# Patient Record
Sex: FEMALE | Race: WHITE | NOT HISPANIC OR LATINO | Employment: UNEMPLOYED | RURAL
[De-identification: names, ages, dates, MRNs, and addresses within clinical notes are randomized per-mention and may not be internally consistent; named-entity substitution may affect disease eponyms.]

---

## 2019-12-12 ENCOUNTER — HISTORICAL (OUTPATIENT)
Dept: ADMINISTRATIVE | Facility: HOSPITAL | Age: 39
End: 2019-12-12

## 2019-12-13 LAB
LAB AP CLINICAL INFORMATION: NORMAL
LAB AP DIAGNOSIS - HISTORICAL: NORMAL
LAB AP GROSS PATHOLOGY - HISTORICAL: NORMAL
LAB AP SPECIMEN SUBMITTED - HISTORICAL: NORMAL

## 2020-03-09 ENCOUNTER — HISTORICAL (OUTPATIENT)
Dept: ADMINISTRATIVE | Facility: HOSPITAL | Age: 40
End: 2020-03-09

## 2020-04-27 ENCOUNTER — HISTORICAL (OUTPATIENT)
Dept: ADMINISTRATIVE | Facility: HOSPITAL | Age: 40
End: 2020-04-27

## 2020-05-05 ENCOUNTER — HISTORICAL (OUTPATIENT)
Dept: ADMINISTRATIVE | Facility: HOSPITAL | Age: 40
End: 2020-05-05

## 2020-05-19 ENCOUNTER — HISTORICAL (OUTPATIENT)
Dept: ADMINISTRATIVE | Facility: HOSPITAL | Age: 40
End: 2020-05-19

## 2020-05-21 LAB
LAB AP CLINICAL INFORMATION: NORMAL
LAB AP GENERAL CAT - HISTORICAL: NORMAL
LAB AP INTERPRETATION/RESULT - HISTORICAL: NEGATIVE
LAB AP SPECIMEN ADEQUACY - HISTORICAL: NORMAL
LAB AP SPECIMEN SUBMITTED - HISTORICAL: NORMAL

## 2020-06-18 ENCOUNTER — HISTORICAL (OUTPATIENT)
Dept: ADMINISTRATIVE | Facility: HOSPITAL | Age: 40
End: 2020-06-18

## 2020-06-26 ENCOUNTER — HISTORICAL (OUTPATIENT)
Dept: ADMINISTRATIVE | Facility: HOSPITAL | Age: 40
End: 2020-06-26

## 2020-09-21 ENCOUNTER — HISTORICAL (OUTPATIENT)
Dept: ADMINISTRATIVE | Facility: HOSPITAL | Age: 40
End: 2020-09-21

## 2020-09-21 LAB
GLUCOSE SERPL-MCNC: 129 MG/DL (ref 70–105)
GLUCOSE SERPL-MCNC: 157 MG/DL (ref 70–105)

## 2020-10-07 ENCOUNTER — HISTORICAL (OUTPATIENT)
Dept: ADMINISTRATIVE | Facility: HOSPITAL | Age: 40
End: 2020-10-07

## 2020-10-07 LAB
ANION GAP SERPL CALCULATED.3IONS-SCNC: 15 MMOL/L
APTT PPP: 36.8 SECONDS (ref 25.2–37.3)
BASOPHILS # BLD AUTO: 0.05 X10E3/UL (ref 0–0.2)
BASOPHILS NFR BLD AUTO: 0.5 % (ref 0–1)
BUN SERPL-MCNC: 16 MG/DL (ref 7–18)
CALCIUM SERPL-MCNC: 9.4 MG/DL (ref 8.5–10.1)
CHLORIDE SERPL-SCNC: 101 MMOL/L (ref 98–107)
CK MB SERPL-MCNC: <1 NG/ML (ref 1–3.6)
CO2 SERPL-SCNC: 26 MMOL/L (ref 21–32)
CREAT SERPL-MCNC: 0.79 MG/DL (ref 0.55–1.02)
EOSINOPHIL # BLD AUTO: 0.24 X10E3/UL (ref 0–0.5)
EOSINOPHIL NFR BLD AUTO: 2.5 % (ref 1–4)
ERYTHROCYTE [DISTWIDTH] IN BLOOD BY AUTOMATED COUNT: 13.2 % (ref 11.5–14.5)
GLUCOSE SERPL-MCNC: 127 MG/DL (ref 74–106)
HCT VFR BLD AUTO: 38.6 % (ref 38–47)
HGB BLD-MCNC: 12.6 G/DL (ref 12–16)
IMM GRANULOCYTES # BLD AUTO: 0.08 X10E3/UL (ref 0–0.04)
IMM GRANULOCYTES NFR BLD: 0.8 % (ref 0–0.4)
INR BLD: 1.1 (ref 0–3.3)
LYMPHOCYTES # BLD AUTO: 2.26 X10E3/UL (ref 1–4.8)
LYMPHOCYTES NFR BLD AUTO: 23.3 % (ref 27–41)
MCH RBC QN AUTO: 28.2 PG (ref 27–31)
MCHC RBC AUTO-ENTMCNC: 32.6 G/DL (ref 32–36)
MCV RBC AUTO: 86.4 FL (ref 80–96)
MONOCYTES # BLD AUTO: 0.75 X10E3/UL (ref 0–0.8)
MONOCYTES NFR BLD AUTO: 7.7 % (ref 2–6)
MPC BLD CALC-MCNC: 13.4 FL (ref 9.4–12.4)
MYOGLOBIN SERPL-MCNC: 39 NG/ML (ref 13–71)
NEUTROPHILS # BLD AUTO: 6.32 X10E3/UL (ref 1.8–7.7)
NEUTROPHILS NFR BLD AUTO: 65.2 % (ref 53–65)
NRBC # BLD AUTO: 0 X10E3/UL (ref 0–0)
NRBC, AUTO (.00): 0 /100 (ref 0–0)
NT-PROBNP SERPL-MCNC: 22 PG/ML (ref 1–125)
PLATELET # BLD AUTO: 166 X10E3/UL (ref 150–400)
PLATELET MORPHOLOGY: ABNORMAL
POTASSIUM SERPL-SCNC: 3.9 MMOL/L (ref 3.5–5.1)
PROTHROMBIN TIME: 13.7 SECONDS (ref 11.7–14.7)
RBC # BLD AUTO: 4.47 X10E6/UL (ref 4.2–5.4)
RBC MORPH BLD: NORMAL
SODIUM SERPL-SCNC: 138 MMOL/L (ref 136–145)
TROPONIN I SERPL-MCNC: <0.017 NG/ML (ref 0–0.06)
WBC # BLD AUTO: 9.7 X10E3/UL (ref 4.5–11)

## 2020-11-16 ENCOUNTER — HISTORICAL (OUTPATIENT)
Dept: ADMINISTRATIVE | Facility: HOSPITAL | Age: 40
End: 2020-11-16

## 2020-11-16 LAB — GLUCOSE SERPL-MCNC: 125 MG/DL (ref 70–105)

## 2020-12-21 ENCOUNTER — HISTORICAL (OUTPATIENT)
Dept: ADMINISTRATIVE | Facility: HOSPITAL | Age: 40
End: 2020-12-21

## 2020-12-21 LAB — GLUCOSE SERPL-MCNC: 95 MG/DL (ref 70–105)

## 2021-03-24 RX ORDER — ESCITALOPRAM OXALATE 20 MG/1
20 TABLET ORAL DAILY
COMMUNITY
End: 2022-09-12

## 2021-03-24 RX ORDER — CYCLOBENZAPRINE HCL 10 MG
10 TABLET ORAL 3 TIMES DAILY PRN
COMMUNITY
End: 2021-05-06 | Stop reason: SDUPTHER

## 2021-03-24 RX ORDER — GABAPENTIN 300 MG/1
300 CAPSULE ORAL EVERY 8 HOURS
COMMUNITY
End: 2021-03-29 | Stop reason: SDUPTHER

## 2021-03-25 RX ORDER — BUDESONIDE AND FORMOTEROL FUMARATE DIHYDRATE 160; 4.5 UG/1; UG/1
2 AEROSOL RESPIRATORY (INHALATION) EVERY 12 HOURS
COMMUNITY
End: 2022-05-11 | Stop reason: SDUPTHER

## 2021-03-25 RX ORDER — LISINOPRIL 40 MG/1
40 TABLET ORAL DAILY
COMMUNITY
End: 2021-05-06 | Stop reason: SDUPTHER

## 2021-03-25 RX ORDER — TRIAMTERENE AND HYDROCHLOROTHIAZIDE 75; 50 MG/1; MG/1
1 TABLET ORAL DAILY
COMMUNITY
End: 2021-05-06 | Stop reason: SDUPTHER

## 2021-03-25 RX ORDER — HYDROCODONE BITARTRATE AND ACETAMINOPHEN 10; 325 MG/1; MG/1
1 TABLET ORAL EVERY 12 HOURS
COMMUNITY
End: 2021-03-29 | Stop reason: SDUPTHER

## 2021-03-25 RX ORDER — METFORMIN HYDROCHLORIDE EXTENDED-RELEASE TABLETS 500 MG/1
500 TABLET, FILM COATED, EXTENDED RELEASE ORAL
COMMUNITY
End: 2021-06-16 | Stop reason: SDUPTHER

## 2021-03-25 RX ORDER — PRAVASTATIN SODIUM 20 MG/1
20 TABLET ORAL NIGHTLY
COMMUNITY
End: 2021-05-06 | Stop reason: SDUPTHER

## 2021-03-29 PROBLEM — M54.17 LUMBOSACRAL RADICULOPATHY: Chronic | Status: ACTIVE | Noted: 2021-03-29

## 2021-03-29 PROBLEM — M05.79 RHEUMATOID ARTHRITIS INVOLVING MULTIPLE SITES WITH POSITIVE RHEUMATOID FACTOR: Chronic | Status: ACTIVE | Noted: 2021-03-29

## 2021-03-29 PROBLEM — G89.4 CHRONIC PAIN SYNDROME: Chronic | Status: ACTIVE | Noted: 2021-03-29

## 2021-03-31 ENCOUNTER — OFFICE VISIT (OUTPATIENT)
Dept: PAIN MEDICINE | Facility: HOSPITAL | Age: 41
End: 2021-03-31
Payer: MEDICAID

## 2021-03-31 VITALS
DIASTOLIC BLOOD PRESSURE: 91 MMHG | WEIGHT: 293 LBS | HEART RATE: 107 BPM | SYSTOLIC BLOOD PRESSURE: 150 MMHG | RESPIRATION RATE: 19 BRPM | HEIGHT: 62 IN | BODY MASS INDEX: 53.92 KG/M2

## 2021-03-31 DIAGNOSIS — M05.79 RHEUMATOID ARTHRITIS INVOLVING MULTIPLE SITES WITH POSITIVE RHEUMATOID FACTOR: Primary | Chronic | ICD-10-CM

## 2021-03-31 DIAGNOSIS — G89.4 CHRONIC PAIN SYNDROME: Chronic | ICD-10-CM

## 2021-03-31 DIAGNOSIS — Z79.899 ENCOUNTER FOR LONG-TERM (CURRENT) USE OF OTHER MEDICATIONS: ICD-10-CM

## 2021-03-31 DIAGNOSIS — M54.17 LUMBOSACRAL RADICULOPATHY: Chronic | ICD-10-CM

## 2021-03-31 LAB
CTP QC/QA: YES
POC (AMP) AMPHETAMINE: NEGATIVE
POC (BAR) BARBITURATES: NEGATIVE
POC (BUP) BUPRENORPHINE: NEGATIVE
POC (BZO) BENZODIAZEPINES: NEGATIVE
POC (COC) COCAINE: NEGATIVE
POC (MDMA) METHYLENEDIOXYMETHAMPHETAMINE 3,4: NEGATIVE
POC (MET) METHAMPHETAMINE: NEGATIVE
POC (MOP) OPIATES: ABNORMAL
POC (MTD) METHADONE: NEGATIVE
POC (OXY) OXYCODONE: NEGATIVE
POC (PCP) PHENCYCLIDINE: NEGATIVE
POC (TCA) TRICYCLIC ANTIDEPRESSANTS: NEGATIVE
POC TEMPERATURE (URINE): 90
POC THC: NEGATIVE

## 2021-03-31 PROCEDURE — 99214 OFFICE O/P EST MOD 30 MIN: CPT | Mod: S$PBB,,, | Performed by: PHYSICIAN ASSISTANT

## 2021-03-31 PROCEDURE — 99214 PR OFFICE/OUTPT VISIT, EST, LEVL IV, 30-39 MIN: ICD-10-PCS | Mod: S$PBB,,, | Performed by: PHYSICIAN ASSISTANT

## 2021-03-31 PROCEDURE — 99214 OFFICE O/P EST MOD 30 MIN: CPT | Mod: PBBFAC | Performed by: PHYSICIAN ASSISTANT

## 2021-03-31 PROCEDURE — 99999 PR PBB SHADOW E&M-EST. PATIENT-LVL IV: ICD-10-PCS | Mod: PBBFAC,,, | Performed by: PHYSICIAN ASSISTANT

## 2021-03-31 PROCEDURE — 99999 PR PBB SHADOW E&M-EST. PATIENT-LVL IV: CPT | Mod: PBBFAC,,, | Performed by: PHYSICIAN ASSISTANT

## 2021-03-31 RX ORDER — IPRATROPIUM BROMIDE AND ALBUTEROL SULFATE 2.5; .5 MG/3ML; MG/3ML
3 SOLUTION RESPIRATORY (INHALATION) EVERY 6 HOURS PRN
COMMUNITY

## 2021-03-31 RX ORDER — HYDROCODONE BITARTRATE AND ACETAMINOPHEN 10; 325 MG/1; MG/1
1 TABLET ORAL EVERY 12 HOURS
Qty: 60 TABLET | Refills: 0 | Status: SHIPPED | OUTPATIENT
Start: 2021-04-01 | End: 2021-05-01

## 2021-03-31 RX ORDER — GABAPENTIN 300 MG/1
300 CAPSULE ORAL EVERY 8 HOURS
Qty: 90 CAPSULE | Refills: 0 | Status: SHIPPED | OUTPATIENT
Start: 2021-03-31 | End: 2021-04-27 | Stop reason: SDUPTHER

## 2021-03-31 RX ORDER — METHOTREXATE 2.5 MG/1
2.5 TABLET ORAL
COMMUNITY
End: 2022-09-12

## 2021-04-27 ENCOUNTER — OFFICE VISIT (OUTPATIENT)
Dept: PAIN MEDICINE | Facility: CLINIC | Age: 41
End: 2021-04-27
Payer: MEDICAID

## 2021-04-27 VITALS
DIASTOLIC BLOOD PRESSURE: 82 MMHG | RESPIRATION RATE: 20 BRPM | HEIGHT: 62 IN | WEIGHT: 293 LBS | BODY MASS INDEX: 53.92 KG/M2 | SYSTOLIC BLOOD PRESSURE: 171 MMHG | HEART RATE: 99 BPM

## 2021-04-27 DIAGNOSIS — M05.79 RHEUMATOID ARTHRITIS INVOLVING MULTIPLE SITES WITH POSITIVE RHEUMATOID FACTOR: Chronic | ICD-10-CM

## 2021-04-27 DIAGNOSIS — G89.4 CHRONIC PAIN SYNDROME: Chronic | ICD-10-CM

## 2021-04-27 DIAGNOSIS — M54.17 LUMBOSACRAL RADICULOPATHY: Primary | Chronic | ICD-10-CM

## 2021-04-27 PROCEDURE — 99999 PR PBB SHADOW E&M-EST. PATIENT-LVL IV: CPT | Mod: PBBFAC,,, | Performed by: PHYSICIAN ASSISTANT

## 2021-04-27 PROCEDURE — 99999 PR PBB SHADOW E&M-EST. PATIENT-LVL IV: ICD-10-PCS | Mod: PBBFAC,,, | Performed by: PHYSICIAN ASSISTANT

## 2021-04-27 PROCEDURE — 99214 OFFICE O/P EST MOD 30 MIN: CPT | Mod: PBBFAC,25 | Performed by: PHYSICIAN ASSISTANT

## 2021-04-27 PROCEDURE — 99214 OFFICE O/P EST MOD 30 MIN: CPT | Mod: S$PBB,25,, | Performed by: PHYSICIAN ASSISTANT

## 2021-04-27 PROCEDURE — 96372 THER/PROPH/DIAG INJ SC/IM: CPT | Mod: PBBFAC | Performed by: PHYSICIAN ASSISTANT

## 2021-04-27 PROCEDURE — 99214 PR OFFICE/OUTPT VISIT, EST, LEVL IV, 30-39 MIN: ICD-10-PCS | Mod: S$PBB,25,, | Performed by: PHYSICIAN ASSISTANT

## 2021-04-27 RX ORDER — GABAPENTIN 300 MG/1
300 CAPSULE ORAL EVERY 8 HOURS
Qty: 90 CAPSULE | Refills: 0 | Status: SHIPPED | OUTPATIENT
Start: 2021-04-27 | End: 2021-06-28 | Stop reason: SDUPTHER

## 2021-04-27 RX ORDER — KETOROLAC TROMETHAMINE 30 MG/ML
60 INJECTION, SOLUTION INTRAMUSCULAR; INTRAVENOUS
Status: COMPLETED | OUTPATIENT
Start: 2021-04-27 | End: 2021-04-27

## 2021-04-27 RX ORDER — HYDROCODONE BITARTRATE AND ACETAMINOPHEN 10; 325 MG/1; MG/1
1 TABLET ORAL EVERY 8 HOURS
Qty: 90 TABLET | Refills: 0 | Status: SHIPPED | OUTPATIENT
Start: 2021-05-01 | End: 2021-05-31

## 2021-04-27 RX ORDER — HYDROCODONE BITARTRATE AND ACETAMINOPHEN 10; 325 MG/1; MG/1
1 TABLET ORAL EVERY 12 HOURS
Qty: 60 TABLET | Refills: 0 | Status: SHIPPED | OUTPATIENT
Start: 2021-05-31 | End: 2021-05-07 | Stop reason: SDUPTHER

## 2021-04-27 RX ADMIN — KETOROLAC TROMETHAMINE 60 MG: 60 INJECTION, SOLUTION INTRAMUSCULAR at 01:04

## 2021-05-06 ENCOUNTER — OFFICE VISIT (OUTPATIENT)
Dept: FAMILY MEDICINE | Facility: CLINIC | Age: 41
End: 2021-05-06
Payer: MEDICAID

## 2021-05-06 VITALS
SYSTOLIC BLOOD PRESSURE: 141 MMHG | HEIGHT: 62 IN | RESPIRATION RATE: 20 BRPM | HEART RATE: 75 BPM | DIASTOLIC BLOOD PRESSURE: 100 MMHG | WEIGHT: 293 LBS | BODY MASS INDEX: 53.92 KG/M2 | OXYGEN SATURATION: 98 % | TEMPERATURE: 97 F

## 2021-05-06 DIAGNOSIS — M62.838 MUSCLE SPASM: ICD-10-CM

## 2021-05-06 DIAGNOSIS — I10 ESSENTIAL HYPERTENSION: ICD-10-CM

## 2021-05-06 DIAGNOSIS — E78.00 HYPERCHOLESTEREMIA: ICD-10-CM

## 2021-05-06 DIAGNOSIS — J31.0 CHRONIC RHINITIS: ICD-10-CM

## 2021-05-06 DIAGNOSIS — Z23 NEED FOR DIPHTHERIA-TETANUS-PERTUSSIS (TDAP) VACCINE: ICD-10-CM

## 2021-05-06 DIAGNOSIS — E11.9 TYPE 2 DIABETES MELLITUS WITHOUT COMPLICATION, WITHOUT LONG-TERM CURRENT USE OF INSULIN: Primary | ICD-10-CM

## 2021-05-06 DIAGNOSIS — J45.909 ASTHMA, UNSPECIFIED ASTHMA SEVERITY, UNSPECIFIED WHETHER COMPLICATED, UNSPECIFIED WHETHER PERSISTENT: ICD-10-CM

## 2021-05-06 DIAGNOSIS — M05.79 RHEUMATOID ARTHRITIS INVOLVING MULTIPLE SITES WITH POSITIVE RHEUMATOID FACTOR: ICD-10-CM

## 2021-05-06 DIAGNOSIS — Z12.31 SCREENING MAMMOGRAM, ENCOUNTER FOR: ICD-10-CM

## 2021-05-06 DIAGNOSIS — Z79.899 ENCOUNTER FOR LONG-TERM (CURRENT) USE OF OTHER MEDICATIONS: ICD-10-CM

## 2021-05-06 PROCEDURE — 90471 TDAP VACCINE GREATER THAN OR EQUAL TO 7YO IM: ICD-10-PCS | Mod: ,,, | Performed by: NURSE PRACTITIONER

## 2021-05-06 PROCEDURE — 90715 TDAP VACCINE 7 YRS/> IM: CPT | Mod: ,,, | Performed by: NURSE PRACTITIONER

## 2021-05-06 PROCEDURE — 90715 TDAP VACCINE GREATER THAN OR EQUAL TO 7YO IM: ICD-10-PCS | Mod: ,,, | Performed by: NURSE PRACTITIONER

## 2021-05-06 PROCEDURE — 90471 IMMUNIZATION ADMIN: CPT | Mod: ,,, | Performed by: NURSE PRACTITIONER

## 2021-05-06 PROCEDURE — 99214 OFFICE O/P EST MOD 30 MIN: CPT | Mod: 25,,, | Performed by: NURSE PRACTITIONER

## 2021-05-06 PROCEDURE — 99214 PR OFFICE/OUTPT VISIT, EST, LEVL IV, 30-39 MIN: ICD-10-PCS | Mod: 25,,, | Performed by: NURSE PRACTITIONER

## 2021-05-06 RX ORDER — ADALIMUMAB 40MG/0.4ML
KIT SUBCUTANEOUS
COMMUNITY
Start: 2021-04-19 | End: 2021-09-08 | Stop reason: ALTCHOICE

## 2021-05-06 RX ORDER — BUSPIRONE HYDROCHLORIDE 15 MG/1
15 TABLET ORAL 2 TIMES DAILY
COMMUNITY
Start: 2021-04-28 | End: 2022-09-12

## 2021-05-06 RX ORDER — CYCLOBENZAPRINE HCL 10 MG
10 TABLET ORAL 3 TIMES DAILY PRN
Qty: 90 TABLET | Refills: 3 | Status: SHIPPED | OUTPATIENT
Start: 2021-05-06 | End: 2021-09-08 | Stop reason: SDUPTHER

## 2021-05-06 RX ORDER — HYDROXYZINE PAMOATE 50 MG/1
4 CAPSULE ORAL DAILY
COMMUNITY
Start: 2021-04-28 | End: 2022-09-12

## 2021-05-06 RX ORDER — AMLODIPINE BESYLATE 2.5 MG/1
2.5 TABLET ORAL DAILY
Qty: 30 TABLET | Refills: 3 | Status: SHIPPED | OUTPATIENT
Start: 2021-05-06 | End: 2021-12-22 | Stop reason: ALTCHOICE

## 2021-05-06 RX ORDER — TRIAMTERENE AND HYDROCHLOROTHIAZIDE 75; 50 MG/1; MG/1
1 TABLET ORAL DAILY
Qty: 90 TABLET | Refills: 1 | Status: SHIPPED | OUTPATIENT
Start: 2021-05-06 | End: 2021-05-20

## 2021-05-06 RX ORDER — LISINOPRIL 40 MG/1
40 TABLET ORAL DAILY
Qty: 90 TABLET | Refills: 1 | Status: SHIPPED | OUTPATIENT
Start: 2021-05-06 | End: 2021-09-08 | Stop reason: SDUPTHER

## 2021-05-06 RX ORDER — PRAVASTATIN SODIUM 20 MG/1
20 TABLET ORAL NIGHTLY
Qty: 90 TABLET | Refills: 3 | Status: SHIPPED | OUTPATIENT
Start: 2021-05-06 | End: 2021-06-16 | Stop reason: ALTCHOICE

## 2021-05-07 PROBLEM — J31.0 CHRONIC RHINITIS: Status: ACTIVE | Noted: 2021-05-07

## 2021-05-07 PROBLEM — J45.909 ASTHMA: Status: ACTIVE | Noted: 2021-05-07

## 2021-05-07 PROBLEM — M62.838 MUSCLE SPASM: Status: ACTIVE | Noted: 2021-05-07

## 2021-05-07 PROBLEM — E11.9 TYPE 2 DIABETES MELLITUS WITHOUT COMPLICATION, WITHOUT LONG-TERM CURRENT USE OF INSULIN: Status: ACTIVE | Noted: 2021-05-07

## 2021-05-07 PROBLEM — I10 ESSENTIAL HYPERTENSION: Status: ACTIVE | Noted: 2021-05-07

## 2021-05-07 PROBLEM — E78.00 HYPERCHOLESTEREMIA: Status: ACTIVE | Noted: 2021-05-07

## 2021-05-17 ENCOUNTER — HOSPITAL ENCOUNTER (OUTPATIENT)
Dept: RADIOLOGY | Facility: HOSPITAL | Age: 41
Discharge: HOME | End: 2021-05-17
Attending: NURSE PRACTITIONER
Payer: MEDICAID

## 2021-05-17 VITALS — BODY MASS INDEX: 68.84 KG/M2 | HEIGHT: 62 IN

## 2021-05-17 DIAGNOSIS — Z12.31 SCREENING MAMMOGRAM, ENCOUNTER FOR: ICD-10-CM

## 2021-05-17 PROCEDURE — 77067 SCR MAMMO BI INCL CAD: CPT | Mod: TC

## 2021-05-20 ENCOUNTER — APPOINTMENT (OUTPATIENT)
Dept: RADIOLOGY | Facility: CLINIC | Age: 41
End: 2021-05-20
Attending: NURSE PRACTITIONER
Payer: MEDICAID

## 2021-05-20 ENCOUNTER — OFFICE VISIT (OUTPATIENT)
Dept: FAMILY MEDICINE | Facility: CLINIC | Age: 41
End: 2021-05-20
Payer: MEDICAID

## 2021-05-20 ENCOUNTER — TELEPHONE (OUTPATIENT)
Dept: FAMILY MEDICINE | Facility: CLINIC | Age: 41
End: 2021-05-20

## 2021-05-20 VITALS
BODY MASS INDEX: 53.92 KG/M2 | DIASTOLIC BLOOD PRESSURE: 110 MMHG | HEIGHT: 62 IN | OXYGEN SATURATION: 98 % | SYSTOLIC BLOOD PRESSURE: 168 MMHG | TEMPERATURE: 98 F | HEART RATE: 97 BPM | RESPIRATION RATE: 22 BRPM | WEIGHT: 293 LBS

## 2021-05-20 DIAGNOSIS — R05.9 COUGH: Primary | ICD-10-CM

## 2021-05-20 DIAGNOSIS — J32.9 SINUSITIS, UNSPECIFIED CHRONICITY, UNSPECIFIED LOCATION: ICD-10-CM

## 2021-05-20 DIAGNOSIS — R05.9 COUGH: ICD-10-CM

## 2021-05-20 DIAGNOSIS — R50.9 FEVER, UNSPECIFIED FEVER CAUSE: ICD-10-CM

## 2021-05-20 PROCEDURE — 96372 PR INJECTION,THERAP/PROPH/DIAG2ST, IM OR SUBCUT: ICD-10-PCS | Mod: ,,, | Performed by: NURSE PRACTITIONER

## 2021-05-20 PROCEDURE — 96372 THER/PROPH/DIAG INJ SC/IM: CPT | Mod: ,,, | Performed by: NURSE PRACTITIONER

## 2021-05-20 PROCEDURE — 71046 X-RAY EXAM CHEST 2 VIEWS: CPT | Mod: TC,RHCUB | Performed by: NURSE PRACTITIONER

## 2021-05-20 PROCEDURE — 99213 OFFICE O/P EST LOW 20 MIN: CPT | Mod: 25,,, | Performed by: NURSE PRACTITIONER

## 2021-05-20 PROCEDURE — 99213 PR OFFICE/OUTPT VISIT, EST, LEVL III, 20-29 MIN: ICD-10-PCS | Mod: 25,,, | Performed by: NURSE PRACTITIONER

## 2021-05-20 RX ORDER — PROMETHAZINE HYDROCHLORIDE AND DEXTROMETHORPHAN HYDROBROMIDE 6.25; 15 MG/5ML; MG/5ML
5 SYRUP ORAL EVERY 4 HOURS PRN
Qty: 150 ML | Refills: 0 | Status: SHIPPED | OUTPATIENT
Start: 2021-05-20 | End: 2021-05-30

## 2021-05-20 RX ORDER — METFORMIN HYDROCHLORIDE 500 MG/1
500 TABLET, EXTENDED RELEASE ORAL DAILY
COMMUNITY
Start: 2020-12-23 | End: 2022-01-11 | Stop reason: SDUPTHER

## 2021-05-20 RX ORDER — OLOPATADINE HYDROCHLORIDE 2 MG/ML
SOLUTION/ DROPS OPHTHALMIC
COMMUNITY
Start: 2020-12-01 | End: 2022-01-11

## 2021-05-20 RX ORDER — CEFTRIAXONE 1 G/1
1 INJECTION, POWDER, FOR SOLUTION INTRAMUSCULAR; INTRAVENOUS
Status: COMPLETED | OUTPATIENT
Start: 2021-05-20 | End: 2021-05-20

## 2021-05-20 RX ORDER — CEFDINIR 300 MG/1
300 CAPSULE ORAL 2 TIMES DAILY
Qty: 20 CAPSULE | Refills: 0 | Status: SHIPPED | OUTPATIENT
Start: 2021-05-20 | End: 2021-09-08 | Stop reason: ALTCHOICE

## 2021-05-20 RX ORDER — CALCIUM CITRATE/VITAMIN D3 200MG-6.25
TABLET ORAL
COMMUNITY
Start: 2021-01-01 | End: 2023-01-12 | Stop reason: SDUPTHER

## 2021-05-20 RX ORDER — ETANERCEPT 50 MG/ML
SOLUTION SUBCUTANEOUS
COMMUNITY
Start: 2021-05-07 | End: 2022-01-11

## 2021-05-20 RX ORDER — SPIRONOLACTONE 50 MG/1
75 TABLET, FILM COATED ORAL DAILY
COMMUNITY
Start: 2020-12-02 | End: 2022-07-06 | Stop reason: SDUPTHER

## 2021-05-20 RX ORDER — HYDROCODONE BITARTRATE AND ACETAMINOPHEN 7.5; 325 MG/1; MG/1
TABLET ORAL
COMMUNITY
Start: 2021-01-01 | End: 2021-05-20

## 2021-05-20 RX ADMIN — CEFTRIAXONE 1 G: 1 INJECTION, POWDER, FOR SOLUTION INTRAMUSCULAR; INTRAVENOUS at 03:05

## 2021-05-24 ENCOUNTER — OFFICE VISIT (OUTPATIENT)
Dept: FAMILY MEDICINE | Facility: CLINIC | Age: 41
End: 2021-05-24
Payer: MEDICAID

## 2021-05-24 VITALS
BODY MASS INDEX: 55.32 KG/M2 | TEMPERATURE: 99 F | OXYGEN SATURATION: 98 % | HEART RATE: 95 BPM | HEIGHT: 61 IN | SYSTOLIC BLOOD PRESSURE: 160 MMHG | WEIGHT: 293 LBS | RESPIRATION RATE: 20 BRPM | DIASTOLIC BLOOD PRESSURE: 102 MMHG

## 2021-05-24 DIAGNOSIS — I10 ESSENTIAL HYPERTENSION: Primary | ICD-10-CM

## 2021-05-24 PROCEDURE — 99212 PR OFFICE/OUTPT VISIT, EST, LEVL II, 10-19 MIN: ICD-10-PCS | Mod: ,,, | Performed by: NURSE PRACTITIONER

## 2021-05-24 PROCEDURE — 99212 OFFICE O/P EST SF 10 MIN: CPT | Mod: ,,, | Performed by: NURSE PRACTITIONER

## 2021-06-08 ENCOUNTER — OFFICE VISIT (OUTPATIENT)
Dept: ORTHOPEDICS | Facility: CLINIC | Age: 41
End: 2021-06-08
Payer: MEDICAID

## 2021-06-08 DIAGNOSIS — M22.41 CHONDROMALACIA OF PATELLOFEMORAL JOINT, RIGHT: Primary | ICD-10-CM

## 2021-06-08 PROCEDURE — 20610 LARGE JOINT ASPIRATION/INJECTION: R SUPRA PATELLAR BURSA: ICD-10-PCS | Mod: RT,,, | Performed by: ORTHOPAEDIC SURGERY

## 2021-06-08 PROCEDURE — 20610 DRAIN/INJ JOINT/BURSA W/O US: CPT | Mod: RT,,, | Performed by: ORTHOPAEDIC SURGERY

## 2021-06-08 PROCEDURE — 99212 OFFICE O/P EST SF 10 MIN: CPT | Mod: 25,,, | Performed by: ORTHOPAEDIC SURGERY

## 2021-06-08 PROCEDURE — 99212 PR OFFICE/OUTPT VISIT, EST, LEVL II, 10-19 MIN: ICD-10-PCS | Mod: 25,,, | Performed by: ORTHOPAEDIC SURGERY

## 2021-06-08 RX ORDER — HYDROCODONE BITARTRATE AND ACETAMINOPHEN 10; 325 MG/1; MG/1
TABLET ORAL
COMMUNITY
Start: 2021-06-01 | End: 2021-06-28 | Stop reason: SDUPTHER

## 2021-06-08 RX ORDER — TRIAMCINOLONE ACETONIDE 40 MG/ML
40 INJECTION, SUSPENSION INTRA-ARTICULAR; INTRAMUSCULAR
Status: DISCONTINUED | OUTPATIENT
Start: 2021-06-08 | End: 2021-06-08 | Stop reason: HOSPADM

## 2021-06-08 RX ADMIN — TRIAMCINOLONE ACETONIDE 40 MG: 40 INJECTION, SUSPENSION INTRA-ARTICULAR; INTRAMUSCULAR at 09:06

## 2021-06-16 ENCOUNTER — OFFICE VISIT (OUTPATIENT)
Dept: CARDIOLOGY | Facility: CLINIC | Age: 41
End: 2021-06-16
Payer: MEDICAID

## 2021-06-16 VITALS
DIASTOLIC BLOOD PRESSURE: 82 MMHG | WEIGHT: 293 LBS | RESPIRATION RATE: 14 BRPM | HEIGHT: 61 IN | SYSTOLIC BLOOD PRESSURE: 120 MMHG | HEART RATE: 82 BPM | BODY MASS INDEX: 55.32 KG/M2

## 2021-06-16 DIAGNOSIS — E78.5 HYPERLIPIDEMIA, UNSPECIFIED HYPERLIPIDEMIA TYPE: ICD-10-CM

## 2021-06-16 DIAGNOSIS — I10 ESSENTIAL HYPERTENSION: ICD-10-CM

## 2021-06-16 DIAGNOSIS — E66.01 MORBIDLY OBESE: ICD-10-CM

## 2021-06-16 PROCEDURE — 93010 ELECTROCARDIOGRAM REPORT: CPT | Mod: S$PBB,,, | Performed by: STUDENT IN AN ORGANIZED HEALTH CARE EDUCATION/TRAINING PROGRAM

## 2021-06-16 PROCEDURE — 93010 EKG 12-LEAD: ICD-10-PCS | Mod: S$PBB,,, | Performed by: STUDENT IN AN ORGANIZED HEALTH CARE EDUCATION/TRAINING PROGRAM

## 2021-06-16 PROCEDURE — 93005 ELECTROCARDIOGRAM TRACING: CPT | Mod: PBBFAC | Performed by: STUDENT IN AN ORGANIZED HEALTH CARE EDUCATION/TRAINING PROGRAM

## 2021-06-16 PROCEDURE — 99204 PR OFFICE/OUTPT VISIT, NEW, LEVL IV, 45-59 MIN: ICD-10-PCS | Mod: S$PBB,,, | Performed by: STUDENT IN AN ORGANIZED HEALTH CARE EDUCATION/TRAINING PROGRAM

## 2021-06-16 PROCEDURE — 99204 OFFICE O/P NEW MOD 45 MIN: CPT | Mod: S$PBB,,, | Performed by: STUDENT IN AN ORGANIZED HEALTH CARE EDUCATION/TRAINING PROGRAM

## 2021-06-16 PROCEDURE — 99215 OFFICE O/P EST HI 40 MIN: CPT | Mod: PBBFAC | Performed by: STUDENT IN AN ORGANIZED HEALTH CARE EDUCATION/TRAINING PROGRAM

## 2021-06-16 RX ORDER — ROSUVASTATIN CALCIUM 40 MG/1
40 TABLET, COATED ORAL NIGHTLY
Qty: 90 TABLET | Refills: 3 | Status: SHIPPED | OUTPATIENT
Start: 2021-06-16 | End: 2023-06-27 | Stop reason: SDUPTHER

## 2021-06-28 ENCOUNTER — OFFICE VISIT (OUTPATIENT)
Dept: PAIN MEDICINE | Facility: CLINIC | Age: 41
End: 2021-06-28
Payer: MEDICAID

## 2021-06-28 VITALS
HEART RATE: 108 BPM | HEIGHT: 62 IN | RESPIRATION RATE: 20 BRPM | DIASTOLIC BLOOD PRESSURE: 106 MMHG | SYSTOLIC BLOOD PRESSURE: 166 MMHG | BODY MASS INDEX: 53.92 KG/M2 | WEIGHT: 293 LBS

## 2021-06-28 DIAGNOSIS — M05.79 RHEUMATOID ARTHRITIS INVOLVING MULTIPLE SITES WITH POSITIVE RHEUMATOID FACTOR: Chronic | ICD-10-CM

## 2021-06-28 DIAGNOSIS — M54.17 LUMBOSACRAL RADICULOPATHY: Chronic | ICD-10-CM

## 2021-06-28 DIAGNOSIS — Z79.899 ENCOUNTER FOR LONG-TERM (CURRENT) USE OF OTHER MEDICATIONS: Primary | ICD-10-CM

## 2021-06-28 LAB
CTP QC/QA: YES
POC (AMP) AMPHETAMINE: NEGATIVE
POC (BAR) BARBITURATES: NEGATIVE
POC (BUP) BUPRENORPHINE: NEGATIVE
POC (BZO) BENZODIAZEPINES: NEGATIVE
POC (COC) COCAINE: NEGATIVE
POC (MDMA) METHYLENEDIOXYMETHAMPHETAMINE 3,4: NEGATIVE
POC (MET) METHAMPHETAMINE: NEGATIVE
POC (MOP) OPIATES: ABNORMAL
POC (MTD) METHADONE: NEGATIVE
POC (OXY) OXYCODONE: NEGATIVE
POC (PCP) PHENCYCLIDINE: NEGATIVE
POC (TCA) TRICYCLIC ANTIDEPRESSANTS: NEGATIVE
POC TEMPERATURE (URINE): 92
POC THC: NEGATIVE

## 2021-06-28 PROCEDURE — 99214 OFFICE O/P EST MOD 30 MIN: CPT | Mod: S$PBB,,, | Performed by: PHYSICIAN ASSISTANT

## 2021-06-28 PROCEDURE — 80305 DRUG TEST PRSMV DIR OPT OBS: CPT | Mod: PBBFAC | Performed by: PHYSICIAN ASSISTANT

## 2021-06-28 PROCEDURE — 99215 OFFICE O/P EST HI 40 MIN: CPT | Mod: PBBFAC | Performed by: PHYSICIAN ASSISTANT

## 2021-06-28 PROCEDURE — 99214 PR OFFICE/OUTPT VISIT, EST, LEVL IV, 30-39 MIN: ICD-10-PCS | Mod: S$PBB,,, | Performed by: PHYSICIAN ASSISTANT

## 2021-06-28 RX ORDER — HYDROCODONE BITARTRATE AND ACETAMINOPHEN 10; 325 MG/1; MG/1
1 TABLET ORAL EVERY 12 HOURS
Qty: 60 TABLET | Refills: 0 | Status: SHIPPED | OUTPATIENT
Start: 2021-07-31 | End: 2021-08-25 | Stop reason: SDUPTHER

## 2021-06-28 RX ORDER — HYDROCODONE BITARTRATE AND ACETAMINOPHEN 10; 325 MG/1; MG/1
1 TABLET ORAL EVERY 12 HOURS
Qty: 60 TABLET | Refills: 0 | Status: SHIPPED | OUTPATIENT
Start: 2021-07-01 | End: 2021-07-31

## 2021-06-28 RX ORDER — GABAPENTIN 300 MG/1
300 CAPSULE ORAL EVERY 8 HOURS
Qty: 90 CAPSULE | Refills: 1 | Status: SHIPPED | OUTPATIENT
Start: 2021-06-28 | End: 2021-08-25 | Stop reason: SDUPTHER

## 2021-08-25 RX ORDER — HYDROCODONE BITARTRATE AND ACETAMINOPHEN 10; 325 MG/1; MG/1
1 TABLET ORAL EVERY 12 HOURS
Qty: 60 TABLET | Refills: 0 | Status: CANCELLED | OUTPATIENT
Start: 2021-08-25 | End: 2021-09-24

## 2021-08-26 ENCOUNTER — OFFICE VISIT (OUTPATIENT)
Dept: PAIN MEDICINE | Facility: CLINIC | Age: 41
End: 2021-08-26
Payer: MEDICAID

## 2021-08-26 VITALS
HEART RATE: 75 BPM | WEIGHT: 293 LBS | DIASTOLIC BLOOD PRESSURE: 78 MMHG | BODY MASS INDEX: 53.92 KG/M2 | SYSTOLIC BLOOD PRESSURE: 144 MMHG | HEIGHT: 62 IN

## 2021-08-26 DIAGNOSIS — M05.79 RHEUMATOID ARTHRITIS INVOLVING MULTIPLE SITES WITH POSITIVE RHEUMATOID FACTOR: Primary | Chronic | ICD-10-CM

## 2021-08-26 DIAGNOSIS — M05.79 RHEUMATOID ARTHRITIS INVOLVING MULTIPLE SITES WITH POSITIVE RHEUMATOID FACTOR: Chronic | ICD-10-CM

## 2021-08-26 DIAGNOSIS — M54.17 LUMBOSACRAL RADICULOPATHY: Chronic | ICD-10-CM

## 2021-08-26 PROCEDURE — 99214 OFFICE O/P EST MOD 30 MIN: CPT | Mod: PBBFAC | Performed by: PHYSICIAN ASSISTANT

## 2021-08-26 PROCEDURE — 99214 PR OFFICE/OUTPT VISIT, EST, LEVL IV, 30-39 MIN: ICD-10-PCS | Mod: S$PBB,,, | Performed by: PHYSICIAN ASSISTANT

## 2021-08-26 PROCEDURE — 99214 OFFICE O/P EST MOD 30 MIN: CPT | Mod: S$PBB,,, | Performed by: PHYSICIAN ASSISTANT

## 2021-08-26 RX ORDER — GABAPENTIN 300 MG/1
300 CAPSULE ORAL EVERY 8 HOURS
Qty: 90 CAPSULE | Refills: 0 | Status: SHIPPED | OUTPATIENT
Start: 2021-08-26 | End: 2021-09-28 | Stop reason: SDUPTHER

## 2021-08-26 RX ORDER — HYDROCODONE BITARTRATE AND ACETAMINOPHEN 10; 325 MG/1; MG/1
1 TABLET ORAL EVERY 12 HOURS
Qty: 60 TABLET | Refills: 0 | Status: SHIPPED | OUTPATIENT
Start: 2021-08-31 | End: 2021-09-28 | Stop reason: SDUPTHER

## 2021-09-08 ENCOUNTER — OFFICE VISIT (OUTPATIENT)
Dept: FAMILY MEDICINE | Facility: CLINIC | Age: 41
End: 2021-09-08
Payer: MEDICAID

## 2021-09-08 VITALS
HEART RATE: 95 BPM | BODY MASS INDEX: 53.92 KG/M2 | HEIGHT: 62 IN | SYSTOLIC BLOOD PRESSURE: 130 MMHG | RESPIRATION RATE: 20 BRPM | DIASTOLIC BLOOD PRESSURE: 92 MMHG | WEIGHT: 293 LBS | TEMPERATURE: 98 F | OXYGEN SATURATION: 99 %

## 2021-09-08 DIAGNOSIS — Z00.00 ROUTINE GENERAL MEDICAL EXAMINATION AT A HEALTH CARE FACILITY: Primary | ICD-10-CM

## 2021-09-08 DIAGNOSIS — E66.01 MORBID OBESITY WITH BMI OF 70 AND OVER, ADULT: ICD-10-CM

## 2021-09-08 DIAGNOSIS — E78.00 PURE HYPERCHOLESTEROLEMIA: ICD-10-CM

## 2021-09-08 DIAGNOSIS — Z13.1 DIABETES MELLITUS SCREENING: ICD-10-CM

## 2021-09-08 DIAGNOSIS — J31.0 CHRONIC RHINITIS: ICD-10-CM

## 2021-09-08 DIAGNOSIS — I10 ESSENTIAL HYPERTENSION: ICD-10-CM

## 2021-09-08 DIAGNOSIS — M62.838 MUSCLE SPASM: ICD-10-CM

## 2021-09-08 DIAGNOSIS — E11.9 TYPE 2 DIABETES MELLITUS WITHOUT COMPLICATION, WITHOUT LONG-TERM CURRENT USE OF INSULIN: ICD-10-CM

## 2021-09-08 DIAGNOSIS — Z13.220 SCREENING FOR HYPERLIPIDEMIA: ICD-10-CM

## 2021-09-08 DIAGNOSIS — J45.40 MODERATE PERSISTENT ASTHMA WITHOUT COMPLICATION: ICD-10-CM

## 2021-09-08 DIAGNOSIS — Z23 INFLUENZA VACCINE NEEDED: ICD-10-CM

## 2021-09-08 PROCEDURE — 90471 FLU VACCINE (QUAD) GREATER THAN OR EQUAL TO 3YO PRESERVATIVE FREE IM: ICD-10-PCS | Mod: ,,, | Performed by: NURSE PRACTITIONER

## 2021-09-08 PROCEDURE — 90686 IIV4 VACC NO PRSV 0.5 ML IM: CPT | Mod: ,,, | Performed by: NURSE PRACTITIONER

## 2021-09-08 PROCEDURE — 99396 PREV VISIT EST AGE 40-64: CPT | Mod: 25,,, | Performed by: NURSE PRACTITIONER

## 2021-09-08 PROCEDURE — 90686 FLU VACCINE (QUAD) GREATER THAN OR EQUAL TO 3YO PRESERVATIVE FREE IM: ICD-10-PCS | Mod: ,,, | Performed by: NURSE PRACTITIONER

## 2021-09-08 PROCEDURE — 99396 PR PREVENTIVE VISIT,EST,40-64: ICD-10-PCS | Mod: 25,,, | Performed by: NURSE PRACTITIONER

## 2021-09-08 PROCEDURE — 90471 IMMUNIZATION ADMIN: CPT | Mod: ,,, | Performed by: NURSE PRACTITIONER

## 2021-09-08 RX ORDER — MELOXICAM 15 MG/1
15 TABLET ORAL DAILY
COMMUNITY
Start: 2021-09-01 | End: 2022-01-11

## 2021-09-08 RX ORDER — TRIAMCINOLONE ACETONIDE 1 MG/G
OINTMENT TOPICAL 2 TIMES DAILY
Qty: 454 G | Refills: 0 | Status: SHIPPED | OUTPATIENT
Start: 2021-09-08

## 2021-09-08 RX ORDER — LIRAGLUTIDE 6 MG/ML
0.6 INJECTION SUBCUTANEOUS DAILY
Qty: 3 ML | Refills: 5 | Status: SHIPPED | OUTPATIENT
Start: 2021-09-08 | End: 2022-01-11

## 2021-09-08 RX ORDER — HYDROCHLOROTHIAZIDE 12.5 MG/1
1 CAPSULE ORAL DAILY
COMMUNITY
End: 2022-01-11

## 2021-09-08 RX ORDER — LISINOPRIL 40 MG/1
40 TABLET ORAL DAILY
Qty: 90 TABLET | Refills: 1 | Status: SHIPPED | OUTPATIENT
Start: 2021-09-08 | End: 2023-06-27 | Stop reason: ALTCHOICE

## 2021-09-08 RX ORDER — FLUTICASONE PROPIONATE 50 MCG
1-2 SPRAY, SUSPENSION (ML) NASAL DAILY
COMMUNITY
Start: 2020-12-02 | End: 2022-09-12

## 2021-09-08 RX ORDER — PEN NEEDLE, DIABETIC 29 G X1/2"
NEEDLE, DISPOSABLE MISCELLANEOUS
Qty: 30 EACH | Refills: 11 | Status: SHIPPED | OUTPATIENT
Start: 2021-09-08 | End: 2022-09-12 | Stop reason: ALTCHOICE

## 2021-09-08 RX ORDER — CYCLOBENZAPRINE HCL 10 MG
10 TABLET ORAL 3 TIMES DAILY PRN
Qty: 90 TABLET | Refills: 3 | Status: SHIPPED | OUTPATIENT
Start: 2021-09-08 | End: 2021-10-11

## 2021-09-08 RX ORDER — TRIAMTERENE AND HYDROCHLOROTHIAZIDE 75; 50 MG/1; MG/1
1 TABLET ORAL DAILY
COMMUNITY
Start: 2021-07-01 | End: 2022-07-07 | Stop reason: SDUPTHER

## 2021-09-09 LAB
CHOLEST SERPL-MCNC: 159 MG/DL (ref 0–200)
CHOLEST/HDLC SERPL: 3.9 {RATIO}
EST. AVERAGE GLUCOSE BLD GHB EST-MCNC: 114 MG/DL
GLUCOSE SERPL-MCNC: 117 MG/DL (ref 74–106)
HBA1C MFR BLD HPLC: 6 % (ref 4.5–6.6)
HDLC SERPL-MCNC: 41 MG/DL (ref 40–60)
LDLC SERPL CALC-MCNC: 101 MG/DL
LDLC/HDLC SERPL: 2.5 {RATIO}
NONHDLC SERPL-MCNC: 118 MG/DL
TRIGL SERPL-MCNC: 85 MG/DL (ref 35–150)
VLDLC SERPL-MCNC: 17 MG/DL

## 2021-09-09 PROCEDURE — 80061 LIPID PANEL: ICD-10-PCS | Mod: ,,, | Performed by: CLINICAL MEDICAL LABORATORY

## 2021-09-09 PROCEDURE — 82947 ASSAY GLUCOSE BLOOD QUANT: CPT | Mod: ,,, | Performed by: CLINICAL MEDICAL LABORATORY

## 2021-09-09 PROCEDURE — 80061 LIPID PANEL: CPT | Mod: ,,, | Performed by: CLINICAL MEDICAL LABORATORY

## 2021-09-09 PROCEDURE — 83036 HEMOGLOBIN GLYCOSYLATED A1C: CPT | Mod: ,,, | Performed by: CLINICAL MEDICAL LABORATORY

## 2021-09-09 PROCEDURE — 82947 GLUCOSE, FASTING: ICD-10-PCS | Mod: ,,, | Performed by: CLINICAL MEDICAL LABORATORY

## 2021-09-09 PROCEDURE — 83036 HEMOGLOBIN A1C: ICD-10-PCS | Mod: ,,, | Performed by: CLINICAL MEDICAL LABORATORY

## 2021-09-29 ENCOUNTER — OFFICE VISIT (OUTPATIENT)
Dept: PAIN MEDICINE | Facility: CLINIC | Age: 41
End: 2021-09-29
Payer: MEDICAID

## 2021-09-29 VITALS
WEIGHT: 293 LBS | HEART RATE: 83 BPM | BODY MASS INDEX: 53.92 KG/M2 | DIASTOLIC BLOOD PRESSURE: 97 MMHG | HEIGHT: 62 IN | RESPIRATION RATE: 20 BRPM | SYSTOLIC BLOOD PRESSURE: 166 MMHG

## 2021-09-29 DIAGNOSIS — M54.17 LUMBOSACRAL RADICULOPATHY: Chronic | ICD-10-CM

## 2021-09-29 DIAGNOSIS — M05.79 RHEUMATOID ARTHRITIS INVOLVING MULTIPLE SITES WITH POSITIVE RHEUMATOID FACTOR: Primary | Chronic | ICD-10-CM

## 2021-09-29 DIAGNOSIS — Z79.899 ENCOUNTER FOR LONG-TERM (CURRENT) USE OF OTHER MEDICATIONS: ICD-10-CM

## 2021-09-29 PROCEDURE — 99215 OFFICE O/P EST HI 40 MIN: CPT | Mod: PBBFAC,25 | Performed by: PHYSICIAN ASSISTANT

## 2021-09-29 PROCEDURE — 99214 PR OFFICE/OUTPT VISIT, EST, LEVL IV, 30-39 MIN: ICD-10-PCS | Mod: S$PBB,25,, | Performed by: PHYSICIAN ASSISTANT

## 2021-09-29 PROCEDURE — 96372 THER/PROPH/DIAG INJ SC/IM: CPT | Mod: PBBFAC | Performed by: PHYSICIAN ASSISTANT

## 2021-09-29 PROCEDURE — 80305 DRUG TEST PRSMV DIR OPT OBS: CPT | Mod: PBBFAC | Performed by: PHYSICIAN ASSISTANT

## 2021-09-29 PROCEDURE — 99214 OFFICE O/P EST MOD 30 MIN: CPT | Mod: S$PBB,25,, | Performed by: PHYSICIAN ASSISTANT

## 2021-09-29 RX ORDER — HYDROCODONE BITARTRATE AND ACETAMINOPHEN 10; 325 MG/1; MG/1
1 TABLET ORAL EVERY 12 HOURS
Qty: 60 TABLET | Refills: 0 | Status: SHIPPED | OUTPATIENT
Start: 2021-10-31 | End: 2021-11-18 | Stop reason: SDUPTHER

## 2021-09-29 RX ORDER — HYDROCODONE BITARTRATE AND ACETAMINOPHEN 10; 325 MG/1; MG/1
1 TABLET ORAL EVERY 12 HOURS
Qty: 60 TABLET | Refills: 0 | Status: SHIPPED | OUTPATIENT
Start: 2021-10-01 | End: 2021-10-31

## 2021-09-29 RX ORDER — KETOROLAC TROMETHAMINE 30 MG/ML
60 INJECTION, SOLUTION INTRAMUSCULAR; INTRAVENOUS
Status: COMPLETED | OUTPATIENT
Start: 2021-09-29 | End: 2021-09-29

## 2021-09-29 RX ORDER — GABAPENTIN 300 MG/1
300 CAPSULE ORAL EVERY 8 HOURS
Qty: 90 CAPSULE | Refills: 1 | Status: SHIPPED | OUTPATIENT
Start: 2021-09-29 | End: 2021-11-18 | Stop reason: SDUPTHER

## 2021-09-29 RX ADMIN — KETOROLAC TROMETHAMINE 60 MG: 30 INJECTION, SOLUTION INTRAMUSCULAR; INTRAVENOUS at 10:09

## 2021-10-17 ENCOUNTER — OFFICE VISIT (OUTPATIENT)
Dept: FAMILY MEDICINE | Facility: CLINIC | Age: 41
End: 2021-10-17
Payer: MEDICAID

## 2021-10-17 VITALS
BODY MASS INDEX: 53.92 KG/M2 | OXYGEN SATURATION: 97 % | WEIGHT: 293 LBS | TEMPERATURE: 99 F | RESPIRATION RATE: 20 BRPM | SYSTOLIC BLOOD PRESSURE: 178 MMHG | HEIGHT: 62 IN | HEART RATE: 86 BPM | DIASTOLIC BLOOD PRESSURE: 122 MMHG

## 2021-10-17 DIAGNOSIS — L02.91 ABSCESS: Primary | ICD-10-CM

## 2021-10-17 PROCEDURE — 96372 PR INJECTION,THERAP/PROPH/DIAG2ST, IM OR SUBCUT: ICD-10-PCS | Mod: ,,, | Performed by: FAMILY MEDICINE

## 2021-10-17 PROCEDURE — 99051 MED SERV EVE/WKEND/HOLIDAY: CPT | Mod: ,,, | Performed by: FAMILY MEDICINE

## 2021-10-17 PROCEDURE — 99214 OFFICE O/P EST MOD 30 MIN: CPT | Mod: 25,,, | Performed by: FAMILY MEDICINE

## 2021-10-17 PROCEDURE — 99214 PR OFFICE/OUTPT VISIT, EST, LEVL IV, 30-39 MIN: ICD-10-PCS | Mod: 25,,, | Performed by: FAMILY MEDICINE

## 2021-10-17 PROCEDURE — 96372 THER/PROPH/DIAG INJ SC/IM: CPT | Mod: ,,, | Performed by: FAMILY MEDICINE

## 2021-10-17 PROCEDURE — 99051 PR MEDICAL SERVICES, EVE/WKEND/HOLIDAY: ICD-10-PCS | Mod: ,,, | Performed by: FAMILY MEDICINE

## 2021-10-17 RX ORDER — CLINDAMYCIN PHOSPHATE 150 MG/ML
300 INJECTION, SOLUTION INTRAVENOUS
Status: COMPLETED | OUTPATIENT
Start: 2021-10-17 | End: 2021-10-17

## 2021-10-17 RX ORDER — CLINDAMYCIN HYDROCHLORIDE 300 MG/1
300 CAPSULE ORAL 3 TIMES DAILY
Qty: 21 CAPSULE | Refills: 0 | Status: SHIPPED | OUTPATIENT
Start: 2021-10-17 | End: 2021-10-24

## 2021-10-17 RX ORDER — PEN NEEDLE, DIABETIC 32GX 5/32"
NEEDLE, DISPOSABLE MISCELLANEOUS
COMMUNITY
Start: 2021-09-11 | End: 2022-09-12 | Stop reason: ALTCHOICE

## 2021-10-17 RX ORDER — PROMETHAZINE HYDROCHLORIDE 25 MG/1
25 TABLET ORAL EVERY 6 HOURS PRN
Qty: 20 TABLET | Refills: 0 | Status: SHIPPED | OUTPATIENT
Start: 2021-10-17 | End: 2022-01-11 | Stop reason: SDUPTHER

## 2021-10-17 RX ORDER — IBUPROFEN 800 MG/1
800 TABLET ORAL 3 TIMES DAILY PRN
Qty: 20 TABLET | Refills: 0 | Status: SHIPPED | OUTPATIENT
Start: 2021-10-17 | End: 2022-01-11

## 2021-10-17 RX ADMIN — CLINDAMYCIN PHOSPHATE 300 MG: 150 INJECTION, SOLUTION INTRAVENOUS at 11:10

## 2021-10-27 ENCOUNTER — TELEPHONE (OUTPATIENT)
Dept: FAMILY MEDICINE | Facility: CLINIC | Age: 41
End: 2021-10-27
Payer: MEDICAID

## 2021-11-29 ENCOUNTER — HOSPITAL ENCOUNTER (OUTPATIENT)
Dept: RADIOLOGY | Facility: HOSPITAL | Age: 41
Discharge: HOME | End: 2021-11-29
Attending: PHYSICIAN ASSISTANT
Payer: MEDICAID

## 2021-11-29 ENCOUNTER — OFFICE VISIT (OUTPATIENT)
Dept: PAIN MEDICINE | Facility: CLINIC | Age: 41
End: 2021-11-29
Payer: MEDICAID

## 2021-11-29 VITALS
WEIGHT: 293 LBS | DIASTOLIC BLOOD PRESSURE: 80 MMHG | HEIGHT: 61 IN | RESPIRATION RATE: 20 BRPM | HEART RATE: 110 BPM | BODY MASS INDEX: 55.32 KG/M2 | SYSTOLIC BLOOD PRESSURE: 143 MMHG

## 2021-11-29 DIAGNOSIS — M54.9 DORSALGIA, UNSPECIFIED: ICD-10-CM

## 2021-11-29 DIAGNOSIS — M54.17 LUMBOSACRAL RADICULOPATHY: Chronic | ICD-10-CM

## 2021-11-29 DIAGNOSIS — M54.17 LUMBOSACRAL RADICULOPATHY: ICD-10-CM

## 2021-11-29 DIAGNOSIS — M05.79 RHEUMATOID ARTHRITIS INVOLVING MULTIPLE SITES WITH POSITIVE RHEUMATOID FACTOR: Primary | Chronic | ICD-10-CM

## 2021-11-29 PROCEDURE — 72110 XR LUMBAR SPINE 5 VIEW WITH FLEX AND EXT: ICD-10-PCS | Mod: 26,,, | Performed by: RADIOLOGY

## 2021-11-29 PROCEDURE — 99215 OFFICE O/P EST HI 40 MIN: CPT | Mod: PBBFAC,25 | Performed by: PHYSICIAN ASSISTANT

## 2021-11-29 PROCEDURE — 72070 XR THORACIC SPINE AP LATERAL: ICD-10-PCS | Mod: 26,,, | Performed by: RADIOLOGY

## 2021-11-29 PROCEDURE — 72070 X-RAY EXAM THORAC SPINE 2VWS: CPT | Mod: 26,,, | Performed by: RADIOLOGY

## 2021-11-29 PROCEDURE — 72070 X-RAY EXAM THORAC SPINE 2VWS: CPT | Mod: TC

## 2021-11-29 PROCEDURE — 72110 X-RAY EXAM L-2 SPINE 4/>VWS: CPT | Mod: TC

## 2021-11-29 PROCEDURE — 99214 PR OFFICE/OUTPT VISIT, EST, LEVL IV, 30-39 MIN: ICD-10-PCS | Mod: S$PBB,25,, | Performed by: PHYSICIAN ASSISTANT

## 2021-11-29 PROCEDURE — 72110 X-RAY EXAM L-2 SPINE 4/>VWS: CPT | Mod: 26,,, | Performed by: RADIOLOGY

## 2021-11-29 PROCEDURE — 99214 OFFICE O/P EST MOD 30 MIN: CPT | Mod: S$PBB,25,, | Performed by: PHYSICIAN ASSISTANT

## 2021-11-29 PROCEDURE — 96372 THER/PROPH/DIAG INJ SC/IM: CPT | Mod: PBBFAC | Performed by: PHYSICIAN ASSISTANT

## 2021-11-29 RX ORDER — HYDROCODONE BITARTRATE AND ACETAMINOPHEN 10; 325 MG/1; MG/1
1 TABLET ORAL EVERY 12 HOURS
Qty: 60 TABLET | Refills: 0 | Status: SHIPPED | OUTPATIENT
Start: 2021-12-31 | End: 2021-12-02

## 2021-11-29 RX ORDER — HYDROCODONE BITARTRATE AND ACETAMINOPHEN 10; 325 MG/1; MG/1
1 TABLET ORAL EVERY 8 HOURS PRN
Qty: 90 TABLET | Refills: 0 | Status: SHIPPED | OUTPATIENT
Start: 2021-12-01 | End: 2021-12-02

## 2021-11-29 RX ORDER — KETOROLAC TROMETHAMINE 30 MG/ML
60 INJECTION, SOLUTION INTRAMUSCULAR; INTRAVENOUS
Status: COMPLETED | OUTPATIENT
Start: 2021-11-29 | End: 2021-11-29

## 2021-11-29 RX ORDER — GABAPENTIN 300 MG/1
300 CAPSULE ORAL EVERY 8 HOURS
Qty: 90 CAPSULE | Refills: 1 | Status: SHIPPED | OUTPATIENT
Start: 2021-11-29 | End: 2022-02-16 | Stop reason: SDUPTHER

## 2021-11-29 RX ADMIN — KETOROLAC TROMETHAMINE 60 MG: 30 INJECTION, SOLUTION INTRAMUSCULAR; INTRAVENOUS at 09:11

## 2021-12-02 RX ORDER — HYDROCODONE BITARTRATE AND ACETAMINOPHEN 10; 325 MG/1; MG/1
1 TABLET ORAL EVERY 12 HOURS
Qty: 60 TABLET | Refills: 0 | Status: SHIPPED | OUTPATIENT
Start: 2021-12-31 | End: 2022-01-30

## 2021-12-02 RX ORDER — HYDROCODONE BITARTRATE AND ACETAMINOPHEN 10; 325 MG/1; MG/1
1 TABLET ORAL EVERY 8 HOURS PRN
Qty: 90 TABLET | Refills: 0 | Status: SHIPPED | OUTPATIENT
Start: 2021-12-02 | End: 2022-01-01

## 2021-12-09 ENCOUNTER — OFFICE VISIT (OUTPATIENT)
Dept: ORTHOPEDICS | Facility: CLINIC | Age: 41
End: 2021-12-09
Payer: MEDICAID

## 2021-12-09 DIAGNOSIS — M22.41 CHONDROMALACIA OF PATELLOFEMORAL JOINT, RIGHT: Primary | ICD-10-CM

## 2021-12-09 PROCEDURE — 99213 OFFICE O/P EST LOW 20 MIN: CPT | Mod: 25,,, | Performed by: ORTHOPAEDIC SURGERY

## 2021-12-09 PROCEDURE — 99213 PR OFFICE/OUTPT VISIT, EST, LEVL III, 20-29 MIN: ICD-10-PCS | Mod: 25,,, | Performed by: ORTHOPAEDIC SURGERY

## 2021-12-09 PROCEDURE — 20610 DRAIN/INJ JOINT/BURSA W/O US: CPT | Mod: RT,,, | Performed by: ORTHOPAEDIC SURGERY

## 2021-12-09 PROCEDURE — 20610 LARGE JOINT ASPIRATION/INJECTION: R SUPRA PATELLAR BURSA: ICD-10-PCS | Mod: RT,,, | Performed by: ORTHOPAEDIC SURGERY

## 2021-12-09 RX ADMIN — TRIAMCINOLONE ACETONIDE 40 MG: 40 INJECTION, SUSPENSION INTRA-ARTICULAR; INTRAMUSCULAR at 10:12

## 2021-12-09 RX ADMIN — BUPIVACAINE HYDROCHLORIDE 3 ML: 5 INJECTION, SOLUTION PERINEURAL at 10:12

## 2021-12-10 RX ORDER — TRIAMCINOLONE ACETONIDE 40 MG/ML
40 INJECTION, SUSPENSION INTRA-ARTICULAR; INTRAMUSCULAR
Status: DISCONTINUED | OUTPATIENT
Start: 2021-12-09 | End: 2021-12-10 | Stop reason: HOSPADM

## 2021-12-10 RX ORDER — BUPIVACAINE HYDROCHLORIDE 5 MG/ML
3 INJECTION, SOLUTION PERINEURAL
Status: DISCONTINUED | OUTPATIENT
Start: 2021-12-09 | End: 2021-12-10 | Stop reason: HOSPADM

## 2021-12-16 ENCOUNTER — OFFICE VISIT (OUTPATIENT)
Dept: DERMATOLOGY | Facility: CLINIC | Age: 41
End: 2021-12-16
Payer: MEDICAID

## 2021-12-16 VITALS — WEIGHT: 293 LBS | RESPIRATION RATE: 18 BRPM | BODY MASS INDEX: 55.32 KG/M2 | HEIGHT: 61 IN

## 2021-12-16 DIAGNOSIS — L02.91 ABSCESS: ICD-10-CM

## 2021-12-16 DIAGNOSIS — L73.2 HIDRADENITIS SUPPURATIVA: Primary | ICD-10-CM

## 2021-12-16 DIAGNOSIS — L40.9 PSORIASIS: ICD-10-CM

## 2021-12-16 PROCEDURE — 99204 PR OFFICE/OUTPT VISIT, NEW, LEVL IV, 45-59 MIN: ICD-10-PCS | Mod: ,,, | Performed by: STUDENT IN AN ORGANIZED HEALTH CARE EDUCATION/TRAINING PROGRAM

## 2021-12-16 PROCEDURE — 4010F PR ACE/ARB THEARPY RXD/TAKEN: ICD-10-PCS | Mod: CPTII,,, | Performed by: STUDENT IN AN ORGANIZED HEALTH CARE EDUCATION/TRAINING PROGRAM

## 2021-12-16 PROCEDURE — 99204 OFFICE O/P NEW MOD 45 MIN: CPT | Mod: ,,, | Performed by: STUDENT IN AN ORGANIZED HEALTH CARE EDUCATION/TRAINING PROGRAM

## 2021-12-16 PROCEDURE — 4010F ACE/ARB THERAPY RXD/TAKEN: CPT | Mod: CPTII,,, | Performed by: STUDENT IN AN ORGANIZED HEALTH CARE EDUCATION/TRAINING PROGRAM

## 2021-12-16 RX ORDER — DOXYCYCLINE 100 MG/1
100 CAPSULE ORAL 2 TIMES DAILY
Qty: 60 CAPSULE | Refills: 1 | Status: SHIPPED | OUTPATIENT
Start: 2021-12-16 | End: 2022-01-11

## 2021-12-22 ENCOUNTER — OFFICE VISIT (OUTPATIENT)
Dept: CARDIOLOGY | Facility: CLINIC | Age: 41
End: 2021-12-22
Payer: MEDICAID

## 2021-12-22 VITALS
HEIGHT: 61 IN | RESPIRATION RATE: 14 BRPM | DIASTOLIC BLOOD PRESSURE: 80 MMHG | BODY MASS INDEX: 55.32 KG/M2 | WEIGHT: 293 LBS | SYSTOLIC BLOOD PRESSURE: 130 MMHG | HEART RATE: 100 BPM

## 2021-12-22 DIAGNOSIS — I10 HYPERTENSION, UNSPECIFIED TYPE: Primary | ICD-10-CM

## 2021-12-22 DIAGNOSIS — E66.01 MORBID OBESITY WITH BMI OF 70 AND OVER, ADULT: ICD-10-CM

## 2021-12-22 DIAGNOSIS — I10 HYPERTENSION, UNSPECIFIED TYPE: ICD-10-CM

## 2021-12-22 DIAGNOSIS — I10 ESSENTIAL HYPERTENSION: ICD-10-CM

## 2021-12-22 PROCEDURE — 93010 ELECTROCARDIOGRAM REPORT: CPT | Mod: S$PBB,,, | Performed by: STUDENT IN AN ORGANIZED HEALTH CARE EDUCATION/TRAINING PROGRAM

## 2021-12-22 PROCEDURE — 93005 ELECTROCARDIOGRAM TRACING: CPT | Mod: PBBFAC | Performed by: STUDENT IN AN ORGANIZED HEALTH CARE EDUCATION/TRAINING PROGRAM

## 2021-12-22 PROCEDURE — 4010F ACE/ARB THERAPY RXD/TAKEN: CPT | Mod: CPTII,,, | Performed by: STUDENT IN AN ORGANIZED HEALTH CARE EDUCATION/TRAINING PROGRAM

## 2021-12-22 PROCEDURE — 99214 OFFICE O/P EST MOD 30 MIN: CPT | Mod: S$PBB,,, | Performed by: STUDENT IN AN ORGANIZED HEALTH CARE EDUCATION/TRAINING PROGRAM

## 2021-12-22 PROCEDURE — 99215 OFFICE O/P EST HI 40 MIN: CPT | Mod: PBBFAC | Performed by: STUDENT IN AN ORGANIZED HEALTH CARE EDUCATION/TRAINING PROGRAM

## 2021-12-22 PROCEDURE — 4010F PR ACE/ARB THEARPY RXD/TAKEN: ICD-10-PCS | Mod: CPTII,,, | Performed by: STUDENT IN AN ORGANIZED HEALTH CARE EDUCATION/TRAINING PROGRAM

## 2021-12-22 PROCEDURE — 99214 PR OFFICE/OUTPT VISIT, EST, LEVL IV, 30-39 MIN: ICD-10-PCS | Mod: S$PBB,,, | Performed by: STUDENT IN AN ORGANIZED HEALTH CARE EDUCATION/TRAINING PROGRAM

## 2021-12-22 PROCEDURE — 93010 EKG 12-LEAD: ICD-10-PCS | Mod: S$PBB,,, | Performed by: STUDENT IN AN ORGANIZED HEALTH CARE EDUCATION/TRAINING PROGRAM

## 2022-01-11 ENCOUNTER — OFFICE VISIT (OUTPATIENT)
Dept: FAMILY MEDICINE | Facility: CLINIC | Age: 42
End: 2022-01-11
Payer: MEDICAID

## 2022-01-11 VITALS
WEIGHT: 293 LBS | RESPIRATION RATE: 20 BRPM | BODY MASS INDEX: 55.32 KG/M2 | OXYGEN SATURATION: 98 % | DIASTOLIC BLOOD PRESSURE: 82 MMHG | SYSTOLIC BLOOD PRESSURE: 122 MMHG | HEIGHT: 61 IN | HEART RATE: 101 BPM | TEMPERATURE: 98 F

## 2022-01-11 DIAGNOSIS — M79.7 FIBROMYALGIA: ICD-10-CM

## 2022-01-11 DIAGNOSIS — J45.40 MODERATE PERSISTENT ASTHMA WITHOUT COMPLICATION: ICD-10-CM

## 2022-01-11 DIAGNOSIS — F41.9 ANXIETY: ICD-10-CM

## 2022-01-11 DIAGNOSIS — J31.0 CHRONIC RHINITIS: ICD-10-CM

## 2022-01-11 DIAGNOSIS — E78.00 PURE HYPERCHOLESTEROLEMIA: ICD-10-CM

## 2022-01-11 DIAGNOSIS — Z79.899 ENCOUNTER FOR LONG-TERM (CURRENT) USE OF OTHER MEDICATIONS: ICD-10-CM

## 2022-01-11 DIAGNOSIS — I10 ESSENTIAL HYPERTENSION: ICD-10-CM

## 2022-01-11 DIAGNOSIS — G47.33 OSA ON CPAP: ICD-10-CM

## 2022-01-11 DIAGNOSIS — E11.9 TYPE 2 DIABETES MELLITUS WITHOUT COMPLICATION, WITHOUT LONG-TERM CURRENT USE OF INSULIN: Primary | ICD-10-CM

## 2022-01-11 DIAGNOSIS — E24.9 CUSHING SYNDROME: ICD-10-CM

## 2022-01-11 LAB
ALBUMIN SERPL BCP-MCNC: 3.5 G/DL (ref 3.5–5)
ALBUMIN/GLOB SERPL: 0.7 {RATIO}
ALP SERPL-CCNC: 121 U/L (ref 37–98)
ALT SERPL W P-5'-P-CCNC: 33 U/L (ref 13–56)
ANION GAP SERPL CALCULATED.3IONS-SCNC: 11 MMOL/L (ref 7–16)
AST SERPL W P-5'-P-CCNC: 13 U/L (ref 15–37)
BASOPHILS # BLD AUTO: 0.08 K/UL (ref 0–0.2)
BASOPHILS NFR BLD AUTO: 0.6 % (ref 0–1)
BILIRUB SERPL-MCNC: 0.3 MG/DL (ref 0–1.2)
BUN SERPL-MCNC: 12 MG/DL (ref 7–18)
BUN/CREAT SERPL: 20 (ref 6–20)
CALCIUM SERPL-MCNC: 9.4 MG/DL (ref 8.5–10.1)
CHLORIDE SERPL-SCNC: 104 MMOL/L (ref 98–107)
CHOLEST SERPL-MCNC: 175 MG/DL (ref 0–200)
CHOLEST/HDLC SERPL: 3.7 {RATIO}
CO2 SERPL-SCNC: 26 MMOL/L (ref 21–32)
CREAT SERPL-MCNC: 0.6 MG/DL (ref 0.55–1.02)
CREAT UR-MCNC: 195 MG/DL (ref 28–219)
DIFFERENTIAL METHOD BLD: ABNORMAL
EOSINOPHIL # BLD AUTO: 0.42 K/UL (ref 0–0.5)
EOSINOPHIL NFR BLD AUTO: 3 % (ref 1–4)
ERYTHROCYTE [DISTWIDTH] IN BLOOD BY AUTOMATED COUNT: 14.6 % (ref 11.5–14.5)
EST. AVERAGE GLUCOSE BLD GHB EST-MCNC: 120 MG/DL
GLOBULIN SER-MCNC: 5.1 G/DL (ref 2–4)
GLUCOSE SERPL-MCNC: 114 MG/DL (ref 74–106)
HBA1C MFR BLD HPLC: 6.2 % (ref 4.5–6.6)
HCT VFR BLD AUTO: 44.4 % (ref 38–47)
HDLC SERPL-MCNC: 47 MG/DL (ref 40–60)
HGB BLD-MCNC: 14.1 G/DL (ref 12–16)
IMM GRANULOCYTES # BLD AUTO: 0.16 K/UL (ref 0–0.04)
IMM GRANULOCYTES NFR BLD: 1.1 % (ref 0–0.4)
LDLC SERPL CALC-MCNC: 108 MG/DL
LDLC/HDLC SERPL: 2.3 {RATIO}
LYMPHOCYTES # BLD AUTO: 2.14 K/UL (ref 1–4.8)
LYMPHOCYTES NFR BLD AUTO: 15.2 % (ref 27–41)
MCH RBC QN AUTO: 28.5 PG (ref 27–31)
MCHC RBC AUTO-ENTMCNC: 31.8 G/DL (ref 32–36)
MCV RBC AUTO: 89.7 FL (ref 80–96)
MICROALBUMIN UR-MCNC: 1.1 MG/DL (ref 0–2.8)
MICROALBUMIN/CREAT RATIO PNL UR: 5.6 MG/G (ref 0–30)
MONOCYTES # BLD AUTO: 0.76 K/UL (ref 0–0.8)
MONOCYTES NFR BLD AUTO: 5.4 % (ref 2–6)
MPC BLD CALC-MCNC: 14 FL (ref 9.4–12.4)
NEUTROPHILS # BLD AUTO: 10.51 K/UL (ref 1.8–7.7)
NEUTROPHILS NFR BLD AUTO: 74.7 % (ref 53–65)
NONHDLC SERPL-MCNC: 128 MG/DL
NRBC # BLD AUTO: 0 X10E3/UL
NRBC, AUTO (.00): 0 %
PLATELET # BLD AUTO: 171 K/UL (ref 150–400)
PLATELET MORPHOLOGY: ABNORMAL
POTASSIUM SERPL-SCNC: 3.9 MMOL/L (ref 3.5–5.1)
PROT SERPL-MCNC: 8.6 G/DL (ref 6.4–8.2)
RBC # BLD AUTO: 4.95 M/UL (ref 4.2–5.4)
RBC MORPH BLD: NORMAL
SODIUM SERPL-SCNC: 137 MMOL/L (ref 136–145)
TRIGL SERPL-MCNC: 101 MG/DL (ref 35–150)
TSH SERPL DL<=0.005 MIU/L-ACNC: 2.24 UIU/ML (ref 0.36–3.74)
VLDLC SERPL-MCNC: 20 MG/DL
WBC # BLD AUTO: 14.07 K/UL (ref 4.5–11)

## 2022-01-11 PROCEDURE — 82043 UR ALBUMIN QUANTITATIVE: CPT | Mod: ,,, | Performed by: CLINICAL MEDICAL LABORATORY

## 2022-01-11 PROCEDURE — 3079F PR MOST RECENT DIASTOLIC BLOOD PRESSURE 80-89 MM HG: ICD-10-PCS | Mod: CPTII,,, | Performed by: NURSE PRACTITIONER

## 2022-01-11 PROCEDURE — 99214 PR OFFICE/OUTPT VISIT, EST, LEVL IV, 30-39 MIN: ICD-10-PCS | Mod: ,,, | Performed by: NURSE PRACTITIONER

## 2022-01-11 PROCEDURE — 82043 MICROALBUMIN / CREATININE RATIO URINE: ICD-10-PCS | Mod: ,,, | Performed by: CLINICAL MEDICAL LABORATORY

## 2022-01-11 PROCEDURE — 85025 CBC WITH DIFFERENTIAL: ICD-10-PCS | Mod: ,,, | Performed by: CLINICAL MEDICAL LABORATORY

## 2022-01-11 PROCEDURE — 1160F PR REVIEW ALL MEDS BY PRESCRIBER/CLIN PHARMACIST DOCUMENTED: ICD-10-PCS | Mod: CPTII,,, | Performed by: NURSE PRACTITIONER

## 2022-01-11 PROCEDURE — 83036 HEMOGLOBIN A1C: ICD-10-PCS | Mod: ,,, | Performed by: CLINICAL MEDICAL LABORATORY

## 2022-01-11 PROCEDURE — 99214 OFFICE O/P EST MOD 30 MIN: CPT | Mod: ,,, | Performed by: NURSE PRACTITIONER

## 2022-01-11 PROCEDURE — 1159F MED LIST DOCD IN RCRD: CPT | Mod: CPTII,,, | Performed by: NURSE PRACTITIONER

## 2022-01-11 PROCEDURE — 83036 HEMOGLOBIN GLYCOSYLATED A1C: CPT | Mod: ,,, | Performed by: CLINICAL MEDICAL LABORATORY

## 2022-01-11 PROCEDURE — 3008F BODY MASS INDEX DOCD: CPT | Mod: CPTII,,, | Performed by: NURSE PRACTITIONER

## 2022-01-11 PROCEDURE — 3008F PR BODY MASS INDEX (BMI) DOCUMENTED: ICD-10-PCS | Mod: CPTII,,, | Performed by: NURSE PRACTITIONER

## 2022-01-11 PROCEDURE — 80061 LIPID PANEL: CPT | Mod: ,,, | Performed by: CLINICAL MEDICAL LABORATORY

## 2022-01-11 PROCEDURE — 3079F DIAST BP 80-89 MM HG: CPT | Mod: CPTII,,, | Performed by: NURSE PRACTITIONER

## 2022-01-11 PROCEDURE — 1159F PR MEDICATION LIST DOCUMENTED IN MEDICAL RECORD: ICD-10-PCS | Mod: CPTII,,, | Performed by: NURSE PRACTITIONER

## 2022-01-11 PROCEDURE — 85025 COMPLETE CBC W/AUTO DIFF WBC: CPT | Mod: ,,, | Performed by: CLINICAL MEDICAL LABORATORY

## 2022-01-11 PROCEDURE — 82570 MICROALBUMIN / CREATININE RATIO URINE: ICD-10-PCS | Mod: ,,, | Performed by: CLINICAL MEDICAL LABORATORY

## 2022-01-11 PROCEDURE — 84443 ASSAY THYROID STIM HORMONE: CPT | Mod: ,,, | Performed by: CLINICAL MEDICAL LABORATORY

## 2022-01-11 PROCEDURE — 80053 COMPREHEN METABOLIC PANEL: CPT | Mod: ,,, | Performed by: CLINICAL MEDICAL LABORATORY

## 2022-01-11 PROCEDURE — 80053 COMPREHENSIVE METABOLIC PANEL: ICD-10-PCS | Mod: ,,, | Performed by: CLINICAL MEDICAL LABORATORY

## 2022-01-11 PROCEDURE — 1160F RVW MEDS BY RX/DR IN RCRD: CPT | Mod: CPTII,,, | Performed by: NURSE PRACTITIONER

## 2022-01-11 PROCEDURE — 82570 ASSAY OF URINE CREATININE: CPT | Mod: ,,, | Performed by: CLINICAL MEDICAL LABORATORY

## 2022-01-11 PROCEDURE — 80061 LIPID PANEL: ICD-10-PCS | Mod: ,,, | Performed by: CLINICAL MEDICAL LABORATORY

## 2022-01-11 PROCEDURE — 3074F PR MOST RECENT SYSTOLIC BLOOD PRESSURE < 130 MM HG: ICD-10-PCS | Mod: CPTII,,, | Performed by: NURSE PRACTITIONER

## 2022-01-11 PROCEDURE — 3074F SYST BP LT 130 MM HG: CPT | Mod: CPTII,,, | Performed by: NURSE PRACTITIONER

## 2022-01-11 PROCEDURE — 84443 TSH: ICD-10-PCS | Mod: ,,, | Performed by: CLINICAL MEDICAL LABORATORY

## 2022-01-11 RX ORDER — HYDROCODONE BITARTRATE AND ACETAMINOPHEN 7.5; 325 MG/1; MG/1
1 TABLET ORAL 2 TIMES DAILY PRN
COMMUNITY
End: 2022-01-11 | Stop reason: DRUGHIGH

## 2022-01-11 RX ORDER — PROMETHAZINE HYDROCHLORIDE 25 MG/1
25 TABLET ORAL EVERY 6 HOURS PRN
Qty: 20 TABLET | Refills: 1 | Status: SHIPPED | OUTPATIENT
Start: 2022-01-11 | End: 2022-05-11 | Stop reason: SDUPTHER

## 2022-01-11 RX ORDER — LIRAGLUTIDE 6 MG/ML
1.8 INJECTION SUBCUTANEOUS DAILY
Qty: 9 ML | Refills: 11 | Status: SHIPPED | OUTPATIENT
Start: 2022-01-11 | End: 2022-06-13

## 2022-01-11 RX ORDER — HYDROCHLOROTHIAZIDE 25 MG/1
25 TABLET ORAL DAILY
COMMUNITY
End: 2022-01-11

## 2022-01-11 RX ORDER — METFORMIN HYDROCHLORIDE 500 MG/1
500 TABLET, EXTENDED RELEASE ORAL DAILY
Qty: 90 TABLET | Refills: 3 | Status: SHIPPED | OUTPATIENT
Start: 2022-01-11 | End: 2023-11-13 | Stop reason: SDUPTHER

## 2022-01-11 NOTE — PROGRESS NOTES
WHIT FERNANDEZ [facility] - FAMILY MEDICINE       PATIENT NAME: Belem Swann   : 1980    AGE: 41 y.o. DATE: 2022    MRN: [id number]        Reason for Visit / Chief Complaint:  Follow-up (Pt presents for 4 month DM, HTN, and HLD follow up. She is fasting.), Hypertension, Hyperlipidemia, Diabetes, and Insomnia (Pt states she has been having trouble falling asleep for a while. She is always up really late. She can stay asleep unless she has to use the bathroom.)     Subjective:     HPI:  Presents for 4 mth f/u T2DM, HTN, asthma, and HLD.  Rx for victoza at last visit and is up to 1.8 mg daily - reports was down to 360 lbs but gained wt during the holidays; is down 13 lbs since last visit  Monitoring glucose daily with levels ranging 105-197.   Last A1c 6.0% 2021    Went to the weight loss clinic in [place]; was advised to take victoza at [facility] to help nausea.    Having difficulty falling asleep - taking 4 vistaril and it doesn't help; limited options due to chronic opioids for pain    On spironolactone for hirtuism per Dr. Dominguez in [place] for Cushing's syndrome    Eye exam per Dr. Resendiz at [facility] Dec 2021 - NEERAJ  Pap 20 per Dr. Avina    Has psoriasis and RA - sees Rheumatologist Dr. Aguilar; would like to change when Dr. Bull starts with [facility]    PHQ9 2022   Total Score 10       Review of Systems:     Review of Systems   Constitutional: Negative for chills and fever.   HENT: Positive for congestion (chronic rhinitis) and postnasal drip. Negative for ear pain, sinus pain and sore throat.    Eyes: Negative.    Respiratory: Positive for shortness of breath (chronic BALDERAS). Negative for cough and wheezing.    Cardiovascular: Positive for leg swelling. Negative for chest pain and palpitations.   Genitourinary: Negative.    Musculoskeletal: Positive for arthralgias, back pain, gait problem and myalgias.   Skin: Positive for rash.  "  Psychiatric/Behavioral: Negative.        Allergies and Medications:     Review of patient's allergies indicates:   Allergen Reactions    Doxycycline     Latex         Current Outpatient Medications on File Prior to Visit   Medication Sig Dispense Refill    adalimumab (HUMIRA) 10 mg/0.2 mL SyKt Inject into the skin every 14 (fourteen) days.      albuterol sulfate 90 mcg/actuation aebs Inhale 180 mcg into the lungs every 4 (four) hours.      albuterol-ipratropium (DUO-NEB) 2.5 mg-0.5 mg/3 mL nebulizer solution Take 3 mLs by nebulization every 6 (six) hours as needed. Rescue      budesonide-formoterol 160-4.5 mcg (SYMBICORT) 160-4.5 mcg/actuation HFAA Inhale 2 puffs into the lungs every 12 (twelve) hours. Controller      busPIRone (BUSPAR) 15 MG tablet Take 15 mg by mouth 2 (two) times daily.      cyclobenzaprine (FLEXERIL) 10 MG tablet Take 1 tablet (10 mg total) by mouth 3 (three) times daily as needed. 90 tablet 1    EScitalopram oxalate (LEXAPRO) 20 MG tablet Take 20 mg by mouth once daily.      fluticasone propionate (FLONASE) 50 mcg/actuation nasal spray 1-2 sprays by Nasal route once daily.      gabapentin (NEURONTIN) 300 MG capsule Take 1 capsule (300 mg total) by mouth every 8 (eight) hours. (Patient taking differently: Take 300 mg by mouth 3 (three) times daily.) 90 capsule 1    HYDROcodone-acetaminophen (NORCO)  mg per tablet Take 1 tablet by mouth every 12 (twelve) hours. 60 tablet 0    hydrOXYzine pamoate (VISTARIL) 50 MG Cap Take 4 capsules by mouth once daily.      lisinopriL (PRINIVIL,ZESTRIL) 40 MG tablet Take 1 tablet (40 mg total) by mouth once daily. 90 tablet 1    methotrexate 2.5 MG Tab Take 2.5 mg by mouth every 7 days.      pen needle, diabetic 31 gauge x 1/4" Ndle 1 needle daily for injection of victoza 30 each 11    pen needle, diabetic 31 gauge x 15/64" Ndle USE ONE PEN NEEDLE SUBCUTANESOULY FOR VICTOZA INJECTION DAILY      rosuvastatin (CRESTOR) 40 MG Tab Take 1 " tablet (40 mg total) by mouth every evening. 90 tablet 3    spironolactone (ALDACTONE) 50 MG tablet Take 75 mg by mouth once daily.      triamcinolone acetonide 0.1% (KENALOG) 0.1 % ointment Apply topically 2 (two) times daily. 454 g 0    triamterene-hydrochlorothiazide 75-50 mg (MAXZIDE) 75-50 mg per tablet Take 1 tablet by mouth once daily.      TRUE METRIX GLUCOSE TEST STRIP Strp USE TO TEST BLOOD SUGAR EVERY DAY      [DISCONTINUED] ibuprofen (ADVIL,MOTRIN) 800 MG tablet Take 1 tablet (800 mg total) by mouth 3 (three) times daily as needed for Pain. 20 tablet 0    [DISCONTINUED] liraglutide 0.6 mg/0.1 mL, 18 mg/3 mL, subq PNIJ (VICTOZA 3-SUZI) 0.6 mg/0.1 mL (18 mg/3 mL) PnIj pen Inject 0.6 mg into the skin once daily. 0.6 mg daily for the 1st week then increase to 1.2 mg in the skin daily x 1 week then increase to 1.8 mg in the skin daily. (Patient taking differently: Inject 1.8 mg into the skin once daily.) 3 mL 5    [DISCONTINUED] meloxicam (MOBIC) 15 MG tablet Take 15 mg by mouth once daily.      [DISCONTINUED] metFORMIN (GLUCOPHAGE-XR) 500 MG ER 24hr tablet Take 500 mg by mouth once daily.      [DISCONTINUED] promethazine (PHENERGAN) 25 MG tablet Take 1 tablet (25 mg total) by mouth every 6 (six) hours as needed for Nausea. 20 tablet 0    [DISCONTINUED] doxycycline (VIBRAMYCIN) 100 MG Cap Take 1 capsule (100 mg total) by mouth 2 (two) times daily. (Patient not taking: No sig reported) 60 capsule 1    [DISCONTINUED] ENBREL SURECLICK 50 mg/mL (1 mL) PnIj       [DISCONTINUED] hydroCHLOROthiazide (HYDRODIURIL) 25 MG tablet Take 25 mg by mouth once daily.      [DISCONTINUED] hydroCHLOROthiazide (MICROZIDE) 12.5 mg capsule Take 1 capsule by mouth once daily.      [DISCONTINUED] HYDROcodone-acetaminophen (NORCO) 7.5-325 mg per tablet Take 1 tablet by mouth 2 (two) times daily as needed.      [DISCONTINUED] olopatadine (PATADAY) 0.2 % Drop INSTILL 1 DROP INTO EACH EYE ONCE DAILY       No current  facility-administered medications on file prior to visit.       Labs:      Lab Results   Component Value Date    HGBA1C 6.0 09/09/2021      Lipids:   Lab Results   Component Value Date    CHOL 159 09/09/2021    CHOL 195 05/06/2021     Lab Results   Component Value Date    HDL 41 09/09/2021    HDL 48 05/06/2021     Lab Results   Component Value Date    LDLCALC 101 09/09/2021    LDLCALC 128 05/06/2021     Lab Results   Component Value Date    TRIG 85 09/09/2021    TRIG 95 05/06/2021     Lab Results   Component Value Date    CHOLHDL 3.9 09/09/2021    CHOLHDL 4.1 05/06/2021      Urine Ma/creat ratio:  No results found for: MICROALADELINE, [id number]   CMP:  Sodium   Date Value Ref Range Status   12/17/2021 138 136 - 145 mmol/L Final     Potassium   Date Value Ref Range Status   12/17/2021 4.2 3.5 - 5.1 mmol/L Final     Chloride   Date Value Ref Range Status   12/17/2021 104 98 - 107 mmol/L Final     CO2   Date Value Ref Range Status   12/17/2021 30 21 - 32 mmol/L Final     Glucose   Date Value Ref Range Status   12/17/2021 90 74 - 106 mg/dL Final     BUN   Date Value Ref Range Status   12/17/2021 10 7 - 18 mg/dL Final     Creatinine   Date Value Ref Range Status   12/17/2021 0.61 0.55 - 1.02 mg/dL Final     Calcium   Date Value Ref Range Status   12/17/2021 8.6 8.5 - 10.1 mg/dL Final     Total Protein   Date Value Ref Range Status   12/17/2021 7.7 6.4 - 8.2 g/dL Final     Albumin   Date Value Ref Range Status   12/17/2021 3.3 (L) 3.5 - 5.0 g/dL Final     Bilirubin, Total   Date Value Ref Range Status   12/17/2021 0.3 0.0 - 1.2 mg/dL Final     Alk Phos   Date Value Ref Range Status   12/17/2021 125 (H) 37 - 98 U/L Final     AST   Date Value Ref Range Status   12/17/2021 12 (L) 15 - 37 U/L Final     ALT   Date Value Ref Range Status   12/17/2021 32 13 - 56 U/L Final     Anion Gap   Date Value Ref Range Status   12/17/2021 8 7 - 16 mmol/L Final     eGFR    Date Value Ref Range Status   10/07/2020 103       eGFR    Date Value Ref Range Status   2021 114 >=60 mL/min/1.73m² Final      CBC:  Lab Results   Component Value Date    WBC 16.97 (H) 2021    RBC 4.69 2021    HGB 13.3 2021    HCT 40.5 2021    MCV 86.4 2021    MCH 28.4 2021    MCHC 32.8 2021    RDW 13.6 2021     2021    MPV 12.7 (H) 2021    LYMPH 13.6 (L) 2021    LYMPH 2.31 2021    MONO 4.9 2021    EOS 0.43 2021    BASO 0.08 2021    EOSINOPHIL 2.5 2021    BASOPHIL 0.5 2021      Lab Results   Component Value Date    TSH 1.390 2021         Medical/Social/Family History:     Past Medical History:   Diagnosis Date    Anxiety     Asthma     Chronic pain     Cushing syndrome     Depression     Essential hypertension     Fibromyalgia     Herpes zoster     Hyperlipidemia     Onychomycosis     MARY on CPAP     Rheumatoid arthritis     Type 2 diabetes mellitus 2020    Vitamin D deficiency 2018      Social History     Tobacco Use   Smoking Status Former Smoker   Smokeless Tobacco Never Used      Social History     Substance and Sexual Activity   Alcohol Use Never       Family History   Problem Relation Age of Onset    Cancer Mother     Thyroid cancer Mother     Thyroid cancer Maternal Grandmother     Melanoma Neg Hx       Past Surgical History:   Procedure Laterality Date     SECTION      ELBOW SURGERY  1985    EPIDURAL STEROID INJECTION      L4-5 VERITO Dec 2020-Torre    FEMUR SURGERY Right 1985    due to osteomyelitis    HYSTERECTOMY      LIPOMA RESECTION  2019    SURGICAL REMOVAL OF PILONIDAL CYST      TRANSFORAMINAL EPIDURAL INJECTION OF STEROID      Bilateral L4-5 TFESI 2020-    TRANSFORAMINAL EPIDURAL INJECTION OF STEROID      Right L4-5 TFESI 2020-Torre    VENTRAL HERNIA REPAIR  2020        Health Maintenance:     Immunization History   Administered Date(s) Administered    Influenza -  "Quadrivalent 09/03/2020    Influenza - Quadrivalent - PF *Preferred* (6 months and older) 09/08/2021    Pneumococcal Conjugate - 13 Valent 12/11/2019    Pneumococcal Polysaccharide - 23 Valent 01/04/2021    Tdap 05/06/2021      Health Maintenance Due   Topic Date Due    Diabetes Urine Screening  Never done    Eye Exam  Never done     Health Maintenance Topics with due status: Not Due       Topic Last Completion Date    Pap Smear 05/19/2020    Pneumococcal Vaccines (Age 0-64) 01/04/2021    TETANUS VACCINE 05/06/2021    Foot Exam 05/06/2021    Mammogram 05/17/2021    Lipid Panel 09/09/2021    Hemoglobin A1c 09/09/2021       Objective:      Vitals:    01/11/22 0839   BP: 122/82   BP Location: Left arm   Patient Position: Sitting   BP Method: Large (Manual)   Pulse: 101   Resp: 20   Temp: 97.5 °F (36.4 °C)   TempSrc: Temporal   SpO2: 98%   Weight: (!) 167.4 kg (369 lb)   Height: 5' 1" (1.549 m)     Body mass index is 69.72 kg/m².     Physical Exam:    Physical Exam  Vitals and nursing note reviewed.   Constitutional:       Appearance: Normal appearance. She is obese.   HENT:      Head: Normocephalic.   Eyes:      Conjunctiva/sclera: Conjunctivae normal.   Neck:      Thyroid: No thyromegaly.      Trachea: Trachea normal.   Cardiovascular:      Rate and Rhythm: Normal rate and regular rhythm.      Pulses: Normal pulses.      Heart sounds: Normal heart sounds.   Pulmonary:      Effort: Pulmonary effort is normal.      Breath sounds: Normal breath sounds.   Musculoskeletal:      Cervical back: Neck supple.      Right lower leg: Edema (trace) present.      Left lower leg: Edema (trace) present.   Lymphadenopathy:      Cervical: No cervical adenopathy.   Skin:     General: Skin is warm and dry.   Neurological:      General: No focal deficit present.      Mental Status: She is alert and oriented to person, place, and time.   Psychiatric:         Mood and Affect: Mood normal.         Behavior: Behavior normal.      "     Assessment:          ICD-10-CM ICD-9-CM   1. Type 2 diabetes mellitus without complication, without long-term current use of insulin  E11.9 250.00   2. Essential hypertension  I10 401.9   3. Pure hypercholesterolemia  E78.00 272.0   4. Moderate persistent asthma without complication  J45.40 493.90   5. Chronic rhinitis  J31.0 472.0   6. Cushing syndrome  E24.9 255.0   7. Fibromyalgia  M79.7 729.1   8. Anxiety  F41.9 300.00   9. MARY on CPAP  G47.33 327.23    Z99.89 V46.8   10. Encounter for long-term (current) use of other medications  Z79.899 V58.69        Plan:       Type 2 diabetes mellitus without complication, without long-term current use of insulin  -     liraglutide 0.6 mg/0.1 mL, 18 mg/3 mL, subq PNIJ (VICTOZA 3-SUZI) 0.6 mg/0.1 mL (18 mg/3 mL) PnIj pen; Inject 1.8 mg into the skin once daily.  Dispense: 9 mL; Refill: 11  -     Comprehensive Metabolic Panel; Future; Expected date: 01/11/2022  -     Microalbumin/Creatinine Ratio, Urine; Future; Expected date: 01/11/2022  -     Hemoglobin A1C; Future; Expected date: 01/11/2022    Essential hypertension  -     Comprehensive Metabolic Panel; Future; Expected date: 01/11/2022  -     TSH; Future; Expected date: 01/11/2022    Pure hypercholesterolemia  -     Comprehensive Metabolic Panel; Future; Expected date: 01/11/2022  -     Lipid Panel; Future; Expected date: 01/11/2022  -     TSH; Future; Expected date: 01/11/2022    Moderate persistent asthma without complication    Chronic rhinitis    Cushing syndrome    Fibromyalgia    Anxiety    MARY on CPAP    Encounter for long-term (current) use of other medications  -     CBC Auto Differential; Future; Expected date: 01/11/2022  -     Comprehensive Metabolic Panel; Future; Expected date: 01/11/2022  -     TSH; Future; Expected date: 01/11/2022    Other orders  -     metFORMIN (GLUCOPHAGE-XR) 500 MG ER 24hr tablet; Take 1 tablet (500 mg total) by mouth once daily.  Dispense: 90 tablet; Refill: 3  -     promethazine  "(PHENERGAN) 25 MG tablet; Take 1 tablet (25 mg total) by mouth every 6 (six) hours as needed for Nausea.  Dispense: 20 tablet; Refill: 1        Current Outpatient Medications:     adalimumab (HUMIRA) 10 mg/0.2 mL SyKt, Inject into the skin every 14 (fourteen) days., Disp: , Rfl:     albuterol sulfate 90 mcg/actuation aebs, Inhale 180 mcg into the lungs every 4 (four) hours., Disp: , Rfl:     albuterol-ipratropium (DUO-NEB) 2.5 mg-0.5 mg/3 mL nebulizer solution, Take 3 mLs by nebulization every 6 (six) hours as needed. Rescue, Disp: , Rfl:     budesonide-formoterol 160-4.5 mcg (SYMBICORT) 160-4.5 mcg/actuation HFAA, Inhale 2 puffs into the lungs every 12 (twelve) hours. Controller, Disp: , Rfl:     busPIRone (BUSPAR) 15 MG tablet, Take 15 mg by mouth 2 (two) times daily., Disp: , Rfl:     cyclobenzaprine (FLEXERIL) 10 MG tablet, Take 1 tablet (10 mg total) by mouth 3 (three) times daily as needed., Disp: 90 tablet, Rfl: 1    EScitalopram oxalate (LEXAPRO) 20 MG tablet, Take 20 mg by mouth once daily., Disp: , Rfl:     fluticasone propionate (FLONASE) 50 mcg/actuation nasal spray, 1-2 sprays by Nasal route once daily., Disp: , Rfl:     gabapentin (NEURONTIN) 300 MG capsule, Take 1 capsule (300 mg total) by mouth every 8 (eight) hours. (Patient taking differently: Take 300 mg by mouth 3 (three) times daily.), Disp: 90 capsule, Rfl: 1    HYDROcodone-acetaminophen (NORCO)  mg per tablet, Take 1 tablet by mouth every 12 (twelve) hours., Disp: 60 tablet, Rfl: 0    hydrOXYzine pamoate (VISTARIL) 50 MG Cap, Take 4 capsules by mouth once daily., Disp: , Rfl:     lisinopriL (PRINIVIL,ZESTRIL) 40 MG tablet, Take 1 tablet (40 mg total) by mouth once daily., Disp: 90 tablet, Rfl: 1    methotrexate 2.5 MG Tab, Take 2.5 mg by mouth every 7 days., Disp: , Rfl:     pen needle, diabetic 31 gauge x 1/4" Ndle, 1 needle daily for injection of victoza, Disp: 30 each, Rfl: 11    pen needle, diabetic 31 gauge x 15/64" " Ndle, USE ONE PEN NEEDLE SUBCUTANESOULY FOR VICTOZA INJECTION DAILY, Disp: , Rfl:     rosuvastatin (CRESTOR) 40 MG Tab, Take 1 tablet (40 mg total) by mouth every evening., Disp: 90 tablet, Rfl: 3    spironolactone (ALDACTONE) 50 MG tablet, Take 75 mg by mouth once daily., Disp: , Rfl:     triamcinolone acetonide 0.1% (KENALOG) 0.1 % ointment, Apply topically 2 (two) times daily., Disp: 454 g, Rfl: 0    triamterene-hydrochlorothiazide 75-50 mg (MAXZIDE) 75-50 mg per tablet, Take 1 tablet by mouth once daily., Disp: , Rfl:     TRUE METRIX GLUCOSE TEST STRIP Strp, USE TO TEST BLOOD SUGAR EVERY DAY, Disp: , Rfl:     liraglutide 0.6 mg/0.1 mL, 18 mg/3 mL, subq PNIJ (VICTOZA 3-SUZI) 0.6 mg/0.1 mL (18 mg/3 mL) PnIj pen, Inject 1.8 mg into the skin once daily., Disp: 9 mL, Rfl: 11    metFORMIN (GLUCOPHAGE-XR) 500 MG ER 24hr tablet, Take 1 tablet (500 mg total) by mouth once daily., Disp: 90 tablet, Rfl: 3    promethazine (PHENERGAN) 25 MG tablet, Take 1 tablet (25 mg total) by mouth every 6 (six) hours as needed for Nausea., Disp: 20 tablet, Rfl: 1      New & refilled meds:  Requested Prescriptions     Signed Prescriptions Disp Refills    metFORMIN (GLUCOPHAGE-XR) 500 MG ER 24hr tablet 90 tablet 3     Sig: Take 1 tablet (500 mg total) by mouth once daily.    promethazine (PHENERGAN) 25 MG tablet 20 tablet 1     Sig: Take 1 tablet (25 mg total) by mouth every 6 (six) hours as needed for Nausea.    liraglutide 0.6 mg/0.1 mL, 18 mg/3 mL, subq PNIJ (VICTOZA 3-SUZI) 0.6 mg/0.1 mL (18 mg/3 mL) PnIj pen 9 mL 11     Sig: Inject 1.8 mg into the skin once daily.        Urged to continue weight loss for overall health and well being.   Continue current meds.   Labs - will notify of results.   NEERAJ for Dec 2021 eye exam report    Return to clinic in 4 months for T2DM, HTN, HLD, and asthma with nonfasting labs; and as needed.    Future Appointments   Date Time Provider Department Center   1/24/2022 10:45 AM Carmel Torre,  MD [facility]   4/12/2022 10:15 AM Rogelio Wakefield MD [facility]   4/19/2022  1:30 PM Ashlyn Wakefield MD [facility]   5/11/2022  9:40 AM [facility]   5/23/2022  2:00 PM [facility] [place]   9/12/2022  8:00 AM DANE Saenz [facility] SYLVAIN [facility]        Signature:  DANE Saenz [facility] - FAMILY MEDICINE    Date of encounter: 1/11/22

## 2022-01-18 DIAGNOSIS — D69.1 LARGE PLATELETS: ICD-10-CM

## 2022-01-18 DIAGNOSIS — D72.829 LEUKOCYTOSIS, UNSPECIFIED TYPE: Primary | ICD-10-CM

## 2022-01-18 DIAGNOSIS — R77.1 ELEVATED SERUM GLOBULIN LEVEL: ICD-10-CM

## 2022-01-24 ENCOUNTER — OFFICE VISIT (OUTPATIENT)
Dept: PAIN MEDICINE | Facility: CLINIC | Age: 42
End: 2022-01-24
Payer: MEDICAID

## 2022-01-24 VITALS
HEART RATE: 102 BPM | DIASTOLIC BLOOD PRESSURE: 100 MMHG | WEIGHT: 293 LBS | HEIGHT: 61 IN | SYSTOLIC BLOOD PRESSURE: 152 MMHG | BODY MASS INDEX: 55.32 KG/M2

## 2022-01-24 DIAGNOSIS — Z79.899 OTHER LONG TERM (CURRENT) DRUG THERAPY: Primary | ICD-10-CM

## 2022-01-24 DIAGNOSIS — G89.29 CHRONIC BILATERAL LOW BACK PAIN WITHOUT SCIATICA: ICD-10-CM

## 2022-01-24 DIAGNOSIS — Z11.59 SCREENING FOR VIRAL DISEASE: ICD-10-CM

## 2022-01-24 DIAGNOSIS — M43.06 SPONDYLOLYSIS OF LUMBAR REGION: ICD-10-CM

## 2022-01-24 DIAGNOSIS — M54.50 CHRONIC BILATERAL LOW BACK PAIN WITHOUT SCIATICA: ICD-10-CM

## 2022-01-24 LAB
CTP QC/QA: YES
POC (AMP) AMPHETAMINE: NEGATIVE
POC (BAR) BARBITURATES: NEGATIVE
POC (BUP) BUPRENORPHINE: NEGATIVE
POC (BZO) BENZODIAZEPINES: ABNORMAL
POC (COC) COCAINE: NEGATIVE
POC (MDMA) METHYLENEDIOXYMETHAMPHETAMINE 3,4: NEGATIVE
POC (MET) METHAMPHETAMINE: NEGATIVE
POC (MOP) OPIATES: ABNORMAL
POC (MTD) METHADONE: NEGATIVE
POC (OXY) OXYCODONE: NEGATIVE
POC (PCP) PHENCYCLIDINE: NEGATIVE
POC (TCA) TRICYCLIC ANTIDEPRESSANTS: NEGATIVE
POC TEMPERATURE (URINE): 92
POC THC: NEGATIVE

## 2022-01-24 PROCEDURE — 80305 DRUG TEST PRSMV DIR OPT OBS: CPT | Mod: PBBFAC | Performed by: PAIN MEDICINE

## 2022-01-24 PROCEDURE — 3044F HG A1C LEVEL LT 7.0%: CPT | Mod: CPTII,,, | Performed by: PAIN MEDICINE

## 2022-01-24 PROCEDURE — 3008F PR BODY MASS INDEX (BMI) DOCUMENTED: ICD-10-PCS | Mod: CPTII,,, | Performed by: PAIN MEDICINE

## 2022-01-24 PROCEDURE — 3080F DIAST BP >= 90 MM HG: CPT | Mod: CPTII,,, | Performed by: PAIN MEDICINE

## 2022-01-24 PROCEDURE — 3066F NEPHROPATHY DOC TX: CPT | Mod: CPTII,,, | Performed by: PAIN MEDICINE

## 2022-01-24 PROCEDURE — G0481 DRUG TEST DEF 8-14 CLASSES: HCPCS | Mod: ,,, | Performed by: CLINICAL MEDICAL LABORATORY

## 2022-01-24 PROCEDURE — 3044F PR MOST RECENT HEMOGLOBIN A1C LEVEL <7.0%: ICD-10-PCS | Mod: CPTII,,, | Performed by: PAIN MEDICINE

## 2022-01-24 PROCEDURE — 3008F BODY MASS INDEX DOCD: CPT | Mod: CPTII,,, | Performed by: PAIN MEDICINE

## 2022-01-24 PROCEDURE — 3061F PR NEG MICROALBUMINURIA RESULT DOCUMENTED/REVIEW: ICD-10-PCS | Mod: CPTII,,, | Performed by: PAIN MEDICINE

## 2022-01-24 PROCEDURE — 99215 OFFICE O/P EST HI 40 MIN: CPT | Mod: PBBFAC | Performed by: PAIN MEDICINE

## 2022-01-24 PROCEDURE — 99214 OFFICE O/P EST MOD 30 MIN: CPT | Mod: S$PBB,,, | Performed by: PAIN MEDICINE

## 2022-01-24 PROCEDURE — 3077F PR MOST RECENT SYSTOLIC BLOOD PRESSURE >= 140 MM HG: ICD-10-PCS | Mod: CPTII,,, | Performed by: PAIN MEDICINE

## 2022-01-24 PROCEDURE — 3080F PR MOST RECENT DIASTOLIC BLOOD PRESSURE >= 90 MM HG: ICD-10-PCS | Mod: CPTII,,, | Performed by: PAIN MEDICINE

## 2022-01-24 PROCEDURE — 1159F PR MEDICATION LIST DOCUMENTED IN MEDICAL RECORD: ICD-10-PCS | Mod: CPTII,,, | Performed by: PAIN MEDICINE

## 2022-01-24 PROCEDURE — 3061F NEG MICROALBUMINURIA REV: CPT | Mod: CPTII,,, | Performed by: PAIN MEDICINE

## 2022-01-24 PROCEDURE — G0481 PR DRUG TEST DEF 8-14 CLASSES: ICD-10-PCS | Mod: ,,, | Performed by: CLINICAL MEDICAL LABORATORY

## 2022-01-24 PROCEDURE — 99214 PR OFFICE/OUTPT VISIT, EST, LEVL IV, 30-39 MIN: ICD-10-PCS | Mod: S$PBB,,, | Performed by: PAIN MEDICINE

## 2022-01-24 PROCEDURE — 1159F MED LIST DOCD IN RCRD: CPT | Mod: CPTII,,, | Performed by: PAIN MEDICINE

## 2022-01-24 PROCEDURE — 3066F PR DOCUMENTATION OF TREATMENT FOR NEPHROPATHY: ICD-10-PCS | Mod: CPTII,,, | Performed by: PAIN MEDICINE

## 2022-01-24 PROCEDURE — 3077F SYST BP >= 140 MM HG: CPT | Mod: CPTII,,, | Performed by: PAIN MEDICINE

## 2022-01-24 RX ORDER — HYDROCODONE BITARTRATE AND ACETAMINOPHEN 10; 325 MG/1; MG/1
1 TABLET ORAL EVERY 8 HOURS PRN
Qty: 90 TABLET | Refills: 0 | Status: SHIPPED | OUTPATIENT
Start: 2022-01-31 | End: 2022-02-16 | Stop reason: SDUPTHER

## 2022-01-24 NOTE — H&P (VIEW-ONLY)
She Disclaimer: This note has been generated using voice-recognition software. There may be typographical errors that have been missed during proof-reading        Patient ID: Belem Swann is a 41 y.o. female.      Chief Complaint: Low-back Pain and Knee Pain (right)      41-year-old female returns today for re-evaluation of intractable lower back pain.  She received transforaminal epidural steroid injections for lumbosacral radiculopathy with some improvement.  She is now  poorly responding to Norco twice a day.  She was evaluated by Dr. Gale for surgical intervention and weight reduction was recommended.  She complains of lower back pain, right side greater than left.  She is not an ideal  candidate for repeat steroid injections secondary to cushion syndrome and diabetes mellitus.  Lumbar medial branch blocks were discussed without steroids and patient agrees and consents to the procedure for lower back pain and spondylosis.              Pain Assessment  Pain Assessment: 0-10  Pain Score:   9  Pain Location: Back  Pain Radiating Towards: LBP and right knee pain  Pain Descriptors: Sharp,Pounding,Constant  Pain Frequency: Continuous  Pain Onset: Awakened from sleep  Clinical Progression: Gradually worsening  Aggravating Factors: Standing,Walking  Pain Intervention(s): Medication (See eMAR)      A's of Opioid Risk Assessment  Activity:Patient can not perform ADL.   Analgesia:Patients pain is not controlled by current medication.   Adverse Effects: Patient denies constipation or sedation.  Aberrant Behavior:  reviewed with no aberrant drug seeking/taking behavior.      Patient denies any suicidal or homicidal ideations    Physical Therapy/Home Exercise: yes      X-Ray Lumbar Complete Including Flex And Ext  Narrative: EXAMINATION:  XR LUMBAR SPINE 5 VIEW WITH FLEX AND EXT    CLINICAL HISTORY:  Dorsalgia, unspecifiedLow back pain, symptoms persist with > 6wks conservative treatment;    COMPARISON:  Lumbar spine  x-ray 2020    TECHNIQUE:  Frontal and lateral views of the lumbosacral spine. Lateral views obtained in the neutral, flexion, and extension positions.    FINDINGS:  Lumbar vertebral body heights appear maintained.  Minimal levoconvex curvature of the lumbar spine.  No significant anterolisthesis or retrolisthesis.  Mild scattered posterior facet arthropathy.  Impression: As above.    Point of Service: [facility]    Electronically signed by: Joseph Torres  Date:    2021  Time:    10:27  X-Ray Thoracic Spine AP Lateral  Narrative: EXAMINATION:  XR THORACIC SPINE AP LATERAL    CLINICAL HISTORY:  Radiculopathy, lumbosacral region    COMPARISON:  None.    FINDINGS:  No fracture is seen. Vertebral body heights and alignment are normal.  The disc space heights are well-maintained.  No significant degenerative change is present.  Impression: No evidence of abnormality demonstrated    Electronically signed by: Baljinder Porter  Date:    2021  Time:    10:20      Review of Systems   Constitutional: Negative.    HENT: Negative.    Eyes: Negative.    Respiratory: Negative.    Cardiovascular: Negative.    Gastrointestinal: Negative.    Endocrine: Negative.    Genitourinary: Negative.    Musculoskeletal: Positive for arthralgias, back pain and myalgias.   Integumentary:  Negative.   Neurological: Negative.    Hematological: Negative.    Psychiatric/Behavioral: Positive for sleep disturbance.             Past Medical History:   Diagnosis Date    Anxiety     Asthma     Chronic pain     Cushing syndrome     Depression     Essential hypertension     Fibromyalgia     Herpes zoster     Hyperlipidemia     Onychomycosis     MARY on CPAP     Rheumatoid arthritis     Type 2 diabetes mellitus 2020    Vitamin D deficiency 2018     Past Surgical History:   Procedure Laterality Date     SECTION      ELBOW SURGERY      EPIDURAL STEROID INJECTION      L4-5 VERITO Dec  2020[place]    FEMUR SURGERY Right 1985    due to osteomyelitis    HYSTERECTOMY      LIPOMA RESECTION  12/2019    SURGICAL REMOVAL OF PILONIDAL CYST      TRANSFORAMINAL EPIDURAL INJECTION OF STEROID      Bilateral L4-5 TFESI Nov 2020-[place]    TRANSFORAMINAL EPIDURAL INJECTION OF STEROID      Right L4-5 TFESI Feb 2020-[place]    VENTRAL HERNIA REPAIR  09/2020     Social History     Socioeconomic History    Marital status:    Tobacco Use    Smoking status: Former Smoker    Smokeless tobacco: Never Used   Substance and Sexual Activity    Alcohol use: Never    Drug use: Never    Sexual activity: Not Currently     Family History   Problem Relation Age of Onset    Cancer Mother     Thyroid cancer Mother     Thyroid cancer Maternal Grandmother     Melanoma Neg Hx      Review of patient's allergies indicates:   Allergen Reactions    Doxycycline     Latex      has a current medication list which includes the following prescription(s): humira, albuterol sulfate, albuterol-ipratropium, budesonide-formoterol 160-4.5 mcg, buspirone, cyclobenzaprine, escitalopram oxalate, fluticasone propionate, gabapentin, hydrocodone-acetaminophen, hydroxyzine pamoate, victoza 3-harshal, lisinopril, metformin, methotrexate, pen needle, diabetic, pen needle, diabetic, promethazine, rosuvastatin, spironolactone, triamcinolone acetonide 0.1%, triamterene-hydrochlorothiazide 75-50 mg, true metrix glucose test strip, and [START ON 1/31/2022] hydrocodone-acetaminophen.      Objective:  Vitals:    01/24/22 1058   BP: (!) 152/100   Pulse: 102        Physical Exam  Vitals and nursing note reviewed.   Constitutional:       General: She is not in acute distress.     Appearance: Normal appearance. She is not ill-appearing, toxic-appearing or diaphoretic.   HENT:      Head: Normocephalic and atraumatic.      Nose: Nose normal.      Mouth/Throat:      Mouth: Mucous membranes are moist.   Eyes:      Extraocular Movements: Extraocular  movements intact.      Pupils: Pupils are equal, round, and reactive to light.   Cardiovascular:      Rate and Rhythm: Normal rate and regular rhythm.      Heart sounds: Normal heart sounds.   Pulmonary:      Effort: Pulmonary effort is normal. No respiratory distress.      Breath sounds: Normal breath sounds. No stridor. No wheezing or rhonchi.   Abdominal:      General: Bowel sounds are normal.      Palpations: Abdomen is soft.   Musculoskeletal:         General: No swelling or deformity.      Cervical back: Normal and normal range of motion. No spasms or tenderness. No pain with movement. Normal range of motion.      Thoracic back: Normal.      Lumbar back: Spasms, tenderness and bony tenderness present. Decreased range of motion. Negative right straight leg raise test and negative left straight leg raise test. No scoliosis.      Right lower leg: No edema.      Left lower leg: No edema.      Comments: Lumbar pain with extension, flexion and lateral rotation   Skin:     General: Skin is warm.   Neurological:      General: No focal deficit present.      Mental Status: She is alert and oriented to person, place, and time. Mental status is at baseline.      Cranial Nerves: Cranial nerves are intact. No cranial nerve deficit.      Sensory: Sensation is intact. No sensory deficit.      Motor: No weakness.      Coordination: Coordination normal.      Gait: Gait normal.      Deep Tendon Reflexes: Reflexes are normal and symmetric.   Psychiatric:         Mood and Affect: Mood normal.         Behavior: Behavior normal.           Assessment:      1. Other long term (current) drug therapy    2. Spondylolysis of lumbar region    3. Chronic bilateral low back pain without sciatica    4. Screening for viral disease          Plan:  1. reviewed  2.Addiction, Dependency, Tolerance, Opioid abuse-misuse, Death, Diversion Discussed. Overdose reversal drug Naloxone discussed.  3.Refill/Continue medications for pain control and  function       Requested Prescriptions     Signed Prescriptions Disp Refills    HYDROcodone-acetaminophen (NORCO)  mg per tablet 90 tablet 0     Sig: Take 1 tablet by mouth every 8 (eight) hours as needed for Pain.     4.Urine drug screen point of care obtained and consistent with prescribed medications and medication refill date  5. Schedule diagnostic bilateral lumbar medial branch blocks at L4-5, 5 S1 for lower back pain and spondylosis    Orders Placed This Encounter   Procedures    Drug Screen Definitive 14, Urine     Standing Status:   Future     Number of Occurrences:   1     Standing Expiration Date:   3/25/2023     Order Specific Question:   Specimen Source     Answer:   Urine    COVID-19 Routine Screening     Standing Status:   Future     Standing Expiration Date:   3/25/2023     Order Specific Question:   Is the patient symptomatic?     Answer:   No     Order Specific Question:   Is this needed for pre-procedure or pre-op testing?     Answer:   Yes     Order Specific Question:   Diagnosis:     Answer:   Pre-op testing [[id number]]    POCT Urine Drug Screen Presump     Interpretive Information:     Negative:  No drug detected at the cut off level.   Positive:  This result represents presumptive positive for the   tested drug, other substances may yield a positive response other   than the analyte of interest. This result should be utilized for   diagnostic purpose only. Confirmation testing will be performed upon physician request only.       Case Request Operating Room: Bilateral L4-5,5-S1 MBB ( No steroids)     Order Specific Question:   Medical Necessity:     Answer:   Medically Non-Urgent [100]     Order Specific Question:   CPT Code:     Answer:   LA INJ DX/THER AGNT PARAVERT FACET JOINT,IMG GUIDE,LUMBAR/SAC,1ST LVL [46705]     Order Specific Question:   Post-Procedure Disposition:     Answer:   PACU [1]     Order Specific Question:   Estimated Length of Stay:     Answer:   0 midnight     Order  Specific Question:   Implant Required:     Answer:   No [1001]     Order Specific Question:   Is an on-site pathologist required for this procedure?     Answer:   N/A      5. Indications for this procedure for this specific patient include the following   - Pt has had symptoms for three months with moderate to severe pain with functional impairment rated of 7/10 pain.   - Pain non-responsive to conservative care.    - Pain predominately axial and not associated with radiculopathy or claudication.    - No non-facet pathology as source of pain.    - Clinical assessment implicates facet joint as putative pain source.    - Pain is exacerbated by extension or prolonged sitting/standing and relieved by rest.    - No unexplained neurologic deficit.    - No history of coagulopathy, infection or unstable medical conditions.    - Pain is causing significant functional limitation resulting in diminished quality of life and impaired age appropriate ADL's.   - Clinical assessment implicates facet joint as putative source of pain  - Repeat injections not done prior to 7 days   - no more than 2 levels will be done per side    6. Monitored Anesthesia Care medical necessity authorization request:    Monitor anesthesia request is medically indicated for the scheduled nerve block procedure due to:  - needle phobia and anxiety, placing  the patient at risk during the provided service.  -patient has a BMI greater than 45  -patient has severe sleep apnea for which BiPAP and oxygen are needed while sleeping  --patient has severe problems with muscles and muscle spasticity that makes it hard to lie still  --patient suffers from chronic pain and is unable to function due to  diminished ADLs           report:  Reviewed and consistent with medication use as prescribed.      The total time spent for evaluation and management on 01/24/2022 including reviewing separately obtained history, performing a medically appropriate exam and  evaluation, documenting clinical information in the health record, independently interpreting results and communicating them to the patient/family/caregiver, and ordering medications/tests/procedures was between 15-29 minutes.    The above plan and management options were discussed at length with patient. Patient is in agreement with the above and verbalized understanding. It will be communicated with the referring physician via electronic record, fax, or mail.

## 2022-01-24 NOTE — PATIENT INSTRUCTIONS
Bilateral L4-S1 MBB 2-8-2022 AT 0800      ALL PATIENTS TO HAVE COVID SCREENING DONE 48-72 HOURS PRIOR TO PROCEDURE INCLUDING PATIENTS THAT WHOM HAVE HAD COVID VACCINE!!!   IF YOUR  PROCEDURE IS ON A Tuesday, GET COVID TESTING ON THE  Friday BEFORE PROCEDURE.  IF YOUR PROCEDURE IS ON Thursday, HAVE COVID TESTING ON THE Monday OR Tuesday PRIOR TO PROCEDURE    IF YOUR PRIMARY  CARE DOCTOR ORDERS YOUR COVID TESTING YOU MUST BRING YOUR RESULTS WITH YOU TO YOUR PROCEDURE.    Procedure Instructions:    Nothing to eat or drink for 8 hours or after midnight including gum, candy, mints, or tobacco products.  If you are scheduled for 1:30 or later nothing to eat or drink after 5 a.m. the morning of the procedure, including gum, candy, mints, or tobacco products.  Must have a  at least 18 yrs of age to stay present at all times  No Diabetic medications the morning of procedure, check blood sugar the morning of procedure, if it is greater than 200 call the office at [phone]  If you are started on antibiotics or have been prescribed antibiotics, have a fever, or have any other type of infection call to reschedule procedure.  If you take blood pressure medications you can take it at your regular scheduled time with a small sip of WATER!    If you are having ANY TYPE OF EPIDURAL  VERITO/TFESI: hold aspirin and aspirin products for 7 days and NSAIDS( ibuprofen, mobic, advil, aleve etc....)  for 3 days   prior to procedure.    IF YOU ARE HAVING ANY TYPE OF NECK/CERVICAL PROCEDURE  ASPIRIN AND ASPIRIN PRODUCTS TO BE HELD FOR 7 DAYS AS WELL AS NSAIDS( IBUPROFEN, MOBIC, ADVIL, ALEVE ETC...) FOR 3 DAYS PRIOR TO PROCEDURE.

## 2022-01-24 NOTE — PROGRESS NOTES
She Disclaimer: This note has been generated using voice-recognition software. There may be typographical errors that have been missed during proof-reading        Patient ID: Belem Swann is a 41 y.o. female.      Chief Complaint: Low-back Pain and Knee Pain (right)      41-year-old female returns today for re-evaluation of intractable lower back pain.  She received transforaminal epidural steroid injections for lumbosacral radiculopathy with some improvement.  She is now  poorly responding to Norco twice a day.  She was evaluated by Dr. Gale for surgical intervention and weight reduction was recommended.  She complains of lower back pain, right side greater than left.  She is not an ideal  candidate for repeat steroid injections secondary to cushion syndrome and diabetes mellitus.  Lumbar medial branch blocks were discussed without steroids and patient agrees and consents to the procedure for lower back pain and spondylosis.              Pain Assessment  Pain Assessment: 0-10  Pain Score:   9  Pain Location: Back  Pain Radiating Towards: LBP and right knee pain  Pain Descriptors: Sharp,Pounding,Constant  Pain Frequency: Continuous  Pain Onset: Awakened from sleep  Clinical Progression: Gradually worsening  Aggravating Factors: Standing,Walking  Pain Intervention(s): Medication (See eMAR)      A's of Opioid Risk Assessment  Activity:Patient can not perform ADL.   Analgesia:Patients pain is not controlled by current medication.   Adverse Effects: Patient denies constipation or sedation.  Aberrant Behavior:  reviewed with no aberrant drug seeking/taking behavior.      Patient denies any suicidal or homicidal ideations    Physical Therapy/Home Exercise: yes      X-Ray Lumbar Complete Including Flex And Ext  Narrative: EXAMINATION:  XR LUMBAR SPINE 5 VIEW WITH FLEX AND EXT    CLINICAL HISTORY:  Dorsalgia, unspecifiedLow back pain, symptoms persist with > 6wks conservative treatment;    COMPARISON:  Lumbar spine  x-ray 2020    TECHNIQUE:  Frontal and lateral views of the lumbosacral spine. Lateral views obtained in the neutral, flexion, and extension positions.    FINDINGS:  Lumbar vertebral body heights appear maintained.  Minimal levoconvex curvature of the lumbar spine.  No significant anterolisthesis or retrolisthesis.  Mild scattered posterior facet arthropathy.  Impression: As above.    Point of Service: [facility]    Electronically signed by: Joseph Torres  Date:    2021  Time:    10:27  X-Ray Thoracic Spine AP Lateral  Narrative: EXAMINATION:  XR THORACIC SPINE AP LATERAL    CLINICAL HISTORY:  Radiculopathy, lumbosacral region    COMPARISON:  None.    FINDINGS:  No fracture is seen. Vertebral body heights and alignment are normal.  The disc space heights are well-maintained.  No significant degenerative change is present.  Impression: No evidence of abnormality demonstrated    Electronically signed by: Baljinder Porter  Date:    2021  Time:    10:20      Review of Systems   Constitutional: Negative.    HENT: Negative.    Eyes: Negative.    Respiratory: Negative.    Cardiovascular: Negative.    Gastrointestinal: Negative.    Endocrine: Negative.    Genitourinary: Negative.    Musculoskeletal: Positive for arthralgias, back pain and myalgias.   Integumentary:  Negative.   Neurological: Negative.    Hematological: Negative.    Psychiatric/Behavioral: Positive for sleep disturbance.             Past Medical History:   Diagnosis Date    Anxiety     Asthma     Chronic pain     Cushing syndrome     Depression     Essential hypertension     Fibromyalgia     Herpes zoster     Hyperlipidemia     Onychomycosis     MARY on CPAP     Rheumatoid arthritis     Type 2 diabetes mellitus 2020    Vitamin D deficiency 2018     Past Surgical History:   Procedure Laterality Date     SECTION      ELBOW SURGERY      EPIDURAL STEROID INJECTION      L4-5 VERITO Dec  2020-[facility]    FEMUR SURGERY Right 1985    due to osteomyelitis    HYSTERECTOMY      LIPOMA RESECTION  12/2019    SURGICAL REMOVAL OF PILONIDAL CYST      TRANSFORAMINAL EPIDURAL INJECTION OF STEROID      Bilateral L4-5 TFESI Nov 2020-[facility]    TRANSFORAMINAL EPIDURAL INJECTION OF STEROID      Right L4-5 TFESI Feb 2020-[facility]    VENTRAL HERNIA REPAIR  09/2020     Social History     Socioeconomic History    Marital status:    Tobacco Use    Smoking status: Former Smoker    Smokeless tobacco: Never Used   Substance and Sexual Activity    Alcohol use: Never    Drug use: Never    Sexual activity: Not Currently     Family History   Problem Relation Age of Onset    Cancer Mother     Thyroid cancer Mother     Thyroid cancer Maternal Grandmother     Melanoma Neg Hx      Review of patient's allergies indicates:   Allergen Reactions    Doxycycline     Latex      has a current medication list which includes the following prescription(s): humira, albuterol sulfate, albuterol-ipratropium, budesonide-formoterol 160-4.5 mcg, buspirone, cyclobenzaprine, escitalopram oxalate, fluticasone propionate, gabapentin, hydrocodone-acetaminophen, hydroxyzine pamoate, victoza 3-harshal, lisinopril, metformin, methotrexate, pen needle, diabetic, pen needle, diabetic, promethazine, rosuvastatin, spironolactone, triamcinolone acetonide 0.1%, triamterene-hydrochlorothiazide 75-50 mg, true metrix glucose test strip, and [START ON 1/31/2022] hydrocodone-acetaminophen.      Objective:  Vitals:    01/24/22 1058   BP: (!) 152/100   Pulse: 102        Physical Exam  Vitals and nursing note reviewed.   Constitutional:       General: She is not in acute distress.     Appearance: Normal appearance. She is not ill-appearing, toxic-appearing or diaphoretic.   HENT:      Head: Normocephalic and atraumatic.      Nose: Nose normal.      Mouth/Throat:      Mouth: Mucous membranes are moist.   Eyes:      Extraocular Movements: Extraocular  movements intact.      Pupils: Pupils are equal, round, and reactive to light.   Cardiovascular:      Rate and Rhythm: Normal rate and regular rhythm.      Heart sounds: Normal heart sounds.   Pulmonary:      Effort: Pulmonary effort is normal. No respiratory distress.      Breath sounds: Normal breath sounds. No stridor. No wheezing or rhonchi.   Abdominal:      General: Bowel sounds are normal.      Palpations: Abdomen is soft.   Musculoskeletal:         General: No swelling or deformity.      Cervical back: Normal and normal range of motion. No spasms or tenderness. No pain with movement. Normal range of motion.      Thoracic back: Normal.      Lumbar back: Spasms, tenderness and bony tenderness present. Decreased range of motion. Negative right straight leg raise test and negative left straight leg raise test. No scoliosis.      Right lower leg: No edema.      Left lower leg: No edema.      Comments: Lumbar pain with extension, flexion and lateral rotation   Skin:     General: Skin is warm.   Neurological:      General: No focal deficit present.      Mental Status: She is alert and oriented to person, place, and time. Mental status is at baseline.      Cranial Nerves: Cranial nerves are intact. No cranial nerve deficit.      Sensory: Sensation is intact. No sensory deficit.      Motor: No weakness.      Coordination: Coordination normal.      Gait: Gait normal.      Deep Tendon Reflexes: Reflexes are normal and symmetric.   Psychiatric:         Mood and Affect: Mood normal.         Behavior: Behavior normal.           Assessment:      1. Other long term (current) drug therapy    2. Spondylolysis of lumbar region    3. Chronic bilateral low back pain without sciatica    4. Screening for viral disease          Plan:  1. reviewed  2.Addiction, Dependency, Tolerance, Opioid abuse-misuse, Death, Diversion Discussed. Overdose reversal drug Naloxone discussed.  3.Refill/Continue medications for pain control and  function       Requested Prescriptions     Signed Prescriptions Disp Refills    HYDROcodone-acetaminophen (NORCO)  mg per tablet 90 tablet 0     Sig: Take 1 tablet by mouth every 8 (eight) hours as needed for Pain.     4.Urine drug screen point of care obtained and consistent with prescribed medications and medication refill date  5. Schedule diagnostic bilateral lumbar medial branch blocks at L4-5, 5 S1 for lower back pain and spondylosis    Orders Placed This Encounter   Procedures    Drug Screen Definitive 14, Urine     Standing Status:   Future     Number of Occurrences:   1     Standing Expiration Date:   3/25/2023     Order Specific Question:   Specimen Source     Answer:   Urine    COVID-19 Routine Screening     Standing Status:   Future     Standing Expiration Date:   3/25/2023     Order Specific Question:   Is the patient symptomatic?     Answer:   No     Order Specific Question:   Is this needed for pre-procedure or pre-op testing?     Answer:   Yes     Order Specific Question:   Diagnosis:     Answer:   Pre-op testing [[id number]]    POCT Urine Drug Screen Presump     Interpretive Information:     Negative:  No drug detected at the cut off level.   Positive:  This result represents presumptive positive for the   tested drug, other substances may yield a positive response other   than the analyte of interest. This result should be utilized for   diagnostic purpose only. Confirmation testing will be performed upon physician request only.       Case Request Operating Room: Bilateral L4-5,5-S1 MBB ( No steroids)     Order Specific Question:   Medical Necessity:     Answer:   Medically Non-Urgent [100]     Order Specific Question:   CPT Code:     Answer:   OR INJ DX/THER AGNT PARAVERT FACET JOINT,IMG GUIDE,LUMBAR/SAC,1ST LVL [81877]     Order Specific Question:   Post-Procedure Disposition:     Answer:   PACU [1]     Order Specific Question:   Estimated Length of Stay:     Answer:   0 midnight     Order  Specific Question:   Implant Required:     Answer:   No [1001]     Order Specific Question:   Is an on-site pathologist required for this procedure?     Answer:   N/A      5. Indications for this procedure for this specific patient include the following   - Pt has had symptoms for three months with moderate to severe pain with functional impairment rated of 7/10 pain.   - Pain non-responsive to conservative care.    - Pain predominately axial and not associated with radiculopathy or claudication.    - No non-facet pathology as source of pain.    - Clinical assessment implicates facet joint as putative pain source.    - Pain is exacerbated by extension or prolonged sitting/standing and relieved by rest.    - No unexplained neurologic deficit.    - No history of coagulopathy, infection or unstable medical conditions.    - Pain is causing significant functional limitation resulting in diminished quality of life and impaired age appropriate ADL's.   - Clinical assessment implicates facet joint as putative source of pain  - Repeat injections not done prior to 7 days   - no more than 2 levels will be done per side    6. Monitored Anesthesia Care medical necessity authorization request:    Monitor anesthesia request is medically indicated for the scheduled nerve block procedure due to:  - needle phobia and anxiety, placing  the patient at risk during the provided service.  -patient has a BMI greater than 45  -patient has severe sleep apnea for which BiPAP and oxygen are needed while sleeping  --patient has severe problems with muscles and muscle spasticity that makes it hard to lie still  --patient suffers from chronic pain and is unable to function due to  diminished ADLs           report:  Reviewed and consistent with medication use as prescribed.      The total time spent for evaluation and management on 01/24/2022 including reviewing separately obtained history, performing a medically appropriate exam and  evaluation, documenting clinical information in the health record, independently interpreting results and communicating them to the patient/family/caregiver, and ordering medications/tests/procedures was between 15-29 minutes.    The above plan and management options were discussed at length with patient. Patient is in agreement with the above and verbalized understanding. It will be communicated with the referring physician via electronic record, fax, or mail.

## 2022-01-26 LAB
6-ACETYLMORPHINE, URINE (RUSH): NEGATIVE 10 NG/ML
7-AMINOCLONAZEPAM, URINE (RUSH): NEGATIVE 25 NG/ML
A-HYDROXYALPRAZOLAM, URINE (RUSH): 31.8 25 NG/ML
ACETYL FENTANYL, URINE (RUSH): NEGATIVE 2.5 NG/ML
ACETYL NORFENTANYL OXALATE, URINE (RUSH): NEGATIVE 5 NG/ML
AMPHET UR QL SCN: NEGATIVE 100 NG/ML
BENZOYLECGONINE, URINE (RUSH): NEGATIVE 100 NG/ML
BUPRENORPHINE UR QL SCN: NEGATIVE 25 NG/ML
CODEINE, URINE (RUSH): NEGATIVE 25 NG/ML
CREAT UR-MCNC: 184 MG/DL (ref 28–219)
EDDP, URINE (RUSH): NEGATIVE 25 NG/ML
FENTANYL, URINE (RUSH): NEGATIVE 2.5 NG/ML
HYDROCODONE, URINE (RUSH): >250 25 NG/ML
HYDROMORPHONE, URINE (RUSH): NEGATIVE 25 NG/ML
LORAZEPAM, URINE (RUSH): NEGATIVE 25 NG/ML
METHADONE UR QL SCN: NEGATIVE 25 NG/ML
METHAMPHET UR QL SCN: NEGATIVE 100 NG/ML
MORPHINE, URINE (RUSH): NEGATIVE 25 NG/ML
NORBUPRENORPHINE, URINE (RUSH): NEGATIVE 25 NG/ML
NORDIAZEPAM, URINE (RUSH): NEGATIVE 25 NG/ML
NORFENTANYL OXALATE, URINE (RUSH): NEGATIVE 5 NG/ML
NORHYDROCODONE, URINE (RUSH): >500 50 NG/ML
NOROXYCODONE HCL, URINE (RUSH): NEGATIVE 50 NG/ML
OXAZEPAM, URINE (RUSH): NEGATIVE 25 NG/ML
OXYCODONE UR QL SCN: NEGATIVE 25 NG/ML
OXYMORPHONE, URINE (RUSH): NEGATIVE 25 NG/ML
PH UR STRIP: 5.5 PH UNITS
SP GR UR STRIP: >=1.03
TAPENTADOL, URINE (RUSH): NEGATIVE 25 NG/ML
TEMAZEPAM, URINE (RUSH): NEGATIVE 25 NG/ML
THC-COOH, URINE (RUSH): NEGATIVE 25 NG/ML
TRAMADOL, URINE (RUSH): NEGATIVE 100 NG/ML

## 2022-02-08 ENCOUNTER — HOSPITAL ENCOUNTER (OUTPATIENT)
Facility: HOSPITAL | Age: 42
Discharge: HOME | End: 2022-02-08
Attending: PAIN MEDICINE | Admitting: PAIN MEDICINE
Payer: MEDICAID

## 2022-02-08 ENCOUNTER — ANESTHESIA EVENT (OUTPATIENT)
Dept: PAIN MEDICINE | Facility: HOSPITAL | Age: 42
End: 2022-02-08
Payer: MEDICAID

## 2022-02-08 ENCOUNTER — ANESTHESIA (OUTPATIENT)
Dept: PAIN MEDICINE | Facility: HOSPITAL | Age: 42
End: 2022-02-08
Payer: MEDICAID

## 2022-02-08 VITALS
SYSTOLIC BLOOD PRESSURE: 110 MMHG | WEIGHT: 293 LBS | TEMPERATURE: 97 F | HEIGHT: 61 IN | BODY MASS INDEX: 55.32 KG/M2 | DIASTOLIC BLOOD PRESSURE: 75 MMHG | OXYGEN SATURATION: 99 % | HEART RATE: 88 BPM | RESPIRATION RATE: 13 BRPM

## 2022-02-08 DIAGNOSIS — M47.816 SPONDYLOSIS OF LUMBAR REGION WITHOUT MYELOPATHY OR RADICULOPATHY: Primary | ICD-10-CM

## 2022-02-08 DIAGNOSIS — M47.817 SPONDYLOSIS WITHOUT MYELOPATHY OR RADICULOPATHY, LUMBOSACRAL REGION: ICD-10-CM

## 2022-02-08 LAB
GLUCOSE SERPL-MCNC: 147 MG/DL (ref 70–105)
GLUCOSE SERPL-MCNC: 147 MG/DL (ref 70–110)

## 2022-02-08 PROCEDURE — 82962 GLUCOSE BLOOD TEST: CPT

## 2022-02-08 PROCEDURE — 63600175 PHARM REV CODE 636 W HCPCS: Performed by: NURSE ANESTHETIST, CERTIFIED REGISTERED

## 2022-02-08 PROCEDURE — 64495 INJ PARAVERT F JNT L/S 3 LEV: CPT | Mod: 50 | Performed by: PAIN MEDICINE

## 2022-02-08 PROCEDURE — 63600175 PHARM REV CODE 636 W HCPCS: Performed by: PAIN MEDICINE

## 2022-02-08 PROCEDURE — 64494 INJ PARAVERT F JNT L/S 2 LEV: CPT | Mod: RT,,, | Performed by: PAIN MEDICINE

## 2022-02-08 PROCEDURE — 64495 PR INJ DX/THER AGNT PARAVERT FACET JOINT,IMG GUIDE,LUMBAR/SAC, ADD LEVEL: ICD-10-PCS | Mod: RT,,, | Performed by: PAIN MEDICINE

## 2022-02-08 PROCEDURE — 64495 INJ PARAVERT F JNT L/S 3 LEV: CPT | Mod: RT,,, | Performed by: PAIN MEDICINE

## 2022-02-08 PROCEDURE — 25000003 PHARM REV CODE 250: Performed by: NURSE ANESTHETIST, CERTIFIED REGISTERED

## 2022-02-08 PROCEDURE — 37000009 HC ANESTHESIA EA ADD 15 MINS: Performed by: PAIN MEDICINE

## 2022-02-08 PROCEDURE — D9220A PRA ANESTHESIA: Mod: ,,, | Performed by: NURSE ANESTHETIST, CERTIFIED REGISTERED

## 2022-02-08 PROCEDURE — 25000003 PHARM REV CODE 250: Performed by: PAIN MEDICINE

## 2022-02-08 PROCEDURE — 64493 INJ PARAVERT F JNT L/S 1 LEV: CPT | Mod: 50,,, | Performed by: PAIN MEDICINE

## 2022-02-08 PROCEDURE — D9220A PRA ANESTHESIA: ICD-10-PCS | Mod: ,,, | Performed by: NURSE ANESTHETIST, CERTIFIED REGISTERED

## 2022-02-08 PROCEDURE — 64494 INJ PARAVERT F JNT L/S 2 LEV: CPT | Mod: 50 | Performed by: PAIN MEDICINE

## 2022-02-08 PROCEDURE — 64494 PR INJ DX/THER AGNT PARAVERT FACET JOINT,IMG GUIDE,LUMBAR/SAC, 2ND LEVEL: ICD-10-PCS | Mod: RT,,, | Performed by: PAIN MEDICINE

## 2022-02-08 PROCEDURE — 64493 INJ PARAVERT F JNT L/S 1 LEV: CPT | Mod: 50 | Performed by: PAIN MEDICINE

## 2022-02-08 PROCEDURE — 37000008 HC ANESTHESIA 1ST 15 MINUTES: Performed by: PAIN MEDICINE

## 2022-02-08 PROCEDURE — 27201423 OPTIME MED/SURG SUP & DEVICES STERILE SUPPLY: Performed by: PAIN MEDICINE

## 2022-02-08 PROCEDURE — 64493 PR INJ DX/THER AGNT PARAVERT FACET JOINT,IMG GUIDE,LUMBAR/SAC,1ST LVL: ICD-10-PCS | Mod: 50,,, | Performed by: PAIN MEDICINE

## 2022-02-08 RX ORDER — PROPOFOL 10 MG/ML
INJECTION, EMULSION INTRAVENOUS
Status: DISCONTINUED | OUTPATIENT
Start: 2022-02-08 | End: 2022-02-08

## 2022-02-08 RX ORDER — BUPIVACAINE HYDROCHLORIDE 2.5 MG/ML
INJECTION, SOLUTION INFILTRATION; PERINEURAL
Status: DISCONTINUED | OUTPATIENT
Start: 2022-02-08 | End: 2022-02-08 | Stop reason: HOSPADM

## 2022-02-08 RX ORDER — FENTANYL CITRATE 50 UG/ML
INJECTION, SOLUTION INTRAMUSCULAR; INTRAVENOUS
Status: DISCONTINUED | OUTPATIENT
Start: 2022-02-08 | End: 2022-02-08

## 2022-02-08 RX ORDER — TRIAMCINOLONE ACETONIDE 40 MG/ML
INJECTION, SUSPENSION INTRA-ARTICULAR; INTRAMUSCULAR
Status: DISCONTINUED | OUTPATIENT
Start: 2022-02-08 | End: 2022-02-08 | Stop reason: HOSPADM

## 2022-02-08 RX ORDER — SODIUM CHLORIDE 9 MG/ML
INJECTION, SOLUTION INTRAVENOUS CONTINUOUS
Status: DISCONTINUED | OUTPATIENT
Start: 2022-02-08 | End: 2022-02-08 | Stop reason: HOSPADM

## 2022-02-08 RX ORDER — LIDOCAINE HYDROCHLORIDE 20 MG/ML
INJECTION, SOLUTION EPIDURAL; INFILTRATION; INTRACAUDAL; PERINEURAL
Status: DISCONTINUED | OUTPATIENT
Start: 2022-02-08 | End: 2022-02-08

## 2022-02-08 RX ADMIN — PROPOFOL 50 MG: 10 INJECTION, EMULSION INTRAVENOUS at 08:02

## 2022-02-08 RX ADMIN — LIDOCAINE HYDROCHLORIDE 100 MG: 20 INJECTION, SOLUTION INTRAVENOUS at 08:02

## 2022-02-08 RX ADMIN — FENTANYL CITRATE 100 MCG: 50 INJECTION INTRAMUSCULAR; INTRAVENOUS at 09:02

## 2022-02-08 RX ADMIN — PROPOFOL 50 MG: 10 INJECTION, EMULSION INTRAVENOUS at 09:02

## 2022-02-08 RX ADMIN — SODIUM CHLORIDE: 9 INJECTION, SOLUTION INTRAVENOUS at 08:02

## 2022-02-08 NOTE — TRANSFER OF CARE
"Anesthesia Transfer of Care Note    Patient: Belem Swann    Procedure(s) Performed: Procedure(s) (LRB):  Bilateral L4-5,5-S1 MBB ( No steroids) (Bilateral)    Patient location: PACU    Anesthesia Type: general    Transport from OR: Transported from OR on room air with adequate spontaneous ventilation    Post pain: adequate analgesia    Post assessment: no apparent anesthetic complications    Post vital signs: stable    Level of consciousness: responds to stimulation    Nausea/Vomiting: no nausea/vomiting    Complications: none    Transfer of care protocol was followed      Last vitals:   Visit Vitals  /78 (BP Location: Right arm, Patient Position: Lying)   Pulse 98   Temp 36.1 °C (97 °F) (Oral)   Resp (!) 22   Ht 5' 1" (1.549 m)   Wt (!) 166.9 kg (368 lb)   SpO2 100%   Breastfeeding No   BMI 69.53 kg/m²     "

## 2022-02-08 NOTE — ANESTHESIA PREPROCEDURE EVALUATION
02/08/2022  Belem Swann is a 41 y.o., female.    Anesthesia Evaluation    I have reviewed the Patient Summary Reports.    I have reviewed the Nursing Notes. I have reviewed the NPO Status.   I have reviewed the Medications.     Review of Systems  Anesthesia Hx:  No problems with previous Anesthesia    Social:  Non-Smoker    Cardiovascular:   Hypertension hyperlipidemia    Pulmonary:   Asthma Sleep Apnea, CPAP    Musculoskeletal:  Lumbar Spine Disorders   Neurological:   Neuromuscular Disease,  Pain Syndrome  Chronic Pain Syndrome   Endocrine:   Diabetes, type 2  Metabolic Disorders, Morbid Obesity / BMI > 40  Psych:   Psychiatric History        Past Medical History:   Diagnosis Date    Anxiety     Asthma     Chronic pain     Cushing syndrome     Depression     Essential hypertension     Fibromyalgia     Herpes zoster     Hyperlipidemia     Onychomycosis     MARY on CPAP     Rheumatoid arthritis     Type 2 diabetes mellitus 11/2020    Vitamin D deficiency 05/03/2018       Physical Exam  General:  Well nourished, Morbid Obesity    Airway/Jaw/Neck:  Airway Findings: General Airway Assessment: Adult Mallampati: III       Chest/Lungs:  Chest/Lungs Findings: Clear to auscultation     Heart/Vascular:  Heart Findings: Rate: Normal        Mental Status:  Mental Status Findings:  Cooperative, Alert and Oriented         Anesthesia Plan  Type of Anesthesia, risks & benefits discussed:  Anesthesia Type:  general    Patient's Preference:   Plan Factors:          Intra-op Monitoring Plan: standard ASA monitors  Intra-op Monitoring Plan Comments:   Post Op Pain Control Plan: multimodal analgesia  Post Op Pain Control Plan Comments:     Induction:   IV  Beta Blocker:  Patient is not currently on a Beta-Blocker (No further documentation required).       Informed Consent: Patient understands risks and agrees  with Anesthesia plan.  Questions answered. Anesthesia consent signed with patient.  ASA Score: 3     Day of Surgery Review of History & Physical: I have interviewed and examined the patient. I have reviewed the patient's H&P dated:  There are no significant changes.          Ready For Surgery From Anesthesia Perspective.

## 2022-02-08 NOTE — DISCHARGE SUMMARY
[facility] - Pain Management  Discharge Note  Short Stay    Procedure(s) (LRB):  Bilateral L4-5,5-S1 MBB ( No steroids) (Bilateral)    OUTCOME: Patient tolerated treatment/procedure well without complication and is now ready for discharge.    DISPOSITION: Home or Self Care    FINAL DIAGNOSIS:  Lumbar spondylosis    FOLLOWUP: In clinic    DISCHARGE INSTRUCTIONS:  See nurse's notes     TIME SPENT ON DISCHARGE: 5 minutes

## 2022-02-08 NOTE — OP NOTE
Procedure Note    Procedure Date: 2/8/2022    Procedure Performed:  Bilateral Lumbar Facet  Medial Branch Block @ L3-4,4-5,5-S1  with Fluoroscopic Guidance    Indications: Patient has failed conservative therapy.      Pre-op diagnosis: Lumbar Spondylosis    Post-op diagnosis: same    Physician: REBECCA Torre MD    Anesthesia: MAC    Estimated Blood Loss: Less than 1cc    IVF: Per Anesthesia    Complications: None    Technique:  The patient was interviewed in the holding area and Risks/Benefits were discussed, including but not limited to  the possibility of new or different pain, bleeding or infection.   All questions were answered.  The patient understood and accepted risks.  Consent was reviewed and signed.  A time out was taken to identify the patient, procedure and side of the procedure. The patient was placed in a prone position, then prepped and draped in the usual sterile fashion using ChloraPrep and sterile towels.  The levels were determined under fluoroscopic guidance and then marked.  1% Lidocaine was given by raising a skin wheal at the skin over each site and then infiltrated.  A 22-gauge 4 inch needle was introduced to the anatomic location of the bilateral L3-4,4-5,5-S1 medial branch nerves.  Then, after negative aspiration, 1 cc of a 1:1 mixture of  (Bupivacaine 0.25% 3cc , 1% lidocaine 2 cc and  40mg kenalog ) was injected @ each level.The patient tolerated the procedure well and was transferred to the [facility] in stable condition.  The patient was monitored after the procedure.  Patient was given post procedure and discharge instructions to follow at home.  The patient was discharged in a stable condition with an adult .

## 2022-02-08 NOTE — ANESTHESIA POSTPROCEDURE EVALUATION
Anesthesia Post Evaluation    Patient: Belem Swann    Procedure(s) Performed: Procedure(s) (LRB):  Bilateral L4-5,5-S1 MBB ( No steroids) (Bilateral)    Final Anesthesia Type: general      Patient location during evaluation: PACU  Patient participation: Yes- Able to Participate  Level of consciousness: awake and alert  Post-procedure vital signs: reviewed and stable  Pain management: adequate  Airway patency: patent    PONV status at discharge: No PONV  Anesthetic complications: no      Cardiovascular status: blood pressure returned to baseline and hemodynamically stable  Respiratory status: spontaneous ventilation, unassisted and nasal cannula  Hydration status: euvolemic  Follow-up not needed.          Vitals Value Taken Time   /78 02/08/22 0913   Temp 97f 02/08/22 0913   Pulse 87 02/08/22 0913   Resp 18 02/08/22 0913   SpO2 99 02/08/22 0913         No case tracking events are documented in the log.      Pain/Lowell Score: No data recorded

## 2022-02-16 NOTE — PROGRESS NOTES
Disclaimer:  This note has been generated using voice recognition software.  There may be type of graft focal areas that have been missed during a proof reading      Subjective:      Patient ID: Belem Swann is a 41 y.o. female.    Chief Complaint: No chief complaint on file.      Pain  This is a chronic problem. The current episode started more than 1 year ago. The problem occurs daily. The problem has been gradually improving. Associated symptoms include arthralgias and neck pain. Pertinent negatives include no change in bowel habit, chest pain, chills, coughing, diaphoresis, fever, rash, sore throat, vertigo or vomiting.     Review of Systems   Constitutional: Negative for appetite change, chills, diaphoresis, fever and unexpected weight change.   HENT: Negative for drooling, ear discharge, ear pain, facial swelling, nosebleeds, sore throat, trouble swallowing, voice change and goiter.    Eyes: Negative for photophobia, pain, discharge, redness and visual disturbance.   Respiratory: Negative for apnea, cough, choking, chest tightness, shortness of breath, wheezing and stridor.    Cardiovascular: Negative for chest pain, palpitations and leg swelling.   Gastrointestinal: Negative for abdominal distention, change in bowel habit, diarrhea, rectal pain, vomiting, fecal incontinence and change in bowel habit.   Endocrine: Negative for cold intolerance, heat intolerance, polydipsia, polyphagia and polyuria.   Genitourinary: Negative for bladder incontinence, dysuria, flank pain, frequency and hot flashes.   Musculoskeletal: Positive for arthralgias, back pain, leg pain, neck pain and neck stiffness.   Integumentary:  Negative for color change, pallor and rash.   Allergic/Immunologic: Negative for immunocompromised state.   Neurological: Negative for dizziness, vertigo, seizures, syncope, facial asymmetry, speech difficulty, light-headedness, disturbances in coordination, memory loss and coordination difficulties.  "  Hematological: Negative for adenopathy. Does not bruise/bleed easily.   Psychiatric/Behavioral: Negative for agitation, behavioral problems, confusion, decreased concentration, dysphoric mood, hallucinations, self-injury and suicidal ideas. The patient is not nervous/anxious and is not hyperactive.             Objective:  Vitals:    02/22/22 0818 02/22/22 0819   BP: (!) 168/115    Pulse: 101    Resp: 18    SpO2: 98%    Weight: (!) 167.8 kg (370 lb)    Height: 5' 2" (1.575 m)    PainSc:   6   6         Physical Exam  Vitals and nursing note reviewed. Exam conducted with a chaperone present.   Constitutional:       General: She is awake.      Appearance: Normal appearance. She is not ill-appearing or diaphoretic.   HENT:      Head: Normocephalic and atraumatic.      Nose: Nose normal.      Mouth/Throat:      Mouth: Mucous membranes are moist.      Pharynx: Oropharynx is clear.   Eyes:      Conjunctiva/sclera: Conjunctivae normal.      Pupils: Pupils are equal, round, and reactive to light.   Cardiovascular:      Rate and Rhythm: Normal rate.   Pulmonary:      Effort: Pulmonary effort is normal. No respiratory distress.   Abdominal:      Palpations: Abdomen is soft.      Tenderness: There is no guarding.   Musculoskeletal:         General: No swelling.      Cervical back: Normal range of motion and neck supple. Tenderness present. No rigidity.      Thoracic back: Tenderness present.      Lumbar back: Tenderness present. Decreased range of motion.   Skin:     General: Skin is warm and dry.      Coloration: Skin is not jaundiced or pale.   Neurological:      General: No focal deficit present.      Mental Status: She is alert and oriented to person, place, and time. Mental status is at baseline.      Cranial Nerves: Cranial nerves are intact. No cranial nerve deficit (II-XII).   Psychiatric:         Mood and Affect: Mood normal.         Behavior: Behavior normal. Behavior is cooperative.         Thought Content: Thought " content normal.           Orders Placed This Encounter   Procedures    Case Request Operating Room: Block-nerve-medial branch-lumbar, bilateral L4 through S1     Order Specific Question:   Medical Necessity:     Answer:   Medically Non-Urgent [100]     Order Specific Question:   CPT Code:     Answer:   TN INJ DX/THER AGNT PARAVERT FACET JOINT,IMG GUIDE,LUMBAR/SAC,1ST LVL [63055]     Order Specific Question:   CPT Code:     Answer:   TN INJ DX/THER AGNT PARAVERT FACET JOINT,IMG GUIDE,LUMBAR/SAC, 2ND LEVEL [14436]     Order Specific Question:   Is an on-site pathologist required for this procedure?     Answer:   N/A        [facility]  See OP Notes for results.     IMPRESSION: See OP Notes for results.     This procedure was auto-finalized by: Virtual Radiologist       Admission on 02/08/2022, Discharged on 02/08/2022   Component Date Value Ref Range Status    POC Glucose 02/08/2022 147 (A) 70 - 110 MG/DL Final    POC Glucose 02/08/2022 147 (A) 70 - 105 mg/dL Final   Lab Visit on 02/04/2022   Component Date Value Ref Range Status    SARS CoV-2 PCR 02/04/2022 Negative  Negative, Invalid Final   Office Visit on 01/24/2022   Component Date Value Ref Range Status    POC Amphetamines 01/24/2022 Negative  Negative, Inconclusive Final    POC Barbiturates 01/24/2022 Negative  Negative, Inconclusive Final    POC Benzodiazepines 01/24/2022 Presumptive Positive (A) Negative, Inconclusive Final    POC Cocaine 01/24/2022 Negative  Negative, Inconclusive Final    POC THC 01/24/2022 Negative  Negative, Inconclusive Final    POC Methadone 01/24/2022 Negative  Negative, Inconclusive Final    POC Methamphetamine 01/24/2022 Negative  Negative, Inconclusive Final    POC Opiates 01/24/2022 Presumptive Positive (A) Negative, Inconclusive Final    POC Oxycodone 01/24/2022 Negative  Negative, Inconclusive Final    POC Phencyclidine 01/24/2022 Negative  Negative, Inconclusive Final    POC  Methylenedioxymethamphetamine * 01/24/2022 Negative  Negative, Inconclusive Final    POC Tricyclic Antidepressants 01/24/2022 Negative  Negative, Inconclusive Final    POC Buprenorphine 01/24/2022 Negative   Final     Acceptable 01/24/2022 Yes   Final    POC Temperature (Urine) 01/24/2022 92   Final    pH, UA 01/24/2022 5.5  5.0, 5.5, 6.0, 6.5, 7.0, 7.5, 8.0 pH Units Final    Specific Gravity, UA 01/24/2022 >=1.030 (A) <=1.005, 1.010, 1.015, 1.020, 1.025, 1.030 Final    Creatinine, Urine 01/24/2022 184  28 - 219 mg/dL Final    6-Acetylmorphine 01/24/2022 Negative  10 ng/mL Final    7-Aminoclonazepam 01/24/2022 Negative  Negative 25 ng/mL Final    a-Hydroxyalprazolam 01/24/2022 31.8 (A) <25.0 25 ng/mL Final    Acetyl Fentanyl 01/24/2022 Negative  2.5 ng/mL Final    Acetyl Norfentanyl Oxalate 01/24/2022 Negative  5 ng/mL Final    Benzoylecgonine 01/24/2022 Negative  100 ng/mL Final    Buprenorphine 01/24/2022 Negative  25 ng/mL Final    Codeine 01/24/2022 Negative  25 ng/mL Final    EDDP 01/24/2022 Negative  25 ng/mL Final    Fentanyl 01/24/2022 Negative  2.5 ng/mL Final    Hydrocodone 01/24/2022 >250.0 (A) <25.0 25 ng/mL Final    Hydromorphone 01/24/2022 Negative  25 ng/mL Final    Lorazepam 01/24/2022 Negative  25 ng/mL Final    Morphine 01/24/2022 Negative  25 ng/mL Final    Norbuprenorphine 01/24/2022 Negative  25 ng/mL Final    Nordiazepam 01/24/2022 Negative  25 ng/mL Final    Norfentanyl Oxalate 01/24/2022 Negative  5 ng/mL Final    Norhydrocodone 01/24/2022 >500.0 (A) <50.0 50 ng/mL Final    Noroxycodone HCL 01/24/2022 Negative  50 ng/mL Final    Oxazepam 01/24/2022 Negative  25 ng/mL Final    Oxymorphone 01/24/2022 Negative  25 ng/mL Final    Tapentadol 01/24/2022 Negative  25 ng/mL Final    Temazepam 01/24/2022 Negative  25 ng/mL Final    THC-COOH 01/24/2022 Negative  25 ng/mL Final    Tramadol 01/24/2022 Negative  100 ng/mL Final    Amphetamine, Urine  01/24/2022 Negative  Negative 100 ng/mL Final    Methamphetamines, Urine 01/24/2022 Negative  Negative 100 ng/mL Final    Methadone, Urine 01/24/2022 Negative  Negative 25 ng/mL Final    Oxycodone, Urine 01/24/2022 Negative  Negative 25 ng/mL Final   Office Visit on 01/11/2022   Component Date Value Ref Range Status    Sodium 01/11/2022 137  136 - 145 mmol/L Final    Potassium 01/11/2022 3.9  3.5 - 5.1 mmol/L Final    Chloride 01/11/2022 104  98 - 107 mmol/L Final    CO2 01/11/2022 26  21 - 32 mmol/L Final    Anion Gap 01/11/2022 11  7 - 16 mmol/L Final    Glucose 01/11/2022 114 (A) 74 - 106 mg/dL Final    BUN 01/11/2022 12  7 - 18 mg/dL Final    Creatinine 01/11/2022 0.60  0.55 - 1.02 mg/dL Final    BUN/Creatinine Ratio 01/11/2022 20  6 - 20 Final    Calcium 01/11/2022 9.4  8.5 - 10.1 mg/dL Final    Total Protein 01/11/2022 8.6 (A) 6.4 - 8.2 g/dL Final    Albumin 01/11/2022 3.5  3.5 - 5.0 g/dL Final    Globulin 01/11/2022 5.1 (A) 2.0 - 4.0 g/dL Final    A/G Ratio 01/11/2022 0.7   Final    Bilirubin, Total 01/11/2022 0.3  0.0 - 1.2 mg/dL Final    Alk Phos 01/11/2022 121 (A) 37 - 98 U/L Final    ALT 01/11/2022 33  13 - 56 U/L Final    AST 01/11/2022 13 (A) 15 - 37 U/L Final    eGFR 01/11/2022 117  >=60 mL/min/1.73m² Final    Triglycerides 01/11/2022 101  35 - 150 mg/dL Final    Cholesterol 01/11/2022 175  0 - 200 mg/dL Final    HDL Cholesterol 01/11/2022 47  40 - 60 mg/dL Final    Cholesterol/HDL Ratio (Risk Factor) 01/11/2022 3.7   Final    Non-HDL 01/11/2022 128  mg/dL Final    LDL Calculated 01/11/2022 108  mg/dL Final    LDL/HDL 01/11/2022 2.3   Final    VLDL 01/11/2022 20  mg/dL Final    TSH 01/11/2022 2.240  0.358 - 3.740 uIU/mL Final    Creatinine, Urine 01/11/2022 195  28 - 219 mg/dL Final    Microalbumin 01/11/2022 1.1  0.0 - 2.8 mg/dL Final    Microalbumin/Creatinine Ratio 01/11/2022 5.6  0.0 - 30.0 mg/g Final    Hemoglobin A1C 01/11/2022 6.2  4.5 - 6.6 % Final     Estimated Average Glucose 01/11/2022 120  mg/dL Final    WBC 01/11/2022 14.07 (A) 4.50 - 11.00 K/uL Final    RBC 01/11/2022 4.95  4.20 - 5.40 M/uL Final    Hemoglobin 01/11/2022 14.1  12.0 - 16.0 g/dL Final    Hematocrit 01/11/2022 44.4  38.0 - 47.0 % Final    MCV 01/11/2022 89.7  80.0 - 96.0 fL Final    MCH 01/11/2022 28.5  27.0 - 31.0 pg Final    MCHC 01/11/2022 31.8 (A) 32.0 - 36.0 g/dL Final    RDW 01/11/2022 14.6 (A) 11.5 - 14.5 % Final    Platelet Count 01/11/2022 171  150 - 400 K/uL Final    MPV 01/11/2022 14.0 (A) 9.4 - 12.4 fL Final    Neutrophils % 01/11/2022 74.7 (A) 53.0 - 65.0 % Final    Lymphocytes % 01/11/2022 15.2 (A) 27.0 - 41.0 % Final    Monocytes % 01/11/2022 5.4  2.0 - 6.0 % Final    Eosinophils % 01/11/2022 3.0  1.0 - 4.0 % Final    Basophils % 01/11/2022 0.6  0.0 - 1.0 % Final    Immature Granulocytes % 01/11/2022 1.1 (A) 0.0 - 0.4 % Final    nRBC, Auto 01/11/2022 0.0  <=0.0 % Final    Neutrophils, Abs 01/11/2022 10.51 (A) 1.80 - 7.70 K/uL Final    Lymphocytes, Absolute 01/11/2022 2.14  1.00 - 4.80 K/uL Final    Monocytes, Absolute 01/11/2022 0.76  0.00 - 0.80 K/uL Final    Eosinophils, Absolute 01/11/2022 0.42  0.00 - 0.50 K/uL Final    Basophils, Absolute 01/11/2022 0.08  0.00 - 0.20 K/uL Final    Immature Granulocytes, Absolute 01/11/2022 0.16 (A) 0.00 - 0.04 K/uL Final    nRBC, Absolute 01/11/2022 0.00  <=0.00 x10e3/uL Final    Diff Type 01/11/2022 Scan Smear   Final    Platelet Morphology 01/11/2022 Large Platelets (A) Normal Final    RBC Morphology 01/11/2022 Normal   Final   Lab Visit on 12/17/2021   Component Date Value Ref Range Status    Sodium 12/17/2021 138  136 - 145 mmol/L Final    Potassium 12/17/2021 4.2  3.5 - 5.1 mmol/L Final    Chloride 12/17/2021 104  98 - 107 mmol/L Final    CO2 12/17/2021 30  21 - 32 mmol/L Final    Anion Gap 12/17/2021 8  7 - 16 mmol/L Final    Glucose 12/17/2021 90  74 - 106 mg/dL Final    BUN 12/17/2021 10  7 - 18  mg/dL Final    Creatinine 12/17/2021 0.61  0.55 - 1.02 mg/dL Final    BUN/Creatinine Ratio 12/17/2021 16  6 - 20 Final    Calcium 12/17/2021 8.6  8.5 - 10.1 mg/dL Final    Total Protein 12/17/2021 7.7  6.4 - 8.2 g/dL Final    Albumin 12/17/2021 3.3 (A) 3.5 - 5.0 g/dL Final    Globulin 12/17/2021 4.4 (A) 2.0 - 4.0 g/dL Final    A/G Ratio 12/17/2021 0.8   Final    Bilirubin, Total 12/17/2021 0.3  0.0 - 1.2 mg/dL Final    Alk Phos 12/17/2021 125 (A) 37 - 98 U/L Final    ALT 12/17/2021 32  13 - 56 U/L Final    AST 12/17/2021 12 (A) 15 - 37 U/L Final    eGFR 12/17/2021 114  >=60 mL/min/1.73m² Final    Nil Result, TB 12/17/2021 0.03   Final    TB1 Ag minus Nil Result 12/17/2021 0.00   Final    TB2 Ag minus Nil Result 12/17/2021 0.00   Final    Mitogen minus Nil Result, TB 12/17/2021 8.44   Final    QuantiFERON-Tb Gold Plus 12/17/2021 Negative  Negative Final    Hepatitis B Surface Ag 12/17/2021 Non-Reactive  Non-Reactive Final    HBc Total Ab 12/17/2021 Negative  Negative Final    WBC 12/17/2021 16.97 (A) 4.50 - 11.00 K/uL Final    RBC 12/17/2021 4.69  4.20 - 5.40 M/uL Final    Hemoglobin 12/17/2021 13.3  12.0 - 16.0 g/dL Final    Hematocrit 12/17/2021 40.5  38.0 - 47.0 % Final    MCV 12/17/2021 86.4  80.0 - 96.0 fL Final    MCH 12/17/2021 28.4  27.0 - 31.0 pg Final    MCHC 12/17/2021 32.8  32.0 - 36.0 g/dL Final    RDW 12/17/2021 13.6  11.5 - 14.5 % Final    Platelet Count 12/17/2021 173  150 - 400 K/uL Final    MPV 12/17/2021 12.7 (A) 9.4 - 12.4 fL Final    Neutrophils % 12/17/2021 77.7 (A) 53.0 - 65.0 % Final    Lymphocytes % 12/17/2021 13.6 (A) 27.0 - 41.0 % Final    Monocytes % 12/17/2021 4.9  2.0 - 6.0 % Final    Eosinophils % 12/17/2021 2.5  1.0 - 4.0 % Final    Basophils % 12/17/2021 0.5  0.0 - 1.0 % Final    Immature Granulocytes % 12/17/2021 0.8 (A) 0.0 - 0.4 % Final    nRBC, Auto 12/17/2021 0.0  <=0.0 % Final    Neutrophils, Abs 12/17/2021 13.18 (A) 1.80 - 7.70 K/uL Final     Lymphocytes, Absolute 12/17/2021 2.31  1.00 - 4.80 K/uL Final    Monocytes, Absolute 12/17/2021 0.83 (A) 0.00 - 0.80 K/uL Final    Eosinophils, Absolute 12/17/2021 0.43  0.00 - 0.50 K/uL Final    Basophils, Absolute 12/17/2021 0.08  0.00 - 0.20 K/uL Final    Immature Granulocytes, Absolute 12/17/2021 0.14 (A) 0.00 - 0.04 K/uL Final    nRBC, Absolute 12/17/2021 0.00  <=0.00 x10e3/uL Final    Diff Type 12/17/2021 Scan Smear   Final    Platelet Morphology 12/17/2021 Large & Giant Platelets (A) Normal Final    RBC Morphology 12/17/2021 Normal   Final   Office Visit on 09/29/2021   Component Date Value Ref Range Status    POC Amphetamines 09/29/2021 Negative  Negative, Inconclusive Final    POC Barbiturates 09/29/2021 Negative  Negative, Inconclusive Final    POC Benzodiazepines 09/29/2021 Negative  Negative, Inconclusive Final    POC Cocaine 09/29/2021 Negative  Negative, Inconclusive Final    POC THC 09/29/2021 Negative  Negative, Inconclusive Final    POC Methadone 09/29/2021 Negative  Negative, Inconclusive Final    POC Methamphetamine 09/29/2021 Negative  Negative, Inconclusive Final    POC Opiates 09/29/2021 Presumptive Positive (A) Negative, Inconclusive Final    POC Oxycodone 09/29/2021 Negative  Negative, Inconclusive Final    POC Phencyclidine 09/29/2021 Negative  Negative, Inconclusive Final    POC Methylenedioxymethamphetamine * 09/29/2021 Negative  Negative, Inconclusive Final    POC Tricyclic Antidepressants 09/29/2021 Negative  Negative, Inconclusive Final    POC Buprenorphine 09/29/2021 Negative   Final     Acceptable 09/29/2021 Yes   Final    POC Temperature (Urine) 09/29/2021 90   Final   Office Visit on 09/08/2021   Component Date Value Ref Range Status    Triglycerides 09/09/2021 85  35 - 150 mg/dL Final    Cholesterol 09/09/2021 159  0 - 200 mg/dL Final    HDL Cholesterol 09/09/2021 41  40 - 60 mg/dL Final    Cholesterol/HDL Ratio (Risk Factor) 09/09/2021  3.9   Final    Non-HDL 09/09/2021 118  mg/dL Final    LDL Calculated 09/09/2021 101  mg/dL Final    LDL/HDL 09/09/2021 2.5   Final    VLDL 09/09/2021 17  mg/dL Final    Hemoglobin A1C 09/09/2021 6.0  4.5 - 6.6 % Final    Estimated Average Glucose 09/09/2021 114  mg/dL Final    Glucose, Fasting 09/09/2021 117 (A) 74 - 106 mg/dL Final         Assessment:      1. 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 Rheumatoid arthritis involving multiple sites with positive rheumatoid factor            A's of Opioid Risk Assessment  Activity:Patient can perform ADL.   Analgesia:Patients pain is partially controlled by current medication. Patient has tried OTC medications such as Tylenol and Ibuprofen with out relief.   Adverse Effects: Patient denies constipation or sedation.  Aberrant Behavior:  reviewed with no aberrant drug seeking/taking behavior.  Overdose reversal drug naloxone discussed    Drug screen reviewed      Requested Prescriptions     Signed Prescriptions Disp Refills    gabapentin (NEURONTIN) 300 MG capsule 90 capsule 1     Sig: Take 1 capsule (300 mg total) by mouth every 8 (eight) hours.    HYDROcodone-acetaminophen (NORCO)  mg per tablet 90 tablet 0     Sig: Take 1 tablet by mouth every 8 (eight) hours.         Plan:    Benzodiazepine positive last definitive drug screen, no hydromorphone last drug screen January 24, 2022    Follow-up after lumbar L4 through S1 bilateral medial branch block # 1, February 8, 2022  She states she had 80% relief after procedure maintain 50% relief with time    She would like to have next lumbar procedure for back pain worse with flexion extension rotation lumbar spine facet joint in nature    Follows rheumatology ARM rheumatoid arthritis    Indications for this procedure for this specific patient include the following   - Pt has had symptoms for three months with moderate to severe pain with functional impairment rated of 7/10 pain.    - Pain non-responsive to conservative care.    - Pain predominately axial and not associated with radiculopathy or claudication.    - No non-facet pathology as source of pain.    - Clinical assessment implicates facet joint as putative pain source.    - Pain is exacerbated by extension or prolonged sitting/standing and relieved by rest.    - No unexplained neurologic deficit.    - No history of coagulopathy, infection or unstable medical conditions.    - Pain is causing significant functional limitation resulting in diminished quality of life and impaired age appropriate ADL's.   - Clinical assessment implicates facet joint as putative source of pain  - Repeat injections not done prior to 7 days   - no more than 2 levels will be done      Monitor anesthesia request is medically indicated for the scheduled nerve block procedure due to:  1- needle phobia and anxiety, placing  the patient at risk during the provided service.  2-patient has an ASA class greater than 3 and requires constant presence of an anesthesiologist during the procedure,   3-patient has severe problems hard to lie still  4-patient suffers from chronic pain and is unable to function due to  diminished ADLs    Schedule bilateral lumbar medial branch block L4 through S1 # 2, lumbosacral spondylosis    Dr. Torre      Bring original prescription medication bottles/container/box with labels to each visit

## 2022-02-16 NOTE — H&P (VIEW-ONLY)
Disclaimer:  This note has been generated using voice recognition software.  There may be type of graft focal areas that have been missed during a proof reading      Subjective:      Patient ID: Belem Swann is a 41 y.o. female.    Chief Complaint: No chief complaint on file.      Pain  This is a chronic problem. The current episode started more than 1 year ago. The problem occurs daily. The problem has been gradually improving. Associated symptoms include arthralgias and neck pain. Pertinent negatives include no change in bowel habit, chest pain, chills, coughing, diaphoresis, fever, rash, sore throat, vertigo or vomiting.     Review of Systems   Constitutional: Negative for appetite change, chills, diaphoresis, fever and unexpected weight change.   HENT: Negative for drooling, ear discharge, ear pain, facial swelling, nosebleeds, sore throat, trouble swallowing, voice change and goiter.    Eyes: Negative for photophobia, pain, discharge, redness and visual disturbance.   Respiratory: Negative for apnea, cough, choking, chest tightness, shortness of breath, wheezing and stridor.    Cardiovascular: Negative for chest pain, palpitations and leg swelling.   Gastrointestinal: Negative for abdominal distention, change in bowel habit, diarrhea, rectal pain, vomiting, fecal incontinence and change in bowel habit.   Endocrine: Negative for cold intolerance, heat intolerance, polydipsia, polyphagia and polyuria.   Genitourinary: Negative for bladder incontinence, dysuria, flank pain, frequency and hot flashes.   Musculoskeletal: Positive for arthralgias, back pain, leg pain, neck pain and neck stiffness.   Integumentary:  Negative for color change, pallor and rash.   Allergic/Immunologic: Negative for immunocompromised state.   Neurological: Negative for dizziness, vertigo, seizures, syncope, facial asymmetry, speech difficulty, light-headedness, disturbances in coordination, memory loss and coordination difficulties.  "  Hematological: Negative for adenopathy. Does not bruise/bleed easily.   Psychiatric/Behavioral: Negative for agitation, behavioral problems, confusion, decreased concentration, dysphoric mood, hallucinations, self-injury and suicidal ideas. The patient is not nervous/anxious and is not hyperactive.             Objective:  Vitals:    02/22/22 0818 02/22/22 0819   BP: (!) 168/115    Pulse: 101    Resp: 18    SpO2: 98%    Weight: (!) 167.8 kg (370 lb)    Height: 5' 2" (1.575 m)    PainSc:   6   6         Physical Exam  Vitals and nursing note reviewed. Exam conducted with a chaperone present.   Constitutional:       General: She is awake.      Appearance: Normal appearance. She is not ill-appearing or diaphoretic.   HENT:      Head: Normocephalic and atraumatic.      Nose: Nose normal.      Mouth/Throat:      Mouth: Mucous membranes are moist.      Pharynx: Oropharynx is clear.   Eyes:      Conjunctiva/sclera: Conjunctivae normal.      Pupils: Pupils are equal, round, and reactive to light.   Cardiovascular:      Rate and Rhythm: Normal rate.   Pulmonary:      Effort: Pulmonary effort is normal. No respiratory distress.   Abdominal:      Palpations: Abdomen is soft.      Tenderness: There is no guarding.   Musculoskeletal:         General: No swelling.      Cervical back: Normal range of motion and neck supple. Tenderness present. No rigidity.      Thoracic back: Tenderness present.      Lumbar back: Tenderness present. Decreased range of motion.   Skin:     General: Skin is warm and dry.      Coloration: Skin is not jaundiced or pale.   Neurological:      General: No focal deficit present.      Mental Status: She is alert and oriented to person, place, and time. Mental status is at baseline.      Cranial Nerves: Cranial nerves are intact. No cranial nerve deficit (II-XII).   Psychiatric:         Mood and Affect: Mood normal.         Behavior: Behavior normal. Behavior is cooperative.         Thought Content: Thought " content normal.           Orders Placed This Encounter   Procedures    Case Request Operating Room: Block-nerve-medial branch-lumbar, bilateral L4 through S1     Order Specific Question:   Medical Necessity:     Answer:   Medically Non-Urgent [100]     Order Specific Question:   CPT Code:     Answer:   IL INJ DX/THER AGNT PARAVERT FACET JOINT,IMG GUIDE,LUMBAR/SAC,1ST LVL [54273]     Order Specific Question:   CPT Code:     Answer:   IL INJ DX/THER AGNT PARAVERT FACET JOINT,IMG GUIDE,LUMBAR/SAC, 2ND LEVEL [89187]     Order Specific Question:   Is an on-site pathologist required for this procedure?     Answer:   N/A        [facility] for [facility]  See OP Notes for results.     IMPRESSION: See OP Notes for results.     This procedure was auto-finalized by: Virtual Radiologist       Admission on 02/08/2022, Discharged on 02/08/2022   Component Date Value Ref Range Status    POC Glucose 02/08/2022 147 (A) 70 - 110 MG/DL Final    POC Glucose 02/08/2022 147 (A) 70 - 105 mg/dL Final   Lab Visit on 02/04/2022   Component Date Value Ref Range Status    SARS CoV-2 PCR 02/04/2022 Negative  Negative, Invalid Final   Office Visit on 01/24/2022   Component Date Value Ref Range Status    POC Amphetamines 01/24/2022 Negative  Negative, Inconclusive Final    POC Barbiturates 01/24/2022 Negative  Negative, Inconclusive Final    POC Benzodiazepines 01/24/2022 Presumptive Positive (A) Negative, Inconclusive Final    POC Cocaine 01/24/2022 Negative  Negative, Inconclusive Final    POC THC 01/24/2022 Negative  Negative, Inconclusive Final    POC Methadone 01/24/2022 Negative  Negative, Inconclusive Final    POC Methamphetamine 01/24/2022 Negative  Negative, Inconclusive Final    POC Opiates 01/24/2022 Presumptive Positive (A) Negative, Inconclusive Final    POC Oxycodone 01/24/2022 Negative  Negative, Inconclusive Final    POC Phencyclidine 01/24/2022 Negative  Negative, Inconclusive Final    POC  Methylenedioxymethamphetamine * 01/24/2022 Negative  Negative, Inconclusive Final    POC Tricyclic Antidepressants 01/24/2022 Negative  Negative, Inconclusive Final    POC Buprenorphine 01/24/2022 Negative   Final     Acceptable 01/24/2022 Yes   Final    POC Temperature (Urine) 01/24/2022 92   Final    pH, UA 01/24/2022 5.5  5.0, 5.5, 6.0, 6.5, 7.0, 7.5, 8.0 pH Units Final    Specific Gravity, UA 01/24/2022 >=1.030 (A) <=1.005, 1.010, 1.015, 1.020, 1.025, 1.030 Final    Creatinine, Urine 01/24/2022 184  28 - 219 mg/dL Final    6-Acetylmorphine 01/24/2022 Negative  10 ng/mL Final    7-Aminoclonazepam 01/24/2022 Negative  Negative 25 ng/mL Final    a-Hydroxyalprazolam 01/24/2022 31.8 (A) <25.0 25 ng/mL Final    Acetyl Fentanyl 01/24/2022 Negative  2.5 ng/mL Final    Acetyl Norfentanyl Oxalate 01/24/2022 Negative  5 ng/mL Final    Benzoylecgonine 01/24/2022 Negative  100 ng/mL Final    Buprenorphine 01/24/2022 Negative  25 ng/mL Final    Codeine 01/24/2022 Negative  25 ng/mL Final    EDDP 01/24/2022 Negative  25 ng/mL Final    Fentanyl 01/24/2022 Negative  2.5 ng/mL Final    Hydrocodone 01/24/2022 >250.0 (A) <25.0 25 ng/mL Final    Hydromorphone 01/24/2022 Negative  25 ng/mL Final    Lorazepam 01/24/2022 Negative  25 ng/mL Final    Morphine 01/24/2022 Negative  25 ng/mL Final    Norbuprenorphine 01/24/2022 Negative  25 ng/mL Final    Nordiazepam 01/24/2022 Negative  25 ng/mL Final    Norfentanyl Oxalate 01/24/2022 Negative  5 ng/mL Final    Norhydrocodone 01/24/2022 >500.0 (A) <50.0 50 ng/mL Final    Noroxycodone HCL 01/24/2022 Negative  50 ng/mL Final    Oxazepam 01/24/2022 Negative  25 ng/mL Final    Oxymorphone 01/24/2022 Negative  25 ng/mL Final    Tapentadol 01/24/2022 Negative  25 ng/mL Final    Temazepam 01/24/2022 Negative  25 ng/mL Final    THC-COOH 01/24/2022 Negative  25 ng/mL Final    Tramadol 01/24/2022 Negative  100 ng/mL Final    Amphetamine, Urine  01/24/2022 Negative  Negative 100 ng/mL Final    Methamphetamines, Urine 01/24/2022 Negative  Negative 100 ng/mL Final    Methadone, Urine 01/24/2022 Negative  Negative 25 ng/mL Final    Oxycodone, Urine 01/24/2022 Negative  Negative 25 ng/mL Final   Office Visit on 01/11/2022   Component Date Value Ref Range Status    Sodium 01/11/2022 137  136 - 145 mmol/L Final    Potassium 01/11/2022 3.9  3.5 - 5.1 mmol/L Final    Chloride 01/11/2022 104  98 - 107 mmol/L Final    CO2 01/11/2022 26  21 - 32 mmol/L Final    Anion Gap 01/11/2022 11  7 - 16 mmol/L Final    Glucose 01/11/2022 114 (A) 74 - 106 mg/dL Final    BUN 01/11/2022 12  7 - 18 mg/dL Final    Creatinine 01/11/2022 0.60  0.55 - 1.02 mg/dL Final    BUN/Creatinine Ratio 01/11/2022 20  6 - 20 Final    Calcium 01/11/2022 9.4  8.5 - 10.1 mg/dL Final    Total Protein 01/11/2022 8.6 (A) 6.4 - 8.2 g/dL Final    Albumin 01/11/2022 3.5  3.5 - 5.0 g/dL Final    Globulin 01/11/2022 5.1 (A) 2.0 - 4.0 g/dL Final    A/G Ratio 01/11/2022 0.7   Final    Bilirubin, Total 01/11/2022 0.3  0.0 - 1.2 mg/dL Final    Alk Phos 01/11/2022 121 (A) 37 - 98 U/L Final    ALT 01/11/2022 33  13 - 56 U/L Final    AST 01/11/2022 13 (A) 15 - 37 U/L Final    eGFR 01/11/2022 117  >=60 mL/min/1.73m² Final    Triglycerides 01/11/2022 101  35 - 150 mg/dL Final    Cholesterol 01/11/2022 175  0 - 200 mg/dL Final    HDL Cholesterol 01/11/2022 47  40 - 60 mg/dL Final    Cholesterol/HDL Ratio (Risk Factor) 01/11/2022 3.7   Final    Non-HDL 01/11/2022 128  mg/dL Final    LDL Calculated 01/11/2022 108  mg/dL Final    LDL/HDL 01/11/2022 2.3   Final    VLDL 01/11/2022 20  mg/dL Final    TSH 01/11/2022 2.240  0.358 - 3.740 uIU/mL Final    Creatinine, Urine 01/11/2022 195  28 - 219 mg/dL Final    Microalbumin 01/11/2022 1.1  0.0 - 2.8 mg/dL Final    Microalbumin/Creatinine Ratio 01/11/2022 5.6  0.0 - 30.0 mg/g Final    Hemoglobin A1C 01/11/2022 6.2  4.5 - 6.6 % Final     Estimated Average Glucose 01/11/2022 120  mg/dL Final    WBC 01/11/2022 14.07 (A) 4.50 - 11.00 K/uL Final    RBC 01/11/2022 4.95  4.20 - 5.40 M/uL Final    Hemoglobin 01/11/2022 14.1  12.0 - 16.0 g/dL Final    Hematocrit 01/11/2022 44.4  38.0 - 47.0 % Final    MCV 01/11/2022 89.7  80.0 - 96.0 fL Final    MCH 01/11/2022 28.5  27.0 - 31.0 pg Final    MCHC 01/11/2022 31.8 (A) 32.0 - 36.0 g/dL Final    RDW 01/11/2022 14.6 (A) 11.5 - 14.5 % Final    Platelet Count 01/11/2022 171  150 - 400 K/uL Final    MPV 01/11/2022 14.0 (A) 9.4 - 12.4 fL Final    Neutrophils % 01/11/2022 74.7 (A) 53.0 - 65.0 % Final    Lymphocytes % 01/11/2022 15.2 (A) 27.0 - 41.0 % Final    Monocytes % 01/11/2022 5.4  2.0 - 6.0 % Final    Eosinophils % 01/11/2022 3.0  1.0 - 4.0 % Final    Basophils % 01/11/2022 0.6  0.0 - 1.0 % Final    Immature Granulocytes % 01/11/2022 1.1 (A) 0.0 - 0.4 % Final    nRBC, Auto 01/11/2022 0.0  <=0.0 % Final    Neutrophils, Abs 01/11/2022 10.51 (A) 1.80 - 7.70 K/uL Final    Lymphocytes, Absolute 01/11/2022 2.14  1.00 - 4.80 K/uL Final    Monocytes, Absolute 01/11/2022 0.76  0.00 - 0.80 K/uL Final    Eosinophils, Absolute 01/11/2022 0.42  0.00 - 0.50 K/uL Final    Basophils, Absolute 01/11/2022 0.08  0.00 - 0.20 K/uL Final    Immature Granulocytes, Absolute 01/11/2022 0.16 (A) 0.00 - 0.04 K/uL Final    nRBC, Absolute 01/11/2022 0.00  <=0.00 x10e3/uL Final    Diff Type 01/11/2022 Scan Smear   Final    Platelet Morphology 01/11/2022 Large Platelets (A) Normal Final    RBC Morphology 01/11/2022 Normal   Final   Lab Visit on 12/17/2021   Component Date Value Ref Range Status    Sodium 12/17/2021 138  136 - 145 mmol/L Final    Potassium 12/17/2021 4.2  3.5 - 5.1 mmol/L Final    Chloride 12/17/2021 104  98 - 107 mmol/L Final    CO2 12/17/2021 30  21 - 32 mmol/L Final    Anion Gap 12/17/2021 8  7 - 16 mmol/L Final    Glucose 12/17/2021 90  74 - 106 mg/dL Final    BUN 12/17/2021 10  7 - 18  mg/dL Final    Creatinine 12/17/2021 0.61  0.55 - 1.02 mg/dL Final    BUN/Creatinine Ratio 12/17/2021 16  6 - 20 Final    Calcium 12/17/2021 8.6  8.5 - 10.1 mg/dL Final    Total Protein 12/17/2021 7.7  6.4 - 8.2 g/dL Final    Albumin 12/17/2021 3.3 (A) 3.5 - 5.0 g/dL Final    Globulin 12/17/2021 4.4 (A) 2.0 - 4.0 g/dL Final    A/G Ratio 12/17/2021 0.8   Final    Bilirubin, Total 12/17/2021 0.3  0.0 - 1.2 mg/dL Final    Alk Phos 12/17/2021 125 (A) 37 - 98 U/L Final    ALT 12/17/2021 32  13 - 56 U/L Final    AST 12/17/2021 12 (A) 15 - 37 U/L Final    eGFR 12/17/2021 114  >=60 mL/min/1.73m² Final    Nil Result, TB 12/17/2021 0.03   Final    TB1 Ag minus Nil Result 12/17/2021 0.00   Final    TB2 Ag minus Nil Result 12/17/2021 0.00   Final    Mitogen minus Nil Result, TB 12/17/2021 8.44   Final    QuantiFERON-Tb Gold Plus 12/17/2021 Negative  Negative Final    Hepatitis B Surface Ag 12/17/2021 Non-Reactive  Non-Reactive Final    HBc Total Ab 12/17/2021 Negative  Negative Final    WBC 12/17/2021 16.97 (A) 4.50 - 11.00 K/uL Final    RBC 12/17/2021 4.69  4.20 - 5.40 M/uL Final    Hemoglobin 12/17/2021 13.3  12.0 - 16.0 g/dL Final    Hematocrit 12/17/2021 40.5  38.0 - 47.0 % Final    MCV 12/17/2021 86.4  80.0 - 96.0 fL Final    MCH 12/17/2021 28.4  27.0 - 31.0 pg Final    MCHC 12/17/2021 32.8  32.0 - 36.0 g/dL Final    RDW 12/17/2021 13.6  11.5 - 14.5 % Final    Platelet Count 12/17/2021 173  150 - 400 K/uL Final    MPV 12/17/2021 12.7 (A) 9.4 - 12.4 fL Final    Neutrophils % 12/17/2021 77.7 (A) 53.0 - 65.0 % Final    Lymphocytes % 12/17/2021 13.6 (A) 27.0 - 41.0 % Final    Monocytes % 12/17/2021 4.9  2.0 - 6.0 % Final    Eosinophils % 12/17/2021 2.5  1.0 - 4.0 % Final    Basophils % 12/17/2021 0.5  0.0 - 1.0 % Final    Immature Granulocytes % 12/17/2021 0.8 (A) 0.0 - 0.4 % Final    nRBC, Auto 12/17/2021 0.0  <=0.0 % Final    Neutrophils, Abs 12/17/2021 13.18 (A) 1.80 - 7.70 K/uL Final     Lymphocytes, Absolute 12/17/2021 2.31  1.00 - 4.80 K/uL Final    Monocytes, Absolute 12/17/2021 0.83 (A) 0.00 - 0.80 K/uL Final    Eosinophils, Absolute 12/17/2021 0.43  0.00 - 0.50 K/uL Final    Basophils, Absolute 12/17/2021 0.08  0.00 - 0.20 K/uL Final    Immature Granulocytes, Absolute 12/17/2021 0.14 (A) 0.00 - 0.04 K/uL Final    nRBC, Absolute 12/17/2021 0.00  <=0.00 x10e3/uL Final    Diff Type 12/17/2021 Scan Smear   Final    Platelet Morphology 12/17/2021 Large & Giant Platelets (A) Normal Final    RBC Morphology 12/17/2021 Normal   Final   Office Visit on 09/29/2021   Component Date Value Ref Range Status    POC Amphetamines 09/29/2021 Negative  Negative, Inconclusive Final    POC Barbiturates 09/29/2021 Negative  Negative, Inconclusive Final    POC Benzodiazepines 09/29/2021 Negative  Negative, Inconclusive Final    POC Cocaine 09/29/2021 Negative  Negative, Inconclusive Final    POC THC 09/29/2021 Negative  Negative, Inconclusive Final    POC Methadone 09/29/2021 Negative  Negative, Inconclusive Final    POC Methamphetamine 09/29/2021 Negative  Negative, Inconclusive Final    POC Opiates 09/29/2021 Presumptive Positive (A) Negative, Inconclusive Final    POC Oxycodone 09/29/2021 Negative  Negative, Inconclusive Final    POC Phencyclidine 09/29/2021 Negative  Negative, Inconclusive Final    POC Methylenedioxymethamphetamine * 09/29/2021 Negative  Negative, Inconclusive Final    POC Tricyclic Antidepressants 09/29/2021 Negative  Negative, Inconclusive Final    POC Buprenorphine 09/29/2021 Negative   Final     Acceptable 09/29/2021 Yes   Final    POC Temperature (Urine) 09/29/2021 90   Final   Office Visit on 09/08/2021   Component Date Value Ref Range Status    Triglycerides 09/09/2021 85  35 - 150 mg/dL Final    Cholesterol 09/09/2021 159  0 - 200 mg/dL Final    HDL Cholesterol 09/09/2021 41  40 - 60 mg/dL Final    Cholesterol/HDL Ratio (Risk Factor) 09/09/2021  3.9   Final    Non-HDL 09/09/2021 118  mg/dL Final    LDL Calculated 09/09/2021 101  mg/dL Final    LDL/HDL 09/09/2021 2.5   Final    VLDL 09/09/2021 17  mg/dL Final    Hemoglobin A1C 09/09/2021 6.0  4.5 - 6.6 % Final    Estimated Average Glucose 09/09/2021 114  mg/dL Final    Glucose, Fasting 09/09/2021 117 (A) 74 - 106 mg/dL Final         Assessment:      1. 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 Rheumatoid arthritis involving multiple sites with positive rheumatoid factor            A's of Opioid Risk Assessment  Activity:Patient can perform ADL.   Analgesia:Patients pain is partially controlled by current medication. Patient has tried OTC medications such as Tylenol and Ibuprofen with out relief.   Adverse Effects: Patient denies constipation or sedation.  Aberrant Behavior:  reviewed with no aberrant drug seeking/taking behavior.  Overdose reversal drug naloxone discussed    Drug screen reviewed      Requested Prescriptions     Signed Prescriptions Disp Refills    gabapentin (NEURONTIN) 300 MG capsule 90 capsule 1     Sig: Take 1 capsule (300 mg total) by mouth every 8 (eight) hours.    HYDROcodone-acetaminophen (NORCO)  mg per tablet 90 tablet 0     Sig: Take 1 tablet by mouth every 8 (eight) hours.         Plan:    Benzodiazepine positive last definitive drug screen, no hydromorphone last drug screen January 24, 2022    Follow-up after lumbar L4 through S1 bilateral medial branch block # 1, February 8, 2022  She states she had 80% relief after procedure maintain 50% relief with time    She would like to have next lumbar procedure for back pain worse with flexion extension rotation lumbar spine facet joint in nature    Follows rheumatology ARM rheumatoid arthritis    Indications for this procedure for this specific patient include the following   - Pt has had symptoms for three months with moderate to severe pain with functional impairment rated of 7/10 pain.    - Pain non-responsive to conservative care.    - Pain predominately axial and not associated with radiculopathy or claudication.    - No non-facet pathology as source of pain.    - Clinical assessment implicates facet joint as putative pain source.    - Pain is exacerbated by extension or prolonged sitting/standing and relieved by rest.    - No unexplained neurologic deficit.    - No history of coagulopathy, infection or unstable medical conditions.    - Pain is causing significant functional limitation resulting in diminished quality of life and impaired age appropriate ADL's.   - Clinical assessment implicates facet joint as putative source of pain  - Repeat injections not done prior to 7 days   - no more than 2 levels will be done      Monitor anesthesia request is medically indicated for the scheduled nerve block procedure due to:  1- needle phobia and anxiety, placing  the patient at risk during the provided service.  2-patient has an ASA class greater than 3 and requires constant presence of an anesthesiologist during the procedure,   3-patient has severe problems hard to lie still  4-patient suffers from chronic pain and is unable to function due to  diminished ADLs    Schedule bilateral lumbar medial branch block L4 through S1 # 2, lumbosacral spondylosis    Dr. Torre      Bring original prescription medication bottles/container/box with labels to each visit

## 2022-02-22 ENCOUNTER — OFFICE VISIT (OUTPATIENT)
Dept: PAIN MEDICINE | Facility: CLINIC | Age: 42
End: 2022-02-22
Payer: MEDICAID

## 2022-02-22 VITALS
HEIGHT: 62 IN | OXYGEN SATURATION: 98 % | HEART RATE: 101 BPM | SYSTOLIC BLOOD PRESSURE: 168 MMHG | RESPIRATION RATE: 18 BRPM | DIASTOLIC BLOOD PRESSURE: 115 MMHG | WEIGHT: 293 LBS | BODY MASS INDEX: 53.92 KG/M2

## 2022-02-22 DIAGNOSIS — M47.816 SPONDYLOSIS OF LUMBAR REGION WITHOUT MYELOPATHY OR RADICULOPATHY: Primary | Chronic | ICD-10-CM

## 2022-02-22 DIAGNOSIS — M05.79 RHEUMATOID ARTHRITIS INVOLVING MULTIPLE SITES WITH POSITIVE RHEUMATOID FACTOR: Chronic | ICD-10-CM

## 2022-02-22 DIAGNOSIS — Z01.818 PRE-OP TESTING: ICD-10-CM

## 2022-02-22 PROCEDURE — 3080F DIAST BP >= 90 MM HG: CPT | Mod: CPTII,,, | Performed by: PHYSICIAN ASSISTANT

## 2022-02-22 PROCEDURE — 3077F PR MOST RECENT SYSTOLIC BLOOD PRESSURE >= 140 MM HG: ICD-10-PCS | Mod: CPTII,,, | Performed by: PHYSICIAN ASSISTANT

## 2022-02-22 PROCEDURE — 99214 OFFICE O/P EST MOD 30 MIN: CPT | Mod: S$PBB,,, | Performed by: PHYSICIAN ASSISTANT

## 2022-02-22 PROCEDURE — 3066F PR DOCUMENTATION OF TREATMENT FOR NEPHROPATHY: ICD-10-PCS | Mod: CPTII,,, | Performed by: PHYSICIAN ASSISTANT

## 2022-02-22 PROCEDURE — 3044F HG A1C LEVEL LT 7.0%: CPT | Mod: CPTII,,, | Performed by: PHYSICIAN ASSISTANT

## 2022-02-22 PROCEDURE — 99215 OFFICE O/P EST HI 40 MIN: CPT | Mod: PBBFAC | Performed by: PHYSICIAN ASSISTANT

## 2022-02-22 PROCEDURE — 3044F PR MOST RECENT HEMOGLOBIN A1C LEVEL <7.0%: ICD-10-PCS | Mod: CPTII,,, | Performed by: PHYSICIAN ASSISTANT

## 2022-02-22 PROCEDURE — 1159F MED LIST DOCD IN RCRD: CPT | Mod: CPTII,,, | Performed by: PHYSICIAN ASSISTANT

## 2022-02-22 PROCEDURE — 3080F PR MOST RECENT DIASTOLIC BLOOD PRESSURE >= 90 MM HG: ICD-10-PCS | Mod: CPTII,,, | Performed by: PHYSICIAN ASSISTANT

## 2022-02-22 PROCEDURE — 3061F NEG MICROALBUMINURIA REV: CPT | Mod: CPTII,,, | Performed by: PHYSICIAN ASSISTANT

## 2022-02-22 PROCEDURE — 3077F SYST BP >= 140 MM HG: CPT | Mod: CPTII,,, | Performed by: PHYSICIAN ASSISTANT

## 2022-02-22 PROCEDURE — 3008F BODY MASS INDEX DOCD: CPT | Mod: CPTII,,, | Performed by: PHYSICIAN ASSISTANT

## 2022-02-22 PROCEDURE — 3066F NEPHROPATHY DOC TX: CPT | Mod: CPTII,,, | Performed by: PHYSICIAN ASSISTANT

## 2022-02-22 PROCEDURE — 99214 PR OFFICE/OUTPT VISIT, EST, LEVL IV, 30-39 MIN: ICD-10-PCS | Mod: S$PBB,,, | Performed by: PHYSICIAN ASSISTANT

## 2022-02-22 PROCEDURE — 3061F PR NEG MICROALBUMINURIA RESULT DOCUMENTED/REVIEW: ICD-10-PCS | Mod: CPTII,,, | Performed by: PHYSICIAN ASSISTANT

## 2022-02-22 PROCEDURE — 3008F PR BODY MASS INDEX (BMI) DOCUMENTED: ICD-10-PCS | Mod: CPTII,,, | Performed by: PHYSICIAN ASSISTANT

## 2022-02-22 PROCEDURE — 1159F PR MEDICATION LIST DOCUMENTED IN MEDICAL RECORD: ICD-10-PCS | Mod: CPTII,,, | Performed by: PHYSICIAN ASSISTANT

## 2022-02-22 RX ORDER — GABAPENTIN 300 MG/1
300 CAPSULE ORAL EVERY 8 HOURS
Qty: 90 CAPSULE | Refills: 1 | Status: SHIPPED | OUTPATIENT
Start: 2022-02-22 | End: 2022-03-22 | Stop reason: SDUPTHER

## 2022-02-22 RX ORDER — HYDROCODONE BITARTRATE AND ACETAMINOPHEN 10; 325 MG/1; MG/1
1 TABLET ORAL EVERY 8 HOURS
Qty: 90 TABLET | Refills: 0 | Status: SHIPPED | OUTPATIENT
Start: 2022-03-02 | End: 2022-03-22 | Stop reason: SDUPTHER

## 2022-02-22 NOTE — PATIENT INSTRUCTIONS
COVID SCREENING TO BE DONE 48-72 HOURS PRIOR TO PROCEDURE IF PT HAS NOT RECEIVED BOTH COVID VACCINATIONS OR HAS BEEN VACCINATED WITHIN THE LAST 2 WEEKS. VACCINATION CARD MUST BE PROVIDED IF PT HAS BEEN VACCINATED OR PT MUST HAVE COVID SCREENING IN ORDER TO HAVE PROCEDURE.  IF YOUR PROCEDURE IS ON A Tuesday, GET COVID TESTING ON THE Friday PRIOR TO PROCEDURE.  IF YOUR PROCEDURE IS ON A Thursday GET COVID TESTING ON THE Monday OR Tuesday PRIOR TO PROCEDURE.    IF YOUR PRIMARY CARE DOCTOR ORDERS YOUR COVID TESTING YOU MUST BRING YOUR RESULTS WITH YOU TO YOUR PROCEDURE.    Procedure Instructions:    Nothing to eat or drink for 8 hours or after midnight including gum, candy, mints, or tobacco products.  If you are scheduled for 1:30 or later nothing to eat or drink after 5 a.m. the morning of the procedure, including gum, candy, mints, or tobacco products.  Must have a  at least 18 yrs of age to stay present at all times  No Diabetic medications the morning of procedure, check blood sugar the morning of procedure, if it is greater than 200 call the office at [phone]  If you are started on antibiotics or have been prescribed antibiotics, have a fever, or have any other type of infection call to reschedule procedure.  If you take blood pressure medications you can take it at your regular scheduled time.        HOLD ASPIRIN AND ASPIRIN PRODUCTS  (ASPIRIN, BC POWDER ETC. ) FOR 7 DAYS  PRIOR TO PROCEDURE  HOLD NSAIDS( ibuprofen, mobic, meloxicam, advil, diclofenac, methotrexate, aleve etc....)  FOR 3 DAYS   PRIOR TO PROCEDURE

## 2022-03-08 ENCOUNTER — HOSPITAL ENCOUNTER (OUTPATIENT)
Facility: HOSPITAL | Age: 42
Discharge: HOME | End: 2022-03-08
Attending: PAIN MEDICINE | Admitting: PAIN MEDICINE
Payer: MEDICAID

## 2022-03-08 ENCOUNTER — ANESTHESIA EVENT (OUTPATIENT)
Dept: PAIN MEDICINE | Facility: HOSPITAL | Age: 42
End: 2022-03-08
Payer: MEDICAID

## 2022-03-08 ENCOUNTER — ANESTHESIA (OUTPATIENT)
Dept: PAIN MEDICINE | Facility: HOSPITAL | Age: 42
End: 2022-03-08
Payer: MEDICAID

## 2022-03-08 VITALS
TEMPERATURE: 99 F | HEIGHT: 62 IN | HEART RATE: 91 BPM | SYSTOLIC BLOOD PRESSURE: 121 MMHG | WEIGHT: 293 LBS | DIASTOLIC BLOOD PRESSURE: 73 MMHG | OXYGEN SATURATION: 98 % | RESPIRATION RATE: 19 BRPM | BODY MASS INDEX: 53.92 KG/M2

## 2022-03-08 DIAGNOSIS — M47.817 SPONDYLOSIS OF LUMBOSACRAL REGION WITHOUT MYELOPATHY OR RADICULOPATHY: ICD-10-CM

## 2022-03-08 LAB — GLUCOSE SERPL-MCNC: 116 MG/DL (ref 70–105)

## 2022-03-08 PROCEDURE — 27000284 HC CANNULA NASAL: Performed by: NURSE ANESTHETIST, CERTIFIED REGISTERED

## 2022-03-08 PROCEDURE — 64494 INJ PARAVERT F JNT L/S 2 LEV: CPT | Mod: 50 | Performed by: PAIN MEDICINE

## 2022-03-08 PROCEDURE — 82962 GLUCOSE BLOOD TEST: CPT

## 2022-03-08 PROCEDURE — 37000009 HC ANESTHESIA EA ADD 15 MINS: Performed by: PAIN MEDICINE

## 2022-03-08 PROCEDURE — 64493 INJ PARAVERT F JNT L/S 1 LEV: CPT | Mod: 50,,, | Performed by: PAIN MEDICINE

## 2022-03-08 PROCEDURE — D9220A PRA ANESTHESIA: Mod: ,,, | Performed by: NURSE ANESTHETIST, CERTIFIED REGISTERED

## 2022-03-08 PROCEDURE — 64494 PR INJ DX/THER AGNT PARAVERT FACET JOINT,IMG GUIDE,LUMBAR/SAC, 2ND LEVEL: ICD-10-PCS | Mod: 50,,, | Performed by: PAIN MEDICINE

## 2022-03-08 PROCEDURE — 64493 PR INJ DX/THER AGNT PARAVERT FACET JOINT,IMG GUIDE,LUMBAR/SAC,1ST LVL: ICD-10-PCS | Mod: 50,,, | Performed by: PAIN MEDICINE

## 2022-03-08 PROCEDURE — 64494 INJ PARAVERT F JNT L/S 2 LEV: CPT | Mod: 50,,, | Performed by: PAIN MEDICINE

## 2022-03-08 PROCEDURE — D9220A PRA ANESTHESIA: ICD-10-PCS | Mod: ,,, | Performed by: NURSE ANESTHETIST, CERTIFIED REGISTERED

## 2022-03-08 PROCEDURE — 64493 INJ PARAVERT F JNT L/S 1 LEV: CPT | Mod: 50 | Performed by: PAIN MEDICINE

## 2022-03-08 PROCEDURE — 27201423 OPTIME MED/SURG SUP & DEVICES STERILE SUPPLY: Performed by: PAIN MEDICINE

## 2022-03-08 PROCEDURE — 63600175 PHARM REV CODE 636 W HCPCS: Performed by: NURSE ANESTHETIST, CERTIFIED REGISTERED

## 2022-03-08 PROCEDURE — 25000003 PHARM REV CODE 250: Performed by: PAIN MEDICINE

## 2022-03-08 PROCEDURE — 25000003 PHARM REV CODE 250: Performed by: NURSE ANESTHETIST, CERTIFIED REGISTERED

## 2022-03-08 PROCEDURE — 37000008 HC ANESTHESIA 1ST 15 MINUTES: Performed by: PAIN MEDICINE

## 2022-03-08 RX ORDER — LIDOCAINE HYDROCHLORIDE 20 MG/ML
INJECTION, SOLUTION EPIDURAL; INFILTRATION; INTRACAUDAL; PERINEURAL
Status: DISCONTINUED | OUTPATIENT
Start: 2022-03-08 | End: 2022-03-08

## 2022-03-08 RX ORDER — PROPOFOL 10 MG/ML
VIAL (ML) INTRAVENOUS
Status: DISCONTINUED | OUTPATIENT
Start: 2022-03-08 | End: 2022-03-08

## 2022-03-08 RX ORDER — SODIUM CHLORIDE 9 MG/ML
INJECTION, SOLUTION INTRAVENOUS CONTINUOUS
Status: DISCONTINUED | OUTPATIENT
Start: 2022-03-08 | End: 2022-03-08 | Stop reason: HOSPADM

## 2022-03-08 RX ADMIN — PROPOFOL 50 MG: 10 INJECTION, EMULSION INTRAVENOUS at 12:03

## 2022-03-08 RX ADMIN — LIDOCAINE HYDROCHLORIDE 50 MG: 20 INJECTION, SOLUTION EPIDURAL; INFILTRATION; INTRACAUDAL; PERINEURAL at 12:03

## 2022-03-08 RX ADMIN — SODIUM CHLORIDE: 9 INJECTION, SOLUTION INTRAVENOUS at 12:03

## 2022-03-08 NOTE — BRIEF OP NOTE
Discharge Note  Short Stay    Admit Date: 3/8/2022    Discharge Date: 3/8/2022    Attending Physician: Carmel Torre     Discharge Provider: Carmel Torre    Diagnoses: Lumbosacral Spondylosis    Discharged Condition: Good    Final Diagnoses: Spondylosis of lumbar region without myelopathy or radiculopathy [M47.816]    Disposition: Home or Self Care    Hospital Course: No complications, uneventful    Outcome of Hospitalization, Treatment, Procedure, or Surgery:  Patient was admitted for outpatient interventional pain management procedure. The patient tolerated the procedure well with no complications.    Follow up/Patient Instructions:  Follow up as scheduled in Pain Management office in 3-4 weeks.  Patient has received instructions and follow up date and time.    Medications:  Continue previous medications

## 2022-03-08 NOTE — PLAN OF CARE
Plan:  D/c pt via wheelchair at 1300  Informed pt if does not void in 8 hours to go to ER. Notify if redness, drainage, from injection site or fever over next 3-4 days. Rest and drink plenty of fluids for the remainder of the day. No lifting over 5 lbs. For the remainder of the day. Continue regular medications as prescribed. May take pain medications as prescribed.     Pain improved 100%

## 2022-03-08 NOTE — DISCHARGE INSTRUCTIONS
No driving, operating machinery, making legal decisions, or signing legal documents until tomorrow.   Continue diet as tolerated. Drink plenty of fluids and rest.   If unable to void in 8 hours proceed to nearest ER.   Notify MD of redness or drainage from incision site as well as any fever over the next 3-4 days.  No lifting over 5 lbs for the next 24 hrs.   Continue medications as prescribed. May take pain medication as prescribed.   May shower tomorrow. No tub baths for 48 hours following procedure.   May remove bandaids tomorrow, if they fall off before tomorrow they do not have to be replaced.

## 2022-03-08 NOTE — ANESTHESIA PREPROCEDURE EVALUATION
03/08/2022  Belem Swann is a 41 y.o., female.      Pre-op Assessment    I have reviewed the Patient Summary Reports.     I have reviewed the Nursing Notes. I have reviewed the NPO Status.   I have reviewed the Medications.     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  Dermatological:  Skin Normal    Psych:   Psychiatric History          Physical Exam  General: Well nourished    Airway:  Mallampati: II   Mouth Opening: Normal  TM Distance: Normal  Tongue: Normal  Neck ROM: Normal ROM    Dental:  Intact    Chest/Lungs:  Clear to auscultation    Heart:  Rate: Normal  Rhythm: Regular Rhythm    Abdomen:  Normal        Anesthesia Plan  Type of Anesthesia, risks & benefits discussed:    Anesthesia Type: Gen Natural Airway  Intra-op Monitoring Plan: Standard ASA Monitors  Post Op Pain Control Plan: multimodal analgesia  Induction:  IV  Informed Consent: Informed consent signed with the Patient and all parties understand the risks and agree with anesthesia plan.  All questions answered. Patient consented to blood products? Yes  ASA Score: 3    Ready For Surgery From Anesthesia Perspective.     .

## 2022-03-08 NOTE — DISCHARGE SUMMARY
[facility] - Pain Management  Discharge Note  Short Stay    Procedure(s) (LRB):  Block-nerve-medial branch-lumbar, bilateral L4 through S1 (Bilateral)    OUTCOME: Patient tolerated treatment/procedure well without complication and is now ready for discharge.    DISPOSITION: Home or Self Care    FINAL DIAGNOSIS:  Lumbosacral spondylosis    FOLLOWUP: In clinic    DISCHARGE INSTRUCTIONS:  See nurse's notes     TIME SPENT ON DISCHARGE: 5 minutes

## 2022-03-08 NOTE — OP NOTE
Procedure Note    Procedure Date: 3/8/2022    Procedure Performed: Bilateral Lumbar Facet  Medial Branch Block @ L4-5,5-S1 with Fluoroscopic Guidance    Indications: Patient has failed conservative therapy.      Pre-op diagnosis: Lumbar Spondylosis    Post-op diagnosis: same    Physician: REBECCA Torre MD    Anesthesia: MAC    Estimated Blood Loss: Less than 1cc    IVF: Per Anesthesia    Complications: None    Technique:  The patient was interviewed in the holding area and Risks/Benefits were discussed, including but not limited to  the possibility of new or different pain, bleeding or infection.   All questions were answered.  The patient understood and accepted risks.  Consent was reviewed and signed.  A time out was taken to identify the patient, procedure and side of the procedure. The patient was placed in a prone position, then prepped and draped in the usual sterile fashion using ChloraPrep and sterile towels.  The levels were determined under fluoroscopic guidance and then marked.  1% Lidocaine was given by raising a skin wheal at the skin over each site and then infiltrated.  A 22-gauge 5 inch needle was introduced to the anatomic location of the bilateral L4-5,5-S1 medial branch nerves.  Then, after negative aspiration, 1 cc of a 1:1 mixture of  (Bupivacaine 0.25% 3cc , 1% lidocaine 2 cc and  40mg kenalog ) was injected @ each level.The patient tolerated the procedure well and was transferred to the [facility] in stable condition.  The patient was monitored after the procedure.  Patient was given post procedure and discharge instructions to follow at home.  The patient was discharged in a stable condition with an adult .

## 2022-03-08 NOTE — TRANSFER OF CARE
"Anesthesia Transfer of Care Note    Patient: Belem Swann    Procedure(s) Performed: Procedure(s) (LRB):  Block-nerve-medial branch-lumbar, bilateral L4 through S1 (Bilateral)    Patient location: GI    Anesthesia Type: general    Transport from OR: Transported from OR on room air with adequate spontaneous ventilation. Continuous ECG monitoring in transport. Continuous SpO2 monitoring in transport    Post pain: adequate analgesia    Post assessment: no apparent anesthetic complications    Post vital signs: stable    Level of consciousness: responds to stimulation    Nausea/Vomiting: no nausea/vomiting    Complications: none    Transfer of care protocol was followed      Last vitals:   Visit Vitals  /76 (BP Location: Right arm, Patient Position: Lying)   Pulse 80   Temp 37 °C (98.6 °F) (Oral)   Resp 13   Ht 5' 2" (1.575 m)   Wt (!) 170.1 kg (375 lb)   SpO2 98%   Breastfeeding No   BMI 68.59 kg/m²     "
[FreeTextEntry3] : Pt seen and examined with GI team.  Pt has anemia, will obtain iron and vitamin studies.  Today the patient has bilateral wheezes which he believes may be related to URI.  To ensure that that patient's respiratory status has improved I will have patient seen me in one month and if at that time his wheezes have corrected colonoscopy will be scheduled, Egd and colonoscopy if RYDER is diagnosed.

## 2022-03-08 NOTE — ANESTHESIA POSTPROCEDURE EVALUATION
Anesthesia Post Evaluation    Patient: Belem Swann    Procedure(s) Performed: Procedure(s) (LRB):  Block-nerve-medial branch-lumbar, bilateral L4 through S1 (Bilateral)    Final Anesthesia Type: general      Patient location during evaluation: PACU  Patient participation: Yes- Able to Participate  Level of consciousness: awake and alert  Post-procedure vital signs: reviewed and stable  Pain management: adequate  Airway patency: patent  MARY mitigation strategies: Multimodal analgesia  PONV status at discharge: No PONV  Anesthetic complications: no      Cardiovascular status: blood pressure returned to baseline  Respiratory status: unassisted  Hydration status: euvolemic  Follow-up not needed.          Vitals Value Taken Time   /57 03/08/22 1230   Temp 37 °C (98.6 °F) 03/08/22 1222   Pulse 89 03/08/22 1230   Resp 11 03/08/22 1230   SpO2 97 % 03/08/22 1230   Vitals shown include unvalidated device data.      No case tracking events are documented in the log.      Pain/Lowell Score: Lowell Score: 10 (3/8/2022 12:22 PM)

## 2022-03-22 RX ORDER — HYDROCODONE BITARTRATE AND ACETAMINOPHEN 10; 325 MG/1; MG/1
1 TABLET ORAL EVERY 8 HOURS
Qty: 90 TABLET | Refills: 0 | Status: CANCELLED | OUTPATIENT
Start: 2022-03-22 | End: 2022-04-21

## 2022-03-22 NOTE — H&P (VIEW-ONLY)
Disclaimer:  This note has been generated using voice recognition software.  There may be type of graft focal areas that have been missed during a proof reading      Subjective:      Patient ID: Belem Swann is a 41 y.o. female.    Chief Complaint: Low-back Pain and Knee Pain      Pain  This is a chronic problem. The current episode started more than 1 year ago. The problem occurs daily. The problem has been waxing and waning. Associated symptoms include arthralgias and neck pain. Pertinent negatives include no change in bowel habit, chest pain, coughing, diaphoresis, fever, rash, sore throat, vertigo or vomiting.     Review of Systems   Constitutional: Negative for appetite change, diaphoresis, fever and unexpected weight change.   HENT: Negative for drooling, ear discharge, ear pain, facial swelling, nosebleeds, sore throat, trouble swallowing, voice change and goiter.    Eyes: Negative for photophobia, pain, discharge, redness and visual disturbance.   Respiratory: Negative for apnea, cough, choking, chest tightness, shortness of breath, wheezing and stridor.    Cardiovascular: Negative for chest pain, palpitations and leg swelling.   Gastrointestinal: Negative for abdominal distention, change in bowel habit, diarrhea, rectal pain, vomiting, fecal incontinence and change in bowel habit.   Endocrine: Negative for cold intolerance, heat intolerance, polydipsia, polyphagia and polyuria.   Genitourinary: Negative for bladder incontinence, dysuria, flank pain, frequency and hot flashes.   Musculoskeletal: Positive for arthralgias, back pain, leg pain, neck pain and neck stiffness.   Integumentary:  Negative for color change, pallor and rash.   Allergic/Immunologic: Negative for immunocompromised state.   Neurological: Negative for dizziness, vertigo, seizures, syncope, facial asymmetry, speech difficulty, light-headedness, disturbances in coordination, memory loss and coordination difficulties.   Hematological:  "Negative for adenopathy. Does not bruise/bleed easily.   Psychiatric/Behavioral: Negative for agitation, behavioral problems, confusion, decreased concentration, dysphoric mood, hallucinations, self-injury and suicidal ideas. The patient is not nervous/anxious and is not hyperactive.             Objective:  Vitals:    03/23/22 0915 03/23/22 0916   BP: (!) 173/95    Pulse: 79    Resp: 18    Weight: (!) 170.1 kg (375 lb)    Height: 5' 2" (1.575 m)    PainSc:   6   6         Physical Exam  Vitals and nursing note reviewed. Exam conducted with a chaperone present.   Constitutional:       General: She is awake.      Appearance: Normal appearance. She is not ill-appearing, toxic-appearing or diaphoretic.   HENT:      Head: Normocephalic and atraumatic.      Nose: Nose normal.      Mouth/Throat:      Mouth: Mucous membranes are moist.      Pharynx: Oropharynx is clear.   Eyes:      Conjunctiva/sclera: Conjunctivae normal.      Pupils: Pupils are equal, round, and reactive to light.   Cardiovascular:      Rate and Rhythm: Normal rate.   Pulmonary:      Effort: Pulmonary effort is normal. No respiratory distress.   Abdominal:      Palpations: Abdomen is soft.      Tenderness: There is no guarding.   Musculoskeletal:         General: No swelling.      Cervical back: Normal range of motion and neck supple. Tenderness present. No rigidity.      Thoracic back: Tenderness present.      Lumbar back: Tenderness present. Decreased range of motion.   Skin:     General: Skin is warm and dry.      Coloration: Skin is not jaundiced or pale.   Neurological:      General: No focal deficit present.      Mental Status: She is alert and oriented to person, place, and time. Mental status is at baseline.      Cranial Nerves: Cranial nerves are intact. No cranial nerve deficit (II-XII).   Psychiatric:         Mood and Affect: Mood normal.         Behavior: Behavior normal. Behavior is cooperative.         Thought Content: Thought content " normal.           No orders of the defined types were placed in this encounter.       [facility]  See OP Notes for results.     IMPRESSION: See OP Notes for results.     This procedure was auto-finalized by: Virtual Radiologist       Admission on 03/08/2022, Discharged on 03/08/2022   Component Date Value Ref Range Status    POC Glucose 03/08/2022 116 (A) 70 - 105 mg/dL Final   Lab Visit on 03/04/2022   Component Date Value Ref Range Status    SARS CoV-2 PCR 03/04/2022 Negative  Negative, Invalid Final    Internal Control 03/04/2022 Valid   Final   Admission on 02/08/2022, Discharged on 02/08/2022   Component Date Value Ref Range Status    POC Glucose 02/08/2022 147 (A) 70 - 110 MG/DL Final    POC Glucose 02/08/2022 147 (A) 70 - 105 mg/dL Final   Lab Visit on 02/04/2022   Component Date Value Ref Range Status    SARS CoV-2 PCR 02/04/2022 Negative  Negative, Invalid Final   Office Visit on 01/24/2022   Component Date Value Ref Range Status    POC Amphetamines 01/24/2022 Negative  Negative, Inconclusive Final    POC Barbiturates 01/24/2022 Negative  Negative, Inconclusive Final    POC Benzodiazepines 01/24/2022 Presumptive Positive (A) Negative, Inconclusive Final    POC Cocaine 01/24/2022 Negative  Negative, Inconclusive Final    POC THC 01/24/2022 Negative  Negative, Inconclusive Final    POC Methadone 01/24/2022 Negative  Negative, Inconclusive Final    POC Methamphetamine 01/24/2022 Negative  Negative, Inconclusive Final    POC Opiates 01/24/2022 Presumptive Positive (A) Negative, Inconclusive Final    POC Oxycodone 01/24/2022 Negative  Negative, Inconclusive Final    POC Phencyclidine 01/24/2022 Negative  Negative, Inconclusive Final    POC Methylenedioxymethamphetamine * 01/24/2022 Negative  Negative, Inconclusive Final    POC Tricyclic Antidepressants 01/24/2022 Negative  Negative, Inconclusive Final    POC Buprenorphine 01/24/2022 Negative   Final      Acceptable 01/24/2022 Yes   Final    POC Temperature (Urine) 01/24/2022 92   Final    pH, UA 01/24/2022 5.5  5.0, 5.5, 6.0, 6.5, 7.0, 7.5, 8.0 pH Units Final    Specific Gravity, UA 01/24/2022 >=1.030 (A) <=1.005, 1.010, 1.015, 1.020, 1.025, 1.030 Final    Creatinine, Urine 01/24/2022 184  28 - 219 mg/dL Final    6-Acetylmorphine 01/24/2022 Negative  10 ng/mL Final    7-Aminoclonazepam 01/24/2022 Negative  Negative 25 ng/mL Final    a-Hydroxyalprazolam 01/24/2022 31.8 (A) <25.0 25 ng/mL Final    Acetyl Fentanyl 01/24/2022 Negative  2.5 ng/mL Final    Acetyl Norfentanyl Oxalate 01/24/2022 Negative  5 ng/mL Final    Benzoylecgonine 01/24/2022 Negative  100 ng/mL Final    Buprenorphine 01/24/2022 Negative  25 ng/mL Final    Codeine 01/24/2022 Negative  25 ng/mL Final    EDDP 01/24/2022 Negative  25 ng/mL Final    Fentanyl 01/24/2022 Negative  2.5 ng/mL Final    Hydrocodone 01/24/2022 >250.0 (A) <25.0 25 ng/mL Final    Hydromorphone 01/24/2022 Negative  25 ng/mL Final    Lorazepam 01/24/2022 Negative  25 ng/mL Final    Morphine 01/24/2022 Negative  25 ng/mL Final    Norbuprenorphine 01/24/2022 Negative  25 ng/mL Final    Nordiazepam 01/24/2022 Negative  25 ng/mL Final    Norfentanyl Oxalate 01/24/2022 Negative  5 ng/mL Final    Norhydrocodone 01/24/2022 >500.0 (A) <50.0 50 ng/mL Final    Noroxycodone HCL 01/24/2022 Negative  50 ng/mL Final    Oxazepam 01/24/2022 Negative  25 ng/mL Final    Oxymorphone 01/24/2022 Negative  25 ng/mL Final    Tapentadol 01/24/2022 Negative  25 ng/mL Final    Temazepam 01/24/2022 Negative  25 ng/mL Final    THC-COOH 01/24/2022 Negative  25 ng/mL Final    Tramadol 01/24/2022 Negative  100 ng/mL Final    Amphetamine, Urine 01/24/2022 Negative  Negative 100 ng/mL Final    Methamphetamines, Urine 01/24/2022 Negative  Negative 100 ng/mL Final    Methadone, Urine 01/24/2022 Negative  Negative 25 ng/mL Final    Oxycodone, Urine 01/24/2022 Negative  Negative 25  ng/mL Final   Office Visit on 01/11/2022   Component Date Value Ref Range Status    Sodium 01/11/2022 137  136 - 145 mmol/L Final    Potassium 01/11/2022 3.9  3.5 - 5.1 mmol/L Final    Chloride 01/11/2022 104  98 - 107 mmol/L Final    CO2 01/11/2022 26  21 - 32 mmol/L Final    Anion Gap 01/11/2022 11  7 - 16 mmol/L Final    Glucose 01/11/2022 114 (A) 74 - 106 mg/dL Final    BUN 01/11/2022 12  7 - 18 mg/dL Final    Creatinine 01/11/2022 0.60  0.55 - 1.02 mg/dL Final    BUN/Creatinine Ratio 01/11/2022 20  6 - 20 Final    Calcium 01/11/2022 9.4  8.5 - 10.1 mg/dL Final    Total Protein 01/11/2022 8.6 (A) 6.4 - 8.2 g/dL Final    Albumin 01/11/2022 3.5  3.5 - 5.0 g/dL Final    Globulin 01/11/2022 5.1 (A) 2.0 - 4.0 g/dL Final    A/G Ratio 01/11/2022 0.7   Final    Bilirubin, Total 01/11/2022 0.3  0.0 - 1.2 mg/dL Final    Alk Phos 01/11/2022 121 (A) 37 - 98 U/L Final    ALT 01/11/2022 33  13 - 56 U/L Final    AST 01/11/2022 13 (A) 15 - 37 U/L Final    eGFR 01/11/2022 117  >=60 mL/min/1.73m² Final    Triglycerides 01/11/2022 101  35 - 150 mg/dL Final    Cholesterol 01/11/2022 175  0 - 200 mg/dL Final    HDL Cholesterol 01/11/2022 47  40 - 60 mg/dL Final    Cholesterol/HDL Ratio (Risk Factor) 01/11/2022 3.7   Final    Non-HDL 01/11/2022 128  mg/dL Final    LDL Calculated 01/11/2022 108  mg/dL Final    LDL/HDL 01/11/2022 2.3   Final    VLDL 01/11/2022 20  mg/dL Final    TSH 01/11/2022 2.240  0.358 - 3.740 uIU/mL Final    Creatinine, Urine 01/11/2022 195  28 - 219 mg/dL Final    Microalbumin 01/11/2022 1.1  0.0 - 2.8 mg/dL Final    Microalbumin/Creatinine Ratio 01/11/2022 5.6  0.0 - 30.0 mg/g Final    Hemoglobin A1C 01/11/2022 6.2  4.5 - 6.6 % Final    Estimated Average Glucose 01/11/2022 120  mg/dL Final    WBC 01/11/2022 14.07 (A) 4.50 - 11.00 K/uL Final    RBC 01/11/2022 4.95  4.20 - 5.40 M/uL Final    Hemoglobin 01/11/2022 14.1  12.0 - 16.0 g/dL Final    Hematocrit 01/11/2022 44.4  38.0  - 47.0 % Final    MCV 01/11/2022 89.7  80.0 - 96.0 fL Final    MCH 01/11/2022 28.5  27.0 - 31.0 pg Final    MCHC 01/11/2022 31.8 (A) 32.0 - 36.0 g/dL Final    RDW 01/11/2022 14.6 (A) 11.5 - 14.5 % Final    Platelet Count 01/11/2022 171  150 - 400 K/uL Final    MPV 01/11/2022 14.0 (A) 9.4 - 12.4 fL Final    Neutrophils % 01/11/2022 74.7 (A) 53.0 - 65.0 % Final    Lymphocytes % 01/11/2022 15.2 (A) 27.0 - 41.0 % Final    Monocytes % 01/11/2022 5.4  2.0 - 6.0 % Final    Eosinophils % 01/11/2022 3.0  1.0 - 4.0 % Final    Basophils % 01/11/2022 0.6  0.0 - 1.0 % Final    Immature Granulocytes % 01/11/2022 1.1 (A) 0.0 - 0.4 % Final    nRBC, Auto 01/11/2022 0.0  <=0.0 % Final    Neutrophils, Abs 01/11/2022 10.51 (A) 1.80 - 7.70 K/uL Final    Lymphocytes, Absolute 01/11/2022 2.14  1.00 - 4.80 K/uL Final    Monocytes, Absolute 01/11/2022 0.76  0.00 - 0.80 K/uL Final    Eosinophils, Absolute 01/11/2022 0.42  0.00 - 0.50 K/uL Final    Basophils, Absolute 01/11/2022 0.08  0.00 - 0.20 K/uL Final    Immature Granulocytes, Absolute 01/11/2022 0.16 (A) 0.00 - 0.04 K/uL Final    nRBC, Absolute 01/11/2022 0.00  <=0.00 x10e3/uL Final    Diff Type 01/11/2022 Scan Smear   Final    Platelet Morphology 01/11/2022 Large Platelets (A) Normal Final    RBC Morphology 01/11/2022 Normal   Final   Lab Visit on 12/17/2021   Component Date Value Ref Range Status    Sodium 12/17/2021 138  136 - 145 mmol/L Final    Potassium 12/17/2021 4.2  3.5 - 5.1 mmol/L Final    Chloride 12/17/2021 104  98 - 107 mmol/L Final    CO2 12/17/2021 30  21 - 32 mmol/L Final    Anion Gap 12/17/2021 8  7 - 16 mmol/L Final    Glucose 12/17/2021 90  74 - 106 mg/dL Final    BUN 12/17/2021 10  7 - 18 mg/dL Final    Creatinine 12/17/2021 0.61  0.55 - 1.02 mg/dL Final    BUN/Creatinine Ratio 12/17/2021 16  6 - 20 Final    Calcium 12/17/2021 8.6  8.5 - 10.1 mg/dL Final    Total Protein 12/17/2021 7.7  6.4 - 8.2 g/dL Final    Albumin 12/17/2021  3.3 (A) 3.5 - 5.0 g/dL Final    Globulin 12/17/2021 4.4 (A) 2.0 - 4.0 g/dL Final    A/G Ratio 12/17/2021 0.8   Final    Bilirubin, Total 12/17/2021 0.3  0.0 - 1.2 mg/dL Final    Alk Phos 12/17/2021 125 (A) 37 - 98 U/L Final    ALT 12/17/2021 32  13 - 56 U/L Final    AST 12/17/2021 12 (A) 15 - 37 U/L Final    eGFR 12/17/2021 114  >=60 mL/min/1.73m² Final    Nil Result, TB 12/17/2021 0.03   Final    TB1 Ag minus Nil Result 12/17/2021 0.00   Final    TB2 Ag minus Nil Result 12/17/2021 0.00   Final    Mitogen minus Nil Result, TB 12/17/2021 8.44   Final    QuantiFERON-Tb Gold Plus 12/17/2021 Negative  Negative Final    Hepatitis B Surface Ag 12/17/2021 Non-Reactive  Non-Reactive Final    HBc Total Ab 12/17/2021 Negative  Negative Final    WBC 12/17/2021 16.97 (A) 4.50 - 11.00 K/uL Final    RBC 12/17/2021 4.69  4.20 - 5.40 M/uL Final    Hemoglobin 12/17/2021 13.3  12.0 - 16.0 g/dL Final    Hematocrit 12/17/2021 40.5  38.0 - 47.0 % Final    MCV 12/17/2021 86.4  80.0 - 96.0 fL Final    MCH 12/17/2021 28.4  27.0 - 31.0 pg Final    MCHC 12/17/2021 32.8  32.0 - 36.0 g/dL Final    RDW 12/17/2021 13.6  11.5 - 14.5 % Final    Platelet Count 12/17/2021 173  150 - 400 K/uL Final    MPV 12/17/2021 12.7 (A) 9.4 - 12.4 fL Final    Neutrophils % 12/17/2021 77.7 (A) 53.0 - 65.0 % Final    Lymphocytes % 12/17/2021 13.6 (A) 27.0 - 41.0 % Final    Monocytes % 12/17/2021 4.9  2.0 - 6.0 % Final    Eosinophils % 12/17/2021 2.5  1.0 - 4.0 % Final    Basophils % 12/17/2021 0.5  0.0 - 1.0 % Final    Immature Granulocytes % 12/17/2021 0.8 (A) 0.0 - 0.4 % Final    nRBC, Auto 12/17/2021 0.0  <=0.0 % Final    Neutrophils, Abs 12/17/2021 13.18 (A) 1.80 - 7.70 K/uL Final    Lymphocytes, Absolute 12/17/2021 2.31  1.00 - 4.80 K/uL Final    Monocytes, Absolute 12/17/2021 0.83 (A) 0.00 - 0.80 K/uL Final    Eosinophils, Absolute 12/17/2021 0.43  0.00 - 0.50 K/uL Final    Basophils, Absolute 12/17/2021 0.08  0.00 - 0.20  K/uL Final    Immature Granulocytes, Absolute 12/17/2021 0.14 (A) 0.00 - 0.04 K/uL Final    nRBC, Absolute 12/17/2021 0.00  <=0.00 x10e3/uL Final    Diff Type 12/17/2021 Scan Smear   Final    Platelet Morphology 12/17/2021 Large & Giant Platelets (A) Normal Final    RBC Morphology 12/17/2021 Normal   Final   Office Visit on 09/29/2021   Component Date Value Ref Range Status    POC Amphetamines 09/29/2021 Negative  Negative, Inconclusive Final    POC Barbiturates 09/29/2021 Negative  Negative, Inconclusive Final    POC Benzodiazepines 09/29/2021 Negative  Negative, Inconclusive Final    POC Cocaine 09/29/2021 Negative  Negative, Inconclusive Final    POC THC 09/29/2021 Negative  Negative, Inconclusive Final    POC Methadone 09/29/2021 Negative  Negative, Inconclusive Final    POC Methamphetamine 09/29/2021 Negative  Negative, Inconclusive Final    POC Opiates 09/29/2021 Presumptive Positive (A) Negative, Inconclusive Final    POC Oxycodone 09/29/2021 Negative  Negative, Inconclusive Final    POC Phencyclidine 09/29/2021 Negative  Negative, Inconclusive Final    POC Methylenedioxymethamphetamine * 09/29/2021 Negative  Negative, Inconclusive Final    POC Tricyclic Antidepressants 09/29/2021 Negative  Negative, Inconclusive Final    POC Buprenorphine 09/29/2021 Negative   Final     Acceptable 09/29/2021 Yes   Final    POC Temperature (Urine) 09/29/2021 90   Final         Assessment:      1. Rheumatoid arthritis involving multiple sites with positive rheumatoid factor    2. Spondylosis of lumbar region without myelopathy or radiculopathy            A's of Opioid Risk Assessment  Activity:Patient can perform ADL.   Analgesia:Patients pain is partially controlled by current medication. Patient has tried OTC medications such as Tylenol and Ibuprofen with out relief.   Adverse Effects: Patient denies constipation or sedation.  Aberrant Behavior:  reviewed with no aberrant drug  seeking/taking behavior.  Overdose reversal drug naloxone discussed    Drug screen reviewed      Requested Prescriptions     Pending Prescriptions Disp Refills    gabapentin (NEURONTIN) 300 MG capsule 90 capsule 1     Sig: Take 1 capsule (300 mg total) by mouth every 8 (eight) hours.    HYDROcodone-acetaminophen (NORCO)  mg per tablet 90 tablet 0     Sig: Take 1 tablet by mouth every 8 (eight) hours.    HYDROcodone-acetaminophen (NORCO)  mg per tablet 90 tablet 0     Sig: Take 1 tablet by mouth every 8 (eight) hours.         Plan:    Benzodiazepine positive last definitive drug screen, no hydromorphone last drug screen January 24, 2022    Follow-up after lumbar L4 through S1 bilateral medial branch block # 2, March 8, 2022  She states she had 85% relief after procedure maintaining 65% relief with time  She states the procedure does help improve her level of functioning    Complaining bilateral knee pain considering genicular nerve block    She would like to have next lumbar procedure for back pain worse with flexion extension rotation lumbar spine facet joint in nature    Requesting Toradol Injection for back pain joint pain    Toradol 60 mg IM, tolerated well    Follows rheumatology ARM rheumatoid arthritis    Indications for this procedure for this specific patient include the following   - Pt has had symptoms for three months with moderate to severe pain with functional impairment rated of 7/10 pain.   - Pain non-responsive to conservative care.    - Pain predominately axial and not associated with radiculopathy or claudication.    - No non-facet pathology as source of pain.    - Clinical assessment implicates facet joint as putative pain source.    - Pain is exacerbated by extension or prolonged sitting/standing and relieved by rest.    - No unexplained neurologic deficit.    - No history of coagulopathy, infection or unstable medical conditions.    - Pain is causing significant functional  limitation resulting in diminished quality of life and impaired age appropriate ADL's.   - Clinical assessment implicates facet joint as putative source of pain  - Repeat injections not done prior to 7 days   - no more than 2 levels will be done      Monitor anesthesia request is medically indicated for the scheduled nerve block procedure due to:  1- needle phobia and anxiety, placing  the patient at risk during the provided service.  2-patient has an ASA class greater than 3 and requires constant presence of an anesthesiologist during the procedure,   3-patient has severe problems hard to lie still  4-patient suffers from chronic pain and is unable to function due to  diminished ADLs    Schedule right lumbar RFTC L4 through S1, lumbosacral spondylosis    Proceed with left lumbar RF TC L4 through S1 after completing above    Dr. Torre      Bring original prescription medication bottles/container/box with labels to each visit

## 2022-03-23 ENCOUNTER — OFFICE VISIT (OUTPATIENT)
Dept: PAIN MEDICINE | Facility: CLINIC | Age: 42
End: 2022-03-23
Payer: MEDICAID

## 2022-03-23 VITALS
DIASTOLIC BLOOD PRESSURE: 95 MMHG | RESPIRATION RATE: 18 BRPM | SYSTOLIC BLOOD PRESSURE: 173 MMHG | BODY MASS INDEX: 53.92 KG/M2 | WEIGHT: 293 LBS | HEART RATE: 79 BPM | HEIGHT: 62 IN

## 2022-03-23 DIAGNOSIS — M47.816 SPONDYLOSIS OF LUMBAR REGION WITHOUT MYELOPATHY OR RADICULOPATHY: Chronic | ICD-10-CM

## 2022-03-23 DIAGNOSIS — Z11.59 SCREENING FOR VIRAL DISEASE: ICD-10-CM

## 2022-03-23 DIAGNOSIS — M05.79 RHEUMATOID ARTHRITIS INVOLVING MULTIPLE SITES WITH POSITIVE RHEUMATOID FACTOR: Primary | Chronic | ICD-10-CM

## 2022-03-23 PROCEDURE — 3008F BODY MASS INDEX DOCD: CPT | Mod: CPTII,,, | Performed by: PHYSICIAN ASSISTANT

## 2022-03-23 PROCEDURE — 3080F PR MOST RECENT DIASTOLIC BLOOD PRESSURE >= 90 MM HG: ICD-10-PCS | Mod: CPTII,,, | Performed by: PHYSICIAN ASSISTANT

## 2022-03-23 PROCEDURE — 3066F PR DOCUMENTATION OF TREATMENT FOR NEPHROPATHY: ICD-10-PCS | Mod: CPTII,,, | Performed by: PHYSICIAN ASSISTANT

## 2022-03-23 PROCEDURE — 99214 OFFICE O/P EST MOD 30 MIN: CPT | Mod: S$PBB,25,, | Performed by: PHYSICIAN ASSISTANT

## 2022-03-23 PROCEDURE — 3080F DIAST BP >= 90 MM HG: CPT | Mod: CPTII,,, | Performed by: PHYSICIAN ASSISTANT

## 2022-03-23 PROCEDURE — 3066F NEPHROPATHY DOC TX: CPT | Mod: CPTII,,, | Performed by: PHYSICIAN ASSISTANT

## 2022-03-23 PROCEDURE — 3077F PR MOST RECENT SYSTOLIC BLOOD PRESSURE >= 140 MM HG: ICD-10-PCS | Mod: CPTII,,, | Performed by: PHYSICIAN ASSISTANT

## 2022-03-23 PROCEDURE — 3061F NEG MICROALBUMINURIA REV: CPT | Mod: CPTII,,, | Performed by: PHYSICIAN ASSISTANT

## 2022-03-23 PROCEDURE — 99214 PR OFFICE/OUTPT VISIT, EST, LEVL IV, 30-39 MIN: ICD-10-PCS | Mod: S$PBB,25,, | Performed by: PHYSICIAN ASSISTANT

## 2022-03-23 PROCEDURE — 3044F HG A1C LEVEL LT 7.0%: CPT | Mod: CPTII,,, | Performed by: PHYSICIAN ASSISTANT

## 2022-03-23 PROCEDURE — 3044F PR MOST RECENT HEMOGLOBIN A1C LEVEL <7.0%: ICD-10-PCS | Mod: CPTII,,, | Performed by: PHYSICIAN ASSISTANT

## 2022-03-23 PROCEDURE — 99215 OFFICE O/P EST HI 40 MIN: CPT | Mod: PBBFAC | Performed by: PHYSICIAN ASSISTANT

## 2022-03-23 PROCEDURE — 3061F PR NEG MICROALBUMINURIA RESULT DOCUMENTED/REVIEW: ICD-10-PCS | Mod: CPTII,,, | Performed by: PHYSICIAN ASSISTANT

## 2022-03-23 PROCEDURE — 3077F SYST BP >= 140 MM HG: CPT | Mod: CPTII,,, | Performed by: PHYSICIAN ASSISTANT

## 2022-03-23 PROCEDURE — 96372 THER/PROPH/DIAG INJ SC/IM: CPT | Mod: PBBFAC | Performed by: PHYSICIAN ASSISTANT

## 2022-03-23 PROCEDURE — 3008F PR BODY MASS INDEX (BMI) DOCUMENTED: ICD-10-PCS | Mod: CPTII,,, | Performed by: PHYSICIAN ASSISTANT

## 2022-03-23 RX ORDER — GABAPENTIN 300 MG/1
300 CAPSULE ORAL EVERY 8 HOURS
Qty: 90 CAPSULE | Refills: 1 | Status: SHIPPED | OUTPATIENT
Start: 2022-03-23 | End: 2022-05-17 | Stop reason: SDUPTHER

## 2022-03-23 RX ORDER — KETOROLAC TROMETHAMINE 30 MG/ML
60 INJECTION, SOLUTION INTRAMUSCULAR; INTRAVENOUS
Status: COMPLETED | OUTPATIENT
Start: 2022-03-23 | End: 2022-03-23

## 2022-03-23 RX ORDER — HYDROCODONE BITARTRATE AND ACETAMINOPHEN 10; 325 MG/1; MG/1
1 TABLET ORAL EVERY 8 HOURS
Qty: 90 TABLET | Refills: 0 | Status: SHIPPED | OUTPATIENT
Start: 2022-04-01 | End: 2022-04-18 | Stop reason: SDUPTHER

## 2022-03-23 RX ADMIN — KETOROLAC TROMETHAMINE 60 MG: 30 INJECTION, SOLUTION INTRAMUSCULAR; INTRAVENOUS at 10:03

## 2022-03-23 NOTE — PATIENT INSTRUCTIONS
ALL PATIENTS TO HAVE COVID SCREENING DONE 48-72 HOURS PRIOR TO PROCEDURE INCLUDING PATIENTS THAT WHOM HAVE HAD COVID VACCINE!!!     IF YOUR  PROCEDURE IS ON A Tuesday, GET COVID TESTING ON THE  Friday BEFORE PROCEDURE.    IF YOUR PROCEDURE IS ON Thursday, HAVE COVID TESTING ON THE Monday OR Tuesday PRIOR TO PROCEDURE    COVID TESTING TO BE DONE AT [facility] IN THE LAB DEPARTMENT OR YOUR PRIMARY CARE DOCTOR MAY ORDER IT FOR YOU.    IF YOUR PRIMARY  CARE DOCTOR ORDERS YOUR COVID TESTING YOU MUST BRING YOUR RESULTS WITH YOU TO YOUR PROCEDURE.    Procedure Instructions:    Nothing to eat or drink for 8 hours or after midnight including gum, candy, mints, or tobacco products.  If you are scheduled for 1:30 or later nothing to eat or drink after 5 a.m. the morning of the procedure, including gum, candy, mints, or tobacco products.  Must have a  at least 18 yrs of age to stay present at all times  No Diabetic medications the morning of procedure, check blood sugar the morning of procedure, if it is greater than 200 call the office at [phone]  If you are started on antibiotics or have been prescribed antibiotics, have a fever, or have any other type of infection call to reschedule procedure.  If you take blood pressure medications you can take it at your regular scheduled time with a small sip of WATER!    HOLD ASPIRIN AND ASPIRIN PRODUCTS  (ASPIRIN, BC POWDER ETC. ) FOR 7 DAYS  PRIOR TO PROCEDURE  HOLD NSAIDS( ibuprofen, mobic, meloxicam, advil, diclofenac, methotrexate, aleve etc....)  FOR 3 DAYS   PRIOR TO PROCEDURE

## 2022-04-12 ENCOUNTER — OFFICE VISIT (OUTPATIENT)
Dept: ORTHOPEDICS | Facility: CLINIC | Age: 42
End: 2022-04-12
Payer: MEDICAID

## 2022-04-12 DIAGNOSIS — M22.41 CHONDROMALACIA OF PATELLOFEMORAL JOINT, RIGHT: Primary | ICD-10-CM

## 2022-04-12 DIAGNOSIS — M22.41 CHONDROMALACIA OF BOTH PATELLAE: ICD-10-CM

## 2022-04-12 DIAGNOSIS — M22.42 CHONDROMALACIA OF BOTH PATELLAE: ICD-10-CM

## 2022-04-12 PROCEDURE — 3061F PR NEG MICROALBUMINURIA RESULT DOCUMENTED/REVIEW: ICD-10-PCS | Mod: CPTII,,, | Performed by: ORTHOPAEDIC SURGERY

## 2022-04-12 PROCEDURE — 99213 OFFICE O/P EST LOW 20 MIN: CPT | Mod: 25,,, | Performed by: ORTHOPAEDIC SURGERY

## 2022-04-12 PROCEDURE — 3044F HG A1C LEVEL LT 7.0%: CPT | Mod: CPTII,,, | Performed by: ORTHOPAEDIC SURGERY

## 2022-04-12 PROCEDURE — 3044F PR MOST RECENT HEMOGLOBIN A1C LEVEL <7.0%: ICD-10-PCS | Mod: CPTII,,, | Performed by: ORTHOPAEDIC SURGERY

## 2022-04-12 PROCEDURE — 99213 PR OFFICE/OUTPT VISIT, EST, LEVL III, 20-29 MIN: ICD-10-PCS | Mod: 25,,, | Performed by: ORTHOPAEDIC SURGERY

## 2022-04-12 PROCEDURE — 3066F NEPHROPATHY DOC TX: CPT | Mod: CPTII,,, | Performed by: ORTHOPAEDIC SURGERY

## 2022-04-12 PROCEDURE — 3066F PR DOCUMENTATION OF TREATMENT FOR NEPHROPATHY: ICD-10-PCS | Mod: CPTII,,, | Performed by: ORTHOPAEDIC SURGERY

## 2022-04-12 PROCEDURE — 20610 DRAIN/INJ JOINT/BURSA W/O US: CPT | Mod: 50,,, | Performed by: ORTHOPAEDIC SURGERY

## 2022-04-12 PROCEDURE — 3061F NEG MICROALBUMINURIA REV: CPT | Mod: CPTII,,, | Performed by: ORTHOPAEDIC SURGERY

## 2022-04-12 PROCEDURE — 20610 LARGE JOINT ASPIRATION/INJECTION: BILATERAL PATELLAR BURSA: ICD-10-PCS | Mod: 50,,, | Performed by: ORTHOPAEDIC SURGERY

## 2022-04-12 RX ORDER — TRIAMCINOLONE ACETONIDE 40 MG/ML
40 INJECTION, SUSPENSION INTRA-ARTICULAR; INTRAMUSCULAR
Status: DISCONTINUED | OUTPATIENT
Start: 2022-04-12 | End: 2022-04-12 | Stop reason: HOSPADM

## 2022-04-12 RX ADMIN — TRIAMCINOLONE ACETONIDE 40 MG: 40 INJECTION, SUSPENSION INTRA-ARTICULAR; INTRAMUSCULAR at 10:04

## 2022-04-12 NOTE — PROGRESS NOTES
CC:  Knee pain    42 y.o. Female returns to clinic for a follow up visit regarding right knee pain.  Her prior right knee injection worked remarkably well.. She is now bothered by pain in her left knee. She describes it as similar in quality and duration as her pain in her right knee       Past Medical History:   Diagnosis Date    Anxiety     Asthma     Chronic pain     Cushing syndrome     Depression     Essential hypertension     Fibromyalgia     Herpes zoster     Hyperlipidemia     Onychomycosis     MARY on CPAP     Rheumatoid arthritis     Type 2 diabetes mellitus 2020    Vitamin D deficiency 2018     Past Surgical History:   Procedure Laterality Date     SECTION      ELBOW SURGERY  1985    EPIDURAL STEROID INJECTION      L4-5 VERITO Dec 2020-Torre    FEMUR SURGERY Right 1985    due to osteomyelitis    HYSTERECTOMY      INJECTION OF ANESTHETIC AGENT AROUND MEDIAL BRANCH NERVES INNERVATING LUMBAR FACET JOINT Bilateral 2022    Procedure: Bilateral L4-5,5-S1 MBB ( No steroids);  Surgeon: Carmel Torre MD;  Location: [facility];  Service: Pain Management;  Laterality: Bilateral;  PT AWARE TO BE TESTED ON OV    INJECTION OF ANESTHETIC AGENT AROUND MEDIAL BRANCH NERVES INNERVATING LUMBAR FACET JOINT Bilateral 3/8/2022    Procedure: Block-nerve-medial branch-lumbar, bilateral L4 through S1;  Surgeon: Carmel Torre MD;  Location: [facility];  Service: Pain Management;  Laterality: Bilateral;    LIPOMA RESECTION  2019    SURGICAL REMOVAL OF PILONIDAL CYST      TRANSFORAMINAL EPIDURAL INJECTION OF STEROID      Bilateral L4-5 TFESI 2020-Torre    TRANSFORAMINAL EPIDURAL INJECTION OF STEROID      Right L4-5 TFESI 2020-Torre    VENTRAL HERNIA REPAIR  2020         REVIEW OF SYSTEMS:   Constitution: Negative. Negative for chills, fever and night sweats.    Hematologic/Lymphatic: Negative for bleeding problem. Does not bruise/bleed easily.    Skin: Negative for dry skin, itching and rash.   Musculoskeletal: Negative for falls. Positive for knee pain and muscle weakness.   All other review of symptoms were reviewed and found to be noncontributory.     PHYSICAL EXAMINATION:  There were no vitals taken for this visit.  General    Constitutional: She is oriented to person, place, and time. She appears well-developed and well-nourished.   HENT:   Head: Normocephalic and atraumatic.   Eyes: EOM are normal. Pupils are equal, round, and reactive to light.   Neck: Neck supple.   Cardiovascular: Normal rate, regular rhythm and intact distal pulses.    Pulmonary/Chest: Effort normal and breath sounds normal. No respiratory distress.   Abdominal: There is no abdominal tenderness. There is no guarding.   Neurological: She is alert and oriented to person, place, and time. She has normal reflexes. No cranial nerve deficit. She exhibits normal muscle tone. Coordination normal.   Psychiatric: She has a normal mood and affect. Her behavior is normal. Judgment and thought content normal.           Right Knee Exam     Tenderness   The patient is tender to palpation of the lateral retinaculum and condyle.    Range of Motion   Extension: normal   Flexion: normal     Tests   Meniscus   Grady:  Medial - negative   Ligament Examination Lachman: normal (-1 to 2mm) Pivot Shift: normal (Equal)    Left Knee Exam     Inspection   Erythema: absent  Scars: absent  Swelling: absent  Bruising: absent    Tenderness   The patient tender to palpation of the patella.    Crepitus   The patient has crepitus of the patella.    Range of Motion   Extension: normal   Flexion: normal     Tests   Meniscus   Grady:  Medial - positive   Stability Lachman: normal (-1 to 2mm)   Patella   Passive Patellar Tilt: lateral tilt  Patellar Tracking: normal  Q-Angle at 90 degrees: normal  Patellar Grind: positive    Other   Sensation: normal    Muscle Strength   Right Lower Extremity   Hip Abduction:  5/5   Quadriceps:  5/5   Hamstrin/5     Vascular Exam       Left Pulses  Dorsalis Pedis:      2+  Posterior Tibial:      2+              IMAGING:  No results found.     ASSESSMENT:      ICD-10-CM ICD-9-CM   1. Chondromalacia of patellofemoral joint, right  M22.41 717.7   2. Chondromalacia of both patellae  M22.41 717.7    M22.42        PLAN:     -Findings and treatment options were discussed with the patient  -All questions answered  Natural history and expected course discussed. Questions answered.  Educational materials distributed.  Reduction in offending activity.  Arthrocentesis. See procedure note.  Bilateral knee injections  RTC PRN    There are no Patient Instructions on file for this visit.      Orders Placed This Encounter   Procedures    Large Joint Aspiration/Injection: bilateral patellar bursa         Large Joint Aspiration/Injection: bilateral patellar bursa    Date/Time: 2022 10:15 AM  Performed by: Rogelio Wakefield MD  Authorized by: Rogelio Wakefield MD     Consent Done?:  Yes (Verbal)  Indications:  Pain  Site marked: the procedure site was marked    Local anesthetic:  Bupivacaine 0.25% without epinephrine    Details:  Needle Size:  22 G  Location:  Knee  Laterality:  Bilateral  Site:  Bilateral patellar bursa  Medications (Right):  40 mg triamcinolone acetonide 40 mg/mL  Medications (Left):  40 mg triamcinolone acetonide 40 mg/mL  Patient tolerance:  Patient tolerated the procedure well with no immediate complications

## 2022-04-14 ENCOUNTER — ANESTHESIA (OUTPATIENT)
Dept: PAIN MEDICINE | Facility: HOSPITAL | Age: 42
End: 2022-04-14
Payer: MEDICAID

## 2022-04-14 ENCOUNTER — HOSPITAL ENCOUNTER (OUTPATIENT)
Facility: HOSPITAL | Age: 42
Discharge: HOME | End: 2022-04-14
Attending: PAIN MEDICINE | Admitting: PAIN MEDICINE
Payer: MEDICAID

## 2022-04-14 ENCOUNTER — ANESTHESIA EVENT (OUTPATIENT)
Dept: PAIN MEDICINE | Facility: HOSPITAL | Age: 42
End: 2022-04-14
Payer: MEDICAID

## 2022-04-14 VITALS
WEIGHT: 293 LBS | TEMPERATURE: 99 F | OXYGEN SATURATION: 100 % | SYSTOLIC BLOOD PRESSURE: 148 MMHG | HEART RATE: 77 BPM | HEIGHT: 62 IN | BODY MASS INDEX: 53.92 KG/M2 | DIASTOLIC BLOOD PRESSURE: 102 MMHG | RESPIRATION RATE: 10 BRPM

## 2022-04-14 DIAGNOSIS — Z11.59 SCREENING FOR VIRAL DISEASE: Primary | ICD-10-CM

## 2022-04-14 DIAGNOSIS — M47.817 LUMBOSACRAL SPONDYLOSIS WITHOUT MYELOPATHY: ICD-10-CM

## 2022-04-14 LAB
GLUCOSE SERPL-MCNC: 238 MG/DL (ref 70–105)
GLUCOSE SERPL-MCNC: 238 MG/DL (ref 70–110)

## 2022-04-14 PROCEDURE — 27201423 OPTIME MED/SURG SUP & DEVICES STERILE SUPPLY: Performed by: PAIN MEDICINE

## 2022-04-14 PROCEDURE — 64636 PR DESTROY L/S FACET JNT ADDL: ICD-10-PCS | Mod: RT,,, | Performed by: PAIN MEDICINE

## 2022-04-14 PROCEDURE — 64635 PR DESTROY LUMB/SAC FACET JNT: ICD-10-PCS | Mod: RT,,, | Performed by: PAIN MEDICINE

## 2022-04-14 PROCEDURE — 27000284 HC CANNULA NASAL: Performed by: NURSE ANESTHETIST, CERTIFIED REGISTERED

## 2022-04-14 PROCEDURE — 64635 DESTROY LUMB/SAC FACET JNT: CPT | Mod: RT | Performed by: PAIN MEDICINE

## 2022-04-14 PROCEDURE — 25000003 PHARM REV CODE 250: Performed by: NURSE ANESTHETIST, CERTIFIED REGISTERED

## 2022-04-14 PROCEDURE — D9220A PRA ANESTHESIA: Mod: ,,, | Performed by: NURSE ANESTHETIST, CERTIFIED REGISTERED

## 2022-04-14 PROCEDURE — 63600175 PHARM REV CODE 636 W HCPCS: Performed by: NURSE ANESTHETIST, CERTIFIED REGISTERED

## 2022-04-14 PROCEDURE — 82962 GLUCOSE BLOOD TEST: CPT

## 2022-04-14 PROCEDURE — 37000009 HC ANESTHESIA EA ADD 15 MINS: Performed by: PAIN MEDICINE

## 2022-04-14 PROCEDURE — 64635 DESTROY LUMB/SAC FACET JNT: CPT | Mod: RT,,, | Performed by: PAIN MEDICINE

## 2022-04-14 PROCEDURE — 64636 DESTROY L/S FACET JNT ADDL: CPT | Mod: RT,,, | Performed by: PAIN MEDICINE

## 2022-04-14 PROCEDURE — 01992 ANES DX/THER NRV BLK&INJ PRN: CPT | Performed by: PAIN MEDICINE

## 2022-04-14 PROCEDURE — 64636 DESTROY L/S FACET JNT ADDL: CPT | Performed by: PAIN MEDICINE

## 2022-04-14 PROCEDURE — D9220A PRA ANESTHESIA: ICD-10-PCS | Mod: ,,, | Performed by: NURSE ANESTHETIST, CERTIFIED REGISTERED

## 2022-04-14 PROCEDURE — 37000008 HC ANESTHESIA 1ST 15 MINUTES: Performed by: PAIN MEDICINE

## 2022-04-14 PROCEDURE — 25000003 PHARM REV CODE 250: Performed by: PAIN MEDICINE

## 2022-04-14 RX ORDER — LIDOCAINE HYDROCHLORIDE 20 MG/ML
INJECTION, SOLUTION EPIDURAL; INFILTRATION; INTRACAUDAL; PERINEURAL
Status: DISCONTINUED | OUTPATIENT
Start: 2022-04-14 | End: 2022-04-14

## 2022-04-14 RX ORDER — SODIUM CHLORIDE 9 MG/ML
INJECTION, SOLUTION INTRAVENOUS CONTINUOUS
Status: DISCONTINUED | OUTPATIENT
Start: 2022-04-14 | End: 2022-04-14 | Stop reason: HOSPADM

## 2022-04-14 RX ORDER — PROPOFOL 10 MG/ML
VIAL (ML) INTRAVENOUS
Status: DISCONTINUED | OUTPATIENT
Start: 2022-04-14 | End: 2022-04-14

## 2022-04-14 RX ORDER — BUPIVACAINE HYDROCHLORIDE 2.5 MG/ML
INJECTION, SOLUTION INFILTRATION; PERINEURAL
Status: DISCONTINUED | OUTPATIENT
Start: 2022-04-14 | End: 2022-04-14 | Stop reason: HOSPADM

## 2022-04-14 RX ADMIN — LIDOCAINE HYDROCHLORIDE 100 MG: 20 INJECTION, SOLUTION INTRAVENOUS at 08:04

## 2022-04-14 RX ADMIN — PROPOFOL 200 MG: 10 INJECTION, EMULSION INTRAVENOUS at 08:04

## 2022-04-14 NOTE — BRIEF OP NOTE
Discharge Note  Short Stay    Admit Date: 4/14/2022    Discharge Date: 4/14/2022    Attending Physician: Carmel Torre     Discharge Provider: Carmel Torre    Diagnoses:  Lumbosacral spondylosis    Discharged Condition: Good    Final Diagnoses: Spondylosis of lumbar region without myelopathy or radiculopathy [M47.816]    Disposition: Home or Self Care    Hospital Course: No complications, uneventful    Outcome of Hospitalization, Treatment, Procedure, or Surgery:  Patient was admitted for outpatient interventional pain management procedure. The patient tolerated the procedure well with no complications.    Follow up/Patient Instructions:  Follow up as scheduled in Pain Management office in 3-4 weeks.  Patient has received instructions and follow up date and time.    Medications:  Continue previous medications

## 2022-04-14 NOTE — PLAN OF CARE
REFER TO WRITTEN DOCUMENT AND RECOVERY INFORMATION    INFORMED PT IF DOES NOT VOID IN 8 HOURS TO GO TO ER. NOTIFY IF REDDNESS, DRAINAGE, FROM INJECTION SITE OR FEVER OVER NEXT 3-4 DAYS, REST AND DRINK PLENTY OF FLUIDS FOR THE REMINDER OF THE DAY , NO LIFTING OVER 5 LBS FOR THE REMAINDER OF THE DAY. CONTINUE REGULAR MEDICATIONS AS PRESCRIBED. MAY TAKE PAIN MEDS AS PRESCRIBED.

## 2022-04-14 NOTE — ANESTHESIA PREPROCEDURE EVALUATION
04/14/2022  Belem Swann is a 42 y.o., female.      Pre-op Assessment    I have reviewed the Patient Summary Reports.     I have reviewed the Nursing Notes. I have reviewed the NPO Status.   I have reviewed the Medications.     Review of Systems  Anesthesia Hx:  No problems with previous Anesthesia  Neg history of prior surgery.   Cardiovascular:   Hypertension    Pulmonary:   Asthma mild Sleep Apnea, CPAP    Neurological:   Neuromuscular Disease,    Endocrine:   Diabetes, well controlled, type 2    Psych:   Psychiatric History depression          Physical Exam  General: Well nourished    Airway:  Mallampati: I   Mouth Opening: Normal  TM Distance: Normal  Neck ROM: Normal ROM        Anesthesia Plan  Type of Anesthesia, risks & benefits discussed:    Anesthesia Type: Gen Natural Airway  Intra-op Monitoring Plan: Standard ASA Monitors  Post Op Pain Control Plan: multimodal analgesia  Induction:  IV  Airway Plan: Direct  Informed Consent: Informed consent signed with the Patient and all parties understand the risks and agree with anesthesia plan.  All questions answered. Patient consented to blood products? Yes  ASA Score: 3  Day of Surgery Review of History & Physical: H&P Update referred to the surgeon/provider.    Ready For Surgery From Anesthesia Perspective.     .

## 2022-04-14 NOTE — TRANSFER OF CARE
"Anesthesia Transfer of Care Note    Patient: Belem Swann    Procedure(s) Performed: Procedure(s) (LRB):  Radiofrequency Ablation, Nerve, Spinal, Lumbar, Medial Branch, Level L4-S1, right side 1st, will have left-sided after completing (Right)    Patient location: PACU    Anesthesia Type: general    Transport from OR: Transported from OR on room air with adequate spontaneous ventilation    Post pain: adequate analgesia    Post assessment: no apparent anesthetic complications and tolerated procedure well    Post vital signs: stable    Level of consciousness: awake    Nausea/Vomiting: no nausea    Complications: none    Transfer of care protocol was followed      Last vitals:   Visit Vitals  BP (!) 132/97   Pulse 84   Temp 37 °C (98.6 °F)   Resp 20   Ht 5' 2" (1.575 m)   Wt (!) 166.5 kg (367 lb)   SpO2 97%   Breastfeeding No   BMI 67.13 kg/m²     "

## 2022-04-14 NOTE — OP NOTE
Procedure Note    Procedure Date: 4/14/2022    Procedure Performed:  Radiofrequency ablation of the right  L3-4,4-5,5-S1 medial branch nerves utilizing fluoroscopy    Indications: Patient has failed conservative therapy.      Pre-op diagnosis: Lumbosacral Spondylosis    Post-op diagnosis: same    Physician: REBECCA Torre MD    Anesthesia:MAC    Medications injected:  Pre-lesion, 2ml of 1% lidocaine at each level.  0.25% bupivacaine,   1ml of this solution was injected at each level post-lesion.    Local anesthetic used: 1% Lidocaine, 10 ml     Estimated Blood Loss:Less than 1cc    IVF:Per Anesthesia    Complications: None    Technique:  The patient was interviewed in the holding area and Risks/Benefits were discussed, including, but not limited to  the possibility of new or different pain, bleeding or infection.   All questions were answered.  The patient understood and accepted risks.  The area of treatment was marked. Consent was reviewed and signed.  A time out was taken to identify the patient, procedure and side of the procedure. The patient was placed in a prone position, then prepped and draped in the usual sterile fashion using ChloraPrep and sterile towels.  The levels were determined under fluoroscopic guidance.   AP and oblique fluoroscopy were used to identify and tirso the junctions between the superior articular processes and transverse processes at  Right  L3-4,4-5,5-S1.  The Right sacral ala was also identified and marked.  The skin and subcutaneous tissues in these identified areas were anesthetized with 1% lidocaine. A 20-gauge 150 mm Localler RF needle was advanced towards each of these points under fluoroscopic guidance such that the tip of the needle was positioned posterior to the Neuro-foramen on lateral fluoroscopic view. Each needle was positioned such that, on stimulation, the patient had an appropriate pain response between 0.3-0.5 V, with no motor response, other than Lumbar Paraspinal Muscles  up to 2.0V.  One mL of 2% lidocaine was then injected at each level prior to lesioning, which was performed for 90 seconds at 80°C.  Once the lesion was complete, 1 mL of a solution consisting of  0.25% bupivacaine was injected through each probe. The probes were removed and a sterile Band-Aid dressing was applied to the puncture site.  The patient tolerated the procedure well and was monitored after the procedure.  Patient was given post procedure and discharge instructions to follow at home.  The patient was discharged in a stable condition and accompanied by an adult.

## 2022-04-14 NOTE — ANESTHESIA POSTPROCEDURE EVALUATION
Anesthesia Post Evaluation    Patient: Belem Swann    Procedure(s) Performed: Procedure(s) (LRB):  Radiofrequency Ablation, Nerve, Spinal, Lumbar, Medial Branch, Level L4-S1, right side 1st, will have left-sided after completing (Right)    Final Anesthesia Type: general      Patient location during evaluation: PACU  Patient participation: Yes- Able to Participate  Level of consciousness: awake and alert  Post-procedure vital signs: reviewed and stable  Pain management: adequate  Airway patency: patent    PONV status at discharge: No PONV  Anesthetic complications: no      Cardiovascular status: blood pressure returned to baseline  Respiratory status: unassisted and spontaneous ventilation  Hydration status: euvolemic  Follow-up not needed.          Vitals Value Taken Time   /97 04/14/22 0912   Temp 37 °C (98.6 °F) 04/14/22 0912   Pulse 86 04/14/22 0912   Resp 23 04/14/22 0912   SpO2 98 % 04/14/22 0912   Vitals shown include unvalidated device data.      No case tracking events are documented in the log.      Pain/Lowell Score: Lowell Score: 10 (4/14/2022  9:12 AM)

## 2022-04-14 NOTE — DISCHARGE SUMMARY
[facility] - Pain Management  Discharge Note  Short Stay    Procedure(s) (LRB):  Radiofrequency Ablation, Nerve, Spinal, Lumbar, Medial Branch, Level L4-S1, right side 1st, will have left-sided after completing (Right)    OUTCOME: Patient tolerated treatment/procedure well without complication and is now ready for discharge.    DISPOSITION: Home or Self Care    FINAL DIAGNOSIS:  Lumbosacral spondylosis    FOLLOWUP: In clinic    DISCHARGE INSTRUCTIONS:  See nurse's notes     TIME SPENT ON DISCHARGE: 5 minutes

## 2022-04-18 DIAGNOSIS — M05.79 RHEUMATOID ARTHRITIS INVOLVING MULTIPLE SITES WITH POSITIVE RHEUMATOID FACTOR: Chronic | ICD-10-CM

## 2022-04-18 DIAGNOSIS — M47.816 SPONDYLOSIS OF LUMBAR REGION WITHOUT MYELOPATHY OR RADICULOPATHY: Chronic | ICD-10-CM

## 2022-04-18 NOTE — TELEPHONE ENCOUNTER
----- Message from Mercy Alex sent at 4/14/2022  3:26 PM CDT -----  Regarding: meds  Pt states that her pain meds run out(5/1) before her procedure 5/5. Call back number [phone]

## 2022-04-18 NOTE — TELEPHONE ENCOUNTER
Pt had procedure on 4-14 and to have another procedure on 5-5. Per [facility], [facility] 10 #[phone] filled on 4-1-2022.

## 2022-04-19 ENCOUNTER — OFFICE VISIT (OUTPATIENT)
Dept: DERMATOLOGY | Facility: CLINIC | Age: 42
End: 2022-04-19
Payer: MEDICAID

## 2022-04-19 VITALS — BODY MASS INDEX: 53.92 KG/M2 | HEIGHT: 62 IN | RESPIRATION RATE: 19 BRPM | WEIGHT: 293 LBS

## 2022-04-19 DIAGNOSIS — L73.2 HIDRADENITIS SUPPURATIVA: ICD-10-CM

## 2022-04-19 DIAGNOSIS — L21.9 SEBORRHEIC DERMATITIS: ICD-10-CM

## 2022-04-19 DIAGNOSIS — L40.9 PSORIASIS: ICD-10-CM

## 2022-04-19 DIAGNOSIS — L91.8 INFLAMED ACROCHORDON: Primary | ICD-10-CM

## 2022-04-19 PROCEDURE — 99214 OFFICE O/P EST MOD 30 MIN: CPT | Mod: 25,,, | Performed by: STUDENT IN AN ORGANIZED HEALTH CARE EDUCATION/TRAINING PROGRAM

## 2022-04-19 PROCEDURE — 17110 DESTRUCTION B9 LES UP TO 14: CPT | Mod: ,,, | Performed by: STUDENT IN AN ORGANIZED HEALTH CARE EDUCATION/TRAINING PROGRAM

## 2022-04-19 PROCEDURE — 3066F NEPHROPATHY DOC TX: CPT | Mod: CPTII,,, | Performed by: STUDENT IN AN ORGANIZED HEALTH CARE EDUCATION/TRAINING PROGRAM

## 2022-04-19 PROCEDURE — 3008F BODY MASS INDEX DOCD: CPT | Mod: CPTII,,, | Performed by: STUDENT IN AN ORGANIZED HEALTH CARE EDUCATION/TRAINING PROGRAM

## 2022-04-19 PROCEDURE — 3061F NEG MICROALBUMINURIA REV: CPT | Mod: CPTII,,, | Performed by: STUDENT IN AN ORGANIZED HEALTH CARE EDUCATION/TRAINING PROGRAM

## 2022-04-19 PROCEDURE — 3044F PR MOST RECENT HEMOGLOBIN A1C LEVEL <7.0%: ICD-10-PCS | Mod: CPTII,,, | Performed by: STUDENT IN AN ORGANIZED HEALTH CARE EDUCATION/TRAINING PROGRAM

## 2022-04-19 PROCEDURE — 1159F MED LIST DOCD IN RCRD: CPT | Mod: CPTII,,, | Performed by: STUDENT IN AN ORGANIZED HEALTH CARE EDUCATION/TRAINING PROGRAM

## 2022-04-19 PROCEDURE — 1160F PR REVIEW ALL MEDS BY PRESCRIBER/CLIN PHARMACIST DOCUMENTED: ICD-10-PCS | Mod: CPTII,,, | Performed by: STUDENT IN AN ORGANIZED HEALTH CARE EDUCATION/TRAINING PROGRAM

## 2022-04-19 PROCEDURE — 17110 PR DESTRUCTION BENIGN LESIONS UP TO 14: ICD-10-PCS | Mod: ,,, | Performed by: STUDENT IN AN ORGANIZED HEALTH CARE EDUCATION/TRAINING PROGRAM

## 2022-04-19 PROCEDURE — 3008F PR BODY MASS INDEX (BMI) DOCUMENTED: ICD-10-PCS | Mod: CPTII,,, | Performed by: STUDENT IN AN ORGANIZED HEALTH CARE EDUCATION/TRAINING PROGRAM

## 2022-04-19 PROCEDURE — 3061F PR NEG MICROALBUMINURIA RESULT DOCUMENTED/REVIEW: ICD-10-PCS | Mod: CPTII,,, | Performed by: STUDENT IN AN ORGANIZED HEALTH CARE EDUCATION/TRAINING PROGRAM

## 2022-04-19 PROCEDURE — 1159F PR MEDICATION LIST DOCUMENTED IN MEDICAL RECORD: ICD-10-PCS | Mod: CPTII,,, | Performed by: STUDENT IN AN ORGANIZED HEALTH CARE EDUCATION/TRAINING PROGRAM

## 2022-04-19 PROCEDURE — 3044F HG A1C LEVEL LT 7.0%: CPT | Mod: CPTII,,, | Performed by: STUDENT IN AN ORGANIZED HEALTH CARE EDUCATION/TRAINING PROGRAM

## 2022-04-19 PROCEDURE — 1160F RVW MEDS BY RX/DR IN RCRD: CPT | Mod: CPTII,,, | Performed by: STUDENT IN AN ORGANIZED HEALTH CARE EDUCATION/TRAINING PROGRAM

## 2022-04-19 PROCEDURE — 3066F PR DOCUMENTATION OF TREATMENT FOR NEPHROPATHY: ICD-10-PCS | Mod: CPTII,,, | Performed by: STUDENT IN AN ORGANIZED HEALTH CARE EDUCATION/TRAINING PROGRAM

## 2022-04-19 PROCEDURE — 99214 PR OFFICE/OUTPT VISIT, EST, LEVL IV, 30-39 MIN: ICD-10-PCS | Mod: 25,,, | Performed by: STUDENT IN AN ORGANIZED HEALTH CARE EDUCATION/TRAINING PROGRAM

## 2022-04-19 RX ORDER — KETOCONAZOLE 20 MG/G
CREAM TOPICAL DAILY
Qty: 60 G | Refills: 2 | Status: SHIPPED | OUTPATIENT
Start: 2022-04-19

## 2022-04-19 RX ORDER — HYDROCODONE BITARTRATE AND ACETAMINOPHEN 10; 325 MG/1; MG/1
1 TABLET ORAL EVERY 8 HOURS
Qty: 90 TABLET | Refills: 0 | Status: SHIPPED | OUTPATIENT
Start: 2022-05-01 | End: 2022-05-17 | Stop reason: SDUPTHER

## 2022-04-19 NOTE — PROGRESS NOTES
[facility]  Cj Wakefield MD    [address] [address]  [phone]    Fax: [phone]    Patient Name: Belem Swann  Medical Record Number: [id number]  PCP: DANE Saenz  Age: 42 y.o. : 1980  Contact: [phone] (home)     History of Present Illness:     Belem Swann is a 42 y.o.  female here for follow up of psoriasis and hidradenitis suppurativa. She was last seen on 2021. Previous treatment includes Humira which pt has only been taking for about 3 weeks due to awaiting insurance approval.  Pt has seen some improvement of the psoriasis. She has an active cyst today in the L axilla area. Patient is also complaining of redness around the nose and skin tags around her neck that have become inflamed and pruritic.      Review of Systems:     Unremarkable other than mentioned above.     Physical Exam:     General: Relaxed, oriented, alert    Skin examination of the scalp, face, neck, chest, back, abdomen, upper extremities and lower extremities were normal except for as listed below      Assessment and Plan:     1. 1. Hidradenitis Suppurativa  - Fistula formation and draining sinuses, weeping acneiform pustules and papules, scarring, and acneiform nodules  Alvarado Stage: 3     Plan:   - continue Humira 40 mg weekly      Counseling.  Cleanse acneiform lesions and sinus tracts with anti-bacterial washes. Oral antibiotics can help reduce inflammation.  Discussed importance of not smoking and weight loss       2. Seborrheic Dermatitis   - Red scaly plaques located on the scalp, nasolabial folds, and eyebrows    Plan:   - ketoconazole cream bid PRN to face or ears   - can use OTC 1% hydrocortisone cream for severe flares       3. Inflamed acrochordons   - skin colored papules on erythematous base       Plan: Liquid Nitrogen.  A total of 12 lesions were treated with liquid nitrogen for 2 freeze-thaw cycles lasting 5 seconds, located on the  above locations.   The patient's consent was obtained including but not limited to risks of crusting, scabbing,  blistering, scarring, darker or lighter pigmentary change, recurrence, incomplete removal and infection.    4.  Plaque Psoriasis  - psoriasiform plaques with micaceous scale     Plan: - will continue Humira as above      Counseling  I counseled patient regarding systemic effects of inflammation from psoriasis, and the association with cardiac disease, metabolic syndrome, depression, and arthritis           Return to clinic in 3 months    AVS printed with patient instructions     Cj Wakefield MD   Mohs Surgery/Dermatologic Oncology  Dermatology  te

## 2022-05-05 ENCOUNTER — ANESTHESIA EVENT (OUTPATIENT)
Dept: PAIN MEDICINE | Facility: HOSPITAL | Age: 42
End: 2022-05-05
Payer: MEDICAID

## 2022-05-05 ENCOUNTER — HOSPITAL ENCOUNTER (OUTPATIENT)
Facility: HOSPITAL | Age: 42
Discharge: HOME | End: 2022-05-05
Attending: PAIN MEDICINE | Admitting: PAIN MEDICINE
Payer: MEDICAID

## 2022-05-05 ENCOUNTER — ANESTHESIA (OUTPATIENT)
Dept: PAIN MEDICINE | Facility: HOSPITAL | Age: 42
End: 2022-05-05
Payer: MEDICAID

## 2022-05-05 VITALS
TEMPERATURE: 98 F | HEIGHT: 62 IN | WEIGHT: 293 LBS | OXYGEN SATURATION: 100 % | DIASTOLIC BLOOD PRESSURE: 103 MMHG | HEART RATE: 76 BPM | BODY MASS INDEX: 53.92 KG/M2 | SYSTOLIC BLOOD PRESSURE: 172 MMHG | RESPIRATION RATE: 15 BRPM

## 2022-05-05 DIAGNOSIS — M47.817 LUMBOSACRAL SPONDYLOSIS WITHOUT MYELOPATHY: ICD-10-CM

## 2022-05-05 LAB
GLUCOSE SERPL-MCNC: 131 MG/DL (ref 70–105)
GLUCOSE SERPL-MCNC: 131 MG/DL (ref 70–110)

## 2022-05-05 PROCEDURE — 64635 PR DESTROY LUMB/SAC FACET JNT: ICD-10-PCS | Mod: LT,,, | Performed by: PAIN MEDICINE

## 2022-05-05 PROCEDURE — 64635 DESTROY LUMB/SAC FACET JNT: CPT | Mod: LT,,, | Performed by: PAIN MEDICINE

## 2022-05-05 PROCEDURE — 25000003 PHARM REV CODE 250: Performed by: NURSE ANESTHETIST, CERTIFIED REGISTERED

## 2022-05-05 PROCEDURE — 63600175 PHARM REV CODE 636 W HCPCS: Performed by: NURSE ANESTHETIST, CERTIFIED REGISTERED

## 2022-05-05 PROCEDURE — 01940 ANES NULYT AGT LMBR/SAC: CPT | Performed by: PAIN MEDICINE

## 2022-05-05 PROCEDURE — 64636 PR DESTROY L/S FACET JNT ADDL: ICD-10-PCS | Mod: LT,,, | Performed by: PAIN MEDICINE

## 2022-05-05 PROCEDURE — 37000008 HC ANESTHESIA 1ST 15 MINUTES: Performed by: PAIN MEDICINE

## 2022-05-05 PROCEDURE — 82962 GLUCOSE BLOOD TEST: CPT

## 2022-05-05 PROCEDURE — 63600175 PHARM REV CODE 636 W HCPCS: Performed by: PAIN MEDICINE

## 2022-05-05 PROCEDURE — D9220A PRA ANESTHESIA: Mod: ,,, | Performed by: NURSE ANESTHETIST, CERTIFIED REGISTERED

## 2022-05-05 PROCEDURE — 25000003 PHARM REV CODE 250: Performed by: PAIN MEDICINE

## 2022-05-05 PROCEDURE — D9220A PRA ANESTHESIA: ICD-10-PCS | Mod: ,,, | Performed by: NURSE ANESTHETIST, CERTIFIED REGISTERED

## 2022-05-05 PROCEDURE — 64635 DESTROY LUMB/SAC FACET JNT: CPT | Mod: LT | Performed by: PAIN MEDICINE

## 2022-05-05 PROCEDURE — 27201423 OPTIME MED/SURG SUP & DEVICES STERILE SUPPLY: Performed by: PAIN MEDICINE

## 2022-05-05 PROCEDURE — 37000009 HC ANESTHESIA EA ADD 15 MINS: Performed by: PAIN MEDICINE

## 2022-05-05 PROCEDURE — 64636 DESTROY L/S FACET JNT ADDL: CPT | Mod: LT,,, | Performed by: PAIN MEDICINE

## 2022-05-05 PROCEDURE — 64636 DESTROY L/S FACET JNT ADDL: CPT | Mod: LT | Performed by: PAIN MEDICINE

## 2022-05-05 RX ORDER — TRIAMCINOLONE ACETONIDE 40 MG/ML
INJECTION, SUSPENSION INTRA-ARTICULAR; INTRAMUSCULAR
Status: DISCONTINUED | OUTPATIENT
Start: 2022-05-05 | End: 2022-05-05 | Stop reason: HOSPADM

## 2022-05-05 RX ORDER — LIDOCAINE HYDROCHLORIDE 20 MG/ML
INJECTION, SOLUTION EPIDURAL; INFILTRATION; INTRACAUDAL; PERINEURAL
Status: DISCONTINUED | OUTPATIENT
Start: 2022-05-05 | End: 2022-05-05

## 2022-05-05 RX ORDER — BUPIVACAINE HYDROCHLORIDE 2.5 MG/ML
INJECTION, SOLUTION INFILTRATION; PERINEURAL
Status: DISCONTINUED | OUTPATIENT
Start: 2022-05-05 | End: 2022-05-05 | Stop reason: HOSPADM

## 2022-05-05 RX ORDER — ORPHENADRINE CITRATE 30 MG/ML
INJECTION INTRAMUSCULAR; INTRAVENOUS
Status: DISCONTINUED | OUTPATIENT
Start: 2022-05-05 | End: 2022-05-05

## 2022-05-05 RX ORDER — PROPOFOL 10 MG/ML
VIAL (ML) INTRAVENOUS
Status: DISCONTINUED | OUTPATIENT
Start: 2022-05-05 | End: 2022-05-05

## 2022-05-05 RX ORDER — SODIUM CHLORIDE 9 MG/ML
INJECTION, SOLUTION INTRAVENOUS CONTINUOUS
Status: DISCONTINUED | OUTPATIENT
Start: 2022-05-05 | End: 2022-05-05 | Stop reason: HOSPADM

## 2022-05-05 RX ADMIN — PROPOFOL 50 MG: 10 INJECTION, EMULSION INTRAVENOUS at 08:05

## 2022-05-05 RX ADMIN — PROPOFOL 25 MG: 10 INJECTION, EMULSION INTRAVENOUS at 08:05

## 2022-05-05 RX ADMIN — SODIUM CHLORIDE: 9 INJECTION, SOLUTION INTRAVENOUS at 08:05

## 2022-05-05 RX ADMIN — LIDOCAINE HYDROCHLORIDE 50 MG: 20 INJECTION, SOLUTION INTRAVENOUS at 08:05

## 2022-05-05 RX ADMIN — ORPHENADRINE CITRATE 60 MG: 30 INJECTION INTRAMUSCULAR; INTRAVENOUS at 08:05

## 2022-05-05 NOTE — TRANSFER OF CARE
"Anesthesia Transfer of Care Note    Patient: Belem Swann    Procedure(s) Performed: Procedure(s) (LRB):  LEFT  L4-S1 RFTC  (HAD RIGHT  ON 4-14) (Left)    Patient location: Other: [facility]    Anesthesia Type: general    Transport from OR: Transported from OR on room air with adequate spontaneous ventilation    Post pain: adequate analgesia    Post assessment: no apparent anesthetic complications    Post vital signs: stable    Level of consciousness: sedated and responds to stimulation    Nausea/Vomiting: no nausea/vomiting    Complications: none    Transfer of care protocol was followedComments: Good SV continue, NAD noted, VSS, RTRN      Last vitals:   Visit Vitals  /84   Pulse 78   Temp 36.4 °C (97.5 °F)   Resp (!) 22   Ht 5' 2" (1.575 m)   Wt (!) 170.1 kg (375 lb)   SpO2 97%   BMI 68.59 kg/m²     "

## 2022-05-05 NOTE — BRIEF OP NOTE
Discharge Note  Short Stay    Admit Date: 5/5/2022    Discharge Date: 5/5/2022    Attending Physician: Carmel Torre     Discharge Provider: Carmel Torre    Diagnoses:  Lumbosacral spondylosis    Discharged Condition: Good    Final Diagnoses: Lumbosacral spondylosis without myelopathy [M47.817]    Disposition: Home or Self Care    Hospital Course: No complications, uneventful    Outcome of Hospitalization, Treatment, Procedure, or Surgery:  Patient was admitted for outpatient interventional pain management procedure. The patient tolerated the procedure well with no complications.    Follow up/Patient Instructions:  Follow up as scheduled in Pain Management office in 3-4 weeks.  Patient has received instructions and follow up date and time.    Medications:  Continue previous medications

## 2022-05-05 NOTE — H&P
[facility] Pain Management  History & Physical    Subjective:      Chief Complaint/Reason for Admission:  Left lumbar medial branch radiofrequency    Belem Swann is a 42 y.o. female.  Patient has long history of chronic lower back pain and lumbar spondylosis.  She presents today for a medial branch radiofrequency procedure.  She failed conservative treatment and is not a surgical candidate.    Past Medical History:   Diagnosis Date    Anxiety     Asthma     Chronic pain     Cushing syndrome     Depression     Essential hypertension     Fibromyalgia     Herpes zoster     Hyperlipidemia     Onychomycosis     MARY on CPAP     Rheumatoid arthritis     Type 2 diabetes mellitus 2020    Vitamin D deficiency 2018     Past Surgical History:   Procedure Laterality Date     SECTION      ELBOW SURGERY  1985    EPIDURAL STEROID INJECTION      L4-5 VERITO Dec 2020-Torre    FEMUR SURGERY Right     due to osteomyelitis    HYSTERECTOMY      INJECTION OF ANESTHETIC AGENT AROUND MEDIAL BRANCH NERVES INNERVATING LUMBAR FACET JOINT Bilateral 2022    Procedure: Bilateral L4-5,5-S1 MBB ( No steroids);  Surgeon: Carmel Torre MD;  Location: [facility];  Service: Pain Management;  Laterality: Bilateral;  PT AWARE TO BE TESTED ON OV    INJECTION OF ANESTHETIC AGENT AROUND MEDIAL BRANCH NERVES INNERVATING LUMBAR FACET JOINT Bilateral 3/8/2022    Procedure: Block-nerve-medial branch-lumbar, bilateral L4 through S1;  Surgeon: Carmel Torre MD;  Location: [facility];  Service: Pain Management;  Laterality: Bilateral;    LIPOMA RESECTION  2019    RADIOFREQUENCY ABLATION OF LUMBAR MEDIAL BRANCH NERVE AT SINGLE LEVEL Right 2022    Procedure: Radiofrequency Ablation, Nerve, Spinal, Lumbar, Medial Branch, Level L4-S1, right side 1st, will have left-sided after completing;  Surgeon: Carmel Torre MD;  Location: [facility];  Service: Pain Management;   Laterality: Right;  pt aware at visit to be tested    SURGICAL REMOVAL OF PILONIDAL CYST      TRANSFORAMINAL EPIDURAL INJECTION OF STEROID      Bilateral L4-5 TFESI Nov 2020-Torre    TRANSFORAMINAL EPIDURAL INJECTION OF STEROID      Right L4-5 TFESI Feb 2020-Torre    VENTRAL HERNIA REPAIR  09/2020     Family History   Problem Relation Age of Onset    Cancer Mother     Thyroid cancer Mother     Thyroid cancer Maternal Grandmother     Melanoma Neg Hx      Social History     Tobacco Use    Smoking status: Former Smoker    Smokeless tobacco: Never Used   Substance Use Topics    Alcohol use: Never    Drug use: Never       PTA Medications   Medication Sig    adalimumab (HUMIRA) 10 mg/0.2 mL SyKt Inject into the skin every 14 (fourteen) days.    albuterol sulfate 90 mcg/actuation aebs Inhale 180 mcg into the lungs every 4 (four) hours.    busPIRone (BUSPAR) 15 MG tablet Take 15 mg by mouth 2 (two) times daily.    cyclobenzaprine (FLEXERIL) 10 MG tablet Take 1 tablet (10 mg total) by mouth 3 (three) times daily as needed.    EScitalopram oxalate (LEXAPRO) 20 MG tablet Take 20 mg by mouth once daily.    gabapentin (NEURONTIN) 300 MG capsule Take 1 capsule (300 mg total) by mouth every 8 (eight) hours.    HYDROcodone-acetaminophen (NORCO)  mg per tablet Take 1 tablet by mouth every 8 (eight) hours.    hydrOXYzine pamoate (VISTARIL) 50 MG Cap Take 4 capsules by mouth once daily.    liraglutide 0.6 mg/0.1 mL, 18 mg/3 mL, subq PNIJ (VICTOZA 3-SUZI) 0.6 mg/0.1 mL (18 mg/3 mL) PnIj pen Inject 1.8 mg into the skin once daily.    lisinopriL (PRINIVIL,ZESTRIL) 40 MG tablet Take 1 tablet (40 mg total) by mouth once daily.    metFORMIN (GLUCOPHAGE-XR) 500 MG ER 24hr tablet Take 1 tablet (500 mg total) by mouth once daily.    rosuvastatin (CRESTOR) 40 MG Tab Take 1 tablet (40 mg total) by mouth every evening.    triamterene-hydrochlorothiazide 75-50 mg (MAXZIDE) 75-50 mg per tablet Take 1 tablet by mouth  "once daily.    albuterol-ipratropium (DUO-NEB) 2.5 mg-0.5 mg/3 mL nebulizer solution Take 3 mLs by nebulization every 6 (six) hours as needed. Rescue    budesonide-formoterol 160-4.5 mcg (SYMBICORT) 160-4.5 mcg/actuation HFAA Inhale 2 puffs into the lungs every 12 (twelve) hours. Controller    fluticasone propionate (FLONASE) 50 mcg/actuation nasal spray 1-2 sprays by Nasal route once daily.    ketoconazole (NIZORAL) 2 % cream Apply topically once daily.    methotrexate 2.5 MG Tab Take 2.5 mg by mouth every 7 days.    pen needle, diabetic 31 gauge x 1/4" Ndle 1 needle daily for injection of victoza    pen needle, diabetic 31 gauge x 15/64" Ndle USE ONE PEN NEEDLE SUBCUTANESOULY FOR VICTOZA INJECTION DAILY    promethazine (PHENERGAN) 25 MG tablet Take 1 tablet (25 mg total) by mouth every 6 (six) hours as needed for Nausea.    spironolactone (ALDACTONE) 50 MG tablet Take 75 mg by mouth once daily.    triamcinolone acetonide 0.1% (KENALOG) 0.1 % ointment Apply topically 2 (two) times daily.    TRUE METRIX GLUCOSE TEST STRIP Strp USE TO TEST BLOOD SUGAR EVERY DAY     Review of patient's allergies indicates:   Allergen Reactions    Doxycycline     Latex         Review of Systems   Constitutional: Negative for chills, diaphoresis, fever, malaise/fatigue and weight loss.   HENT: Negative for hearing loss.    Eyes: Negative.    Respiratory: Negative for cough, shortness of breath and wheezing.    Cardiovascular: Negative.  Negative for chest pain, palpitations and leg swelling.   Gastrointestinal: Negative for abdominal pain.   Genitourinary: Negative.    Musculoskeletal: Positive for back pain and myalgias. Negative for falls, joint pain and neck pain.   Skin: Negative.    Neurological: Negative for dizziness, tingling, sensory change, speech change, seizures, loss of consciousness, weakness and headaches.   Endo/Heme/Allergies: Negative.  Does not bruise/bleed easily.   Psychiatric/Behavioral: Negative.  " Negative for depression, hallucinations, memory loss, substance abuse and suicidal ideas. The patient does not have insomnia.        Objective:      Vital Signs (Most Recent)  Temp: 98.6 °F (37 °C) (05/05/22 0758)  Pulse: 76 (05/05/22 0758)  Resp: 18 (05/05/22 0758)  BP: (!) 184/94 (05/05/22 0758)  SpO2: 97 % (05/05/22 0758)    Vital Signs Range (Last 24H):  Temp:  [98.6 °F (37 °C)]   Pulse:  [76]   Resp:  [18]   BP: (184)/(94)   SpO2:  [97 %]     Physical Exam  Vitals and nursing note reviewed.   Constitutional:       General: She is not in acute distress.     Appearance: Normal appearance. She is morbidly obese. She is not ill-appearing, toxic-appearing or diaphoretic.   HENT:      Head: Normocephalic and atraumatic.      Nose: Nose normal.      Mouth/Throat:      Mouth: Mucous membranes are moist.   Eyes:      Extraocular Movements: Extraocular movements intact.      Pupils: Pupils are equal, round, and reactive to light.   Cardiovascular:      Rate and Rhythm: Normal rate and regular rhythm.      Heart sounds: Normal heart sounds.   Pulmonary:      Effort: Pulmonary effort is normal. No respiratory distress.      Breath sounds: Normal breath sounds. No stridor. No wheezing or rhonchi.   Abdominal:      General: Bowel sounds are normal.      Palpations: Abdomen is soft.   Musculoskeletal:         General: No swelling or deformity.      Cervical back: Normal and normal range of motion. No spasms or tenderness. No pain with movement. Normal range of motion.      Thoracic back: Normal.      Lumbar back: Tenderness and bony tenderness present. No spasms. Decreased range of motion. Negative right straight leg raise test and negative left straight leg raise test. No scoliosis.      Right lower leg: No edema.      Left lower leg: No edema.      Comments: Lumbar pain with flexion, extension and lateral rotation.  Facet tenderness to palpation at L3-S1   Skin:     General: Skin is warm.   Neurological:      General: No  focal deficit present.      Mental Status: She is alert and oriented to person, place, and time. Mental status is at baseline.      Cranial Nerves: Cranial nerves are intact. No cranial nerve deficit.      Sensory: Sensation is intact. No sensory deficit.      Motor: No weakness.      Coordination: Coordination normal.      Gait: Gait normal.      Deep Tendon Reflexes: Reflexes are normal and symmetric.   Psychiatric:         Mood and Affect: Mood normal.         Behavior: Behavior normal.             Assessment:      1. Bilateral lumbar spondylosis    Plan:    1. Left L3-4, 4-5, 5-S1 medial branch radiofrequency procedure under fluoroscopic guided needle localization

## 2022-05-05 NOTE — DISCHARGE SUMMARY
[facility] - Pain Management  Discharge Note  Short Stay    Procedure(s) (LRB):  LEFT  L4-S1 RFTC  (HAD RIGHT  ON 4-14) (Left)    OUTCOME: Patient tolerated treatment/procedure well without complication and is now ready for discharge.    DISPOSITION: Home or Self Care    FINAL DIAGNOSIS:  Lumbosacral spondylosis    FOLLOWUP: In clinic    DISCHARGE INSTRUCTIONS:  See nurse's notes     TIME SPENT ON DISCHARGE: 5 minutes

## 2022-05-05 NOTE — ANESTHESIA PREPROCEDURE EVALUATION
2022  Belem Swann is a 42 y.o., female.      Pre-op Assessment    I have reviewed the Patient Summary Reports.     I have reviewed the Nursing Notes. I have reviewed the NPO Status.   I have reviewed the Medications.     Review of Systems  Anesthesia Hx:  No problems with previous Anesthesia    Cardiovascular:   Hypertension hyperlipidemia    Pulmonary:   Asthma Sleep Apnea, CPAP    Musculoskeletal:   Arthritis  Rheumatoid  fibromyalgia   Neurological:   Neuromuscular Disease,    Endocrine:   Diabetes Cushing<s Morbid Obesity / BMI > 40  Psych:   Psychiatric History anxiety          Physical Exam  General: Well nourished, Cooperative, Alert and Oriented    Airway:  Mallampati: II   Mouth Opening: Normal  TM Distance: Normal  Tongue: Normal  Neck ROM: Normal ROM    Dental:  Intact        Anesthesia Plan  Type of Anesthesia, risks & benefits discussed:    Anesthesia Type: Gen Natural Airway  Intra-op Monitoring Plan: Standard ASA Monitors  Post Op Pain Control Plan: multimodal analgesia  Induction:  IV  Informed Consent: Informed consent signed with the Patient and all parties understand the risks and agree with anesthesia plan.  All questions answered. Patient consented to blood products? Yes  ASA Score: 4  Day of Surgery Review of History & Physical: I have interviewed and examined the patient. I have reviewed the patient's H&P dated:     Ready For Surgery From Anesthesia Perspective.     .    Asthma Chronic pain   Essential hypertension Hyperlipidemia   Herpes zoster Onychomycosis   Rheumatoid arthritis Type 2 diabetes mellitus   Depression Vitamin D deficiency   Cushing syndrome Fibromyalgia   Anxiety MARY on CPAP       Surgical History    HYSTERECTOMY VENTRAL HERNIA REPAIR    SECTION LIPOMA RESECTION   ELBOW SURGERY FEMUR SURGERY   SURGICAL REMOVAL OF PILONIDAL CYST EPIDURAL STEROID  INJECTION   TRANSFORAMINAL EPIDURAL INJECTION OF STEROID TRANSFORAMINAL EPIDURAL INJECTION OF STEROID   INJECTION OF ANESTHETIC AGENT AROUND MEDIAL BRANCH NERVES INNERVATING LUMBAR FACET JOINT INJECTION OF ANESTHETIC AGENT AROUND MEDIAL BRANCH NERVES INNERVATING LUMBAR FACET JOINT   RADIOFREQUENCY ABLATION OF LUMBAR MEDIAL BRANCH NERVE AT SINGLE LEVEL

## 2022-05-05 NOTE — PLAN OF CARE
REFER TO WRITTEN DOCUMENT AND RECOVERY INFORMATION.    D/CD PATIENT VIAA WHEELCHAIR .    INFORMED PATIENT IF UNABLE TO VOID IN 8 HOURS, GO TO ER. NOTIFY MD OF REDNESS OR DRAINAGE FROM INJECTION SITE OR FEVER OVER 3-4 DAY. REST AND DRINK PLENTY OF FLUIDS FOR THE REMAINDER OF THE DAY. NO LIFTING OVER 5 LBS FOR THE REMAINDER OF THE DAY. CONTINUE REGULAR MEDICATIONS AS PRESCRIBED. MAY TAKE PAIN MEDICATION AS PRESCRIBED.     PAIN IMPROVED  0%

## 2022-05-05 NOTE — OP NOTE
Procedure Note    Procedure Date: 5/5/2022    Procedure Performed:  Radiofrequency ablation of the left  L3-4,4-5,5-S1 medial branch nerves utilizing fluoroscopy    Indications: Patient has failed conservative therapy.      Pre-op diagnosis: Lumbosacral Spondylosis    Post-op diagnosis: same    Physician: REBECCA Torre MD    Anesthesia:MAC    Medications injected:  Pre-lesion, 2ml of 1% lidocaine at each level.  From a 1:1 mixture of  (0.25% bupivacaine,  1% Lidocaine 40mg of kenalog)  1ml of this solution was injected at each level post-lesion.    Local anesthetic used: 1% Lidocaine, 1- ml     Estimated Blood Loss:Less than 1cc    IVF:Per Anesthesia    Complications: None    Technique:  The patient was interviewed in the holding area and Risks/Benefits were discussed, including, but not limited to  the possibility of new or different pain, bleeding or infection.   All questions were answered.  The patient understood and accepted risks.  The area of treatment was marked. Consent was reviewed and signed.  A time out was taken to identify the patient, procedure and side of the procedure. The patient was placed in a prone position, then prepped and draped in the usual sterile fashion using ChloraPrep and sterile towels.  The levels were determined under fluoroscopic guidance.   AP and oblique fluoroscopy were used to identify and tirso the junctions between the superior articular processes and transverse processes at  Left  L3-4,4-5, 5-S1.  The Left sacral ala was also identified and marked.  The skin and subcutaneous tissues in these identified areas were anesthetized with 1% lidocaine. A 20-gauge 150 mm Plasticell RF needle was advanced towards each of these points under fluoroscopic guidance such that the tip of the needle was positioned posterior to the Neuro-foramen on lateral fluoroscopic view. Each needle was positioned such that, on stimulation, the patient had an appropriate pain response between 0.3-0.5 V, with no  motor response, other than Lumbar Paraspinal Muscles up to 2.0V.  One mL of 2% lidocaine was then injected at each level prior to lesioning, which was performed for 90 seconds at 80°C.  Once the lesion was complete, 1 mL of a solution consisting of  a 1:1 mixture  (0.25% bupivacaine 2cc and 1% Lidocaine 2cc and 40mg kenalog)  was injected through each probe. The probes were removed and a sterile Band-Aid dressing was applied to the puncture site.  The patient tolerated the procedure well and was monitored after the procedure.  Patient was given post procedure and discharge instructions to follow at home.  The patient was discharged in a stable condition and accompanied by an adult.

## 2022-05-06 NOTE — ANESTHESIA POSTPROCEDURE EVALUATION
Anesthesia Post Evaluation    Patient: Belem Swann    Procedure(s) Performed: Procedure(s) (LRB):  LEFT  L4-S1 RFTC  (HAD RIGHT  ON 4-14) (Left)    Final Anesthesia Type: general      Patient location: [facility].  Patient participation: Yes- Able to Participate  Level of consciousness: awake and alert  Post-procedure vital signs: reviewed and stable  Pain management: adequate  Airway patency: patent    PONV status at discharge: No PONV  Anesthetic complications: no      Cardiovascular status: blood pressure returned to baseline, hemodynamically stable and stable  Respiratory status: unassisted  Hydration status: euvolemic  Follow-up not needed.  Comments: Pt voices appreciation for care          Vitals Value Taken Time   /105 05/05/22 0943   Temp 36.4 °C (97.5 °F) 05/05/22 0911   Pulse 76 05/05/22 0945   Resp 11 05/05/22 0945   SpO2 100 % 05/05/22 0944   Vitals shown include unvalidated device data.      Event Time   Out of Recovery 09:45:00         Pain/Lowell Score: Lowell Score: 10 (5/5/2022  9:40 AM)

## 2022-05-11 ENCOUNTER — OFFICE VISIT (OUTPATIENT)
Dept: FAMILY MEDICINE | Facility: CLINIC | Age: 42
End: 2022-05-11
Payer: MEDICAID

## 2022-05-11 VITALS
HEART RATE: 89 BPM | WEIGHT: 293 LBS | OXYGEN SATURATION: 98 % | SYSTOLIC BLOOD PRESSURE: 130 MMHG | TEMPERATURE: 98 F | DIASTOLIC BLOOD PRESSURE: 85 MMHG | RESPIRATION RATE: 22 BRPM | BODY MASS INDEX: 53.92 KG/M2 | HEIGHT: 62 IN

## 2022-05-11 DIAGNOSIS — I10 ESSENTIAL HYPERTENSION: ICD-10-CM

## 2022-05-11 DIAGNOSIS — E78.00 PURE HYPERCHOLESTEROLEMIA: ICD-10-CM

## 2022-05-11 DIAGNOSIS — D69.1 LARGE PLATELETS: ICD-10-CM

## 2022-05-11 DIAGNOSIS — M79.7 FIBROMYALGIA: ICD-10-CM

## 2022-05-11 DIAGNOSIS — M62.838 MUSCLE SPASM: ICD-10-CM

## 2022-05-11 DIAGNOSIS — J45.40 MODERATE PERSISTENT ASTHMA WITHOUT COMPLICATION: ICD-10-CM

## 2022-05-11 DIAGNOSIS — E11.9 TYPE 2 DIABETES MELLITUS WITHOUT COMPLICATION, WITHOUT LONG-TERM CURRENT USE OF INSULIN: Primary | ICD-10-CM

## 2022-05-11 DIAGNOSIS — D72.829 LEUKOCYTOSIS, UNSPECIFIED TYPE: ICD-10-CM

## 2022-05-11 DIAGNOSIS — R77.1 ELEVATED SERUM GLOBULIN LEVEL: ICD-10-CM

## 2022-05-11 DIAGNOSIS — M05.79 RHEUMATOID ARTHRITIS INVOLVING MULTIPLE SITES WITH POSITIVE RHEUMATOID FACTOR: ICD-10-CM

## 2022-05-11 LAB
ALBUMIN SERPL BCP-MCNC: 3.5 G/DL (ref 3.5–5)
ALBUMIN/GLOB SERPL: 0.7 {RATIO}
ALP SERPL-CCNC: 140 U/L (ref 37–98)
ALT SERPL W P-5'-P-CCNC: 35 U/L (ref 13–56)
ANION GAP SERPL CALCULATED.3IONS-SCNC: 7 MMOL/L (ref 7–16)
AST SERPL W P-5'-P-CCNC: 16 U/L (ref 15–37)
BASOPHILS # BLD AUTO: 0.09 K/UL (ref 0–0.2)
BASOPHILS NFR BLD AUTO: 0.5 % (ref 0–1)
BILIRUB SERPL-MCNC: 0.3 MG/DL (ref 0–1.2)
BUN SERPL-MCNC: 18 MG/DL (ref 7–18)
BUN/CREAT SERPL: 25 (ref 6–20)
CALCIUM SERPL-MCNC: 9.6 MG/DL (ref 8.5–10.1)
CHLORIDE SERPL-SCNC: 102 MMOL/L (ref 98–107)
CO2 SERPL-SCNC: 33 MMOL/L (ref 21–32)
CREAT SERPL-MCNC: 0.71 MG/DL (ref 0.55–1.02)
DIFFERENTIAL METHOD BLD: ABNORMAL
EOSINOPHIL # BLD AUTO: 0.38 K/UL (ref 0–0.5)
EOSINOPHIL NFR BLD AUTO: 2.1 % (ref 1–4)
ERYTHROCYTE [DISTWIDTH] IN BLOOD BY AUTOMATED COUNT: 14.9 % (ref 11.5–14.5)
EST. AVERAGE GLUCOSE BLD GHB EST-MCNC: 130 MG/DL
GLOBULIN SER-MCNC: 5.1 G/DL (ref 2–4)
GLUCOSE SERPL-MCNC: 102 MG/DL (ref 74–106)
HBA1C MFR BLD HPLC: 6.5 % (ref 4.5–6.6)
HCT VFR BLD AUTO: 46 % (ref 38–47)
HGB BLD-MCNC: 14.4 G/DL (ref 12–16)
IMM GRANULOCYTES # BLD AUTO: 0.22 K/UL (ref 0–0.04)
IMM GRANULOCYTES NFR BLD: 1.2 % (ref 0–0.4)
LDH SERPL-CCNC: 254 U/L (ref 87–241)
LYMPHOCYTES # BLD AUTO: 3.79 K/UL (ref 1–4.8)
LYMPHOCYTES NFR BLD AUTO: 20.7 % (ref 27–41)
MCH RBC QN AUTO: 28.9 PG (ref 27–31)
MCHC RBC AUTO-ENTMCNC: 31.3 G/DL (ref 32–36)
MCV RBC AUTO: 92.2 FL (ref 80–96)
MONOCYTES # BLD AUTO: 1.21 K/UL (ref 0–0.8)
MONOCYTES NFR BLD AUTO: 6.6 % (ref 2–6)
MPC BLD CALC-MCNC: 13.3 FL (ref 9.4–12.4)
NEUTROPHILS # BLD AUTO: 12.62 K/UL (ref 1.8–7.7)
NEUTROPHILS NFR BLD AUTO: 68.9 % (ref 53–65)
NRBC # BLD AUTO: 0 X10E3/UL
NRBC, AUTO (.00): 0 %
PLATELET # BLD AUTO: 183 K/UL (ref 150–400)
PLATELET MORPHOLOGY: ABNORMAL
POTASSIUM SERPL-SCNC: 4.3 MMOL/L (ref 3.5–5.1)
PROT SERPL-MCNC: 8.6 G/DL (ref 6.4–8.2)
RBC # BLD AUTO: 4.99 M/UL (ref 4.2–5.4)
RBC MORPH BLD: NORMAL
SODIUM SERPL-SCNC: 138 MMOL/L (ref 136–145)
WBC # BLD AUTO: 18.31 K/UL (ref 4.5–11)

## 2022-05-11 PROCEDURE — 3008F BODY MASS INDEX DOCD: CPT | Mod: CPTII,,, | Performed by: NURSE PRACTITIONER

## 2022-05-11 PROCEDURE — 84166 PROTEIN ELECTROPHORESIS URINE, CONCENTRATED: ICD-10-PCS | Mod: 26,,, | Performed by: PATHOLOGY

## 2022-05-11 PROCEDURE — 3066F NEPHROPATHY DOC TX: CPT | Mod: CPTII,,, | Performed by: NURSE PRACTITIONER

## 2022-05-11 PROCEDURE — 3044F HG A1C LEVEL LT 7.0%: CPT | Mod: CPTII,,, | Performed by: NURSE PRACTITIONER

## 2022-05-11 PROCEDURE — 80053 COMPREHEN METABOLIC PANEL: CPT | Mod: ,,, | Performed by: CLINICAL MEDICAL LABORATORY

## 2022-05-11 PROCEDURE — 3079F PR MOST RECENT DIASTOLIC BLOOD PRESSURE 80-89 MM HG: ICD-10-PCS | Mod: CPTII,,, | Performed by: NURSE PRACTITIONER

## 2022-05-11 PROCEDURE — 84166 PROTEIN E-PHORESIS/URINE/CSF: CPT | Mod: ,,, | Performed by: CLINICAL MEDICAL LABORATORY

## 2022-05-11 PROCEDURE — 84165 PROTEIN ELECTROPHORESIS, SERUM WITH REFLEX IFE: ICD-10-PCS | Mod: ,,, | Performed by: CLINICAL MEDICAL LABORATORY

## 2022-05-11 PROCEDURE — 99214 OFFICE O/P EST MOD 30 MIN: CPT | Mod: ,,, | Performed by: NURSE PRACTITIONER

## 2022-05-11 PROCEDURE — 1159F MED LIST DOCD IN RCRD: CPT | Mod: CPTII,,, | Performed by: NURSE PRACTITIONER

## 2022-05-11 PROCEDURE — 85025 CBC WITH DIFFERENTIAL: ICD-10-PCS | Mod: ,,, | Performed by: CLINICAL MEDICAL LABORATORY

## 2022-05-11 PROCEDURE — 1159F PR MEDICATION LIST DOCUMENTED IN MEDICAL RECORD: ICD-10-PCS | Mod: CPTII,,, | Performed by: NURSE PRACTITIONER

## 2022-05-11 PROCEDURE — 3075F SYST BP GE 130 - 139MM HG: CPT | Mod: CPTII,,, | Performed by: NURSE PRACTITIONER

## 2022-05-11 PROCEDURE — 3075F PR MOST RECENT SYSTOLIC BLOOD PRESS GE 130-139MM HG: ICD-10-PCS | Mod: CPTII,,, | Performed by: NURSE PRACTITIONER

## 2022-05-11 PROCEDURE — 80053 COMPREHENSIVE METABOLIC PANEL: ICD-10-PCS | Mod: ,,, | Performed by: CLINICAL MEDICAL LABORATORY

## 2022-05-11 PROCEDURE — 3008F PR BODY MASS INDEX (BMI) DOCUMENTED: ICD-10-PCS | Mod: CPTII,,, | Performed by: NURSE PRACTITIONER

## 2022-05-11 PROCEDURE — 84165 PROTEIN ELECTROPHORESIS, SERUM WITH REFLEX IFE: ICD-10-PCS | Mod: 26,,, | Performed by: PATHOLOGY

## 2022-05-11 PROCEDURE — 84165 PROTEIN E-PHORESIS SERUM: CPT | Mod: ,,, | Performed by: CLINICAL MEDICAL LABORATORY

## 2022-05-11 PROCEDURE — 99214 PR OFFICE/OUTPT VISIT, EST, LEVL IV, 30-39 MIN: ICD-10-PCS | Mod: ,,, | Performed by: NURSE PRACTITIONER

## 2022-05-11 PROCEDURE — 3061F NEG MICROALBUMINURIA REV: CPT | Mod: CPTII,,, | Performed by: NURSE PRACTITIONER

## 2022-05-11 PROCEDURE — 83036 HEMOGLOBIN GLYCOSYLATED A1C: CPT | Mod: ,,, | Performed by: CLINICAL MEDICAL LABORATORY

## 2022-05-11 PROCEDURE — 83615 LACTATE (LD) (LDH) ENZYME: CPT | Mod: ,,, | Performed by: CLINICAL MEDICAL LABORATORY

## 2022-05-11 PROCEDURE — 3066F PR DOCUMENTATION OF TREATMENT FOR NEPHROPATHY: ICD-10-PCS | Mod: CPTII,,, | Performed by: NURSE PRACTITIONER

## 2022-05-11 PROCEDURE — 3044F PR MOST RECENT HEMOGLOBIN A1C LEVEL <7.0%: ICD-10-PCS | Mod: CPTII,,, | Performed by: NURSE PRACTITIONER

## 2022-05-11 PROCEDURE — 84166 PROTEIN ELECTROPHORESIS URINE, CONCENTRATED: ICD-10-PCS | Mod: ,,, | Performed by: CLINICAL MEDICAL LABORATORY

## 2022-05-11 PROCEDURE — 3079F DIAST BP 80-89 MM HG: CPT | Mod: CPTII,,, | Performed by: NURSE PRACTITIONER

## 2022-05-11 PROCEDURE — 83615 LACTATE DEHYDROGENASE: ICD-10-PCS | Mod: ,,, | Performed by: CLINICAL MEDICAL LABORATORY

## 2022-05-11 PROCEDURE — 3061F PR NEG MICROALBUMINURIA RESULT DOCUMENTED/REVIEW: ICD-10-PCS | Mod: CPTII,,, | Performed by: NURSE PRACTITIONER

## 2022-05-11 PROCEDURE — 84165 PROTEIN E-PHORESIS SERUM: CPT | Mod: 26,,, | Performed by: PATHOLOGY

## 2022-05-11 PROCEDURE — 83036 HEMOGLOBIN A1C: ICD-10-PCS | Mod: ,,, | Performed by: CLINICAL MEDICAL LABORATORY

## 2022-05-11 PROCEDURE — 85025 COMPLETE CBC W/AUTO DIFF WBC: CPT | Mod: ,,, | Performed by: CLINICAL MEDICAL LABORATORY

## 2022-05-11 PROCEDURE — 84166 PROTEIN E-PHORESIS/URINE/CSF: CPT | Mod: 26,,, | Performed by: PATHOLOGY

## 2022-05-11 RX ORDER — CYCLOBENZAPRINE HCL 10 MG
10 TABLET ORAL 3 TIMES DAILY PRN
Qty: 90 TABLET | Refills: 2 | Status: SHIPPED | OUTPATIENT
Start: 2022-05-11 | End: 2022-09-12 | Stop reason: SDUPTHER

## 2022-05-11 RX ORDER — BUDESONIDE AND FORMOTEROL FUMARATE DIHYDRATE 160; 4.5 UG/1; UG/1
2 AEROSOL RESPIRATORY (INHALATION) EVERY 12 HOURS
Qty: 10.2 G | Refills: 11 | Status: SHIPPED | OUTPATIENT
Start: 2022-05-11

## 2022-05-11 RX ORDER — ADALIMUMAB 40MG/0.4ML
40 KIT SUBCUTANEOUS WEEKLY
COMMUNITY
Start: 2022-04-19

## 2022-05-11 RX ORDER — PROMETHAZINE HYDROCHLORIDE 25 MG/1
25 TABLET ORAL EVERY 6 HOURS PRN
Qty: 30 TABLET | Refills: 1 | Status: SHIPPED | OUTPATIENT
Start: 2022-05-11 | End: 2023-01-12 | Stop reason: SDUPTHER

## 2022-05-11 RX ORDER — CARVEDILOL 6.25 MG/1
6.25 TABLET ORAL 2 TIMES DAILY
COMMUNITY
Start: 2022-04-05 | End: 2022-09-09

## 2022-05-11 RX ORDER — LANCETS 33 GAUGE
1 EACH MISCELLANEOUS DAILY
Qty: 100 EACH | Refills: 3 | Status: SHIPPED | OUTPATIENT
Start: 2022-05-11

## 2022-05-11 RX ORDER — PEN NEEDLE, DIABETIC 29 G X1/2"
NEEDLE, DISPOSABLE MISCELLANEOUS
COMMUNITY
Start: 2021-09-11

## 2022-05-11 NOTE — PROGRESS NOTES
[facility]NAN[facility] - FAMILY MEDICINE       PATIENT NAME: Belem Swann   : 1980    AGE: 42 y.o. DATE: 2022    MRN: [id number]        Reason for Visit / Chief Complaint:  Follow-up (Room 1//  Follow up for HTN and DM)     Subjective:     HPI:  Presents for 4 mth f/u T2DM and HTN.  Hasn't had f/u labs as advised after last visit.    Reports has been having pain tx procedures and hasn't been diligent about weight loss.    Not checking glucose - needs lancets and strips for True Metrix.     RA, fibro - wants to change to [facility]    PHQ9 2022   Total Score 3       Review of Systems:     Review of Systems   Constitutional: Negative for chills and fever.   HENT: Positive for congestion (chronic rhinitis) and postnasal drip. Negative for ear pain, sinus pain and sore throat.    Eyes: Negative.    Respiratory: Positive for shortness of breath (chronic BALDERAS). Negative for cough and wheezing.    Cardiovascular: Positive for leg swelling. Negative for chest pain and palpitations.   Genitourinary: Negative.    Musculoskeletal: Positive for arthralgias, back pain, gait problem and myalgias.   Skin: Positive for rash.   Psychiatric/Behavioral: Negative.        Allergies and Medications:     Review of patient's allergies indicates:   Allergen Reactions    Doxycycline     Latex         Current Outpatient Medications on File Prior to Visit   Medication Sig Dispense Refill    albuterol sulfate 90 mcg/actuation aebs Inhale 180 mcg into the lungs every 4 (four) hours.      albuterol-ipratropium (DUO-NEB) 2.5 mg-0.5 mg/3 mL nebulizer solution Take 3 mLs by nebulization every 6 (six) hours as needed. Rescue      busPIRone (BUSPAR) 15 MG tablet Take 15 mg by mouth 2 (two) times daily.      carvediloL (COREG) 6.25 MG tablet Take 6.25 mg by mouth 2 (two) times daily.      EScitalopram oxalate (LEXAPRO) 20 MG tablet Take 20 mg by mouth once daily.      gabapentin  "(NEURONTIN) 300 MG capsule Take 1 capsule (300 mg total) by mouth every 8 (eight) hours. 90 capsule 1    HUMIRA,CF, PEN 40 mg/0.4 mL PnKt SMARTSI Milligram(s) SUB-Q Once a Week      HYDROcodone-acetaminophen (NORCO)  mg per tablet Take 1 tablet by mouth every 8 (eight) hours. 90 tablet 0    hydrOXYzine pamoate (VISTARIL) 50 MG Cap Take 4 capsules by mouth once daily.      ketoconazole (NIZORAL) 2 % cream Apply topically once daily. 60 g 2    liraglutide 0.6 mg/0.1 mL, 18 mg/3 mL, subq PNIJ (VICTOZA 3-SUZI) 0.6 mg/0.1 mL (18 mg/3 mL) PnIj pen Inject 1.8 mg into the skin once daily. 9 mL 11    lisinopriL (PRINIVIL,ZESTRIL) 40 MG tablet Take 1 tablet (40 mg total) by mouth once daily. 90 tablet 1    metFORMIN (GLUCOPHAGE-XR) 500 MG ER 24hr tablet Take 1 tablet (500 mg total) by mouth once daily. 90 tablet 3    methotrexate 2.5 MG Tab Take 2.5 mg by mouth every 7 days.      pen needle, diabetic 31 gauge x 1/4" Ndle 1 needle daily for injection of victoza 30 each 11    pen needle, diabetic 31 gauge x 15/64" Ndle USE ONE PEN NEEDLE SUBCUTANESOULY FOR VICTOZA INJECTION DAILY      rosuvastatin (CRESTOR) 40 MG Tab Take 1 tablet (40 mg total) by mouth every evening. 90 tablet 3    spironolactone (ALDACTONE) 50 MG tablet Take 75 mg by mouth once daily.      triamcinolone acetonide 0.1% (KENALOG) 0.1 % ointment Apply topically 2 (two) times daily. 454 g 0    triamterene-hydrochlorothiazide 75-50 mg (MAXZIDE) 75-50 mg per tablet Take 1 tablet by mouth once daily.      TRUE METRIX GLUCOSE TEST STRIP Strp USE TO TEST BLOOD SUGAR EVERY DAY      [DISCONTINUED] cyclobenzaprine (FLEXERIL) 10 MG tablet Take 1 tablet (10 mg total) by mouth 3 (three) times daily as needed. 90 tablet 1    [DISCONTINUED] promethazine (PHENERGAN) 25 MG tablet Take 1 tablet (25 mg total) by mouth every 6 (six) hours as needed for Nausea. 20 tablet 1    adalimumab (HUMIRA) 10 mg/0.2 mL SyKt Inject into the skin every 14 (fourteen) " "days.      fluticasone propionate (FLONASE) 50 mcg/actuation nasal spray 1-2 sprays by Nasal route once daily.      pen needle, diabetic 31 gauge x 1/4" Ndle       [DISCONTINUED] budesonide-formoterol 160-4.5 mcg (SYMBICORT) 160-4.5 mcg/actuation HFAA Inhale 2 puffs into the lungs every 12 (twelve) hours. Controller       No current facility-administered medications on file prior to visit.       Labs:      Lab Results   Component Value Date    HGBA1C 6.2 01/11/2022      Lipids:   Lab Results   Component Value Date    CHOL 175 01/11/2022    CHOL 159 09/09/2021    CHOL 195 05/06/2021     Lab Results   Component Value Date    HDL 47 01/11/2022    HDL 41 09/09/2021    HDL 48 05/06/2021     Lab Results   Component Value Date    LDLCALC 108 01/11/2022    LDLCALC 101 09/09/2021    LDLCALC 128 05/06/2021     Lab Results   Component Value Date    TRIG 101 01/11/2022    TRIG 85 09/09/2021    TRIG 95 05/06/2021     Lab Results   Component Value Date    CHOLHDL 3.7 01/11/2022    CHOLHDL 3.9 09/09/2021    CHOLHDL 4.1 05/06/2021      Urine Ma/creat ratio:  Lab Results   Component Value Date    MICROALBUR 1.1 01/11/2022      CMP:  Sodium   Date Value Ref Range Status   01/11/2022 137 136 - 145 mmol/L Final     Potassium   Date Value Ref Range Status   01/11/2022 3.9 3.5 - 5.1 mmol/L Final     Chloride   Date Value Ref Range Status   01/11/2022 104 98 - 107 mmol/L Final     CO2   Date Value Ref Range Status   01/11/2022 26 21 - 32 mmol/L Final     Glucose   Date Value Ref Range Status   01/11/2022 114 (H) 74 - 106 mg/dL Final     BUN   Date Value Ref Range Status   01/11/2022 12 7 - 18 mg/dL Final     Creatinine   Date Value Ref Range Status   01/11/2022 0.60 0.55 - 1.02 mg/dL Final     Calcium   Date Value Ref Range Status   01/11/2022 9.4 8.5 - 10.1 mg/dL Final     Total Protein   Date Value Ref Range Status   01/11/2022 8.6 (H) 6.4 - 8.2 g/dL Final     Albumin   Date Value Ref Range Status   01/11/2022 3.5 3.5 - 5.0 g/dL " Final     Bilirubin, Total   Date Value Ref Range Status   2022 0.3 0.0 - 1.2 mg/dL Final     Alk Phos   Date Value Ref Range Status   2022 121 (H) 37 - 98 U/L Final     AST   Date Value Ref Range Status   2022 13 (L) 15 - 37 U/L Final     ALT   Date Value Ref Range Status   2022 33 13 - 56 U/L Final     Anion Gap   Date Value Ref Range Status   2022 11 7 - 16 mmol/L Final     eGFR    Date Value Ref Range Status   10/07/2020 103       eGFR   Date Value Ref Range Status   2022 117 >=60 mL/min/1.73m² Final      CBC:  Lab Results   Component Value Date    WBC 14.07 (H) 2022    RBC 4.95 2022    HGB 14.1 2022    HCT 44.4 2022    MCV 89.7 2022    MCH 28.5 2022    MCHC 31.8 (L) 2022    RDW 14.6 (H) 2022     2022    MPV 14.0 (H) 2022    LYMPH 15.2 (L) 2022    LYMPH 2.14 2022    MONO 5.4 2022    EOS 0.42 2022    BASO 0.08 2022    EOSINOPHIL 3.0 2022    BASOPHIL 0.6 2022      Lab Results   Component Value Date    TSH 2.240 2022       Medical/Social/Family History:     Past Medical History:   Diagnosis Date    Anxiety     Asthma     Chronic pain     Cushing syndrome     Depression     Essential hypertension     Fibromyalgia     Herpes zoster     Hyperlipidemia     Onychomycosis     MARY on CPAP     Rheumatoid arthritis     Type 2 diabetes mellitus 2020    Vitamin D deficiency 2018      Social History     Tobacco Use   Smoking Status Former Smoker   Smokeless Tobacco Never Used      Social History     Substance and Sexual Activity   Alcohol Use Never       Family History   Problem Relation Age of Onset    Cancer Mother     Thyroid cancer Mother     Thyroid cancer Maternal Grandmother     Melanoma Neg Hx       Past Surgical History:   Procedure Laterality Date     SECTION      ELBOW SURGERY      EPIDURAL STEROID INJECTION       L4-5 VERITO Dec 2020-[facility]    FEMUR SURGERY Right 1985    due to osteomyelitis    HYSTERECTOMY      INJECTION OF ANESTHETIC AGENT AROUND MEDIAL BRANCH NERVES INNERVATING LUMBAR FACET JOINT Bilateral 2/8/2022    Procedure: Bilateral L4-5,5-S1 MBB ( No steroids);  Surgeon: Carmel Torre MD;  Location: [facility];  Service: Pain Management;  Laterality: Bilateral;  PT AWARE TO BE TESTED ON OV    INJECTION OF ANESTHETIC AGENT AROUND MEDIAL BRANCH NERVES INNERVATING LUMBAR FACET JOINT Bilateral 3/8/2022    Procedure: Block-nerve-medial branch-lumbar, bilateral L4 through S1;  Surgeon: Carmel Torre MD;  Location: [facility];  Service: Pain Management;  Laterality: Bilateral;    LIPOMA RESECTION  12/2019    RADIOFREQUENCY ABLATION OF LUMBAR MEDIAL BRANCH NERVE AT SINGLE LEVEL Right 4/14/2022    Procedure: Radiofrequency Ablation, Nerve, Spinal, Lumbar, Medial Branch, Level L4-S1, right side 1st, will have left-sided after completing;  Surgeon: Carmel Torre MD;  Location: [facility];  Service: Pain Management;  Laterality: Right;  pt aware at visit to be tested    RADIOFREQUENCY ABLATION OF LUMBAR MEDIAL BRANCH NERVE AT SINGLE LEVEL Left 5/5/2022    Procedure: LEFT  L4-S1 RFTC  (HAD RIGHT  ON 4-14);  Surgeon: Carmel Torre MD;  Location: [facility];  Service: Pain Management;  Laterality: Left;    SURGICAL REMOVAL OF PILONIDAL CYST      TRANSFORAMINAL EPIDURAL INJECTION OF STEROID      Bilateral L4-5 TFESI Nov 2020-[facility]    TRANSFORAMINAL EPIDURAL INJECTION OF STEROID      Right L4-5 TFESI Feb 2020-[facility]    VENTRAL HERNIA REPAIR  09/2020        Health Maintenance:     Immunization History   Administered Date(s) Administered    Influenza - Quadrivalent 09/03/2020    Influenza - Quadrivalent - PF *Preferred* (6 months and older) 09/08/2021    Pneumococcal Conjugate - 13 Valent 12/11/2019    Pneumococcal Polysaccharide - 23 Valent 01/04/2021    Tdap 05/06/2021     "  Health Maintenance Due   Topic Date Due    Eye Exam  Never done    Sign Pain Contract  Never done    Mammogram  05/17/2022     Health Maintenance Topics with due status: Not Due       Topic Last Completion Date    Pap Smear 05/19/2020    TETANUS VACCINE 05/06/2021    Diabetes Urine Screening 01/11/2022    Lipid Panel 01/11/2022    Hemoglobin A1c 01/11/2022    Foot Exam 05/11/2022       Objective:      Wt Readings from Last 3 Encounters:   05/11/22 0926 (!) 169.7 kg (374 lb 3.2 oz)   05/05/22 0758 (!) 170.1 kg (375 lb)   04/19/22 1317 (!) 166.5 kg (367 lb)     Vitals:    05/11/22 0926   BP: 130/85   Pulse: 89   Resp: (!) 22   Temp: 98 °F (36.7 °C)   TempSrc: Tympanic   SpO2: 98%   Weight: (!) 169.7 kg (374 lb 3.2 oz)   Height: 5' 2" (1.575 m)     Body mass index is 68.44 kg/m².     Physical Exam:    Physical Exam  Vitals and nursing note reviewed.   Constitutional:       Appearance: Normal appearance. She is morbidly obese.   HENT:      Head: Normocephalic.   Eyes:      Conjunctiva/sclera: Conjunctivae normal.      Pupils: Pupils are equal, round, and reactive to light.   Neck:      Thyroid: No thyromegaly.      Vascular: No carotid bruit.      Trachea: Trachea normal.   Cardiovascular:      Rate and Rhythm: Normal rate and regular rhythm.      Pulses:           Dorsalis pedis pulses are 3+ on the right side and 3+ on the left side.        Posterior tibial pulses are 3+ on the right side and 3+ on the left side.      Heart sounds: Normal heart sounds.   Pulmonary:      Effort: Pulmonary effort is normal.      Breath sounds: Normal breath sounds.   Chest:   Breasts:      Right: No supraclavicular adenopathy.      Left: No supraclavicular adenopathy.       Musculoskeletal:      Cervical back: Neck supple.      Right lower leg: Edema present.      Left lower leg: Edema present.      Right foot: Normal range of motion. No deformity or bunion.      Left foot: Normal range of motion. No deformity or bunion.   Feet:    "   Right foot:      Protective Sensation: 10 sites tested. 10 sites sensed.      Skin integrity: Dry skin present. No ulcer, blister, skin breakdown, erythema, warmth, callus or fissure.      Toenail Condition: Right toenails are normal.      Left foot:      Protective Sensation: 10 sites tested. 10 sites sensed.      Skin integrity: Dry skin present. No ulcer, blister, skin breakdown, erythema, warmth, callus or fissure.      Toenail Condition: Left toenails are normal.   Lymphadenopathy:      Cervical: No cervical adenopathy.      Upper Body:      Right upper body: No supraclavicular adenopathy.      Left upper body: No supraclavicular adenopathy.   Skin:     General: Skin is warm and dry.      Findings: No rash.   Neurological:      General: No focal deficit present.      Mental Status: She is alert and oriented to person, place, and time.   Psychiatric:         Mood and Affect: Mood normal.         Behavior: Behavior normal.          Assessment:          ICD-10-CM ICD-9-CM   1. Type 2 diabetes mellitus without complication, without long-term current use of insulin  E11.9 250.00   2. Muscle spasm  M62.838 728.85   3. Essential hypertension  I10 401.9   4. Moderate persistent asthma without complication  J45.40 493.90   5. Pure hypercholesterolemia  E78.00 272.0   6. Fibromyalgia  M79.7 729.1   7. Rheumatoid arthritis involving multiple sites with positive rheumatoid factor  M05.79 714.0   8. Leukocytosis, unspecified type  D72.829 288.60   9. Elevated serum globulin level  R77.1 790.99   10. Large platelets  D69.1 287.1        Plan:       Type 2 diabetes mellitus without complication, without long-term current use of insulin  -     Hemoglobin A1C; Future; Expected date: 05/11/2022  -     lancets (ONETOUCH DELICA LANCETS) 33 gauge Misc; 1 lancet by Misc.(Non-Drug; Combo Route) route once daily.  Dispense: 100 each; Refill: 3  -     blood sugar diagnostic Strp; 1 strip by Misc.(Non-Drug; Combo Route) route once  daily.  Dispense: 100 strip; Refill: 3    Muscle spasm  -     cyclobenzaprine (FLEXERIL) 10 MG tablet; Take 1 tablet (10 mg total) by mouth 3 (three) times daily as needed for Muscle spasms.  Dispense: 90 tablet; Refill: 2    Essential hypertension    Moderate persistent asthma without complication    Pure hypercholesterolemia    Fibromyalgia  -     Ambulatory referral/consult to Rheumatology; Future; Expected date: 05/18/2022    Rheumatoid arthritis involving multiple sites with positive rheumatoid factor  -     Ambulatory referral/consult to Rheumatology; Future; Expected date: 05/18/2022    Leukocytosis, unspecified type  -     Comprehensive Metabolic Panel  -     CBC Auto Differential  -     Lactate Dehydrogenase    Elevated serum globulin level  -     Comprehensive Metabolic Panel  -     CBC Auto Differential  -     Protein Electrophoresis Urine, Concentrated  -     Protein Electrophoresis, Serum with Reflex AUDRA  -     Lactate Dehydrogenase    Large platelets  -     Comprehensive Metabolic Panel  -     CBC Auto Differential  -     Lactate Dehydrogenase    Other orders  -     promethazine (PHENERGAN) 25 MG tablet; Take 1 tablet (25 mg total) by mouth every 6 (six) hours as needed for Nausea.  Dispense: 30 tablet; Refill: 1  -     budesonide-formoterol 160-4.5 mcg (SYMBICORT) 160-4.5 mcg/actuation HFAA; Inhale 2 puffs into the lungs every 12 (twelve) hours. Controller  Dispense: 10.2 g; Refill: 11        Current Outpatient Medications:     albuterol sulfate 90 mcg/actuation aebs, Inhale 180 mcg into the lungs every 4 (four) hours., Disp: , Rfl:     albuterol-ipratropium (DUO-NEB) 2.5 mg-0.5 mg/3 mL nebulizer solution, Take 3 mLs by nebulization every 6 (six) hours as needed. Rescue, Disp: , Rfl:     busPIRone (BUSPAR) 15 MG tablet, Take 15 mg by mouth 2 (two) times daily., Disp: , Rfl:     carvediloL (COREG) 6.25 MG tablet, Take 6.25 mg by mouth 2 (two) times daily., Disp: , Rfl:     EScitalopram oxalate  "(LEXAPRO) 20 MG tablet, Take 20 mg by mouth once daily., Disp: , Rfl:     gabapentin (NEURONTIN) 300 MG capsule, Take 1 capsule (300 mg total) by mouth every 8 (eight) hours., Disp: 90 capsule, Rfl: 1    HUMIRA,CF, PEN 40 mg/0.4 mL PnKt, SMARTSI Milligram(s) SUB-Q Once a Week, Disp: , Rfl:     HYDROcodone-acetaminophen (NORCO)  mg per tablet, Take 1 tablet by mouth every 8 (eight) hours., Disp: 90 tablet, Rfl: 0    hydrOXYzine pamoate (VISTARIL) 50 MG Cap, Take 4 capsules by mouth once daily., Disp: , Rfl:     ketoconazole (NIZORAL) 2 % cream, Apply topically once daily., Disp: 60 g, Rfl: 2    liraglutide 0.6 mg/0.1 mL, 18 mg/3 mL, subq PNIJ (VICTOZA 3-SUZI) 0.6 mg/0.1 mL (18 mg/3 mL) PnIj pen, Inject 1.8 mg into the skin once daily., Disp: 9 mL, Rfl: 11    lisinopriL (PRINIVIL,ZESTRIL) 40 MG tablet, Take 1 tablet (40 mg total) by mouth once daily., Disp: 90 tablet, Rfl: 1    metFORMIN (GLUCOPHAGE-XR) 500 MG ER 24hr tablet, Take 1 tablet (500 mg total) by mouth once daily., Disp: 90 tablet, Rfl: 3    methotrexate 2.5 MG Tab, Take 2.5 mg by mouth every 7 days., Disp: , Rfl:     pen needle, diabetic 31 gauge x 1/4" Ndle, 1 needle daily for injection of victoza, Disp: 30 each, Rfl: 11    pen needle, diabetic 31 gauge x 15/64" Ndle, USE ONE PEN NEEDLE SUBCUTANESOULY FOR VICTOZA INJECTION DAILY, Disp: , Rfl:     rosuvastatin (CRESTOR) 40 MG Tab, Take 1 tablet (40 mg total) by mouth every evening., Disp: 90 tablet, Rfl: 3    spironolactone (ALDACTONE) 50 MG tablet, Take 75 mg by mouth once daily., Disp: , Rfl:     triamcinolone acetonide 0.1% (KENALOG) 0.1 % ointment, Apply topically 2 (two) times daily., Disp: 454 g, Rfl: 0    triamterene-hydrochlorothiazide 75-50 mg (MAXZIDE) 75-50 mg per tablet, Take 1 tablet by mouth once daily., Disp: , Rfl:     TRUE METRIX GLUCOSE TEST STRIP Strp, USE TO TEST BLOOD SUGAR EVERY DAY, Disp: , Rfl:     adalimumab (HUMIRA) 10 mg/0.2 mL SyKt, Inject into the skin " "every 14 (fourteen) days., Disp: , Rfl:     blood sugar diagnostic Strp, 1 strip by Misc.(Non-Drug; Combo Route) route once daily., Disp: 100 strip, Rfl: 3    budesonide-formoterol 160-4.5 mcg (SYMBICORT) 160-4.5 mcg/actuation HFAA, Inhale 2 puffs into the lungs every 12 (twelve) hours. Controller, Disp: 10.2 g, Rfl: 11    cyclobenzaprine (FLEXERIL) 10 MG tablet, Take 1 tablet (10 mg total) by mouth 3 (three) times daily as needed for Muscle spasms., Disp: 90 tablet, Rfl: 2    fluticasone propionate (FLONASE) 50 mcg/actuation nasal spray, 1-2 sprays by Nasal route once daily., Disp: , Rfl:     lancets (ONETOUCH DELICA LANCETS) 33 gauge Misc, 1 lancet by Misc.(Non-Drug; Combo Route) route once daily., Disp: 100 each, Rfl: 3    pen needle, diabetic 31 gauge x 1/4" Ndle, , Disp: , Rfl:     promethazine (PHENERGAN) 25 MG tablet, Take 1 tablet (25 mg total) by mouth every 6 (six) hours as needed for Nausea., Disp: 30 tablet, Rfl: 1      New & refilled meds:  Requested Prescriptions     Signed Prescriptions Disp Refills    cyclobenzaprine (FLEXERIL) 10 MG tablet 90 tablet 2     Sig: Take 1 tablet (10 mg total) by mouth 3 (three) times daily as needed for Muscle spasms.    promethazine (PHENERGAN) 25 MG tablet 30 tablet 1     Sig: Take 1 tablet (25 mg total) by mouth every 6 (six) hours as needed for Nausea.    budesonide-formoterol 160-4.5 mcg (SYMBICORT) 160-4.5 mcg/actuation HFAA 10.2 g 11     Sig: Inhale 2 puffs into the lungs every 12 (twelve) hours. Controller    lancets (ONETOUCH DELICA LANCETS) 33 gauge Misc 100 each 3     Si lancet by Misc.(Non-Drug; Combo Route) route once daily.    blood sugar diagnostic Strp 100 strip 3     Si strip by Misc.(Non-Drug; Combo Route) route once daily.       Current treatment plan is effective, no change in therapy.  Lab results and schedule of future lab studies reviewed with patient.  Orders and follow up as documented in patient record.  Reviewed diet, " exercise and weight control.   NEERAJ for eye exam report  Check glucose daily.  Refer to Rheum at [facility] - advised may take 5-6 mths for appt so needs to cont to see Dr. Aguilar until then.     Return to clinic 4 mths for DM and HTN; and f/u as needed.    Future Appointments   Date Time Provider Department Center   5/18/2022  8:15 AM GUCCI Rubio [facility]   5/23/2022  2:00 PM [facility] [place]   7/19/2022  4:00 PM Ashlyn Sherman MD [facility]   9/12/2022  8:00 AM [facility]   9/14/2022 10:20 AM [facility]        Signature:  DANE Saenz  [facility]    Date of encounter: 5/11/22

## 2022-05-12 LAB
ALBUMIN PE, BLOOD: 4.82 G/DL (ref 3.5–5.2)
ALPHA1 GLOB SERPL ELPH-MCNC: 0.2 G/DL (ref 0.1–0.4)
ALPHA2 GLOB SERPL ELPH-MCNC: 1 G/DL (ref 0.4–1.3)
B-GLOBULIN SERPL ELPH-MCNC: 1 G/DL (ref 0.5–1.5)
GAMMA GLOB SERPL ELPH-MCNC: 1.3 G/DL (ref 0.5–1.8)
PATH INTERP BLD-IMP: ABNORMAL
PROT SERPL-MCNC: 8.3 G/DL (ref 6.4–8.2)

## 2022-05-13 LAB
ALBUMIN %, URINE ELECTROPHORESIS: 100 %
PATHOLOGIST INTERP, PRO ELECT, URINE: NORMAL
PROT UR-MCNC: 7.7 MG/DL (ref 0–11.9)

## 2022-05-17 ENCOUNTER — TELEPHONE (OUTPATIENT)
Dept: FAMILY MEDICINE | Facility: CLINIC | Age: 42
End: 2022-05-17
Payer: MEDICAID

## 2022-05-17 DIAGNOSIS — K14.8 TONGUE LESION: Primary | ICD-10-CM

## 2022-05-17 DIAGNOSIS — E11.9 TYPE 2 DIABETES MELLITUS WITHOUT COMPLICATION, WITHOUT LONG-TERM CURRENT USE OF INSULIN: ICD-10-CM

## 2022-05-17 DIAGNOSIS — D72.829 LEUKOCYTOSIS, UNSPECIFIED TYPE: Primary | ICD-10-CM

## 2022-05-17 RX ORDER — SEMAGLUTIDE 1.34 MG/ML
1 INJECTION, SOLUTION SUBCUTANEOUS
Qty: 1 PEN | Refills: 0 | Status: SHIPPED | OUTPATIENT
Start: 2022-05-17 | End: 2022-06-13

## 2022-05-17 NOTE — TELEPHONE ENCOUNTER
"Pt called stating that she has a "spot" on her tongue. She says its been there for a little while but she recently has started biting it, accidentally, and now it is hurting. She states she has had to see an ENT to get them removed, in the past. She is wondering if you can refer her to someone because her ENT moved out of state.  "

## 2022-05-17 NOTE — PROGRESS NOTES
Subjective:      Patient ID: Belem Swann is a 42 y.o. female.    Chief Complaint: Low-back Pain, Leg Pain, and Knee Pain      Pain  This is a chronic problem. The current episode started more than 1 year ago. The problem occurs daily. The problem has been unchanged. Associated symptoms include arthralgias and neck pain. Pertinent negatives include no change in bowel habit, chest pain, coughing, diaphoresis, fever, rash, sore throat, vertigo or vomiting.     Review of Systems   Constitutional: Negative for activity change, appetite change, diaphoresis, fever and unexpected weight change.   HENT: Negative for drooling, ear discharge, ear pain, facial swelling, mouth sores, nosebleeds, sore throat, trouble swallowing, voice change and goiter.    Eyes: Negative for photophobia, pain, discharge, redness and visual disturbance.   Respiratory: Negative for apnea, cough, choking, chest tightness, shortness of breath, wheezing and stridor.    Cardiovascular: Negative for chest pain, palpitations and leg swelling.   Gastrointestinal: Negative for abdominal distention, change in bowel habit, diarrhea, rectal pain, vomiting, fecal incontinence and change in bowel habit.   Endocrine: Negative for cold intolerance, heat intolerance, polydipsia, polyphagia and polyuria.   Genitourinary: Negative for bladder incontinence, dysuria, flank pain, frequency and hot flashes.   Musculoskeletal: Positive for arthralgias, back pain, leg pain, neck pain and neck stiffness.   Integumentary:  Negative for color change, pallor and rash.   Allergic/Immunologic: Negative for immunocompromised state.   Neurological: Negative for dizziness, vertigo, seizures, syncope, facial asymmetry, speech difficulty, light-headedness, disturbances in coordination, memory loss and coordination difficulties.   Hematological: Negative for adenopathy. Does not bruise/bleed easily.   Psychiatric/Behavioral: Negative for agitation, behavioral problems,  "confusion, decreased concentration, dysphoric mood, hallucinations, self-injury and suicidal ideas. The patient is not hyperactive.             Objective:  Vitals:    05/18/22 0751   BP: (!) 175/100   Pulse: 90   Resp: 19   Weight: (!) 169.2 kg (373 lb)   Height: 5' 2" (1.575 m)   PainSc:   6         Physical Exam  Vitals and nursing note reviewed. Exam conducted with a chaperone present.   Constitutional:       General: She is awake.      Appearance: Normal appearance. She is not toxic-appearing or diaphoretic.   HENT:      Head: Normocephalic and atraumatic.      Nose: Nose normal.      Mouth/Throat:      Mouth: Mucous membranes are moist.      Pharynx: Oropharynx is clear.   Eyes:      Conjunctiva/sclera: Conjunctivae normal.      Pupils: Pupils are equal, round, and reactive to light.   Cardiovascular:      Rate and Rhythm: Normal rate.   Pulmonary:      Effort: Pulmonary effort is normal. No respiratory distress.   Abdominal:      Palpations: Abdomen is soft.      Tenderness: There is no guarding.   Musculoskeletal:         General: No swelling.      Cervical back: Normal range of motion and neck supple. Tenderness present. No rigidity.      Thoracic back: Tenderness present.      Lumbar back: Tenderness present. Decreased range of motion.   Skin:     General: Skin is warm and dry.      Coloration: Skin is not jaundiced or pale.   Neurological:      General: No focal deficit present.      Mental Status: She is alert and oriented to person, place, and time. Mental status is at baseline.      Cranial Nerves: Cranial nerves are intact. No cranial nerve deficit (II-XII).   Psychiatric:         Mood and Affect: Mood normal.         Behavior: Behavior normal. Behavior is cooperative.         Thought Content: Thought content normal.           Orders Placed This Encounter   Procedures    POCT Urine Drug Screen Presump     Interpretive Information:     Negative:  No drug detected at the cut off level.   Positive:  This " result represents presumptive positive for the   tested drug, other substances may yield a positive response other   than the analyte of interest. This result should be utilized for   diagnostic purpose only. Confirmation testing will be performed upon physician request only.           [facility]  See OP Notes for results.     IMPRESSION: See OP Notes for results.     This procedure was auto-finalized by: Virtual Radiologist       Office Visit on 05/11/2022   Component Date Value Ref Range Status    Sodium 05/11/2022 138  136 - 145 mmol/L Final    Potassium 05/11/2022 4.3  3.5 - 5.1 mmol/L Final    Chloride 05/11/2022 102  98 - 107 mmol/L Final    CO2 05/11/2022 33 (A) 21 - 32 mmol/L Final    Anion Gap 05/11/2022 7  7 - 16 mmol/L Final    Glucose 05/11/2022 102  74 - 106 mg/dL Final    BUN 05/11/2022 18  7 - 18 mg/dL Final    Creatinine 05/11/2022 0.71  0.55 - 1.02 mg/dL Final    BUN/Creatinine Ratio 05/11/2022 25 (A) 6 - 20 Final    Calcium 05/11/2022 9.6  8.5 - 10.1 mg/dL Final    Total Protein 05/11/2022 8.6 (A) 6.4 - 8.2 g/dL Final    Albumin 05/11/2022 3.5  3.5 - 5.0 g/dL Final    Globulin 05/11/2022 5.1 (A) 2.0 - 4.0 g/dL Final    A/G Ratio 05/11/2022 0.7   Final    Bilirubin, Total 05/11/2022 0.3  0.0 - 1.2 mg/dL Final    Alk Phos 05/11/2022 140 (A) 37 - 98 U/L Final    ALT 05/11/2022 35  13 - 56 U/L Final    AST 05/11/2022 16  15 - 37 U/L Final    eGFR 05/11/2022 95  >=60 mL/min/1.73m² Final    Protein, Urine 05/11/2022 7.7  0.0 - 11.9 mg/dL Final    Albumin % 05/11/2022 100  % Final    Pathologist Interp, Pro Elect, Uri* 05/11/2022 No Abnormal Proteins Detected   Final    Total Protein 05/11/2022 8.3 (A) 6.4 - 8.2 g/dL Final    Albumin, PE 05/11/2022 4.82  3.5 - 5.2 g/dL Final    Alpha-1-Globulin 05/11/2022 0.2  0.1 - 0.4 g/dL Final    Alpha-2-Globulin 05/11/2022 1.0  0.4 - 1.3 g/dL Final    Beta, PE 05/11/2022 1.0  0.5 - 1.5 g/dL Final    Gamma, PE 05/11/2022  1.3  0.5 - 1.8 g/dL Final    Pathologist Interp, Pro Elect, Blo* 05/11/2022 Normal. No monoclonal bands identified   Final    LDH 05/11/2022 254 (A) 87 - 241 U/L Final    Hemoglobin A1C 05/11/2022 6.5  4.5 - 6.6 % Final    Estimated Average Glucose 05/11/2022 130  mg/dL Final    WBC 05/11/2022 18.31 (A) 4.50 - 11.00 K/uL Final    RBC 05/11/2022 4.99  4.20 - 5.40 M/uL Final    Hemoglobin 05/11/2022 14.4  12.0 - 16.0 g/dL Final    Hematocrit 05/11/2022 46.0  38.0 - 47.0 % Final    MCV 05/11/2022 92.2  80.0 - 96.0 fL Final    MCH 05/11/2022 28.9  27.0 - 31.0 pg Final    MCHC 05/11/2022 31.3 (A) 32.0 - 36.0 g/dL Final    RDW 05/11/2022 14.9 (A) 11.5 - 14.5 % Final    Platelet Count 05/11/2022 183  150 - 400 K/uL Final    MPV 05/11/2022 13.3 (A) 9.4 - 12.4 fL Final    Neutrophils % 05/11/2022 68.9 (A) 53.0 - 65.0 % Final    Lymphocytes % 05/11/2022 20.7 (A) 27.0 - 41.0 % Final    Monocytes % 05/11/2022 6.6 (A) 2.0 - 6.0 % Final    Eosinophils % 05/11/2022 2.1  1.0 - 4.0 % Final    Basophils % 05/11/2022 0.5  0.0 - 1.0 % Final    Immature Granulocytes % 05/11/2022 1.2 (A) 0.0 - 0.4 % Final    nRBC, Auto 05/11/2022 0.0  <=0.0 % Final    Neutrophils, Abs 05/11/2022 12.62 (A) 1.80 - 7.70 K/uL Final    Lymphocytes, Absolute 05/11/2022 3.79  1.00 - 4.80 K/uL Final    Monocytes, Absolute 05/11/2022 1.21 (A) 0.00 - 0.80 K/uL Final    Eosinophils, Absolute 05/11/2022 0.38  0.00 - 0.50 K/uL Final    Basophils, Absolute 05/11/2022 0.09  0.00 - 0.20 K/uL Final    Immature Granulocytes, Absolute 05/11/2022 0.22 (A) 0.00 - 0.04 K/uL Final    nRBC, Absolute 05/11/2022 0.00  <=0.00 x10e3/uL Final    Diff Type 05/11/2022 Scan Smear   Final    Platelet Morphology 05/11/2022 Few Large Platelets (A) Normal Final    RBC Morphology 05/11/2022 Normal   Final   Admission on 05/05/2022, Discharged on 05/05/2022   Component Date Value Ref Range Status    POC Glucose 05/05/2022 131 (A) 70 - 110 MG/DL Final    POC  Glucose 05/05/2022 131 (A) 70 - 105 mg/dL Final   Lab Visit on 05/03/2022   Component Date Value Ref Range Status    SARS CoV-2 PCR 05/03/2022 Negative  Negative, Invalid Final    Internal Control 05/03/2022 Valid   Final   Admission on 04/14/2022, Discharged on 04/14/2022   Component Date Value Ref Range Status    POC Glucose 04/14/2022 238 (A) 70 - 110 MG/DL Final    POC Glucose 04/14/2022 238 (A) 70 - 105 mg/dL Final   Lab Visit on 04/12/2022   Component Date Value Ref Range Status    SARS CoV-2 PCR 04/12/2022 Negative  Negative, Invalid Final    Internal Control 04/12/2022 Valid   Final   Admission on 03/08/2022, Discharged on 03/08/2022   Component Date Value Ref Range Status    POC Glucose 03/08/2022 116 (A) 70 - 105 mg/dL Final   Lab Visit on 03/04/2022   Component Date Value Ref Range Status    SARS CoV-2 PCR 03/04/2022 Negative  Negative, Invalid Final    Internal Control 03/04/2022 Valid   Final   Admission on 02/08/2022, Discharged on 02/08/2022   Component Date Value Ref Range Status    POC Glucose 02/08/2022 147 (A) 70 - 110 MG/DL Final    POC Glucose 02/08/2022 147 (A) 70 - 105 mg/dL Final   Lab Visit on 02/04/2022   Component Date Value Ref Range Status    SARS CoV-2 PCR 02/04/2022 Negative  Negative, Invalid Final   Office Visit on 01/24/2022   Component Date Value Ref Range Status    POC Amphetamines 01/24/2022 Negative  Negative, Inconclusive Final    POC Barbiturates 01/24/2022 Negative  Negative, Inconclusive Final    POC Benzodiazepines 01/24/2022 Presumptive Positive (A) Negative, Inconclusive Final    POC Cocaine 01/24/2022 Negative  Negative, Inconclusive Final    POC THC 01/24/2022 Negative  Negative, Inconclusive Final    POC Methadone 01/24/2022 Negative  Negative, Inconclusive Final    POC Methamphetamine 01/24/2022 Negative  Negative, Inconclusive Final    POC Opiates 01/24/2022 Presumptive Positive (A) Negative, Inconclusive Final    POC Oxycodone 01/24/2022  Negative  Negative, Inconclusive Final    POC Phencyclidine 01/24/2022 Negative  Negative, Inconclusive Final    POC Methylenedioxymethamphetamine * 01/24/2022 Negative  Negative, Inconclusive Final    POC Tricyclic Antidepressants 01/24/2022 Negative  Negative, Inconclusive Final    POC Buprenorphine 01/24/2022 Negative   Final     Acceptable 01/24/2022 Yes   Final    POC Temperature (Urine) 01/24/2022 92   Final    pH, UA 01/24/2022 5.5  5.0, 5.5, 6.0, 6.5, 7.0, 7.5, 8.0 pH Units Final    Specific Gravity, UA 01/24/2022 >=1.030 (A) <=1.005, 1.010, 1.015, 1.020, 1.025, 1.030 Final    Creatinine, Urine 01/24/2022 184  28 - 219 mg/dL Final    6-Acetylmorphine 01/24/2022 Negative  10 ng/mL Final    7-Aminoclonazepam 01/24/2022 Negative  Negative 25 ng/mL Final    a-Hydroxyalprazolam 01/24/2022 31.8 (A) <25.0 25 ng/mL Final    Acetyl Fentanyl 01/24/2022 Negative  2.5 ng/mL Final    Acetyl Norfentanyl Oxalate 01/24/2022 Negative  5 ng/mL Final    Benzoylecgonine 01/24/2022 Negative  100 ng/mL Final    Buprenorphine 01/24/2022 Negative  25 ng/mL Final    Codeine 01/24/2022 Negative  25 ng/mL Final    EDDP 01/24/2022 Negative  25 ng/mL Final    Fentanyl 01/24/2022 Negative  2.5 ng/mL Final    Hydrocodone 01/24/2022 >250.0 (A) <25.0 25 ng/mL Final    Hydromorphone 01/24/2022 Negative  25 ng/mL Final    Lorazepam 01/24/2022 Negative  25 ng/mL Final    Morphine 01/24/2022 Negative  25 ng/mL Final    Norbuprenorphine 01/24/2022 Negative  25 ng/mL Final    Nordiazepam 01/24/2022 Negative  25 ng/mL Final    Norfentanyl Oxalate 01/24/2022 Negative  5 ng/mL Final    Norhydrocodone 01/24/2022 >500.0 (A) <50.0 50 ng/mL Final    Noroxycodone HCL 01/24/2022 Negative  50 ng/mL Final    Oxazepam 01/24/2022 Negative  25 ng/mL Final    Oxymorphone 01/24/2022 Negative  25 ng/mL Final    Tapentadol 01/24/2022 Negative  25 ng/mL Final    Temazepam 01/24/2022 Negative  25 ng/mL Final     THC-COOH 01/24/2022 Negative  25 ng/mL Final    Tramadol 01/24/2022 Negative  100 ng/mL Final    Amphetamine, Urine 01/24/2022 Negative  Negative 100 ng/mL Final    Methamphetamines, Urine 01/24/2022 Negative  Negative 100 ng/mL Final    Methadone, Urine 01/24/2022 Negative  Negative 25 ng/mL Final    Oxycodone, Urine 01/24/2022 Negative  Negative 25 ng/mL Final   There may be more visits with results that are not included.         Assessment:      1. Rheumatoid arthritis involving multiple sites with positive rheumatoid factor    2. Spondylosis of lumbar region without myelopathy or radiculopathy    3. Other long term (current) drug therapy            A's of Opioid Risk Assessment  Activity:Patient can perform ADL.   Analgesia:Patients pain is partially controlled by current medication. Patient has tried OTC medications such as Tylenol and Ibuprofen with out relief.   Adverse Effects: Patient denies constipation or sedation.  Aberrant Behavior:  reviewed with no aberrant drug seeking/taking behavior.  Overdose reversal drug naloxone discussed    Drug screen reviewed      Requested Prescriptions     Signed Prescriptions Disp Refills    gabapentin (NEURONTIN) 300 MG capsule 90 capsule 1     Sig: Take 1 capsule (300 mg total) by mouth every 8 (eight) hours.    HYDROcodone-acetaminophen (NORCO)  mg per tablet 90 tablet 0     Sig: Take 1 tablet by mouth every 8 (eight) hours.    HYDROcodone-acetaminophen (NORCO)  mg per tablet 90 tablet 0     Sig: Take 1 tablet by mouth every 8 (eight) hours.         Plan:    Follows rheumatology [facility] rheumatoid arthritis  Has been referred rheumatology [facility] rheumatoid arthritis    Follow-up after lumbar RFTC L4 through S1 left and right  She states she had 80% relief after procedure maintaining 65% relief with time  She states the procedure does help improve her level of functioning    She would like to continue conservative management at this  time    Continue home exercise program as directed    Continue current medication    Follow-up 2 months    Dr. Torre, May 2023    Bring original prescription medication bottles/container/box with labels to each visit

## 2022-05-18 ENCOUNTER — OFFICE VISIT (OUTPATIENT)
Dept: PAIN MEDICINE | Facility: CLINIC | Age: 42
End: 2022-05-18
Payer: MEDICAID

## 2022-05-18 VITALS
HEART RATE: 90 BPM | RESPIRATION RATE: 19 BRPM | SYSTOLIC BLOOD PRESSURE: 175 MMHG | WEIGHT: 293 LBS | HEIGHT: 62 IN | DIASTOLIC BLOOD PRESSURE: 100 MMHG | BODY MASS INDEX: 53.92 KG/M2

## 2022-05-18 DIAGNOSIS — M05.79 RHEUMATOID ARTHRITIS INVOLVING MULTIPLE SITES WITH POSITIVE RHEUMATOID FACTOR: Primary | Chronic | ICD-10-CM

## 2022-05-18 DIAGNOSIS — Z79.899 OTHER LONG TERM (CURRENT) DRUG THERAPY: ICD-10-CM

## 2022-05-18 DIAGNOSIS — M47.816 SPONDYLOSIS OF LUMBAR REGION WITHOUT MYELOPATHY OR RADICULOPATHY: Chronic | ICD-10-CM

## 2022-05-18 PROCEDURE — 3061F NEG MICROALBUMINURIA REV: CPT | Mod: CPTII,,, | Performed by: PHYSICIAN ASSISTANT

## 2022-05-18 PROCEDURE — 3066F PR DOCUMENTATION OF TREATMENT FOR NEPHROPATHY: ICD-10-PCS | Mod: CPTII,,, | Performed by: PHYSICIAN ASSISTANT

## 2022-05-18 PROCEDURE — 3008F BODY MASS INDEX DOCD: CPT | Mod: CPTII,,, | Performed by: PHYSICIAN ASSISTANT

## 2022-05-18 PROCEDURE — 1159F PR MEDICATION LIST DOCUMENTED IN MEDICAL RECORD: ICD-10-PCS | Mod: CPTII,,, | Performed by: PHYSICIAN ASSISTANT

## 2022-05-18 PROCEDURE — 80305 DRUG TEST PRSMV DIR OPT OBS: CPT | Mod: PBBFAC | Performed by: PHYSICIAN ASSISTANT

## 2022-05-18 PROCEDURE — 3080F DIAST BP >= 90 MM HG: CPT | Mod: CPTII,,, | Performed by: PHYSICIAN ASSISTANT

## 2022-05-18 PROCEDURE — 1159F MED LIST DOCD IN RCRD: CPT | Mod: CPTII,,, | Performed by: PHYSICIAN ASSISTANT

## 2022-05-18 PROCEDURE — 3044F PR MOST RECENT HEMOGLOBIN A1C LEVEL <7.0%: ICD-10-PCS | Mod: CPTII,,, | Performed by: PHYSICIAN ASSISTANT

## 2022-05-18 PROCEDURE — 3066F NEPHROPATHY DOC TX: CPT | Mod: CPTII,,, | Performed by: PHYSICIAN ASSISTANT

## 2022-05-18 PROCEDURE — 99214 PR OFFICE/OUTPT VISIT, EST, LEVL IV, 30-39 MIN: ICD-10-PCS | Mod: S$PBB,,, | Performed by: PHYSICIAN ASSISTANT

## 2022-05-18 PROCEDURE — 4010F ACE/ARB THERAPY RXD/TAKEN: CPT | Mod: CPTII,,, | Performed by: PHYSICIAN ASSISTANT

## 2022-05-18 PROCEDURE — 3008F PR BODY MASS INDEX (BMI) DOCUMENTED: ICD-10-PCS | Mod: CPTII,,, | Performed by: PHYSICIAN ASSISTANT

## 2022-05-18 PROCEDURE — 3077F SYST BP >= 140 MM HG: CPT | Mod: CPTII,,, | Performed by: PHYSICIAN ASSISTANT

## 2022-05-18 PROCEDURE — 99214 OFFICE O/P EST MOD 30 MIN: CPT | Mod: S$PBB,,, | Performed by: PHYSICIAN ASSISTANT

## 2022-05-18 PROCEDURE — 3061F PR NEG MICROALBUMINURIA RESULT DOCUMENTED/REVIEW: ICD-10-PCS | Mod: CPTII,,, | Performed by: PHYSICIAN ASSISTANT

## 2022-05-18 PROCEDURE — 3080F PR MOST RECENT DIASTOLIC BLOOD PRESSURE >= 90 MM HG: ICD-10-PCS | Mod: CPTII,,, | Performed by: PHYSICIAN ASSISTANT

## 2022-05-18 PROCEDURE — 99213 OFFICE O/P EST LOW 20 MIN: CPT | Mod: PBBFAC | Performed by: PHYSICIAN ASSISTANT

## 2022-05-18 PROCEDURE — 3044F HG A1C LEVEL LT 7.0%: CPT | Mod: CPTII,,, | Performed by: PHYSICIAN ASSISTANT

## 2022-05-18 PROCEDURE — 4010F PR ACE/ARB THEARPY RXD/TAKEN: ICD-10-PCS | Mod: CPTII,,, | Performed by: PHYSICIAN ASSISTANT

## 2022-05-18 PROCEDURE — 3077F PR MOST RECENT SYSTOLIC BLOOD PRESSURE >= 140 MM HG: ICD-10-PCS | Mod: CPTII,,, | Performed by: PHYSICIAN ASSISTANT

## 2022-05-18 RX ORDER — HYDROCODONE BITARTRATE AND ACETAMINOPHEN 10; 325 MG/1; MG/1
1 TABLET ORAL EVERY 8 HOURS
Qty: 90 TABLET | Refills: 0 | Status: SHIPPED | OUTPATIENT
Start: 2022-06-30 | End: 2022-07-28 | Stop reason: SDUPTHER

## 2022-05-18 RX ORDER — GABAPENTIN 300 MG/1
300 CAPSULE ORAL EVERY 8 HOURS
Qty: 90 CAPSULE | Refills: 1 | Status: SHIPPED | OUTPATIENT
Start: 2022-05-18 | End: 2022-07-28 | Stop reason: SDUPTHER

## 2022-05-18 RX ORDER — HYDROCODONE BITARTRATE AND ACETAMINOPHEN 10; 325 MG/1; MG/1
1 TABLET ORAL EVERY 8 HOURS
Qty: 90 TABLET | Refills: 0 | Status: SHIPPED | OUTPATIENT
Start: 2022-05-31 | End: 2022-06-30

## 2022-05-23 ENCOUNTER — HOSPITAL ENCOUNTER (OUTPATIENT)
Dept: RADIOLOGY | Facility: HOSPITAL | Age: 42
Discharge: HOME | End: 2022-05-23
Attending: NURSE PRACTITIONER
Payer: MEDICAID

## 2022-05-23 DIAGNOSIS — Z12.31 OTHER SCREENING MAMMOGRAM: ICD-10-CM

## 2022-05-23 PROCEDURE — 77067 SCR MAMMO BI INCL CAD: CPT | Mod: TC

## 2022-05-25 ENCOUNTER — OFFICE VISIT (OUTPATIENT)
Dept: OTOLARYNGOLOGY | Facility: CLINIC | Age: 42
End: 2022-05-25
Payer: MEDICAID

## 2022-05-25 VITALS — WEIGHT: 293 LBS | BODY MASS INDEX: 53.92 KG/M2 | HEIGHT: 62 IN

## 2022-05-25 DIAGNOSIS — K14.8 TONGUE LESION: ICD-10-CM

## 2022-05-25 PROCEDURE — 1159F PR MEDICATION LIST DOCUMENTED IN MEDICAL RECORD: ICD-10-PCS | Mod: CPTII,,, | Performed by: OTOLARYNGOLOGY

## 2022-05-25 PROCEDURE — 1160F RVW MEDS BY RX/DR IN RCRD: CPT | Mod: CPTII,,, | Performed by: OTOLARYNGOLOGY

## 2022-05-25 PROCEDURE — 3061F PR NEG MICROALBUMINURIA RESULT DOCUMENTED/REVIEW: ICD-10-PCS | Mod: CPTII,,, | Performed by: OTOLARYNGOLOGY

## 2022-05-25 PROCEDURE — 1159F MED LIST DOCD IN RCRD: CPT | Mod: CPTII,,, | Performed by: OTOLARYNGOLOGY

## 2022-05-25 PROCEDURE — 3044F HG A1C LEVEL LT 7.0%: CPT | Mod: CPTII,,, | Performed by: OTOLARYNGOLOGY

## 2022-05-25 PROCEDURE — 3066F NEPHROPATHY DOC TX: CPT | Mod: CPTII,,, | Performed by: OTOLARYNGOLOGY

## 2022-05-25 PROCEDURE — 3008F BODY MASS INDEX DOCD: CPT | Mod: CPTII,,, | Performed by: OTOLARYNGOLOGY

## 2022-05-25 PROCEDURE — 3061F NEG MICROALBUMINURIA REV: CPT | Mod: CPTII,,, | Performed by: OTOLARYNGOLOGY

## 2022-05-25 PROCEDURE — 99203 PR OFFICE/OUTPT VISIT, NEW, LEVL III, 30-44 MIN: ICD-10-PCS | Mod: S$PBB,,, | Performed by: OTOLARYNGOLOGY

## 2022-05-25 PROCEDURE — 3066F PR DOCUMENTATION OF TREATMENT FOR NEPHROPATHY: ICD-10-PCS | Mod: CPTII,,, | Performed by: OTOLARYNGOLOGY

## 2022-05-25 PROCEDURE — 3008F PR BODY MASS INDEX (BMI) DOCUMENTED: ICD-10-PCS | Mod: CPTII,,, | Performed by: OTOLARYNGOLOGY

## 2022-05-25 PROCEDURE — 1160F PR REVIEW ALL MEDS BY PRESCRIBER/CLIN PHARMACIST DOCUMENTED: ICD-10-PCS | Mod: CPTII,,, | Performed by: OTOLARYNGOLOGY

## 2022-05-25 PROCEDURE — 3044F PR MOST RECENT HEMOGLOBIN A1C LEVEL <7.0%: ICD-10-PCS | Mod: CPTII,,, | Performed by: OTOLARYNGOLOGY

## 2022-05-25 PROCEDURE — 4010F PR ACE/ARB THEARPY RXD/TAKEN: ICD-10-PCS | Mod: CPTII,,, | Performed by: OTOLARYNGOLOGY

## 2022-05-25 PROCEDURE — 99203 OFFICE O/P NEW LOW 30 MIN: CPT | Mod: S$PBB,,, | Performed by: OTOLARYNGOLOGY

## 2022-05-25 PROCEDURE — 4010F ACE/ARB THERAPY RXD/TAKEN: CPT | Mod: CPTII,,, | Performed by: OTOLARYNGOLOGY

## 2022-05-25 PROCEDURE — 99215 OFFICE O/P EST HI 40 MIN: CPT | Mod: PBBFAC | Performed by: OTOLARYNGOLOGY

## 2022-05-25 NOTE — PROGRESS NOTES
Subjective:       Patient ID: Belem Swann is a 42 y.o. female.    Chief Complaint: Other (Patient is here for a tongue lesion.)  better today   HPI  Review of Systems   HENT: Positive for mouth sores.    All other systems reviewed and are negative.      Objective:      Physical Exam  General: NAD  Head: Normocephalic, atraumatic, no facial asymmetry/normal strength,  Ears: Both auricules normal in appearance, w/o deformities tympanic membranes normal external auditory canals normal  Nose: External nose w/o deformities normal turbinates no drainage or inflammation  Oral Cavity: Lips, gums, floor of mouth, tongue better minimal changes hard palate, and buccal mucosa without mass/lesion  Oropharynx: Mucosa pink and moist, soft palate, posterior pharynx and oropharyngeal wall without mass/lesion  Neck: Supple, symmetric, trachea midline, no palpable mass/lesion, no palpable cervical lymphadenopathy  Skin: Warm and dry, no concerning lesions  Respiratory: Respirations even, unlabored    Assessment:       1. Tongue lesion        Plan:       Watch return if worsens

## 2022-06-13 ENCOUNTER — TELEPHONE (OUTPATIENT)
Dept: FAMILY MEDICINE | Facility: CLINIC | Age: 42
End: 2022-06-13
Payer: MEDICAID

## 2022-06-13 DIAGNOSIS — E11.9 TYPE 2 DIABETES MELLITUS WITHOUT COMPLICATION, WITHOUT LONG-TERM CURRENT USE OF INSULIN: Primary | ICD-10-CM

## 2022-06-13 RX ORDER — EXENATIDE 250 UG/ML
5 INJECTION SUBCUTANEOUS 2 TIMES DAILY WITH MEALS
Qty: 1.2 ML | Refills: 0 | Status: SHIPPED | OUTPATIENT
Start: 2022-06-13 | End: 2022-09-12 | Stop reason: SDUPTHER

## 2022-06-13 NOTE — TELEPHONE ENCOUNTER
She already took and failed treatment w/ victoza Sept 2021 to Jan 2022 with continued reports of hyperglycemia on 1.8 mg dly.   So to clarify.... she has to fail Ric too? If that is the case, I will send rx; typically compliance is an issue w/ Ric bc it is a twice daily injection. I sent 1 mth of the 5 mcg then would increase to 10 mcg.

## 2022-06-13 NOTE — TELEPHONE ENCOUNTER
I had to do PA for Ozempic.    It was denied. It states    - Member must try and fail the following drug alternatives: Byetta and Victoza.

## 2022-06-22 ENCOUNTER — TELEPHONE (OUTPATIENT)
Dept: FAMILY MEDICINE | Facility: CLINIC | Age: 42
End: 2022-06-22
Payer: MEDICAID

## 2022-06-22 ENCOUNTER — DOCUMENTATION ONLY (OUTPATIENT)
Dept: FAMILY MEDICINE | Facility: CLINIC | Age: 42
End: 2022-06-22
Payer: MEDICAID

## 2022-06-22 DIAGNOSIS — E11.9 TYPE 2 DIABETES MELLITUS WITHOUT COMPLICATION, WITHOUT LONG-TERM CURRENT USE OF INSULIN: ICD-10-CM

## 2022-07-06 RX ORDER — SPIRONOLACTONE 50 MG/1
75 TABLET, FILM COATED ORAL DAILY
Qty: 90 TABLET | Refills: 1 | Status: SHIPPED | OUTPATIENT
Start: 2022-07-06 | End: 2023-06-27

## 2022-07-06 NOTE — TELEPHONE ENCOUNTER
----- Message from Margy Rodriguez sent at 7/6/2022  3:24 PM CDT -----  Patient needs a refill on fluid pill  called into [place] [address] pharmacy.  Please call patient at [phone] if you have any questions. Thanks!

## 2022-07-07 RX ORDER — TRIAMTERENE AND HYDROCHLOROTHIAZIDE 75; 50 MG/1; MG/1
1 TABLET ORAL DAILY
Qty: 90 TABLET | Refills: 1 | Status: SHIPPED | OUTPATIENT
Start: 2022-07-07 | End: 2023-06-27

## 2022-07-07 NOTE — TELEPHONE ENCOUNTER
Called pt to let her know refill was sent. She told me the wrong medication was sent. I explained that my message just said fluid pill and that the girls up front didn't know a lot about medications and that it would help if she could provide names of meds when she calls for refills.

## 2022-07-21 ENCOUNTER — OFFICE VISIT (OUTPATIENT)
Dept: DERMATOLOGY | Facility: CLINIC | Age: 42
End: 2022-07-21
Payer: MEDICAID

## 2022-07-21 VITALS — BODY MASS INDEX: 53.92 KG/M2 | HEIGHT: 62 IN | WEIGHT: 293 LBS

## 2022-07-21 DIAGNOSIS — L21.9 SEBORRHEIC DERMATITIS: ICD-10-CM

## 2022-07-21 DIAGNOSIS — L91.8 INFLAMED ACROCHORDON: Primary | ICD-10-CM

## 2022-07-21 DIAGNOSIS — Z79.899 HIGH RISK MEDICATION USE: ICD-10-CM

## 2022-07-21 DIAGNOSIS — L73.2 HIDRADENITIS SUPPURATIVA: ICD-10-CM

## 2022-07-21 DIAGNOSIS — L40.9 PSORIASIS: ICD-10-CM

## 2022-07-21 PROCEDURE — 3061F NEG MICROALBUMINURIA REV: CPT | Mod: CPTII,,, | Performed by: STUDENT IN AN ORGANIZED HEALTH CARE EDUCATION/TRAINING PROGRAM

## 2022-07-21 PROCEDURE — 4010F PR ACE/ARB THEARPY RXD/TAKEN: ICD-10-PCS | Mod: CPTII,,, | Performed by: STUDENT IN AN ORGANIZED HEALTH CARE EDUCATION/TRAINING PROGRAM

## 2022-07-21 PROCEDURE — 3061F PR NEG MICROALBUMINURIA RESULT DOCUMENTED/REVIEW: ICD-10-PCS | Mod: CPTII,,, | Performed by: STUDENT IN AN ORGANIZED HEALTH CARE EDUCATION/TRAINING PROGRAM

## 2022-07-21 PROCEDURE — 17110 PR DESTRUCTION BENIGN LESIONS UP TO 14: ICD-10-PCS | Mod: ,,, | Performed by: STUDENT IN AN ORGANIZED HEALTH CARE EDUCATION/TRAINING PROGRAM

## 2022-07-21 PROCEDURE — 3066F NEPHROPATHY DOC TX: CPT | Mod: CPTII,,, | Performed by: STUDENT IN AN ORGANIZED HEALTH CARE EDUCATION/TRAINING PROGRAM

## 2022-07-21 PROCEDURE — 3008F BODY MASS INDEX DOCD: CPT | Mod: CPTII,,, | Performed by: STUDENT IN AN ORGANIZED HEALTH CARE EDUCATION/TRAINING PROGRAM

## 2022-07-21 PROCEDURE — 99214 PR OFFICE/OUTPT VISIT, EST, LEVL IV, 30-39 MIN: ICD-10-PCS | Mod: 25,,, | Performed by: STUDENT IN AN ORGANIZED HEALTH CARE EDUCATION/TRAINING PROGRAM

## 2022-07-21 PROCEDURE — 4010F ACE/ARB THERAPY RXD/TAKEN: CPT | Mod: CPTII,,, | Performed by: STUDENT IN AN ORGANIZED HEALTH CARE EDUCATION/TRAINING PROGRAM

## 2022-07-21 PROCEDURE — 3044F HG A1C LEVEL LT 7.0%: CPT | Mod: CPTII,,, | Performed by: STUDENT IN AN ORGANIZED HEALTH CARE EDUCATION/TRAINING PROGRAM

## 2022-07-21 PROCEDURE — 3066F PR DOCUMENTATION OF TREATMENT FOR NEPHROPATHY: ICD-10-PCS | Mod: CPTII,,, | Performed by: STUDENT IN AN ORGANIZED HEALTH CARE EDUCATION/TRAINING PROGRAM

## 2022-07-21 PROCEDURE — 1159F MED LIST DOCD IN RCRD: CPT | Mod: CPTII,,, | Performed by: STUDENT IN AN ORGANIZED HEALTH CARE EDUCATION/TRAINING PROGRAM

## 2022-07-21 PROCEDURE — 1159F PR MEDICATION LIST DOCUMENTED IN MEDICAL RECORD: ICD-10-PCS | Mod: CPTII,,, | Performed by: STUDENT IN AN ORGANIZED HEALTH CARE EDUCATION/TRAINING PROGRAM

## 2022-07-21 PROCEDURE — 17110 DESTRUCTION B9 LES UP TO 14: CPT | Mod: ,,, | Performed by: STUDENT IN AN ORGANIZED HEALTH CARE EDUCATION/TRAINING PROGRAM

## 2022-07-21 PROCEDURE — 99214 OFFICE O/P EST MOD 30 MIN: CPT | Mod: 25,,, | Performed by: STUDENT IN AN ORGANIZED HEALTH CARE EDUCATION/TRAINING PROGRAM

## 2022-07-21 PROCEDURE — 3044F PR MOST RECENT HEMOGLOBIN A1C LEVEL <7.0%: ICD-10-PCS | Mod: CPTII,,, | Performed by: STUDENT IN AN ORGANIZED HEALTH CARE EDUCATION/TRAINING PROGRAM

## 2022-07-21 PROCEDURE — 3008F PR BODY MASS INDEX (BMI) DOCUMENTED: ICD-10-PCS | Mod: CPTII,,, | Performed by: STUDENT IN AN ORGANIZED HEALTH CARE EDUCATION/TRAINING PROGRAM

## 2022-07-21 RX ORDER — HYDROCORTISONE 25 MG/G
OINTMENT TOPICAL 2 TIMES DAILY
Qty: 454 G | Refills: 5 | Status: SHIPPED | OUTPATIENT
Start: 2022-07-21

## 2022-07-21 RX ORDER — FLUOCINOLONE ACETONIDE 0.1 MG/ML
SOLUTION TOPICAL 2 TIMES DAILY
Qty: 90 ML | Refills: 5 | Status: SHIPPED | OUTPATIENT
Start: 2022-07-21 | End: 2022-11-22 | Stop reason: SDUPTHER

## 2022-07-21 NOTE — PROGRESS NOTES
[facility]  Cj Wakefield MD    [address] [address]  [phone]    Fax: [phone]    Patient Name: Belem Swann  Medical Record Number: [id number]  PCP: DANE Saenz  Age: 42 y.o. : 1980  Contact: [phone] (home)     History of Present Illness:     Belem Swann is a 42 y.o.  female here for follow up of psoriasis and hidradenitis suppurativa.  Previous treatment includes Humira with improvement in both. She has no active cysts today. Patient does have some psoriasis flaring on her posterior ears. A few of the previously treated skin tags did not resolve with LN2 and have continued to be pruritic.      Review of Systems:     Unremarkable other than mentioned above.     Physical Exam:     General: Relaxed, oriented, alert    Skin examination of the scalp, face, neck, chest, back, abdomen, upper extremities and lower extremities were normal except for as listed below      Assessment and Plan:     1. 1. Hidradenitis Suppurativa  - Fistula formation and draining sinuses, weeping acneiform pustules and papules, scarring, and acneiform nodules  Alvarado Stage: 3     Plan:   - continue Humira 40 mg weekly      Counseling.  Cleanse acneiform lesions and sinus tracts with anti-bacterial washes. Oral antibiotics can help reduce inflammation.  Discussed importance of not smoking and weight loss       2. Seborrheic Dermatitis   - Red scaly plaques located on the scalp, nasolabial folds, and eyebrows    Plan:   -Lidex solution for the scalp   - ketoconazole cream bid PRN to face or ears   - can use OTC 1% hydrocortisone cream for severe flares       3. Inflamed acrochordons   - skin colored papules on erythematous base       Plan: Liquid Nitrogen.  A total of  8 lesions were treated with liquid nitrogen for 2 freeze-thaw cycles lasting 5 seconds, located on the above locations.   The patient's consent was obtained including but not limited to  risks of crusting, scabbing,  blistering, scarring, darker or lighter pigmentary change, recurrence, incomplete removal and infection.    4.  Plaque Psoriasis  - psoriasiform plaques with micaceous scale     Plan: - will continue Humira as above   -Hydrocortisone 2.5% ointment for posterior ears      Counseling  I counseled patient regarding systemic effects of inflammation from psoriasis, and the association with cardiac disease, metabolic syndrome, depression, and arthritis       Return to clinic in 4 months    AVS printed with patient instructions     Cj Wakefield MD   Mohs Surgery/Dermatologic Oncology  Dermatology  te

## 2022-07-28 ENCOUNTER — OFFICE VISIT (OUTPATIENT)
Dept: PAIN MEDICINE | Facility: CLINIC | Age: 42
End: 2022-07-28
Payer: MEDICAID

## 2022-07-28 VITALS
WEIGHT: 293 LBS | SYSTOLIC BLOOD PRESSURE: 182 MMHG | DIASTOLIC BLOOD PRESSURE: 83 MMHG | HEIGHT: 62 IN | BODY MASS INDEX: 53.92 KG/M2 | HEART RATE: 99 BPM

## 2022-07-28 DIAGNOSIS — M05.79 RHEUMATOID ARTHRITIS INVOLVING MULTIPLE SITES WITH POSITIVE RHEUMATOID FACTOR: Chronic | ICD-10-CM

## 2022-07-28 DIAGNOSIS — M47.816 SPONDYLOSIS OF LUMBAR REGION WITHOUT MYELOPATHY OR RADICULOPATHY: Chronic | ICD-10-CM

## 2022-07-28 DIAGNOSIS — Z01.818 PRE-OP TESTING: ICD-10-CM

## 2022-07-28 DIAGNOSIS — M46.1 SACROILIITIS: Primary | ICD-10-CM

## 2022-07-28 PROCEDURE — 3061F NEG MICROALBUMINURIA REV: CPT | Mod: CPTII,,, | Performed by: PHYSICIAN ASSISTANT

## 2022-07-28 PROCEDURE — 3044F PR MOST RECENT HEMOGLOBIN A1C LEVEL <7.0%: ICD-10-PCS | Mod: CPTII,,, | Performed by: PHYSICIAN ASSISTANT

## 2022-07-28 PROCEDURE — 99214 PR OFFICE/OUTPT VISIT, EST, LEVL IV, 30-39 MIN: ICD-10-PCS | Mod: S$PBB,25,, | Performed by: PHYSICIAN ASSISTANT

## 2022-07-28 PROCEDURE — 99214 OFFICE O/P EST MOD 30 MIN: CPT | Mod: S$PBB,25,, | Performed by: PHYSICIAN ASSISTANT

## 2022-07-28 PROCEDURE — 99215 OFFICE O/P EST HI 40 MIN: CPT | Mod: PBBFAC,25 | Performed by: PHYSICIAN ASSISTANT

## 2022-07-28 PROCEDURE — 3008F BODY MASS INDEX DOCD: CPT | Mod: CPTII,,, | Performed by: PHYSICIAN ASSISTANT

## 2022-07-28 PROCEDURE — 3079F PR MOST RECENT DIASTOLIC BLOOD PRESSURE 80-89 MM HG: ICD-10-PCS | Mod: CPTII,,, | Performed by: PHYSICIAN ASSISTANT

## 2022-07-28 PROCEDURE — 1159F MED LIST DOCD IN RCRD: CPT | Mod: CPTII,,, | Performed by: PHYSICIAN ASSISTANT

## 2022-07-28 PROCEDURE — 3066F PR DOCUMENTATION OF TREATMENT FOR NEPHROPATHY: ICD-10-PCS | Mod: CPTII,,, | Performed by: PHYSICIAN ASSISTANT

## 2022-07-28 PROCEDURE — 4010F ACE/ARB THERAPY RXD/TAKEN: CPT | Mod: CPTII,,, | Performed by: PHYSICIAN ASSISTANT

## 2022-07-28 PROCEDURE — 3044F HG A1C LEVEL LT 7.0%: CPT | Mod: CPTII,,, | Performed by: PHYSICIAN ASSISTANT

## 2022-07-28 PROCEDURE — 96372 THER/PROPH/DIAG INJ SC/IM: CPT | Mod: PBBFAC | Performed by: PHYSICIAN ASSISTANT

## 2022-07-28 PROCEDURE — 3008F PR BODY MASS INDEX (BMI) DOCUMENTED: ICD-10-PCS | Mod: CPTII,,, | Performed by: PHYSICIAN ASSISTANT

## 2022-07-28 PROCEDURE — 4010F PR ACE/ARB THEARPY RXD/TAKEN: ICD-10-PCS | Mod: CPTII,,, | Performed by: PHYSICIAN ASSISTANT

## 2022-07-28 PROCEDURE — 1159F PR MEDICATION LIST DOCUMENTED IN MEDICAL RECORD: ICD-10-PCS | Mod: CPTII,,, | Performed by: PHYSICIAN ASSISTANT

## 2022-07-28 PROCEDURE — 3077F SYST BP >= 140 MM HG: CPT | Mod: CPTII,,, | Performed by: PHYSICIAN ASSISTANT

## 2022-07-28 PROCEDURE — 3079F DIAST BP 80-89 MM HG: CPT | Mod: CPTII,,, | Performed by: PHYSICIAN ASSISTANT

## 2022-07-28 PROCEDURE — 3061F PR NEG MICROALBUMINURIA RESULT DOCUMENTED/REVIEW: ICD-10-PCS | Mod: CPTII,,, | Performed by: PHYSICIAN ASSISTANT

## 2022-07-28 PROCEDURE — 3077F PR MOST RECENT SYSTOLIC BLOOD PRESSURE >= 140 MM HG: ICD-10-PCS | Mod: CPTII,,, | Performed by: PHYSICIAN ASSISTANT

## 2022-07-28 PROCEDURE — 3066F NEPHROPATHY DOC TX: CPT | Mod: CPTII,,, | Performed by: PHYSICIAN ASSISTANT

## 2022-07-28 RX ORDER — GABAPENTIN 300 MG/1
300 CAPSULE ORAL EVERY 8 HOURS
Qty: 90 CAPSULE | Refills: 1 | Status: SHIPPED | OUTPATIENT
Start: 2022-07-28 | End: 2022-09-01 | Stop reason: SDUPTHER

## 2022-07-28 RX ORDER — KETOROLAC TROMETHAMINE 30 MG/ML
60 INJECTION, SOLUTION INTRAMUSCULAR; INTRAVENOUS
Status: COMPLETED | OUTPATIENT
Start: 2022-07-28 | End: 2022-07-28

## 2022-07-28 RX ORDER — HYDROCODONE BITARTRATE AND ACETAMINOPHEN 10; 325 MG/1; MG/1
1 TABLET ORAL EVERY 8 HOURS
Qty: 90 TABLET | Refills: 0 | Status: SHIPPED | OUTPATIENT
Start: 2022-07-30 | End: 2022-08-29

## 2022-07-28 RX ADMIN — KETOROLAC TROMETHAMINE 60 MG: 60 INJECTION, SOLUTION INTRAMUSCULAR at 01:07

## 2022-07-28 NOTE — H&P (VIEW-ONLY)
Subjective:      Patient ID: Belem Swann is a 42 y.o. female.    Chief Complaint: Low-back Pain and Knee Pain      Pain  This is a chronic problem. The current episode started more than 1 year ago. The problem occurs daily. The problem has been waxing and waning. Associated symptoms include arthralgias and neck pain. Pertinent negatives include no change in bowel habit, chest pain, coughing, diaphoresis, fever, rash, sore throat, vertigo or vomiting.     Review of Systems   Constitutional: Negative for activity change, appetite change, diaphoresis, fever and unexpected weight change.   HENT: Negative for drooling, ear discharge, ear pain, facial swelling, mouth sores, nosebleeds, sore throat, trouble swallowing, voice change and goiter.    Eyes: Negative for photophobia, pain, discharge, redness and visual disturbance.   Respiratory: Negative for apnea, cough, choking, chest tightness, shortness of breath, wheezing and stridor.    Cardiovascular: Negative for chest pain, palpitations and leg swelling.   Gastrointestinal: Negative for abdominal distention, change in bowel habit, diarrhea, rectal pain, vomiting, fecal incontinence and change in bowel habit.   Endocrine: Negative for cold intolerance, heat intolerance, polydipsia, polyphagia and polyuria.   Genitourinary: Negative for bladder incontinence, dysuria, flank pain, frequency and hot flashes.   Musculoskeletal: Positive for arthralgias, back pain, leg pain, neck pain and neck stiffness.   Integumentary:  Negative for color change, pallor and rash.   Allergic/Immunologic: Negative for immunocompromised state.   Neurological: Negative for dizziness, vertigo, seizures, syncope, facial asymmetry, speech difficulty, light-headedness, disturbances in coordination, memory loss and coordination difficulties.   Hematological: Negative for adenopathy. Does not bruise/bleed easily.   Psychiatric/Behavioral: Negative for agitation, behavioral problems, confusion,  decreased concentration, dysphoric mood, hallucinations, self-injury and suicidal ideas. The patient is not hyperactive.             Past Surgical History:   Procedure Laterality Date     SECTION      ELBOW SURGERY  1985    EPIDURAL STEROID INJECTION      L4-5 VERITO Dec 2020-[facility]    FEMUR SURGERY Right 1985    due to osteomyelitis    HYSTERECTOMY      INJECTION OF ANESTHETIC AGENT AROUND MEDIAL BRANCH NERVES INNERVATING LUMBAR FACET JOINT Bilateral 2022    Procedure: Bilateral L4-5,5-S1 MBB ( No steroids);  Surgeon: Carmel Torre MD;  Location: [facility];  Service: Pain Management;  Laterality: Bilateral;  PT AWARE TO BE TESTED ON OV    INJECTION OF ANESTHETIC AGENT AROUND MEDIAL BRANCH NERVES INNERVATING LUMBAR FACET JOINT Bilateral 3/8/2022    Procedure: Block-nerve-medial branch-lumbar, bilateral L4 through S1;  Surgeon: Carmel Torre MD;  Location: [facility];  Service: Pain Management;  Laterality: Bilateral;    LIPOMA RESECTION  2019    RADIOFREQUENCY ABLATION OF LUMBAR MEDIAL BRANCH NERVE AT SINGLE LEVEL Right 2022    Procedure: Radiofrequency Ablation, Nerve, Spinal, Lumbar, Medial Branch, Level L4-S1, right side 1st, will have left-sided after completing;  Surgeon: Carmel Torre MD;  Location: [facility];  Service: Pain Management;  Laterality: Right;  pt aware at visit to be tested    RADIOFREQUENCY ABLATION OF LUMBAR MEDIAL BRANCH NERVE AT SINGLE LEVEL Left 2022    Procedure: LEFT  L4-S1 RFTC  (HAD RIGHT  ON );  Surgeon: Carmel Torre MD;  Location: [facility];  Service: Pain Management;  Laterality: Left;    SURGICAL REMOVAL OF PILONIDAL CYST      TRANSFORAMINAL EPIDURAL INJECTION OF STEROID      Bilateral L4-5 TFESI     TRANSFORAMINAL EPIDURAL INJECTION OF STEROID      Right L4-5 TFESI     VENTRAL HERNIA REPAIR  2020       Objective:  Vitals:    22 1251 22 1252   BP: (!)  "182/83    Pulse: 99    Weight: (!) 169.7 kg (374 lb 3.2 oz)    Height: 5' 2.4" (1.585 m)    PainSc:   8   8         Physical Exam  Vitals and nursing note reviewed. Exam conducted with a chaperone present.   Constitutional:       General: She is awake.      Appearance: Normal appearance. She is not toxic-appearing or diaphoretic.   HENT:      Head: Normocephalic and atraumatic.      Nose: Nose normal.      Mouth/Throat:      Mouth: Mucous membranes are moist.      Pharynx: Oropharynx is clear.   Eyes:      Conjunctiva/sclera: Conjunctivae normal.      Pupils: Pupils are equal, round, and reactive to light.   Cardiovascular:      Rate and Rhythm: Normal rate.   Pulmonary:      Effort: Pulmonary effort is normal. No respiratory distress.   Abdominal:      Palpations: Abdomen is soft.      Tenderness: There is no guarding.   Musculoskeletal:         General: No swelling.      Cervical back: Normal range of motion and neck supple. Tenderness present. No rigidity.      Thoracic back: Tenderness present.      Lumbar back: Tenderness present. Decreased range of motion.   Skin:     General: Skin is warm and dry.      Coloration: Skin is not jaundiced or pale.   Neurological:      General: No focal deficit present.      Mental Status: She is alert and oriented to person, place, and time. Mental status is at baseline.      Cranial Nerves: No cranial nerve deficit (II-XII).   Psychiatric:         Mood and Affect: Mood normal.         Behavior: Behavior normal. Behavior is cooperative.         Thought Content: Thought content normal.           No orders of the defined types were placed in this encounter.       Mammo Digital Screening Bilat  Narrative: Result:   Mammo Digital Screening Bilat     History:  Patient is 42 y.o. and is seen for a screening mammogram.    Films Compared:  Prior images (if available) were compared.     Findings:   The breasts have scattered areas of fibroglandular density. There is no   evidence of " suspicious masses, calcifications, or other abnormal findings.  Impression: Bilateral  There is no mammographic evidence of malignancy.    BI-RADS Category:   Overall: 1 - Negative     Recommendation:  Routine screening mammogram in 1 year is recommended.       Office Visit on 05/18/2022   Component Date Value Ref Range Status    POC Amphetamines 05/18/2022 Negative  Negative, Inconclusive Final    POC Barbiturates 05/18/2022 Negative  Negative, Inconclusive Final    POC Benzodiazepines 05/18/2022 Negative  Negative, Inconclusive Final    POC Cocaine 05/18/2022 Negative  Negative, Inconclusive Final    POC THC 05/18/2022 Negative  Negative, Inconclusive Final    POC Methadone 05/18/2022 Negative  Negative, Inconclusive Final    POC Methamphetamine 05/18/2022 Negative  Negative, Inconclusive Final    POC Opiates 05/18/2022 Presumptive Positive (A) Negative, Inconclusive Final    POC Oxycodone 05/18/2022 Negative  Negative, Inconclusive Final    POC Phencyclidine 05/18/2022 Negative  Negative, Inconclusive Final    POC Methylenedioxymethamphetamine * 05/18/2022 Negative  Negative, Inconclusive Final    POC Tricyclic Antidepressants 05/18/2022 Negative  Negative, Inconclusive Final    POC Buprenorphine 05/18/2022 Negative   Final     Acceptable 05/18/2022 Yes   Final    POC Temperature (Urine) 05/18/2022 90   Final   Office Visit on 05/11/2022   Component Date Value Ref Range Status    Sodium 05/11/2022 138  136 - 145 mmol/L Final    Potassium 05/11/2022 4.3  3.5 - 5.1 mmol/L Final    Chloride 05/11/2022 102  98 - 107 mmol/L Final    CO2 05/11/2022 33 (A) 21 - 32 mmol/L Final    Anion Gap 05/11/2022 7  7 - 16 mmol/L Final    Glucose 05/11/2022 102  74 - 106 mg/dL Final    BUN 05/11/2022 18  7 - 18 mg/dL Final    Creatinine 05/11/2022 0.71  0.55 - 1.02 mg/dL Final    BUN/Creatinine Ratio 05/11/2022 25 (A) 6 - 20 Final    Calcium 05/11/2022 9.6  8.5 - 10.1 mg/dL Final    Total  Protein 05/11/2022 8.6 (A) 6.4 - 8.2 g/dL Final    Albumin 05/11/2022 3.5  3.5 - 5.0 g/dL Final    Globulin 05/11/2022 5.1 (A) 2.0 - 4.0 g/dL Final    A/G Ratio 05/11/2022 0.7   Final    Bilirubin, Total 05/11/2022 0.3  0.0 - 1.2 mg/dL Final    Alk Phos 05/11/2022 140 (A) 37 - 98 U/L Final    ALT 05/11/2022 35  13 - 56 U/L Final    AST 05/11/2022 16  15 - 37 U/L Final    eGFR 05/11/2022 95  >=60 mL/min/1.73m² Final    Protein, Urine 05/11/2022 7.7  0.0 - 11.9 mg/dL Final    Albumin % 05/11/2022 100  % Final    Pathologist Interp, Pro Elect, Uri* 05/11/2022 No Abnormal Proteins Detected   Final    Total Protein 05/11/2022 8.3 (A) 6.4 - 8.2 g/dL Final    Albumin, PE 05/11/2022 4.82  3.5 - 5.2 g/dL Final    Alpha-1-Globulin 05/11/2022 0.2  0.1 - 0.4 g/dL Final    Alpha-2-Globulin 05/11/2022 1.0  0.4 - 1.3 g/dL Final    Beta, PE 05/11/2022 1.0  0.5 - 1.5 g/dL Final    Gamma, PE 05/11/2022 1.3  0.5 - 1.8 g/dL Final    Pathologist Interp, Pro Elect, Blo* 05/11/2022 Normal. No monoclonal bands identified   Final    LDH 05/11/2022 254 (A) 87 - 241 U/L Final    Hemoglobin A1C 05/11/2022 6.5  4.5 - 6.6 % Final    Estimated Average Glucose 05/11/2022 130  mg/dL Final    WBC 05/11/2022 18.31 (A) 4.50 - 11.00 K/uL Final    RBC 05/11/2022 4.99  4.20 - 5.40 M/uL Final    Hemoglobin 05/11/2022 14.4  12.0 - 16.0 g/dL Final    Hematocrit 05/11/2022 46.0  38.0 - 47.0 % Final    MCV 05/11/2022 92.2  80.0 - 96.0 fL Final    MCH 05/11/2022 28.9  27.0 - 31.0 pg Final    MCHC 05/11/2022 31.3 (A) 32.0 - 36.0 g/dL Final    RDW 05/11/2022 14.9 (A) 11.5 - 14.5 % Final    Platelet Count 05/11/2022 183  150 - 400 K/uL Final    MPV 05/11/2022 13.3 (A) 9.4 - 12.4 fL Final    Neutrophils % 05/11/2022 68.9 (A) 53.0 - 65.0 % Final    Lymphocytes % 05/11/2022 20.7 (A) 27.0 - 41.0 % Final    Monocytes % 05/11/2022 6.6 (A) 2.0 - 6.0 % Final    Eosinophils % 05/11/2022 2.1  1.0 - 4.0 % Final    Basophils % 05/11/2022  0.5  0.0 - 1.0 % Final    Immature Granulocytes % 05/11/2022 1.2 (A) 0.0 - 0.4 % Final    nRBC, Auto 05/11/2022 0.0  <=0.0 % Final    Neutrophils, Abs 05/11/2022 12.62 (A) 1.80 - 7.70 K/uL Final    Lymphocytes, Absolute 05/11/2022 3.79  1.00 - 4.80 K/uL Final    Monocytes, Absolute 05/11/2022 1.21 (A) 0.00 - 0.80 K/uL Final    Eosinophils, Absolute 05/11/2022 0.38  0.00 - 0.50 K/uL Final    Basophils, Absolute 05/11/2022 0.09  0.00 - 0.20 K/uL Final    Immature Granulocytes, Absolute 05/11/2022 0.22 (A) 0.00 - 0.04 K/uL Final    nRBC, Absolute 05/11/2022 0.00  <=0.00 x10e3/uL Final    Diff Type 05/11/2022 Scan Smear   Final    Platelet Morphology 05/11/2022 Few Large Platelets (A) Normal Final    RBC Morphology 05/11/2022 Normal   Final   Admission on 05/05/2022, Discharged on 05/05/2022   Component Date Value Ref Range Status    POC Glucose 05/05/2022 131 (A) 70 - 110 MG/DL Final    POC Glucose 05/05/2022 131 (A) 70 - 105 mg/dL Final   Lab Visit on 05/03/2022   Component Date Value Ref Range Status    SARS CoV-2 PCR 05/03/2022 Negative  Negative, Invalid Final    Internal Control 05/03/2022 Valid   Final   Admission on 04/14/2022, Discharged on 04/14/2022   Component Date Value Ref Range Status    POC Glucose 04/14/2022 238 (A) 70 - 110 MG/DL Final    POC Glucose 04/14/2022 238 (A) 70 - 105 mg/dL Final   Lab Visit on 04/12/2022   Component Date Value Ref Range Status    SARS CoV-2 PCR 04/12/2022 Negative  Negative, Invalid Final    Internal Control 04/12/2022 Valid   Final   Admission on 03/08/2022, Discharged on 03/08/2022   Component Date Value Ref Range Status    POC Glucose 03/08/2022 116 (A) 70 - 105 mg/dL Final   Lab Visit on 03/04/2022   Component Date Value Ref Range Status    SARS CoV-2 PCR 03/04/2022 Negative  Negative, Invalid Final    Internal Control 03/04/2022 Valid   Final   Admission on 02/08/2022, Discharged on 02/08/2022   Component Date Value Ref Range Status    POC  Glucose 02/08/2022 147 (A) 70 - 110 MG/DL Final    POC Glucose 02/08/2022 147 (A) 70 - 105 mg/dL Final   Lab Visit on 02/04/2022   Component Date Value Ref Range Status    SARS CoV-2 PCR 02/04/2022 Negative  Negative, Invalid Final   There may be more visits with results that are not included.         Assessment:      1. Rheumatoid arthritis involving multiple sites with positive rheumatoid factor    2. Spondylosis of lumbar region without myelopathy or radiculopathy            A's of Opioid Risk Assessment  Activity:Patient can perform ADL.   Analgesia:Patients pain is partially controlled by current medication. Patient has tried OTC medications such as Tylenol and Ibuprofen with out relief.   Adverse Effects: Patient denies constipation or sedation.  Aberrant Behavior:  reviewed with no aberrant drug seeking/taking behavior.  Overdose reversal drug naloxone discussed    Drug screen reviewed      Requested Prescriptions     Pending Prescriptions Disp Refills    gabapentin (NEURONTIN) 300 MG capsule 90 capsule 1     Sig: Take 1 capsule (300 mg total) by mouth every 8 (eight) hours.    HYDROcodone-acetaminophen (NORCO)  mg per tablet 90 tablet 0     Sig: Take 1 tablet by mouth every 8 (eight) hours.         Plan:    Follows rheumatology [facility] rheumatoid arthritis    She had lumbar RFTC L4 through S1 left and right  She states she had 80% relief after procedure maintaining 65% relief with time  She states the procedure does help improve her level of functioning    Complaining bilateral lower back pain today pointing to her bilateral sacroiliac area    Tender bilateral sacroiliac area  Bilateral sacroiliac compression test positive  Lemuel's /Skip's positive bilateral  Gaenslen positive bilateral    Ongoing Physical therapy/home exercise program as directed no longer controlling discomfort    Patient's pain medication no longer helping control discomfort    Toradol 60 mg IM, tolerated well    Continue  home exercise program as directed    Continue current medication    Monitor anesthesia request is medically indicated for the scheduled nerve block procedure due to:  1- needle phobia and anxiety, placing  the patient at risk during the provided service.  2-patient has an ASA class greater than 3 and requires constant presence of an anesthesiologist during the procedure,   3-patient has severe problems hard to lie still  4-patient suffers from chronic pain and is unable to function due to  diminished ADLs    Schedule bilateral sacroiliac injection # 1, sacroiliitis    Dr. Trore    Bring original prescription medication bottles/container/box with labels to each visit

## 2022-07-28 NOTE — PROGRESS NOTES
Subjective:      Patient ID: Belem Swann is a 42 y.o. female.    Chief Complaint: Low-back Pain and Knee Pain      Pain  This is a chronic problem. The current episode started more than 1 year ago. The problem occurs daily. The problem has been waxing and waning. Associated symptoms include arthralgias and neck pain. Pertinent negatives include no change in bowel habit, chest pain, coughing, diaphoresis, fever, rash, sore throat, vertigo or vomiting.     Review of Systems   Constitutional: Negative for activity change, appetite change, diaphoresis, fever and unexpected weight change.   HENT: Negative for drooling, ear discharge, ear pain, facial swelling, mouth sores, nosebleeds, sore throat, trouble swallowing, voice change and goiter.    Eyes: Negative for photophobia, pain, discharge, redness and visual disturbance.   Respiratory: Negative for apnea, cough, choking, chest tightness, shortness of breath, wheezing and stridor.    Cardiovascular: Negative for chest pain, palpitations and leg swelling.   Gastrointestinal: Negative for abdominal distention, change in bowel habit, diarrhea, rectal pain, vomiting, fecal incontinence and change in bowel habit.   Endocrine: Negative for cold intolerance, heat intolerance, polydipsia, polyphagia and polyuria.   Genitourinary: Negative for bladder incontinence, dysuria, flank pain, frequency and hot flashes.   Musculoskeletal: Positive for arthralgias, back pain, leg pain, neck pain and neck stiffness.   Integumentary:  Negative for color change, pallor and rash.   Allergic/Immunologic: Negative for immunocompromised state.   Neurological: Negative for dizziness, vertigo, seizures, syncope, facial asymmetry, speech difficulty, light-headedness, disturbances in coordination, memory loss and coordination difficulties.   Hematological: Negative for adenopathy. Does not bruise/bleed easily.   Psychiatric/Behavioral: Negative for agitation, behavioral problems, confusion,  decreased concentration, dysphoric mood, hallucinations, self-injury and suicidal ideas. The patient is not hyperactive.             Past Surgical History:   Procedure Laterality Date     SECTION      ELBOW SURGERY  1985    EPIDURAL STEROID INJECTION      L4-5 VERITO Dec 2020-Torre    FEMUR SURGERY Right 1985    due to osteomyelitis    HYSTERECTOMY      INJECTION OF ANESTHETIC AGENT AROUND MEDIAL BRANCH NERVES INNERVATING LUMBAR FACET JOINT Bilateral 2022    Procedure: Bilateral L4-5,5-S1 MBB ( No steroids);  Surgeon: Carmel Torre MD;  Location: [facility];  Service: Pain Management;  Laterality: Bilateral;  PT AWARE TO BE TESTED ON OV    INJECTION OF ANESTHETIC AGENT AROUND MEDIAL BRANCH NERVES INNERVATING LUMBAR FACET JOINT Bilateral 3/8/2022    Procedure: Block-nerve-medial branch-lumbar, bilateral L4 through S1;  Surgeon: Carmel Torre MD;  Location: [facility];  Service: Pain Management;  Laterality: Bilateral;    LIPOMA RESECTION  2019    RADIOFREQUENCY ABLATION OF LUMBAR MEDIAL BRANCH NERVE AT SINGLE LEVEL Right 2022    Procedure: Radiofrequency Ablation, Nerve, Spinal, Lumbar, Medial Branch, Level L4-S1, right side 1st, will have left-sided after completing;  Surgeon: Carmel Torre MD;  Location: [facility];  Service: Pain Management;  Laterality: Right;  pt aware at visit to be tested    RADIOFREQUENCY ABLATION OF LUMBAR MEDIAL BRANCH NERVE AT SINGLE LEVEL Left 2022    Procedure: LEFT  L4-S1 RFTC  (HAD RIGHT  ON );  Surgeon: Carmel Torre MD;  Location: [facility];  Service: Pain Management;  Laterality: Left;    SURGICAL REMOVAL OF PILONIDAL CYST      TRANSFORAMINAL EPIDURAL INJECTION OF STEROID      Bilateral L4-5 TFESI     TRANSFORAMINAL EPIDURAL INJECTION OF STEROID      Right L4-5 TFESI     VENTRAL HERNIA REPAIR  2020       Objective:  Vitals:    22 1251 22 1252   BP: (!)  "182/83    Pulse: 99    Weight: (!) 169.7 kg (374 lb 3.2 oz)    Height: 5' 2.4" (1.585 m)    PainSc:   8   8         Physical Exam  Vitals and nursing note reviewed. Exam conducted with a chaperone present.   Constitutional:       General: She is awake.      Appearance: Normal appearance. She is not toxic-appearing or diaphoretic.   HENT:      Head: Normocephalic and atraumatic.      Nose: Nose normal.      Mouth/Throat:      Mouth: Mucous membranes are moist.      Pharynx: Oropharynx is clear.   Eyes:      Conjunctiva/sclera: Conjunctivae normal.      Pupils: Pupils are equal, round, and reactive to light.   Cardiovascular:      Rate and Rhythm: Normal rate.   Pulmonary:      Effort: Pulmonary effort is normal. No respiratory distress.   Abdominal:      Palpations: Abdomen is soft.      Tenderness: There is no guarding.   Musculoskeletal:         General: No swelling.      Cervical back: Normal range of motion and neck supple. Tenderness present. No rigidity.      Thoracic back: Tenderness present.      Lumbar back: Tenderness present. Decreased range of motion.   Skin:     General: Skin is warm and dry.      Coloration: Skin is not jaundiced or pale.   Neurological:      General: No focal deficit present.      Mental Status: She is alert and oriented to person, place, and time. Mental status is at baseline.      Cranial Nerves: No cranial nerve deficit (II-XII).   Psychiatric:         Mood and Affect: Mood normal.         Behavior: Behavior normal. Behavior is cooperative.         Thought Content: Thought content normal.           No orders of the defined types were placed in this encounter.       Mammo Digital Screening Bilat  Narrative: Result:   Mammo Digital Screening Bilat     History:  Patient is 42 y.o. and is seen for a screening mammogram.    Films Compared:  Prior images (if available) were compared.     Findings:   The breasts have scattered areas of fibroglandular density. There is no   evidence of " suspicious masses, calcifications, or other abnormal findings.  Impression: Bilateral  There is no mammographic evidence of malignancy.    BI-RADS Category:   Overall: 1 - Negative     Recommendation:  Routine screening mammogram in 1 year is recommended.       Office Visit on 05/18/2022   Component Date Value Ref Range Status    POC Amphetamines 05/18/2022 Negative  Negative, Inconclusive Final    POC Barbiturates 05/18/2022 Negative  Negative, Inconclusive Final    POC Benzodiazepines 05/18/2022 Negative  Negative, Inconclusive Final    POC Cocaine 05/18/2022 Negative  Negative, Inconclusive Final    POC THC 05/18/2022 Negative  Negative, Inconclusive Final    POC Methadone 05/18/2022 Negative  Negative, Inconclusive Final    POC Methamphetamine 05/18/2022 Negative  Negative, Inconclusive Final    POC Opiates 05/18/2022 Presumptive Positive (A) Negative, Inconclusive Final    POC Oxycodone 05/18/2022 Negative  Negative, Inconclusive Final    POC Phencyclidine 05/18/2022 Negative  Negative, Inconclusive Final    POC Methylenedioxymethamphetamine * 05/18/2022 Negative  Negative, Inconclusive Final    POC Tricyclic Antidepressants 05/18/2022 Negative  Negative, Inconclusive Final    POC Buprenorphine 05/18/2022 Negative   Final     Acceptable 05/18/2022 Yes   Final    POC Temperature (Urine) 05/18/2022 90   Final   Office Visit on 05/11/2022   Component Date Value Ref Range Status    Sodium 05/11/2022 138  136 - 145 mmol/L Final    Potassium 05/11/2022 4.3  3.5 - 5.1 mmol/L Final    Chloride 05/11/2022 102  98 - 107 mmol/L Final    CO2 05/11/2022 33 (A) 21 - 32 mmol/L Final    Anion Gap 05/11/2022 7  7 - 16 mmol/L Final    Glucose 05/11/2022 102  74 - 106 mg/dL Final    BUN 05/11/2022 18  7 - 18 mg/dL Final    Creatinine 05/11/2022 0.71  0.55 - 1.02 mg/dL Final    BUN/Creatinine Ratio 05/11/2022 25 (A) 6 - 20 Final    Calcium 05/11/2022 9.6  8.5 - 10.1 mg/dL Final    Total  Protein 05/11/2022 8.6 (A) 6.4 - 8.2 g/dL Final    Albumin 05/11/2022 3.5  3.5 - 5.0 g/dL Final    Globulin 05/11/2022 5.1 (A) 2.0 - 4.0 g/dL Final    A/G Ratio 05/11/2022 0.7   Final    Bilirubin, Total 05/11/2022 0.3  0.0 - 1.2 mg/dL Final    Alk Phos 05/11/2022 140 (A) 37 - 98 U/L Final    ALT 05/11/2022 35  13 - 56 U/L Final    AST 05/11/2022 16  15 - 37 U/L Final    eGFR 05/11/2022 95  >=60 mL/min/1.73m² Final    Protein, Urine 05/11/2022 7.7  0.0 - 11.9 mg/dL Final    Albumin % 05/11/2022 100  % Final    Pathologist Interp, Pro Elect, Uri* 05/11/2022 No Abnormal Proteins Detected   Final    Total Protein 05/11/2022 8.3 (A) 6.4 - 8.2 g/dL Final    Albumin, PE 05/11/2022 4.82  3.5 - 5.2 g/dL Final    Alpha-1-Globulin 05/11/2022 0.2  0.1 - 0.4 g/dL Final    Alpha-2-Globulin 05/11/2022 1.0  0.4 - 1.3 g/dL Final    Beta, PE 05/11/2022 1.0  0.5 - 1.5 g/dL Final    Gamma, PE 05/11/2022 1.3  0.5 - 1.8 g/dL Final    Pathologist Interp, Pro Elect, Blo* 05/11/2022 Normal. No monoclonal bands identified   Final    LDH 05/11/2022 254 (A) 87 - 241 U/L Final    Hemoglobin A1C 05/11/2022 6.5  4.5 - 6.6 % Final    Estimated Average Glucose 05/11/2022 130  mg/dL Final    WBC 05/11/2022 18.31 (A) 4.50 - 11.00 K/uL Final    RBC 05/11/2022 4.99  4.20 - 5.40 M/uL Final    Hemoglobin 05/11/2022 14.4  12.0 - 16.0 g/dL Final    Hematocrit 05/11/2022 46.0  38.0 - 47.0 % Final    MCV 05/11/2022 92.2  80.0 - 96.0 fL Final    MCH 05/11/2022 28.9  27.0 - 31.0 pg Final    MCHC 05/11/2022 31.3 (A) 32.0 - 36.0 g/dL Final    RDW 05/11/2022 14.9 (A) 11.5 - 14.5 % Final    Platelet Count 05/11/2022 183  150 - 400 K/uL Final    MPV 05/11/2022 13.3 (A) 9.4 - 12.4 fL Final    Neutrophils % 05/11/2022 68.9 (A) 53.0 - 65.0 % Final    Lymphocytes % 05/11/2022 20.7 (A) 27.0 - 41.0 % Final    Monocytes % 05/11/2022 6.6 (A) 2.0 - 6.0 % Final    Eosinophils % 05/11/2022 2.1  1.0 - 4.0 % Final    Basophils % 05/11/2022  0.5  0.0 - 1.0 % Final    Immature Granulocytes % 05/11/2022 1.2 (A) 0.0 - 0.4 % Final    nRBC, Auto 05/11/2022 0.0  <=0.0 % Final    Neutrophils, Abs 05/11/2022 12.62 (A) 1.80 - 7.70 K/uL Final    Lymphocytes, Absolute 05/11/2022 3.79  1.00 - 4.80 K/uL Final    Monocytes, Absolute 05/11/2022 1.21 (A) 0.00 - 0.80 K/uL Final    Eosinophils, Absolute 05/11/2022 0.38  0.00 - 0.50 K/uL Final    Basophils, Absolute 05/11/2022 0.09  0.00 - 0.20 K/uL Final    Immature Granulocytes, Absolute 05/11/2022 0.22 (A) 0.00 - 0.04 K/uL Final    nRBC, Absolute 05/11/2022 0.00  <=0.00 x10e3/uL Final    Diff Type 05/11/2022 Scan Smear   Final    Platelet Morphology 05/11/2022 Few Large Platelets (A) Normal Final    RBC Morphology 05/11/2022 Normal   Final   Admission on 05/05/2022, Discharged on 05/05/2022   Component Date Value Ref Range Status    POC Glucose 05/05/2022 131 (A) 70 - 110 MG/DL Final    POC Glucose 05/05/2022 131 (A) 70 - 105 mg/dL Final   Lab Visit on 05/03/2022   Component Date Value Ref Range Status    SARS CoV-2 PCR 05/03/2022 Negative  Negative, Invalid Final    Internal Control 05/03/2022 Valid   Final   Admission on 04/14/2022, Discharged on 04/14/2022   Component Date Value Ref Range Status    POC Glucose 04/14/2022 238 (A) 70 - 110 MG/DL Final    POC Glucose 04/14/2022 238 (A) 70 - 105 mg/dL Final   Lab Visit on 04/12/2022   Component Date Value Ref Range Status    SARS CoV-2 PCR 04/12/2022 Negative  Negative, Invalid Final    Internal Control 04/12/2022 Valid   Final   Admission on 03/08/2022, Discharged on 03/08/2022   Component Date Value Ref Range Status    POC Glucose 03/08/2022 116 (A) 70 - 105 mg/dL Final   Lab Visit on 03/04/2022   Component Date Value Ref Range Status    SARS CoV-2 PCR 03/04/2022 Negative  Negative, Invalid Final    Internal Control 03/04/2022 Valid   Final   Admission on 02/08/2022, Discharged on 02/08/2022   Component Date Value Ref Range Status    POC  Glucose 02/08/2022 147 (A) 70 - 110 MG/DL Final    POC Glucose 02/08/2022 147 (A) 70 - 105 mg/dL Final   Lab Visit on 02/04/2022   Component Date Value Ref Range Status    SARS CoV-2 PCR 02/04/2022 Negative  Negative, Invalid Final   There may be more visits with results that are not included.         Assessment:      1. Rheumatoid arthritis involving multiple sites with positive rheumatoid factor    2. Spondylosis of lumbar region without myelopathy or radiculopathy            A's of Opioid Risk Assessment  Activity:Patient can perform ADL.   Analgesia:Patients pain is partially controlled by current medication. Patient has tried OTC medications such as Tylenol and Ibuprofen with out relief.   Adverse Effects: Patient denies constipation or sedation.  Aberrant Behavior:  reviewed with no aberrant drug seeking/taking behavior.  Overdose reversal drug naloxone discussed    Drug screen reviewed      Requested Prescriptions     Pending Prescriptions Disp Refills    gabapentin (NEURONTIN) 300 MG capsule 90 capsule 1     Sig: Take 1 capsule (300 mg total) by mouth every 8 (eight) hours.    HYDROcodone-acetaminophen (NORCO)  mg per tablet 90 tablet 0     Sig: Take 1 tablet by mouth every 8 (eight) hours.         Plan:    Follows rheumatology [facility] rheumatoid arthritis    She had lumbar RFTC L4 through S1 left and right  She states she had 80% relief after procedure maintaining 65% relief with time  She states the procedure does help improve her level of functioning    Complaining bilateral lower back pain today pointing to her bilateral sacroiliac area    Tender bilateral sacroiliac area  Bilateral sacroiliac compression test positive  Lemuel's /Skip's positive bilateral  Gaenslen positive bilateral    Ongoing Physical therapy/home exercise program as directed no longer controlling discomfort    Patient's pain medication no longer helping control discomfort    Toradol 60 mg IM, tolerated well    Continue  home exercise program as directed    Continue current medication    Monitor anesthesia request is medically indicated for the scheduled nerve block procedure due to:  1- needle phobia and anxiety, placing  the patient at risk during the provided service.  2-patient has an ASA class greater than 3 and requires constant presence of an anesthesiologist during the procedure,   3-patient has severe problems hard to lie still  4-patient suffers from chronic pain and is unable to function due to  diminished ADLs    Schedule bilateral sacroiliac injection # 1, sacroiliitis    Dr. Torre    Bring original prescription medication bottles/container/box with labels to each visit

## 2022-07-28 NOTE — PATIENT INSTRUCTIONS
COVID SCREENING TO BE DONE 48-72 HOURS PRIOR TO PROCEDURE IF PT HAS NOT RECEIVED BOTH COVID VACCINATIONS OR HAS BEEN VACCINATED WITHIN THE LAST 2 WEEKS. VACCINATION CARD MUST BE PROVIDED IF PT HAS BEEN VACCINATED OR PT MUST HAVE COVID SCREENING IN ORDER TO HAVE PROCEDURE.  IF YOUR PROCEDURE IS ON A Tuesday, GET COVID TESTING ON THE Friday PRIOR TO PROCEDURE.  IF YOUR PROCEDURE IS ON A Thursday GET COVID TESTING ON THE Monday OR Tuesday PRIOR TO PROCEDURE.    IF YOUR PRIMARY CARE DOCTOR ORDERS YOUR COVID TESTING YOU MUST BRING YOUR RESULTS WITH YOU TO YOUR PROCEDURE.    Procedure Instructions:    Nothing to eat or drink for 8 hours or after midnight including gum, candy, mints, or tobacco products.  If you are scheduled for 1:30 or later nothing to eat or drink after 5 a.m. the morning of the procedure, including gum, candy, mints, or tobacco products.  Must have a  at least 18 yrs of age to stay present at all times  No Diabetic medications the morning of procedure, check blood sugar the morning of procedure, if it is greater than 200 call the office at [phone]  If you are started on antibiotics or have been prescribed antibiotics, have a fever, or have any other type of infection call to reschedule procedure.  If you take blood pressure medications you can take it at your regular scheduled time.        HOLD ASPIRIN AND ASPIRIN PRODUCTS  (ASPIRIN, BC POWDER ETC. ) FOR 7 DAYS  PRIOR TO PROCEDURE  HOLD NSAIDS( ibuprofen, mobic, meloxicam, advil, diclofenac, methotrexate, aleve etc....)  FOR 3 DAYS   PRIOR TO PROCEDURE

## 2022-08-23 ENCOUNTER — HOSPITAL ENCOUNTER (OUTPATIENT)
Facility: HOSPITAL | Age: 42
Discharge: HOME | End: 2022-08-23
Attending: PAIN MEDICINE | Admitting: PAIN MEDICINE
Payer: MEDICAID

## 2022-08-23 ENCOUNTER — ANESTHESIA (OUTPATIENT)
Dept: PAIN MEDICINE | Facility: HOSPITAL | Age: 42
End: 2022-08-23
Payer: MEDICAID

## 2022-08-23 ENCOUNTER — ANESTHESIA EVENT (OUTPATIENT)
Dept: PAIN MEDICINE | Facility: HOSPITAL | Age: 42
End: 2022-08-23
Payer: MEDICAID

## 2022-08-23 VITALS
WEIGHT: 293 LBS | RESPIRATION RATE: 12 BRPM | HEIGHT: 62 IN | SYSTOLIC BLOOD PRESSURE: 124 MMHG | DIASTOLIC BLOOD PRESSURE: 70 MMHG | BODY MASS INDEX: 53.92 KG/M2 | HEART RATE: 75 BPM | OXYGEN SATURATION: 99 % | TEMPERATURE: 97 F

## 2022-08-23 DIAGNOSIS — M46.1 SACROILIITIS: Primary | ICD-10-CM

## 2022-08-23 LAB
GLUCOSE SERPL-MCNC: 122 MG/DL (ref 70–105)
GLUCOSE SERPL-MCNC: 122 MG/DL (ref 70–110)

## 2022-08-23 PROCEDURE — 63600175 PHARM REV CODE 636 W HCPCS: Performed by: PAIN MEDICINE

## 2022-08-23 PROCEDURE — 25000003 PHARM REV CODE 250: Performed by: NURSE ANESTHETIST, CERTIFIED REGISTERED

## 2022-08-23 PROCEDURE — 37000008 HC ANESTHESIA 1ST 15 MINUTES: Performed by: PAIN MEDICINE

## 2022-08-23 PROCEDURE — 27096 INJECT SACROILIAC JOINT: CPT | Mod: 50,,, | Performed by: PAIN MEDICINE

## 2022-08-23 PROCEDURE — 01991 ANES DX/THER NRV BLK&INJ OTH: CPT | Performed by: PAIN MEDICINE

## 2022-08-23 PROCEDURE — 27096 INJECT SACROILIAC JOINT: CPT | Mod: 50 | Performed by: PAIN MEDICINE

## 2022-08-23 PROCEDURE — 25000003 PHARM REV CODE 250: Performed by: PAIN MEDICINE

## 2022-08-23 PROCEDURE — 37000009 HC ANESTHESIA EA ADD 15 MINS: Performed by: PAIN MEDICINE

## 2022-08-23 PROCEDURE — D9220A PRA ANESTHESIA: ICD-10-PCS | Mod: ,,, | Performed by: NURSE ANESTHETIST, CERTIFIED REGISTERED

## 2022-08-23 PROCEDURE — 82962 GLUCOSE BLOOD TEST: CPT

## 2022-08-23 PROCEDURE — 63600175 PHARM REV CODE 636 W HCPCS: Performed by: NURSE ANESTHETIST, CERTIFIED REGISTERED

## 2022-08-23 PROCEDURE — 27096 PR INJECTION,SACROILIAC JOINT: ICD-10-PCS | Mod: 50,,, | Performed by: PAIN MEDICINE

## 2022-08-23 PROCEDURE — 27000284 HC CANNULA NASAL: Performed by: NURSE ANESTHETIST, CERTIFIED REGISTERED

## 2022-08-23 PROCEDURE — D9220A PRA ANESTHESIA: Mod: ,,, | Performed by: NURSE ANESTHETIST, CERTIFIED REGISTERED

## 2022-08-23 RX ORDER — TRIAMCINOLONE ACETONIDE 40 MG/ML
INJECTION, SUSPENSION INTRA-ARTICULAR; INTRAMUSCULAR
Status: DISCONTINUED | OUTPATIENT
Start: 2022-08-23 | End: 2022-08-23 | Stop reason: HOSPADM

## 2022-08-23 RX ORDER — PROPOFOL 10 MG/ML
VIAL (ML) INTRAVENOUS
Status: DISCONTINUED | OUTPATIENT
Start: 2022-08-23 | End: 2022-08-23

## 2022-08-23 RX ORDER — SODIUM CHLORIDE 9 MG/ML
INJECTION, SOLUTION INTRAVENOUS CONTINUOUS
Status: DISCONTINUED | OUTPATIENT
Start: 2022-08-23 | End: 2022-08-23 | Stop reason: HOSPADM

## 2022-08-23 RX ORDER — LIDOCAINE HYDROCHLORIDE 20 MG/ML
INJECTION, SOLUTION EPIDURAL; INFILTRATION; INTRACAUDAL; PERINEURAL
Status: DISCONTINUED | OUTPATIENT
Start: 2022-08-23 | End: 2022-08-23

## 2022-08-23 RX ORDER — BUPIVACAINE HYDROCHLORIDE 2.5 MG/ML
INJECTION, SOLUTION INFILTRATION; PERINEURAL
Status: DISCONTINUED | OUTPATIENT
Start: 2022-08-23 | End: 2022-08-23 | Stop reason: HOSPADM

## 2022-08-23 RX ORDER — HYDROCODONE BITARTRATE AND ACETAMINOPHEN 10; 325 MG/1; MG/1
1 TABLET ORAL EVERY 8 HOURS PRN
Qty: 90 TABLET | Refills: 0 | Status: SHIPPED | OUTPATIENT
Start: 2022-08-30 | End: 2022-09-01 | Stop reason: SDUPTHER

## 2022-08-23 RX ADMIN — PROPOFOL 50 MG: 10 INJECTION, EMULSION INTRAVENOUS at 10:08

## 2022-08-23 RX ADMIN — SODIUM CHLORIDE: 9 INJECTION, SOLUTION INTRAVENOUS at 10:08

## 2022-08-23 RX ADMIN — LIDOCAINE HYDROCHLORIDE 100 MG: 20 INJECTION, SOLUTION INTRAVENOUS at 10:08

## 2022-08-23 NOTE — ANESTHESIA PREPROCEDURE EVALUATION
08/23/2022  Belem Swann is a 42 y.o., female.      Pre-op Assessment    I have reviewed the Patient Summary Reports.     I have reviewed the Nursing Notes. I have reviewed the NPO Status.   I have reviewed the Medications.     Review of Systems  Anesthesia Hx:  No problems with previous Anesthesia    Cardiovascular:   Hypertension hyperlipidemia    Pulmonary:   Asthma Sleep Apnea, CPAP    Musculoskeletal:  Joint Disease:  Arthritis, Rheumatoid Arthritis    Neurological:   Neuromuscular Disease,  Rheumatoid Arthritis    Endocrine:   Diabetes, type 2  Metabolic Disorders, Morbid Obesity / BMI > 40  Psych:   Psychiatric History anxiety depression        Past Medical History:   Diagnosis Date    Anxiety     Asthma     Chronic pain     Cushing syndrome     Depression     Essential hypertension     Fibromyalgia     Herpes zoster     Hyperlipidemia     Onychomycosis     MARY on CPAP     Rheumatoid arthritis     Type 2 diabetes mellitus 11/2020    Vitamin D deficiency 05/03/2018       Physical Exam  General: Well nourished, Cooperative, Alert and Oriented    Airway:  Mallampati: III       Chest/Lungs:  Clear to auscultation    Heart:  Rate: Normal        Anesthesia Plan  Type of Anesthesia, risks & benefits discussed:    Anesthesia Type: Gen Natural Airway, MAC  Intra-op Monitoring Plan: Standard ASA Monitors  Post Op Pain Control Plan: multimodal analgesia and IV/PO Opioids PRN  Induction:  IV  Informed Consent: Informed consent signed with the Patient and all parties understand the risks and agree with anesthesia plan.  All questions answered. Patient consented to blood products? Yes  ASA Score: 3  Day of Surgery Review of History & Physical: I have interviewed and examined the patient. I have reviewed the patient's H&P dated: There are no significant changes.     Ready For Surgery From  Anesthesia Perspective.     .

## 2022-08-23 NOTE — OP NOTE
Procedure Note    Procedure Date: 8/23/2022    Procedure Performed: Bilateral Sacroiliac joint injection, with Fluoroscopic Guidance    Indications: Patient has failed conservative therapy.      Pre-op diagnosis: Bilateral sided Sacroiliitis    Post-op diagnosis: same    Physician: REBECCA Torre MD    Anesthesia: MAC    Medications injected: 0.25% Bupivacaine, 1% Lidocaine, Kenalog 40mg    Local anesthetic used: 1% Lidocaine, 2ml    Estimated Blood Loss: None    Complications:None    Technique:  The patient was interviewed in the holding area and Risks/Benefits were discussed, including, but not limited to, the possibility of new or different pain, bleeding or infection.   All questions were answered.  The patient understood and accepted risks.  Consent was reviewed and signed.   A time out was taken to identify the patient, procedure and side of the procedure.  The skin was prepped with chloroprep and sterile drapes were applied. The Bilateral SI joint was identified by fluoroscopy in an oblique plane. Skin and subcutaneous tissues overyling the target area was anesthetized with 1-2 mL of Lidocaine 1%.  A 22g 3 1/2 inch spinal needle was advanced under intermittent fluoroscopy until joint space was entered at the junction of the superior 2/3 & inferior 1/3 of the joint.  There was no paresthesia with needle placement. Aspiration was negative for blood. Next, a 2 cc solution from a mixture of 40 mg kenalog, 2mL of 0.25% Marcaine, 1ml 1% Lidocaine mixture was injected without difficulty or resistance into each joint. The needle was removed and a sterile Band-Aid dressing was applied to the puncture site. The patient tolerated the procedure well.  The patient was monitored after the procedure.  The patient was given post procedure and discharge instructions to follow at home. The patient was discharged in a stable condition

## 2022-08-23 NOTE — TRANSFER OF CARE
"Anesthesia Transfer of Care Note    Patient: Belem Swann    Procedure(s) Performed: Procedure(s) (LRB):  BLOCK, SACROILIAC JOINT (Bilateral)    Patient location: PACU    Anesthesia Type: general    Transport from OR: Transported from OR on room air with adequate spontaneous ventilation    Post pain: adequate analgesia    Post assessment: no apparent anesthetic complications    Post vital signs: stable    Level of consciousness: responds to stimulation    Nausea/Vomiting: no nausea/vomiting    Complications: none    Transfer of care protocol was followed      Last vitals:   Visit Vitals  /63 (BP Location: Left arm, Patient Position: Lying)   Pulse 80   Temp 36.1 °C (97 °F) (Oral)   Resp (!) 27   Ht 5' 2" (1.575 m)   Wt (!) 169.6 kg (374 lb)   SpO2 96%   BMI 68.41 kg/m²     "

## 2022-08-23 NOTE — DISCHARGE SUMMARY
[facility] - Pain Management  Discharge Note  Short Stay    Procedure(s) (LRB):  BLOCK, SACROILIAC JOINT (Bilateral)    OUTCOME: Patient tolerated treatment/procedure well without complication and is now ready for discharge.    DISPOSITION: Home or Self Care    FINAL DIAGNOSIS:  Bilateral sacroiliitis    FOLLOWUP: In clinic    DISCHARGE INSTRUCTIONS:  See nurse's notes     TIME SPENT ON DISCHARGE: 5 minutes

## 2022-08-23 NOTE — ANESTHESIA POSTPROCEDURE EVALUATION
Anesthesia Post Evaluation    Patient: Belem Swann    Procedure(s) Performed: Procedure(s) (LRB):  BLOCK, SACROILIAC JOINT (Bilateral)    Final Anesthesia Type: general      Patient location during evaluation: PACU  Patient participation: Yes- Able to Participate  Level of consciousness: awake and alert  Post-procedure vital signs: reviewed and stable  Pain management: adequate  Airway patency: patent    PONV status at discharge: No PONV  Anesthetic complications: no      Cardiovascular status: blood pressure returned to baseline and hemodynamically stable  Respiratory status: spontaneous ventilation and unassisted  Hydration status: euvolemic  Follow-up not needed.          Vitals Value Taken Time   /67 08/23/22 1108   Temp 36.1 °C (97 °F) 08/23/22 1108   Pulse 80 08/23/22 1108   Resp 27 08/23/22 1108   SpO2 95 % 08/23/22 1108   Vitals shown include unvalidated device data.      No case tracking events are documented in the log.      Pain/Lowell Score: No data recorded

## 2022-08-23 NOTE — BRIEF OP NOTE
Discharge Note  Short Stay    Admit Date: 8/23/2022    Discharge Date: 8/23/2022    Attending Physician: Carmel Torre     Discharge Provider: Carmel Torre    Diagnoses: Bilateral sacroiliitis    Discharged Condition: Good    Final Diagnoses: Sacroiliitis [M46.1]    Disposition: Home or Self Care    Hospital Course: No complications, uneventful    Outcome of Hospitalization, Treatment, Procedure, or Surgery:  Patient was admitted for outpatient interventional pain management procedure. The patient tolerated the procedure well with no complications.    Follow up/Patient Instructions:  Follow up as scheduled in Pain Management office in 3-4 weeks.  Patient has received instructions and follow up date and time.    Medications:  Continue previous medications

## 2022-09-01 NOTE — PROGRESS NOTES
Subjective:         Patient ID: Belem Swann is a 42 y.o. female.    Chief Complaint: Low-back Pain and Knee Pain      Pain  This is a chronic problem. The current episode started more than 1 year ago. The problem occurs daily. The problem has been unchanged. Associated symptoms include arthralgias and neck pain. Pertinent negatives include no change in bowel habit, chest pain, coughing, diaphoresis, fever, rash, sore throat, vertigo or vomiting.   Review of Systems   Constitutional:  Negative for activity change, appetite change, diaphoresis, fever and unexpected weight change.   HENT:  Negative for drooling, ear discharge, ear pain, facial swelling, mouth sores, nosebleeds, sore throat, trouble swallowing, voice change and goiter.    Eyes:  Negative for photophobia, pain, discharge, redness and visual disturbance.   Respiratory:  Negative for apnea, cough, choking, chest tightness, shortness of breath, wheezing and stridor.    Cardiovascular:  Negative for chest pain, palpitations and leg swelling.   Gastrointestinal:  Negative for abdominal distention, change in bowel habit, diarrhea, rectal pain, vomiting, fecal incontinence and change in bowel habit.   Endocrine: Negative for cold intolerance, heat intolerance, polydipsia, polyphagia and polyuria.   Genitourinary:  Negative for bladder incontinence, dysuria, flank pain, frequency and hot flashes.   Musculoskeletal:  Positive for arthralgias, back pain, leg pain, neck pain and neck stiffness.   Integumentary:  Negative for color change, pallor and rash.   Allergic/Immunologic: Negative for immunocompromised state.   Neurological:  Negative for dizziness, vertigo, seizures, syncope, facial asymmetry, speech difficulty, light-headedness, coordination difficulties, memory loss and coordination difficulties.   Hematological:  Negative for adenopathy. Does not bruise/bleed easily.   Psychiatric/Behavioral:  Negative for agitation, behavioral problems,  confusion, decreased concentration, dysphoric mood, hallucinations, self-injury and suicidal ideas. The patient is not hyperactive.          Past Medical History:   Diagnosis Date    Anxiety     Asthma     Chronic pain     Cushing syndrome     Depression     Essential hypertension     Fibromyalgia     Herpes zoster     Hyperlipidemia     Onychomycosis     MARY on CPAP     Rheumatoid arthritis     Type 2 diabetes mellitus 2020    Vitamin D deficiency 2018     Past Surgical History:   Procedure Laterality Date     SECTION      ELBOW SURGERY  1985    EPIDURAL STEROID INJECTION      L4-5 VERITO Dec 2020-[facility]    FEMUR SURGERY Right     due to osteomyelitis    HYSTERECTOMY      INJECTION OF ANESTHETIC AGENT AROUND MEDIAL BRANCH NERVES INNERVATING LUMBAR FACET JOINT Bilateral 2022    Procedure: Bilateral L4-5,5-S1 MBB ( No steroids);  Surgeon: Carmel Torre MD;  Location: [facility];  Service: Pain Management;  Laterality: Bilateral;  PT AWARE TO BE TESTED ON OV    INJECTION OF ANESTHETIC AGENT AROUND MEDIAL BRANCH NERVES INNERVATING LUMBAR FACET JOINT Bilateral 3/8/2022    Procedure: Block-nerve-medial branch-lumbar, bilateral L4 through S1;  Surgeon: Carmel Torre MD;  Location: [facility];  Service: Pain Management;  Laterality: Bilateral;    INJECTION OF ANESTHETIC AGENT INTO SACROILIAC JOINT Bilateral 2022    Procedure: BLOCK, SACROILIAC JOINT;  Surgeon: Carmel Torre MD;  Location: [facility];  Service: Pain Management;  Laterality: Bilateral;  covid test put in    LIPOMA RESECTION  2019    RADIOFREQUENCY ABLATION OF LUMBAR MEDIAL BRANCH NERVE AT SINGLE LEVEL Right 2022    Procedure: Radiofrequency Ablation, Nerve, Spinal, Lumbar, Medial Branch, Level L4-S1, right side 1st, will have left-sided after completing;  Surgeon: Carmel Torre MD;  Location: [facility];  Service: Pain Management;  Laterality: Right;  pt aware at visit to be  "tested    RADIOFREQUENCY ABLATION OF LUMBAR MEDIAL BRANCH NERVE AT SINGLE LEVEL Left 5/5/2022    Procedure: LEFT  L4-S1 RFTC  (HAD RIGHT  ON 4-14);  Surgeon: Carmel Torre MD;  Location: [facility];  Service: Pain Management;  Laterality: Left;    SURGICAL REMOVAL OF PILONIDAL CYST      TRANSFORAMINAL EPIDURAL INJECTION OF STEROID      Bilateral L4-5 TFESI Nov 2020-[facility]    TRANSFORAMINAL EPIDURAL INJECTION OF STEROID      Right L4-5 TFESI Feb 2020-[facility]    VENTRAL HERNIA REPAIR  09/2020     Social History     Socioeconomic History    Marital status:    Tobacco Use    Smoking status: Former    Smokeless tobacco: Never   Substance and Sexual Activity    Alcohol use: Never    Drug use: Never    Sexual activity: Not Currently     Family History   Problem Relation Age of Onset    Cancer Mother     Thyroid cancer Mother     Thyroid cancer Maternal Grandmother     Melanoma Neg Hx      Review of patient's allergies indicates:   Allergen Reactions    Doxycycline     Latex         Objective:  Vitals:    09/06/22 0857 09/06/22 0858   BP: (!) 196/103    Pulse: 79    Weight: (!) 162.7 kg (358 lb 9.6 oz)    Height: 5' 2.4" (1.585 m)    PainSc:   6   6         Physical Exam  Vitals and nursing note reviewed. Exam conducted with a chaperone present.   Constitutional:       General: She is awake.      Appearance: Normal appearance. She is not toxic-appearing or diaphoretic.   HENT:      Head: Normocephalic and atraumatic.      Nose: Nose normal.      Mouth/Throat:      Mouth: Mucous membranes are moist.      Pharynx: Oropharynx is clear.   Eyes:      Conjunctiva/sclera: Conjunctivae normal.      Pupils: Pupils are equal, round, and reactive to light.   Cardiovascular:      Rate and Rhythm: Normal rate.   Pulmonary:      Effort: Pulmonary effort is normal. No respiratory distress.   Abdominal:      Palpations: Abdomen is soft.      Tenderness: There is no guarding.   Musculoskeletal:         General: No swelling. "      Cervical back: Normal range of motion and neck supple. Tenderness present. No rigidity.      Thoracic back: Tenderness present.      Lumbar back: Tenderness present. Decreased range of motion.   Skin:     General: Skin is warm and dry.      Coloration: Skin is not jaundiced or pale.   Neurological:      General: No focal deficit present.      Mental Status: She is alert and oriented to person, place, and time. Mental status is at baseline.      Cranial Nerves: No cranial nerve deficit (II-XII).   Psychiatric:         Mood and Affect: Mood normal.         Behavior: Behavior normal. Behavior is cooperative.         Thought Content: Thought content normal.         FL Fluoro for Pain Management  See OP Notes for results.     IMPRESSION: See OP Notes for results.     This procedure was auto-finalized by: Virtual Radiologist       Admission on 08/23/2022, Discharged on 08/23/2022   Component Date Value Ref Range Status    POC Glucose 08/23/2022 122 (A)  70 - 110 MG/DL Final    POC Glucose 08/23/2022 122 (H)  70 - 105 mg/dL Final   Lab Visit on 08/19/2022   Component Date Value Ref Range Status    SARS CoV-2 PCR 08/19/2022 Negative  Negative, Invalid Final   Office Visit on 05/18/2022   Component Date Value Ref Range Status    POC Amphetamines 05/18/2022 Negative  Negative, Inconclusive Final    POC Barbiturates 05/18/2022 Negative  Negative, Inconclusive Final    POC Benzodiazepines 05/18/2022 Negative  Negative, Inconclusive Final    POC Cocaine 05/18/2022 Negative  Negative, Inconclusive Final    POC THC 05/18/2022 Negative  Negative, Inconclusive Final    POC Methadone 05/18/2022 Negative  Negative, Inconclusive Final    POC Methamphetamine 05/18/2022 Negative  Negative, Inconclusive Final    POC Opiates 05/18/2022 Presumptive Positive (A)  Negative, Inconclusive Final    POC Oxycodone 05/18/2022 Negative  Negative, Inconclusive Final    POC Phencyclidine 05/18/2022 Negative  Negative, Inconclusive Final    POC  Methylenedioxymethamphetamine * 05/18/2022 Negative  Negative, Inconclusive Final    POC Tricyclic Antidepressants 05/18/2022 Negative  Negative, Inconclusive Final    POC Buprenorphine 05/18/2022 Negative   Final     Acceptable 05/18/2022 Yes   Final    POC Temperature (Urine) 05/18/2022 90   Final   Office Visit on 05/11/2022   Component Date Value Ref Range Status    Sodium 05/11/2022 138  136 - 145 mmol/L Final    Potassium 05/11/2022 4.3  3.5 - 5.1 mmol/L Final    Chloride 05/11/2022 102  98 - 107 mmol/L Final    CO2 05/11/2022 33 (H)  21 - 32 mmol/L Final    Anion Gap 05/11/2022 7  7 - 16 mmol/L Final    Glucose 05/11/2022 102  74 - 106 mg/dL Final    BUN 05/11/2022 18  7 - 18 mg/dL Final    Creatinine 05/11/2022 0.71  0.55 - 1.02 mg/dL Final    BUN/Creatinine Ratio 05/11/2022 25 (H)  6 - 20 Final    Calcium 05/11/2022 9.6  8.5 - 10.1 mg/dL Final    Total Protein 05/11/2022 8.6 (H)  6.4 - 8.2 g/dL Final    Albumin 05/11/2022 3.5  3.5 - 5.0 g/dL Final    Globulin 05/11/2022 5.1 (H)  2.0 - 4.0 g/dL Final    A/G Ratio 05/11/2022 0.7   Final    Bilirubin, Total 05/11/2022 0.3  0.0 - 1.2 mg/dL Final    Alk Phos 05/11/2022 140 (H)  37 - 98 U/L Final    ALT 05/11/2022 35  13 - 56 U/L Final    AST 05/11/2022 16  15 - 37 U/L Final    eGFR 05/11/2022 95  >=60 mL/min/1.73m² Final    Protein, Urine 05/11/2022 7.7  0.0 - 11.9 mg/dL Final    Albumin % 05/11/2022 100  % Final    Pathologist Interp, Pro Elect, Uri* 05/11/2022 No Abnormal Proteins Detected   Final    Total Protein 05/11/2022 8.3 (H)  6.4 - 8.2 g/dL Final    Albumin, PE 05/11/2022 4.82  3.5 - 5.2 g/dL Final    Alpha-1-Globulin 05/11/2022 0.2  0.1 - 0.4 g/dL Final    Alpha-2-Globulin 05/11/2022 1.0  0.4 - 1.3 g/dL Final    Beta, PE 05/11/2022 1.0  0.5 - 1.5 g/dL Final    Gamma, PE 05/11/2022 1.3  0.5 - 1.8 g/dL Final    Pathologist Interp, Pro Elect, Blo* 05/11/2022 Normal. No monoclonal bands identified   Final    LDH 05/11/2022 254 (H)  87 - 241  U/L Final    Hemoglobin A1C 05/11/2022 6.5  4.5 - 6.6 % Final    Estimated Average Glucose 05/11/2022 130  mg/dL Final    WBC 05/11/2022 18.31 (H)  4.50 - 11.00 K/uL Final    RBC 05/11/2022 4.99  4.20 - 5.40 M/uL Final    Hemoglobin 05/11/2022 14.4  12.0 - 16.0 g/dL Final    Hematocrit 05/11/2022 46.0  38.0 - 47.0 % Final    MCV 05/11/2022 92.2  80.0 - 96.0 fL Final    MCH 05/11/2022 28.9  27.0 - 31.0 pg Final    MCHC 05/11/2022 31.3 (L)  32.0 - 36.0 g/dL Final    RDW 05/11/2022 14.9 (H)  11.5 - 14.5 % Final    Platelet Count 05/11/2022 183  150 - 400 K/uL Final    MPV 05/11/2022 13.3 (H)  9.4 - 12.4 fL Final    Neutrophils % 05/11/2022 68.9 (H)  53.0 - 65.0 % Final    Lymphocytes % 05/11/2022 20.7 (L)  27.0 - 41.0 % Final    Monocytes % 05/11/2022 6.6 (H)  2.0 - 6.0 % Final    Eosinophils % 05/11/2022 2.1  1.0 - 4.0 % Final    Basophils % 05/11/2022 0.5  0.0 - 1.0 % Final    Immature Granulocytes % 05/11/2022 1.2 (H)  0.0 - 0.4 % Final    nRBC, Auto 05/11/2022 0.0  <=0.0 % Final    Neutrophils, Abs 05/11/2022 12.62 (H)  1.80 - 7.70 K/uL Final    Lymphocytes, Absolute 05/11/2022 3.79  1.00 - 4.80 K/uL Final    Monocytes, Absolute 05/11/2022 1.21 (H)  0.00 - 0.80 K/uL Final    Eosinophils, Absolute 05/11/2022 0.38  0.00 - 0.50 K/uL Final    Basophils, Absolute 05/11/2022 0.09  0.00 - 0.20 K/uL Final    Immature Granulocytes, Absolute 05/11/2022 0.22 (H)  0.00 - 0.04 K/uL Final    nRBC, Absolute 05/11/2022 0.00  <=0.00 x10e3/uL Final    Diff Type 05/11/2022 Scan Smear   Final    Platelet Morphology 05/11/2022 Few Large Platelets (A)  Normal Final    RBC Morphology 05/11/2022 Normal   Final   Admission on 05/05/2022, Discharged on 05/05/2022   Component Date Value Ref Range Status    POC Glucose 05/05/2022 131 (A)  70 - 110 MG/DL Final    POC Glucose 05/05/2022 131 (H)  70 - 105 mg/dL Final   Lab Visit on 05/03/2022   Component Date Value Ref Range Status    SARS CoV-2 PCR 05/03/2022 Negative  Negative, Invalid Final     Internal Control 05/03/2022 Valid   Final   Admission on 04/14/2022, Discharged on 04/14/2022   Component Date Value Ref Range Status    POC Glucose 04/14/2022 238 (A)  70 - 110 MG/DL Final    POC Glucose 04/14/2022 238 (H)  70 - 105 mg/dL Final   Lab Visit on 04/12/2022   Component Date Value Ref Range Status    SARS CoV-2 PCR 04/12/2022 Negative  Negative, Invalid Final    Internal Control 04/12/2022 Valid   Final   Admission on 03/08/2022, Discharged on 03/08/2022   Component Date Value Ref Range Status    POC Glucose 03/08/2022 116 (H)  70 - 105 mg/dL Final         Orders Placed This Encounter   Procedures    POCT Urine Drug Screen Presump     Interpretive Information:     Negative:  No drug detected at the cut off level.   Positive:  This result represents presumptive positive for the   tested drug, other substances may yield a positive response other   than the analyte of interest. This result should be utilized for   diagnostic purpose only. Confirmation testing will be performed upon physician request only.          Requested Prescriptions     Signed Prescriptions Disp Refills    gabapentin (NEURONTIN) 300 MG capsule 90 capsule 1     Sig: Take 1 capsule (300 mg total) by mouth every 8 (eight) hours.    HYDROcodone-acetaminophen (NORCO)  mg per tablet 90 tablet 0     Sig: Take 1 tablet by mouth every 8 (eight) hours as needed for Pain.    HYDROcodone-acetaminophen (NORCO)  mg per tablet 90 tablet 0     Sig: Take 1 tablet by mouth every 8 (eight) hours as needed for Pain.       Assessment:     1. Rheumatoid arthritis involving multiple sites with positive rheumatoid factor    2. Spondylosis of lumbar region without myelopathy or radiculopathy    3. Sacroiliitis    4. Other long term (current) drug therapy         A's of Opioid Risk Assessment  Activity:Patient can perform ADL.   Analgesia:Patients pain is partially controlled by current medication. Patient has tried OTC medications such as  Tylenol and Ibuprofen with out relief.   Adverse Effects: Patient denies constipation or sedation.  Aberrant Behavior:  reviewed with no aberrant drug seeking/taking behavior.  Overdose reversal drug naloxone discussed    Drug screen reviewed      Plan:    Follows rheumatology [facility] rheumatoid arthritis    Follow-up after bilateral sacroiliac injection # 1 August 23, 2022, she states she would 60% relief initially pain is slowly returning    At this point she would like to continue with conservative management home exercise program     Continue current medication    Currently dealing with social issues at home    Follow-up 2 months    Dr. Torre, August 2023    Bring original prescription medication bottles/container/box with labels to each visit    Pill count    Physical therapy

## 2022-09-06 ENCOUNTER — OFFICE VISIT (OUTPATIENT)
Dept: PAIN MEDICINE | Facility: CLINIC | Age: 42
End: 2022-09-06
Payer: MEDICAID

## 2022-09-06 VITALS
DIASTOLIC BLOOD PRESSURE: 103 MMHG | HEIGHT: 62 IN | BODY MASS INDEX: 53.92 KG/M2 | HEART RATE: 79 BPM | SYSTOLIC BLOOD PRESSURE: 196 MMHG | WEIGHT: 293 LBS

## 2022-09-06 DIAGNOSIS — M46.1 SACROILIITIS: ICD-10-CM

## 2022-09-06 DIAGNOSIS — M05.79 RHEUMATOID ARTHRITIS INVOLVING MULTIPLE SITES WITH POSITIVE RHEUMATOID FACTOR: Primary | Chronic | ICD-10-CM

## 2022-09-06 DIAGNOSIS — Z79.899 OTHER LONG TERM (CURRENT) DRUG THERAPY: ICD-10-CM

## 2022-09-06 DIAGNOSIS — M47.816 SPONDYLOSIS OF LUMBAR REGION WITHOUT MYELOPATHY OR RADICULOPATHY: Chronic | ICD-10-CM

## 2022-09-06 PROCEDURE — 3080F DIAST BP >= 90 MM HG: CPT | Mod: CPTII,,, | Performed by: PHYSICIAN ASSISTANT

## 2022-09-06 PROCEDURE — 80305 DRUG TEST PRSMV DIR OPT OBS: CPT | Mod: PBBFAC | Performed by: PHYSICIAN ASSISTANT

## 2022-09-06 PROCEDURE — 3044F HG A1C LEVEL LT 7.0%: CPT | Mod: CPTII,,, | Performed by: PHYSICIAN ASSISTANT

## 2022-09-06 PROCEDURE — 3077F SYST BP >= 140 MM HG: CPT | Mod: CPTII,,, | Performed by: PHYSICIAN ASSISTANT

## 2022-09-06 PROCEDURE — 3044F PR MOST RECENT HEMOGLOBIN A1C LEVEL <7.0%: ICD-10-PCS | Mod: CPTII,,, | Performed by: PHYSICIAN ASSISTANT

## 2022-09-06 PROCEDURE — 99214 OFFICE O/P EST MOD 30 MIN: CPT | Mod: S$PBB,,, | Performed by: PHYSICIAN ASSISTANT

## 2022-09-06 PROCEDURE — 3066F PR DOCUMENTATION OF TREATMENT FOR NEPHROPATHY: ICD-10-PCS | Mod: CPTII,,, | Performed by: PHYSICIAN ASSISTANT

## 2022-09-06 PROCEDURE — 4010F PR ACE/ARB THEARPY RXD/TAKEN: ICD-10-PCS | Mod: CPTII,,, | Performed by: PHYSICIAN ASSISTANT

## 2022-09-06 PROCEDURE — 3080F PR MOST RECENT DIASTOLIC BLOOD PRESSURE >= 90 MM HG: ICD-10-PCS | Mod: CPTII,,, | Performed by: PHYSICIAN ASSISTANT

## 2022-09-06 PROCEDURE — 3008F BODY MASS INDEX DOCD: CPT | Mod: CPTII,,, | Performed by: PHYSICIAN ASSISTANT

## 2022-09-06 PROCEDURE — 3066F NEPHROPATHY DOC TX: CPT | Mod: CPTII,,, | Performed by: PHYSICIAN ASSISTANT

## 2022-09-06 PROCEDURE — 4010F ACE/ARB THERAPY RXD/TAKEN: CPT | Mod: CPTII,,, | Performed by: PHYSICIAN ASSISTANT

## 2022-09-06 PROCEDURE — 3061F NEG MICROALBUMINURIA REV: CPT | Mod: CPTII,,, | Performed by: PHYSICIAN ASSISTANT

## 2022-09-06 PROCEDURE — 3077F PR MOST RECENT SYSTOLIC BLOOD PRESSURE >= 140 MM HG: ICD-10-PCS | Mod: CPTII,,, | Performed by: PHYSICIAN ASSISTANT

## 2022-09-06 PROCEDURE — 3008F PR BODY MASS INDEX (BMI) DOCUMENTED: ICD-10-PCS | Mod: CPTII,,, | Performed by: PHYSICIAN ASSISTANT

## 2022-09-06 PROCEDURE — 99214 PR OFFICE/OUTPT VISIT, EST, LEVL IV, 30-39 MIN: ICD-10-PCS | Mod: S$PBB,,, | Performed by: PHYSICIAN ASSISTANT

## 2022-09-06 PROCEDURE — 3061F PR NEG MICROALBUMINURIA RESULT DOCUMENTED/REVIEW: ICD-10-PCS | Mod: CPTII,,, | Performed by: PHYSICIAN ASSISTANT

## 2022-09-06 PROCEDURE — 99213 OFFICE O/P EST LOW 20 MIN: CPT | Mod: PBBFAC | Performed by: PHYSICIAN ASSISTANT

## 2022-09-06 PROCEDURE — 1159F PR MEDICATION LIST DOCUMENTED IN MEDICAL RECORD: ICD-10-PCS | Mod: CPTII,,, | Performed by: PHYSICIAN ASSISTANT

## 2022-09-06 PROCEDURE — 1159F MED LIST DOCD IN RCRD: CPT | Mod: CPTII,,, | Performed by: PHYSICIAN ASSISTANT

## 2022-09-06 RX ORDER — HYDROCODONE BITARTRATE AND ACETAMINOPHEN 10; 325 MG/1; MG/1
1 TABLET ORAL EVERY 8 HOURS PRN
Qty: 90 TABLET | Refills: 0 | Status: SHIPPED | OUTPATIENT
Start: 2022-10-29 | End: 2022-10-27 | Stop reason: SDUPTHER

## 2022-09-06 RX ORDER — HYDROCODONE BITARTRATE AND ACETAMINOPHEN 10; 325 MG/1; MG/1
1 TABLET ORAL EVERY 8 HOURS PRN
Qty: 90 TABLET | Refills: 0 | Status: SHIPPED | OUTPATIENT
Start: 2022-09-29 | End: 2022-09-09

## 2022-09-06 RX ORDER — GABAPENTIN 300 MG/1
300 CAPSULE ORAL EVERY 8 HOURS
Qty: 90 CAPSULE | Refills: 1 | Status: SHIPPED | OUTPATIENT
Start: 2022-09-06 | End: 2022-10-27 | Stop reason: SDUPTHER

## 2022-09-09 ENCOUNTER — OFFICE VISIT (OUTPATIENT)
Dept: FAMILY MEDICINE | Facility: CLINIC | Age: 42
End: 2022-09-09
Payer: MEDICAID

## 2022-09-09 VITALS
TEMPERATURE: 98 F | DIASTOLIC BLOOD PRESSURE: 77 MMHG | WEIGHT: 293 LBS | SYSTOLIC BLOOD PRESSURE: 102 MMHG | RESPIRATION RATE: 18 BRPM | BODY MASS INDEX: 53.92 KG/M2 | HEART RATE: 83 BPM | HEIGHT: 62 IN | OXYGEN SATURATION: 100 %

## 2022-09-09 DIAGNOSIS — R53.83 FATIGUE, UNSPECIFIED TYPE: Primary | ICD-10-CM

## 2022-09-09 DIAGNOSIS — R10.32 LLQ PAIN: ICD-10-CM

## 2022-09-09 DIAGNOSIS — R19.7 DIARRHEA, UNSPECIFIED TYPE: ICD-10-CM

## 2022-09-09 PROBLEM — F41.9 ANXIETY AND DEPRESSION: Status: ACTIVE | Noted: 2021-10-21

## 2022-09-09 PROBLEM — M25.50 POLYARTHRALGIA: Status: ACTIVE | Noted: 2022-09-09

## 2022-09-09 PROBLEM — E24.0: Status: ACTIVE | Noted: 2022-09-09

## 2022-09-09 PROBLEM — Z72.89 OTHER PROBLEMS RELATED TO LIFESTYLE: Status: ACTIVE | Noted: 2022-09-09

## 2022-09-09 PROBLEM — G47.34 IDIOPATHIC SLEEP RELATED NONOBSTRUCTIVE ALVEOLAR HYPOVENTILATION: Status: ACTIVE | Noted: 2022-09-09

## 2022-09-09 PROBLEM — Z79.899 OTHER LONG TERM (CURRENT) DRUG THERAPY: Status: ACTIVE | Noted: 2022-09-09

## 2022-09-09 PROBLEM — H92.09 OTALGIA: Status: ACTIVE | Noted: 2022-09-09

## 2022-09-09 PROBLEM — M26.609 TEMPOROMANDIBULAR JOINT DISORDER: Status: ACTIVE | Noted: 2022-09-09

## 2022-09-09 PROBLEM — F32.A ANXIETY AND DEPRESSION: Status: ACTIVE | Noted: 2021-10-21

## 2022-09-09 PROBLEM — L68.0 HIRSUTISM: Status: ACTIVE | Noted: 2022-09-09

## 2022-09-09 PROBLEM — H91.90 HEARING LOSS: Status: ACTIVE | Noted: 2022-09-09

## 2022-09-09 PROBLEM — M54.50 LOW BACK PAIN: Status: ACTIVE | Noted: 2022-09-09

## 2022-09-09 PROBLEM — H69.93 UNSPECIFIED EUSTACHIAN TUBE DISORDER, BILATERAL: Status: ACTIVE | Noted: 2022-09-09

## 2022-09-09 PROBLEM — M83.9 OSTEOMALACIA: Status: ACTIVE | Noted: 2022-09-09

## 2022-09-09 PROBLEM — H93.19 TINNITUS: Status: ACTIVE | Noted: 2022-09-09

## 2022-09-09 LAB
ALBUMIN SERPL BCP-MCNC: 3.8 G/DL (ref 3.5–5)
ALBUMIN/GLOB SERPL: 0.7 {RATIO}
ALP SERPL-CCNC: 127 U/L (ref 37–98)
ALT SERPL W P-5'-P-CCNC: 33 U/L (ref 13–56)
ANION GAP SERPL CALCULATED.3IONS-SCNC: 10 MMOL/L (ref 7–16)
AST SERPL W P-5'-P-CCNC: 30 U/L (ref 15–37)
BASOPHILS # BLD AUTO: 0.06 K/UL (ref 0–0.2)
BASOPHILS NFR BLD AUTO: 0.4 % (ref 0–1)
BILIRUB SERPL-MCNC: 0.6 MG/DL (ref ?–1.2)
BILIRUB SERPL-MCNC: NEGATIVE MG/DL
BLOOD, POC UA: NEGATIVE
BUN SERPL-MCNC: 31 MG/DL (ref 7–18)
BUN/CREAT SERPL: 37 (ref 6–20)
CALCIUM SERPL-MCNC: 10 MG/DL (ref 8.5–10.1)
CHLORIDE SERPL-SCNC: 101 MMOL/L (ref 98–107)
CO2 SERPL-SCNC: 28 MMOL/L (ref 21–32)
CREAT SERPL-MCNC: 0.84 MG/DL (ref 0.55–1.02)
CTP QC/QA: YES
DIFFERENTIAL METHOD BLD: ABNORMAL
EGFR (NO RACE VARIABLE) (RUSH/TITUS): 89 ML/MIN/1.73M²
EOSINOPHIL # BLD AUTO: 0.23 K/UL (ref 0–0.5)
EOSINOPHIL NFR BLD AUTO: 1.6 % (ref 1–4)
ERYTHROCYTE [DISTWIDTH] IN BLOOD BY AUTOMATED COUNT: 13.4 % (ref 11.5–14.5)
FLUAV AG NPH QL: NEGATIVE
FLUBV AG NPH QL: NEGATIVE
GLOBULIN SER-MCNC: 5.1 G/DL (ref 2–4)
GLUCOSE SERPL-MCNC: 144 MG/DL (ref 74–106)
GLUCOSE UR QL STRIP: NEGATIVE
HCT VFR BLD AUTO: 47.3 % (ref 38–47)
HGB BLD-MCNC: 15.4 G/DL (ref 12–16)
IMM GRANULOCYTES # BLD AUTO: 0.09 K/UL (ref 0–0.04)
IMM GRANULOCYTES NFR BLD: 0.6 % (ref 0–0.4)
KETONES UR QL STRIP: NEGATIVE
LEUKOCYTE ESTERASE URINE, POC: NEGATIVE
LYMPHOCYTES # BLD AUTO: 2.06 K/UL (ref 1–4.8)
LYMPHOCYTES NFR BLD AUTO: 14.6 % (ref 27–41)
MCH RBC QN AUTO: 28.5 PG (ref 27–31)
MCHC RBC AUTO-ENTMCNC: 32.6 G/DL (ref 32–36)
MCV RBC AUTO: 87.6 FL (ref 80–96)
MONOCYTES # BLD AUTO: 0.81 K/UL (ref 0–0.8)
MONOCYTES NFR BLD AUTO: 5.7 % (ref 2–6)
MPC BLD CALC-MCNC: 13.4 FL (ref 9.4–12.4)
NEUTROPHILS # BLD AUTO: 10.9 K/UL (ref 1.8–7.7)
NEUTROPHILS NFR BLD AUTO: 77.1 % (ref 53–65)
NITRITE, POC UA: NEGATIVE
NRBC # BLD AUTO: 0 X10E3/UL
NRBC, AUTO (.00): 0 %
PH, POC UA: 5.5
PLATELET # BLD AUTO: 190 K/UL (ref 150–400)
PLATELET MORPHOLOGY: ABNORMAL
POTASSIUM SERPL-SCNC: 4.7 MMOL/L (ref 3.5–5.1)
PROT SERPL-MCNC: 8.9 G/DL (ref 6.4–8.2)
PROTEIN, POC: NEGATIVE
RBC # BLD AUTO: 5.4 M/UL (ref 4.2–5.4)
RBC MORPH BLD: NORMAL
SODIUM SERPL-SCNC: 134 MMOL/L (ref 136–145)
SPECIFIC GRAVITY, POC UA: 1.02
UROBILINOGEN, POC UA: 0.2
WBC # BLD AUTO: 14.15 K/UL (ref 4.5–11)

## 2022-09-09 PROCEDURE — 80053 COMPREHENSIVE METABOLIC PANEL: ICD-10-PCS | Mod: ,,, | Performed by: CLINICAL MEDICAL LABORATORY

## 2022-09-09 PROCEDURE — 3061F PR NEG MICROALBUMINURIA RESULT DOCUMENTED/REVIEW: ICD-10-PCS | Mod: CPTII,,, | Performed by: NURSE PRACTITIONER

## 2022-09-09 PROCEDURE — 1160F RVW MEDS BY RX/DR IN RCRD: CPT | Mod: CPTII,,, | Performed by: NURSE PRACTITIONER

## 2022-09-09 PROCEDURE — 3066F NEPHROPATHY DOC TX: CPT | Mod: CPTII,,, | Performed by: NURSE PRACTITIONER

## 2022-09-09 PROCEDURE — 3044F PR MOST RECENT HEMOGLOBIN A1C LEVEL <7.0%: ICD-10-PCS | Mod: CPTII,,, | Performed by: NURSE PRACTITIONER

## 2022-09-09 PROCEDURE — 99214 OFFICE O/P EST MOD 30 MIN: CPT | Mod: ,,, | Performed by: NURSE PRACTITIONER

## 2022-09-09 PROCEDURE — 85025 COMPLETE CBC W/AUTO DIFF WBC: CPT | Mod: ,,, | Performed by: CLINICAL MEDICAL LABORATORY

## 2022-09-09 PROCEDURE — 1159F PR MEDICATION LIST DOCUMENTED IN MEDICAL RECORD: ICD-10-PCS | Mod: CPTII,,, | Performed by: NURSE PRACTITIONER

## 2022-09-09 PROCEDURE — 87804 INFLUENZA ASSAY W/OPTIC: CPT | Mod: RHCUB | Performed by: NURSE PRACTITIONER

## 2022-09-09 PROCEDURE — 3078F PR MOST RECENT DIASTOLIC BLOOD PRESSURE < 80 MM HG: ICD-10-PCS | Mod: CPTII,,, | Performed by: NURSE PRACTITIONER

## 2022-09-09 PROCEDURE — 1160F PR REVIEW ALL MEDS BY PRESCRIBER/CLIN PHARMACIST DOCUMENTED: ICD-10-PCS | Mod: CPTII,,, | Performed by: NURSE PRACTITIONER

## 2022-09-09 PROCEDURE — 3008F BODY MASS INDEX DOCD: CPT | Mod: CPTII,,, | Performed by: NURSE PRACTITIONER

## 2022-09-09 PROCEDURE — 3074F PR MOST RECENT SYSTOLIC BLOOD PRESSURE < 130 MM HG: ICD-10-PCS | Mod: CPTII,,, | Performed by: NURSE PRACTITIONER

## 2022-09-09 PROCEDURE — 1159F MED LIST DOCD IN RCRD: CPT | Mod: CPTII,,, | Performed by: NURSE PRACTITIONER

## 2022-09-09 PROCEDURE — 3044F HG A1C LEVEL LT 7.0%: CPT | Mod: CPTII,,, | Performed by: NURSE PRACTITIONER

## 2022-09-09 PROCEDURE — 81003 URINALYSIS AUTO W/O SCOPE: CPT | Mod: RHCUB | Performed by: NURSE PRACTITIONER

## 2022-09-09 PROCEDURE — 87086 URINE CULTURE/COLONY COUNT: CPT | Mod: ,,, | Performed by: CLINICAL MEDICAL LABORATORY

## 2022-09-09 PROCEDURE — 3061F NEG MICROALBUMINURIA REV: CPT | Mod: CPTII,,, | Performed by: NURSE PRACTITIONER

## 2022-09-09 PROCEDURE — 85025 CBC WITH DIFFERENTIAL: ICD-10-PCS | Mod: ,,, | Performed by: CLINICAL MEDICAL LABORATORY

## 2022-09-09 PROCEDURE — 3078F DIAST BP <80 MM HG: CPT | Mod: CPTII,,, | Performed by: NURSE PRACTITIONER

## 2022-09-09 PROCEDURE — 87086 CULTURE, URINE: ICD-10-PCS | Mod: ,,, | Performed by: CLINICAL MEDICAL LABORATORY

## 2022-09-09 PROCEDURE — 4010F ACE/ARB THERAPY RXD/TAKEN: CPT | Mod: CPTII,,, | Performed by: NURSE PRACTITIONER

## 2022-09-09 PROCEDURE — 80053 COMPREHEN METABOLIC PANEL: CPT | Mod: ,,, | Performed by: CLINICAL MEDICAL LABORATORY

## 2022-09-09 PROCEDURE — 3066F PR DOCUMENTATION OF TREATMENT FOR NEPHROPATHY: ICD-10-PCS | Mod: CPTII,,, | Performed by: NURSE PRACTITIONER

## 2022-09-09 PROCEDURE — 4010F PR ACE/ARB THEARPY RXD/TAKEN: ICD-10-PCS | Mod: CPTII,,, | Performed by: NURSE PRACTITIONER

## 2022-09-09 PROCEDURE — 3008F PR BODY MASS INDEX (BMI) DOCUMENTED: ICD-10-PCS | Mod: CPTII,,, | Performed by: NURSE PRACTITIONER

## 2022-09-09 PROCEDURE — 99214 PR OFFICE/OUTPT VISIT, EST, LEVL IV, 30-39 MIN: ICD-10-PCS | Mod: ,,, | Performed by: NURSE PRACTITIONER

## 2022-09-09 PROCEDURE — 3074F SYST BP LT 130 MM HG: CPT | Mod: CPTII,,, | Performed by: NURSE PRACTITIONER

## 2022-09-09 RX ORDER — MELOXICAM 15 MG/1
15 TABLET ORAL DAILY
COMMUNITY
Start: 2022-05-11 | End: 2023-01-12

## 2022-09-09 RX ORDER — CIPROFLOXACIN 500 MG/1
500 TABLET ORAL 2 TIMES DAILY
Qty: 20 TABLET | Refills: 0 | Status: SHIPPED | OUTPATIENT
Start: 2022-09-09 | End: 2023-01-12 | Stop reason: ALTCHOICE

## 2022-09-09 RX ORDER — METRONIDAZOLE 500 MG/1
500 TABLET ORAL EVERY 8 HOURS
Qty: 30 TABLET | Refills: 0 | Status: SHIPPED | OUTPATIENT
Start: 2022-09-09 | End: 2022-09-19

## 2022-09-09 RX ORDER — CLONIDINE HYDROCHLORIDE 0.1 MG/1
0.1 TABLET ORAL DAILY PRN
COMMUNITY
Start: 2022-09-08

## 2022-09-09 RX ORDER — CARVEDILOL 12.5 MG/1
25 TABLET ORAL 2 TIMES DAILY
COMMUNITY
Start: 2022-07-06 | End: 2023-06-27 | Stop reason: DRUGHIGH

## 2022-09-09 NOTE — PROGRESS NOTES
Please call patient and ask if she has seen hematology yet. Rhoda Dodgety referred her to them for chronically elevated WBC. It is elevated today but better than it was 4 months ago. I want her to bring back a stool sample for enteric panel and c-diff. I am sending in an antibiotic in case of diverticulitis. She should measure her temp q 6 hours and go to ER if temp greater than 100.4, severe abd pain, or worsening. Thanks

## 2022-09-09 NOTE — PROGRESS NOTES
"Subjective:       Patient ID: Belem Swann is a 42 y.o. female.    Chief Complaint: Nausea (Nausea, vomiting, diarrhea, and fatigue since yesterday)    Presents to clinic as above. No fever. No recent antibiotic use. Had covid 1 month ago. Has had diarrhea 4 times since yesterday. Just feels unwell. No URI s/s. No severe abd pain. No dysuria, hematuria or blood in stool. Has had a hysterectomy in the past.   Review of Systems   Constitutional: Negative.    Respiratory: Negative.     Cardiovascular: Negative.    Gastrointestinal:  Positive for abdominal pain, diarrhea, nausea and vomiting.   Genitourinary: Negative.    Musculoskeletal: Negative.         Reviewed family, medical, surgical, and social history.    Objective:      /77   Pulse 83   Temp 98.2 °F (36.8 °C)   Resp 18   Ht 5' 2" (1.575 m)   Wt (!) 162.4 kg (358 lb)   SpO2 100%   BMI 65.48 kg/m²   Physical Exam  Vitals and nursing note reviewed.   Constitutional:       General: She is not in acute distress.     Appearance: Normal appearance. She is not ill-appearing, toxic-appearing or diaphoretic.   HENT:      Head: Normocephalic.      Right Ear: Tympanic membrane, ear canal and external ear normal.      Left Ear: Tympanic membrane, ear canal and external ear normal.      Nose: Nose normal. No congestion or rhinorrhea.      Mouth/Throat:      Mouth: Mucous membranes are moist.      Pharynx: No oropharyngeal exudate or posterior oropharyngeal erythema.   Cardiovascular:      Rate and Rhythm: Normal rate and regular rhythm.      Heart sounds: Normal heart sounds.   Pulmonary:      Effort: Pulmonary effort is normal.      Breath sounds: Normal breath sounds.   Abdominal:      General: Abdomen is flat. Bowel sounds are normal. There is no distension.      Palpations: Abdomen is soft. There is no mass.      Tenderness: There is abdominal tenderness. There is no right CVA tenderness, left CVA tenderness, guarding or rebound.      Hernia: No hernia " is present.      Comments: Mild LLQ tenderness. Palpation difficult due to abd adiposity.    Musculoskeletal:      Cervical back: Normal range of motion and neck supple.   Skin:     General: Skin is warm and dry.      Capillary Refill: Capillary refill takes less than 2 seconds.   Neurological:      Mental Status: She is alert and oriented to person, place, and time.   Psychiatric:         Mood and Affect: Mood normal.         Behavior: Behavior normal.         Thought Content: Thought content normal.         Judgment: Judgment normal.          Office Visit on 09/09/2022   Component Date Value Ref Range Status    Rapid Influenza A Ag 09/09/2022 Negative  Negative Final    Rapid Influenza B Ag 09/09/2022 Negative  Negative Final     Acceptable 09/09/2022 Yes   Final    WBC, UA 09/09/2022 Negative   Final    Nitrite, UA 09/09/2022 Negative   Final    Urobilinogen, UA 09/09/2022 0.2   Final    Protein, POC 09/09/2022 Negative   Final    pH, UA 09/09/2022 5.5   Final    Blood, UA 09/09/2022 Negative   Final    Spec Grav UA 09/09/2022 1.025   Final    Ketones, UA 09/09/2022 Negative   Final    Bilirubin, POC 09/09/2022 Negative   Final    Glucose, UA 09/09/2022 Negative   Final    WBC 09/09/2022 14.15 (H)  4.50 - 11.00 K/uL Final    RBC 09/09/2022 5.40  4.20 - 5.40 M/uL Final    Hemoglobin 09/09/2022 15.4  12.0 - 16.0 g/dL Final    Hematocrit 09/09/2022 47.3 (H)  38.0 - 47.0 % Final    MCV 09/09/2022 87.6  80.0 - 96.0 fL Final    MCH 09/09/2022 28.5  27.0 - 31.0 pg Final    MCHC 09/09/2022 32.6  32.0 - 36.0 g/dL Final    RDW 09/09/2022 13.4  11.5 - 14.5 % Final    Platelet Count 09/09/2022 190  150 - 400 K/uL Final    MPV 09/09/2022 13.4 (H)  9.4 - 12.4 fL Final    Neutrophils % 09/09/2022 77.1 (H)  53.0 - 65.0 % Final    Lymphocytes % 09/09/2022 14.6 (L)  27.0 - 41.0 % Final    Monocytes % 09/09/2022 5.7  2.0 - 6.0 % Final    Eosinophils % 09/09/2022 1.6  1.0 - 4.0 % Final    Basophils % 09/09/2022 0.4   0.0 - 1.0 % Final    Immature Granulocytes % 09/09/2022 0.6 (H)  0.0 - 0.4 % Final    nRBC, Auto 09/09/2022 0.0  <=0.0 % Final    Neutrophils, Abs 09/09/2022 10.90 (H)  1.80 - 7.70 K/uL Final    Lymphocytes, Absolute 09/09/2022 2.06  1.00 - 4.80 K/uL Final    Monocytes, Absolute 09/09/2022 0.81 (H)  0.00 - 0.80 K/uL Final    Eosinophils, Absolute 09/09/2022 0.23  0.00 - 0.50 K/uL Final    Basophils, Absolute 09/09/2022 0.06  0.00 - 0.20 K/uL Final    Immature Granulocytes, Absolute 09/09/2022 0.09 (H)  0.00 - 0.04 K/uL Final    nRBC, Absolute 09/09/2022 0.00  <=0.00 x10e3/uL Final    Diff Type 09/09/2022 Scan Smear   Final    Platelet Morphology 09/09/2022 Large Platelets (A)  Normal Final    RBC Morphology 09/09/2022 Normal   Final      Assessment:       1. Fatigue, unspecified type    2. LLQ pain    3. Diarrhea, unspecified type        Plan:       Fatigue, unspecified type  -     POCT Influenza A/B  -     CBC Auto Differential; Future; Expected date: 09/09/2022  -     Comprehensive Metabolic Panel; Future; Expected date: 09/09/2022  -     CBC Morphology    LLQ pain  -     POCT Urinalysis  -     Urine culture; Future; Expected date: 09/09/2022  -     Cancel: POCT urine pregnancy  -     ciprofloxacin HCl (CIPRO) 500 MG tablet; Take 1 tablet (500 mg total) by mouth 2 (two) times daily.  Dispense: 20 tablet; Refill: 0  -     metroNIDAZOLE (FLAGYL) 500 MG tablet; Take 1 tablet (500 mg total) by mouth every 8 (eight) hours. for 10 days  Dispense: 30 tablet; Refill: 0    Diarrhea, unspecified type  -     ciprofloxacin HCl (CIPRO) 500 MG tablet; Take 1 tablet (500 mg total) by mouth 2 (two) times daily.  Dispense: 20 tablet; Refill: 0  -     metroNIDAZOLE (FLAGYL) 500 MG tablet; Take 1 tablet (500 mg total) by mouth every 8 (eight) hours. for 10 days  Dispense: 30 tablet; Refill: 0        I spoke to patient at 1905 on 9/9/22 and notified of mild dehydration. She picked up the antibiotic and took a dose and feels some  better. Enc to hydrated with gatorade and water. She is seeing Hematology. Has a F/U appt with them. She will see her PCP Monday. Notify me if any new or worsening over the weekend. Voiced agreement.     Risks, benefits, and side effects were discussed with the patient. All questions were answered to the fullest satisfaction of the patient, and pt verbalized understanding and agreement to treatment plan. Pt was to call with any new or worsening symptoms, or present to the ER.

## 2022-09-09 NOTE — PROGRESS NOTES
Notify of mild dehydration. Needs to drink gatorade as long as having diarrhea. Also notify of persistent elevated ALK. Has she seen GI?

## 2022-09-11 ENCOUNTER — HOSPITAL ENCOUNTER (EMERGENCY)
Facility: HOSPITAL | Age: 42
Discharge: HOME | End: 2022-09-11
Attending: EMERGENCY MEDICINE
Payer: MEDICAID

## 2022-09-11 VITALS
WEIGHT: 293 LBS | HEART RATE: 106 BPM | OXYGEN SATURATION: 96 % | HEIGHT: 62 IN | BODY MASS INDEX: 53.92 KG/M2 | RESPIRATION RATE: 18 BRPM | SYSTOLIC BLOOD PRESSURE: 117 MMHG | TEMPERATURE: 98 F | DIASTOLIC BLOOD PRESSURE: 81 MMHG

## 2022-09-11 DIAGNOSIS — A08.4 VIRAL GASTROENTERITIS: Primary | ICD-10-CM

## 2022-09-11 DIAGNOSIS — K21.9 GASTROESOPHAGEAL REFLUX DISEASE, UNSPECIFIED WHETHER ESOPHAGITIS PRESENT: ICD-10-CM

## 2022-09-11 LAB
ALBUMIN SERPL BCP-MCNC: 3.9 G/DL (ref 3.5–5)
ALBUMIN/GLOB SERPL: 0.8 {RATIO}
ALP SERPL-CCNC: 112 U/L (ref 37–98)
ALT SERPL W P-5'-P-CCNC: 34 U/L (ref 13–56)
AMORPHOUS CRYSTALS, UA (OHS): ABNORMAL /HPF
ANION GAP SERPL CALCULATED.3IONS-SCNC: 15 MMOL/L (ref 7–16)
AST SERPL W P-5'-P-CCNC: 20 U/L (ref 15–37)
BACTERIA #/AREA URNS HPF: ABNORMAL /HPF
BASOPHILS # BLD AUTO: 0.06 K/UL (ref 0–0.2)
BASOPHILS NFR BLD AUTO: 0.4 % (ref 0–1)
BILIRUB SERPL-MCNC: 0.4 MG/DL (ref ?–1.2)
BILIRUB UR QL STRIP: NEGATIVE
BUN SERPL-MCNC: 34 MG/DL (ref 7–18)
BUN/CREAT SERPL: 28 (ref 6–20)
CALCIUM SERPL-MCNC: 10 MG/DL (ref 8.5–10.1)
CHLORIDE SERPL-SCNC: 100 MMOL/L (ref 98–107)
CLARITY UR: ABNORMAL
CO2 SERPL-SCNC: 25 MMOL/L (ref 21–32)
COLOR UR: YELLOW
CREAT SERPL-MCNC: 1.22 MG/DL (ref 0.55–1.02)
DIFFERENTIAL METHOD BLD: ABNORMAL
EGFR (NO RACE VARIABLE) (RUSH/TITUS): 57 ML/MIN/1.73M²
EOSINOPHIL # BLD AUTO: 0.2 K/UL (ref 0–0.5)
EOSINOPHIL NFR BLD AUTO: 1.2 % (ref 1–4)
ERYTHROCYTE [DISTWIDTH] IN BLOOD BY AUTOMATED COUNT: 13.1 % (ref 11.5–14.5)
GLOBULIN SER-MCNC: 5 G/DL (ref 2–4)
GLUCOSE SERPL-MCNC: 117 MG/DL (ref 74–106)
GLUCOSE UR STRIP-MCNC: NORMAL MG/DL
HCT VFR BLD AUTO: 46.9 % (ref 38–47)
HGB BLD-MCNC: 15.4 G/DL (ref 12–16)
IMM GRANULOCYTES # BLD AUTO: 0.1 K/UL (ref 0–0.04)
IMM GRANULOCYTES NFR BLD: 0.6 % (ref 0–0.4)
KETONES UR STRIP-SCNC: ABNORMAL MG/DL
LACTATE SERPL-SCNC: 1.6 MMOL/L (ref 0.4–2)
LEUKOCYTE ESTERASE UR QL STRIP: ABNORMAL
LIPASE SERPL-CCNC: 184 U/L (ref 73–393)
LYMPHOCYTES # BLD AUTO: 2.55 K/UL (ref 1–4.8)
LYMPHOCYTES NFR BLD AUTO: 15.1 % (ref 27–41)
MAGNESIUM SERPL-MCNC: 2.1 MG/DL (ref 1.7–2.3)
MCH RBC QN AUTO: 28.7 PG (ref 27–31)
MCHC RBC AUTO-ENTMCNC: 32.8 G/DL (ref 32–36)
MCV RBC AUTO: 87.5 FL (ref 80–96)
MONOCYTES # BLD AUTO: 1.33 K/UL (ref 0–0.8)
MONOCYTES NFR BLD AUTO: 7.9 % (ref 2–6)
MPC BLD CALC-MCNC: 13.9 FL (ref 9.4–12.4)
MUCOUS, UA: ABNORMAL /LPF
NEUTROPHILS # BLD AUTO: 12.6 K/UL (ref 1.8–7.7)
NEUTROPHILS NFR BLD AUTO: 74.8 % (ref 53–65)
NITRITE UR QL STRIP: NEGATIVE
NRBC # BLD AUTO: 0 X10E3/UL
NRBC, AUTO (.00): 0 %
PH UR STRIP: 5.5 PH UNITS
PLATELET # BLD AUTO: 229 K/UL (ref 150–400)
POTASSIUM SERPL-SCNC: 4.5 MMOL/L (ref 3.5–5.1)
PROT SERPL-MCNC: 8.9 G/DL (ref 6.4–8.2)
PROT UR QL STRIP: 20
RBC # BLD AUTO: 5.36 M/UL (ref 4.2–5.4)
RBC # UR STRIP: NEGATIVE /UL
RBC #/AREA URNS HPF: 1 /HPF
SODIUM SERPL-SCNC: 135 MMOL/L (ref 136–145)
SP GR UR STRIP: 1.02
SQUAMOUS #/AREA URNS LPF: ABNORMAL /HPF
TROPONIN I SERPL HS-MCNC: <4 PG/ML
UA COMPLETE W REFLEX CULTURE PNL UR: NORMAL
UROBILINOGEN UR STRIP-ACNC: NORMAL MG/DL
WBC # BLD AUTO: 16.84 K/UL (ref 4.5–11)
WBC #/AREA URNS HPF: 7 /HPF

## 2022-09-11 PROCEDURE — 81001 URINALYSIS AUTO W/SCOPE: CPT | Performed by: EMERGENCY MEDICINE

## 2022-09-11 PROCEDURE — 63600175 PHARM REV CODE 636 W HCPCS: Performed by: NURSE PRACTITIONER

## 2022-09-11 PROCEDURE — 83735 ASSAY OF MAGNESIUM: CPT | Performed by: EMERGENCY MEDICINE

## 2022-09-11 PROCEDURE — 25000003 PHARM REV CODE 250: Performed by: NURSE PRACTITIONER

## 2022-09-11 PROCEDURE — 36415 COLL VENOUS BLD VENIPUNCTURE: CPT | Performed by: EMERGENCY MEDICINE

## 2022-09-11 PROCEDURE — 83690 ASSAY OF LIPASE: CPT | Performed by: EMERGENCY MEDICINE

## 2022-09-11 PROCEDURE — 99284 EMERGENCY DEPT VISIT MOD MDM: CPT | Mod: ,,, | Performed by: NURSE PRACTITIONER

## 2022-09-11 PROCEDURE — 63600175 PHARM REV CODE 636 W HCPCS: Performed by: EMERGENCY MEDICINE

## 2022-09-11 PROCEDURE — 99285 EMERGENCY DEPT VISIT HI MDM: CPT | Mod: 25

## 2022-09-11 PROCEDURE — 96361 HYDRATE IV INFUSION ADD-ON: CPT

## 2022-09-11 PROCEDURE — 96375 TX/PRO/DX INJ NEW DRUG ADDON: CPT

## 2022-09-11 PROCEDURE — 25000003 PHARM REV CODE 250: Performed by: EMERGENCY MEDICINE

## 2022-09-11 PROCEDURE — 83605 ASSAY OF LACTIC ACID: CPT | Performed by: EMERGENCY MEDICINE

## 2022-09-11 PROCEDURE — 84484 ASSAY OF TROPONIN QUANT: CPT | Performed by: EMERGENCY MEDICINE

## 2022-09-11 PROCEDURE — 96365 THER/PROPH/DIAG IV INF INIT: CPT

## 2022-09-11 PROCEDURE — 99284 PR EMERGENCY DEPT VISIT,LEVEL IV: ICD-10-PCS | Mod: ,,, | Performed by: NURSE PRACTITIONER

## 2022-09-11 PROCEDURE — 85025 COMPLETE CBC W/AUTO DIFF WBC: CPT | Performed by: EMERGENCY MEDICINE

## 2022-09-11 PROCEDURE — 80053 COMPREHEN METABOLIC PANEL: CPT | Performed by: EMERGENCY MEDICINE

## 2022-09-11 RX ORDER — FAMOTIDINE 10 MG/ML
20 INJECTION INTRAVENOUS
Status: COMPLETED | OUTPATIENT
Start: 2022-09-11 | End: 2022-09-11

## 2022-09-11 RX ORDER — OMEPRAZOLE 20 MG/1
20 CAPSULE, DELAYED RELEASE ORAL DAILY
Qty: 30 CAPSULE | Refills: 0 | Status: SHIPPED | OUTPATIENT
Start: 2022-09-11 | End: 2022-10-07 | Stop reason: SDUPTHER

## 2022-09-11 RX ORDER — ONDANSETRON 2 MG/ML
4 INJECTION INTRAMUSCULAR; INTRAVENOUS
Status: COMPLETED | OUTPATIENT
Start: 2022-09-11 | End: 2022-09-11

## 2022-09-11 RX ORDER — ONDANSETRON 4 MG/1
4 TABLET, ORALLY DISINTEGRATING ORAL EVERY 6 HOURS PRN
Qty: 15 TABLET | Refills: 0 | Status: SHIPPED | OUTPATIENT
Start: 2022-09-11 | End: 2023-01-12

## 2022-09-11 RX ORDER — KETOROLAC TROMETHAMINE 30 MG/ML
30 INJECTION, SOLUTION INTRAMUSCULAR; INTRAVENOUS
Status: COMPLETED | OUTPATIENT
Start: 2022-09-11 | End: 2022-09-11

## 2022-09-11 RX ADMIN — SODIUM CHLORIDE 1000 ML: 9 INJECTION, SOLUTION INTRAVENOUS at 07:09

## 2022-09-11 RX ADMIN — KETOROLAC TROMETHAMINE 30 MG: 30 INJECTION, SOLUTION INTRAMUSCULAR; INTRAVENOUS at 08:09

## 2022-09-11 RX ADMIN — ONDANSETRON 4 MG: 2 INJECTION INTRAMUSCULAR; INTRAVENOUS at 05:09

## 2022-09-11 RX ADMIN — SODIUM CHLORIDE 1000 ML: 9 INJECTION, SOLUTION INTRAVENOUS at 05:09

## 2022-09-11 RX ADMIN — FAMOTIDINE 20 MG: 10 INJECTION, SOLUTION INTRAVENOUS at 08:09

## 2022-09-11 RX ADMIN — PROMETHAZINE HYDROCHLORIDE 25 MG: 25 INJECTION INTRAMUSCULAR; INTRAVENOUS at 07:09

## 2022-09-11 NOTE — ED PROVIDER NOTES
Encounter Date: 2022       History     Chief Complaint   Patient presents with    Nausea    Fever    Vomiting     41 Y/O FEMALE WITH 3 OR 4 DAYS OF GENERALLY FEELING WEAK AND EPIGASTRIC PAIN.  PAIN IS MODERATE.  PAIN IS WORSE WITH PALPATION.  OTHERWISE SHE NOTES NO REMITTING OR EXACERBATING FACTORS.        Review of patient's allergies indicates:   Allergen Reactions    Doxycycline     Latex      Past Medical History:   Diagnosis Date    Anxiety     Asthma     Chronic pain     Cushing syndrome     Depression     Essential hypertension     Fibromyalgia     Herpes zoster     Hyperlipidemia     Onychomycosis     MARY on CPAP     Rheumatoid arthritis     Type 2 diabetes mellitus 2020    Vitamin D deficiency 2018     Past Surgical History:   Procedure Laterality Date     SECTION      ELBOW SURGERY      EPIDURAL STEROID INJECTION      L4-5 VERITO Dec 2020-Torre    FEMUR SURGERY Right     due to osteomyelitis    HYSTERECTOMY      INJECTION OF ANESTHETIC AGENT AROUND MEDIAL BRANCH NERVES INNERVATING LUMBAR FACET JOINT Bilateral 2022    Procedure: Bilateral L4-5,5-S1 MBB ( No steroids);  Surgeon: Carmel Torre MD;  Location: [facility];  Service: Pain Management;  Laterality: Bilateral;  PT AWARE TO BE TESTED ON OV    INJECTION OF ANESTHETIC AGENT AROUND MEDIAL BRANCH NERVES INNERVATING LUMBAR FACET JOINT Bilateral 3/8/2022    Procedure: Block-nerve-medial branch-lumbar, bilateral L4 through S1;  Surgeon: Carmel Torre MD;  Location: [facility];  Service: Pain Management;  Laterality: Bilateral;    INJECTION OF ANESTHETIC AGENT INTO SACROILIAC JOINT Bilateral 2022    Procedure: BLOCK, SACROILIAC JOINT;  Surgeon: Carmel Torre MD;  Location: [facility];  Service: Pain Management;  Laterality: Bilateral;  covid test put in    LIPOMA RESECTION  2019    RADIOFREQUENCY ABLATION OF LUMBAR MEDIAL BRANCH NERVE AT SINGLE LEVEL Right 2022    Procedure:  Radiofrequency Ablation, Nerve, Spinal, Lumbar, Medial Branch, Level L4-S1, right side 1st, will have left-sided after completing;  Surgeon: Carmel Torre MD;  Location: [facility];  Service: Pain Management;  Laterality: Right;  pt aware at visit to be tested    RADIOFREQUENCY ABLATION OF LUMBAR MEDIAL BRANCH NERVE AT SINGLE LEVEL Left 5/5/2022    Procedure: LEFT  L4-S1 RFTC  (HAD RIGHT  ON 4-14);  Surgeon: Carmel Torre MD;  Location: [facility];  Service: Pain Management;  Laterality: Left;    SURGICAL REMOVAL OF PILONIDAL CYST      TRANSFORAMINAL EPIDURAL INJECTION OF STEROID      Bilateral L4-5 TFESI Nov 2020-Torre    TRANSFORAMINAL EPIDURAL INJECTION OF STEROID      Right L4-5 TFESI Feb 2020-Torre    VENTRAL HERNIA REPAIR  09/2020     Family History   Problem Relation Age of Onset    Cancer Mother     Thyroid cancer Mother     Thyroid cancer Maternal Grandmother     Melanoma Neg Hx      Social History     Tobacco Use    Smoking status: Former    Smokeless tobacco: Never   Substance Use Topics    Alcohol use: Never    Drug use: Never     Review of Systems   Constitutional:  Positive for activity change, appetite change, fatigue and fever.   Gastrointestinal:  Positive for abdominal pain, diarrhea, nausea and vomiting.   All other systems reviewed and are negative.    Physical Exam     Initial Vitals [09/11/22 1630]   BP Pulse Resp Temp SpO2   117/81 106 18 97.7 °F (36.5 °C) 96 %      MAP       --         Physical Exam    Nursing note and vitals reviewed.  Constitutional: She appears well-developed and well-nourished.   HENT:   Head: Normocephalic and atraumatic.   Nose: Nose normal.   Mouth/Throat: Oropharynx is clear and moist.   Eyes: Conjunctivae and EOM are normal. Pupils are equal, round, and reactive to light.   Neck: Neck supple.   Normal range of motion.  Cardiovascular:  Normal rate, regular rhythm, normal heart sounds and intact distal pulses.           Pulmonary/Chest: Breath  sounds normal.   Abdominal: Abdomen is soft. Bowel sounds are normal. There is abdominal tenderness.   Musculoskeletal:         General: Normal range of motion.      Cervical back: Normal range of motion and neck supple.     Neurological: She is alert and oriented to person, place, and time. She has normal strength. GCS score is 15. GCS eye subscore is 4. GCS verbal subscore is 5. GCS motor subscore is 6.   Skin: Skin is warm and dry. Capillary refill takes less than 2 seconds.   Psychiatric: She has a normal mood and affect. Thought content normal.       Medical Screening Exam   See Full Note    ED Course   Procedures  Labs Reviewed   COMPREHENSIVE METABOLIC PANEL - Abnormal; Notable for the following components:       Result Value    Sodium 135 (*)     Glucose 117 (*)     BUN 34 (*)     Creatinine 1.22 (*)     BUN/Creatinine Ratio 28 (*)     Total Protein 8.9 (*)     Globulin 5.0 (*)     Alk Phos 112 (*)     eGFR 57 (*)     All other components within normal limits   URINALYSIS, REFLEX TO URINE CULTURE - Abnormal; Notable for the following components:    Leukocytes, UA Trace (*)     Protein, UA 20 (*)     All other components within normal limits   CBC WITH DIFFERENTIAL - Abnormal; Notable for the following components:    WBC 16.84 (*)     MPV 13.9 (*)     Neutrophils % 74.8 (*)     Lymphocytes % 15.1 (*)     Monocytes % 7.9 (*)     Immature Granulocytes % 0.6 (*)     Neutrophils, Abs 12.60 (*)     Monocytes, Absolute 1.33 (*)     Immature Granulocytes, Absolute 0.10 (*)     All other components within normal limits   URINALYSIS, MICROSCOPIC - Abnormal; Notable for the following components:    WBC, UA 7 (*)     Bacteria, UA Few (*)     Squamous Epithelial Cells, UA Occasional (*)     Amorphous Crystals, UA Occ (*)     Mucous Few (*)     All other components within normal limits   LIPASE - Normal   MAGNESIUM - Normal   LACTIC ACID, PLASMA - Normal   TROPONIN I - Normal   CBC W/ AUTO DIFFERENTIAL    Narrative:      The following orders were created for panel order CBC auto differential.  Procedure                               Abnormality         Status                     ---------                               -----------         ------                     CBC with Differential[[id number]]        Abnormal            Final result                 Please view results for these tests on the individual orders.          Imaging Results              CT Abdomen Pelvis  Without Contrast (Final result)  Result time 09/11/22 19:28:44      Final result by Alex Buchanan MD (09/11/22 19:28:44)                   Impression:      No acute abnormality identified in the abdomen or pelvis.      Electronically signed by: Alex Buchanan  Date:    09/11/2022  Time:    19:28               Narrative:    EXAMINATION:  CT ABDOMEN PELVIS WITHOUT CONTRAST    CLINICAL HISTORY:  Abdominal pain, acute, nonlocalized;    TECHNIQUE:  Low dose axial images, sagittal and coronal reformations were obtained from the lung bases to the pubic symphysis.  Oral contrast was not administered.    COMPARISON:  06/18/2020    FINDINGS:  Lung bases clear.    Fatty infiltration of the liver.  No focal hepatic lesion.  Gallbladder, adrenals, pancreas, and spleen are unremarkable.  Kidneys unremarkable.  No stone, hydronephrosis, or hydroureter.    There is no pneumoperitoneum.  No ascites.    No adenopathy.  Vascular structures are normal caliber.    No bowel obstruction.  No acute bowel abnormality.  Appendix seen and normal.    Urinary bladder decompressed.    No acute fracture.  Small fat containing umbilical hernia.  Mildly decreased from prior.                                       Medications   sodium chloride 0.9% bolus 1,000 mL (0 mLs Intravenous Stopped 9/11/22 1813)   ondansetron injection 4 mg (4 mg Intravenous Given 9/11/22 1708)   sodium chloride 0.9% bolus 1,000 mL (0 mLs Intravenous Stopped 9/11/22 2012)   promethazine (PHENERGAN) 25 mg in dextrose 5 % 50 mL IVPB (0  mg Intravenous Stopped 9/11/22 2012)   ketorolac injection 30 mg (30 mg Intravenous Given 9/11/22 2011)   famotidine (PF) injection 20 mg (20 mg Intravenous Given 9/11/22 2011)     Medical Decision Making:   ED Management:  Assumed care of patient from Dr Le at 1800.  Patient resting quietly awaiting CT scan.    Discussed CT results with patient. She now complains of spasm type pain in left upper quadrant of abdomen along with nausea. Patient states this pain is chronic and her PCP normally treats it with Flexeril.  Patient states she has seen GI in past for c-diff but it has been a long time, and she does not remember what Doctor she saw.  Stool studies are ordered, if patient is unable to collect in ER will have her to collect at home and return to PCP office or ER.                  Clinical Impression:   Final diagnoses:  [A08.4] Viral gastroenteritis (Primary)  [K21.9] Gastroesophageal reflux disease, unspecified whether esophagitis present      ED Disposition Condition    Discharge Stable          ED Prescriptions       Medication Sig Dispense Start Date End Date Auth. Provider    ondansetron (ZOFRAN-ODT) 4 MG TbDL Take 1 tablet (4 mg total) by mouth every 6 (six) hours as needed (nausea). 15 tablet 9/11/2022 -- DANE Osorio    omeprazole (PRILOSEC) 20 MG capsule Take 1 capsule (20 mg total) by mouth once daily. 30 capsule 9/11/2022 10/11/2022 DANE Osorio          Follow-up Information       Follow up With Specialties Details Why Contact Info    Omero Weber MD Gastroenterology Schedule an appointment as soon as possible for a visit   [address] [place] [address]  [facility] [address]  [phone]      [facility] In 2 days If symptoms worsen [address] [place] [address] [facility] [address]  [phone]               DANE Osorio  09/11/22 2004       DANE Osorio  09/15/22 3073

## 2022-09-11 NOTE — ED TRIAGE NOTES
Pt presents to ed with c/o having nausea, vomiting, fever, and body aches for 1 week. Was treated with antibiotics from the clinic on Friday

## 2022-09-12 ENCOUNTER — OFFICE VISIT (OUTPATIENT)
Dept: FAMILY MEDICINE | Facility: CLINIC | Age: 42
End: 2022-09-12
Payer: MEDICAID

## 2022-09-12 VITALS
BODY MASS INDEX: 53.92 KG/M2 | WEIGHT: 293 LBS | SYSTOLIC BLOOD PRESSURE: 118 MMHG | RESPIRATION RATE: 20 BRPM | TEMPERATURE: 98 F | DIASTOLIC BLOOD PRESSURE: 70 MMHG | OXYGEN SATURATION: 94 % | HEART RATE: 82 BPM | HEIGHT: 62 IN

## 2022-09-12 DIAGNOSIS — Z79.899 ENCOUNTER FOR LONG-TERM (CURRENT) USE OF OTHER MEDICATIONS: ICD-10-CM

## 2022-09-12 DIAGNOSIS — E66.01 MORBID OBESITY WITH BMI OF 60.0-69.9, ADULT: Chronic | ICD-10-CM

## 2022-09-12 DIAGNOSIS — Z23 NEED FOR INFLUENZA VACCINATION: ICD-10-CM

## 2022-09-12 DIAGNOSIS — Z13.220 SCREENING FOR HYPERLIPIDEMIA: ICD-10-CM

## 2022-09-12 DIAGNOSIS — I10 ESSENTIAL HYPERTENSION: Chronic | ICD-10-CM

## 2022-09-12 DIAGNOSIS — Z00.00 ROUTINE GENERAL MEDICAL EXAMINATION AT A HEALTH CARE FACILITY: Primary | ICD-10-CM

## 2022-09-12 DIAGNOSIS — E11.9 TYPE 2 DIABETES MELLITUS WITHOUT COMPLICATION, WITHOUT LONG-TERM CURRENT USE OF INSULIN: Chronic | ICD-10-CM

## 2022-09-12 DIAGNOSIS — E78.00 HYPERCHOLESTEROLEMIA: Chronic | ICD-10-CM

## 2022-09-12 DIAGNOSIS — M62.838 MUSCLE SPASM: ICD-10-CM

## 2022-09-12 DIAGNOSIS — Z13.1 DIABETES MELLITUS SCREENING: ICD-10-CM

## 2022-09-12 PROBLEM — F41.9 ANXIETY AND DEPRESSION: Chronic | Status: ACTIVE | Noted: 2021-10-21

## 2022-09-12 PROBLEM — J31.0 CHRONIC RHINITIS: Chronic | Status: ACTIVE | Noted: 2021-05-07

## 2022-09-12 PROBLEM — J45.40 MODERATE PERSISTENT ASTHMA WITHOUT COMPLICATION: Chronic | Status: ACTIVE | Noted: 2021-05-07

## 2022-09-12 PROBLEM — M54.50 LOW BACK PAIN: Chronic | Status: ACTIVE | Noted: 2022-09-09

## 2022-09-12 PROBLEM — F32.A ANXIETY AND DEPRESSION: Chronic | Status: ACTIVE | Noted: 2021-10-21

## 2022-09-12 LAB
CHOLEST SERPL-MCNC: 171 MG/DL (ref 0–200)
CHOLEST/HDLC SERPL: 4.9 {RATIO}
EST. AVERAGE GLUCOSE BLD GHB EST-MCNC: 120 MG/DL
GLUCOSE SERPL-MCNC: 130 MG/DL (ref 74–106)
HBA1C MFR BLD HPLC: 6.2 % (ref 4.5–6.6)
HDLC SERPL-MCNC: 35 MG/DL (ref 40–60)
LDLC SERPL CALC-MCNC: 109 MG/DL
LDLC/HDLC SERPL: 3.1 {RATIO}
NONHDLC SERPL-MCNC: 136 MG/DL
TRIGL SERPL-MCNC: 135 MG/DL (ref 35–150)
VLDLC SERPL-MCNC: 27 MG/DL

## 2022-09-12 PROCEDURE — 80061 LIPID PANEL: ICD-10-PCS | Mod: ,,, | Performed by: CLINICAL MEDICAL LABORATORY

## 2022-09-12 PROCEDURE — 4010F ACE/ARB THERAPY RXD/TAKEN: CPT | Mod: CPTII,,, | Performed by: NURSE PRACTITIONER

## 2022-09-12 PROCEDURE — 90686 FLU VACCINE (QUAD) GREATER THAN OR EQUAL TO 3YO PRESERVATIVE FREE IM: ICD-10-PCS | Mod: ,,, | Performed by: NURSE PRACTITIONER

## 2022-09-12 PROCEDURE — 3008F BODY MASS INDEX DOCD: CPT | Mod: CPTII,,, | Performed by: NURSE PRACTITIONER

## 2022-09-12 PROCEDURE — 3066F NEPHROPATHY DOC TX: CPT | Mod: CPTII,,, | Performed by: NURSE PRACTITIONER

## 2022-09-12 PROCEDURE — 83036 HEMOGLOBIN GLYCOSYLATED A1C: CPT | Mod: ,,, | Performed by: CLINICAL MEDICAL LABORATORY

## 2022-09-12 PROCEDURE — 3044F HG A1C LEVEL LT 7.0%: CPT | Mod: CPTII,,, | Performed by: NURSE PRACTITIONER

## 2022-09-12 PROCEDURE — 3061F NEG MICROALBUMINURIA REV: CPT | Mod: CPTII,,, | Performed by: NURSE PRACTITIONER

## 2022-09-12 PROCEDURE — 3061F PR NEG MICROALBUMINURIA RESULT DOCUMENTED/REVIEW: ICD-10-PCS | Mod: CPTII,,, | Performed by: NURSE PRACTITIONER

## 2022-09-12 PROCEDURE — 3008F PR BODY MASS INDEX (BMI) DOCUMENTED: ICD-10-PCS | Mod: CPTII,,, | Performed by: NURSE PRACTITIONER

## 2022-09-12 PROCEDURE — 3066F PR DOCUMENTATION OF TREATMENT FOR NEPHROPATHY: ICD-10-PCS | Mod: CPTII,,, | Performed by: NURSE PRACTITIONER

## 2022-09-12 PROCEDURE — 3074F PR MOST RECENT SYSTOLIC BLOOD PRESSURE < 130 MM HG: ICD-10-PCS | Mod: CPTII,,, | Performed by: NURSE PRACTITIONER

## 2022-09-12 PROCEDURE — 3074F SYST BP LT 130 MM HG: CPT | Mod: CPTII,,, | Performed by: NURSE PRACTITIONER

## 2022-09-12 PROCEDURE — 82947 ASSAY GLUCOSE BLOOD QUANT: CPT | Mod: ,,, | Performed by: CLINICAL MEDICAL LABORATORY

## 2022-09-12 PROCEDURE — 90686 IIV4 VACC NO PRSV 0.5 ML IM: CPT | Mod: ,,, | Performed by: NURSE PRACTITIONER

## 2022-09-12 PROCEDURE — 99396 PREV VISIT EST AGE 40-64: CPT | Mod: 25,,, | Performed by: NURSE PRACTITIONER

## 2022-09-12 PROCEDURE — 90471 FLU VACCINE (QUAD) GREATER THAN OR EQUAL TO 3YO PRESERVATIVE FREE IM: ICD-10-PCS | Mod: ,,, | Performed by: NURSE PRACTITIONER

## 2022-09-12 PROCEDURE — 80061 LIPID PANEL: CPT | Mod: ,,, | Performed by: CLINICAL MEDICAL LABORATORY

## 2022-09-12 PROCEDURE — 3078F DIAST BP <80 MM HG: CPT | Mod: CPTII,,, | Performed by: NURSE PRACTITIONER

## 2022-09-12 PROCEDURE — 99396 PR PREVENTIVE VISIT,EST,40-64: ICD-10-PCS | Mod: 25,,, | Performed by: NURSE PRACTITIONER

## 2022-09-12 PROCEDURE — 1159F MED LIST DOCD IN RCRD: CPT | Mod: CPTII,,, | Performed by: NURSE PRACTITIONER

## 2022-09-12 PROCEDURE — 1159F PR MEDICATION LIST DOCUMENTED IN MEDICAL RECORD: ICD-10-PCS | Mod: CPTII,,, | Performed by: NURSE PRACTITIONER

## 2022-09-12 PROCEDURE — 83036 HEMOGLOBIN A1C: ICD-10-PCS | Mod: ,,, | Performed by: CLINICAL MEDICAL LABORATORY

## 2022-09-12 PROCEDURE — 82947 GLUCOSE, FASTING: ICD-10-PCS | Mod: ,,, | Performed by: CLINICAL MEDICAL LABORATORY

## 2022-09-12 PROCEDURE — 3044F PR MOST RECENT HEMOGLOBIN A1C LEVEL <7.0%: ICD-10-PCS | Mod: CPTII,,, | Performed by: NURSE PRACTITIONER

## 2022-09-12 PROCEDURE — 90471 IMMUNIZATION ADMIN: CPT | Mod: ,,, | Performed by: NURSE PRACTITIONER

## 2022-09-12 PROCEDURE — 4010F PR ACE/ARB THEARPY RXD/TAKEN: ICD-10-PCS | Mod: CPTII,,, | Performed by: NURSE PRACTITIONER

## 2022-09-12 PROCEDURE — 3078F PR MOST RECENT DIASTOLIC BLOOD PRESSURE < 80 MM HG: ICD-10-PCS | Mod: CPTII,,, | Performed by: NURSE PRACTITIONER

## 2022-09-12 RX ORDER — CYCLOBENZAPRINE HCL 10 MG
10 TABLET ORAL 3 TIMES DAILY PRN
Qty: 90 TABLET | Refills: 2 | Status: SHIPPED | OUTPATIENT
Start: 2022-09-12 | End: 2023-01-12 | Stop reason: SDUPTHER

## 2022-09-12 RX ORDER — EXENATIDE 250 UG/ML
5 INJECTION SUBCUTANEOUS 2 TIMES DAILY WITH MEALS
Qty: 1.2 ML | Refills: 0 | Status: SHIPPED | OUTPATIENT
Start: 2022-09-12 | End: 2022-11-14 | Stop reason: DRUGHIGH

## 2022-09-12 NOTE — ED NOTES
INFORMED PATIENT FOR THE NEED FOR STOOL SAMPLE. SHE REPORTS SHE DOES NOT HAVE TO HAVE A BM AT THIS TIME BUT WILL GET ONE WHEN SHE DOES HAVE TO GO

## 2022-09-12 NOTE — ED NOTES
PATIENT UNABLE TO PROVIDE STOOL SAMPLE HERE. SHE WILL TAKE SPECIMEN CUP WITH HER AND BRING BACK TO RUN

## 2022-09-12 NOTE — PROGRESS NOTES
"   [facility]ILY [facility]       PATIENT NAME: Belem Swann   : 1980    AGE: 42 y.o. DATE OF ENCOUNTER: 22    MRN: [id number]        Reason for Visit / Chief Complaint:  Annual Exam (Pt presents for wellness exam. She is fasting.), Hypertension, Hyperlipidemia, and Diabetes     Subjective:     HPI:    Presents for wellness visit w/ fasting labs.  Wants to get flu shot today.  PMH T2DM, HTN, HLD, asthma    Reports she saw Hematology but CBC was near normal that day so she will f/u in 6 mths.    Reports she went to [facility] & ER over the weekend for dehydration; had COVID early August.  Is on cipro & flagyl.    T2DM - reports Byetta helped, but forgot to call for refill to have dose increase to 10 mcg; has been off med > 1 mth    Still having muscle spasms in her sides but out of muscle relaxer.    Hasn't been to [facility] "in a while" - off meds.    Hasn't seen Dr. Aguilar for RA lately but never got appt w/ Dr. Bull so she will reschedule w/ him.    Review of Systems:     Review of Systems   Constitutional:  Negative for chills and fever.   HENT:  Positive for congestion (chronic rhinitis). Negative for ear pain, sinus pain and sore throat.    Eyes: Negative.    Respiratory:  Positive for shortness of breath (chronic BALDERAS). Negative for cough and wheezing.    Cardiovascular:  Positive for leg swelling (chronic). Negative for chest pain and palpitations.   Genitourinary: Negative.    Musculoskeletal:  Positive for arthralgias, back pain, gait problem and myalgias.        Chronic   Skin:  Positive for rash.   Psychiatric/Behavioral: Negative.       Allergies:     Review of patient's allergies indicates:   Allergen Reactions    Doxycycline     Latex         Labs:      Lab Results   Component Value Date    HGBA1C 6.5 2022     Lab Results   Component Value Date    MICROALBUR 1.1 2022      Lipids:   Lab Results   Component Value Date    CHOL 175 2022     Lab Results " "  Component Value Date    HDL 47 01/11/2022     Lab Results   Component Value Date    LDLCALC 108 01/11/2022     Lab Results   Component Value Date    TRIG 101 01/11/2022     CMP:  Sodium   Date Value Ref Range Status   09/11/2022 135 (L) 136 - 145 mmol/L Final     Potassium   Date Value Ref Range Status   09/11/2022 4.5 3.5 - 5.1 mmol/L Final     Chloride   Date Value Ref Range Status   09/11/2022 100 98 - 107 mmol/L Final     Glucose   Date Value Ref Range Status   09/11/2022 117 (H) 74 - 106 mg/dL Final     BUN   Date Value Ref Range Status   09/11/2022 34 (H) 7 - 18 mg/dL Final     Creatinine   Date Value Ref Range Status   09/11/2022 1.22 (H) 0.55 - 1.02 mg/dL Final     AST   Date Value Ref Range Status   09/11/2022 20 15 - 37 U/L Final     ALT   Date Value Ref Range Status   09/11/2022 34 13 - 56 U/L Final     eGFR    Date Value Ref Range Status   10/07/2020 103       eGFR   Date Value Ref Range Status   05/11/2022 95 >=60 mL/min/1.73m² Final      CBC:  Lab Results   Component Value Date    WBC 16.84 (H) 09/11/2022    RBC 5.36 09/11/2022    HGB 15.4 09/11/2022    HCT 46.9 09/11/2022     09/11/2022      TSH:  Lab Results   Component Value Date    TSH 2.240 01/11/2022     Objective:      Wt Readings from Last 3 Encounters:   09/12/22 0804 (!) 162.7 kg (358 lb 9.6 oz)   09/11/22 1630 (!) 149.2 kg (329 lb)   09/09/22 0834 (!) 162.4 kg (358 lb)     Vitals:    09/12/22 0804   BP: 118/70   BP Location: Left arm   Patient Position: Sitting   BP Method: Large (Manual)   Pulse: 82   Resp: 20   Temp: 98.1 °F (36.7 °C)   TempSrc: Oral   SpO2: (!) 94%   Weight: (!) 162.7 kg (358 lb 9.6 oz)   Height: 5' 2" (1.575 m)     Body mass index is 65.59 kg/m².     Physical Exam:    Physical Exam  Vitals and nursing note reviewed.   Constitutional:       General: She is not in acute distress.     Appearance: Normal appearance. She is obese. She is not ill-appearing.   HENT:      Head: Normocephalic.      Right " Ear: Tympanic membrane, ear canal and external ear normal.      Left Ear: Tympanic membrane, ear canal and external ear normal.      Nose: Nose normal.      Mouth/Throat:      Mouth: Mucous membranes are moist.      Pharynx: Oropharynx is clear.   Eyes:      Conjunctiva/sclera: Conjunctivae normal.   Neck:      Thyroid: No thyromegaly.      Trachea: Trachea normal.   Cardiovascular:      Rate and Rhythm: Normal rate and regular rhythm.      Pulses: Normal pulses.      Heart sounds: Normal heart sounds.   Pulmonary:      Effort: Pulmonary effort is normal.      Breath sounds: Normal breath sounds.   Abdominal:      Palpations: Abdomen is soft.   Musculoskeletal:      Cervical back: Neck supple.      Right lower leg: Edema (trace) present.      Left lower leg: Edema (trace) present.   Lymphadenopathy:      Cervical: No cervical adenopathy.   Skin:     General: Skin is warm and dry.   Neurological:      General: No focal deficit present.      Mental Status: She is alert and oriented to person, place, and time.   Psychiatric:         Mood and Affect: Mood normal.         Behavior: Behavior normal.        Assessment:          ICD-10-CM ICD-9-CM   1. Routine general medical examination at a health care facility  Z00.00 V70.0   2. Screening for hyperlipidemia  Z13.220 V77.91   3. Encounter for long-term (current) use of other medications  Z79.899 V58.69   4. Type 2 diabetes mellitus without complication, without long-term current use of insulin  E11.9 250.00   5. Essential hypertension  I10 401.9   6. Hypercholesterolemia  E78.00 272.0   7. Morbid obesity with BMI of 60.0-69.9, adult  E66.01 278.01    Z68.44 V85.44   8. Need for influenza vaccination  Z23 V04.81   9. Diabetes mellitus screening  Z13.1 V77.1   10. Muscle spasm  M62.838 728.85        Plan:       Routine general medical examination at a health care facility    Screening for hyperlipidemia  -     Lipid Panel; Future; Expected date: 09/12/2022    Encounter for  long-term (current) use of other medications    Type 2 diabetes mellitus without complication, without long-term current use of insulin  -     Hemoglobin A1C; Future; Expected date: 09/12/2022  -     exenatide (BYETTA) 5 mcg/dose (250 mcg/mL) 1.2 mL injection; Inject 0.02 mLs (5 mcg total) into the skin 2 (two) times daily with meals.  Dispense: 1.2 mL; Refill: 0    Essential hypertension    Hypercholesterolemia    Morbid obesity with BMI of 60.0-69.9, adult    Need for influenza vaccination  -     Influenza - Quadrivalent *Preferred* (6 months+) (PF)    Diabetes mellitus screening  -     Glucose, Fasting; Future; Expected date: 09/12/2022    Muscle spasm  -     cyclobenzaprine (FLEXERIL) 10 MG tablet; Take 1 tablet (10 mg total) by mouth 3 (three) times daily as needed for Muscle spasms.  Dispense: 90 tablet; Refill: 2      Current Outpatient Medications:     albuterol sulfate 90 mcg/actuation aebs, Inhale 180 mcg into the lungs every 4 (four) hours., Disp: , Rfl:     albuterol-ipratropium (DUO-NEB) 2.5 mg-0.5 mg/3 mL nebulizer solution, Take 3 mLs by nebulization every 6 (six) hours as needed. Rescue, Disp: , Rfl:     blood sugar diagnostic Strp, 1 strip by Misc.(Non-Drug; Combo Route) route once daily., Disp: 100 strip, Rfl: 3    budesonide-formoterol 160-4.5 mcg (SYMBICORT) 160-4.5 mcg/actuation HFAA, Inhale 2 puffs into the lungs every 12 (twelve) hours. Controller, Disp: 10.2 g, Rfl: 11    carvediloL (COREG) 12.5 MG tablet, Take 12.5 mg by mouth 2 (two) times daily., Disp: , Rfl:     ciprofloxacin HCl (CIPRO) 500 MG tablet, Take 1 tablet (500 mg total) by mouth 2 (two) times daily., Disp: 20 tablet, Rfl: 0    cloNIDine (CATAPRES) 0.1 MG tablet, Take 0.1 mg by mouth every evening., Disp: , Rfl:     fluocinolone (SYNALAR) 0.01 % external solution, Apply topically 2 (two) times daily., Disp: 90 mL, Rfl: 5    gabapentin (NEURONTIN) 300 MG capsule, Take 1 capsule (300 mg total) by mouth every 8 (eight) hours.,  "Disp: 90 capsule, Rfl: 1    HUMIRA,CF, PEN 40 mg/0.4 mL PnKt, SMARTSI Milligram(s) SUB-Q Once a Week, Disp: , Rfl:     [START ON 10/29/2022] HYDROcodone-acetaminophen (NORCO)  mg per tablet, Take 1 tablet by mouth every 8 (eight) hours as needed for Pain., Disp: 90 tablet, Rfl: 0    hydrocortisone 2.5 % ointment, Apply topically 2 (two) times daily., Disp: 454 g, Rfl: 5    ketoconazole (NIZORAL) 2 % cream, Apply topically once daily., Disp: 60 g, Rfl: 2    lancets (ONETOUCH DELICA LANCETS) 33 gauge Misc, 1 lancet by Misc.(Non-Drug; Combo Route) route once daily., Disp: 100 each, Rfl: 3    lisinopriL (PRINIVIL,ZESTRIL) 40 MG tablet, Take 1 tablet (40 mg total) by mouth once daily., Disp: 90 tablet, Rfl: 1    meloxicam (MOBIC) 15 MG tablet, Take 15 mg by mouth once daily., Disp: , Rfl:     metFORMIN (GLUCOPHAGE-XR) 500 MG ER 24hr tablet, Take 1 tablet (500 mg total) by mouth once daily., Disp: 90 tablet, Rfl: 3    metroNIDAZOLE (FLAGYL) 500 MG tablet, Take 1 tablet (500 mg total) by mouth every 8 (eight) hours. for 10 days, Disp: 30 tablet, Rfl: 0    omeprazole (PRILOSEC) 20 MG capsule, Take 1 capsule (20 mg total) by mouth once daily., Disp: 30 capsule, Rfl: 0    ondansetron (ZOFRAN-ODT) 4 MG TbDL, Take 1 tablet (4 mg total) by mouth every 6 (six) hours as needed (nausea)., Disp: 15 tablet, Rfl: 0    pen needle, diabetic 31 gauge x 1/4" Ndle, , Disp: , Rfl:     promethazine (PHENERGAN) 25 MG tablet, Take 1 tablet (25 mg total) by mouth every 6 (six) hours as needed for Nausea., Disp: 30 tablet, Rfl: 1    rosuvastatin (CRESTOR) 40 MG Tab, Take 1 tablet (40 mg total) by mouth every evening., Disp: 90 tablet, Rfl: 3    spironolactone (ALDACTONE) 50 MG tablet, Take 1.5 tablets (75 mg total) by mouth once daily., Disp: 90 tablet, Rfl: 1    triamcinolone acetonide 0.1% (KENALOG) 0.1 % ointment, Apply topically 2 (two) times daily., Disp: 454 g, Rfl: 0    triamterene-hydrochlorothiazide 75-50 mg (MAXZIDE) 75-50 mg " per tablet, Take 1 tablet by mouth once daily., Disp: 90 tablet, Rfl: 1    TRUE METRIX GLUCOSE TEST STRIP Strp, USE TO TEST BLOOD SUGAR EVERY DAY, Disp: , Rfl:     cyclobenzaprine (FLEXERIL) 10 MG tablet, Take 1 tablet (10 mg total) by mouth 3 (three) times daily as needed for Muscle spasms., Disp: 90 tablet, Rfl: 2    exenatide (BYETTA) 5 mcg/dose (250 mcg/mL) 1.2 mL injection, Inject 0.02 mLs (5 mcg total) into the skin 2 (two) times daily with meals., Disp: 1.2 mL, Rfl: 0  No current facility-administered medications for this visit.    New & refilled meds:  Requested Prescriptions     Signed Prescriptions Disp Refills    exenatide (BYETTA) 5 mcg/dose (250 mcg/mL) 1.2 mL injection 1.2 mL 0     Sig: Inject 0.02 mLs (5 mcg total) into the skin 2 (two) times daily with meals.    cyclobenzaprine (FLEXERIL) 10 MG tablet 90 tablet 2     Sig: Take 1 tablet (10 mg total) by mouth 3 (three) times daily as needed for Muscle spasms.     Resume Byetta - after 1st mth plan to increase to 10 mcg 2x/day; pt is to call for rx.  Lab results and schedule of future lab studies reviewed with patient.  Reviewed diet, exercise and weight control.  Wellness labs  Flu shot today    Return to clinic 4 mths for T2DM, HTN; and f/u as needed.    Future Appointments   Date Time Provider Department Center   9/20/2022  8:30 AM Rogelio Wakefield MD [facility] [place] [facility]   11/3/2022  9:45 AM GUCCI Rubio RAS [facility]   11/22/2022 10:00 AM Ashlyn Wakefield MD [facility] [place]   1/12/2023  8:00 AM DANE Saenz T[facility]   5/30/2023  2:00 PM [facility] MAMMO[facility] [place]   9/13/2023  9:00 AM DANE Saenz [facility] SYLVAIN [place]        Signature:  DANE Saenz

## 2022-09-12 NOTE — DISCHARGE INSTRUCTIONS
Take medication as prescribed.   Continue medication prescribed by [facility].   GI will call with follow up appointment in GI clinic.   Keep scheduled appointment with PCP.   Return stool specimen for stool culture, c-diff, ova and parasite studies to PCP's office or ER.   Return to ER with new or worsening symptoms.

## 2022-09-14 ENCOUNTER — TELEPHONE (OUTPATIENT)
Dept: FAMILY MEDICINE | Facility: CLINIC | Age: 42
End: 2022-09-14
Payer: MEDICAID

## 2022-09-14 DIAGNOSIS — M25.561 RIGHT KNEE PAIN, UNSPECIFIED CHRONICITY: Primary | ICD-10-CM

## 2022-09-14 NOTE — TELEPHONE ENCOUNTER
Pt notified. Voiced understanding.----- Message from DANE Farmer sent at 9/9/2022 11:53 AM CDT -----  Start antibiotic if possible after collecting stool.

## 2022-09-15 DIAGNOSIS — K21.9 GERD (GASTROESOPHAGEAL REFLUX DISEASE): Primary | ICD-10-CM

## 2022-09-20 ENCOUNTER — HOSPITAL ENCOUNTER (OUTPATIENT)
Dept: RADIOLOGY | Facility: HOSPITAL | Age: 42
Discharge: HOME | End: 2022-09-20
Attending: ORTHOPAEDIC SURGERY
Payer: MEDICAID

## 2022-09-20 ENCOUNTER — OFFICE VISIT (OUTPATIENT)
Dept: ORTHOPEDICS | Facility: CLINIC | Age: 42
End: 2022-09-20
Payer: MEDICAID

## 2022-09-20 DIAGNOSIS — M22.41 CHONDROMALACIA OF PATELLOFEMORAL JOINT, RIGHT: Primary | ICD-10-CM

## 2022-09-20 DIAGNOSIS — M25.561 RIGHT KNEE PAIN, UNSPECIFIED CHRONICITY: ICD-10-CM

## 2022-09-20 PROCEDURE — 3061F PR NEG MICROALBUMINURIA RESULT DOCUMENTED/REVIEW: ICD-10-PCS | Mod: CPTII,,, | Performed by: ORTHOPAEDIC SURGERY

## 2022-09-20 PROCEDURE — 20610 DRAIN/INJ JOINT/BURSA W/O US: CPT | Mod: RT,,, | Performed by: ORTHOPAEDIC SURGERY

## 2022-09-20 PROCEDURE — 99214 PR OFFICE/OUTPT VISIT, EST, LEVL IV, 30-39 MIN: ICD-10-PCS | Mod: 25,,, | Performed by: ORTHOPAEDIC SURGERY

## 2022-09-20 PROCEDURE — 1160F RVW MEDS BY RX/DR IN RCRD: CPT | Mod: CPTII,,, | Performed by: ORTHOPAEDIC SURGERY

## 2022-09-20 PROCEDURE — 73564 X-RAY EXAM KNEE 4 OR MORE: CPT | Mod: 26,RT,, | Performed by: ORTHOPAEDIC SURGERY

## 2022-09-20 PROCEDURE — 3066F NEPHROPATHY DOC TX: CPT | Mod: CPTII,,, | Performed by: ORTHOPAEDIC SURGERY

## 2022-09-20 PROCEDURE — 4010F PR ACE/ARB THEARPY RXD/TAKEN: ICD-10-PCS | Mod: CPTII,,, | Performed by: ORTHOPAEDIC SURGERY

## 2022-09-20 PROCEDURE — 3066F PR DOCUMENTATION OF TREATMENT FOR NEPHROPATHY: ICD-10-PCS | Mod: CPTII,,, | Performed by: ORTHOPAEDIC SURGERY

## 2022-09-20 PROCEDURE — 1160F PR REVIEW ALL MEDS BY PRESCRIBER/CLIN PHARMACIST DOCUMENTED: ICD-10-PCS | Mod: CPTII,,, | Performed by: ORTHOPAEDIC SURGERY

## 2022-09-20 PROCEDURE — 20610 LARGE JOINT ASPIRATION/INJECTION: R KNEE: ICD-10-PCS | Mod: RT,,, | Performed by: ORTHOPAEDIC SURGERY

## 2022-09-20 PROCEDURE — 1159F PR MEDICATION LIST DOCUMENTED IN MEDICAL RECORD: ICD-10-PCS | Mod: CPTII,,, | Performed by: ORTHOPAEDIC SURGERY

## 2022-09-20 PROCEDURE — 3044F PR MOST RECENT HEMOGLOBIN A1C LEVEL <7.0%: ICD-10-PCS | Mod: CPTII,,, | Performed by: ORTHOPAEDIC SURGERY

## 2022-09-20 PROCEDURE — 73564 XR KNEE COMP 4 OR MORE VIEWS RIGHT: ICD-10-PCS | Mod: 26,RT,, | Performed by: ORTHOPAEDIC SURGERY

## 2022-09-20 PROCEDURE — 3061F NEG MICROALBUMINURIA REV: CPT | Mod: CPTII,,, | Performed by: ORTHOPAEDIC SURGERY

## 2022-09-20 PROCEDURE — 3044F HG A1C LEVEL LT 7.0%: CPT | Mod: CPTII,,, | Performed by: ORTHOPAEDIC SURGERY

## 2022-09-20 PROCEDURE — 4010F ACE/ARB THERAPY RXD/TAKEN: CPT | Mod: CPTII,,, | Performed by: ORTHOPAEDIC SURGERY

## 2022-09-20 PROCEDURE — 99214 OFFICE O/P EST MOD 30 MIN: CPT | Mod: 25,,, | Performed by: ORTHOPAEDIC SURGERY

## 2022-09-20 PROCEDURE — 73564 X-RAY EXAM KNEE 4 OR MORE: CPT | Mod: TC,RT

## 2022-09-20 PROCEDURE — 1159F MED LIST DOCD IN RCRD: CPT | Mod: CPTII,,, | Performed by: ORTHOPAEDIC SURGERY

## 2022-09-20 RX ORDER — BUPIVACAINE HYDROCHLORIDE 5 MG/ML
3 INJECTION, SOLUTION PERINEURAL
Status: DISCONTINUED | OUTPATIENT
Start: 2022-09-20 | End: 2022-09-20 | Stop reason: HOSPADM

## 2022-09-20 RX ORDER — TRIAMCINOLONE ACETONIDE 40 MG/ML
40 INJECTION, SUSPENSION INTRA-ARTICULAR; INTRAMUSCULAR
Status: DISCONTINUED | OUTPATIENT
Start: 2022-09-20 | End: 2022-09-20 | Stop reason: HOSPADM

## 2022-09-20 RX ADMIN — BUPIVACAINE HYDROCHLORIDE 3 ML: 5 INJECTION, SOLUTION PERINEURAL at 08:09

## 2022-09-20 RX ADMIN — TRIAMCINOLONE ACETONIDE 40 MG: 40 INJECTION, SUSPENSION INTRA-ARTICULAR; INTRAMUSCULAR at 08:09

## 2022-09-20 NOTE — PROGRESS NOTES
CC:  Knee pain    42 y.o. Female returns to clinic for a follow up visit regarding knee pain.       She was here in April and had an injection. It lasted for about a month and a half. She has had covid and multiple minor procedures since then. She is currently having a lot of pain in her right knee as before.        Past Medical History:   Diagnosis Date    Anxiety     Asthma     Chronic pain     Cushing syndrome     Depression     Essential hypertension     Fibromyalgia     Herpes zoster     Hyperlipidemia     Onychomycosis     MARY on CPAP     Rheumatoid arthritis     Type 2 diabetes mellitus 2020    Vitamin D deficiency 2018     Past Surgical History:   Procedure Laterality Date     SECTION      ELBOW SURGERY      EPIDURAL STEROID INJECTION      L4-5 VERITO Dec 2020-[place]    FEMUR SURGERY Right     due to osteomyelitis    HYSTERECTOMY      INJECTION OF ANESTHETIC AGENT AROUND MEDIAL BRANCH NERVES INNERVATING LUMBAR FACET JOINT Bilateral 2022    Procedure: Bilateral L4-5,5-S1 MBB ( No steroids);  Surgeon: Carmel Torre MD;  Location: [facility]IN [facility];  Service: Pain Management;  Laterality: Bilateral;  PT AWARE TO BE TESTED ON OV    INJECTION OF ANESTHETIC AGENT AROUND MEDIAL BRANCH NERVES INNERVATING LUMBAR FACET JOINT Bilateral 3/8/2022    Procedure: Block-nerve-medial branch-lumbar, bilateral L4 through S1;  Surgeon: Carmel Torre MD;  Location: [facility] PAIN [facility];  Service: Pain Management;  Laterality: Bilateral;    INJECTION OF ANESTHETIC AGENT INTO SACROILIAC JOINT Bilateral 2022    Procedure: BLOCK, SACROILIAC JOINT;  Surgeon: Carmel Torre MD;  Location: [facility] PAIN [facility];  Service: Pain Management;  Laterality: Bilateral;  covid test put in    LIPOMA RESECTION  2019    RADIOFREQUENCY ABLATION OF LUMBAR MEDIAL BRANCH NERVE AT SINGLE LEVEL Right 2022    Procedure: Radiofrequency Ablation, Nerve, Spinal, Lumbar, Medial Branch, Level L4-S1, right  side 1st, will have left-sided after completing;  Surgeon: Carmel Torre MD;  Location: [facility]placed by [facility];  Service: Pain Management;  Laterality: Right;  pt aware at visit to be tested    RADIOFREQUENCY ABLATION OF LUMBAR MEDIAL BRANCH NERVE AT SINGLE LEVEL Left 5/5/2022    Procedure: LEFT  L4-S1 RFTC  (HAD RIGHT  ON 4-14);  Surgeon: Carmel Torre MD;  Location: [facility]placed by [facility];  Service: Pain Management;  Laterality: Left;    SURGICAL REMOVAL OF PILONIDAL CYST      TRANSFORAMINAL EPIDURAL INJECTION OF STEROID      Bilateral L4-5 TFESI Nov 2020-[place]    TRANSFORAMINAL EPIDURAL INJECTION OF STEROID      Right L4-5 TFESI Feb 2020-[place]    VENTRAL HERNIA REPAIR  09/2020         REVIEW OF SYSTEMS:   Constitution: Negative. Negative for chills, fever and night sweats.    Hematologic/Lymphatic: Negative for bleeding problem. Does not bruise/bleed easily.   Skin: Negative for dry skin, itching and rash.   Musculoskeletal: Negative for falls. Positive for knee pain and muscle weakness.   All other review of symptoms were reviewed and found to be noncontributory.     PHYSICAL EXAMINATION:  There were no vitals taken for this visit.  General    Nursing note and vitals reviewed.  Constitutional: She is oriented to person, place, and time. She appears well-developed and well-nourished.   HENT:   Head: Normocephalic and atraumatic.   Nose: Nose normal.   Eyes: Pupils are equal, round, and reactive to light.   Neck: Neck supple.   Cardiovascular:  Normal rate, regular rhythm and intact distal pulses.            Pulmonary/Chest: Effort normal. No respiratory distress. She exhibits no tenderness.   Abdominal: Soft. She exhibits no distension. There is no abdominal tenderness.   Neurological: She is alert and oriented to person, place, and time. She has normal reflexes.   Psychiatric: She has a normal mood and affect. Her behavior is normal. Judgment and thought content normal.     General Musculoskeletal Exam   Gait:  antalgic       Right Knee Exam     Inspection   Swelling: present  Effusion: present  Deformity: present    Tenderness   The patient is tender to palpation of the medial joint line.    Crepitus   The patient has crepitus of the patella and medial joint line.    Range of Motion   Extension:  abnormal   Flexion:  abnormal     Tests   Meniscus   Grady:  Medial - positive   Ligament Examination   Lachman: normal (-1 to 2mm)   PCL-Posterior Drawer: normal (0 to 2mm)     MCL - Valgus: normal (0 to 2mm)  LCL - Varus: normal  Pivot Shift: normal (Equal)  Reverse Pivot Shift: normal (Equal)  Dial Test at 30 degrees: normal (< 5 degrees)  Dial Test at 90 degrees: normal (< 5 degrees)  Posterior Sag Test: negative  Posterolateral Corner: unstable (>15 degrees difference)  Patella   Patellar Tracking: normal  Q-Angle at 90 degrees: normal  Patellar Grind: positive    Other   Sensation: normal    Muscle Strength   Right Lower Extremity   Quadriceps:  5/5   Hamstrin/5     Reflexes     Right Side   Quadriceps:  2+    Vascular Exam     Right Pulses  Dorsalis Pedis:      2+  Posterior Tibial:      2+          IMAGING:  X-Ray Knee Complete 4 Or More Views Right    Result Date: 2022  See Procedure Notes for results. IMPRESSION: Please see Ortho procedure notes for report.  This procedure was auto-finalized by: Virtual Radiologist    CT Abdomen Pelvis  Without Contrast    Result Date: 2022  EXAMINATION: CT ABDOMEN PELVIS WITHOUT CONTRAST CLINICAL HISTORY: Abdominal pain, acute, nonlocalized; TECHNIQUE: Low dose axial images, sagittal and coronal reformations were obtained from the lung bases to the pubic symphysis.  Oral contrast was not administered. COMPARISON: 2020 FINDINGS: Lung bases clear. Fatty infiltration of the liver.  No focal hepatic lesion.  Gallbladder, adrenals, pancreas, and spleen are unremarkable.  Kidneys unremarkable.  No stone, hydronephrosis, or hydroureter. There is no pneumoperitoneum.   No ascites. No adenopathy.  Vascular structures are normal caliber. No bowel obstruction.  No acute bowel abnormality.  Appendix seen and normal. Urinary bladder decompressed. No acute fracture.  Small fat containing umbilical hernia.  Mildly decreased from prior.     No acute abnormality identified in the abdomen or pelvis. Electronically signed by: Alex Buchanan Date:    09/11/2022 Time:    19:28    [place] [facility]    Result Date: 8/23/2022  See OP Notes for results. IMPRESSION: See OP Notes for results. This procedure was auto-finalized by: Virtual Radiologist       ASSESSMENT:      ICD-10-CM ICD-9-CM   1. Chondromalacia of patellofemoral joint, right  M22.41 717.7       PLAN:     -Findings and treatment options were discussed with the patient  -All questions answered  Natural history and expected course discussed. Questions answered.  Educational materials distributed.  Rest, ice, compression, and elevation (RICE) therapy.  Reduction in offending activity.  Arthrocentesis. See procedure note.    We discussed weight loss as well.  She voiced her understanding regarding the importance of weight loss.  Injection given today.  Please see the attached procedure note.  Will follow back up on an as-needed basis.  There are no Patient Instructions on file for this visit.      Orders Placed This Encounter   Procedures    Large Joint Aspiration/Injection: R knee         Large Joint Aspiration/Injection: R knee    Date/Time: 9/20/2022 8:30 AM  Performed by: Rogelio Wakefield MD  Authorized by: Rogelio Wakefield MD     Consent Done?:  Yes (Verbal)  Indications:  Pain  Local anesthesia used?: No      Details:  Needle Size:  22 G  Approach:  Superior  Location:  Knee  Site:  R knee  Medications:  3 mL BUPivacaine 0.5 % (5 mg/mL); 40 mg triamcinolone acetonide 40 mg/mL  Patient tolerance:  Patient tolerated the procedure well with no immediate complications

## 2022-09-20 NOTE — PROCEDURES
Large Joint Aspiration/Injection: R knee    Date/Time: 9/20/2022 8:30 AM  Performed by: Rogelio Wakefield MD  Authorized by: Rogelio Wakefield MD     Consent Done?:  Yes (Verbal)  Indications:  Pain  Local anesthesia used?: No      Details:  Needle Size:  22 G  Approach:  Superior  Location:  Knee  Site:  R knee  Medications:  3 mL BUPivacaine 0.5 % (5 mg/mL); 40 mg triamcinolone acetonide 40 mg/mL  Patient tolerance:  Patient tolerated the procedure well with no immediate complications

## 2022-10-07 ENCOUNTER — ANESTHESIA EVENT (OUTPATIENT)
Dept: GASTROENTEROLOGY | Facility: HOSPITAL | Age: 42
End: 2022-10-07
Payer: MEDICAID

## 2022-10-07 ENCOUNTER — HOSPITAL ENCOUNTER (OUTPATIENT)
Dept: GASTROENTEROLOGY | Facility: HOSPITAL | Age: 42
Discharge: HOME | End: 2022-10-07
Attending: INTERNAL MEDICINE
Payer: MEDICAID

## 2022-10-07 ENCOUNTER — ANESTHESIA (OUTPATIENT)
Dept: GASTROENTEROLOGY | Facility: HOSPITAL | Age: 42
End: 2022-10-07
Payer: MEDICAID

## 2022-10-07 VITALS
OXYGEN SATURATION: 97 % | HEART RATE: 69 BPM | WEIGHT: 293 LBS | RESPIRATION RATE: 15 BRPM | TEMPERATURE: 98 F | SYSTOLIC BLOOD PRESSURE: 130 MMHG | BODY MASS INDEX: 63.1 KG/M2 | DIASTOLIC BLOOD PRESSURE: 97 MMHG

## 2022-10-07 DIAGNOSIS — K21.9 GERD (GASTROESOPHAGEAL REFLUX DISEASE): ICD-10-CM

## 2022-10-07 DIAGNOSIS — K21.00 GASTROESOPHAGEAL REFLUX DISEASE WITH ESOPHAGITIS WITHOUT HEMORRHAGE: Primary | ICD-10-CM

## 2022-10-07 DIAGNOSIS — K29.00 ACUTE SUPERFICIAL GASTRITIS WITHOUT HEMORRHAGE: ICD-10-CM

## 2022-10-07 DIAGNOSIS — K20.90 ESOPHAGITIS: ICD-10-CM

## 2022-10-07 DIAGNOSIS — K44.9 HH (HIATUS HERNIA): ICD-10-CM

## 2022-10-07 LAB — GLUCOSE SERPL-MCNC: 125 MG/DL (ref 70–105)

## 2022-10-07 PROCEDURE — 37000008 HC ANESTHESIA 1ST 15 MINUTES

## 2022-10-07 PROCEDURE — 82962 GLUCOSE BLOOD TEST: CPT

## 2022-10-07 PROCEDURE — 88305 TISSUE EXAM BY PATHOLOGIST: CPT | Mod: 26,,, | Performed by: PATHOLOGY

## 2022-10-07 PROCEDURE — 37000009 HC ANESTHESIA EA ADD 15 MINS

## 2022-10-07 PROCEDURE — 43239 EGD BIOPSY SINGLE/MULTIPLE: CPT

## 2022-10-07 PROCEDURE — D9220A PRA ANESTHESIA: ICD-10-PCS | Mod: ,,, | Performed by: NURSE ANESTHETIST, CERTIFIED REGISTERED

## 2022-10-07 PROCEDURE — 25000003 PHARM REV CODE 250: Performed by: NURSE ANESTHETIST, CERTIFIED REGISTERED

## 2022-10-07 PROCEDURE — 88342 IMHCHEM/IMCYTCHM 1ST ANTB: CPT | Mod: 26,,, | Performed by: PATHOLOGY

## 2022-10-07 PROCEDURE — 88305 TISSUE EXAM BY PATHOLOGIST: CPT | Mod: SUR | Performed by: INTERNAL MEDICINE

## 2022-10-07 PROCEDURE — 88305 SURGICAL PATHOLOGY: ICD-10-PCS | Mod: 26,,, | Performed by: PATHOLOGY

## 2022-10-07 PROCEDURE — 00731 ANES UPR GI NDSC PX NOS: CPT

## 2022-10-07 PROCEDURE — 88342 SURGICAL PATHOLOGY: ICD-10-PCS | Mod: 26,,, | Performed by: PATHOLOGY

## 2022-10-07 PROCEDURE — D9220A PRA ANESTHESIA: Mod: ,,, | Performed by: NURSE ANESTHETIST, CERTIFIED REGISTERED

## 2022-10-07 PROCEDURE — 63600175 PHARM REV CODE 636 W HCPCS: Performed by: NURSE ANESTHETIST, CERTIFIED REGISTERED

## 2022-10-07 RX ORDER — PROPOFOL 10 MG/ML
VIAL (ML) INTRAVENOUS
Status: DISCONTINUED | OUTPATIENT
Start: 2022-10-07 | End: 2022-10-07

## 2022-10-07 RX ORDER — LIDOCAINE HYDROCHLORIDE 20 MG/ML
INJECTION, SOLUTION EPIDURAL; INFILTRATION; INTRACAUDAL; PERINEURAL
Status: DISCONTINUED | OUTPATIENT
Start: 2022-10-07 | End: 2022-10-07

## 2022-10-07 RX ORDER — SODIUM CHLORIDE 0.9 % (FLUSH) 0.9 %
10 SYRINGE (ML) INJECTION
Status: DISCONTINUED | OUTPATIENT
Start: 2022-10-07 | End: 2022-10-08 | Stop reason: HOSPADM

## 2022-10-07 RX ORDER — OMEPRAZOLE 20 MG/1
40 CAPSULE, DELAYED RELEASE ORAL DAILY
Qty: 30 CAPSULE | Refills: 6 | Status: SHIPPED | OUTPATIENT
Start: 2022-10-07 | End: 2023-01-12 | Stop reason: DRUGHIGH

## 2022-10-07 RX ADMIN — PROPOFOL 50 MG: 10 INJECTION, EMULSION INTRAVENOUS at 02:10

## 2022-10-07 RX ADMIN — LIDOCAINE HYDROCHLORIDE 80 MG: 20 INJECTION, SOLUTION INTRAVENOUS at 02:10

## 2022-10-07 RX ADMIN — PROPOFOL 20 MG: 10 INJECTION, EMULSION INTRAVENOUS at 02:10

## 2022-10-07 RX ADMIN — SODIUM CHLORIDE: 9 INJECTION, SOLUTION INTRAVENOUS at 02:10

## 2022-10-07 RX ADMIN — PROPOFOL 80 MG: 10 INJECTION, EMULSION INTRAVENOUS at 02:10

## 2022-10-07 NOTE — ANESTHESIA POSTPROCEDURE EVALUATION
Anesthesia Post Evaluation    Patient: Belem Swann    Procedure(s) Performed: * No procedures listed *    Final Anesthesia Type: general      Patient location during evaluation: GI PACU  Patient participation: Yes- Able to Participate  Level of consciousness: awake and alert  Post-procedure vital signs: reviewed and stable  Pain management: adequate  Airway patency: patent    PONV status at discharge: No PONV  Anesthetic complications: no      Cardiovascular status: blood pressure returned to baseline and hemodynamically stable  Respiratory status: spontaneous ventilation  Hydration status: euvolemic  Follow-up not needed.  Comments: Pt voices appreciation for care          Vitals Value Taken Time   /78 10/07/22 1501   Temp 98.3 10/07/22 1503   Pulse 69 10/07/22 1503   Resp 18 10/07/22 1503   SpO2 97 % 10/07/22 1503   Vitals shown include unvalidated device data.      No case tracking events are documented in the log.      Pain/Lowell Score: Lowell Score: 10 (10/7/2022  2:59 PM)

## 2022-10-07 NOTE — ANESTHESIA PREPROCEDURE EVALUATION
10/07/2022  Belem Swann is a 42 y.o., female.      Pre-op Assessment    I have reviewed the Patient Summary Reports.     I have reviewed the Nursing Notes. I have reviewed the NPO Status.   I have reviewed the Medications.     Review of Systems  Anesthesia Hx:  No problems with previous Anesthesia    Social:  Social Alcohol Use    Cardiovascular:   Hypertension hyperlipidemia    Pulmonary:   Asthma Sleep Apnea, CPAP    Musculoskeletal:  Joint Disease:  Arthritis, Rheumatoid Arthritis    Neurological:   Neuromuscular Disease,  Rheumatoid Arthritis    Endocrine:   Diabetes, type 2  Metabolic Disorders, Morbid Obesity / BMI > 40  Psych:   Psychiatric History anxiety depression        Past Medical History:   Diagnosis Date    Anxiety     Asthma     Chronic pain     Cushing syndrome     Depression     Essential hypertension     Fibromyalgia     Herpes zoster     Hyperlipidemia     Onychomycosis     MARY on CPAP     Rheumatoid arthritis     Type 2 diabetes mellitus 11/2020    Vitamin D deficiency 05/03/2018       Physical Exam  General: Well nourished, Cooperative, Alert and Oriented    Airway:  Mallampati: III   Neck ROM: Normal ROM    Dental:  Intact    Chest/Lungs:  Clear to auscultation    Heart:  Rate: Normal  Rhythm: Regular Rhythm        Anesthesia Plan  Type of Anesthesia, risks & benefits discussed:    Anesthesia Type: Gen Natural Airway, MAC  Intra-op Monitoring Plan: Standard ASA Monitors  Post Op Pain Control Plan: multimodal analgesia and IV/PO Opioids PRN  Induction:  IV  Informed Consent: Informed consent signed with the Patient and all parties understand the risks and agree with anesthesia plan.  All questions answered. Patient consented to blood products? Yes  ASA Score: 3  Day of Surgery Review of History & Physical: I have interviewed and examined the patient. I have reviewed  the patient's H&P dated: There are no significant changes.     Ready For Surgery From Anesthesia Perspective.     .

## 2022-10-07 NOTE — H&P
[facility] - Endoscopy  Gastroenterology  H&P    Patient Name: Belem Swann  MRN: [id number]  Admission Date: 10/7/2022  Code Status: Full Code    Attending Provider: Omero Weber MD   Primary Care Physician: DANE Saenz  Principal Problem:<principal problem not specified>    Subjective:     History of Present Illness: Pt c/o epigastric pain, for egd.    Past Medical History:   Diagnosis Date    Anxiety     Asthma     Chronic pain     Cushing syndrome     Depression     Essential hypertension     Fibromyalgia     Herpes zoster     Hyperlipidemia     Onychomycosis     MARY on CPAP     Rheumatoid arthritis     Type 2 diabetes mellitus 2020    Vitamin D deficiency 2018       Past Surgical History:   Procedure Laterality Date     SECTION      ELBOW SURGERY      EPIDURAL STEROID INJECTION      L4-5 VERITO Dec 2020-[place]    FEMUR SURGERY Right     due to osteomyelitis    HYSTERECTOMY      INJECTION OF ANESTHETIC AGENT AROUND MEDIAL BRANCH NERVES INNERVATING LUMBAR FACET JOINT Bilateral 2022    Procedure: Bilateral L4-5,5-S1 MBB ( No steroids);  Surgeon: Carmel Torre MD;  Location: [facility] PAIN [facility];  Service: Pain Management;  Laterality: Bilateral;  PT AWARE TO BE TESTED ON OV    INJECTION OF ANESTHETIC AGENT AROUND MEDIAL BRANCH NERVES INNERVATING LUMBAR FACET JOINT Bilateral 3/8/2022    Procedure: Block-nerve-medial branch-lumbar, bilateral L4 through S1;  Surgeon: Carmel Torre MD;  Location: [facility]IN [facility];  Service: Pain Management;  Laterality: Bilateral;    INJECTION OF ANESTHETIC AGENT INTO SACROILIAC JOINT Bilateral 2022    Procedure: BLOCK, SACROILIAC JOINT;  Surgeon: Carmel Torre MD;  Location: [facility]IN [facility];  Service: Pain Management;  Laterality: Bilateral;  covid test put in    LIPOMA RESECTION  2019    RADIOFREQUENCY ABLATION OF LUMBAR MEDIAL BRANCH NERVE AT SINGLE LEVEL Right 2022    Procedure: Radiofrequency Ablation, Nerve,  Spinal, Lumbar, Medial Branch, Level L4-S1, right side 1st, will have left-sided after completing;  Surgeon: Carmel Torre MD;  Location: [facility];  Service: Pain Management;  Laterality: Right;  pt aware at visit to be tested    RADIOFREQUENCY ABLATION OF LUMBAR MEDIAL BRANCH NERVE AT SINGLE LEVEL Left 5/5/2022    Procedure: LEFT  L4-S1 RFTC  (HAD RIGHT  ON 4-14);  Surgeon: Carmel Torre MD;  Location: [facility];  Service: Pain Management;  Laterality: Left;    SURGICAL REMOVAL OF PILONIDAL CYST      TRANSFORAMINAL EPIDURAL INJECTION OF STEROID      Bilateral L4-5 TFESI Nov 2020-[place]    TRANSFORAMINAL EPIDURAL INJECTION OF STEROID      Right L4-5 TFESI Feb 2020-[place]    VENTRAL HERNIA REPAIR  09/2020       Review of patient's allergies indicates:   Allergen Reactions    Doxycycline     Latex      Family History       Problem Relation (Age of Onset)    Cancer Mother    Thyroid cancer Mother, Maternal Grandmother          Tobacco Use    Smoking status: Former    Smokeless tobacco: Never   Substance and Sexual Activity    Alcohol use: Yes     Comment: ocassional    Drug use: Never    Sexual activity: Not Currently     Review of Systems   Respiratory: Negative.     Cardiovascular: Negative.    Gastrointestinal:  Positive for abdominal pain.   Objective:     Vital Signs (Most Recent):  Pulse: 76 (10/07/22 1408)  Resp: 13 (10/07/22 1408)  BP: (!) 157/85 (10/07/22 1408)  SpO2: 97 % (10/07/22 1408)   Vital Signs (24h Range):  Pulse:  [76] 76  Resp:  [13] 13  SpO2:  [97 %] 97 %  BP: (157)/(85) 157/85     Weight: (!) 156.5 kg (345 lb) (10/07/22 1408)  Body mass index is 63.1 kg/m².      Intake/Output Summary (Last 24 hours) at 10/7/2022 1442  Last data filed at 10/7/2022 1431  Gross per 24 hour   Intake 100 ml   Output --   Net 100 ml       Lines/Drains/Airways       Peripheral Intravenous Line  Duration                  Peripheral IV - Single Lumen 10/07/22 1407 22 G Anterior;Left Forearm <1 day                     Physical Exam  Vitals reviewed.   Constitutional:       General: She is not in acute distress.     Appearance: Normal appearance. She is well-developed. She is obese. She is not ill-appearing.   HENT:      Head: Normocephalic and atraumatic.      Nose: Nose normal.   Eyes:      Pupils: Pupils are equal, round, and reactive to light.   Cardiovascular:      Rate and Rhythm: Normal rate and regular rhythm.   Pulmonary:      Effort: Pulmonary effort is normal.      Breath sounds: Normal breath sounds. No wheezing.   Abdominal:      General: Abdomen is flat. Bowel sounds are normal. There is no distension.      Palpations: Abdomen is soft.      Tenderness: There is no abdominal tenderness. There is no guarding.   Skin:     General: Skin is warm and dry.      Coloration: Skin is not jaundiced.   Neurological:      Mental Status: She is alert.   Psychiatric:         Attention and Perception: Attention normal.         Mood and Affect: Affect normal.         Speech: Speech normal.         Behavior: Behavior is cooperative.      Comments: Pt was calm while speaking.       Significant Labs:  CBC: No results for input(s): WBC, HGB, HCT, PLT in the last 48 hours.  CMP: No results for input(s): GLU, CALCIUM, ALBUMIN, PROT, NA, K, CO2, CL, BUN, CREATININE, ALKPHOS, ALT, AST, BILITOT in the last 48 hours.    Significant Imaging:  Imaging results within the past 24 hours have been reviewed.    Assessment/Plan:     There are no hospital problems to display for this patient.        Imp: epigastric pain  Plan: egd    Omero Weber MD  Gastroenterology  [facility] - Endoscopy

## 2022-10-07 NOTE — TRANSFER OF CARE
Anesthesia Transfer of Care Note    Patient: Belem Swann    Procedure(s) Performed: * No procedures listed *    Patient location: GI    Anesthesia Type: general    Transport from OR: Transported from OR on room air with adequate spontaneous ventilation    Post pain: adequate analgesia    Post assessment: no apparent anesthetic complications    Post vital signs: stable    Level of consciousness: responds to stimulation    Nausea/Vomiting: no nausea/vomiting    Complications: none    Transfer of care protocol was followed      Last vitals:   Visit Vitals  /67   Pulse 87   Temp 36.6 °C (97.8 °F)   Resp 17   Wt (!) 156.5 kg (345 lb)   SpO2 97%   Breastfeeding No   BMI 63.10 kg/m²

## 2022-10-07 NOTE — DISCHARGE INSTRUCTIONS
Procedure Date  10/7/22     Impression  Overall Impression: The esophagus had inflammation and an erosion at the EG junction. The distal esophagus was biopsied. A small hiatus hernia was seen. The stomach has gastritis without ulcers, biopsies were obtained. The duodenum had normal mucosa.     Recommendation    Await pathology results      Continue to avoid nsaids; PPI 1/AM x 8 weeks as needed. Consider HIDA with CCK if epigastric pain persists.    No driving today, no operating heavy machinery, no signing any legal documents until tomorrow.    Drink lots of fluids, resume regular diet.  Take your normal medications.

## 2022-10-10 LAB
ESTROGEN SERPL-MCNC: NORMAL PG/ML
INSULIN SERPL-ACNC: NORMAL U[IU]/ML
LAB AP GROSS DESCRIPTION: NORMAL
LAB AP LABORATORY NOTES: NORMAL
T3RU NFR SERPL: NORMAL %

## 2022-10-18 ENCOUNTER — TELEPHONE (OUTPATIENT)
Dept: GASTROENTEROLOGY | Facility: CLINIC | Age: 42
End: 2022-10-18
Payer: MEDICAID

## 2022-10-18 NOTE — TELEPHONE ENCOUNTER
Your bx shows gastritis(inflammation in the stomach) and reflux esophagitis(inflammation in the esophagus) without H.pylori(bacteria infection). If you have any questions please give us a call at our office.    ----- Message from Omero Weber MD sent at 10/10/2022  3:15 PM CDT -----  EGD bx shows gastritis and reflux esophagitis without H.pylori.  ----- Message -----  From: Interface, Lab In [facility]  Sent: 10/7/2022   2:23 PM CDT  To: Omero Weber MD

## 2022-10-27 NOTE — PROGRESS NOTES
Subjective:         Patient ID: Belem Swann is a 42 y.o. female.    Chief Complaint: Low-back Pain and Knee Pain (bilateral)      Pain  This is a chronic problem. The current episode started more than 1 year ago. The problem occurs daily. The problem has been waxing and waning. Associated symptoms include arthralgias and neck pain. Pertinent negatives include no change in bowel habit, chest pain, coughing, diaphoresis, fever, rash, sore throat, vertigo or vomiting.   Review of Systems   Constitutional:  Negative for activity change, appetite change, diaphoresis, fever and unexpected weight change.   HENT:  Negative for drooling, ear discharge, ear pain, facial swelling, mouth sores, nosebleeds, sore throat, trouble swallowing, voice change and goiter.    Eyes:  Negative for photophobia, pain, discharge, redness and visual disturbance.   Respiratory:  Negative for apnea, cough, choking, chest tightness, shortness of breath, wheezing and stridor.    Cardiovascular:  Negative for chest pain, palpitations and leg swelling.   Gastrointestinal:  Negative for abdominal distention, change in bowel habit, diarrhea, rectal pain, vomiting, fecal incontinence and change in bowel habit.   Endocrine: Negative for cold intolerance, heat intolerance, polydipsia, polyphagia and polyuria.   Genitourinary:  Negative for bladder incontinence, dysuria, flank pain, frequency and hot flashes.   Musculoskeletal:  Positive for arthralgias, back pain, leg pain, neck pain and neck stiffness.   Integumentary:  Negative for color change, pallor and rash.   Allergic/Immunologic: Negative for immunocompromised state.   Neurological:  Negative for dizziness, vertigo, seizures, syncope, facial asymmetry, speech difficulty, light-headedness, coordination difficulties, memory loss and coordination difficulties.   Hematological:  Negative for adenopathy. Does not bruise/bleed easily.   Psychiatric/Behavioral:  Negative for agitation,  behavioral problems, confusion, decreased concentration, dysphoric mood, hallucinations, self-injury and suicidal ideas. The patient is not hyperactive.          Past Medical History:   Diagnosis Date    Anxiety     Asthma     Chronic pain     Cushing syndrome     Depression     Essential hypertension     Fibromyalgia     Herpes zoster     Hyperlipidemia     Onychomycosis     MARY on CPAP     Rheumatoid arthritis     Type 2 diabetes mellitus 2020    Vitamin D deficiency 2018     Past Surgical History:   Procedure Laterality Date     SECTION      ELBOW SURGERY  1985    EPIDURAL STEROID INJECTION      L4-5 VERITO Dec 2020-[facility]    FEMUR SURGERY Right     due to osteomyelitis    HYSTERECTOMY      INJECTION OF ANESTHETIC AGENT AROUND MEDIAL BRANCH NERVES INNERVATING LUMBAR FACET JOINT Bilateral 2022    Procedure: Bilateral L4-5,5-S1 MBB ( No steroids);  Surgeon: Carmel Torre MD;  Location: [facility];  Service: Pain Management;  Laterality: Bilateral;  PT AWARE TO BE TESTED ON OV    INJECTION OF ANESTHETIC AGENT AROUND MEDIAL BRANCH NERVES INNERVATING LUMBAR FACET JOINT Bilateral 3/8/2022    Procedure: Block-nerve-medial branch-lumbar, bilateral L4 through S1;  Surgeon: Carmel Torre MD;  Location: [facility];  Service: Pain Management;  Laterality: Bilateral;    INJECTION OF ANESTHETIC AGENT INTO SACROILIAC JOINT Bilateral 2022    Procedure: BLOCK, SACROILIAC JOINT;  Surgeon: Carmel Torre MD;  Location: [facility];  Service: Pain Management;  Laterality: Bilateral;  covid test put in    LIPOMA RESECTION  2019    RADIOFREQUENCY ABLATION OF LUMBAR MEDIAL BRANCH NERVE AT SINGLE LEVEL Right 2022    Procedure: Radiofrequency Ablation, Nerve, Spinal, Lumbar, Medial Branch, Level L4-S1, right side 1st, will have left-sided after completing;  Surgeon: Carmel Torre MD;  Location: [facility];  Service: Pain Management;  Laterality: Right;  pt aware  "at visit to be tested    RADIOFREQUENCY ABLATION OF LUMBAR MEDIAL BRANCH NERVE AT SINGLE LEVEL Left 5/5/2022    Procedure: LEFT  L4-S1 RFTC  (HAD RIGHT  ON 4-14);  Surgeon: Carmel Torre MD;  Location: [facility];  Service: Pain Management;  Laterality: Left;    SURGICAL REMOVAL OF PILONIDAL CYST      TRANSFORAMINAL EPIDURAL INJECTION OF STEROID      Bilateral L4-5 TFESI Nov 2020-[facility]    TRANSFORAMINAL EPIDURAL INJECTION OF STEROID      Right L4-5 TFESI Feb 2020-[facility]    VENTRAL HERNIA REPAIR  09/2020     Social History     Socioeconomic History    Marital status:    Tobacco Use    Smoking status: Former    Smokeless tobacco: Never   Substance and Sexual Activity    Alcohol use: Yes     Comment: ocassional    Drug use: Never    Sexual activity: Not Currently     Family History   Problem Relation Age of Onset    Cancer Mother     Thyroid cancer Mother     Thyroid cancer Maternal Grandmother     Melanoma Neg Hx      Review of patient's allergies indicates:   Allergen Reactions    Doxycycline     Latex         Objective:  Vitals:    10/31/22 0906   BP: (!) 163/103   Pulse: 108   Resp: 18   Weight: (!) 161.9 kg (357 lb)   Height: 5' 2" (1.575 m)   PainSc:   8         Physical Exam  Vitals and nursing note reviewed. Exam conducted with a chaperone present.   Constitutional:       General: She is awake.      Appearance: Normal appearance. She is obese. She is not toxic-appearing or diaphoretic.   HENT:      Head: Normocephalic and atraumatic.      Nose: Nose normal.      Mouth/Throat:      Mouth: Mucous membranes are moist.      Pharynx: Oropharynx is clear.   Eyes:      Conjunctiva/sclera: Conjunctivae normal.      Pupils: Pupils are equal, round, and reactive to light.   Cardiovascular:      Rate and Rhythm: Normal rate.   Pulmonary:      Effort: Pulmonary effort is normal. No respiratory distress.   Abdominal:      Palpations: Abdomen is soft.      Tenderness: There is no guarding. "   Musculoskeletal:         General: No swelling.      Cervical back: Normal range of motion and neck supple. Tenderness present. No rigidity.      Thoracic back: Tenderness present.      Lumbar back: Tenderness present. Decreased range of motion.   Skin:     General: Skin is warm and dry.      Coloration: Skin is not jaundiced or pale.   Neurological:      General: No focal deficit present.      Mental Status: She is alert and oriented to person, place, and time. Mental status is at baseline.      Cranial Nerves: No cranial nerve deficit (II-XII).   Psychiatric:         Mood and Affect: Mood normal.         Behavior: Behavior normal. Behavior is cooperative.         Thought Content: Thought content normal.         EGD  Narrative: Table formatting from the original result was not included.  Procedure Date  10/7/22    Impression  Overall   Impression:  The esophagus had inflammation and an erosion at the   EG junction. The distal esophagus was biopsied. A small hiatus hernia was   seen. The stomach has gastritis without ulcers, biopsies were obtained.   The duodenum had normal mucosa.    Recommendation   Await pathology results     Continue to avoid nsaids; PPI 1/AM x 8 weeks as needed. Consider HIDA with   CCK if epigastric pain persists.    Indication  GERD (gastroesophageal reflux disease)    Providers  Antonina Mayes Technician   Cassandra Stacy, SEA Skinner, RN Registered Nurse   Janet Hearn, SEA Weber MD Proceduralist     Medications  Moderate sedation administered by anesthesia staff - See anesthesia   record.    Preprocedure  A history and physical has been performed, and patient medication   allergies have been reviewed. The patient's tolerance of previous   anesthesia has been reviewed. The risks and benefits of the procedure and   the sedation options and risks were discussed with the patient. All   questions were answered and informed consent obtained.    ASA Score: ASA 3  - Patient with moderate systemic disease with functional   limitations  Mallampati Airway Score: II (hard and soft palate, upper portion of   tonsils anduvula visible)    Details of the Procedure  The patient underwent monitored anesthesia care, which was administered by   an anesthesia professional. The patient's blood pressure, heart rate,   level of consciousness, oxygen, respirations, ECG and ETCO2 were monitored   throughout the procedure. The scope was introduced through the mouth and   advanced to the third part of the duodenum. Retroflexion was performed in   the cardia. Prior to the procedure, the patient's H. Pylori status was   unknown. The patient's estimated blood loss was minimal (<5 mL). The   procedure was not difficult. The patient tolerated the procedure well.   There were no apparent complications.     Scope: Gastroscope  Scope Serial: [id number]    Events  Procedure Events   Event Event Time     Procedure Events   Event Event Time   SCOPE IN 10/7/2022  2:46 PM     Findings  Esophagitis, showing erythematous mucosa with erosion in the lower third   of the esophagus; performed cold forceps biopsy  Small hiatal hernia  Erythematous mucosa in the fundus of the stomach and antrum; performed   cold forceps biopsy       Hospital Outpatient Visit on 10/07/2022   Component Date Value Ref Range Status    POC Glucose 10/07/2022 125 (H)  70 - 105 mg/dL Final    Case Report 10/07/2022    Final                    Value:Surgical Pathology                                Case: [id number]                                   Authorizing Provider:  Omero Weber MD      Collected:           10/07/2022 02:49 PM          Ordering Location:     [facility] - Endoscopy       Received:            10/07/2022 03:30 PM          Pathologist:           Farhat Mendoza MD                                                       Specimens:   A) - Stomach, biopsy                                                                                 B) - Esophagus, biopsy                                                                     Final Diagnosis 10/07/2022    Final                    Value:This result contains rich text formatting which cannot be displayed here.    Gross Description 10/07/2022    Final                    Value:This result contains rich text formatting which cannot be displayed here.    Microscopic Description 10/07/2022    Final                    Value:This result contains rich text formatting which cannot be displayed here.    Laboratory Notes 10/07/2022    Final                    Value:This result contains rich text formatting which cannot be displayed here.   Office Visit on 09/12/2022   Component Date Value Ref Range Status    Hemoglobin A1C 09/12/2022 6.2  4.5 - 6.6 % Final    Estimated Average Glucose 09/12/2022 120  mg/dL Final    Glucose, Fasting 09/12/2022 130 (H)  74 - 106 mg/dL Final    Triglycerides 09/12/2022 135  35 - 150 mg/dL Final    Cholesterol 09/12/2022 171  0 - 200 mg/dL Final    HDL Cholesterol 09/12/2022 35 (L)  40 - 60 mg/dL Final    Cholesterol/HDL Ratio (Risk Factor) 09/12/2022 4.9   Final    Non-HDL 09/12/2022 136  mg/dL Final    LDL Calculated 09/12/2022 109  mg/dL Final    LDL/HDL 09/12/2022 3.1   Final    VLDL 09/12/2022 27  mg/dL Final   Admission on 09/11/2022, Discharged on 09/11/2022   Component Date Value Ref Range Status    Sodium 09/11/2022 135 (L)  136 - 145 mmol/L Final    Potassium 09/11/2022 4.5  3.5 - 5.1 mmol/L Final    Chloride 09/11/2022 100  98 - 107 mmol/L Final    CO2 09/11/2022 25  21 - 32 mmol/L Final    Anion Gap 09/11/2022 15  7 - 16 mmol/L Final    Glucose 09/11/2022 117 (H)  74 - 106 mg/dL Final    BUN 09/11/2022 34 (H)  7 - 18 mg/dL Final    Creatinine 09/11/2022 1.22 (H)  0.55 - 1.02 mg/dL Final    BUN/Creatinine Ratio 09/11/2022 28 (H)  6 - 20 Final    Calcium 09/11/2022 10.0  8.5 - 10.1 mg/dL Final    Total Protein 09/11/2022 8.9 (H)  6.4 - 8.2 g/dL Final    Albumin  09/11/2022 3.9  3.5 - 5.0 g/dL Final    Globulin 09/11/2022 5.0 (H)  2.0 - 4.0 g/dL Final    A/G Ratio 09/11/2022 0.8   Final    Bilirubin, Total 09/11/2022 0.4  >0.0 - 1.2 mg/dL Final    Alk Phos 09/11/2022 112 (H)  37 - 98 U/L Final    ALT 09/11/2022 34  13 - 56 U/L Final    AST 09/11/2022 20  15 - 37 U/L Final    eGFR 09/11/2022 57 (L)  >=60 mL/min/1.73m² Final    Lipase 09/11/2022 184  73 - 393 U/L Final    Magnesium 09/11/2022 2.1  1.7 - 2.3 mg/dL Final    Lactic Acid 09/11/2022 1.6  0.4 - 2.0 mmol/L Final    Color, UA 09/11/2022 Yellow  Colorless, Straw, Yellow, Light Yellow, Dark Yellow Final    Clarity, UA 09/11/2022 Turbid  Clear Final    pH, UA 09/11/2022 5.5  5.0 to 8.0 pH Units Final    Leukocytes, UA 09/11/2022 Trace (A)  Negative Final    Nitrites, UA 09/11/2022 Negative  Negative Final    Protein, UA 09/11/2022 20 (A)  Negative Final    Glucose, UA 09/11/2022 Normal  Normal mg/dL Final    Ketones, UA 09/11/2022 Trace  Negative mg/dL Final    Urobilinogen, UA 09/11/2022 Normal  0.2, 1.0, Normal mg/dL Final    Bilirubin, UA 09/11/2022 Negative  Negative Final    Blood, UA 09/11/2022 Negative  Negative Final    Specific Gravity, UA 09/11/2022 1.020  <=1.030 Final    WBC 09/11/2022 16.84 (H)  4.50 - 11.00 K/uL Final    RBC 09/11/2022 5.36  4.20 - 5.40 M/uL Final    Hemoglobin 09/11/2022 15.4  12.0 - 16.0 g/dL Final    Hematocrit 09/11/2022 46.9  38.0 - 47.0 % Final    MCV 09/11/2022 87.5  80.0 - 96.0 fL Final    MCH 09/11/2022 28.7  27.0 - 31.0 pg Final    MCHC 09/11/2022 32.8  32.0 - 36.0 g/dL Final    RDW 09/11/2022 13.1  11.5 - 14.5 % Final    Platelet Count 09/11/2022 229  150 - 400 K/uL Final    MPV 09/11/2022 13.9 (H)  9.4 - 12.4 fL Final    Neutrophils % 09/11/2022 74.8 (H)  53.0 - 65.0 % Final    Lymphocytes % 09/11/2022 15.1 (L)  27.0 - 41.0 % Final    Monocytes % 09/11/2022 7.9 (H)  2.0 - 6.0 % Final    Eosinophils % 09/11/2022 1.2  1.0 - 4.0 % Final    Basophils % 09/11/2022 0.4  0.0 - 1.0 %  Final    Immature Granulocytes % 09/11/2022 0.6 (H)  0.0 - 0.4 % Final    nRBC, Auto 09/11/2022 0.0  <=0.0 % Final    Neutrophils, Abs 09/11/2022 12.60 (H)  1.80 - 7.70 K/uL Final    Lymphocytes, Absolute 09/11/2022 2.55  1.00 - 4.80 K/uL Final    Monocytes, Absolute 09/11/2022 1.33 (H)  0.00 - 0.80 K/uL Final    Eosinophils, Absolute 09/11/2022 0.20  0.00 - 0.50 K/uL Final    Basophils, Absolute 09/11/2022 0.06  0.00 - 0.20 K/uL Final    Immature Granulocytes, Absolute 09/11/2022 0.10 (H)  0.00 - 0.04 K/uL Final    nRBC, Absolute 09/11/2022 0.00  <=0.00 x10e3/uL Final    Diff Type 09/11/2022 Auto   Final    Troponin I High Sensitivity 09/11/2022 <4.0  <=60.4 pg/mL Final    WBC, UA 09/11/2022 7 (H)  <=5 /hpf Final    RBC, UA 09/11/2022 1  <=3 /hpf Final    Bacteria, UA 09/11/2022 Few (A)  None Seen /hpf Final    Squamous Epithelial Cells, UA 09/11/2022 Occasional (A)  None Seen /HPF Final    Amorphous Crystals, UA 09/11/2022 Occ (A)  None Seen /HPF Final    Mucous 09/11/2022 Few (A)  None Seen /LPF Final   Office Visit on 09/09/2022   Component Date Value Ref Range Status    Rapid Influenza A Ag 09/09/2022 Negative  Negative Final    Rapid Influenza B Ag 09/09/2022 Negative  Negative Final     Acceptable 09/09/2022 Yes   Final    WBC, UA 09/09/2022 Negative   Final    Nitrite, UA 09/09/2022 Negative   Final    Urobilinogen, UA 09/09/2022 0.2   Final    Protein, POC 09/09/2022 Negative   Final    pH, UA 09/09/2022 5.5   Final    Blood, UA 09/09/2022 Negative   Final    Spec Grav UA 09/09/2022 1.025   Final    Ketones, UA 09/09/2022 Negative   Final    Bilirubin, POC 09/09/2022 Negative   Final    Glucose, UA 09/09/2022 Negative   Final    Sodium 09/09/2022 134 (L)  136 - 145 mmol/L Final    Potassium 09/09/2022 4.7  3.5 - 5.1 mmol/L Final    Chloride 09/09/2022 101  98 - 107 mmol/L Final    CO2 09/09/2022 28  21 - 32 mmol/L Final    Anion Gap 09/09/2022 10  7 - 16 mmol/L Final    Glucose 09/09/2022 144  (H)  74 - 106 mg/dL Final    BUN 09/09/2022 31 (H)  7 - 18 mg/dL Final    Creatinine 09/09/2022 0.84  0.55 - 1.02 mg/dL Final    BUN/Creatinine Ratio 09/09/2022 37 (H)  6 - 20 Final    Calcium 09/09/2022 10.0  8.5 - 10.1 mg/dL Final    Total Protein 09/09/2022 8.9 (H)  6.4 - 8.2 g/dL Final    Albumin 09/09/2022 3.8  3.5 - 5.0 g/dL Final    Globulin 09/09/2022 5.1 (H)  2.0 - 4.0 g/dL Final    A/G Ratio 09/09/2022 0.7   Final    Bilirubin, Total 09/09/2022 0.6  >0.0 - 1.2 mg/dL Final    Alk Phos 09/09/2022 127 (H)  37 - 98 U/L Final    ALT 09/09/2022 33  13 - 56 U/L Final    AST 09/09/2022 30  15 - 37 U/L Final    eGFR 09/09/2022 89  >=60 mL/min/1.73m² Final    Culture, Urine 09/09/2022 Skin/Urogenital Maricarmen Isolated, no further workup.   Final    WBC 09/09/2022 14.15 (H)  4.50 - 11.00 K/uL Final    RBC 09/09/2022 5.40  4.20 - 5.40 M/uL Final    Hemoglobin 09/09/2022 15.4  12.0 - 16.0 g/dL Final    Hematocrit 09/09/2022 47.3 (H)  38.0 - 47.0 % Final    MCV 09/09/2022 87.6  80.0 - 96.0 fL Final    MCH 09/09/2022 28.5  27.0 - 31.0 pg Final    MCHC 09/09/2022 32.6  32.0 - 36.0 g/dL Final    RDW 09/09/2022 13.4  11.5 - 14.5 % Final    Platelet Count 09/09/2022 190  150 - 400 K/uL Final    MPV 09/09/2022 13.4 (H)  9.4 - 12.4 fL Final    Neutrophils % 09/09/2022 77.1 (H)  53.0 - 65.0 % Final    Lymphocytes % 09/09/2022 14.6 (L)  27.0 - 41.0 % Final    Monocytes % 09/09/2022 5.7  2.0 - 6.0 % Final    Eosinophils % 09/09/2022 1.6  1.0 - 4.0 % Final    Basophils % 09/09/2022 0.4  0.0 - 1.0 % Final    Immature Granulocytes % 09/09/2022 0.6 (H)  0.0 - 0.4 % Final    nRBC, Auto 09/09/2022 0.0  <=0.0 % Final    Neutrophils, Abs 09/09/2022 10.90 (H)  1.80 - 7.70 K/uL Final    Lymphocytes, Absolute 09/09/2022 2.06  1.00 - 4.80 K/uL Final    Monocytes, Absolute 09/09/2022 0.81 (H)  0.00 - 0.80 K/uL Final    Eosinophils, Absolute 09/09/2022 0.23  0.00 - 0.50 K/uL Final    Basophils, Absolute 09/09/2022 0.06  0.00 - 0.20 K/uL Final     Immature Granulocytes, Absolute 09/09/2022 0.09 (H)  0.00 - 0.04 K/uL Final    nRBC, Absolute 09/09/2022 0.00  <=0.00 x10e3/uL Final    Diff Type 09/09/2022 Scan Smear   Final    Platelet Morphology 09/09/2022 Large Platelets (A)  Normal Final    RBC Morphology 09/09/2022 Normal   Final   Office Visit on 09/06/2022   Component Date Value Ref Range Status    POC Amphetamines 09/06/2022 Negative  Negative, Inconclusive Final    POC Barbiturates 09/06/2022 Negative  Negative, Inconclusive Final    POC Benzodiazepines 09/06/2022 Negative  Negative, Inconclusive Final    POC Cocaine 09/06/2022 Negative  Negative, Inconclusive Final    POC THC 09/06/2022 Negative  Negative, Inconclusive Final    POC Methadone 09/06/2022 Negative  Negative, Inconclusive Final    POC Methamphetamine 09/06/2022 Negative  Negative, Inconclusive Final    POC Opiates 09/06/2022 Presumptive Positive (A)  Negative, Inconclusive Final    POC Oxycodone 09/06/2022 Negative  Negative, Inconclusive Final    POC Phencyclidine 09/06/2022 Negative  Negative, Inconclusive Final    POC Methylenedioxymethamphetamine * 09/06/2022 Negative  Negative, Inconclusive Final    POC Tricyclic Antidepressants 09/06/2022 Negative  Negative, Inconclusive Final    POC Buprenorphine 09/06/2022 Negative   Final     Acceptable 09/06/2022 Yes   Final    POC Temperature (Urine) 09/06/2022 92   Final   Admission on 08/23/2022, Discharged on 08/23/2022   Component Date Value Ref Range Status    POC Glucose 08/23/2022 122 (A)  70 - 110 MG/DL Final    POC Glucose 08/23/2022 122 (H)  70 - 105 mg/dL Final   Lab Visit on 08/19/2022   Component Date Value Ref Range Status    SARS CoV-2 PCR 08/19/2022 Negative  Negative, Invalid Final   Office Visit on 05/18/2022   Component Date Value Ref Range Status    POC Amphetamines 05/18/2022 Negative  Negative, Inconclusive Final    POC Barbiturates 05/18/2022 Negative  Negative, Inconclusive Final    POC Benzodiazepines  05/18/2022 Negative  Negative, Inconclusive Final    POC Cocaine 05/18/2022 Negative  Negative, Inconclusive Final    POC THC 05/18/2022 Negative  Negative, Inconclusive Final    POC Methadone 05/18/2022 Negative  Negative, Inconclusive Final    POC Methamphetamine 05/18/2022 Negative  Negative, Inconclusive Final    POC Opiates 05/18/2022 Presumptive Positive (A)  Negative, Inconclusive Final    POC Oxycodone 05/18/2022 Negative  Negative, Inconclusive Final    POC Phencyclidine 05/18/2022 Negative  Negative, Inconclusive Final    POC Methylenedioxymethamphetamine * 05/18/2022 Negative  Negative, Inconclusive Final    POC Tricyclic Antidepressants 05/18/2022 Negative  Negative, Inconclusive Final    POC Buprenorphine 05/18/2022 Negative   Final     Acceptable 05/18/2022 Yes   Final    POC Temperature (Urine) 05/18/2022 90   Final   Office Visit on 05/11/2022   Component Date Value Ref Range Status    Sodium 05/11/2022 138  136 - 145 mmol/L Final    Potassium 05/11/2022 4.3  3.5 - 5.1 mmol/L Final    Chloride 05/11/2022 102  98 - 107 mmol/L Final    CO2 05/11/2022 33 (H)  21 - 32 mmol/L Final    Anion Gap 05/11/2022 7  7 - 16 mmol/L Final    Glucose 05/11/2022 102  74 - 106 mg/dL Final    BUN 05/11/2022 18  7 - 18 mg/dL Final    Creatinine 05/11/2022 0.71  0.55 - 1.02 mg/dL Final    BUN/Creatinine Ratio 05/11/2022 25 (H)  6 - 20 Final    Calcium 05/11/2022 9.6  8.5 - 10.1 mg/dL Final    Total Protein 05/11/2022 8.6 (H)  6.4 - 8.2 g/dL Final    Albumin 05/11/2022 3.5  3.5 - 5.0 g/dL Final    Globulin 05/11/2022 5.1 (H)  2.0 - 4.0 g/dL Final    A/G Ratio 05/11/2022 0.7   Final    Bilirubin, Total 05/11/2022 0.3  0.0 - 1.2 mg/dL Final    Alk Phos 05/11/2022 140 (H)  37 - 98 U/L Final    ALT 05/11/2022 35  13 - 56 U/L Final    AST 05/11/2022 16  15 - 37 U/L Final    eGFR 05/11/2022 95  >=60 mL/min/1.73m² Final    Protein, Urine 05/11/2022 7.7  0.0 - 11.9 mg/dL Final    Albumin % 05/11/2022 100  % Final     Pathologist Interp, Pro Elect, Uri* 05/11/2022 No Abnormal Proteins Detected   Final    Total Protein 05/11/2022 8.3 (H)  6.4 - 8.2 g/dL Final    Albumin, PE 05/11/2022 4.82  3.5 - 5.2 g/dL Final    Alpha-1-Globulin 05/11/2022 0.2  0.1 - 0.4 g/dL Final    Alpha-2-Globulin 05/11/2022 1.0  0.4 - 1.3 g/dL Final    Beta, PE 05/11/2022 1.0  0.5 - 1.5 g/dL Final    Gamma, PE 05/11/2022 1.3  0.5 - 1.8 g/dL Final    Pathologist Interp,  Elect, Blo* 05/11/2022 Normal. No monoclonal bands identified   Final    LDH 05/11/2022 254 (H)  87 - 241 U/L Final    Hemoglobin A1C 05/11/2022 6.5  4.5 - 6.6 % Final    Estimated Average Glucose 05/11/2022 130  mg/dL Final    WBC 05/11/2022 18.31 (H)  4.50 - 11.00 K/uL Final    RBC 05/11/2022 4.99  4.20 - 5.40 M/uL Final    Hemoglobin 05/11/2022 14.4  12.0 - 16.0 g/dL Final    Hematocrit 05/11/2022 46.0  38.0 - 47.0 % Final    MCV 05/11/2022 92.2  80.0 - 96.0 fL Final    MCH 05/11/2022 28.9  27.0 - 31.0 pg Final    MCHC 05/11/2022 31.3 (L)  32.0 - 36.0 g/dL Final    RDW 05/11/2022 14.9 (H)  11.5 - 14.5 % Final    Platelet Count 05/11/2022 183  150 - 400 K/uL Final    MPV 05/11/2022 13.3 (H)  9.4 - 12.4 fL Final    Neutrophils % 05/11/2022 68.9 (H)  53.0 - 65.0 % Final    Lymphocytes % 05/11/2022 20.7 (L)  27.0 - 41.0 % Final    Monocytes % 05/11/2022 6.6 (H)  2.0 - 6.0 % Final    Eosinophils % 05/11/2022 2.1  1.0 - 4.0 % Final    Basophils % 05/11/2022 0.5  0.0 - 1.0 % Final    Immature Granulocytes % 05/11/2022 1.2 (H)  0.0 - 0.4 % Final    nRBC, Auto 05/11/2022 0.0  <=0.0 % Final    Neutrophils, Abs 05/11/2022 12.62 (H)  1.80 - 7.70 K/uL Final    Lymphocytes, Absolute 05/11/2022 3.79  1.00 - 4.80 K/uL Final    Monocytes, Absolute 05/11/2022 1.21 (H)  0.00 - 0.80 K/uL Final    Eosinophils, Absolute 05/11/2022 0.38  0.00 - 0.50 K/uL Final    Basophils, Absolute 05/11/2022 0.09  0.00 - 0.20 K/uL Final    Immature Granulocytes, Absolute 05/11/2022 0.22 (H)  0.00 - 0.04 K/uL Final    nRBC,  Absolute 05/11/2022 0.00  <=0.00 x10e3/uL Final    Diff Type 05/11/2022 Scan Smear   Final    Platelet Morphology 05/11/2022 Few Large Platelets (A)  Normal Final    RBC Morphology 05/11/2022 Normal   Final   Admission on 05/05/2022, Discharged on 05/05/2022   Component Date Value Ref Range Status    POC Glucose 05/05/2022 131 (A)  70 - 110 MG/DL Final    POC Glucose 05/05/2022 131 (H)  70 - 105 mg/dL Final   There may be more visits with results that are not included.         No orders of the defined types were placed in this encounter.      Requested Prescriptions     Signed Prescriptions Disp Refills    gabapentin (NEURONTIN) 300 MG capsule 90 capsule 1     Sig: Take 1 capsule (300 mg total) by mouth every 8 (eight) hours.    HYDROcodone-acetaminophen (NORCO)  mg per tablet 90 tablet 0     Sig: Take 1 tablet by mouth every 8 (eight) hours as needed for Pain.       Assessment:     1. Chronic pain of right knee    2. Rheumatoid arthritis involving multiple sites with positive rheumatoid factor    3. Spondylosis of lumbar region without myelopathy or radiculopathy    4. Sacroiliitis         A's of Opioid Risk Assessment  Activity:Patient can perform ADL.   Analgesia:Patients pain is partially controlled by current medication. Patient has tried OTC medications such as Tylenol and Ibuprofen with out relief.   Adverse Effects: Patient denies constipation or sedation.  Aberrant Behavior:  reviewed with no aberrant drug seeking/taking behavior.  Overdose reversal drug naloxone discussed    Drug screen reviewed      Plan:    Follows rheumatology [facility] rheumatoid arthritis    Complaint of right knee pain chronic follows orthopedics [facility]     She is requesting Toradol injection for joint back pain    Toradol 60 mg IM, tolerated well    She had bilateral sacroiliac injection # 1 August 23, 2022, she states she would 60% relief initially pain is slowly returning    Considering right genicular nerve block pamphlet given  to the patient    X-ray right knee [facility] September 14, 2022 degenerative changes no fracture noted    At this point she would like to continue with conservative management home exercise program     Continue current medication    Follow-up 2 months    Dr. Torre, August 2023    Bring original prescription medication bottles/container/box with labels to each visit    Pill count    Physical therapy

## 2022-10-31 ENCOUNTER — OFFICE VISIT (OUTPATIENT)
Dept: PAIN MEDICINE | Facility: CLINIC | Age: 42
End: 2022-10-31
Payer: MEDICAID

## 2022-10-31 VITALS
RESPIRATION RATE: 18 BRPM | HEART RATE: 108 BPM | DIASTOLIC BLOOD PRESSURE: 103 MMHG | SYSTOLIC BLOOD PRESSURE: 163 MMHG | HEIGHT: 62 IN | BODY MASS INDEX: 53.92 KG/M2 | WEIGHT: 293 LBS

## 2022-10-31 DIAGNOSIS — G89.29 CHRONIC PAIN OF RIGHT KNEE: Primary | Chronic | ICD-10-CM

## 2022-10-31 DIAGNOSIS — M05.79 RHEUMATOID ARTHRITIS INVOLVING MULTIPLE SITES WITH POSITIVE RHEUMATOID FACTOR: Chronic | ICD-10-CM

## 2022-10-31 DIAGNOSIS — M46.1 SACROILIITIS: ICD-10-CM

## 2022-10-31 DIAGNOSIS — M47.816 SPONDYLOSIS OF LUMBAR REGION WITHOUT MYELOPATHY OR RADICULOPATHY: Chronic | ICD-10-CM

## 2022-10-31 DIAGNOSIS — M25.561 CHRONIC PAIN OF RIGHT KNEE: Primary | Chronic | ICD-10-CM

## 2022-10-31 PROCEDURE — 3044F PR MOST RECENT HEMOGLOBIN A1C LEVEL <7.0%: ICD-10-PCS | Mod: CPTII,,, | Performed by: PHYSICIAN ASSISTANT

## 2022-10-31 PROCEDURE — 4010F ACE/ARB THERAPY RXD/TAKEN: CPT | Mod: CPTII,,, | Performed by: PHYSICIAN ASSISTANT

## 2022-10-31 PROCEDURE — 3080F PR MOST RECENT DIASTOLIC BLOOD PRESSURE >= 90 MM HG: ICD-10-PCS | Mod: CPTII,,, | Performed by: PHYSICIAN ASSISTANT

## 2022-10-31 PROCEDURE — 3080F DIAST BP >= 90 MM HG: CPT | Mod: CPTII,,, | Performed by: PHYSICIAN ASSISTANT

## 2022-10-31 PROCEDURE — 3044F HG A1C LEVEL LT 7.0%: CPT | Mod: CPTII,,, | Performed by: PHYSICIAN ASSISTANT

## 2022-10-31 PROCEDURE — 96372 THER/PROPH/DIAG INJ SC/IM: CPT | Mod: PBBFAC | Performed by: PHYSICIAN ASSISTANT

## 2022-10-31 PROCEDURE — 1159F PR MEDICATION LIST DOCUMENTED IN MEDICAL RECORD: ICD-10-PCS | Mod: CPTII,,, | Performed by: PHYSICIAN ASSISTANT

## 2022-10-31 PROCEDURE — 3066F NEPHROPATHY DOC TX: CPT | Mod: CPTII,,, | Performed by: PHYSICIAN ASSISTANT

## 2022-10-31 PROCEDURE — 3066F PR DOCUMENTATION OF TREATMENT FOR NEPHROPATHY: ICD-10-PCS | Mod: CPTII,,, | Performed by: PHYSICIAN ASSISTANT

## 2022-10-31 PROCEDURE — 1159F MED LIST DOCD IN RCRD: CPT | Mod: CPTII,,, | Performed by: PHYSICIAN ASSISTANT

## 2022-10-31 PROCEDURE — 99214 PR OFFICE/OUTPT VISIT, EST, LEVL IV, 30-39 MIN: ICD-10-PCS | Mod: S$PBB,25,, | Performed by: PHYSICIAN ASSISTANT

## 2022-10-31 PROCEDURE — 3077F PR MOST RECENT SYSTOLIC BLOOD PRESSURE >= 140 MM HG: ICD-10-PCS | Mod: CPTII,,, | Performed by: PHYSICIAN ASSISTANT

## 2022-10-31 PROCEDURE — 99214 OFFICE O/P EST MOD 30 MIN: CPT | Mod: S$PBB,25,, | Performed by: PHYSICIAN ASSISTANT

## 2022-10-31 PROCEDURE — 4010F PR ACE/ARB THEARPY RXD/TAKEN: ICD-10-PCS | Mod: CPTII,,, | Performed by: PHYSICIAN ASSISTANT

## 2022-10-31 PROCEDURE — 99215 OFFICE O/P EST HI 40 MIN: CPT | Mod: PBBFAC,25 | Performed by: PHYSICIAN ASSISTANT

## 2022-10-31 PROCEDURE — 3061F NEG MICROALBUMINURIA REV: CPT | Mod: CPTII,,, | Performed by: PHYSICIAN ASSISTANT

## 2022-10-31 PROCEDURE — 3061F PR NEG MICROALBUMINURIA RESULT DOCUMENTED/REVIEW: ICD-10-PCS | Mod: CPTII,,, | Performed by: PHYSICIAN ASSISTANT

## 2022-10-31 PROCEDURE — 3077F SYST BP >= 140 MM HG: CPT | Mod: CPTII,,, | Performed by: PHYSICIAN ASSISTANT

## 2022-10-31 RX ORDER — GABAPENTIN 300 MG/1
300 CAPSULE ORAL EVERY 8 HOURS
Qty: 90 CAPSULE | Refills: 1 | Status: SHIPPED | OUTPATIENT
Start: 2022-10-31 | End: 2022-12-19 | Stop reason: SDUPTHER

## 2022-10-31 RX ORDER — KETOROLAC TROMETHAMINE 30 MG/ML
60 INJECTION, SOLUTION INTRAMUSCULAR; INTRAVENOUS
Status: COMPLETED | OUTPATIENT
Start: 2022-10-31 | End: 2022-10-31

## 2022-10-31 RX ORDER — HYDROCODONE BITARTRATE AND ACETAMINOPHEN 10; 325 MG/1; MG/1
1 TABLET ORAL EVERY 8 HOURS PRN
Qty: 90 TABLET | Refills: 0 | Status: SHIPPED | OUTPATIENT
Start: 2022-11-28 | End: 2022-12-05 | Stop reason: SDUPTHER

## 2022-10-31 RX ADMIN — KETOROLAC TROMETHAMINE 60 MG: 30 INJECTION, SOLUTION INTRAMUSCULAR; INTRAVENOUS at 09:10

## 2022-11-14 DIAGNOSIS — E11.9 TYPE 2 DIABETES MELLITUS WITHOUT COMPLICATION, WITHOUT LONG-TERM CURRENT USE OF INSULIN: Chronic | ICD-10-CM

## 2022-11-14 RX ORDER — EXENATIDE 250 UG/ML
5 INJECTION SUBCUTANEOUS 2 TIMES DAILY WITH MEALS
Qty: 1.2 ML | Refills: 0 | Status: CANCELLED | OUTPATIENT
Start: 2022-11-14 | End: 2022-12-14

## 2022-11-14 RX ORDER — EXENATIDE 250 UG/ML
10 INJECTION SUBCUTANEOUS
Qty: 7.2 ML | Refills: 3 | Status: SHIPPED | OUTPATIENT
Start: 2022-11-14 | End: 2023-01-12

## 2022-11-14 NOTE — TELEPHONE ENCOUNTER
----- Message from Rosalinda Rodrigues sent at 11/14/2022  2:21 PM CST -----  Pt need refill on BYETTA she that you was going up the dose to 10 MG sent to [facility] [address] [id number]

## 2022-11-14 NOTE — TELEPHONE ENCOUNTER
----- Message from Rosalinda Rodrigues sent at 11/14/2022  2:21 PM CST -----  Pt need refill on BYETTA she that you was going up the dose to 10 MG sent to [facility] [address] [id number]

## 2022-11-22 ENCOUNTER — OFFICE VISIT (OUTPATIENT)
Dept: DERMATOLOGY | Facility: CLINIC | Age: 42
End: 2022-11-22
Payer: MEDICAID

## 2022-11-22 VITALS — HEIGHT: 62 IN | WEIGHT: 293 LBS | BODY MASS INDEX: 53.92 KG/M2

## 2022-11-22 DIAGNOSIS — L91.8 INFLAMED ACROCHORDON: Primary | ICD-10-CM

## 2022-11-22 DIAGNOSIS — L73.2 HIDRADENITIS SUPPURATIVA: ICD-10-CM

## 2022-11-22 DIAGNOSIS — Z79.899 HIGH RISK MEDICATION USE: ICD-10-CM

## 2022-11-22 DIAGNOSIS — L40.9 PSORIASIS: ICD-10-CM

## 2022-11-22 DIAGNOSIS — L21.9 SEBORRHEIC DERMATITIS: ICD-10-CM

## 2022-11-22 PROCEDURE — 3066F NEPHROPATHY DOC TX: CPT | Mod: CPTII,,, | Performed by: STUDENT IN AN ORGANIZED HEALTH CARE EDUCATION/TRAINING PROGRAM

## 2022-11-22 PROCEDURE — 17110 PR DESTRUCTION BENIGN LESIONS UP TO 14: ICD-10-PCS | Mod: ,,, | Performed by: STUDENT IN AN ORGANIZED HEALTH CARE EDUCATION/TRAINING PROGRAM

## 2022-11-22 PROCEDURE — 3061F PR NEG MICROALBUMINURIA RESULT DOCUMENTED/REVIEW: ICD-10-PCS | Mod: CPTII,,, | Performed by: STUDENT IN AN ORGANIZED HEALTH CARE EDUCATION/TRAINING PROGRAM

## 2022-11-22 PROCEDURE — 3066F PR DOCUMENTATION OF TREATMENT FOR NEPHROPATHY: ICD-10-PCS | Mod: CPTII,,, | Performed by: STUDENT IN AN ORGANIZED HEALTH CARE EDUCATION/TRAINING PROGRAM

## 2022-11-22 PROCEDURE — 1160F PR REVIEW ALL MEDS BY PRESCRIBER/CLIN PHARMACIST DOCUMENTED: ICD-10-PCS | Mod: CPTII,,, | Performed by: STUDENT IN AN ORGANIZED HEALTH CARE EDUCATION/TRAINING PROGRAM

## 2022-11-22 PROCEDURE — 1159F MED LIST DOCD IN RCRD: CPT | Mod: CPTII,,, | Performed by: STUDENT IN AN ORGANIZED HEALTH CARE EDUCATION/TRAINING PROGRAM

## 2022-11-22 PROCEDURE — 3044F HG A1C LEVEL LT 7.0%: CPT | Mod: CPTII,,, | Performed by: STUDENT IN AN ORGANIZED HEALTH CARE EDUCATION/TRAINING PROGRAM

## 2022-11-22 PROCEDURE — 3044F PR MOST RECENT HEMOGLOBIN A1C LEVEL <7.0%: ICD-10-PCS | Mod: CPTII,,, | Performed by: STUDENT IN AN ORGANIZED HEALTH CARE EDUCATION/TRAINING PROGRAM

## 2022-11-22 PROCEDURE — 99214 PR OFFICE/OUTPT VISIT, EST, LEVL IV, 30-39 MIN: ICD-10-PCS | Mod: 25,,, | Performed by: STUDENT IN AN ORGANIZED HEALTH CARE EDUCATION/TRAINING PROGRAM

## 2022-11-22 PROCEDURE — 3061F NEG MICROALBUMINURIA REV: CPT | Mod: CPTII,,, | Performed by: STUDENT IN AN ORGANIZED HEALTH CARE EDUCATION/TRAINING PROGRAM

## 2022-11-22 PROCEDURE — 3008F BODY MASS INDEX DOCD: CPT | Mod: CPTII,,, | Performed by: STUDENT IN AN ORGANIZED HEALTH CARE EDUCATION/TRAINING PROGRAM

## 2022-11-22 PROCEDURE — 4010F ACE/ARB THERAPY RXD/TAKEN: CPT | Mod: CPTII,,, | Performed by: STUDENT IN AN ORGANIZED HEALTH CARE EDUCATION/TRAINING PROGRAM

## 2022-11-22 PROCEDURE — 99214 OFFICE O/P EST MOD 30 MIN: CPT | Mod: 25,,, | Performed by: STUDENT IN AN ORGANIZED HEALTH CARE EDUCATION/TRAINING PROGRAM

## 2022-11-22 PROCEDURE — 3008F PR BODY MASS INDEX (BMI) DOCUMENTED: ICD-10-PCS | Mod: CPTII,,, | Performed by: STUDENT IN AN ORGANIZED HEALTH CARE EDUCATION/TRAINING PROGRAM

## 2022-11-22 PROCEDURE — 17110 DESTRUCTION B9 LES UP TO 14: CPT | Mod: ,,, | Performed by: STUDENT IN AN ORGANIZED HEALTH CARE EDUCATION/TRAINING PROGRAM

## 2022-11-22 PROCEDURE — 1160F RVW MEDS BY RX/DR IN RCRD: CPT | Mod: CPTII,,, | Performed by: STUDENT IN AN ORGANIZED HEALTH CARE EDUCATION/TRAINING PROGRAM

## 2022-11-22 PROCEDURE — 4010F PR ACE/ARB THEARPY RXD/TAKEN: ICD-10-PCS | Mod: CPTII,,, | Performed by: STUDENT IN AN ORGANIZED HEALTH CARE EDUCATION/TRAINING PROGRAM

## 2022-11-22 PROCEDURE — 1159F PR MEDICATION LIST DOCUMENTED IN MEDICAL RECORD: ICD-10-PCS | Mod: CPTII,,, | Performed by: STUDENT IN AN ORGANIZED HEALTH CARE EDUCATION/TRAINING PROGRAM

## 2022-11-22 RX ORDER — FLUOCINOLONE ACETONIDE 0.1 MG/ML
SOLUTION TOPICAL 2 TIMES DAILY
Qty: 90 ML | Refills: 5 | Status: SHIPPED | OUTPATIENT
Start: 2022-11-22

## 2022-11-22 RX ORDER — NYSTATIN 100000 [USP'U]/G
POWDER TOPICAL 2 TIMES DAILY
Qty: 60 G | Refills: 5 | Status: SHIPPED | OUTPATIENT
Start: 2022-11-22

## 2022-11-22 NOTE — PROGRESS NOTES
[facility]  Cj Wakefield MD    [address] [address]  [phone]    Fax: [phone]    Patient Name: Belem Swann  Medical Record Number: [id number]  PCP: DANE Sanez  Age: 42 y.o. : 1980  Contact: [phone] (home)     History of Present Illness:     Belem Swann is a 42 y.o.  female here for follow up of psoriasis, hidradenitis suppurativa, skin tags,and seborrheic dermatitis .  Previous treatment includes Humira with improvement in HS, but her psoriasis has flared in her scalp after being off of Humira for several weeks following a flu outbreak in her home. She has no active cysts today. Treatment for seb derm has been Ketoconazole cream with improvement when she uses it, but pt has been non compliant with treatment. She has a few skin tags that get irritated on her neck.     Review of Systems:     Unremarkable other than mentioned above.     Physical Exam:     General: Relaxed, oriented, alert    Skin examination of the scalp, face, neck, chest, back, abdomen, upper extremities and lower extremities were normal except for as listed below      Assessment and Plan:     1. 1. Hidradenitis Suppurativa  - Fistula formation and draining sinuses, weeping acneiform pustules and papules, scarring, and acneiform nodules  Alvarado Stage: 3     Plan:   - continue Humira 40 mg weekly      Counseling.  Cleanse acneiform lesions and sinus tracts with anti-bacterial washes. Oral antibiotics can help reduce inflammation.  Discussed importance of not smoking and weight loss       2. Seborrheic Dermatitis   - Red scaly plaques located on the scalp, nasolabial folds, and eyebrows    Plan:   -Synalar solution for the scalp   - ketoconazole cream bid PRN to face or ears   - can use OTC 1% hydrocortisone cream for severe flares       3. Inflamed acrochordons   - skin colored papules on erythematous base       Plan: Liquid Nitrogen.  A total of  2  lesions were treated with liquid nitrogen for 2 freeze-thaw cycles lasting 5 seconds, located on the above locations.   The patient's consent was obtained including but not limited to risks of crusting, scabbing,  blistering, scarring, darker or lighter pigmentary change, recurrence, incomplete removal and infection.    4.  Plaque Psoriasis  - psoriasiform plaques with micaceous scale     Plan: - will continue Humira as above   -Hydrocortisone 2.5% ointment for posterior ears      Counseling  I counseled patient regarding systemic effects of inflammation from psoriasis, and the association with cardiac disease, metabolic syndrome, depression, and arthritis     5.  Candidal intertrigo   - erythema and maceration with satellite papules   - nystatin powder bid PRN   - recommend keeping the area cool and dry   - fluconazole orally can be used. If the yeast is resistant to fluconazole, may need to try itraconazole    Return to clinic in 6 months    AVS printed with patient instructions     Cj Wakefield MD   Mohs Surgery/Dermatologic Oncology  Dermatology  te

## 2022-11-22 NOTE — PROGRESS NOTES
[facility]  Cj Wakefield MD    [address] [address]  [phone]    Fax: [phone]    Patient Name: Belem Swann  Medical Record Number: [id number]  PCP: DANE Saenz  Age: 42 y.o. : 1980  Contact: [phone] (home)     History of Present Illness:     Belem Swann is a 42 y.o.  female here for follow up of psoriasis,hidradenitis suppurativa, and skin tags.  Previous treatment includes Humira 40 mg weekly with improvement in HS and, but not so much in the psoriasis as she is flaring today. Pt also had skin tags treated with LN2 with resolution in most.  She has no active cysts today.      Review of Systems:     Unremarkable other than mentioned above.     Physical Exam:     General: Relaxed, oriented, alert    Skin examination of the scalp, face, neck, chest, back, abdomen, upper extremities and lower extremities were normal except for as listed below      Assessment and Plan:     1. Hidradenitis Suppurativa  - Fistula formation and draining sinuses, weeping acneiform pustules and papules, scarring, and acneiform nodules  Alvarado Stage: 3     Plan:   - continue Humira 40 mg weekly      Counseling.  Cleanse acneiform lesions and sinus tracts with anti-bacterial washes. Oral antibiotics can help reduce inflammation.  Discussed importance of not smoking and weight loss       2. Seborrheic Dermatitis   - Red scaly plaques located on the scalp, nasolabial folds, and eyebrows    Plan:   -Lidex solution for the scalp   - ketoconazole cream bid PRN to face or ears   - can use OTC 1% hydrocortisone cream for severe flares       3. Inflamed acrochordons   - skin colored papules on erythematous base       Plan: Liquid Nitrogen.  A total of  2 lesions were treated with liquid nitrogen for 2 freeze-thaw cycles lasting 5 seconds, located on the above locations.   The patient's consent was obtained including but not limited to risks of crusting,  scabbing,  blistering, scarring, darker or lighter pigmentary change, recurrence, incomplete removal and infection.    4.  Plaque Psoriasis  - psoriasiform plaques with micaceous scale     Plan: - will continue Humira as above   -Hydrocortisone 2.5% ointment for posterior ears      Counseling  I counseled patient regarding systemic effects of inflammation from psoriasis, and the association with cardiac disease, metabolic syndrome, depression, and arthritis       Return to clinic in 4 months    AVS printed with patient instructions     Cj Wakefield MD   Mohs Surgery/Dermatologic Oncology  Dermatology  te

## 2022-12-05 DIAGNOSIS — M25.561 CHRONIC PAIN OF RIGHT KNEE: Chronic | ICD-10-CM

## 2022-12-05 DIAGNOSIS — G89.29 CHRONIC PAIN OF RIGHT KNEE: Chronic | ICD-10-CM

## 2022-12-05 DIAGNOSIS — M46.1 SACROILIITIS: ICD-10-CM

## 2022-12-05 DIAGNOSIS — M05.79 RHEUMATOID ARTHRITIS INVOLVING MULTIPLE SITES WITH POSITIVE RHEUMATOID FACTOR: Chronic | ICD-10-CM

## 2022-12-05 DIAGNOSIS — M47.816 SPONDYLOSIS OF LUMBAR REGION WITHOUT MYELOPATHY OR RADICULOPATHY: Chronic | ICD-10-CM

## 2022-12-05 RX ORDER — HYDROCODONE BITARTRATE AND ACETAMINOPHEN 10; 325 MG/1; MG/1
1 TABLET ORAL EVERY 8 HOURS PRN
Qty: 90 TABLET | Refills: 0 | Status: SHIPPED | OUTPATIENT
Start: 2022-12-05 | End: 2022-12-19 | Stop reason: SDUPTHER

## 2022-12-05 NOTE — TELEPHONE ENCOUNTER
----- Message from Katarina Hernandez sent at 12/2/2022 11:29 AM CST -----  Regarding: rx  Pt states she has been out of meds since Monday her usual pharmacy is out of pain meds pt would like a new rx sent to [facility] in [place] please call pt back at [phone]

## 2022-12-19 ENCOUNTER — OFFICE VISIT (OUTPATIENT)
Dept: PAIN MEDICINE | Facility: CLINIC | Age: 42
End: 2022-12-19
Payer: MEDICAID

## 2022-12-19 VITALS
WEIGHT: 293 LBS | RESPIRATION RATE: 18 BRPM | DIASTOLIC BLOOD PRESSURE: 96 MMHG | BODY MASS INDEX: 53.92 KG/M2 | HEART RATE: 94 BPM | SYSTOLIC BLOOD PRESSURE: 195 MMHG | HEIGHT: 62 IN

## 2022-12-19 DIAGNOSIS — M47.816 SPONDYLOSIS OF LUMBAR REGION WITHOUT MYELOPATHY OR RADICULOPATHY: Chronic | ICD-10-CM

## 2022-12-19 DIAGNOSIS — M25.561 CHRONIC PAIN OF RIGHT KNEE: Chronic | ICD-10-CM

## 2022-12-19 DIAGNOSIS — G89.29 CHRONIC PAIN OF RIGHT KNEE: Chronic | ICD-10-CM

## 2022-12-19 DIAGNOSIS — M05.79 RHEUMATOID ARTHRITIS INVOLVING MULTIPLE SITES WITH POSITIVE RHEUMATOID FACTOR: Primary | Chronic | ICD-10-CM

## 2022-12-19 DIAGNOSIS — M46.1 SACROILIITIS: ICD-10-CM

## 2022-12-19 DIAGNOSIS — Z79.899 ENCOUNTER FOR LONG-TERM (CURRENT) USE OF OTHER MEDICATIONS: ICD-10-CM

## 2022-12-19 PROCEDURE — 3077F PR MOST RECENT SYSTOLIC BLOOD PRESSURE >= 140 MM HG: ICD-10-PCS | Mod: CPTII,,, | Performed by: PHYSICIAN ASSISTANT

## 2022-12-19 PROCEDURE — 3080F PR MOST RECENT DIASTOLIC BLOOD PRESSURE >= 90 MM HG: ICD-10-PCS | Mod: CPTII,,, | Performed by: PHYSICIAN ASSISTANT

## 2022-12-19 PROCEDURE — 3061F PR NEG MICROALBUMINURIA RESULT DOCUMENTED/REVIEW: ICD-10-PCS | Mod: CPTII,,, | Performed by: PHYSICIAN ASSISTANT

## 2022-12-19 PROCEDURE — 4010F ACE/ARB THERAPY RXD/TAKEN: CPT | Mod: CPTII,,, | Performed by: PHYSICIAN ASSISTANT

## 2022-12-19 PROCEDURE — 3044F PR MOST RECENT HEMOGLOBIN A1C LEVEL <7.0%: ICD-10-PCS | Mod: CPTII,,, | Performed by: PHYSICIAN ASSISTANT

## 2022-12-19 PROCEDURE — 3080F DIAST BP >= 90 MM HG: CPT | Mod: CPTII,,, | Performed by: PHYSICIAN ASSISTANT

## 2022-12-19 PROCEDURE — 1159F PR MEDICATION LIST DOCUMENTED IN MEDICAL RECORD: ICD-10-PCS | Mod: CPTII,,, | Performed by: PHYSICIAN ASSISTANT

## 2022-12-19 PROCEDURE — 99214 PR OFFICE/OUTPT VISIT, EST, LEVL IV, 30-39 MIN: ICD-10-PCS | Mod: S$PBB,,, | Performed by: PHYSICIAN ASSISTANT

## 2022-12-19 PROCEDURE — 3008F PR BODY MASS INDEX (BMI) DOCUMENTED: ICD-10-PCS | Mod: CPTII,,, | Performed by: PHYSICIAN ASSISTANT

## 2022-12-19 PROCEDURE — 4010F PR ACE/ARB THEARPY RXD/TAKEN: ICD-10-PCS | Mod: CPTII,,, | Performed by: PHYSICIAN ASSISTANT

## 2022-12-19 PROCEDURE — 99214 OFFICE O/P EST MOD 30 MIN: CPT | Mod: S$PBB,,, | Performed by: PHYSICIAN ASSISTANT

## 2022-12-19 PROCEDURE — 1159F MED LIST DOCD IN RCRD: CPT | Mod: CPTII,,, | Performed by: PHYSICIAN ASSISTANT

## 2022-12-19 PROCEDURE — 3066F NEPHROPATHY DOC TX: CPT | Mod: CPTII,,, | Performed by: PHYSICIAN ASSISTANT

## 2022-12-19 PROCEDURE — 3061F NEG MICROALBUMINURIA REV: CPT | Mod: CPTII,,, | Performed by: PHYSICIAN ASSISTANT

## 2022-12-19 PROCEDURE — 99215 OFFICE O/P EST HI 40 MIN: CPT | Mod: PBBFAC | Performed by: PHYSICIAN ASSISTANT

## 2022-12-19 PROCEDURE — 3008F BODY MASS INDEX DOCD: CPT | Mod: CPTII,,, | Performed by: PHYSICIAN ASSISTANT

## 2022-12-19 PROCEDURE — 80305 DRUG TEST PRSMV DIR OPT OBS: CPT | Mod: PBBFAC | Performed by: PHYSICIAN ASSISTANT

## 2022-12-19 PROCEDURE — 3077F SYST BP >= 140 MM HG: CPT | Mod: CPTII,,, | Performed by: PHYSICIAN ASSISTANT

## 2022-12-19 PROCEDURE — 3066F PR DOCUMENTATION OF TREATMENT FOR NEPHROPATHY: ICD-10-PCS | Mod: CPTII,,, | Performed by: PHYSICIAN ASSISTANT

## 2022-12-19 PROCEDURE — 3044F HG A1C LEVEL LT 7.0%: CPT | Mod: CPTII,,, | Performed by: PHYSICIAN ASSISTANT

## 2022-12-19 RX ORDER — HYDROCODONE BITARTRATE AND ACETAMINOPHEN 10; 325 MG/1; MG/1
1 TABLET ORAL EVERY 8 HOURS PRN
Qty: 90 TABLET | Refills: 0 | Status: SHIPPED | OUTPATIENT
Start: 2023-01-04 | End: 2023-02-03

## 2022-12-19 RX ORDER — GABAPENTIN 300 MG/1
300 CAPSULE ORAL EVERY 8 HOURS
Qty: 90 CAPSULE | Refills: 1 | Status: SHIPPED | OUTPATIENT
Start: 2022-12-19 | End: 2023-02-23 | Stop reason: SDUPTHER

## 2022-12-19 RX ORDER — HYDROCODONE BITARTRATE AND ACETAMINOPHEN 10; 325 MG/1; MG/1
1 TABLET ORAL EVERY 8 HOURS PRN
Qty: 90 TABLET | Refills: 0 | Status: SHIPPED | OUTPATIENT
Start: 2023-02-03 | End: 2023-02-23 | Stop reason: SDUPTHER

## 2022-12-19 RX ORDER — NALOXONE HYDROCHLORIDE 4 MG/.1ML
1 SPRAY NASAL ONCE
Qty: 1 EACH | Refills: 0 | Status: SHIPPED | OUTPATIENT
Start: 2022-12-19 | End: 2022-12-19

## 2022-12-19 NOTE — PROGRESS NOTES
Subjective:         Patient ID: Belem Swann is a 42 y.o. female.    Chief Complaint: Low-back Pain and Knee Pain (bilateral)        Pain  This is a chronic problem. The current episode started more than 1 year ago. The problem occurs daily. The problem has been unchanged. Associated symptoms include arthralgias and neck pain. Pertinent negatives include no anorexia, change in bowel habit, chest pain, coughing, diaphoresis, fever, sore throat, vertigo or vomiting.   Review of Systems   Constitutional:  Negative for activity change, appetite change, diaphoresis, fever and unexpected weight change.   HENT:  Negative for drooling, ear discharge, ear pain, facial swelling, mouth sores, nosebleeds, sore throat, trouble swallowing, voice change and goiter.    Eyes:  Negative for photophobia, pain, discharge, redness and visual disturbance.   Respiratory:  Negative for apnea, cough, choking, chest tightness, shortness of breath, wheezing and stridor.    Cardiovascular:  Negative for chest pain, palpitations and leg swelling.   Gastrointestinal:  Negative for abdominal distention, anorexia, change in bowel habit, diarrhea, rectal pain, vomiting, fecal incontinence and change in bowel habit.   Endocrine: Negative for cold intolerance, heat intolerance, polydipsia, polyphagia and polyuria.   Genitourinary:  Negative for bladder incontinence, dysuria, flank pain, frequency and hot flashes.   Musculoskeletal:  Positive for arthralgias, back pain, leg pain, neck pain and neck stiffness.   Integumentary:  Negative for color change and pallor.   Allergic/Immunologic: Negative for immunocompromised state.   Neurological:  Negative for dizziness, vertigo, seizures, syncope, facial asymmetry, speech difficulty, light-headedness, coordination difficulties, memory loss and coordination difficulties.   Hematological:  Negative for adenopathy. Does not bruise/bleed easily.   Psychiatric/Behavioral:  Negative for agitation,  behavioral problems, confusion, decreased concentration, dysphoric mood, hallucinations, self-injury and suicidal ideas. The patient is not hyperactive.          Past Medical History:   Diagnosis Date    Anxiety     Asthma     Chronic pain     Cushing syndrome     Depression     Essential hypertension     Fibromyalgia     Herpes zoster     Hyperlipidemia     Onychomycosis     MARY on CPAP     Rheumatoid arthritis     Type 2 diabetes mellitus 2020    Vitamin D deficiency 2018     Past Surgical History:   Procedure Laterality Date     SECTION      ELBOW SURGERY  1985    EPIDURAL STEROID INJECTION      L4-5 VERITO Dec 2020-[facility]    FEMUR SURGERY Right     due to osteomyelitis    HYSTERECTOMY      INJECTION OF ANESTHETIC AGENT AROUND MEDIAL BRANCH NERVES INNERVATING LUMBAR FACET JOINT Bilateral 2022    Procedure: Bilateral L4-5,5-S1 MBB ( No steroids);  Surgeon: Carmel Torre MD;  Location: [facility];  Service: Pain Management;  Laterality: Bilateral;  PT AWARE TO BE TESTED ON OV    INJECTION OF ANESTHETIC AGENT AROUND MEDIAL BRANCH NERVES INNERVATING LUMBAR FACET JOINT Bilateral 3/8/2022    Procedure: Block-nerve-medial branch-lumbar, bilateral L4 through S1;  Surgeon: Carmel Torre MD;  Location: [facility];  Service: Pain Management;  Laterality: Bilateral;    INJECTION OF ANESTHETIC AGENT INTO SACROILIAC JOINT Bilateral 2022    Procedure: BLOCK, SACROILIAC JOINT;  Surgeon: Carmel Torre MD;  Location: [facility];  Service: Pain Management;  Laterality: Bilateral;  covid test put in    LIPOMA RESECTION  2019    RADIOFREQUENCY ABLATION OF LUMBAR MEDIAL BRANCH NERVE AT SINGLE LEVEL Right 2022    Procedure: Radiofrequency Ablation, Nerve, Spinal, Lumbar, Medial Branch, Level L4-S1, right side 1st, will have left-sided after completing;  Surgeon: Carmel Torre MD;  Location: [facility];  Service: Pain Management;   "Laterality: Right;  pt aware at visit to be tested    RADIOFREQUENCY ABLATION OF LUMBAR MEDIAL BRANCH NERVE AT SINGLE LEVEL Left 5/5/2022    Procedure: LEFT  L4-S1 RFTC  (HAD RIGHT  ON 4-14);  Surgeon: Carmel Torre MD;  Location: [facility];  Service: Pain Management;  Laterality: Left;    SURGICAL REMOVAL OF PILONIDAL CYST      TRANSFORAMINAL EPIDURAL INJECTION OF STEROID      Bilateral L4-5 TFESI Nov 2020-[facility]    TRANSFORAMINAL EPIDURAL INJECTION OF STEROID      Right L4-5 TFESI Feb 2020-[facility]    VENTRAL HERNIA REPAIR  09/2020     Social History     Socioeconomic History    Marital status:    Tobacco Use    Smoking status: Former    Smokeless tobacco: Never   Substance and Sexual Activity    Alcohol use: Yes     Comment: ocassional    Drug use: Never    Sexual activity: Not Currently     Family History   Problem Relation Age of Onset    Cancer Mother     Thyroid cancer Mother     Thyroid cancer Maternal Grandmother     Melanoma Neg Hx      Review of patient's allergies indicates:   Allergen Reactions    Doxycycline     Latex         Objective:  Vitals:    12/19/22 1321   BP: (!) 195/96   Pulse: 94   Resp: 18   Weight: (!) 165.6 kg (365 lb)   Height: 5' 2" (1.575 m)   PainSc:   8         Physical Exam  Vitals and nursing note reviewed. Exam conducted with a chaperone present.   Constitutional:       General: She is awake.      Appearance: Normal appearance. She is obese. She is not toxic-appearing or diaphoretic.   HENT:      Head: Normocephalic and atraumatic.      Nose: Nose normal.      Mouth/Throat:      Mouth: Mucous membranes are moist.      Pharynx: Oropharynx is clear.   Eyes:      Conjunctiva/sclera: Conjunctivae normal.      Pupils: Pupils are equal, round, and reactive to light.   Cardiovascular:      Rate and Rhythm: Normal rate.   Pulmonary:      Effort: Pulmonary effort is normal. No respiratory distress.   Abdominal:      Palpations: Abdomen is soft.      " Tenderness: There is no guarding.   Musculoskeletal:         General: No swelling.      Cervical back: Normal range of motion and neck supple. Tenderness present. No rigidity.      Thoracic back: Tenderness present.      Lumbar back: Tenderness present. Decreased range of motion.   Skin:     General: Skin is warm and dry.      Coloration: Skin is not jaundiced or pale.   Neurological:      General: No focal deficit present.      Mental Status: She is alert and oriented to person, place, and time. Mental status is at baseline.      Cranial Nerves: No cranial nerve deficit (II-XII).   Psychiatric:         Mood and Affect: Mood normal.         Behavior: Behavior normal. Behavior is cooperative.         Thought Content: Thought content normal.         EGD  Narrative: Table formatting from the original result was not included.  Procedure Date  10/7/22    Impression  Overall   Impression:  The esophagus had inflammation and an erosion at the   EG junction. The distal esophagus was biopsied. A small hiatus hernia was   seen. The stomach has gastritis without ulcers, biopsies were obtained.   The duodenum had normal mucosa.    Recommendation   Await pathology results     Continue to avoid nsaids; PPI 1/AM x 8 weeks as needed. Consider HIDA with   CCK if epigastric pain persists.    Indication  GERD (gastroesophageal reflux disease)    Providers  Antonina Mayes Technician   Cassandra Stacy, CRNA CRNA   Dre Skinner, RN Registered Nurse   Janet Hearn, CRNA SEA Weber MD Proceduralist     Medications  Moderate sedation administered by anesthesia staff - See anesthesia   record.    Preprocedure  A history and physical has been performed, and patient medication   allergies have been reviewed. The patient's tolerance of previous   anesthesia has been reviewed. The risks and benefits of the procedure and   the sedation options and risks were discussed with the patient. All   questions were answered and informed  consent obtained.    ASA Score: ASA 3 - Patient with moderate systemic disease with functional   limitations  Mallampati Airway Score: II (hard and soft palate, upper portion of   tonsils anduvula visible)    Details of the Procedure  The patient underwent monitored anesthesia care, which was administered by   an anesthesia professional. The patient's blood pressure, heart rate,   level of consciousness, oxygen, respirations, ECG and ETCO2 were monitored   throughout the procedure. The scope was introduced through the mouth and   advanced to the third part of the duodenum. Retroflexion was performed in   the cardia. Prior to the procedure, the patient's H. Pylori status was   unknown. The patient's estimated blood loss was minimal (<5 mL). The   procedure was not difficult. The patient tolerated the procedure well.   There were no apparent complications.     Scope: Gastroscope  Scope Serial: [id number]    Events  Procedure Events   Event Event Time     Procedure Events   Event Event Time   SCOPE IN 10/7/2022  2:46 PM     Findings  Esophagitis, showing erythematous mucosa with erosion in the lower third   of the esophagus; performed cold forceps biopsy  Small hiatal hernia  Erythematous mucosa in the fundus of the stomach and antrum; performed   cold forceps biopsy         Lab Visit on 11/18/2022   Component Date Value Ref Range Status    Sodium 11/18/2022 135 (L)  136 - 145 mmol/L Final    Potassium 11/18/2022 4.0  3.5 - 5.1 mmol/L Final    Chloride 11/18/2022 101  98 - 107 mmol/L Final    CO2 11/18/2022 28  21 - 32 mmol/L Final    Anion Gap 11/18/2022 10  7 - 16 mmol/L Final    Glucose 11/18/2022 188 (H)  74 - 106 mg/dL Final    BUN 11/18/2022 17  7 - 18 mg/dL Final    Creatinine 11/18/2022 0.66  0.55 - 1.02 mg/dL Final    BUN/Creatinine Ratio 11/18/2022 26 (H)  6 - 20 Final    Calcium 11/18/2022 9.0  8.5 - 10.1 mg/dL Final    Total Protein 11/18/2022 8.5 (H)  6.4 - 8.2 g/dL Final    Albumin 11/18/2022 3.4  (L)  3.5 - 5.0 g/dL Final    Globulin 11/18/2022 5.1 (H)  2.0 - 4.0 g/dL Final    A/G Ratio 11/18/2022 0.7   Final    Bilirubin, Total 11/18/2022 0.5  >0.0 - 1.2 mg/dL Final    Alk Phos 11/18/2022 103 (H)  37 - 98 U/L Final    ALT 11/18/2022 32  13 - 56 U/L Final    AST 11/18/2022 13 (L)  15 - 37 U/L Final    eGFR 11/18/2022 112  >=60 mL/min/1.73m² Final    WBC 11/18/2022 10.68  4.50 - 11.00 K/uL Final    RBC 11/18/2022 5.15  4.20 - 5.40 M/uL Final    Hemoglobin 11/18/2022 15.0  12.0 - 16.0 g/dL Final    Hematocrit 11/18/2022 46.6  38.0 - 47.0 % Final    MCV 11/18/2022 90.5  80.0 - 96.0 fL Final    MCH 11/18/2022 29.1  27.0 - 31.0 pg Final    MCHC 11/18/2022 32.2  32.0 - 36.0 g/dL Final    RDW 11/18/2022 12.7  11.5 - 14.5 % Final    Platelet Count 11/18/2022 158  150 - 400 K/uL Final    MPV 11/18/2022 14.1 (H)  9.4 - 12.4 fL Final    Neutrophils % 11/18/2022 68.9 (H)  53.0 - 65.0 % Final    Lymphocytes % 11/18/2022 21.3 (L)  27.0 - 41.0 % Final    Monocytes % 11/18/2022 5.4  2.0 - 6.0 % Final    Eosinophils % 11/18/2022 3.6  1.0 - 4.0 % Final    Basophils % 11/18/2022 0.4  0.0 - 1.0 % Final    Immature Granulocytes % 11/18/2022 0.4  0.0 - 0.4 % Final    nRBC, Auto 11/18/2022 0.0  <=0.0 % Final    Neutrophils, Abs 11/18/2022 7.36  1.80 - 7.70 K/uL Final    Lymphocytes, Absolute 11/18/2022 2.28  1.00 - 4.80 K/uL Final    Monocytes, Absolute 11/18/2022 0.58  0.00 - 0.80 K/uL Final    Eosinophils, Absolute 11/18/2022 0.38  0.00 - 0.50 K/uL Final    Basophils, Absolute 11/18/2022 0.04  0.00 - 0.20 K/uL Final    Immature Granulocytes, Absolute 11/18/2022 0.04  0.00 - 0.04 K/uL Final    nRBC, Absolute 11/18/2022 0.00  <=0.00 x10e3/uL Final    Diff Type 11/18/2022 Scan Smear   Final    Platelet Morphology 11/18/2022 Few Large Platelets (A)  Normal Final    RBC Morphology 11/18/2022 Normal   Final   Hospital Outpatient Visit on 10/07/2022   Component Date Value Ref Range Status    POC  Glucose 10/07/2022 125 (H)  70 - 105 mg/dL Final    Case Report 10/07/2022    Final                    Value:Surgical Pathology                                Case: [id number]                                   Authorizing Provider:  Omero Weber MD      Collected:           10/07/2022 02:49 PM          Ordering Location:     [facility] - Endoscopy       Received:            10/07/2022 03:30 PM          Pathologist:           Farhat Mendoza MD                                                       Specimens:   A) - Stomach, biopsy                                                                                B) - Esophagus, biopsy                                                                     Final Diagnosis 10/07/2022    Final                    Value:This result contains rich text formatting which cannot be displayed here.    Gross Description 10/07/2022    Final                    Value:This result contains rich text formatting which cannot be displayed here.    Microscopic Description 10/07/2022    Final                    Value:This result contains rich text formatting which cannot be displayed here.    Laboratory Notes 10/07/2022    Final                    Value:This result contains rich text formatting which cannot be displayed here.   Office Visit on 09/12/2022   Component Date Value Ref Range Status    Hemoglobin A1C 09/12/2022 6.2  4.5 - 6.6 % Final    Estimated Average Glucose 09/12/2022 120  mg/dL Final    Glucose, Fasting 09/12/2022 130 (H)  74 - 106 mg/dL Final    Triglycerides 09/12/2022 135  35 - 150 mg/dL Final    Cholesterol 09/12/2022 171  0 - 200 mg/dL Final    HDL Cholesterol 09/12/2022 35 (L)  40 - 60 mg/dL Final    Cholesterol/HDL Ratio (Risk Factor) 09/12/2022 4.9   Final    Non-HDL 09/12/2022 136  mg/dL Final    LDL Calculated 09/12/2022 109  mg/dL Final    LDL/HDL 09/12/2022 3.1   Final    VLDL 09/12/2022 27  mg/dL Final   Admission on 09/11/2022, Discharged on  09/11/2022   Component Date Value Ref Range Status    Sodium 09/11/2022 135 (L)  136 - 145 mmol/L Final    Potassium 09/11/2022 4.5  3.5 - 5.1 mmol/L Final    Chloride 09/11/2022 100  98 - 107 mmol/L Final    CO2 09/11/2022 25  21 - 32 mmol/L Final    Anion Gap 09/11/2022 15  7 - 16 mmol/L Final    Glucose 09/11/2022 117 (H)  74 - 106 mg/dL Final    BUN 09/11/2022 34 (H)  7 - 18 mg/dL Final    Creatinine 09/11/2022 1.22 (H)  0.55 - 1.02 mg/dL Final    BUN/Creatinine Ratio 09/11/2022 28 (H)  6 - 20 Final    Calcium 09/11/2022 10.0  8.5 - 10.1 mg/dL Final    Total Protein 09/11/2022 8.9 (H)  6.4 - 8.2 g/dL Final    Albumin 09/11/2022 3.9  3.5 - 5.0 g/dL Final    Globulin 09/11/2022 5.0 (H)  2.0 - 4.0 g/dL Final    A/G Ratio 09/11/2022 0.8   Final    Bilirubin, Total 09/11/2022 0.4  >0.0 - 1.2 mg/dL Final    Alk Phos 09/11/2022 112 (H)  37 - 98 U/L Final    ALT 09/11/2022 34  13 - 56 U/L Final    AST 09/11/2022 20  15 - 37 U/L Final    eGFR 09/11/2022 57 (L)  >=60 mL/min/1.73m² Final    Lipase 09/11/2022 184  73 - 393 U/L Final    Magnesium 09/11/2022 2.1  1.7 - 2.3 mg/dL Final    Lactic Acid 09/11/2022 1.6  0.4 - 2.0 mmol/L Final    Color, UA 09/11/2022 Yellow  Colorless, Straw, Yellow, Light Yellow, Dark Yellow Final    Clarity, UA 09/11/2022 Turbid  Clear Final    pH, UA 09/11/2022 5.5  5.0 to 8.0 pH Units Final    Leukocytes, UA 09/11/2022 Trace (A)  Negative Final    Nitrites, UA 09/11/2022 Negative  Negative Final    Protein, UA 09/11/2022 20 (A)  Negative Final    Glucose, UA 09/11/2022 Normal  Normal mg/dL Final    Ketones, UA 09/11/2022 Trace  Negative mg/dL Final    Urobilinogen, UA 09/11/2022 Normal  0.2, 1.0, Normal mg/dL Final    Bilirubin, UA 09/11/2022 Negative  Negative Final    Blood, UA 09/11/2022 Negative  Negative Final    Specific [place], UA 09/11/2022 1.020  <=1.030 Final    WBC 09/11/2022 16.84 (H)  4.50 - 11.00 K/uL Final    RBC 09/11/2022 5.36  4.20 - 5.40  M/uL Final    Hemoglobin 09/11/2022 15.4  12.0 - 16.0 g/dL Final    Hematocrit 09/11/2022 46.9  38.0 - 47.0 % Final    MCV 09/11/2022 87.5  80.0 - 96.0 fL Final    MCH 09/11/2022 28.7  27.0 - 31.0 pg Final    MCHC 09/11/2022 32.8  32.0 - 36.0 g/dL Final    RDW 09/11/2022 13.1  11.5 - 14.5 % Final    Platelet Count 09/11/2022 229  150 - 400 K/uL Final    MPV 09/11/2022 13.9 (H)  9.4 - 12.4 fL Final    Neutrophils % 09/11/2022 74.8 (H)  53.0 - 65.0 % Final    Lymphocytes % 09/11/2022 15.1 (L)  27.0 - 41.0 % Final    Monocytes % 09/11/2022 7.9 (H)  2.0 - 6.0 % Final    Eosinophils % 09/11/2022 1.2  1.0 - 4.0 % Final    Basophils % 09/11/2022 0.4  0.0 - 1.0 % Final    Immature Granulocytes % 09/11/2022 0.6 (H)  0.0 - 0.4 % Final    nRBC, Auto 09/11/2022 0.0  <=0.0 % Final    Neutrophils, Abs 09/11/2022 12.60 (H)  1.80 - 7.70 K/uL Final    Lymphocytes, Absolute 09/11/2022 2.55  1.00 - 4.80 K/uL Final    Monocytes, Absolute 09/11/2022 1.33 (H)  0.00 - 0.80 K/uL Final    Eosinophils, Absolute 09/11/2022 0.20  0.00 - 0.50 K/uL Final    Basophils, Absolute 09/11/2022 0.06  0.00 - 0.20 K/uL Final    Immature Granulocytes, Absolute 09/11/2022 0.10 (H)  0.00 - 0.04 K/uL Final    nRBC, Absolute 09/11/2022 0.00  <=0.00 x10e3/uL Final    Diff Type 09/11/2022 Auto   Final    Troponin I High Sensitivity 09/11/2022 <4.0  <=60.4 pg/mL Final    WBC, UA 09/11/2022 7 (H)  <=5 /hpf Final    RBC, UA 09/11/2022 1  <=3 /hpf Final    Bacteria, UA 09/11/2022 Few (A)  None Seen /hpf Final    Squamous Epithelial Cells, UA 09/11/2022 Occasional (A)  None Seen /HPF Final    Amorphous Crystals, UA 09/11/2022 Occ (A)  None Seen /HPF Final    Mucous 09/11/2022 Few (A)  None Seen /LPF Final   Office Visit on 09/09/2022   Component Date Value Ref Range Status    Rapid Influenza A Ag 09/09/2022 Negative  Negative Final    Rapid Influenza B Ag 09/09/2022 Negative  Negative Final     Acceptable 09/09/2022  Yes   Final    WBC, UA 09/09/2022 Negative   Final    Nitrite, UA 09/09/2022 Negative   Final    Urobilinogen, UA 09/09/2022 0.2   Final    Protein, POC 09/09/2022 Negative   Final    pH, UA 09/09/2022 5.5   Final    Blood, UA 09/09/2022 Negative   Final    Spec Grav UA 09/09/2022 1.025   Final    Ketones, UA 09/09/2022 Negative   Final    Bilirubin, POC 09/09/2022 Negative   Final    Glucose, UA 09/09/2022 Negative   Final    Sodium 09/09/2022 134 (L)  136 - 145 mmol/L Final    Potassium 09/09/2022 4.7  3.5 - 5.1 mmol/L Final    Chloride 09/09/2022 101  98 - 107 mmol/L Final    CO2 09/09/2022 28  21 - 32 mmol/L Final    Anion Gap 09/09/2022 10  7 - 16 mmol/L Final    Glucose 09/09/2022 144 (H)  74 - 106 mg/dL Final    BUN 09/09/2022 31 (H)  7 - 18 mg/dL Final    Creatinine 09/09/2022 0.84  0.55 - 1.02 mg/dL Final    BUN/Creatinine Ratio 09/09/2022 37 (H)  6 - 20 Final    Calcium 09/09/2022 10.0  8.5 - 10.1 mg/dL Final    Total Protein 09/09/2022 8.9 (H)  6.4 - 8.2 g/dL Final    Albumin 09/09/2022 3.8  3.5 - 5.0 g/dL Final    Globulin 09/09/2022 5.1 (H)  2.0 - 4.0 g/dL Final    A/G Ratio 09/09/2022 0.7   Final    Bilirubin, Total 09/09/2022 0.6  >0.0 - 1.2 mg/dL Final    Alk Phos 09/09/2022 127 (H)  37 - 98 U/L Final    ALT 09/09/2022 33  13 - 56 U/L Final    AST 09/09/2022 30  15 - 37 U/L Final    eGFR 09/09/2022 89  >=60 mL/min/1.73m² Final    Culture, Urine 09/09/2022 Skin/Urogenital Mar[place]armen Isolated, no further workup.   Final    WBC 09/09/2022 14.15 (H)  4.50 - 11.00 K/uL Final    RBC 09/09/2022 5.40  4.20 - 5.40 M/uL Final    Hemoglobin 09/09/2022 15.4  12.0 - 16.0 g/dL Final    Hematocrit 09/09/2022 47.3 (H)  38.0 - 47.0 % Final    MCV 09/09/2022 87.6  80.0 - 96.0 fL Final    MCH 09/09/2022 28.5  27.0 - 31.0 pg Final    MCHC 09/09/2022 32.6  32.0 - 36.0 g/dL Final    RDW 09/09/2022 13.4  11.5 - 14.5 % Final    Platelet Count 09/09/2022 190  150 - 400 K/uL Final    MPV  09/09/2022 13.4 (H)  9.4 - 12.4 fL Final    Neutrophils % 09/09/2022 77.1 (H)  53.0 - 65.0 % Final    Lymphocytes % 09/09/2022 14.6 (L)  27.0 - 41.0 % Final    Monocytes % 09/09/2022 5.7  2.0 - 6.0 % Final    Eosinophils % 09/09/2022 1.6  1.0 - 4.0 % Final    Basophils % 09/09/2022 0.4  0.0 - 1.0 % Final    Immature Granulocytes % 09/09/2022 0.6 (H)  0.0 - 0.4 % Final    nRBC, Auto 09/09/2022 0.0  <=0.0 % Final    Neutrophils, Abs 09/09/2022 10.90 (H)  1.80 - 7.70 K/uL Final    Lymphocytes, Absolute 09/09/2022 2.06  1.00 - 4.80 K/uL Final    Monocytes, Absolute 09/09/2022 0.81 (H)  0.00 - 0.80 K/uL Final    Eosinophils, Absolute 09/09/2022 0.23  0.00 - 0.50 K/uL Final    Basophils, Absolute 09/09/2022 0.06  0.00 - 0.20 K/uL Final    Immature Granulocytes, Absolute 09/09/2022 0.09 (H)  0.00 - 0.04 K/uL Final    nRBC, Absolute 09/09/2022 0.00  <=0.00 x10e3/uL Final    Diff Type 09/09/2022 Scan Smear   Final    Platelet Morphology 09/09/2022 Large Platelets (A)  Normal Final    RBC Morphology 09/09/2022 Normal   Final   Office Visit on 09/06/2022   Component Date Value Ref Range Status    POC Amphetamines 09/06/2022 Negative  Negative, Inconclusive Final    POC Barbiturates 09/06/2022 Negative  Negative, Inconclusive Final    POC Benzodiazepines 09/06/2022 Negative  Negative, Inconclusive Final    POC Cocaine 09/06/2022 Negative  Negative, Inconclusive Final    POC THC 09/06/2022 Negative  Negative, Inconclusive Final    POC Methadone 09/06/2022 Negative  Negative, Inconclusive Final    POC Methamphetamine 09/06/2022 Negative  Negative, Inconclusive Final    POC Opiates 09/06/2022 Presumptive Positive (A)  Negative, Inconclusive Final    POC Oxycodone 09/06/2022 Negative  Negative, Inconclusive Final    POC Phencyclidine 09/06/2022 Negative  Negative, Inconclusive Final    POC Methylenedioxymethamphetamine * 09/06/2022 Negative  Negative, Inconclusive Final    POC Tricyclic Antidepressants  2022 Negative  Negative, Inconclusive Final    POC Buprenorphine 2022 Negative   Final     Acceptable 2022 Yes   Final    POC Temperature (Urine) 2022 92   Final   Admission on 2022, Discharged on 2022   Component Date Value Ref Range Status    POC Glucose 2022 122 (A)  70 - 110 MG/DL Final    POC Glucose 2022 122 (H)  70 - 105 mg/dL Final   Lab Visit on 2022   Component Date Value Ref Range Status    SARS CoV-2 PCR 2022 Negative  Negative, Invalid Final         Orders Placed This Encounter   Procedures    POCT Urine Drug Screen Presump     Interpretive Information:     Negative:  No drug detected at the cut off level.   Positive:  This result represents presumptive positive for the   tested drug, other substances may yield a positive response other   than the analyte of interest. This result should be utilized for   diagnostic purpose only. Confirmation testing will be performed upon physician request only.            Requested Prescriptions     Signed Prescriptions Disp Refills    gabapentin (NEURONTIN) 300 MG capsule 90 capsule 1     Sig: Take 1 capsule (300 mg total) by mouth every 8 (eight) hours.    HYDROcodone-acetaminophen (NORCO)  mg per tablet 90 tablet 0     Sig: Take 1 tablet by mouth every 8 (eight) hours as needed for Pain.    naloxone (NARCAN) 4 mg/actuation Spry 1 each 0     Si spray (4 mg total) by Nasal route once. for 1 dose    HYDROcodone-acetaminophen (NORCO)  mg per tablet 90 tablet 0     Sig: Take 1 tablet by mouth every 8 (eight) hours as needed for Pain.       Assessment:     1. Rheumatoid arthritis involving multiple sites with positive rheumatoid factor    2. Spondylosis of lumbar region without myelopathy or radiculopathy    3. Sacroiliitis    4. Chronic pain of right knee    5. Encounter for long-term (current) use of other medications         A's of Opioid Risk  Assessment  Activity:Patient can perform ADL.   Analgesia:Patients pain is partially controlled by current medication. Patient has tried OTC medications such as Tylenol and Ibuprofen with out relief.   Adverse Effects: Patient denies constipation or sedation.  Aberrant Behavior:  reviewed with no aberrant drug seeking/taking behavior.  Overdose reversal drug naloxone discussed    Drug screen reviewed      Plan:    Narcan December 2022    Follows rheumatology [facility] rheumatoid arthritis    Right knee pain chronic follows orthopedics [facility]     She is been told her insurance company will not cover procedures/surgery for her right knee    X-ray [facility] knee [facility] September 14, 2022 degenerative changes no fracture noted    At this point she would like to continue with conservative management home exercise program     Continue current medication    Follow-up 2 months    Dr. Torre, August 2023    Bring original prescription medication bottles/container/box with labels to each visit    Pill count    Physical therapy

## 2023-01-12 ENCOUNTER — OFFICE VISIT (OUTPATIENT)
Dept: FAMILY MEDICINE | Facility: CLINIC | Age: 43
End: 2023-01-12
Payer: MEDICAID

## 2023-01-12 VITALS
SYSTOLIC BLOOD PRESSURE: 130 MMHG | HEIGHT: 62 IN | DIASTOLIC BLOOD PRESSURE: 86 MMHG | HEART RATE: 79 BPM | BODY MASS INDEX: 53.92 KG/M2 | TEMPERATURE: 98 F | RESPIRATION RATE: 18 BRPM | WEIGHT: 293 LBS | OXYGEN SATURATION: 99 %

## 2023-01-12 DIAGNOSIS — M05.79 RHEUMATOID ARTHRITIS INVOLVING MULTIPLE SITES WITH POSITIVE RHEUMATOID FACTOR: Chronic | ICD-10-CM

## 2023-01-12 DIAGNOSIS — K21.00 GASTROESOPHAGEAL REFLUX DISEASE WITH ESOPHAGITIS WITHOUT HEMORRHAGE: ICD-10-CM

## 2023-01-12 DIAGNOSIS — I10 ESSENTIAL HYPERTENSION: Chronic | ICD-10-CM

## 2023-01-12 DIAGNOSIS — E66.01 MORBID OBESITY WITH BMI OF 60.0-69.9, ADULT: Chronic | ICD-10-CM

## 2023-01-12 DIAGNOSIS — E78.00 HYPERCHOLESTEROLEMIA: Chronic | ICD-10-CM

## 2023-01-12 DIAGNOSIS — K44.9 HH (HIATUS HERNIA): Chronic | ICD-10-CM

## 2023-01-12 DIAGNOSIS — M62.838 MUSCLE SPASM: ICD-10-CM

## 2023-01-12 DIAGNOSIS — E11.9 TYPE 2 DIABETES MELLITUS WITHOUT COMPLICATION, WITHOUT LONG-TERM CURRENT USE OF INSULIN: Primary | Chronic | ICD-10-CM

## 2023-01-12 DIAGNOSIS — Z12.89 ENCOUNTER FOR PELVIC SCREENING FOR MALIGNANT NEOPLASM: ICD-10-CM

## 2023-01-12 DIAGNOSIS — Z79.899 ENCOUNTER FOR LONG-TERM (CURRENT) USE OF OTHER MEDICATIONS: ICD-10-CM

## 2023-01-12 DIAGNOSIS — J45.40 MODERATE PERSISTENT ASTHMA WITHOUT COMPLICATION: Chronic | ICD-10-CM

## 2023-01-12 PROBLEM — L68.0 HIRSUTISM: Chronic | Status: ACTIVE | Noted: 2022-09-09

## 2023-01-12 PROBLEM — D72.829 LEUKOCYTOSIS: Status: RESOLVED | Noted: 2022-01-18 | Resolved: 2023-01-12

## 2023-01-12 PROBLEM — H92.09 OTALGIA: Status: RESOLVED | Noted: 2022-09-09 | Resolved: 2023-01-12

## 2023-01-12 PROBLEM — K14.8 TONGUE LESION: Status: RESOLVED | Noted: 2022-05-17 | Resolved: 2023-01-12

## 2023-01-12 LAB
ALBUMIN SERPL BCP-MCNC: 3.2 G/DL (ref 3.5–5)
ALBUMIN/GLOB SERPL: 0.7 {RATIO}
ALP SERPL-CCNC: 125 U/L (ref 37–98)
ALT SERPL W P-5'-P-CCNC: 32 U/L (ref 13–56)
ANION GAP SERPL CALCULATED.3IONS-SCNC: 8 MMOL/L (ref 7–16)
AST SERPL W P-5'-P-CCNC: 14 U/L (ref 15–37)
BILIRUB SERPL-MCNC: 0.1 MG/DL (ref ?–1.2)
BUN SERPL-MCNC: 13 MG/DL (ref 7–18)
BUN/CREAT SERPL: 20 (ref 6–20)
CALCIUM SERPL-MCNC: 8.8 MG/DL (ref 8.5–10.1)
CHLORIDE SERPL-SCNC: 104 MMOL/L (ref 98–107)
CHOLEST SERPL-MCNC: 174 MG/DL (ref 0–200)
CHOLEST/HDLC SERPL: 4.6 {RATIO}
CO2 SERPL-SCNC: 34 MMOL/L (ref 21–32)
CREAT SERPL-MCNC: 0.64 MG/DL (ref 0.55–1.02)
CREAT UR-MCNC: 59 MG/DL (ref 28–219)
EGFR (NO RACE VARIABLE) (RUSH/TITUS): 113 ML/MIN/1.73M²
EST. AVERAGE GLUCOSE BLD GHB EST-MCNC: 117 MG/DL
GLOBULIN SER-MCNC: 4.6 G/DL (ref 2–4)
GLUCOSE SERPL-MCNC: 99 MG/DL (ref 74–106)
HBA1C MFR BLD HPLC: 6.1 % (ref 4.5–6.6)
HDLC SERPL-MCNC: 38 MG/DL (ref 40–60)
LDLC SERPL CALC-MCNC: 105 MG/DL
LDLC/HDLC SERPL: 2.8 {RATIO}
MICROALBUMIN UR-MCNC: <0.5 MG/DL (ref 0–2.8)
MICROALBUMIN/CREAT RATIO PNL UR: <8.5 MG/G (ref 0–30)
NONHDLC SERPL-MCNC: 136 MG/DL
POTASSIUM SERPL-SCNC: 3.5 MMOL/L (ref 3.5–5.1)
PROT SERPL-MCNC: 7.8 G/DL (ref 6.4–8.2)
SODIUM SERPL-SCNC: 142 MMOL/L (ref 136–145)
TRIGL SERPL-MCNC: 153 MG/DL (ref 35–150)
TSH SERPL DL<=0.005 MIU/L-ACNC: 2.63 UIU/ML (ref 0.36–3.74)
VLDLC SERPL-MCNC: 31 MG/DL

## 2023-01-12 PROCEDURE — 99214 OFFICE O/P EST MOD 30 MIN: CPT | Mod: ,,, | Performed by: NURSE PRACTITIONER

## 2023-01-12 PROCEDURE — 82043 MICROALBUMIN / CREATININE RATIO URINE: ICD-10-PCS | Mod: ,,, | Performed by: CLINICAL MEDICAL LABORATORY

## 2023-01-12 PROCEDURE — 3008F BODY MASS INDEX DOCD: CPT | Mod: CPTII,,, | Performed by: NURSE PRACTITIONER

## 2023-01-12 PROCEDURE — 84443 TSH: ICD-10-PCS | Mod: ,,, | Performed by: CLINICAL MEDICAL LABORATORY

## 2023-01-12 PROCEDURE — 3075F PR MOST RECENT SYSTOLIC BLOOD PRESS GE 130-139MM HG: ICD-10-PCS | Mod: CPTII,,, | Performed by: NURSE PRACTITIONER

## 2023-01-12 PROCEDURE — 1160F PR REVIEW ALL MEDS BY PRESCRIBER/CLIN PHARMACIST DOCUMENTED: ICD-10-PCS | Mod: CPTII,,, | Performed by: NURSE PRACTITIONER

## 2023-01-12 PROCEDURE — 3079F PR MOST RECENT DIASTOLIC BLOOD PRESSURE 80-89 MM HG: ICD-10-PCS | Mod: CPTII,,, | Performed by: NURSE PRACTITIONER

## 2023-01-12 PROCEDURE — 80061 LIPID PANEL: CPT | Mod: ,,, | Performed by: CLINICAL MEDICAL LABORATORY

## 2023-01-12 PROCEDURE — 1159F MED LIST DOCD IN RCRD: CPT | Mod: CPTII,,, | Performed by: NURSE PRACTITIONER

## 2023-01-12 PROCEDURE — 83036 HEMOGLOBIN A1C: ICD-10-PCS | Mod: ,,, | Performed by: CLINICAL MEDICAL LABORATORY

## 2023-01-12 PROCEDURE — 99214 PR OFFICE/OUTPT VISIT, EST, LEVL IV, 30-39 MIN: ICD-10-PCS | Mod: ,,, | Performed by: NURSE PRACTITIONER

## 2023-01-12 PROCEDURE — 3075F SYST BP GE 130 - 139MM HG: CPT | Mod: CPTII,,, | Performed by: NURSE PRACTITIONER

## 2023-01-12 PROCEDURE — 82570 ASSAY OF URINE CREATININE: CPT | Mod: ,,, | Performed by: CLINICAL MEDICAL LABORATORY

## 2023-01-12 PROCEDURE — 80053 COMPREHEN METABOLIC PANEL: CPT | Mod: ,,, | Performed by: CLINICAL MEDICAL LABORATORY

## 2023-01-12 PROCEDURE — 82570 MICROALBUMIN / CREATININE RATIO URINE: ICD-10-PCS | Mod: ,,, | Performed by: CLINICAL MEDICAL LABORATORY

## 2023-01-12 PROCEDURE — 82043 UR ALBUMIN QUANTITATIVE: CPT | Mod: ,,, | Performed by: CLINICAL MEDICAL LABORATORY

## 2023-01-12 PROCEDURE — 84443 ASSAY THYROID STIM HORMONE: CPT | Mod: ,,, | Performed by: CLINICAL MEDICAL LABORATORY

## 2023-01-12 PROCEDURE — 80061 LIPID PANEL: ICD-10-PCS | Mod: ,,, | Performed by: CLINICAL MEDICAL LABORATORY

## 2023-01-12 PROCEDURE — 83036 HEMOGLOBIN GLYCOSYLATED A1C: CPT | Mod: ,,, | Performed by: CLINICAL MEDICAL LABORATORY

## 2023-01-12 PROCEDURE — 1160F RVW MEDS BY RX/DR IN RCRD: CPT | Mod: CPTII,,, | Performed by: NURSE PRACTITIONER

## 2023-01-12 PROCEDURE — 3079F DIAST BP 80-89 MM HG: CPT | Mod: CPTII,,, | Performed by: NURSE PRACTITIONER

## 2023-01-12 PROCEDURE — 1159F PR MEDICATION LIST DOCUMENTED IN MEDICAL RECORD: ICD-10-PCS | Mod: CPTII,,, | Performed by: NURSE PRACTITIONER

## 2023-01-12 PROCEDURE — 3008F PR BODY MASS INDEX (BMI) DOCUMENTED: ICD-10-PCS | Mod: CPTII,,, | Performed by: NURSE PRACTITIONER

## 2023-01-12 PROCEDURE — 80053 COMPREHENSIVE METABOLIC PANEL: ICD-10-PCS | Mod: ,,, | Performed by: CLINICAL MEDICAL LABORATORY

## 2023-01-12 RX ORDER — OMEPRAZOLE 40 MG/1
40 CAPSULE, DELAYED RELEASE ORAL
Qty: 180 CAPSULE | Refills: 1 | Status: SHIPPED | OUTPATIENT
Start: 2023-01-12 | End: 2023-09-13

## 2023-01-12 RX ORDER — CALCIUM CITRATE/VITAMIN D3 200MG-6.25
1 TABLET ORAL DAILY
Qty: 100 STRIP | Refills: 3 | Status: SHIPPED | OUTPATIENT
Start: 2023-01-12

## 2023-01-12 RX ORDER — FUROSEMIDE 20 MG/1
20 TABLET ORAL DAILY
COMMUNITY
Start: 2022-12-14 | End: 2023-03-16

## 2023-01-12 RX ORDER — CYCLOBENZAPRINE HCL 10 MG
10 TABLET ORAL 3 TIMES DAILY PRN
Qty: 90 TABLET | Refills: 2 | Status: SHIPPED | OUTPATIENT
Start: 2023-01-12 | End: 2023-04-17 | Stop reason: SDUPTHER

## 2023-01-12 RX ORDER — PROMETHAZINE HYDROCHLORIDE 25 MG/1
25 TABLET ORAL EVERY 6 HOURS PRN
Qty: 30 TABLET | Refills: 1 | Status: SHIPPED | OUTPATIENT
Start: 2023-01-12

## 2023-01-12 NOTE — PROGRESS NOTES
[facility] - FAMILY MEDICINE       PATIENT NAME: Belem Swann   : 1980    AGE: 42 y.o. DATE OF ENCOUNTER: 23    MRN: [id number]      PCP: DANE Saenz    Reason for Visit / Chief Complaint:  Hypertension, Diabetes, and Follow-up (Patient presents to clinic for 4 month follow up of HTN, DM)     Subjective:     HPI:    Presents for 4 mth f/u T2DM, HTN, & HLD.    Last pap 20, Dr. [place].  Per [place]wn needs updated pap.    T2DM - checking glucose once per week, running 150-160s;  reports wasn't able to get Byetta 10 mcg; on metformin    Asthma - stable on Symbicort; okay unless she gets sick    RA/Fibro - Rheumatologist, Dr. Aguilar - last visit > 1 yr ago, chronic uncontrolled pain  Psoriasis/HS - on Humira per Derm, Dr. Cj Wakefield, stable    Review of Systems:     Review of Systems   Constitutional:  Negative for chills and fever.   HENT:  Positive for congestion (chronic rhinitis). Negative for ear pain, sinus pain and sore throat.    Eyes: Negative.    Respiratory:  Positive for shortness of breath (chronic BALDERAS). Negative for cough and wheezing.    Cardiovascular:  Positive for leg swelling (chronic). Negative for chest pain and palpitations.   Genitourinary: Negative.    Musculoskeletal:  Positive for arthralgias, back pain, gait problem and myalgias.        Chronic   Skin:  Positive for rash.   Neurological:  Positive for headaches.   Psychiatric/Behavioral: Negative.       Allergies:     Review of patient's allergies indicates:   Allergen Reactions    Doxycycline     Latex         Labs:      Lab Results   Component Value Date    HGBA1C 6.2 2022      Lipids:   Lab Results   Component Value Date    CHOL 171 2022     Lab Results   Component Value Date    HDL 35 (L) 2022     Lab Results   Component Value Date    LDLCALC 109 2022     Lab Results   Component Value Date    TRIG 135 2022     CMP:  Sodium   Date Value Ref Range Status  "  11/18/2022 135 (L) 136 - 145 mmol/L Final     Potassium   Date Value Ref Range Status   11/18/2022 4.0 3.5 - 5.1 mmol/L Final     Chloride   Date Value Ref Range Status   11/18/2022 101 98 - 107 mmol/L Final     Glucose   Date Value Ref Range Status   11/18/2022 188 (H) 74 - 106 mg/dL Final     BUN   Date Value Ref Range Status   11/18/2022 17 7 - 18 mg/dL Final     Creatinine   Date Value Ref Range Status   11/18/2022 0.66 0.55 - 1.02 mg/dL Final     AST   Date Value Ref Range Status   11/18/2022 13 (L) 15 - 37 U/L Final     ALT   Date Value Ref Range Status   11/18/2022 32 13 - 56 U/L Final     eGFR    Date Value Ref Range Status   10/07/2020 103       eGFR   Date Value Ref Range Status   05/11/2022 95 >=60 mL/min/1.73m² Final      CBC:  Lab Results   Component Value Date    WBC 10.68 11/18/2022    RBC 5.15 11/18/2022    HGB 15.0 11/18/2022    HCT 46.6 11/18/2022     11/18/2022      TSH:  Lab Results   Component Value Date    TSH 2.240 01/11/2022       Medical History:     Past Medical History:   Diagnosis Date    Anxiety     Asthma     Chronic pain     Cushing syndrome     Depression     Essential hypertension     Fibromyalgia     Herpes zoster     Hyperlipidemia     Leukocytosis 1/18/2022    Onychomycosis     MARY on CPAP     Rheumatoid arthritis     Type 2 diabetes mellitus 11/2020    Vitamin D deficiency 05/03/2018      Social History     Tobacco Use   Smoking Status Former   Smokeless Tobacco Never      Objective:      Wt Readings from Last 3 Encounters:   01/12/23 0737 (!) 163.7 kg (361 lb)   12/19/22 1321 (!) 165.6 kg (365 lb)   11/22/22 1418 (!) 161.9 kg (357 lb)     Vitals:    01/12/23 0737   BP: 130/86   BP Location: Left arm   Patient Position: Sitting   BP Method: Large (Manual)   Pulse: 79   Resp: 18   Temp: 98.2 °F (36.8 °C)   TempSrc: Oral   SpO2: 99%   Weight: (!) 163.7 kg (361 lb)   Height: 5' 2" (1.575 m)     Body mass index is 66.03 kg/m².     Physical Exam:    Physical " Exam  Vitals and nursing note reviewed.   Constitutional:       General: She is not in acute distress.     Appearance: Normal appearance. She is obese. She is not ill-appearing.   HENT:      Head: Normocephalic.      Right Ear: Tympanic membrane, ear canal and external ear normal.      Left Ear: Tympanic membrane, ear canal and external ear normal.      Nose: Nose normal.      Mouth/Throat:      Mouth: Mucous membranes are moist.      Pharynx: Oropharynx is clear.   Eyes:      Conjunctiva/sclera: Conjunctivae normal.   Neck:      Thyroid: No thyromegaly.      Trachea: Trachea normal.   Cardiovascular:      Rate and Rhythm: Normal rate and regular rhythm.      Pulses: Normal pulses.      Heart sounds: Normal heart sounds.   Pulmonary:      Effort: Pulmonary effort is normal.      Breath sounds: Normal breath sounds.   Abdominal:      Palpations: Abdomen is soft.   Musculoskeletal:      Cervical back: Neck supple.      Right lower leg: Edema (trace) present.      Left lower leg: Edema (trace) present.   Lymphadenopathy:      Cervical: No cervical adenopathy.   Skin:     General: Skin is warm and dry.   Neurological:      General: No focal deficit present.      Mental Status: She is alert and oriented to person, place, and time.   Psychiatric:         Mood and Affect: Mood normal.         Behavior: Behavior normal.        Assessment:          ICD-10-CM ICD-9-CM   1. Type 2 diabetes mellitus without complication, without long-term current use of insulin  E11.9 250.00   2. Essential hypertension  I10 401.9   3. Hypercholesterolemia  E78.00 272.0   4. Moderate persistent asthma without complication  J45.40 493.90   5. Rheumatoid arthritis involving multiple sites with positive rheumatoid factor  M05.79 714.0   6. Morbid obesity with BMI of 60.0-69.9, adult  E66.01 278.01    Z68.44 V85.44   7. Encounter for long-term (current) use of other medications  Z79.899 V58.69   8. Muscle spasm  M62.838 728.85   9. Gastroesophageal  reflux disease with esophagitis without hemorrhage  K21.00 530.81     530.10   10. Encounter for pelvic screening for malignant neoplasm  Z12.89 V76.49   11. HH (hiatus hernia)  K44.9 553.3        Plan:       Type 2 diabetes mellitus without complication, without long-term current use of insulin  -     Comprehensive Metabolic Panel; Future; Expected date: 01/12/2023  -     Microalbumin/Creatinine Ratio, Urine; Future; Expected date: 01/12/2023  -     Hemoglobin A1C; Future; Expected date: 01/12/2023  -     TRUE METRIX GLUCOSE TEST STRIP Strp; 1 strip by Misc.(Non-Drug; Combo Route) route Daily. For T2DM.  Dispense: 100 strip; Refill: 3  -     Ambulatory referral/consult to Optometry; Future; Expected date: 01/19/2023  -     exenatide (BYETTA) 5 mcg/dose (250 mcg/mL) 1.2 mL injection; Inject 0.02 mLs (5 mcg total) into the skin 2 (two) times daily with meals.  Dispense: 3.6 mL; Refill: 3    Essential hypertension  -     Ambulatory referral/consult to Optometry; Future; Expected date: 01/19/2023    Hypercholesterolemia  -     Comprehensive Metabolic Panel; Future; Expected date: 01/12/2023  -     Lipid Panel; Future; Expected date: 01/12/2023    Moderate persistent asthma without complication    Rheumatoid arthritis involving multiple sites with positive rheumatoid factor    Morbid obesity with BMI of 60.0-69.9, adult    Encounter for long-term (current) use of other medications  -     TSH; Future; Expected date: 01/12/2023    Muscle spasm  -     cyclobenzaprine (FLEXERIL) 10 MG tablet; Take 1 tablet (10 mg total) by mouth 3 (three) times daily as needed for Muscle spasms.  Dispense: 90 tablet; Refill: 2    Gastroesophageal reflux disease with esophagitis without hemorrhage  -     omeprazole (PRILOSEC) 40 MG capsule; Take 1 capsule (40 mg total) by mouth 2 (two) times daily before meals.  Dispense: 180 capsule; Refill: 1    Encounter for pelvic screening for malignant neoplasm  -     Ambulatory referral/consult to  Obstetrics / Gynecology; Future; Expected date: 2023    HH (hiatus hernia)  Comments:  increase omeprazole; advised diet changes & wt loss    Other orders  -     promethazine (PHENERGAN) 25 MG tablet; Take 1 tablet (25 mg total) by mouth every 6 (six) hours as needed for Nausea.  Dispense: 30 tablet; Refill: 1        Current Outpatient Medications:     albuterol sulfate 90 mcg/actuation aebs, Inhale 180 mcg into the lungs every 4 (four) hours., Disp: , Rfl:     albuterol-ipratropium (DUO-NEB) 2.5 mg-0.5 mg/3 mL nebulizer solution, Take 3 mLs by nebulization every 6 (six) hours as needed. Rescue, Disp: , Rfl:     budesonide-formoterol 160-4.5 mcg (SYMBICORT) 160-4.5 mcg/actuation HFAA, Inhale 2 puffs into the lungs every 12 (twelve) hours. Controller, Disp: 10.2 g, Rfl: 11    carvediloL (COREG) 12.5 MG tablet, Take 12.5 mg by mouth 2 (two) times daily., Disp: , Rfl:     cloNIDine (CATAPRES) 0.1 MG tablet, Take 0.1 mg by mouth every evening., Disp: , Rfl:     fluocinolone (SYNALAR) 0.01 % external solution, Apply topically 2 (two) times daily., Disp: 90 mL, Rfl: 5    furosemide (LASIX) 20 MG tablet, Take 20 mg by mouth once daily., Disp: , Rfl:     gabapentin (NEURONTIN) 300 MG capsule, Take 1 capsule (300 mg total) by mouth every 8 (eight) hours., Disp: 90 capsule, Rfl: 1    HUMIRA,CF, PEN 40 mg/0.4 mL PnKt, SMARTSI Milligram(s) SUB-Q Once a Week, Disp: , Rfl:     HYDROcodone-acetaminophen (NORCO)  mg per tablet, Take 1 tablet by mouth every 8 (eight) hours as needed for Pain., Disp: 90 tablet, Rfl: 0    [START ON 2/3/2023] HYDROcodone-acetaminophen (NORCO)  mg per tablet, Take 1 tablet by mouth every 8 (eight) hours as needed for Pain., Disp: 90 tablet, Rfl: 0    hydrocortisone 2.5 % ointment, Apply topically 2 (two) times daily., Disp: 454 g, Rfl: 5    ketoconazole (NIZORAL) 2 % cream, Apply topically once daily., Disp: 60 g, Rfl: 2    lancets (ONETOUCH DELICA LANCETS) 33 gauge Misc, 1 lancet  "by Misc.(Non-Drug; Combo Route) route once daily., Disp: 100 each, Rfl: 3    lisinopriL (PRINIVIL,ZESTRIL) 40 MG tablet, Take 1 tablet (40 mg total) by mouth once daily., Disp: 90 tablet, Rfl: 1    metFORMIN (GLUCOPHAGE-XR) 500 MG ER 24hr tablet, Take 1 tablet (500 mg total) by mouth once daily., Disp: 90 tablet, Rfl: 3    nystatin (MYCOSTATIN) powder, Apply topically 2 (two) times daily., Disp: 60 g, Rfl: 5    pen needle, diabetic 31 gauge x 1/4" Ndle, , Disp: , Rfl:     rosuvastatin (CRESTOR) 40 MG Tab, Take 1 tablet (40 mg total) by mouth every evening., Disp: 90 tablet, Rfl: 3    spironolactone (ALDACTONE) 50 MG tablet, Take 1.5 tablets (75 mg total) by mouth once daily., Disp: 90 tablet, Rfl: 1    triamcinolone acetonide 0.1% (KENALOG) 0.1 % ointment, Apply topically 2 (two) times daily., Disp: 454 g, Rfl: 0    triamterene-hydrochlorothiazide 75-50 mg (MAXZIDE) 75-50 mg per tablet, Take 1 tablet by mouth once daily., Disp: 90 tablet, Rfl: 1    cyclobenzaprine (FLEXERIL) 10 MG tablet, Take 1 tablet (10 mg total) by mouth 3 (three) times daily as needed for Muscle spasms., Disp: 90 tablet, Rfl: 2    exenatide (BYETTA) 5 mcg/dose (250 mcg/mL) 1.2 mL injection, Inject 0.02 mLs (5 mcg total) into the skin 2 (two) times daily with meals., Disp: 3.6 mL, Rfl: 3    omeprazole (PRILOSEC) 40 MG capsule, Take 1 capsule (40 mg total) by mouth 2 (two) times daily before meals., Disp: 180 capsule, Rfl: 1    promethazine (PHENERGAN) 25 MG tablet, Take 1 tablet (25 mg total) by mouth every 6 (six) hours as needed for Nausea., Disp: 30 tablet, Rfl: 1    TRUE METRIX GLUCOSE TEST STRIP Strp, 1 strip by Misc.(Non-Drug; Combo Route) route Daily. For T2DM., Disp: 100 strip, Rfl: 3    New & refilled meds:  Requested Prescriptions     Signed Prescriptions Disp Refills    cyclobenzaprine (FLEXERIL) 10 MG tablet 90 tablet 2     Sig: Take 1 tablet (10 mg total) by mouth 3 (three) times daily as needed for Muscle spasms.    promethazine " (PHENERGAN) 25 MG tablet 30 tablet 1     Sig: Take 1 tablet (25 mg total) by mouth every 6 (six) hours as needed for Nausea.    omeprazole (PRILOSEC) 40 MG capsule 180 capsule 1     Sig: Take 1 capsule (40 mg total) by mouth 2 (two) times daily before meals.    TRUE METRIX GLUCOSE TEST STRIP Strp 100 strip 3     Si strip by Misc.(Non-Drug; Combo Route) route Daily. For T2DM.    exenatide (BYETTA) 5 mcg/dose (250 mcg/mL) 1.2 mL injection 3.6 mL 3     Sig: Inject 0.02 mLs (5 mcg total) into the skin 2 (two) times daily with meals.     Avoid NSAIDs.  Increase omeprazole 40 mg to 2 times per day.    Referral entered to establish new GYN.  Schedule w/ Dr. Resendiz for eye exam prior to leaving.    Resume Byetta for uncontrolled T2DM w/ elevated FPG levels 150-160s, not controlled by metformin alone.    Lab results and schedule of future lab studies reviewed with patient.  Reviewed diet, exercise and weight control.      Return to clinic 4 mths for T2DM & HTN, non-fasting; and f/u as needed.    Signature:  DAEN Saenz

## 2023-02-23 ENCOUNTER — OFFICE VISIT (OUTPATIENT)
Dept: PAIN MEDICINE | Facility: CLINIC | Age: 43
End: 2023-02-23
Payer: MEDICAID

## 2023-02-23 VITALS
HEIGHT: 62 IN | HEART RATE: 78 BPM | SYSTOLIC BLOOD PRESSURE: 218 MMHG | RESPIRATION RATE: 20 BRPM | WEIGHT: 293 LBS | DIASTOLIC BLOOD PRESSURE: 105 MMHG | BODY MASS INDEX: 53.92 KG/M2

## 2023-02-23 DIAGNOSIS — M46.1 SACROILIITIS: ICD-10-CM

## 2023-02-23 DIAGNOSIS — M25.561 CHRONIC PAIN OF RIGHT KNEE: Chronic | ICD-10-CM

## 2023-02-23 DIAGNOSIS — Z79.899 ENCOUNTER FOR LONG-TERM (CURRENT) USE OF OTHER MEDICATIONS: ICD-10-CM

## 2023-02-23 DIAGNOSIS — M05.79 RHEUMATOID ARTHRITIS INVOLVING MULTIPLE SITES WITH POSITIVE RHEUMATOID FACTOR: Primary | Chronic | ICD-10-CM

## 2023-02-23 DIAGNOSIS — M47.816 SPONDYLOSIS OF LUMBAR REGION WITHOUT MYELOPATHY OR RADICULOPATHY: Chronic | ICD-10-CM

## 2023-02-23 DIAGNOSIS — G89.29 CHRONIC PAIN OF RIGHT KNEE: Chronic | ICD-10-CM

## 2023-02-23 PROCEDURE — 3008F PR BODY MASS INDEX (BMI) DOCUMENTED: ICD-10-PCS | Mod: CPTII,,, | Performed by: PHYSICIAN ASSISTANT

## 2023-02-23 PROCEDURE — 99214 OFFICE O/P EST MOD 30 MIN: CPT | Mod: S$PBB,25,, | Performed by: PHYSICIAN ASSISTANT

## 2023-02-23 PROCEDURE — 1159F PR MEDICATION LIST DOCUMENTED IN MEDICAL RECORD: ICD-10-PCS | Mod: CPTII,,, | Performed by: PHYSICIAN ASSISTANT

## 2023-02-23 PROCEDURE — 3044F PR MOST RECENT HEMOGLOBIN A1C LEVEL <7.0%: ICD-10-PCS | Mod: CPTII,,, | Performed by: PHYSICIAN ASSISTANT

## 2023-02-23 PROCEDURE — 3066F PR DOCUMENTATION OF TREATMENT FOR NEPHROPATHY: ICD-10-PCS | Mod: CPTII,,, | Performed by: PHYSICIAN ASSISTANT

## 2023-02-23 PROCEDURE — 3044F HG A1C LEVEL LT 7.0%: CPT | Mod: CPTII,,, | Performed by: PHYSICIAN ASSISTANT

## 2023-02-23 PROCEDURE — 3061F NEG MICROALBUMINURIA REV: CPT | Mod: CPTII,,, | Performed by: PHYSICIAN ASSISTANT

## 2023-02-23 PROCEDURE — 99215 OFFICE O/P EST HI 40 MIN: CPT | Mod: PBBFAC | Performed by: PHYSICIAN ASSISTANT

## 2023-02-23 PROCEDURE — 3080F PR MOST RECENT DIASTOLIC BLOOD PRESSURE >= 90 MM HG: ICD-10-PCS | Mod: CPTII,,, | Performed by: PHYSICIAN ASSISTANT

## 2023-02-23 PROCEDURE — 1159F MED LIST DOCD IN RCRD: CPT | Mod: CPTII,,, | Performed by: PHYSICIAN ASSISTANT

## 2023-02-23 PROCEDURE — 3077F PR MOST RECENT SYSTOLIC BLOOD PRESSURE >= 140 MM HG: ICD-10-PCS | Mod: CPTII,,, | Performed by: PHYSICIAN ASSISTANT

## 2023-02-23 PROCEDURE — 3066F NEPHROPATHY DOC TX: CPT | Mod: CPTII,,, | Performed by: PHYSICIAN ASSISTANT

## 2023-02-23 PROCEDURE — 3080F DIAST BP >= 90 MM HG: CPT | Mod: CPTII,,, | Performed by: PHYSICIAN ASSISTANT

## 2023-02-23 PROCEDURE — 80305 DRUG TEST PRSMV DIR OPT OBS: CPT | Mod: PBBFAC | Performed by: PHYSICIAN ASSISTANT

## 2023-02-23 PROCEDURE — 3061F PR NEG MICROALBUMINURIA RESULT DOCUMENTED/REVIEW: ICD-10-PCS | Mod: CPTII,,, | Performed by: PHYSICIAN ASSISTANT

## 2023-02-23 PROCEDURE — 96372 THER/PROPH/DIAG INJ SC/IM: CPT | Mod: PBBFAC | Performed by: PHYSICIAN ASSISTANT

## 2023-02-23 PROCEDURE — 3008F BODY MASS INDEX DOCD: CPT | Mod: CPTII,,, | Performed by: PHYSICIAN ASSISTANT

## 2023-02-23 PROCEDURE — 3077F SYST BP >= 140 MM HG: CPT | Mod: CPTII,,, | Performed by: PHYSICIAN ASSISTANT

## 2023-02-23 PROCEDURE — 99214 PR OFFICE/OUTPT VISIT, EST, LEVL IV, 30-39 MIN: ICD-10-PCS | Mod: S$PBB,25,, | Performed by: PHYSICIAN ASSISTANT

## 2023-02-23 RX ORDER — HYDROCODONE BITARTRATE AND ACETAMINOPHEN 10; 325 MG/1; MG/1
1 TABLET ORAL EVERY 8 HOURS PRN
Qty: 90 TABLET | Refills: 0 | Status: SHIPPED | OUTPATIENT
Start: 2023-03-05 | End: 2023-05-03 | Stop reason: SDUPTHER

## 2023-02-23 RX ORDER — KETOROLAC TROMETHAMINE 30 MG/ML
60 INJECTION, SOLUTION INTRAMUSCULAR; INTRAVENOUS
Status: COMPLETED | OUTPATIENT
Start: 2023-02-23 | End: 2023-02-23

## 2023-02-23 RX ORDER — HYDROCODONE BITARTRATE AND ACETAMINOPHEN 10; 325 MG/1; MG/1
1 TABLET ORAL EVERY 8 HOURS PRN
Qty: 90 TABLET | Refills: 0 | Status: SHIPPED | OUTPATIENT
Start: 2023-04-04 | End: 2023-05-03 | Stop reason: SDUPTHER

## 2023-02-23 RX ORDER — GABAPENTIN 300 MG/1
300 CAPSULE ORAL EVERY 8 HOURS
Qty: 90 CAPSULE | Refills: 1 | Status: SHIPPED | OUTPATIENT
Start: 2023-02-23 | End: 2023-05-03 | Stop reason: SDUPTHER

## 2023-02-23 RX ADMIN — KETOROLAC TROMETHAMINE 60 MG: 30 INJECTION, SOLUTION INTRAMUSCULAR at 02:02

## 2023-02-23 NOTE — PROGRESS NOTES
Subjective:         Patient ID: Belem Swann is a 42 y.o. female.    Chief Complaint: Hand Pain, Knee Pain, and Foot Pain        Pain  This is a chronic problem. The current episode started more than 1 year ago. The problem occurs daily. The problem has been unchanged. Associated symptoms include arthralgias and neck pain. Pertinent negatives include no anorexia, chest pain, chills, coughing, diaphoresis, fever, sore throat, vertigo or vomiting.   Review of Systems   Constitutional:  Negative for activity change, appetite change, chills, diaphoresis, fever and unexpected weight change.   HENT:  Negative for drooling, ear discharge, ear pain, facial swelling, mouth sores, nosebleeds, sore throat, trouble swallowing, voice change and goiter.    Eyes:  Negative for photophobia, pain, discharge, redness and visual disturbance.   Respiratory:  Negative for apnea, cough, choking, chest tightness, shortness of breath, wheezing and stridor.    Cardiovascular:  Negative for chest pain, palpitations and leg swelling.   Gastrointestinal:  Negative for abdominal distention, anorexia, diarrhea, rectal pain, vomiting and fecal incontinence.   Endocrine: Negative for cold intolerance, heat intolerance, polydipsia, polyphagia and polyuria.   Genitourinary:  Negative for bladder incontinence, dysuria, flank pain, frequency and hot flashes.   Musculoskeletal:  Positive for arthralgias, back pain, leg pain, neck pain and neck stiffness.   Integumentary:  Negative for color change and pallor.   Allergic/Immunologic: Negative for immunocompromised state.   Neurological:  Negative for dizziness, vertigo, seizures, syncope, facial asymmetry, speech difficulty, light-headedness, coordination difficulties, memory loss and coordination difficulties.   Hematological:  Negative for adenopathy. Does not bruise/bleed easily.   Psychiatric/Behavioral:  Negative for agitation, behavioral problems, confusion, decreased concentration,  dysphoric mood, hallucinations, self-injury and suicidal ideas. The patient is not hyperactive.          Past Medical History:   Diagnosis Date    Anxiety     Asthma     Chronic pain     Cushing syndrome     Depression     Essential hypertension     Fibromyalgia     Herpes zoster     Hyperlipidemia     Leukocytosis 2022    Onychomycosis     MARY on CPAP     Rheumatoid arthritis     Type 2 diabetes mellitus 2020    Vitamin D deficiency 2018     Past Surgical History:   Procedure Laterality Date     SECTION      ELBOW SURGERY  1985    EPIDURAL STEROID INJECTION      L4-5 VERITO Dec 2020-[facility]    FEMUR SURGERY Right     due to osteomyelitis    HYSTERECTOMY      INJECTION OF ANESTHETIC AGENT AROUND MEDIAL BRANCH NERVES INNERVATING LUMBAR FACET JOINT Bilateral 2022    Procedure: Bilateral L4-5,5-S1 MBB ( No steroids);  Surgeon: Carmel Torre MD;  Location: [facility];  Service: Pain Management;  Laterality: Bilateral;  PT AWARE TO BE TESTED ON OV    INJECTION OF ANESTHETIC AGENT AROUND MEDIAL BRANCH NERVES INNERVATING LUMBAR FACET JOINT Bilateral 3/8/2022    Procedure: Block-nerve-medial branch-lumbar, bilateral L4 through S1;  Surgeon: Carmel Torre MD;  Location: [facility];  Service: Pain Management;  Laterality: Bilateral;    INJECTION OF ANESTHETIC AGENT INTO SACROILIAC JOINT Bilateral 2022    Procedure: BLOCK, SACROILIAC JOINT;  Surgeon: Carmel Torre MD;  Location: [facility];  Service: Pain Management;  Laterality: Bilateral;  covid test put in    LIPOMA RESECTION  2019    RADIOFREQUENCY ABLATION OF LUMBAR MEDIAL BRANCH NERVE AT SINGLE LEVEL Right 2022    Procedure: Radiofrequency Ablation, Nerve, Spinal, Lumbar, Medial Branch, Level L4-S1, right side 1st, will have left-sided after completing;  Surgeon: Carmel Torre MD;  Location: [facility];  Service: Pain Management;  Laterality: Right;  pt aware at visit to be tested     "RADIOFREQUENCY ABLATION OF LUMBAR MEDIAL BRANCH NERVE AT SINGLE LEVEL Left 5/5/2022    Procedure: LEFT  L4-S1 RFTC  (HAD RIGHT  ON 4-14);  Surgeon: Carmel Torre MD;  Location: [facility];  Service: Pain Management;  Laterality: Left;    SURGICAL REMOVAL OF PILONIDAL CYST      TRANSFORAMINAL EPIDURAL INJECTION OF STEROID      Bilateral L4-5 TFESI Nov 2020-[facility]    TRANSFORAMINAL EPIDURAL INJECTION OF STEROID      Right L4-5 TFESI Feb 2020-[facility]    VENTRAL HERNIA REPAIR  09/2020     Social History     Socioeconomic History    Marital status:    Tobacco Use    Smoking status: Former    Smokeless tobacco: Never   Substance and Sexual Activity    Alcohol use: Yes     Comment: ocassional    Drug use: Never    Sexual activity: Not Currently     Family History   Problem Relation Age of Onset    Cancer Mother     Thyroid cancer Mother     Thyroid cancer Maternal Grandmother     Melanoma Neg Hx      Review of patient's allergies indicates:   Allergen Reactions    Doxycycline     Latex         Objective:  Vitals:    02/23/23 1340   BP: (!) 218/105   Pulse: 78   Resp: 20   Weight: (!) 160.6 kg (354 lb)   Height: 5' 2" (1.575 m)   PainSc:   9           Physical Exam  Vitals and nursing note reviewed. Exam conducted with a chaperone present.   Constitutional:       General: She is awake.      Appearance: Normal appearance. She is obese. She is not ill-appearing, toxic-appearing or diaphoretic.   HENT:      Head: Normocephalic and atraumatic.      Nose: Nose normal.      Mouth/Throat:      Mouth: Mucous membranes are moist.      Pharynx: Oropharynx is clear.   Eyes:      Conjunctiva/sclera: Conjunctivae normal.      Pupils: Pupils are equal, round, and reactive to light.   Cardiovascular:      Rate and Rhythm: Normal rate.   Pulmonary:      Effort: Pulmonary effort is normal. No respiratory distress.   Abdominal:      Palpations: Abdomen is soft.      Tenderness: There is no guarding.   Musculoskeletal:        "  General: No swelling.      Cervical back: Normal range of motion and neck supple. Tenderness present. No rigidity.      Thoracic back: Tenderness present.      Lumbar back: Tenderness present. Decreased range of motion.   Skin:     General: Skin is warm and dry.      Coloration: Skin is not jaundiced or pale.   Neurological:      General: No focal deficit present.      Mental Status: She is alert and oriented to person, place, and time. Mental status is at baseline.      Cranial Nerves: No cranial nerve deficit (II-XII).   Psychiatric:         Mood and Affect: Mood normal.         Behavior: Behavior normal. Behavior is cooperative.         Thought Content: Thought content normal.         EGD  Narrative: Table formatting from the original result was not included.  Procedure Date  10/7/22    Impression  Overall   Impression:  The esophagus had inflammation and an erosion at the   EG junction. The distal esophagus was biopsied. A small hiatus hernia was   seen. The stomach has gastritis without ulcers, biopsies were obtained.   The duodenum had normal mucosa.    Recommendation   Await pathology results     Continue to avoid nsaids; PPI 1/AM x 8 weeks as needed. Consider HIDA with   CCK if epigastric pain persists.    Indication  GERD (gastroesophageal reflux disease)    Providers  Antonina Mayes Technician   Cassandra Stacy, CRNA CRNA   Dre Skinner, RN Registered Nurse   Janet Hearn, CRNA CRNA   Omero Weber MD Proceduralist     Medications  Moderate sedation administered by anesthesia staff - See anesthesia   record.    Preprocedure  A history and physical has been performed, and patient medication   allergies have been reviewed. The patient's tolerance of previous   anesthesia has been reviewed. The risks and benefits of the procedure and   the sedation options and risks were discussed with the patient. All   questions were answered and informed consent obtained.    ASA Score: ASA 3 - Patient with moderate  systemic disease with functional   limitations  Mallampati Airway Score: II (hard and soft palate, upper portion of   tonsils anduvula visible)    Details of the Procedure  The patient underwent monitored anesthesia care, which was administered by   an anesthesia professional. The patient's blood pressure, heart rate,   level of consciousness, oxygen, respirations, ECG and ETCO2 were monitored   throughout the procedure. The scope was introduced through the mouth and   advanced to the third part of the duodenum. Retroflexion was performed in   the cardia. Prior to the procedure, the patient's H. Pylori status was   unknown. The patient's estimated blood loss was minimal (<5 mL). The   procedure was not difficult. The patient tolerated the procedure well.   There were no apparent complications.     Scope: Gastroscope  Scope Serial: [id number]    Events  Procedure Events   Event Event Time     Procedure Events   Event Event Time   SCOPE IN 10/7/2022  2:46 PM     Findings  Esophagitis, showing erythematous mucosa with erosion in the lower third   of the esophagus; performed cold forceps biopsy  Small hiatal hernia  Erythematous mucosa in the fundus of the stomach and antrum; performed   cold forceps biopsy         Office Visit on 01/12/2023   Component Date Value Ref Range Status    Sodium 01/12/2023 142  136 - 145 mmol/L Final    Potassium 01/12/2023 3.5  3.5 - 5.1 mmol/L Final    Chloride 01/12/2023 104  98 - 107 mmol/L Final    CO2 01/12/2023 34 (H)  21 - 32 mmol/L Final    Anion Gap 01/12/2023 8  7 - 16 mmol/L Final    Glucose 01/12/2023 99  74 - 106 mg/dL Final    BUN 01/12/2023 13  7 - 18 mg/dL Final    Creatinine 01/12/2023 0.64  0.55 - 1.02 mg/dL Final    BUN/Creatinine Ratio 01/12/2023 20  6 - 20 Final    Calcium 01/12/2023 8.8  8.5 - 10.1 mg/dL Final    Total Protein 01/12/2023 7.8  6.4 - 8.2 g/dL Final    Albumin 01/12/2023 3.2 (L)  3.5 - 5.0 g/dL Final    Globulin 01/12/2023 4.6 (H)  2.0 - 4.0 g/dL Final     A/G Ratio 01/12/2023 0.7   Final    Bilirubin, Total 01/12/2023 0.1  >0.0 - 1.2 mg/dL Final    Alk Phos 01/12/2023 125 (H)  37 - 98 U/L Final    ALT 01/12/2023 32  13 - 56 U/L Final    AST 01/12/2023 14 (L)  15 - 37 U/L Final    eGFR 01/12/2023 113  >=60 mL/min/1.73m² Final    TSH 01/12/2023 2.630  0.358 - 3.740 uIU/mL Final    Creatinine, Urine 01/12/2023 59  28 - 219 mg/dL Final    Microalbumin 01/12/2023 <0.5  0.0 - 2.8 mg/dL Final    Microalbumin/Creatinine Ratio 01/12/2023 <8.5  0.0 - 30.0 mg/g Final    Hemoglobin A1C 01/12/2023 6.1  4.5 - 6.6 % Final    Estimated Average Glucose 01/12/2023 117  mg/dL Final    Triglycerides 01/12/2023 153 (H)  35 - 150 mg/dL Final    Cholesterol 01/12/2023 174  0 - 200 mg/dL Final    HDL Cholesterol 01/12/2023 38 (L)  40 - 60 mg/dL Final    Cholesterol/HDL Ratio (Risk Factor) 01/12/2023 4.6   Final    Non-HDL 01/12/2023 136  mg/dL Final    LDL Calculated 01/12/2023 105  mg/dL Final    LDL/HDL 01/12/2023 2.8   Final    VLDL 01/12/2023 31  mg/dL Final   Office Visit on 12/19/2022   Component Date Value Ref Range Status    POC Amphetamines 12/19/2022 Negative  Negative, Inconclusive Final    POC Barbiturates 12/19/2022 Negative  Negative, Inconclusive Final    POC Benzodiazepines 12/19/2022 Negative  Negative, Inconclusive Final    POC Cocaine 12/19/2022 Negative  Negative, Inconclusive Final    POC THC 12/19/2022 Negative  Negative, Inconclusive Final    POC Methadone 12/19/2022 Negative  Negative, Inconclusive Final    POC Methamphetamine 12/19/2022 Negative  Negative, Inconclusive Final    POC Opiates 12/19/2022 Presumptive Positive (A)  Negative, Inconclusive Final    POC Oxycodone 12/19/2022 Negative  Negative, Inconclusive Final    POC Phencyclidine 12/19/2022 Negative  Negative, Inconclusive Final    POC Methylenedioxymethamphetamine * 12/19/2022 Negative  Negative, Inconclusive Final    POC Tricyclic Antidepressants 12/19/2022 Negative  Negative, Inconclusive Final    POC  Buprenorphine 12/19/2022 Negative   Final     Acceptable 12/19/2022 Yes   Final    POC Temperature (Urine) 12/19/2022 92   Final   Lab Visit on 11/18/2022   Component Date Value Ref Range Status    Sodium 11/18/2022 135 (L)  136 - 145 mmol/L Final    Potassium 11/18/2022 4.0  3.5 - 5.1 mmol/L Final    Chloride 11/18/2022 101  98 - 107 mmol/L Final    CO2 11/18/2022 28  21 - 32 mmol/L Final    Anion Gap 11/18/2022 10  7 - 16 mmol/L Final    Glucose 11/18/2022 188 (H)  74 - 106 mg/dL Final    BUN 11/18/2022 17  7 - 18 mg/dL Final    Creatinine 11/18/2022 0.66  0.55 - 1.02 mg/dL Final    BUN/Creatinine Ratio 11/18/2022 26 (H)  6 - 20 Final    Calcium 11/18/2022 9.0  8.5 - 10.1 mg/dL Final    Total Protein 11/18/2022 8.5 (H)  6.4 - 8.2 g/dL Final    Albumin 11/18/2022 3.4 (L)  3.5 - 5.0 g/dL Final    Globulin 11/18/2022 5.1 (H)  2.0 - 4.0 g/dL Final    A/G Ratio 11/18/2022 0.7   Final    Bilirubin, Total 11/18/2022 0.5  >0.0 - 1.2 mg/dL Final    Alk Phos 11/18/2022 103 (H)  37 - 98 U/L Final    ALT 11/18/2022 32  13 - 56 U/L Final    AST 11/18/2022 13 (L)  15 - 37 U/L Final    eGFR 11/18/2022 112  >=60 mL/min/1.73m² Final    WBC 11/18/2022 10.68  4.50 - 11.00 K/uL Final    RBC 11/18/2022 5.15  4.20 - 5.40 M/uL Final    Hemoglobin 11/18/2022 15.0  12.0 - 16.0 g/dL Final    Hematocrit 11/18/2022 46.6  38.0 - 47.0 % Final    MCV 11/18/2022 90.5  80.0 - 96.0 fL Final    MCH 11/18/2022 29.1  27.0 - 31.0 pg Final    MCHC 11/18/2022 32.2  32.0 - 36.0 g/dL Final    RDW 11/18/2022 12.7  11.5 - 14.5 % Final    Platelet Count 11/18/2022 158  150 - 400 K/uL Final    MPV 11/18/2022 14.1 (H)  9.4 - 12.4 fL Final    Neutrophils % 11/18/2022 68.9 (H)  53.0 - 65.0 % Final    Lymphocytes % 11/18/2022 21.3 (L)  27.0 - 41.0 % Final    Monocytes % 11/18/2022 5.4  2.0 - 6.0 % Final    Eosinophils % 11/18/2022 3.6  1.0 - 4.0 % Final    Basophils % 11/18/2022 0.4  0.0 - 1.0 % Final    Immature Granulocytes % 11/18/2022 0.4  0.0  - 0.4 % Final    nRBC, Auto 11/18/2022 0.0  <=0.0 % Final    Neutrophils, Abs 11/18/2022 7.36  1.80 - 7.70 K/uL Final    Lymphocytes, Absolute 11/18/2022 2.28  1.00 - 4.80 K/uL Final    Monocytes, Absolute 11/18/2022 0.58  0.00 - 0.80 K/uL Final    Eosinophils, Absolute 11/18/2022 0.38  0.00 - 0.50 K/uL Final    Basophils, Absolute 11/18/2022 0.04  0.00 - 0.20 K/uL Final    Immature Granulocytes, Absolute 11/18/2022 0.04  0.00 - 0.04 K/uL Final    nRBC, Absolute 11/18/2022 0.00  <=0.00 x10e3/uL Final    Diff Type 11/18/2022 Scan Smear   Final    Platelet Morphology 11/18/2022 Few Large Platelets (A)  Normal Final    RBC Morphology 11/18/2022 Normal   Final   Hospital Outpatient Visit on 10/07/2022   Component Date Value Ref Range Status    POC Glucose 10/07/2022 125 (H)  70 - 105 mg/dL Final    Case Report 10/07/2022    Final                    Value:Surgical Pathology                                Case: [id number]                                   Authorizing Provider:  Omero Weber MD      Collected:           10/07/2022 02:49 PM          Ordering Location:     [facility] - Endoscopy       Received:            10/07/2022 03:30 PM          Pathologist:           Farhat Mendoza MD                                                       Specimens:   A) - Stomach, biopsy                                                                                B) - Esophagus, biopsy                                                                     Final Diagnosis 10/07/2022    Final                    Value:This result contains rich text formatting which cannot be displayed here.    Gross Description 10/07/2022    Final                    Value:This result contains rich text formatting which cannot be displayed here.    Microscopic Description 10/07/2022    Final                    Value:This result contains rich text formatting which cannot be displayed here.    Laboratory Notes 10/07/2022    Final                     Value:This result contains rich text formatting which cannot be displayed here.   Office Visit on 09/12/2022   Component Date Value Ref Range Status    Hemoglobin A1C 09/12/2022 6.2  4.5 - 6.6 % Final    Estimated Average Glucose 09/12/2022 120  mg/dL Final    Glucose, Fasting 09/12/2022 130 (H)  74 - 106 mg/dL Final    Triglycerides 09/12/2022 135  35 - 150 mg/dL Final    Cholesterol 09/12/2022 171  0 - 200 mg/dL Final    HDL Cholesterol 09/12/2022 35 (L)  40 - 60 mg/dL Final    Cholesterol/HDL Ratio (Risk Factor) 09/12/2022 4.9   Final    Non-HDL 09/12/2022 136  mg/dL Final    LDL Calculated 09/12/2022 109  mg/dL Final    LDL/HDL 09/12/2022 3.1   Final    VLDL 09/12/2022 27  mg/dL Final   Admission on 09/11/2022, Discharged on 09/11/2022   Component Date Value Ref Range Status    Sodium 09/11/2022 135 (L)  136 - 145 mmol/L Final    Potassium 09/11/2022 4.5  3.5 - 5.1 mmol/L Final    Chloride 09/11/2022 100  98 - 107 mmol/L Final    CO2 09/11/2022 25  21 - 32 mmol/L Final    Anion Gap 09/11/2022 15  7 - 16 mmol/L Final    Glucose 09/11/2022 117 (H)  74 - 106 mg/dL Final    BUN 09/11/2022 34 (H)  7 - 18 mg/dL Final    Creatinine 09/11/2022 1.22 (H)  0.55 - 1.02 mg/dL Final    BUN/Creatinine Ratio 09/11/2022 28 (H)  6 - 20 Final    Calcium 09/11/2022 10.0  8.5 - 10.1 mg/dL Final    Total Protein 09/11/2022 8.9 (H)  6.4 - 8.2 g/dL Final    Albumin 09/11/2022 3.9  3.5 - 5.0 g/dL Final    Globulin 09/11/2022 5.0 (H)  2.0 - 4.0 g/dL Final    A/G Ratio 09/11/2022 0.8   Final    Bilirubin, Total 09/11/2022 0.4  >0.0 - 1.2 mg/dL Final    Alk Phos 09/11/2022 112 (H)  37 - 98 U/L Final    ALT 09/11/2022 34  13 - 56 U/L Final    AST 09/11/2022 20  15 - 37 U/L Final    eGFR 09/11/2022 57 (L)  >=60 mL/min/1.73m² Final    Lipase 09/11/2022 184  73 - 393 U/L Final    Magnesium 09/11/2022 2.1  1.7 - 2.3 mg/dL Final    Lactic Acid 09/11/2022 1.6  0.4 - 2.0 mmol/L Final    Color, UA 09/11/2022 Yellow  Colorless, Straw, Yellow, Light  Yellow, Dark Yellow Final    Clarity, UA 09/11/2022 Turbid  Clear Final    pH, UA 09/11/2022 5.5  5.0 to 8.0 pH Units Final    Leukocytes, UA 09/11/2022 Trace (A)  Negative Final    Nitrites, UA 09/11/2022 Negative  Negative Final    Protein, UA 09/11/2022 20 (A)  Negative Final    Glucose, UA 09/11/2022 Normal  Normal mg/dL Final    Ketones, UA 09/11/2022 Trace  Negative mg/dL Final    Urobilinogen, UA 09/11/2022 Normal  0.2, 1.0, Normal mg/dL Final    Bilirubin, UA 09/11/2022 Negative  Negative Final    Blood, UA 09/11/2022 Negative  Negative Final    Specific Gravity, UA 09/11/2022 1.020  <=1.030 Final    WBC 09/11/2022 16.84 (H)  4.50 - 11.00 K/uL Final    RBC 09/11/2022 5.36  4.20 - 5.40 M/uL Final    Hemoglobin 09/11/2022 15.4  12.0 - 16.0 g/dL Final    Hematocrit 09/11/2022 46.9  38.0 - 47.0 % Final    MCV 09/11/2022 87.5  80.0 - 96.0 fL Final    MCH 09/11/2022 28.7  27.0 - 31.0 pg Final    MCHC 09/11/2022 32.8  32.0 - 36.0 g/dL Final    RDW 09/11/2022 13.1  11.5 - 14.5 % Final    Platelet Count 09/11/2022 229  150 - 400 K/uL Final    MPV 09/11/2022 13.9 (H)  9.4 - 12.4 fL Final    Neutrophils % 09/11/2022 74.8 (H)  53.0 - 65.0 % Final    Lymphocytes % 09/11/2022 15.1 (L)  27.0 - 41.0 % Final    Monocytes % 09/11/2022 7.9 (H)  2.0 - 6.0 % Final    Eosinophils % 09/11/2022 1.2  1.0 - 4.0 % Final    Basophils % 09/11/2022 0.4  0.0 - 1.0 % Final    Immature Granulocytes % 09/11/2022 0.6 (H)  0.0 - 0.4 % Final    nRBC, Auto 09/11/2022 0.0  <=0.0 % Final    Neutrophils, Abs 09/11/2022 12.60 (H)  1.80 - 7.70 K/uL Final    Lymphocytes, Absolute 09/11/2022 2.55  1.00 - 4.80 K/uL Final    Monocytes, Absolute 09/11/2022 1.33 (H)  0.00 - 0.80 K/uL Final    Eosinophils, Absolute 09/11/2022 0.20  0.00 - 0.50 K/uL Final    Basophils, Absolute 09/11/2022 0.06  0.00 - 0.20 K/uL Final    Immature Granulocytes, Absolute 09/11/2022 0.10 (H)  0.00 - 0.04 K/uL Final    nRBC, Absolute 09/11/2022 0.00  <=0.00 x10e3/uL Final    Diff  Type 09/11/2022 Auto   Final    Troponin I High Sensitivity 09/11/2022 <4.0  <=60.4 pg/mL Final    WBC, UA 09/11/2022 7 (H)  <=5 /hpf Final    RBC, UA 09/11/2022 1  <=3 /hpf Final    Bacteria, UA 09/11/2022 Few (A)  None Seen /hpf Final    Squamous Epithelial Cells, UA 09/11/2022 Occasional (A)  None Seen /HPF Final    Amorphous Crystals, UA 09/11/2022 Occ (A)  None Seen /HPF Final    Mucous 09/11/2022 Few (A)  None Seen /LPF Final   Office Visit on 09/09/2022   Component Date Value Ref Range Status    Rapid Influenza A Ag 09/09/2022 Negative  Negative Final    Rapid Influenza B Ag 09/09/2022 Negative  Negative Final     Acceptable 09/09/2022 Yes   Final    WBC, UA 09/09/2022 Negative   Final    Nitrite, UA 09/09/2022 Negative   Final    Urobilinogen, UA 09/09/2022 0.2   Final    Protein, POC 09/09/2022 Negative   Final    pH, UA 09/09/2022 5.5   Final    Blood, UA 09/09/2022 Negative   Final    Spec Grav UA 09/09/2022 1.025   Final    Ketones, UA 09/09/2022 Negative   Final    Bilirubin, POC 09/09/2022 Negative   Final    Glucose, UA 09/09/2022 Negative   Final    Sodium 09/09/2022 134 (L)  136 - 145 mmol/L Final    Potassium 09/09/2022 4.7  3.5 - 5.1 mmol/L Final    Chloride 09/09/2022 101  98 - 107 mmol/L Final    CO2 09/09/2022 28  21 - 32 mmol/L Final    Anion Gap 09/09/2022 10  7 - 16 mmol/L Final    Glucose 09/09/2022 144 (H)  74 - 106 mg/dL Final    BUN 09/09/2022 31 (H)  7 - 18 mg/dL Final    Creatinine 09/09/2022 0.84  0.55 - 1.02 mg/dL Final    BUN/Creatinine Ratio 09/09/2022 37 (H)  6 - 20 Final    Calcium 09/09/2022 10.0  8.5 - 10.1 mg/dL Final    Total Protein 09/09/2022 8.9 (H)  6.4 - 8.2 g/dL Final    Albumin 09/09/2022 3.8  3.5 - 5.0 g/dL Final    Globulin 09/09/2022 5.1 (H)  2.0 - 4.0 g/dL Final    A/G Ratio 09/09/2022 0.7   Final    Bilirubin, Total 09/09/2022 0.6  >0.0 - 1.2 mg/dL Final    Alk Phos 09/09/2022 127 (H)  37 - 98 U/L Final    ALT 09/09/2022 33  13 - 56 U/L Final    AST  09/09/2022 30  15 - 37 U/L Final    eGFR 09/09/2022 89  >=60 mL/min/1.73m² Final    Culture, Urine 09/09/2022 Skin/Urogenital Maricarmen Isolated, no further workup.   Final    WBC 09/09/2022 14.15 (H)  4.50 - 11.00 K/uL Final    RBC 09/09/2022 5.40  4.20 - 5.40 M/uL Final    Hemoglobin 09/09/2022 15.4  12.0 - 16.0 g/dL Final    Hematocrit 09/09/2022 47.3 (H)  38.0 - 47.0 % Final    MCV 09/09/2022 87.6  80.0 - 96.0 fL Final    MCH 09/09/2022 28.5  27.0 - 31.0 pg Final    MCHC 09/09/2022 32.6  32.0 - 36.0 g/dL Final    RDW 09/09/2022 13.4  11.5 - 14.5 % Final    Platelet Count 09/09/2022 190  150 - 400 K/uL Final    MPV 09/09/2022 13.4 (H)  9.4 - 12.4 fL Final    Neutrophils % 09/09/2022 77.1 (H)  53.0 - 65.0 % Final    Lymphocytes % 09/09/2022 14.6 (L)  27.0 - 41.0 % Final    Monocytes % 09/09/2022 5.7  2.0 - 6.0 % Final    Eosinophils % 09/09/2022 1.6  1.0 - 4.0 % Final    Basophils % 09/09/2022 0.4  0.0 - 1.0 % Final    Immature Granulocytes % 09/09/2022 0.6 (H)  0.0 - 0.4 % Final    nRBC, Auto 09/09/2022 0.0  <=0.0 % Final    Neutrophils, Abs 09/09/2022 10.90 (H)  1.80 - 7.70 K/uL Final    Lymphocytes, Absolute 09/09/2022 2.06  1.00 - 4.80 K/uL Final    Monocytes, Absolute 09/09/2022 0.81 (H)  0.00 - 0.80 K/uL Final    Eosinophils, Absolute 09/09/2022 0.23  0.00 - 0.50 K/uL Final    Basophils, Absolute 09/09/2022 0.06  0.00 - 0.20 K/uL Final    Immature Granulocytes, Absolute 09/09/2022 0.09 (H)  0.00 - 0.04 K/uL Final    nRBC, Absolute 09/09/2022 0.00  <=0.00 x10e3/uL Final    Diff Type 09/09/2022 Scan Smear   Final    Platelet Morphology 09/09/2022 Large Platelets (A)  Normal Final    RBC Morphology 09/09/2022 Normal   Final   Office Visit on 09/06/2022   Component Date Value Ref Range Status    POC Amphetamines 09/06/2022 Negative  Negative, Inconclusive Final    POC Barbiturates 09/06/2022 Negative  Negative, Inconclusive Final    POC Benzodiazepines 09/06/2022 Negative  Negative, Inconclusive Final    POC Cocaine  09/06/2022 Negative  Negative, Inconclusive Final    POC THC 09/06/2022 Negative  Negative, Inconclusive Final    POC Methadone 09/06/2022 Negative  Negative, Inconclusive Final    POC Methamphetamine 09/06/2022 Negative  Negative, Inconclusive Final    POC Opiates 09/06/2022 Presumptive Positive (A)  Negative, Inconclusive Final    POC Oxycodone 09/06/2022 Negative  Negative, Inconclusive Final    POC Phencyclidine 09/06/2022 Negative  Negative, Inconclusive Final    POC Methylenedioxymethamphetamine * 09/06/2022 Negative  Negative, Inconclusive Final    POC Tricyclic Antidepressants 09/06/2022 Negative  Negative, Inconclusive Final    POC Buprenorphine 09/06/2022 Negative   Final     Acceptable 09/06/2022 Yes   Final    POC Temperature (Urine) 09/06/2022 92   Final         Orders Placed This Encounter   Procedures    POCT Urine Drug Screen Presump     Interpretive Information:     Negative:  No drug detected at the cut off level.   Positive:  This result represents presumptive positive for the   tested drug, other substances may yield a positive response other   than the analyte of interest. This result should be utilized for   diagnostic purpose only. Confirmation testing will be performed upon physician request only.              Requested Prescriptions     Signed Prescriptions Disp Refills    gabapentin (NEURONTIN) 300 MG capsule 90 capsule 1     Sig: Take 1 capsule (300 mg total) by mouth every 8 (eight) hours.    HYDROcodone-acetaminophen (NORCO)  mg per tablet 90 tablet 0     Sig: Take 1 tablet by mouth every 8 (eight) hours as needed for Pain.    HYDROcodone-acetaminophen (NORCO)  mg per tablet 90 tablet 0     Sig: Take 1 tablet by mouth every 8 (eight) hours as needed for Pain.       Assessment:     1. Rheumatoid arthritis involving multiple sites with positive rheumatoid factor    2. Encounter for long-term (current) use of other medications    3. Spondylosis of lumbar region  without myelopathy or radiculopathy    4. Sacroiliitis    5. Chronic pain of right knee           A's of Opioid Risk Assessment  Activity:Patient can perform ADL.   Analgesia:Patients pain is partially controlled by current medication. Patient has tried OTC medications such as Tylenol and Ibuprofen with out relief.   Adverse Effects: Patient denies constipation or sedation.  Aberrant Behavior:  reviewed with no aberrant drug seeking/taking behavior.  Overdose reversal drug naloxone discussed    Drug screen reviewed    X-ray right knee [facility] September 14, 2022 degenerative changes no fracture noted      Plan:    Narcan December 2022    Follows rheumatology [facility] rheumatoid arthritis    Right knee pain chronic follows orthopedics [facility]     She is been told her insurance company will not cover procedures/surgery for her right knee    Complaint increasing joint pain she relates it to her rheumatoid arthritis     She is requesting a Toradol injection    Toradol 60 milligrams IM, tolerated well    Continue current medication    Follow-up 2 months    Dr. Torre, August 2023    Bring original prescription medication bottles/container/box with labels to each visit    Pill count    Physical therapy

## 2023-03-16 ENCOUNTER — OFFICE VISIT (OUTPATIENT)
Dept: FAMILY MEDICINE | Facility: CLINIC | Age: 43
End: 2023-03-16
Payer: MEDICAID

## 2023-03-16 ENCOUNTER — APPOINTMENT (OUTPATIENT)
Dept: RADIOLOGY | Facility: CLINIC | Age: 43
End: 2023-03-16
Attending: NURSE PRACTITIONER
Payer: MEDICAID

## 2023-03-16 VITALS
HEIGHT: 62 IN | BODY MASS INDEX: 53.92 KG/M2 | HEART RATE: 82 BPM | SYSTOLIC BLOOD PRESSURE: 130 MMHG | RESPIRATION RATE: 18 BRPM | TEMPERATURE: 99 F | WEIGHT: 293 LBS | OXYGEN SATURATION: 100 % | DIASTOLIC BLOOD PRESSURE: 78 MMHG
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
## 2023-03-16 DIAGNOSIS — M79.641 RIGHT HAND PAIN: ICD-10-CM

## 2023-03-16 DIAGNOSIS — M79.641 RIGHT HAND PAIN: Primary | ICD-10-CM

## 2023-03-16 PROCEDURE — 1160F RVW MEDS BY RX/DR IN RCRD: CPT | Mod: CPTII,,, | Performed by: NURSE PRACTITIONER

## 2023-03-16 PROCEDURE — 3044F PR MOST RECENT HEMOGLOBIN A1C LEVEL <7.0%: ICD-10-PCS | Mod: CPTII,,, | Performed by: NURSE PRACTITIONER

## 2023-03-16 PROCEDURE — 3075F SYST BP GE 130 - 139MM HG: CPT | Mod: CPTII,,, | Performed by: NURSE PRACTITIONER

## 2023-03-16 PROCEDURE — 96372 PR INJECTION,THERAP/PROPH/DIAG2ST, IM OR SUBCUT: ICD-10-PCS | Mod: ,,, | Performed by: NURSE PRACTITIONER

## 2023-03-16 PROCEDURE — 99213 OFFICE O/P EST LOW 20 MIN: CPT | Mod: 25,,, | Performed by: NURSE PRACTITIONER

## 2023-03-16 PROCEDURE — 3008F BODY MASS INDEX DOCD: CPT | Mod: CPTII,,, | Performed by: NURSE PRACTITIONER

## 2023-03-16 PROCEDURE — 3075F PR MOST RECENT SYSTOLIC BLOOD PRESS GE 130-139MM HG: ICD-10-PCS | Mod: CPTII,,, | Performed by: NURSE PRACTITIONER

## 2023-03-16 PROCEDURE — 96372 THER/PROPH/DIAG INJ SC/IM: CPT | Mod: ,,, | Performed by: NURSE PRACTITIONER

## 2023-03-16 PROCEDURE — 1159F MED LIST DOCD IN RCRD: CPT | Mod: CPTII,,, | Performed by: NURSE PRACTITIONER

## 2023-03-16 PROCEDURE — 73130 XR HAND COMPLETE 3 VIEW RIGHT: ICD-10-PCS | Mod: 26,RT,, | Performed by: RADIOLOGY

## 2023-03-16 PROCEDURE — 99213 PR OFFICE/OUTPT VISIT, EST, LEVL III, 20-29 MIN: ICD-10-PCS | Mod: 25,,, | Performed by: NURSE PRACTITIONER

## 2023-03-16 PROCEDURE — 3061F NEG MICROALBUMINURIA REV: CPT | Mod: CPTII,,, | Performed by: NURSE PRACTITIONER

## 2023-03-16 PROCEDURE — 3008F PR BODY MASS INDEX (BMI) DOCUMENTED: ICD-10-PCS | Mod: CPTII,,, | Performed by: NURSE PRACTITIONER

## 2023-03-16 PROCEDURE — 3066F NEPHROPATHY DOC TX: CPT | Mod: CPTII,,, | Performed by: NURSE PRACTITIONER

## 2023-03-16 PROCEDURE — 3061F PR NEG MICROALBUMINURIA RESULT DOCUMENTED/REVIEW: ICD-10-PCS | Mod: CPTII,,, | Performed by: NURSE PRACTITIONER

## 2023-03-16 PROCEDURE — 3078F PR MOST RECENT DIASTOLIC BLOOD PRESSURE < 80 MM HG: ICD-10-PCS | Mod: CPTII,,, | Performed by: NURSE PRACTITIONER

## 2023-03-16 PROCEDURE — 3044F HG A1C LEVEL LT 7.0%: CPT | Mod: CPTII,,, | Performed by: NURSE PRACTITIONER

## 2023-03-16 PROCEDURE — 73130 X-RAY EXAM OF HAND: CPT | Mod: 26,RT,, | Performed by: RADIOLOGY

## 2023-03-16 PROCEDURE — 73130 X-RAY EXAM OF HAND: CPT | Mod: TC,RHCUB,RT | Performed by: NURSE PRACTITIONER

## 2023-03-16 PROCEDURE — 3078F DIAST BP <80 MM HG: CPT | Mod: CPTII,,, | Performed by: NURSE PRACTITIONER

## 2023-03-16 PROCEDURE — 1160F PR REVIEW ALL MEDS BY PRESCRIBER/CLIN PHARMACIST DOCUMENTED: ICD-10-PCS | Mod: CPTII,,, | Performed by: NURSE PRACTITIONER

## 2023-03-16 PROCEDURE — 3066F PR DOCUMENTATION OF TREATMENT FOR NEPHROPATHY: ICD-10-PCS | Mod: CPTII,,, | Performed by: NURSE PRACTITIONER

## 2023-03-16 PROCEDURE — 1159F PR MEDICATION LIST DOCUMENTED IN MEDICAL RECORD: ICD-10-PCS | Mod: CPTII,,, | Performed by: NURSE PRACTITIONER

## 2023-03-16 RX ORDER — KETOROLAC TROMETHAMINE 30 MG/ML
30 INJECTION, SOLUTION INTRAMUSCULAR; INTRAVENOUS
Status: COMPLETED | OUTPATIENT
Start: 2023-03-16 | End: 2023-03-16

## 2023-03-16 RX ORDER — FUROSEMIDE 40 MG/1
40 TABLET ORAL DAILY
COMMUNITY
Start: 2023-03-15

## 2023-03-16 RX ORDER — KETOROLAC TROMETHAMINE 30 MG/ML
60 INJECTION, SOLUTION INTRAMUSCULAR; INTRAVENOUS
Status: DISCONTINUED | OUTPATIENT
Start: 2023-03-16 | End: 2023-03-16

## 2023-03-16 RX ORDER — LOTEPREDNOL ETABONATE AND TOBRAMYCIN 5; 3 MG/ML; MG/ML
SUSPENSION/ DROPS OPHTHALMIC
COMMUNITY
Start: 2023-01-20 | End: 2023-06-27 | Stop reason: ALTCHOICE

## 2023-03-16 RX ORDER — HYDROCHLOROTHIAZIDE 25 MG/1
TABLET ORAL
COMMUNITY
End: 2023-06-27

## 2023-03-16 RX ORDER — ERYTHROMYCIN 5 MG/G
OINTMENT OPHTHALMIC 3 TIMES DAILY
Qty: 3.5 G | Refills: 0 | Status: SHIPPED | OUTPATIENT
Start: 2023-03-16 | End: 2023-06-27 | Stop reason: ALTCHOICE

## 2023-03-16 RX ADMIN — KETOROLAC TROMETHAMINE 30 MG: 30 INJECTION, SOLUTION INTRAMUSCULAR; INTRAVENOUS at 03:03

## 2023-03-16 NOTE — PROGRESS NOTES
"Subjective:       Patient ID: Belem Swann is a 42 y.o. female.    Chief Complaint: Fall (Patient fell yesterday and rolled left ankle and landed on right hand. )    Presents to clinic as above. Does not feel that ankle needs to be X-rayed. Her main concern is the right hand.     Review of Systems   Constitutional: Negative.    Respiratory: Negative.     Cardiovascular: Negative.    Musculoskeletal:  Positive for back pain and falls.        Reviewed family, medical, surgical, and social history.    Objective:      /78 (BP Location: Left arm, Patient Position: Sitting, BP Method: Large (Automatic))   Pulse 82   Temp 98.7 °F (37.1 °C) (Oral)   Resp 18   Ht 5' 2" (1.575 m)   Wt (!) 157.9 kg (348 lb)   SpO2 100%   BMI 63.65 kg/m²   Physical Exam  Vitals and nursing note reviewed.   Constitutional:       General: She is not in acute distress.     Appearance: Normal appearance. She is not ill-appearing, toxic-appearing or diaphoretic.   Cardiovascular:      Rate and Rhythm: Normal rate and regular rhythm.      Heart sounds: Normal heart sounds.   Pulmonary:      Effort: Pulmonary effort is normal.      Breath sounds: Normal breath sounds.   Musculoskeletal:      Right hand: Swelling, tenderness and bony tenderness present. No deformity or lacerations. Decreased range of motion. Normal strength. There is no disruption of two-point discrimination. Normal capillary refill. Normal pulse.      Left ankle: Swelling and ecchymosis present. No deformity or lacerations. Tenderness present over the lateral malleolus. No medial malleolus tenderness. Decreased range of motion. Normal pulse.      Left Achilles Tendon: Normal.   Skin:     General: Skin is warm and dry.      Capillary Refill: Capillary refill takes less than 2 seconds.   Neurological:      Mental Status: She is alert and oriented to person, place, and time.   Psychiatric:         Mood and Affect: Mood normal.         Behavior: Behavior normal.         " Thought Content: Thought content normal.         Judgment: Judgment normal.          No visits with results within 1 Day(s) from this visit.   Latest known visit with results is:   Office Visit on 02/23/2023   Component Date Value Ref Range Status    POC Amphetamines 02/23/2023 Negative  Negative, Inconclusive Final    POC Barbiturates 02/23/2023 Negative  Negative, Inconclusive Final    POC Benzodiazepines 02/23/2023 Negative  Negative, Inconclusive Final    POC Cocaine 02/23/2023 Negative  Negative, Inconclusive Final    POC THC 02/23/2023 Negative  Negative, Inconclusive Final    POC Methadone 02/23/2023 Negative  Negative, Inconclusive Final    POC Methamphetamine 02/23/2023 Negative  Negative, Inconclusive Final    POC Opiates 02/23/2023 Presumptive Positive (A)  Negative, Inconclusive Final    POC Oxycodone 02/23/2023 Negative  Negative, Inconclusive Final    POC Phencyclidine 02/23/2023 Negative  Negative, Inconclusive Final    POC Methylenedioxymethamphetamine * 02/23/2023 Negative  Negative, Inconclusive Final    POC Tricyclic Antidepressants 02/23/2023 Negative  Negative, Inconclusive Final    POC Buprenorphine 02/23/2023 Negative   Final     Acceptable 02/23/2023 Yes   Final    POC Temperature (Urine) 02/23/2023 90   Final      Assessment:       1. Right hand pain    2. Fall, initial encounter        Plan:       Right hand pain  -     X-Ray Hand 3 View Right; Future; Expected date: 03/16/2023  -     Discontinue: ketorolac injection 60 mg  -     erythromycin (ROMYCIN) ophthalmic ointment; Place into the right eye 3 (three) times daily. Massage into eyelashes.  Dispense: 3.5 g; Refill: 0    Fall, initial encounter  -     X-Ray Hand 3 View Right; Future; Expected date: 03/16/2023    Other orders  -     ketorolac injection 30 mg    Hx of large platelets. States hematologist says everything is fine. She tolerates Toradol with no problems. [facility].   Ace wrap  applied to right hand  F/U with Dr. Wakefield (ortho) has an appt  RTC PRN          Risks, benefits, and side effects were discussed with the patient. All questions were answered to the fullest satisfaction of the patient, and pt verbalized understanding and agreement to treatment plan. Pt was to call with any new or worsening symptoms, or present to the ER.

## 2023-03-23 ENCOUNTER — OFFICE VISIT (OUTPATIENT)
Dept: OBSTETRICS AND GYNECOLOGY | Facility: CLINIC | Age: 43
End: 2023-03-23
Payer: MEDICAID

## 2023-03-23 VITALS
WEIGHT: 293 LBS | BODY MASS INDEX: 53.92 KG/M2 | DIASTOLIC BLOOD PRESSURE: 80 MMHG | HEART RATE: 101 BPM | RESPIRATION RATE: 20 BRPM | TEMPERATURE: 98 F | SYSTOLIC BLOOD PRESSURE: 128 MMHG | OXYGEN SATURATION: 100 % | HEIGHT: 62 IN

## 2023-03-23 DIAGNOSIS — E66.01 MORBID OBESITY WITH BMI OF 60.0-69.9, ADULT: Chronic | ICD-10-CM

## 2023-03-23 DIAGNOSIS — Z12.89 ENCOUNTER FOR PELVIC SCREENING FOR MALIGNANT NEOPLASM: Primary | ICD-10-CM

## 2023-03-23 PROCEDURE — 87624 HPV HI-RISK TYP POOLED RSLT: CPT | Mod: ,,, | Performed by: CLINICAL MEDICAL LABORATORY

## 2023-03-23 PROCEDURE — 3044F PR MOST RECENT HEMOGLOBIN A1C LEVEL <7.0%: ICD-10-PCS | Mod: CPTII,,, | Performed by: ADVANCED PRACTICE MIDWIFE

## 2023-03-23 PROCEDURE — 3008F PR BODY MASS INDEX (BMI) DOCUMENTED: ICD-10-PCS | Mod: CPTII,,, | Performed by: ADVANCED PRACTICE MIDWIFE

## 2023-03-23 PROCEDURE — 99203 PR OFFICE/OUTPT VISIT, NEW, LEVL III, 30-44 MIN: ICD-10-PCS | Mod: S$PBB,,, | Performed by: ADVANCED PRACTICE MIDWIFE

## 2023-03-23 PROCEDURE — 99215 OFFICE O/P EST HI 40 MIN: CPT | Mod: PBBFAC | Performed by: ADVANCED PRACTICE MIDWIFE

## 2023-03-23 PROCEDURE — 88141 THINPREP PAP TEST: ICD-10-PCS | Mod: ,,, | Performed by: PATHOLOGY

## 2023-03-23 PROCEDURE — 1159F PR MEDICATION LIST DOCUMENTED IN MEDICAL RECORD: ICD-10-PCS | Mod: CPTII,,, | Performed by: ADVANCED PRACTICE MIDWIFE

## 2023-03-23 PROCEDURE — 3061F NEG MICROALBUMINURIA REV: CPT | Mod: CPTII,,, | Performed by: ADVANCED PRACTICE MIDWIFE

## 2023-03-23 PROCEDURE — 88141 CYTOPATH C/V INTERPRET: CPT | Mod: ,,, | Performed by: PATHOLOGY

## 2023-03-23 PROCEDURE — 3079F DIAST BP 80-89 MM HG: CPT | Mod: CPTII,,, | Performed by: ADVANCED PRACTICE MIDWIFE

## 2023-03-23 PROCEDURE — 87624 HUMAN PAPILLOMAVIRUS (HPV): ICD-10-PCS | Mod: ,,, | Performed by: CLINICAL MEDICAL LABORATORY

## 2023-03-23 PROCEDURE — 3061F PR NEG MICROALBUMINURIA RESULT DOCUMENTED/REVIEW: ICD-10-PCS | Mod: CPTII,,, | Performed by: ADVANCED PRACTICE MIDWIFE

## 2023-03-23 PROCEDURE — 99203 OFFICE O/P NEW LOW 30 MIN: CPT | Mod: S$PBB,,, | Performed by: ADVANCED PRACTICE MIDWIFE

## 2023-03-23 PROCEDURE — 3074F SYST BP LT 130 MM HG: CPT | Mod: CPTII,,, | Performed by: ADVANCED PRACTICE MIDWIFE

## 2023-03-23 PROCEDURE — 3066F PR DOCUMENTATION OF TREATMENT FOR NEPHROPATHY: ICD-10-PCS | Mod: CPTII,,, | Performed by: ADVANCED PRACTICE MIDWIFE

## 2023-03-23 PROCEDURE — 3079F PR MOST RECENT DIASTOLIC BLOOD PRESSURE 80-89 MM HG: ICD-10-PCS | Mod: CPTII,,, | Performed by: ADVANCED PRACTICE MIDWIFE

## 2023-03-23 PROCEDURE — 1159F MED LIST DOCD IN RCRD: CPT | Mod: CPTII,,, | Performed by: ADVANCED PRACTICE MIDWIFE

## 2023-03-23 PROCEDURE — 3044F HG A1C LEVEL LT 7.0%: CPT | Mod: CPTII,,, | Performed by: ADVANCED PRACTICE MIDWIFE

## 2023-03-23 PROCEDURE — 88142 CYTOPATH C/V THIN LAYER: CPT | Mod: TC,GCY | Performed by: ADVANCED PRACTICE MIDWIFE

## 2023-03-23 PROCEDURE — 3074F PR MOST RECENT SYSTOLIC BLOOD PRESSURE < 130 MM HG: ICD-10-PCS | Mod: CPTII,,, | Performed by: ADVANCED PRACTICE MIDWIFE

## 2023-03-23 PROCEDURE — 3066F NEPHROPATHY DOC TX: CPT | Mod: CPTII,,, | Performed by: ADVANCED PRACTICE MIDWIFE

## 2023-03-23 PROCEDURE — 3008F BODY MASS INDEX DOCD: CPT | Mod: CPTII,,, | Performed by: ADVANCED PRACTICE MIDWIFE

## 2023-03-23 NOTE — PROGRESS NOTES
Subjective:       Patient ID: Belem Swann is a 42 y.o. female.    Chief Complaint: Well Woman (In for Annual Exam. Denies any problems at this time. Pt's last pap was in [id number].)    MMG scheduled in may  Denies complaints    Review of Systems   Constitutional:  Positive for fatigue.   HENT: Negative.     Eyes: Negative.    Respiratory: Negative.     Cardiovascular: Negative.    Gastrointestinal: Negative.    Endocrine: Negative.    Genitourinary: Negative.  Positive for vaginal dryness.   Musculoskeletal:  Positive for arthralgias.   Integumentary:  Negative.   Allergic/Immunologic: Negative.    Neurological: Negative.    Psychiatric/Behavioral: Negative.         Objective:      Physical Exam  Vitals reviewed.   Constitutional:       Appearance: Normal appearance. She is obese.   HENT:      Head: Normocephalic.   Cardiovascular:      Rate and Rhythm: Normal rate and regular rhythm.   Pulmonary:      Effort: Pulmonary effort is normal.      Breath sounds: Normal breath sounds.   Chest:   Breasts:     Right: Normal. No mass.      Left: Normal. No mass.   Abdominal:      General: Abdomen is flat.      Palpations: Abdomen is soft.   Genitourinary:     General: Normal vulva.      Exam position: Lithotomy position.      Vagina: Normal.      Uterus: Absent. Not tender.       Adnexa: Right adnexa normal and left adnexa normal.        Right: No mass.          Left: No mass.        Rectum: Normal. No mass.   Musculoskeletal:         General: Normal range of motion.      Cervical back: Normal range of motion.   Skin:     General: Skin is warm and dry.   Neurological:      Mental Status: She is alert and oriented to person, place, and time.   Psychiatric:         Mood and Affect: Mood normal.         Behavior: Behavior normal.       Assessment:       Problem List Items Addressed This Visit          Endocrine    Morbid obesity with BMI of 60.0-69.9, adult (Chronic)     Other Visit Diagnoses       Encounter for pelvic  screening for malignant neoplasm    -  Primary    Relevant Orders    ThinPrep Pap Test              Plan:       F/U in 1year or as needed

## 2023-03-27 LAB
GH SERPL-MCNC: ABNORMAL NG/ML
INSULIN SERPL-ACNC: ABNORMAL U[IU]/ML
LAB AP CLINICAL INFORMATION: ABNORMAL
LAB AP GYN INTERPRETATION: ABNORMAL
LAB AP PAP DISCLAIMER COMMENTS: ABNORMAL
RENIN PLAS-CCNC: ABNORMAL NG/ML/H

## 2023-03-28 LAB
HPV 16: NEGATIVE
HPV 18: NEGATIVE
HPV OTHER: NEGATIVE

## 2023-04-04 DIAGNOSIS — M25.561 PAIN IN BOTH KNEES, UNSPECIFIED CHRONICITY: Primary | ICD-10-CM

## 2023-04-04 DIAGNOSIS — M25.562 PAIN IN BOTH KNEES, UNSPECIFIED CHRONICITY: Primary | ICD-10-CM

## 2023-04-05 DIAGNOSIS — M25.562 PAIN IN BOTH KNEES, UNSPECIFIED CHRONICITY: Primary | ICD-10-CM

## 2023-04-05 DIAGNOSIS — M25.561 PAIN IN BOTH KNEES, UNSPECIFIED CHRONICITY: Primary | ICD-10-CM

## 2023-04-06 ENCOUNTER — OFFICE VISIT (OUTPATIENT)
Dept: ORTHOPEDICS | Facility: CLINIC | Age: 43
End: 2023-04-06
Payer: MEDICAID

## 2023-04-06 ENCOUNTER — HOSPITAL ENCOUNTER (OUTPATIENT)
Dept: RADIOLOGY | Facility: HOSPITAL | Age: 43
Discharge: HOME | End: 2023-04-06
Attending: NURSE PRACTITIONER
Payer: MEDICAID

## 2023-04-06 DIAGNOSIS — M25.561 PAIN IN BOTH KNEES, UNSPECIFIED CHRONICITY: ICD-10-CM

## 2023-04-06 DIAGNOSIS — M22.41 CHONDROMALACIA OF PATELLOFEMORAL JOINT, RIGHT: Primary | ICD-10-CM

## 2023-04-06 DIAGNOSIS — M25.562 PAIN IN BOTH KNEES, UNSPECIFIED CHRONICITY: ICD-10-CM

## 2023-04-06 DIAGNOSIS — M22.41 CHONDROMALACIA OF BOTH PATELLAE: ICD-10-CM

## 2023-04-06 DIAGNOSIS — M22.42 CHONDROMALACIA OF BOTH PATELLAE: ICD-10-CM

## 2023-04-06 PROCEDURE — 1159F MED LIST DOCD IN RCRD: CPT | Mod: CPTII,,, | Performed by: NURSE PRACTITIONER

## 2023-04-06 PROCEDURE — 20610 LARGE JOINT ASPIRATION/INJECTION: L SUPRA PATELLAR BURSA: ICD-10-PCS | Mod: S$PBB,50,, | Performed by: NURSE PRACTITIONER

## 2023-04-06 PROCEDURE — 73562 XR KNEE 3 VIEW BILATERAL: ICD-10-PCS | Mod: 26,50,, | Performed by: RADIOLOGY

## 2023-04-06 PROCEDURE — 3044F HG A1C LEVEL LT 7.0%: CPT | Mod: CPTII,,, | Performed by: NURSE PRACTITIONER

## 2023-04-06 PROCEDURE — 3061F NEG MICROALBUMINURIA REV: CPT | Mod: CPTII,,, | Performed by: NURSE PRACTITIONER

## 2023-04-06 PROCEDURE — 20610 DRAIN/INJ JOINT/BURSA W/O US: CPT | Mod: PBBFAC | Performed by: NURSE PRACTITIONER

## 2023-04-06 PROCEDURE — 3066F NEPHROPATHY DOC TX: CPT | Mod: CPTII,,, | Performed by: NURSE PRACTITIONER

## 2023-04-06 PROCEDURE — 73562 X-RAY EXAM OF KNEE 3: CPT | Mod: TC,50

## 2023-04-06 PROCEDURE — 3044F PR MOST RECENT HEMOGLOBIN A1C LEVEL <7.0%: ICD-10-PCS | Mod: CPTII,,, | Performed by: NURSE PRACTITIONER

## 2023-04-06 PROCEDURE — 3061F PR NEG MICROALBUMINURIA RESULT DOCUMENTED/REVIEW: ICD-10-PCS | Mod: CPTII,,, | Performed by: NURSE PRACTITIONER

## 2023-04-06 PROCEDURE — 3066F PR DOCUMENTATION OF TREATMENT FOR NEPHROPATHY: ICD-10-PCS | Mod: CPTII,,, | Performed by: NURSE PRACTITIONER

## 2023-04-06 PROCEDURE — 99213 PR OFFICE/OUTPT VISIT, EST, LEVL III, 20-29 MIN: ICD-10-PCS | Mod: S$PBB,25,, | Performed by: NURSE PRACTITIONER

## 2023-04-06 PROCEDURE — 20610 DRAIN/INJ JOINT/BURSA W/O US: CPT | Mod: PBBFAC,50 | Performed by: NURSE PRACTITIONER

## 2023-04-06 PROCEDURE — 1159F PR MEDICATION LIST DOCUMENTED IN MEDICAL RECORD: ICD-10-PCS | Mod: CPTII,,, | Performed by: NURSE PRACTITIONER

## 2023-04-06 PROCEDURE — 99213 OFFICE O/P EST LOW 20 MIN: CPT | Mod: S$PBB,25,, | Performed by: NURSE PRACTITIONER

## 2023-04-06 PROCEDURE — 73562 X-RAY EXAM OF KNEE 3: CPT | Mod: 26,50,, | Performed by: RADIOLOGY

## 2023-04-06 PROCEDURE — 99212 OFFICE O/P EST SF 10 MIN: CPT | Mod: PBBFAC,25 | Performed by: NURSE PRACTITIONER

## 2023-04-06 RX ORDER — TRIAMCINOLONE ACETONIDE 40 MG/ML
40 INJECTION, SUSPENSION INTRA-ARTICULAR; INTRAMUSCULAR
Status: DISCONTINUED | OUTPATIENT
Start: 2023-04-06 | End: 2023-04-06 | Stop reason: HOSPADM

## 2023-04-06 RX ADMIN — TRIAMCINOLONE ACETONIDE 40 MG: 40 INJECTION, SUSPENSION INTRA-ARTICULAR; INTRAMUSCULAR at 11:04

## 2023-04-06 NOTE — PROGRESS NOTES
43 y.o. Female returns to clinic for a follow up visit regarding No diagnosis found.     Patient is here for follow up right hand pain and bilateral knee pain. She had a fall landing on her right hand, was seen at immediate care and had an xray. No fractures seen. She does have some swelling but pain is improving. Reports it has been a while since she has had knee injection. She is having pain in both knees and would like injections today.        Past Medical History:   Diagnosis Date    Anxiety     Asthma     Chronic pain     Cushing syndrome     Depression     Essential hypertension     Fibromyalgia     Herpes zoster     Hyperlipidemia     Leukocytosis 2022    Onychomycosis     MARY on CPAP     Rheumatoid arthritis     Type 2 diabetes mellitus 2020    Vitamin D deficiency 2018     Past Surgical History:   Procedure Laterality Date     SECTION      ELBOW SURGERY      EPIDURAL STEROID INJECTION      L4-5 VERITO Dec 2020-Torre    FEMUR SURGERY Right     due to osteomyelitis    HYSTERECTOMY      Abn Bleeding    INJECTION OF ANESTHETIC AGENT AROUND MEDIAL BRANCH NERVES INNERVATING LUMBAR FACET JOINT Bilateral 2022    Procedure: Bilateral L4-5,5-S1 MBB ( No steroids);  Surgeon: Carmel Torre MD;  [facility];  Service: Pain Management;  Laterality: Bilateral;  PT AWARE TO BE TESTED ON OV    INJECTION OF ANESTHETIC AGENT AROUND MEDIAL BRANCH NERVES INNERVATING LUMBAR FACET JOINT Bilateral 2022    Procedure: Block-nerve-medial branch-lumbar, bilateral L4 through S1;  Surgeon: Carmel Torre MD;  [facility];  Service: Pain Management;  Laterality: Bilateral;    INJECTION OF ANESTHETIC AGENT INTO SACROILIAC JOINT Bilateral 2022    Procedure: BLOCK, SACROILIAC JOINT;  Surgeon: Carmel Torre MD;  [facility];  Service: Pain Management;  Laterality: Bilateral;  covid test put in    LIPOMA RESECTION  2019     RADIOFREQUENCY ABLATION OF LUMBAR MEDIAL BRANCH NERVE AT SINGLE LEVEL Right 04/14/2022    Procedure: Radiofrequency Ablation, Nerve, Spinal, Lumbar, Medial Branch, Level L4-S1, right side 1st, will have left-sided after completing;  Surgeon: Carmel Torre MD;  [facility];  Service: Pain Management;  Laterality: Right;  pt aware at visit to be tested    RADIOFREQUENCY ABLATION OF LUMBAR MEDIAL BRANCH NERVE AT SINGLE LEVEL Left 05/05/2022    Procedure: LEFT  L4-S1 RFTC  (HAD RIGHT  ON 4-14);  Surgeon: Carmel Torre MD;  [facility];  Service: Pain Management;  Laterality: Left;    SURGICAL REMOVAL OF PILONIDAL CYST      TRANSFORAMINAL EPIDURAL INJECTION OF STEROID      Bilateral L4-5 TFESI Nov 2020-[facility]    TRANSFORAMINAL EPIDURAL INJECTION OF STEROID      Right L4-5 TFESI Feb 2020-Torre    VENTRAL HERNIA REPAIR  09/2020         PHYSICAL EXAMINATION:    General    Constitutional: She is oriented to person, place, and time. She appears well-nourished.   HENT:   Head: Normocephalic and atraumatic.   Eyes: Pupils are equal, round, and reactive to light.   Neck: Neck supple.   Cardiovascular:  Normal rate and regular rhythm.            Pulmonary/Chest: Effort normal. No respiratory distress.   Abdominal: There is no abdominal tenderness. There is no guarding.   Neurological: She is alert and oriented to person, place, and time. She has normal reflexes.   Psychiatric: She has a normal mood and affect. Her behavior is normal. Judgment and thought content normal.           Right Knee Exam     Inspection   Swelling: present  Effusion: present    Tenderness   The patient is tender to palpation of the medial joint line and lateral joint line.    Crepitus   The patient has crepitus of the patella.    Range of Motion   Extension:  normal   Flexion:  abnormal     Tests   Meniscus   Grady:  Medial - positive   Ligament Examination   Lachman: normal (-1 to 2mm)   PCL-Posterior Drawer:  normal (0 to 2mm)     Patella   Patellar Tracking: normal  Patellar Grind: positive    Other   Sensation: normal    Left Knee Exam     Inspection   Swelling: present  Effusion: present    Tenderness   The patient tender to palpation of the medial joint line and lateral joint line.    Crepitus   The patient has crepitus of the patella.    Range of Motion   Extension:  normal   Flexion:  abnormal     Tests   Meniscus   Grady:  Medial - positive   Stability   Lachman: normal (-1 to 2mm)   PCL-Posterior Drawer: normal (0 to 2mm)  Patella   Patellar Tracking: normal    Other   Sensation: normal    Muscle Strength   Right Lower Extremity   Quadriceps:  5/5   Hamstrin/5   Left Lower Extremity   Quadriceps:  5/5   Hamstrin/5     Reflexes     Left Side  Achilles:  2+  Quadriceps:  2+    Right Side   Achilles:  2+  Quadriceps:  2+    Vascular Exam     Right Pulses  Dorsalis Pedis:      2+  Posterior Tibial:      2+        Left Pulses  Dorsalis Pedis:      2+  Posterior Tibial:      2+        IMAGING:  X-Ray Hand 3 View Right    Result Date: 3/16/2023  EXAMINATION: XR HAND COMPLETE 3 VIEW RIGHT CLINICAL HISTORY: Pain in right hand TECHNIQUE: AP, lateral and oblique views of the right hand COMPARISON: None. FINDINGS: No acute fractures are seen.  There is osteoarthritis at the navicular-trapezium articulation and 1st carpal-metacarpal joint.  A mild ulnar minus variant is noted.  No acute bony abnormalities are seen.     There is mild osteoarthritis but no acute fractures are seen. [facility] of service[facility] Electronically signed by: Sabra Hunter Date:    2023 Time:    15:41    X-Ray Knee 3 View Bilateral    Result Date: 2023  EXAMINATION: XR KNEE 3 VIEW BILATERAL CLINICAL HISTORY: Pain in right knee COMPARISON: 2022 right knee TECHNIQUE: XR KNEE 3 VIEW BILATERAL FINDINGS: No evidence of fracture seen.  The alignment of the joints appears normal.  Mild bilateral  tricompartmental degenerative change is present.  No soft tissue abnormality is seen.     Mild knee osteoarthrosis. Electronically signed by: Baljinder Porter Date:    04/06/2023 Time:    11:08       ASSESSMENT:    No diagnosis found.    PLAN:     -Findings and treatment options were discussed with the patient  -All questions answered    Bilateral knee injections today  RTC PRN      There are no Patient Instructions on file for this visit.      No orders of the defined types were placed in this encounter.        Large Joint Aspiration/Injection: L supra patellar bursa    Date/Time: 4/6/2023 11:00 AM  Performed by: DANE Restrepo  Authorized by: DANE Restrepo     Consent Done?:  Yes (Verbal)  Indications:  Pain  Site marked: the procedure site was marked    Local anesthetic:  Bupivacaine 0.25% without epinephrine    Details:  Needle Size:  22 G  Location:  Knee  Site:  L supra patellar bursa  Medications:  40 mg triamcinolone acetonide 40 mg/mL  Patient tolerance:  Patient tolerated the procedure well with no immediate complications  Large Joint Aspiration/Injection: R supra patellar bursa    Date/Time: 4/6/2023 11:00 AM  Performed by: DANE Restrepo  Authorized by: DANE Restrepo     Consent Done?:  Yes (Verbal)  Indications:  Pain  Site marked: the procedure site was marked    Local anesthetic:  Bupivacaine 0.25% without epinephrine    Details:  Needle Size:  22 G  Location:  Knee  Site:  R supra patellar bursa  Medications:  40 mg triamcinolone acetonide 40 mg/mL  Patient tolerance:  Patient tolerated the procedure well with no immediate complications

## 2023-04-17 DIAGNOSIS — M62.838 MUSCLE SPASM: ICD-10-CM

## 2023-04-17 RX ORDER — CYCLOBENZAPRINE HCL 10 MG
10 TABLET ORAL 3 TIMES DAILY PRN
Qty: 90 TABLET | Refills: 2 | Status: SHIPPED | OUTPATIENT
Start: 2023-04-17 | End: 2023-06-27 | Stop reason: SDUPTHER

## 2023-04-17 NOTE — TELEPHONE ENCOUNTER
----- Message from Rosalinda Rodrigues sent at 4/17/2023 12:29 PM CDT -----  Pt need refill FLEXERIL  sent in to [facility] [address] Pt # [phone]

## 2023-05-03 ENCOUNTER — OFFICE VISIT (OUTPATIENT)
Dept: PAIN MEDICINE | Facility: CLINIC | Age: 43
End: 2023-05-03
Payer: MEDICAID

## 2023-05-03 VITALS
DIASTOLIC BLOOD PRESSURE: 91 MMHG | WEIGHT: 293 LBS | BODY MASS INDEX: 53.92 KG/M2 | HEIGHT: 62 IN | RESPIRATION RATE: 18 BRPM | HEART RATE: 97 BPM | SYSTOLIC BLOOD PRESSURE: 157 MMHG

## 2023-05-03 DIAGNOSIS — M47.816 SPONDYLOSIS OF LUMBAR REGION WITHOUT MYELOPATHY OR RADICULOPATHY: Chronic | ICD-10-CM

## 2023-05-03 DIAGNOSIS — G89.29 CHRONIC PAIN OF RIGHT KNEE: Chronic | ICD-10-CM

## 2023-05-03 DIAGNOSIS — M05.79 RHEUMATOID ARTHRITIS INVOLVING MULTIPLE SITES WITH POSITIVE RHEUMATOID FACTOR: Primary | Chronic | ICD-10-CM

## 2023-05-03 DIAGNOSIS — M25.561 CHRONIC PAIN OF RIGHT KNEE: Chronic | ICD-10-CM

## 2023-05-03 DIAGNOSIS — Z79.899 ENCOUNTER FOR LONG-TERM (CURRENT) USE OF OTHER MEDICATIONS: ICD-10-CM

## 2023-05-03 DIAGNOSIS — M46.1 SACROILIITIS: ICD-10-CM

## 2023-05-03 LAB
CTP QC/QA: YES
POC (AMP) AMPHETAMINE: NEGATIVE
POC (BAR) BARBITURATES: NEGATIVE
POC (BUP) BUPRENORPHINE: NEGATIVE
POC (BZO) BENZODIAZEPINES: NEGATIVE
POC (COC) COCAINE: NEGATIVE
POC (MDMA) METHYLENEDIOXYMETHAMPHETAMINE 3,4: NEGATIVE
POC (MET) METHAMPHETAMINE: NEGATIVE
POC (MOP) OPIATES: ABNORMAL
POC (MTD) METHADONE: NEGATIVE
POC (OXY) OXYCODONE: NEGATIVE
POC (PCP) PHENCYCLIDINE: NEGATIVE
POC (TCA) TRICYCLIC ANTIDEPRESSANTS: NEGATIVE
POC TEMPERATURE (URINE): 94
POC THC: NEGATIVE

## 2023-05-03 PROCEDURE — 1159F MED LIST DOCD IN RCRD: CPT | Mod: CPTII,,, | Performed by: PHYSICIAN ASSISTANT

## 2023-05-03 PROCEDURE — 3066F PR DOCUMENTATION OF TREATMENT FOR NEPHROPATHY: ICD-10-PCS | Mod: CPTII,,, | Performed by: PHYSICIAN ASSISTANT

## 2023-05-03 PROCEDURE — 99215 OFFICE O/P EST HI 40 MIN: CPT | Mod: PBBFAC,25 | Performed by: PHYSICIAN ASSISTANT

## 2023-05-03 PROCEDURE — 3008F BODY MASS INDEX DOCD: CPT | Mod: CPTII,,, | Performed by: PHYSICIAN ASSISTANT

## 2023-05-03 PROCEDURE — 3008F PR BODY MASS INDEX (BMI) DOCUMENTED: ICD-10-PCS | Mod: CPTII,,, | Performed by: PHYSICIAN ASSISTANT

## 2023-05-03 PROCEDURE — 99214 OFFICE O/P EST MOD 30 MIN: CPT | Mod: S$PBB,25,, | Performed by: PHYSICIAN ASSISTANT

## 2023-05-03 PROCEDURE — 3077F SYST BP >= 140 MM HG: CPT | Mod: CPTII,,, | Performed by: PHYSICIAN ASSISTANT

## 2023-05-03 PROCEDURE — 3077F PR MOST RECENT SYSTOLIC BLOOD PRESSURE >= 140 MM HG: ICD-10-PCS | Mod: CPTII,,, | Performed by: PHYSICIAN ASSISTANT

## 2023-05-03 PROCEDURE — 3061F NEG MICROALBUMINURIA REV: CPT | Mod: CPTII,,, | Performed by: PHYSICIAN ASSISTANT

## 2023-05-03 PROCEDURE — 1159F PR MEDICATION LIST DOCUMENTED IN MEDICAL RECORD: ICD-10-PCS | Mod: CPTII,,, | Performed by: PHYSICIAN ASSISTANT

## 2023-05-03 PROCEDURE — 99214 PR OFFICE/OUTPT VISIT, EST, LEVL IV, 30-39 MIN: ICD-10-PCS | Mod: S$PBB,25,, | Performed by: PHYSICIAN ASSISTANT

## 2023-05-03 PROCEDURE — 3066F NEPHROPATHY DOC TX: CPT | Mod: CPTII,,, | Performed by: PHYSICIAN ASSISTANT

## 2023-05-03 PROCEDURE — 3080F DIAST BP >= 90 MM HG: CPT | Mod: CPTII,,, | Performed by: PHYSICIAN ASSISTANT

## 2023-05-03 PROCEDURE — 3044F HG A1C LEVEL LT 7.0%: CPT | Mod: CPTII,,, | Performed by: PHYSICIAN ASSISTANT

## 2023-05-03 PROCEDURE — 3061F PR NEG MICROALBUMINURIA RESULT DOCUMENTED/REVIEW: ICD-10-PCS | Mod: CPTII,,, | Performed by: PHYSICIAN ASSISTANT

## 2023-05-03 PROCEDURE — 3080F PR MOST RECENT DIASTOLIC BLOOD PRESSURE >= 90 MM HG: ICD-10-PCS | Mod: CPTII,,, | Performed by: PHYSICIAN ASSISTANT

## 2023-05-03 PROCEDURE — 3044F PR MOST RECENT HEMOGLOBIN A1C LEVEL <7.0%: ICD-10-PCS | Mod: CPTII,,, | Performed by: PHYSICIAN ASSISTANT

## 2023-05-03 PROCEDURE — 96372 THER/PROPH/DIAG INJ SC/IM: CPT | Mod: PBBFAC | Performed by: PHYSICIAN ASSISTANT

## 2023-05-03 PROCEDURE — 80305 DRUG TEST PRSMV DIR OPT OBS: CPT | Mod: PBBFAC | Performed by: PHYSICIAN ASSISTANT

## 2023-05-03 RX ORDER — GABAPENTIN 300 MG/1
300 CAPSULE ORAL EVERY 8 HOURS
Qty: 90 CAPSULE | Refills: 1 | Status: SHIPPED | OUTPATIENT
Start: 2023-05-03 | End: 2023-06-28 | Stop reason: SDUPTHER

## 2023-05-03 RX ORDER — KETOROLAC TROMETHAMINE 30 MG/ML
60 INJECTION, SOLUTION INTRAMUSCULAR; INTRAVENOUS
Status: COMPLETED | OUTPATIENT
Start: 2023-05-03 | End: 2023-05-03

## 2023-05-03 RX ORDER — HYDROCODONE BITARTRATE AND ACETAMINOPHEN 10; 325 MG/1; MG/1
1 TABLET ORAL EVERY 8 HOURS PRN
Qty: 90 TABLET | Refills: 0 | Status: SHIPPED | OUTPATIENT
Start: 2023-05-04 | End: 2023-06-28 | Stop reason: SDUPTHER

## 2023-05-03 RX ORDER — HYDROCODONE BITARTRATE AND ACETAMINOPHEN 10; 325 MG/1; MG/1
1 TABLET ORAL EVERY 8 HOURS PRN
Qty: 90 TABLET | Refills: 0 | Status: SHIPPED | OUTPATIENT
Start: 2023-06-03 | End: 2023-07-03

## 2023-05-03 RX ADMIN — KETOROLAC TROMETHAMINE 60 MG: 30 INJECTION, SOLUTION INTRAMUSCULAR at 02:05

## 2023-05-03 NOTE — PROGRESS NOTES
Subjective:         Patient ID: Belem Swann is a 43 y.o. female.    Chief Complaint: Low-back Pain, Knee Pain (Bilateral knees), and Headache        Pain  This is a chronic problem. The current episode started more than 1 year ago. The problem occurs daily. The problem has been waxing and waning. Associated symptoms include arthralgias and neck pain. Pertinent negatives include no anorexia, chest pain, chills, coughing, diaphoresis, fever, sore throat, vertigo or vomiting.   Review of Systems   Constitutional:  Negative for activity change, appetite change, chills, diaphoresis, fever and unexpected weight change.   HENT:  Negative for drooling, ear discharge, ear pain, facial swelling, mouth sores, nosebleeds, sore throat, trouble swallowing, voice change and goiter.    Eyes:  Negative for photophobia, pain, discharge, redness and visual disturbance.   Respiratory:  Negative for apnea, cough, choking, chest tightness, shortness of breath, wheezing and stridor.    Cardiovascular:  Negative for chest pain, palpitations and leg swelling.   Gastrointestinal:  Negative for abdominal distention, anorexia, diarrhea, rectal pain, vomiting and fecal incontinence.   Endocrine: Negative for cold intolerance, heat intolerance, polydipsia, polyphagia and polyuria.   Genitourinary:  Negative for bladder incontinence, dysuria, flank pain, frequency and hot flashes.   Musculoskeletal:  Positive for arthralgias, back pain, leg pain, neck pain and neck stiffness.   Integumentary:  Negative for color change and pallor.   Allergic/Immunologic: Negative for immunocompromised state.   Neurological:  Negative for dizziness, vertigo, seizures, syncope, facial asymmetry, speech difficulty, light-headedness, coordination difficulties, memory loss and coordination difficulties.   Hematological:  Negative for adenopathy. Does not bruise/bleed easily.   Psychiatric/Behavioral:  Negative for agitation, behavioral problems, confusion,  decreased concentration, dysphoric mood, hallucinations, self-injury and suicidal ideas. The patient is not hyperactive.          Past Medical History:   Diagnosis Date    Anxiety     Asthma     Chronic pain     Cushing syndrome     Depression     Essential hypertension     Fibromyalgia     Herpes zoster     Hyperlipidemia     Leukocytosis 2022    Onychomycosis     MARY on CPAP     Rheumatoid arthritis     Type 2 diabetes mellitus 2020    Vitamin D deficiency 2018     Past Surgical History:   Procedure Laterality Date     SECTION      ELBOW SURGERY  1985    EPIDURAL STEROID INJECTION      L4-5 VERITO Dec 2020-[facility]    FEMUR SURGERY Right     due to osteomyelitis    HYSTERECTOMY      Abn Bleeding    INJECTION OF ANESTHETIC AGENT AROUND MEDIAL BRANCH NERVES INNERVATING LUMBAR FACET JOINT Bilateral 2022    Procedure: Bilateral L4-5,5-S1 MBB ( No steroids);  Surgeon: Carmel Torre MD;  Location: [facility];  Service: Pain Management;  Laterality: Bilateral;  PT AWARE TO BE TESTED ON OV    INJECTION OF ANESTHETIC AGENT AROUND MEDIAL BRANCH NERVES INNERVATING LUMBAR FACET JOINT Bilateral 2022    Procedure: Block-nerve-medial branch-lumbar, bilateral L4 through S1;  Surgeon: Carmel Torre MD;  Location: [facility];  Service: Pain Management;  Laterality: Bilateral;    INJECTION OF ANESTHETIC AGENT INTO SACROILIAC JOINT Bilateral 2022    Procedure: BLOCK, SACROILIAC JOINT;  Surgeon: Carmel Torre MD;  Location: [facility];  Service: Pain Management;  Laterality: Bilateral;  covid test put in    LIPOMA RESECTION  2019    RADIOFREQUENCY ABLATION OF LUMBAR MEDIAL BRANCH NERVE AT SINGLE LEVEL Right 2022    Procedure: Radiofrequency Ablation, Nerve, Spinal, Lumbar, Medial Branch, Level L4-S1, right side 1st, will have left-sided after completing;  Surgeon: Carmel Torre MD;  Location: [facility];  Service: Pain Management;  Laterality:  "Right;  pt aware at visit to be tested    RADIOFREQUENCY ABLATION OF LUMBAR MEDIAL BRANCH NERVE AT SINGLE LEVEL Left 05/05/2022    Procedure: LEFT  L4-S1 RFTC  (HAD RIGHT  ON 4-14);  Surgeon: Carmel Torre MD;  Location: [facility];  Service: Pain Management;  Laterality: Left;    SURGICAL REMOVAL OF PILONIDAL CYST      TRANSFORAMINAL EPIDURAL INJECTION OF STEROID      Bilateral L4-5 TFESI Nov 2020-[facility]    TRANSFORAMINAL EPIDURAL INJECTION OF STEROID      Right L4-5 TFESI Feb 2020-[facility]    VENTRAL HERNIA REPAIR  09/2020     Social History     Socioeconomic History    Marital status:    Tobacco Use    Smoking status: Every Day     Types: Cigarettes    Smokeless tobacco: Never   Substance and Sexual Activity    Alcohol use: Yes     Comment: ocassional    Drug use: Never    Sexual activity: Not Currently     Partners: Male     Birth control/protection: See Surgical Hx     Family History   Problem Relation Age of Onset    Thyroid cancer Maternal Grandmother     Cancer Mother     Thyroid cancer Mother     Melanoma Neg Hx     Colon cancer Neg Hx     Breast cancer Neg Hx     Ovarian cancer Neg Hx      Review of patient's allergies indicates:   Allergen Reactions    Doxycycline     Latex      Other reaction(s): Unknown        Objective:  Vitals:    05/03/23 1354   BP: (!) 157/91   Pulse: 97   Resp: 18   Weight: (!) 158.3 kg (349 lb)   Height: 5' 2" (1.575 m)   PainSc:   7           Physical Exam  Vitals and nursing note reviewed. Exam conducted with a chaperone present.   Constitutional:       General: She is awake. She is not in acute distress.     Appearance: Normal appearance. She is obese. She is not ill-appearing, toxic-appearing or diaphoretic.   HENT:      Head: Normocephalic and atraumatic.      Nose: Nose normal.      Mouth/Throat:      Mouth: Mucous membranes are moist.      Pharynx: Oropharynx is clear.   Eyes:      Conjunctiva/sclera: Conjunctivae normal.      Pupils: Pupils are equal, " round, and reactive to light.   Cardiovascular:      Rate and Rhythm: Normal rate.   Pulmonary:      Effort: Pulmonary effort is normal. No respiratory distress.   Abdominal:      Palpations: Abdomen is soft.      Tenderness: There is no guarding.   Musculoskeletal:         General: No swelling.      Cervical back: Normal range of motion and neck supple. Tenderness present. No rigidity.      Thoracic back: Tenderness present.      Lumbar back: Tenderness present. Decreased range of motion.   Skin:     General: Skin is warm and dry.      Coloration: Skin is not jaundiced or pale.   Neurological:      General: No focal deficit present.      Mental Status: She is alert and oriented to person, place, and time. Mental status is at baseline.      Cranial Nerves: No cranial nerve deficit (II-XII).   Psychiatric:         Mood and Affect: Mood normal.         Behavior: Behavior normal. Behavior is cooperative.         Thought Content: Thought content normal.         X-Ray Knee 3 View Bilateral  Narrative: EXAMINATION:  XR KNEE 3 VIEW BILATERAL    CLINICAL HISTORY:  Pain in right knee    COMPARISON:  20 September 2022 right knee    TECHNIQUE:  XR KNEE 3 VIEW BILATERAL    FINDINGS:  No evidence of fracture seen.  The alignment of the joints appears normal.  Mild bilateral tricompartmental degenerative change is present.  No soft tissue abnormality is seen.  Impression: Mild knee osteoarthrosis.    Electronically signed by: Baljinder Porter  Date:    04/06/2023  Time:    11:08         Office Visit on 03/23/2023   Component Date Value Ref Range Status    Case Report 03/23/2023    Final                    Value:Pap Cytology                                      Case: [id number]                                   Authorizing Provider:  Britney Garcia CNM         Collected:           03/23/2023 02:24 PM          Ordering Location:     [facility] Received:            03/24/2023 08:28 AM                                 -  Obstetrics And                                                                                    Gynecology                                                                   First Screen:          CONSUELO Nguyen(ASCP)                                                    Pathologist:           Louie Chopra III, MD                                                                           Specimen:    Liquid-Based Pap Test, Screening, Cervix                                                   Interpretation 03/23/2023 Atypical Squamous Cells of Undetermined Significance (A)              Final    General Categorization 03/23/2023 Epithelial Cell Abnormality   Final    Specimen Adequacy 03/23/2023 Satisfactory for evaluation   Final    Clinical Information 03/23/2023    Final                    Value:This result contains rich text formatting which cannot be displayed here.    Disclaimer 03/23/2023    Final                    Value:This result contains rich text formatting which cannot be displayed here.    HPV 16 03/23/2023 Negative  Negative, Invalid Final    HPV 18 03/23/2023 Negative  Negative, Invalid Final    HPV Other 03/23/2023 Negative  Negative, Invalid Final   Office Visit on 02/23/2023   Component Date Value Ref Range Status    POC Amphetamines 02/23/2023 Negative  Negative, Inconclusive Final    POC Barbiturates 02/23/2023 Negative  Negative, Inconclusive Final    POC Benzodiazepines 02/23/2023 Negative  Negative, Inconclusive Final    POC Cocaine 02/23/2023 Negative  Negative, Inconclusive Final    POC THC 02/23/2023 Negative  Negative, Inconclusive Final    POC Methadone 02/23/2023 Negative  Negative, Inconclusive Final    POC Methamphetamine 02/23/2023 Negative  Negative, Inconclusive Final    POC Opiates 02/23/2023 Presumptive Positive (A)  Negative, Inconclusive Final    POC Oxycodone 02/23/2023 Negative  Negative,  Inconclusive Final    POC Phencyclidine 02/23/2023 Negative  Negative, Inconclusive Final    POC Methylenedioxymethamphetamine * 02/23/2023 Negative  Negative, Inconclusive Final    POC Tricyclic Antidepressants 02/23/2023 Negative  Negative, Inconclusive Final    POC Buprenorphine 02/23/2023 Negative   Final     Acceptable 02/23/2023 Yes   Final    POC Temperature (Urine) 02/23/2023 90   Final   Office Visit on 01/12/2023   Component Date Value Ref Range Status    Sodium 01/12/2023 142  136 - 145 mmol/L Final    Potassium 01/12/2023 3.5  3.5 - 5.1 mmol/L Final    Chloride 01/12/2023 104  98 - 107 mmol/L Final    CO2 01/12/2023 34 (H)  21 - 32 mmol/L Final    Anion Gap 01/12/2023 8  7 - 16 mmol/L Final    Glucose 01/12/2023 99  74 - 106 mg/dL Final    BUN 01/12/2023 13  7 - 18 mg/dL Final    Creatinine 01/12/2023 0.64  0.55 - 1.02 mg/dL Final    BUN/Creatinine Ratio 01/12/2023 20  6 - 20 Final    Calcium 01/12/2023 8.8  8.5 - 10.1 mg/dL Final    Total Protein 01/12/2023 7.8  6.4 - 8.2 g/dL Final    Albumin 01/12/2023 3.2 (L)  3.5 - 5.0 g/dL Final    Globulin 01/12/2023 4.6 (H)  2.0 - 4.0 g/dL Final    A/G Ratio 01/12/2023 0.7   Final    Bilirubin, Total 01/12/2023 0.1  >0.0 - 1.2 mg/dL Final    Alk Phos 01/12/2023 125 (H)  37 - 98 U/L Final    ALT 01/12/2023 32  13 - 56 U/L Final    AST 01/12/2023 14 (L)  15 - 37 U/L Final    eGFR 01/12/2023 113  >=60 mL/min/1.73m² Final    TSH 01/12/2023 2.630  0.358 - 3.740 uIU/mL Final    Creatinine, Urine 01/12/2023 59  28 - 219 mg/dL Final    Microalbumin 01/12/2023 <0.5  0.0 - 2.8 mg/dL Final    Microalbumin/Creatinine Ratio 01/12/2023 <8.5  0.0 - 30.0 mg/g Final    Hemoglobin A1C 01/12/2023 6.1  4.5 - 6.6 % Final    Estimated Average Glucose 01/12/2023 117  mg/dL Final    Triglycerides 01/12/2023 153 (H)  35 - 150 mg/dL Final    Cholesterol 01/12/2023 174  0 - 200 mg/dL Final    HDL Cholesterol 01/12/2023 38 (L)  40 - 60 mg/dL Final    Cholesterol/HDL Ratio  (Risk Factor) 01/12/2023 4.6   Final    Non-HDL 01/12/2023 136  mg/dL Final    LDL Calculated 01/12/2023 105  mg/dL Final    LDL/HDL 01/12/2023 2.8   Final    VLDL 01/12/2023 31  mg/dL Final   Office Visit on 12/19/2022   Component Date Value Ref Range Status    POC Amphetamines 12/19/2022 Negative  Negative, Inconclusive Final    POC Barbiturates 12/19/2022 Negative  Negative, Inconclusive Final    POC Benzodiazepines 12/19/2022 Negative  Negative, Inconclusive Final    POC Cocaine 12/19/2022 Negative  Negative, Inconclusive Final    POC THC 12/19/2022 Negative  Negative, Inconclusive Final    POC Methadone 12/19/2022 Negative  Negative, Inconclusive Final    POC Methamphetamine 12/19/2022 Negative  Negative, Inconclusive Final    POC Opiates 12/19/2022 Presumptive Positive (A)  Negative, Inconclusive Final    POC Oxycodone 12/19/2022 Negative  Negative, Inconclusive Final    POC Phencyclidine 12/19/2022 Negative  Negative, Inconclusive Final    POC Methylenedioxymethamphetamine * 12/19/2022 Negative  Negative, Inconclusive Final    POC Tricyclic Antidepressants 12/19/2022 Negative  Negative, Inconclusive Final    POC Buprenorphine 12/19/2022 Negative   Final     Acceptable 12/19/2022 Yes   Final    POC Temperature (Urine) 12/19/2022 92   Final   Lab Visit on 11/18/2022   Component Date Value Ref Range Status    Sodium 11/18/2022 135 (L)  136 - 145 mmol/L Final    Potassium 11/18/2022 4.0  3.5 - 5.1 mmol/L Final    Chloride 11/18/2022 101  98 - 107 mmol/L Final    CO2 11/18/2022 28  21 - 32 mmol/L Final    Anion Gap 11/18/2022 10  7 - 16 mmol/L Final    Glucose 11/18/2022 188 (H)  74 - 106 mg/dL Final    BUN 11/18/2022 17  7 - 18 mg/dL Final    Creatinine 11/18/2022 0.66  0.55 - 1.02 mg/dL Final    BUN/Creatinine Ratio 11/18/2022 26 (H)  6 - 20 Final    Calcium 11/18/2022 9.0  8.5 - 10.1 mg/dL Final    Total Protein 11/18/2022 8.5 (H)  6.4 - 8.2 g/dL Final    Albumin 11/18/2022 3.4 (L)  3.5 - 5.0  g/dL Final    Globulin 11/18/2022 5.1 (H)  2.0 - 4.0 g/dL Final    A/G Ratio 11/18/2022 0.7   Final    Bilirubin, Total 11/18/2022 0.5  >0.0 - 1.2 mg/dL Final    Alk Phos 11/18/2022 103 (H)  37 - 98 U/L Final    ALT 11/18/2022 32  13 - 56 U/L Final    AST 11/18/2022 13 (L)  15 - 37 U/L Final    eGFR 11/18/2022 112  >=60 mL/min/1.73m² Final    WBC 11/18/2022 10.68  4.50 - 11.00 K/uL Final    RBC 11/18/2022 5.15  4.20 - 5.40 M/uL Final    Hemoglobin 11/18/2022 15.0  12.0 - 16.0 g/dL Final    Hematocrit 11/18/2022 46.6  38.0 - 47.0 % Final    MCV 11/18/2022 90.5  80.0 - 96.0 fL Final    MCH 11/18/2022 29.1  27.0 - 31.0 pg Final    MCHC 11/18/2022 32.2  32.0 - 36.0 g/dL Final    RDW 11/18/2022 12.7  11.5 - 14.5 % Final    Platelet Count 11/18/2022 158  150 - 400 K/uL Final    MPV 11/18/2022 14.1 (H)  9.4 - 12.4 fL Final    Neutrophils % 11/18/2022 68.9 (H)  53.0 - 65.0 % Final    Lymphocytes % 11/18/2022 21.3 (L)  27.0 - 41.0 % Final    Monocytes % 11/18/2022 5.4  2.0 - 6.0 % Final    Eosinophils % 11/18/2022 3.6  1.0 - 4.0 % Final    Basophils % 11/18/2022 0.4  0.0 - 1.0 % Final    Immature Granulocytes % 11/18/2022 0.4  0.0 - 0.4 % Final    nRBC, Auto 11/18/2022 0.0  <=0.0 % Final    Neutrophils, Abs 11/18/2022 7.36  1.80 - 7.70 K/uL Final    Lymphocytes, Absolute 11/18/2022 2.28  1.00 - 4.80 K/uL Final    Monocytes, Absolute 11/18/2022 0.58  0.00 - 0.80 K/uL Final    Eosinophils, Absolute 11/18/2022 0.38  0.00 - 0.50 K/uL Final    Basophils, Absolute 11/18/2022 0.04  0.00 - 0.20 K/uL Final    Immature Granulocytes, Absolute 11/18/2022 0.04  0.00 - 0.04 K/uL Final    nRBC, Absolute 11/18/2022 0.00  <=0.00 x10e3/uL Final    Diff Type 11/18/2022 Scan Smear   Final    Platelet Morphology 11/18/2022 Few Large Platelets (A)  Normal Final    RBC Morphology 11/18/2022 Normal   Final         Orders Placed This Encounter   Procedures    POCT Urine Drug Screen Presump     Interpretive Information:     Negative:  No drug  detected at the cut off level.   Positive:  This result represents presumptive positive for the   tested drug, other substances may yield a positive response other   than the analyte of interest. This result should be utilized for   diagnostic purpose only. Confirmation testing will be performed upon physician request only.              Requested Prescriptions     Signed Prescriptions Disp Refills    gabapentin (NEURONTIN) 300 MG capsule 90 capsule 1     Sig: Take 1 capsule (300 mg total) by mouth every 8 (eight) hours.    HYDROcodone-acetaminophen (NORCO)  mg per tablet 90 tablet 0     Sig: Take 1 tablet by mouth every 8 (eight) hours as needed for Pain.    HYDROcodone-acetaminophen (NORCO)  mg per tablet 90 tablet 0     Sig: Take 1 tablet by mouth every 8 (eight) hours as needed for Pain.       Assessment:     1. Rheumatoid arthritis involving multiple sites with positive rheumatoid factor    2. Spondylosis of lumbar region without myelopathy or radiculopathy    3. Sacroiliitis    4. Chronic pain of right knee    5. Encounter for long-term (current) use of other medications           A's of Opioid Risk Assessment  Activity:Patient can perform ADL.   Analgesia:Patients pain is partially controlled by current medication. Patient has tried OTC medications such as Tylenol and Ibuprofen with out relief.   Adverse Effects: Patient denies constipation or sedation.  Aberrant Behavior:  reviewed with no aberrant drug seeking/taking behavior.  Overdose reversal drug naloxone discussed    Drug screen reviewed    X-ray right knee [facility] September 14, 2022 degenerative changes no fracture noted      Plan:    Narcan December 2022    Follows rheumatology [facility] rheumatoid arthritis    Right knee pain chronic follows orthopedics [facility]     She is requesting a Toradol injection for joint pain back pain    Toradol 60 milligrams IM, tolerated well    Otherwise she states current medications helping control her  discomfort she would like to continue with conservative management     Continue home exercise program as directed    Continue current medication    Follow-up 2 months    Dr. Torre, August 2023    Bring original prescription medication bottles/container/box with labels to each visit

## 2023-05-16 PROBLEM — E24.0: Chronic | Status: ACTIVE | Noted: 2022-09-09

## 2023-05-30 ENCOUNTER — HOSPITAL ENCOUNTER (OUTPATIENT)
Dept: RADIOLOGY | Facility: HOSPITAL | Age: 43
Discharge: HOME | End: 2023-05-30
Payer: MEDICAID

## 2023-05-30 VITALS — HEIGHT: 62 IN | WEIGHT: 293 LBS | BODY MASS INDEX: 53.92 KG/M2

## 2023-05-30 DIAGNOSIS — Z12.31 BREAST CANCER SCREENING BY MAMMOGRAM: ICD-10-CM

## 2023-05-30 PROCEDURE — 77067 SCR MAMMO BI INCL CAD: CPT | Mod: TC

## 2023-06-27 ENCOUNTER — OFFICE VISIT (OUTPATIENT)
Dept: FAMILY MEDICINE | Facility: CLINIC | Age: 43
End: 2023-06-27
Payer: MEDICAID

## 2023-06-27 VITALS
HEART RATE: 80 BPM | WEIGHT: 293 LBS | SYSTOLIC BLOOD PRESSURE: 127 MMHG | OXYGEN SATURATION: 97 % | DIASTOLIC BLOOD PRESSURE: 81 MMHG | HEIGHT: 62 IN | TEMPERATURE: 98 F | BODY MASS INDEX: 53.92 KG/M2 | RESPIRATION RATE: 20 BRPM

## 2023-06-27 DIAGNOSIS — M62.838 MUSCLE SPASM: Chronic | ICD-10-CM

## 2023-06-27 DIAGNOSIS — Z13.31 POSITIVE DEPRESSION SCREENING: ICD-10-CM

## 2023-06-27 DIAGNOSIS — J45.40 MODERATE PERSISTENT ASTHMA WITHOUT COMPLICATION: Chronic | ICD-10-CM

## 2023-06-27 DIAGNOSIS — G47.9 DIFFICULTY SLEEPING: Chronic | ICD-10-CM

## 2023-06-27 DIAGNOSIS — E78.00 HYPERCHOLESTEROLEMIA: Chronic | ICD-10-CM

## 2023-06-27 DIAGNOSIS — I10 ESSENTIAL HYPERTENSION: Chronic | ICD-10-CM

## 2023-06-27 DIAGNOSIS — F41.9 ANXIETY: Chronic | ICD-10-CM

## 2023-06-27 DIAGNOSIS — E11.9 TYPE 2 DIABETES MELLITUS WITHOUT COMPLICATION, WITHOUT LONG-TERM CURRENT USE OF INSULIN: Primary | Chronic | ICD-10-CM

## 2023-06-27 LAB
ANION GAP SERPL CALCULATED.3IONS-SCNC: 10 MMOL/L (ref 7–16)
BUN SERPL-MCNC: 16 MG/DL (ref 7–18)
BUN/CREAT SERPL: 30 (ref 6–20)
CALCIUM SERPL-MCNC: 9.2 MG/DL (ref 8.5–10.1)
CHLORIDE SERPL-SCNC: 107 MMOL/L (ref 98–107)
CO2 SERPL-SCNC: 27 MMOL/L (ref 21–32)
CREAT SERPL-MCNC: 0.54 MG/DL (ref 0.55–1.02)
EGFR (NO RACE VARIABLE) (RUSH/TITUS): 117 ML/MIN/1.73M2
EST. AVERAGE GLUCOSE BLD GHB EST-MCNC: 114 MG/DL
GLUCOSE SERPL-MCNC: 134 MG/DL (ref 74–106)
HBA1C MFR BLD HPLC: 6 % (ref 4.5–6.6)
POTASSIUM SERPL-SCNC: 4.1 MMOL/L (ref 3.5–5.1)
SODIUM SERPL-SCNC: 140 MMOL/L (ref 136–145)

## 2023-06-27 PROCEDURE — 3066F NEPHROPATHY DOC TX: CPT | Mod: CPTII,,, | Performed by: NURSE PRACTITIONER

## 2023-06-27 PROCEDURE — 99214 OFFICE O/P EST MOD 30 MIN: CPT | Mod: ,,, | Performed by: NURSE PRACTITIONER

## 2023-06-27 PROCEDURE — 3074F PR MOST RECENT SYSTOLIC BLOOD PRESSURE < 130 MM HG: ICD-10-PCS | Mod: CPTII,,, | Performed by: NURSE PRACTITIONER

## 2023-06-27 PROCEDURE — 1160F RVW MEDS BY RX/DR IN RCRD: CPT | Mod: CPTII,,, | Performed by: NURSE PRACTITIONER

## 2023-06-27 PROCEDURE — 1159F PR MEDICATION LIST DOCUMENTED IN MEDICAL RECORD: ICD-10-PCS | Mod: CPTII,,, | Performed by: NURSE PRACTITIONER

## 2023-06-27 PROCEDURE — 4010F PR ACE/ARB THEARPY RXD/TAKEN: ICD-10-PCS | Mod: CPTII,,, | Performed by: NURSE PRACTITIONER

## 2023-06-27 PROCEDURE — 3044F HG A1C LEVEL LT 7.0%: CPT | Mod: CPTII,,, | Performed by: NURSE PRACTITIONER

## 2023-06-27 PROCEDURE — 83036 HEMOGLOBIN GLYCOSYLATED A1C: CPT | Mod: ,,, | Performed by: CLINICAL MEDICAL LABORATORY

## 2023-06-27 PROCEDURE — 4010F ACE/ARB THERAPY RXD/TAKEN: CPT | Mod: CPTII,,, | Performed by: NURSE PRACTITIONER

## 2023-06-27 PROCEDURE — 80048 BASIC METABOLIC PANEL: ICD-10-PCS | Mod: ,,, | Performed by: CLINICAL MEDICAL LABORATORY

## 2023-06-27 PROCEDURE — 3079F DIAST BP 80-89 MM HG: CPT | Mod: CPTII,,, | Performed by: NURSE PRACTITIONER

## 2023-06-27 PROCEDURE — 83036 HEMOGLOBIN A1C: ICD-10-PCS | Mod: ,,, | Performed by: CLINICAL MEDICAL LABORATORY

## 2023-06-27 PROCEDURE — 99214 PR OFFICE/OUTPT VISIT, EST, LEVL IV, 30-39 MIN: ICD-10-PCS | Mod: ,,, | Performed by: NURSE PRACTITIONER

## 2023-06-27 PROCEDURE — 3061F NEG MICROALBUMINURIA REV: CPT | Mod: CPTII,,, | Performed by: NURSE PRACTITIONER

## 2023-06-27 PROCEDURE — 1159F MED LIST DOCD IN RCRD: CPT | Mod: CPTII,,, | Performed by: NURSE PRACTITIONER

## 2023-06-27 PROCEDURE — 3008F PR BODY MASS INDEX (BMI) DOCUMENTED: ICD-10-PCS | Mod: CPTII,,, | Performed by: NURSE PRACTITIONER

## 2023-06-27 PROCEDURE — 3066F PR DOCUMENTATION OF TREATMENT FOR NEPHROPATHY: ICD-10-PCS | Mod: CPTII,,, | Performed by: NURSE PRACTITIONER

## 2023-06-27 PROCEDURE — 3079F PR MOST RECENT DIASTOLIC BLOOD PRESSURE 80-89 MM HG: ICD-10-PCS | Mod: CPTII,,, | Performed by: NURSE PRACTITIONER

## 2023-06-27 PROCEDURE — 3008F BODY MASS INDEX DOCD: CPT | Mod: CPTII,,, | Performed by: NURSE PRACTITIONER

## 2023-06-27 PROCEDURE — 1160F PR REVIEW ALL MEDS BY PRESCRIBER/CLIN PHARMACIST DOCUMENTED: ICD-10-PCS | Mod: CPTII,,, | Performed by: NURSE PRACTITIONER

## 2023-06-27 PROCEDURE — 3061F PR NEG MICROALBUMINURIA RESULT DOCUMENTED/REVIEW: ICD-10-PCS | Mod: CPTII,,, | Performed by: NURSE PRACTITIONER

## 2023-06-27 PROCEDURE — 80048 BASIC METABOLIC PNL TOTAL CA: CPT | Mod: ,,, | Performed by: CLINICAL MEDICAL LABORATORY

## 2023-06-27 PROCEDURE — 3044F PR MOST RECENT HEMOGLOBIN A1C LEVEL <7.0%: ICD-10-PCS | Mod: CPTII,,, | Performed by: NURSE PRACTITIONER

## 2023-06-27 PROCEDURE — 3074F SYST BP LT 130 MM HG: CPT | Mod: CPTII,,, | Performed by: NURSE PRACTITIONER

## 2023-06-27 RX ORDER — HYDROXYZINE PAMOATE 50 MG/1
100 CAPSULE ORAL NIGHTLY PRN
Qty: 180 CAPSULE | Refills: 1 | Status: SHIPPED | OUTPATIENT
Start: 2023-06-27

## 2023-06-27 RX ORDER — OLMESARTAN MEDOXOMIL 40 MG/1
40 TABLET ORAL DAILY
COMMUNITY
Start: 2023-06-14

## 2023-06-27 RX ORDER — NIFEDIPINE 30 MG/1
30 TABLET, EXTENDED RELEASE ORAL NIGHTLY
COMMUNITY
Start: 2023-06-14

## 2023-06-27 RX ORDER — CYCLOBENZAPRINE HCL 10 MG
10 TABLET ORAL 3 TIMES DAILY PRN
Qty: 90 TABLET | Refills: 2 | Status: SHIPPED | OUTPATIENT
Start: 2023-06-27 | End: 2023-11-02 | Stop reason: SDUPTHER

## 2023-06-27 RX ORDER — ROSUVASTATIN CALCIUM 40 MG/1
40 TABLET, COATED ORAL NIGHTLY
Qty: 90 TABLET | Refills: 3 | Status: SHIPPED | OUTPATIENT
Start: 2023-06-27 | End: 2024-06-26

## 2023-06-27 RX ORDER — CARVEDILOL 25 MG/1
25 TABLET ORAL 2 TIMES DAILY WITH MEALS
COMMUNITY

## 2023-06-27 NOTE — PROGRESS NOTES
"   [facility] - FAMILY MEDICINE       PATIENT NAME: Belem Swann   : 1980    AGE: 43 y.o. DATE OF ENCOUNTER: 23    MRN: [id number]      PCP: DANE Saenz    Reason for Visit / Chief Complaint:  Follow-up (Patient presents to the clinic 5m f/u levels)         274}    Subjective:     HPI:    Presents for f/u T2DM, HTN, HLD.    Is seeing Cardiologist: Dr. Hawthorne since Dr. Tomlin left.  Reports they have said her cholesterol is fine so she quit taking rosuvastatin, but explained due to having T2DM & low HDL needs statin to prevent CVA or MI.  Saw Dr. Hawthorne  & BP was 178/124 so he changed meds and said to start low carb diet.    Reports needs flexeril refilled for muscle spasms.    Reports glucose has been good, aroun ; trying to eat better; "insurance wouldn't cover increasing byetta to 10 mcg".    Has been on anxiety/insomnia med in past but hasn't been to [facility] in forever.    I have reviewed the patient's positive depression score. Recommendation based on score is no treatment needed at this time.  She denies need for depression med but wishes to resume hydroxyzine at bedtime.    Review of Systems:   Review of Systems   Constitutional:  Negative for chills and fever.   HENT:  Positive for congestion (chronic rhinitis). Negative for ear pain, sinus pain and sore throat.    Eyes: Negative.    Respiratory:  Positive for shortness of breath (chronic BALDERAS). Negative for cough and wheezing.    Cardiovascular:  Positive for leg swelling (chronic). Negative for chest pain and palpitations.   Genitourinary: Negative.    Musculoskeletal:  Positive for arthralgias, back pain, gait problem and myalgias.        Chronic, followed by pain management at [facility].   Skin:  Positive for rash (chronic).   Neurological:  Positive for headaches.   Psychiatric/Behavioral:  Positive for sleep disturbance. Negative for dysphoric mood, self-injury and suicidal ideas. The patient is " nervous/anxious.      Allergies and Meds: 274}     Review of patient's allergies indicates:   Allergen Reactions    Doxycycline     Latex      Other reaction(s): Unknown        Current Outpatient Medications:     albuterol sulfate 90 mcg/actuation aebs, Inhale 180 mcg into the lungs every 4 (four) hours., Disp: , Rfl:     albuterol-ipratropium (DUO-NEB) 2.5 mg-0.5 mg/3 mL nebulizer solution, Take 3 mLs by nebulization every 6 (six) hours as needed. Rescue, Disp: , Rfl:     budesonide-formoterol 160-4.5 mcg (SYMBICORT) 160-4.5 mcg/actuation HFAA, Inhale 2 puffs into the lungs every 12 (twelve) hours. Controller, Disp: 10.2 g, Rfl: 11    carvediloL (COREG) 25 MG tablet, Take 25 mg by mouth 2 (two) times daily with meals., Disp: , Rfl:     cloNIDine (CATAPRES) 0.1 MG tablet, Take 0.1 mg by mouth daily as needed., Disp: , Rfl:     cyclobenzaprine (FLEXERIL) 10 MG tablet, Take 1 tablet (10 mg total) by mouth 3 (three) times daily as needed for Muscle spasms., Disp: 90 tablet, Rfl: 2    exenatide (BYETTA) 5 mcg/dose (250 mcg/mL) 1.2 mL injection, Inject 0.02 mLs (5 mcg total) into the skin 2 (two) times daily with meals., Disp: 3.6 mL, Rfl: 3    fluocinolone (SYNALAR) 0.01 % external solution, Apply topically 2 (two) times daily., Disp: 90 mL, Rfl: 5    furosemide (LASIX) 40 MG tablet, Take 40 mg by mouth once daily., Disp: , Rfl:     gabapentin (NEURONTIN) 300 MG capsule, Take 1 capsule (300 mg total) by mouth every 8 (eight) hours., Disp: 90 capsule, Rfl: 1    HUMIRA,CF, PEN 40 mg/0.4 mL PnKt, SMARTSI Milligram(s) SUB-Q Once a Week, Disp: , Rfl:     HYDROcodone-acetaminophen (NORCO)  mg per tablet, Take 1 tablet by mouth every 8 (eight) hours as needed for Pain., Disp: 90 tablet, Rfl: 0    hydrocortisone 2.5 % ointment, Apply topically 2 (two) times daily., Disp: 454 g, Rfl: 5    ketoconazole (NIZORAL) 2 % cream, Apply topically once daily., Disp: 60 g, Rfl: 2    lancets (ONETOUCH DELICA LANCETS) 33 gauge  "Misc, 1 lancet by Misc.(Non-Drug; Combo Route) route once daily., Disp: 100 each, Rfl: 3    metFORMIN (GLUCOPHAGE-XR) 500 MG ER 24hr tablet, Take 1 tablet (500 mg total) by mouth once daily., Disp: 90 tablet, Rfl: 3    NIFEdipine (PROCARDIA-XL) 30 MG (OSM) 24 hr tablet, Take 30 mg by mouth every evening., Disp: , Rfl:     nystatin (MYCOSTATIN) powder, Apply topically 2 (two) times daily., Disp: 60 g, Rfl: 5    olmesartan (BENICAR) 40 MG tablet, Take 40 mg by mouth once daily., Disp: , Rfl:     omeprazole (PRILOSEC) 40 MG capsule, Take 1 capsule (40 mg total) by mouth 2 (two) times daily before meals., Disp: 180 capsule, Rfl: 1    pen needle, diabetic 31 gauge x 1/4" Ndle, , Disp: , Rfl:     promethazine (PHENERGAN) 25 MG tablet, Take 1 tablet (25 mg total) by mouth every 6 (six) hours as needed for Nausea., Disp: 30 tablet, Rfl: 1    triamcinolone acetonide 0.1% (KENALOG) 0.1 % ointment, Apply topically 2 (two) times daily., Disp: 454 g, Rfl: 0    TRUE METRIX GLUCOSE TEST STRIP Strp, 1 strip by Misc.(Non-Drug; Combo Route) route Daily. For T2DM., Disp: 100 strip, Rfl: 3    rosuvastatin (CRESTOR) 40 MG Tab, Take 1 tablet (40 mg total) by mouth every evening., Disp: 90 tablet, Rfl: 3    Labs:274}    I have reviewed old labs below:  Lab Results   Component Value Date    WBC 10.68 11/18/2022    RBC 5.15 11/18/2022    HGB 15.0 11/18/2022    HCT 46.6 11/18/2022     11/18/2022     01/12/2023    K 3.5 01/12/2023     01/12/2023    CALCIUM 8.8 01/12/2023    GLU 99 01/12/2023    BUN 13 01/12/2023    CREATININE 0.64 01/12/2023    ESTGFRAFRICA 103 10/07/2020    EGFRNONAA 95 05/11/2022    ALT 32 01/12/2023    AST 14 (L) 01/12/2023    INR 1.10 10/07/2020    CHOL 174 01/12/2023    TRIG 153 (H) 01/12/2023    HDL 38 (L) 01/12/2023    LDLCALC 105 01/12/2023    TSH 2.630 01/12/2023    HGBA1C 6.1 01/12/2023    MICROALBUR <0.5 01/12/2023       Medical History: 274}     Past Medical History:   Diagnosis Date    Anxiety  " "   Asthma     Chronic pain     Cushing syndrome     Depression     Essential hypertension     Fibromyalgia     Herpes zoster     Hyperlipidemia     Leukocytosis 1/18/2022    Onychomycosis     MARY on CPAP     Rheumatoid arthritis     Type 2 diabetes mellitus 11/2020    Vitamin D deficiency 05/03/2018      Social History     Tobacco Use   Smoking Status Every Day    Types: Cigarettes   Smokeless Tobacco Never      Health Maintenance: 274}     Health Maintenance         Date Due Completion Date    Sign Pain Contract Never done ---    HIV Screening 05/06/2027 (Originally 4/4/1994) ---    Hemoglobin A1c 07/12/2023 1/12/2023    Diabetes Urine Screening 01/12/2024 1/12/2023    Lipid Panel 01/12/2024 1/12/2023    Eye Exam 01/19/2024 1/19/2023 (Done)    Override on 1/19/2023: Done ([facility] Resendiz  [facility])    Override on 12/15/2021: Done ([facility])    Mammogram 05/30/2024 5/30/2023    Foot Exam 06/27/2024 6/27/2023    Override on 5/6/2021: Done    Pap Smear 03/23/2026 3/23/2023    Override on 5/19/2020: Done (negative; Dr. Solomon)    TETANUS VACCINE 05/06/2031 5/6/2021    Pneumococcal Vaccines (Age 0-64) (3 - PPSV23 if available, else PCV20) 04/04/2044 1/4/2021            Objective:  274}   /81 (BP Location: Right arm, Patient Position: Sitting, BP Method: X-Large (Automatic))   Pulse 80   Temp 98.4 °F (36.9 °C) (Oral)   Resp 20   Ht 5' 2" (1.575 m)   Wt (!) 158.3 kg (349 lb)   SpO2 97%   BMI 63.83 kg/m²     Wt Readings from Last 3 Encounters:   06/27/23 (!) 158.3 kg (349 lb)   05/30/23 (!) 158.8 kg (350 lb)   05/03/23 (!) 158.3 kg (349 lb)     BP Readings from Last 3 Encounters:   06/27/23 127/81   05/03/23 (!) 157/91   03/23/23 128/80     Body mass index is 63.83 kg/m².     Physical Exam  Vitals and nursing note reviewed.   Constitutional:       General: She is not in acute distress.     Appearance: Normal appearance. She is morbidly obese.   HENT:      Head: Normocephalic.    "   Right Ear: Hearing, tympanic membrane, ear canal and external ear normal.      Left Ear: Hearing, tympanic membrane, ear canal and external ear normal.      Nose: Congestion present. No rhinorrhea.      Right Turbinates: Swollen.      Left Turbinates: Swollen.      Right Sinus: No maxillary sinus tenderness or frontal sinus tenderness.      Left Sinus: No maxillary sinus tenderness or frontal sinus tenderness.      Mouth/Throat:      Lips: Pink.      Mouth: Mucous membranes are moist.      Tongue: No lesions.      Pharynx: Uvula midline. No pharyngeal swelling, oropharyngeal exudate, posterior oropharyngeal erythema (injected, PND) or uvula swelling.   Eyes:      Conjunctiva/sclera: Conjunctivae normal.      Pupils: Pupils are equal, round, and reactive to light.   Neck:      Thyroid: No thyromegaly.      Vascular: No carotid bruit.      Trachea: Trachea normal.   Cardiovascular:      Rate and Rhythm: Normal rate and regular rhythm.      Pulses:           Dorsalis pedis pulses are 3+ on the right side and 3+ on the left side.        Posterior tibial pulses are 3+ on the right side and 3+ on the left side.      Heart sounds: Normal heart sounds.   Pulmonary:      Effort: Pulmonary effort is normal. No respiratory distress.      Breath sounds: Normal breath sounds.   Musculoskeletal:      Cervical back: Neck supple. No rigidity.      Right lower leg: No edema.      Left lower leg: No edema.      Right foot: Normal range of motion. No deformity or bunion.      Left foot: Normal range of motion. No deformity or bunion.   Feet:      Right foot:      Protective Sensation: 10 sites tested.  10 sites sensed.      Skin integrity: Dry skin present. No ulcer, blister, skin breakdown, erythema, warmth, callus or fissure.      Toenail Condition: Right toenails are normal.      Left foot:      Protective Sensation: 10 sites tested.  10 sites sensed.      Skin integrity: Dry skin present. No ulcer, blister, skin breakdown,  erythema, warmth, callus or fissure.      Toenail Condition: Left toenails are normal.   Lymphadenopathy:      Cervical: No cervical adenopathy.      Upper Body:      Right upper body: No supraclavicular adenopathy.      Left upper body: No supraclavicular adenopathy.   Skin:     General: Skin is warm and dry.      Findings: No rash.   Neurological:      General: No focal deficit present.      Mental Status: She is alert and oriented to person, place, and time.   Psychiatric:         Mood and Affect: Mood normal.         Behavior: Behavior normal.        Assessment and Plan: 274}     1. Type 2 diabetes mellitus without complication, without long-term current use of insulin  Comments:  Fair control  Continue metformin and Byetta.  Would like to increase Byetta but not covered by insurance.  Orders:  -     Basic Metabolic Panel; Future; Expected date: 06/27/2023  -     Hemoglobin A1C; Future; Expected date: 06/27/2023    2. Positive depression screening  Comments:  I have reviewed the positive depression score which warrants active treatment with psychotherapy and/or medications.  Denies need for depression med.    3. Essential hypertension  Comments:  Controlled  Continue current meds and treatment.    4. Hypercholesterolemia  Comments:  Resume rosuvastatin as stress daily med adherence.  Orders:  -     rosuvastatin (CRESTOR) 40 MG Tab; Take 1 tablet (40 mg total) by mouth every evening.  Dispense: 90 tablet; Refill: 3    5. Moderate persistent asthma without complication  Comments:  Controlled  Continue Symbicort as prescribed and albuterol as needed.    6. Muscle spasm  Comments:  Request refills of cyclobenzaprine which help control muscle spasms.  Orders:  -     cyclobenzaprine (FLEXERIL) 10 MG tablet; Take 1 tablet (10 mg total) by mouth 3 (three) times daily as needed for Muscle spasms.  Dispense: 90 tablet; Refill: 2    7. Anxiety  Comments:  Resume hydroxyzine at bedtime which she reports helps her anxiety  tremendously.  Orders:  -     hydrOXYzine pamoate (VISTARIL) 50 MG Cap; Take 2 capsules (100 mg total) by mouth nightly as needed (anxiety & difficulty sleeping).  Dispense: 180 capsule; Refill: 1    8. Difficulty sleeping  Comments:  Resume hydroxyzine 100 mg at bedtime for anxiety and difficulty sleeping.  Orders:  -     hydrOXYzine pamoate (VISTARIL) 50 MG Cap; Take 2 capsules (100 mg total) by mouth nightly as needed (anxiety & difficulty sleeping).  Dispense: 180 capsule; Refill: 1       Did not and will not reconsider taking covid vaccine.    Return to clinic in September for wellness visit as scheduled with fasting labs; and sooner as needed.    Future Appointments   Date Time Provider Department Center   6/28/2023  1:15 PM GUCCI Rubio [facility]   9/13/2023  9:00 AM DANE Saenz [facility]   6/4/2024  1:30 PM [facility] [place] [facility] [place]        Signature:  DANE Saenz

## 2023-06-28 ENCOUNTER — OFFICE VISIT (OUTPATIENT)
Dept: PAIN MEDICINE | Facility: CLINIC | Age: 43
End: 2023-06-28
Payer: MEDICAID

## 2023-06-28 VITALS
BODY MASS INDEX: 53.92 KG/M2 | HEIGHT: 62 IN | DIASTOLIC BLOOD PRESSURE: 68 MMHG | HEART RATE: 71 BPM | RESPIRATION RATE: 19 BRPM | SYSTOLIC BLOOD PRESSURE: 114 MMHG | WEIGHT: 293 LBS

## 2023-06-28 DIAGNOSIS — M47.816 SPONDYLOSIS OF LUMBAR REGION WITHOUT MYELOPATHY OR RADICULOPATHY: Primary | Chronic | ICD-10-CM

## 2023-06-28 DIAGNOSIS — G89.29 CHRONIC PAIN OF RIGHT KNEE: Chronic | ICD-10-CM

## 2023-06-28 DIAGNOSIS — M25.561 CHRONIC PAIN OF RIGHT KNEE: Chronic | ICD-10-CM

## 2023-06-28 DIAGNOSIS — M05.79 RHEUMATOID ARTHRITIS INVOLVING MULTIPLE SITES WITH POSITIVE RHEUMATOID FACTOR: Chronic | ICD-10-CM

## 2023-06-28 DIAGNOSIS — M46.1 SACROILIITIS: ICD-10-CM

## 2023-06-28 PROCEDURE — 99214 PR OFFICE/OUTPT VISIT, EST, LEVL IV, 30-39 MIN: ICD-10-PCS | Mod: S$PBB,25,, | Performed by: PHYSICIAN ASSISTANT

## 2023-06-28 PROCEDURE — 3044F HG A1C LEVEL LT 7.0%: CPT | Mod: CPTII,,, | Performed by: PHYSICIAN ASSISTANT

## 2023-06-28 PROCEDURE — 96372 THER/PROPH/DIAG INJ SC/IM: CPT | Mod: PBBFAC | Performed by: PHYSICIAN ASSISTANT

## 2023-06-28 PROCEDURE — 3061F NEG MICROALBUMINURIA REV: CPT | Mod: CPTII,,, | Performed by: PHYSICIAN ASSISTANT

## 2023-06-28 PROCEDURE — 4010F ACE/ARB THERAPY RXD/TAKEN: CPT | Mod: CPTII,,, | Performed by: PHYSICIAN ASSISTANT

## 2023-06-28 PROCEDURE — 3008F PR BODY MASS INDEX (BMI) DOCUMENTED: ICD-10-PCS | Mod: CPTII,,, | Performed by: PHYSICIAN ASSISTANT

## 2023-06-28 PROCEDURE — 3078F PR MOST RECENT DIASTOLIC BLOOD PRESSURE < 80 MM HG: ICD-10-PCS | Mod: CPTII,,, | Performed by: PHYSICIAN ASSISTANT

## 2023-06-28 PROCEDURE — 3066F PR DOCUMENTATION OF TREATMENT FOR NEPHROPATHY: ICD-10-PCS | Mod: CPTII,,, | Performed by: PHYSICIAN ASSISTANT

## 2023-06-28 PROCEDURE — 3074F SYST BP LT 130 MM HG: CPT | Mod: CPTII,,, | Performed by: PHYSICIAN ASSISTANT

## 2023-06-28 PROCEDURE — 3074F PR MOST RECENT SYSTOLIC BLOOD PRESSURE < 130 MM HG: ICD-10-PCS | Mod: CPTII,,, | Performed by: PHYSICIAN ASSISTANT

## 2023-06-28 PROCEDURE — 3008F BODY MASS INDEX DOCD: CPT | Mod: CPTII,,, | Performed by: PHYSICIAN ASSISTANT

## 2023-06-28 PROCEDURE — 99215 OFFICE O/P EST HI 40 MIN: CPT | Mod: PBBFAC,25 | Performed by: PHYSICIAN ASSISTANT

## 2023-06-28 PROCEDURE — 4010F PR ACE/ARB THEARPY RXD/TAKEN: ICD-10-PCS | Mod: CPTII,,, | Performed by: PHYSICIAN ASSISTANT

## 2023-06-28 PROCEDURE — 1159F PR MEDICATION LIST DOCUMENTED IN MEDICAL RECORD: ICD-10-PCS | Mod: CPTII,,, | Performed by: PHYSICIAN ASSISTANT

## 2023-06-28 PROCEDURE — 3078F DIAST BP <80 MM HG: CPT | Mod: CPTII,,, | Performed by: PHYSICIAN ASSISTANT

## 2023-06-28 PROCEDURE — 3044F PR MOST RECENT HEMOGLOBIN A1C LEVEL <7.0%: ICD-10-PCS | Mod: CPTII,,, | Performed by: PHYSICIAN ASSISTANT

## 2023-06-28 PROCEDURE — 1159F MED LIST DOCD IN RCRD: CPT | Mod: CPTII,,, | Performed by: PHYSICIAN ASSISTANT

## 2023-06-28 PROCEDURE — 3066F NEPHROPATHY DOC TX: CPT | Mod: CPTII,,, | Performed by: PHYSICIAN ASSISTANT

## 2023-06-28 PROCEDURE — 99214 OFFICE O/P EST MOD 30 MIN: CPT | Mod: S$PBB,25,, | Performed by: PHYSICIAN ASSISTANT

## 2023-06-28 PROCEDURE — 3061F PR NEG MICROALBUMINURIA RESULT DOCUMENTED/REVIEW: ICD-10-PCS | Mod: CPTII,,, | Performed by: PHYSICIAN ASSISTANT

## 2023-06-28 RX ORDER — HYDROCODONE BITARTRATE AND ACETAMINOPHEN 10; 325 MG/1; MG/1
1 TABLET ORAL EVERY 8 HOURS PRN
Qty: 90 TABLET | Refills: 0 | Status: CANCELLED | OUTPATIENT
Start: 2023-06-28 | End: 2023-07-28

## 2023-06-28 RX ORDER — HYDROCODONE BITARTRATE AND ACETAMINOPHEN 10; 325 MG/1; MG/1
1 TABLET ORAL EVERY 8 HOURS PRN
Qty: 90 TABLET | Refills: 0 | Status: SHIPPED | OUTPATIENT
Start: 2023-07-03 | End: 2023-06-29 | Stop reason: SDUPTHER

## 2023-06-28 RX ORDER — KETOROLAC TROMETHAMINE 30 MG/ML
60 INJECTION, SOLUTION INTRAMUSCULAR; INTRAVENOUS
Status: COMPLETED | OUTPATIENT
Start: 2023-06-28 | End: 2023-06-28

## 2023-06-28 RX ORDER — GABAPENTIN 300 MG/1
300 CAPSULE ORAL EVERY 8 HOURS
Qty: 90 CAPSULE | Refills: 1 | Status: SHIPPED | OUTPATIENT
Start: 2023-06-28 | End: 2023-06-29 | Stop reason: SDUPTHER

## 2023-06-28 RX ADMIN — KETOROLAC TROMETHAMINE 60 MG: 60 INJECTION, SOLUTION INTRAMUSCULAR at 02:06

## 2023-06-28 NOTE — PROGRESS NOTES
Subjective:         Patient ID: Belem Swann is a 43 y.o. female.    Chief Complaint: Low-back Pain and Knee Pain (Right knee)        Pain  This is a chronic problem. The current episode started more than 1 year ago. The problem occurs daily. The problem has been unchanged. Associated symptoms include arthralgias and neck pain. Pertinent negatives include no anorexia, chest pain, chills, coughing, diaphoresis, fever, sore throat, vertigo or vomiting.   Review of Systems   Constitutional:  Negative for activity change, appetite change, chills, diaphoresis, fever and unexpected weight change.   HENT:  Negative for drooling, ear discharge, ear pain, facial swelling, mouth sores, nosebleeds, sore throat, trouble swallowing, voice change and goiter.    Eyes:  Negative for photophobia, pain, discharge, redness and visual disturbance.   Respiratory:  Negative for apnea, cough, choking, chest tightness, shortness of breath, wheezing and stridor.    Cardiovascular:  Negative for chest pain, palpitations and leg swelling.   Gastrointestinal:  Negative for abdominal distention, anorexia, diarrhea, rectal pain, vomiting and fecal incontinence.   Endocrine: Negative for cold intolerance, heat intolerance, polydipsia, polyphagia and polyuria.   Genitourinary:  Negative for bladder incontinence, dysuria, flank pain, frequency and hot flashes.   Musculoskeletal:  Positive for arthralgias, back pain, leg pain, neck pain and neck stiffness.   Integumentary:  Negative for color change and pallor.   Allergic/Immunologic: Negative for immunocompromised state.   Neurological:  Negative for dizziness, vertigo, seizures, syncope, facial asymmetry, speech difficulty, light-headedness, coordination difficulties, memory loss and coordination difficulties.   Hematological:  Negative for adenopathy. Does not bruise/bleed easily.   Psychiatric/Behavioral:  Negative for agitation, behavioral problems, confusion, decreased concentration,  dysphoric mood, hallucinations, self-injury and suicidal ideas. The patient is not hyperactive.          Past Medical History:   Diagnosis Date    Anxiety     Asthma     Chronic pain     Cushing syndrome     Depression     Essential hypertension     Fibromyalgia     Herpes zoster     Hyperlipidemia     Leukocytosis 2022    Onychomycosis     MARY on CPAP     Rheumatoid arthritis     Type 2 diabetes mellitus 2020    Vitamin D deficiency 2018     Past Surgical History:   Procedure Laterality Date     SECTION      ELBOW SURGERY  1985    EPIDURAL STEROID INJECTION      L4-5 VERITO Dec 2020-[facility]    FEMUR SURGERY Right     due to osteomyelitis    HYSTERECTOMY      Abn Bleeding    INJECTION OF ANESTHETIC AGENT AROUND MEDIAL BRANCH NERVES INNERVATING LUMBAR FACET JOINT Bilateral 2022    Procedure: Bilateral L4-5,5-S1 MBB ( No steroids);  Surgeon: Carmel Torre MD;  Location: [facility];  Service: Pain Management;  Laterality: Bilateral;  PT AWARE TO BE TESTED ON OV    INJECTION OF ANESTHETIC AGENT AROUND MEDIAL BRANCH NERVES INNERVATING LUMBAR FACET JOINT Bilateral 2022    Procedure: Block-nerve-medial branch-lumbar, bilateral L4 through S1;  Surgeon: Carmel Torre MD;  Location: [facility];  Service: Pain Management;  Laterality: Bilateral;    INJECTION OF ANESTHETIC AGENT INTO SACROILIAC JOINT Bilateral 2022    Procedure: BLOCK, SACROILIAC JOINT;  Surgeon: Carmel Torre MD;  Location: [facility];  Service: Pain Management;  Laterality: Bilateral;  covid test put in    LIPOMA RESECTION  2019    RADIOFREQUENCY ABLATION OF LUMBAR MEDIAL BRANCH NERVE AT SINGLE LEVEL Right 2022    Procedure: Radiofrequency Ablation, Nerve, Spinal, Lumbar, Medial Branch, Level L4-S1, right side 1st, will have left-sided after completing;  Surgeon: Carmel Torre MD;  Location: [facility];  Service: Pain Management;  Laterality:  "Right;  pt aware at visit to be tested    RADIOFREQUENCY ABLATION OF LUMBAR MEDIAL BRANCH NERVE AT SINGLE LEVEL Left 05/05/2022    Procedure: LEFT  L4-S1 RFTC  (HAD RIGHT  ON 4-14);  Surgeon: Carmel Torre MD;  Location: [facility];  Service: Pain Management;  Laterality: Left;    SURGICAL REMOVAL OF PILONIDAL CYST      TRANSFORAMINAL EPIDURAL INJECTION OF STEROID      Bilateral L4-5 TFESI Nov 2020-[facility]    TRANSFORAMINAL EPIDURAL INJECTION OF STEROID      Right L4-5 TFESI Feb 2020-[facility]    VENTRAL HERNIA REPAIR  09/2020     Social History     Socioeconomic History    Marital status:    Tobacco Use    Smoking status: Every Day     Types: Cigarettes    Smokeless tobacco: Never   Substance and Sexual Activity    Alcohol use: Yes     Comment: ocassional    Drug use: Never    Sexual activity: Not Currently     Partners: Male     Birth control/protection: See Surgical Hx     Family History   Problem Relation Age of Onset    Thyroid cancer Maternal Grandmother     Cancer Mother     Thyroid cancer Mother     Melanoma Neg Hx     Colon cancer Neg Hx     Breast cancer Neg Hx     Ovarian cancer Neg Hx      Review of patient's allergies indicates:   Allergen Reactions    Doxycycline     Latex      Other reaction(s): Unknown        Objective:  Vitals:    06/28/23 1302   BP: 114/68   Pulse: 71   Resp: 19   Weight: (!) 158.8 kg (350 lb)   Height: 5' 2" (1.575 m)   PainSc:   6           Physical Exam  Vitals and nursing note reviewed. Exam conducted with a chaperone present.   Constitutional:       General: She is awake. She is not in acute distress.     Appearance: Normal appearance. She is obese. She is not ill-appearing, toxic-appearing or diaphoretic.   HENT:      Head: Normocephalic and atraumatic.      Nose: Nose normal.      Mouth/Throat:      Mouth: Mucous membranes are moist.      Pharynx: Oropharynx is clear.   Eyes:      Conjunctiva/sclera: Conjunctivae normal.      Pupils: Pupils " are equal, round, and reactive to light.   Cardiovascular:      Rate and Rhythm: Normal rate.   Pulmonary:      Effort: Pulmonary effort is normal. No respiratory distress.   Abdominal:      Palpations: Abdomen is soft.      Tenderness: There is no guarding.   Musculoskeletal:         General: No swelling.      Cervical back: Normal range of motion and neck supple. Tenderness present. No rigidity.      Thoracic back: Tenderness present.      Lumbar back: Tenderness present. Decreased range of motion.   Skin:     General: Skin is warm and dry.      Coloration: Skin is not jaundiced or pale.   Neurological:      General: No focal deficit present.      Mental Status: She is alert and oriented to person, place, and time. Mental status is at baseline.      Cranial Nerves: No cranial nerve deficit (II-XII).   Psychiatric:         Mood and Affect: Mood normal.         Behavior: Behavior normal. Behavior is cooperative.         Thought Content: Thought content normal.         [place] Fluoro for Pain Management  See OP Notes for results.     IMPRESSION: See OP Notes for results.     This procedure was auto-finalized by: Virtual Radiologist         Office Visit on 06/27/2023   Component Date Value Ref Range Status    Sodium 06/27/2023 140  136 - 145 mmol/L Final    Potassium 06/27/2023 4.1  3.5 - 5.1 mmol/L Final    Chloride 06/27/2023 107  98 - 107 mmol/L Final    CO2 06/27/2023 27  21 - 32 mmol/L Final    Anion Gap 06/27/2023 10  7 - 16 mmol/L Final    Glucose 06/27/2023 134 (H)  74 - 106 mg/dL Final    BUN 06/27/2023 16  7 - 18 mg/dL Final    Creatinine 06/27/2023 0.54 (L)  0.55 - 1.02 mg/dL Final    BUN/Creatinine Ratio 06/27/2023 30 (H)  6 - 20 Final    Calcium 06/27/2023 9.2  8.5 - 10.1 mg/dL Final    eGFR 06/27/2023 117  >=60 mL/min/1.73m2 Final    Hemoglobin A1C 06/27/2023 6.0  4.5 - 6.6 % Final    Estimated Average Glucose 06/27/2023 114  mg/dL Final   Office Visit on 05/03/2023   Component Date Value Ref  Range Status    POC Amphetamines 05/03/2023 Negative  Negative, Inconclusive Final    POC Barbiturates 05/03/2023 Negative  Negative, Inconclusive Final    POC Benzodiazepines 05/03/2023 Negative  Negative, Inconclusive Final    POC Cocaine 05/03/2023 Negative  Negative, Inconclusive Final    POC THC 05/03/2023 Negative  Negative, Inconclusive Final    POC Methadone 05/03/2023 Negative  Negative, Inconclusive Final    POC Methamphetamine 05/03/2023 Negative  Negative, Inconclusive Final    POC Opiates 05/03/2023 Presumptive Positive (A)  Negative, Inconclusive Final    POC Oxycodone 05/03/2023 Negative  Negative, Inconclusive Final    POC Phencyclidine 05/03/2023 Negative  Negative, Inconclusive Final    POC Methylenedioxymethamphetamine * 05/03/2023 Negative  Negative, Inconclusive Final    POC Tricyclic Antidepressants 05/03/2023 Negative  Negative, Inconclusive Final    POC Buprenorphine 05/03/2023 Negative   Final     Acceptable 05/03/2023 Yes   Final    POC Temperature (Urine) 05/03/2023 94   Final   Office Visit on 03/23/2023   Component Date Value Ref Range Status    Case Report 03/23/2023    Final                    Value:Pap Cytology                                      Case: [id number]                                   Authorizing Provider:  Britney Garcia CNM         Collected:           03/23/2023 02:24 PM          Ordering Location:     [facility] Received:            03/24/2023 08:28 AM                                 - Obstetrics And                                                                                    Gynecology                                                                   First Screen:          CONSUELO Nguyen(ASCP)                                                    Pathologist:           Louie Chopra III, MD                                                                            Specimen:    Liquid-Based Pap Test, Screening, Cervix                                                   Interpretation 03/23/2023 Atypical Squamous Cells of Undetermined Significance (A)              Final    General Categorization 03/23/2023 Epithelial Cell Abnormality   Final    Specimen Adequacy 03/23/2023 Satisfactory for evaluation   Final    Clinical Information 03/23/2023    Final                    Value:This result contains rich text formatting which cannot be displayed here.    Disclaimer 03/23/2023    Final                    Value:This result contains rich text formatting which cannot be displayed here.    HPV 16 03/23/2023 Negative  Negative, Invalid Final    HPV 18 03/23/2023 Negative  Negative, Invalid Final    HPV Other 03/23/2023 Negative  Negative, Invalid Final   Office Visit on 02/23/2023   Component Date Value Ref Range Status    POC Amphetamines 02/23/2023 Negative  Negative, Inconclusive Final    POC Barbiturates 02/23/2023 Negative  Negative, Inconclusive Final    POC Benzodiazepines 02/23/2023 Negative  Negative, Inconclusive Final    POC Cocaine 02/23/2023 Negative  Negative, Inconclusive Final    POC THC 02/23/2023 Negative  Negative, Inconclusive Final    POC Methadone 02/23/2023 Negative  Negative, Inconclusive Final    POC Methamphetamine 02/23/2023 Negative  Negative, Inconclusive Final    POC Opiates 02/23/2023 Presumptive Positive (A)  Negative, Inconclusive Final    POC Oxycodone 02/23/2023 Negative  Negative, Inconclusive Final    POC Phencyclidine 02/23/2023 Negative  Negative, Inconclusive Final    POC Methylenedioxymethamphetamine * 02/23/2023 Negative  Negative, Inconclusive Final    POC Tricyclic Antidepressants 02/23/2023 Negative  Negative, Inconclusive Final    POC Buprenorphine 02/23/2023 Negative   Final     Acceptable 02/23/2023 Yes   Final    POC Temperature (Urine) 02/23/2023 90   Final   Office Visit  on 01/12/2023   Component Date Value Ref Range Status    Sodium 01/12/2023 142  136 - 145 mmol/L Final    Potassium 01/12/2023 3.5  3.5 - 5.1 mmol/L Final    Chloride 01/12/2023 104  98 - 107 mmol/L Final    CO2 01/12/2023 34 (H)  21 - 32 mmol/L Final    Anion Gap 01/12/2023 8  7 - 16 mmol/L Final    Glucose 01/12/2023 99  74 - 106 mg/dL Final    BUN 01/12/2023 13  7 - 18 mg/dL Final    Creatinine 01/12/2023 0.64  0.55 - 1.02 mg/dL Final    BUN/Creatinine Ratio 01/12/2023 20  6 - 20 Final    Calcium 01/12/2023 8.8  8.5 - 10.1 mg/dL Final    Total Protein 01/12/2023 7.8  6.4 - 8.2 g/dL Final    Albumin 01/12/2023 3.2 (L)  3.5 - 5.0 g/dL Final    Globulin 01/12/2023 4.6 (H)  2.0 - 4.0 g/dL Final    A/G Ratio 01/12/2023 0.7   Final    Bilirubin, Total 01/12/2023 0.1  >0.0 - 1.2 mg/dL Final    Alk Phos 01/12/2023 125 (H)  37 - 98 U/L Final    ALT 01/12/2023 32  13 - 56 U/L Final    AST 01/12/2023 14 (L)  15 - 37 U/L Final    eGFR 01/12/2023 113  >=60 mL/min/1.73m² Final    TSH 01/12/2023 2.630  0.358 - 3.740 uIU/mL Final    Creatinine, Urine 01/12/2023 59  28 - 219 mg/dL Final    Microalbumin 01/12/2023 <0.5  0.0 - 2.8 mg/dL Final    Microalbumin/Creatinine Ratio 01/12/2023 <8.5  0.0 - 30.0 mg/g Final    Hemoglobin A1C 01/12/2023 6.1  4.5 - 6.6 % Final    Estimated Average Glucose 01/12/2023 117  mg/dL Final    Triglycerides 01/12/2023 153 (H)  35 - 150 mg/dL Final    Cholesterol 01/12/2023 174  0 - 200 mg/dL Final    HDL Cholesterol 01/12/2023 38 (L)  40 - 60 mg/dL Final    Cholesterol/HDL Ratio (Risk Factor) 01/12/2023 4.6   Final    Non-HDL 01/12/2023 136  mg/dL Final    LDL Calculated 01/12/2023 105  mg/dL Final    LDL/HDL 01/12/2023 2.8   Final    VLDL 01/12/2023 31  mg/dL Final         Orders Placed This Encounter   Procedures    Case Request Operating Room: Radiofrequency Ablation, Nerve, Spinal, Lumbar, Medial Branch, Level L4-S1     Order Specific Question:   Medical Necessity:      Answer:   Medically Non-Urgent [100]     Order Specific Question:   CPT Code:     Answer:   UT DESTROY LUMB/SAC FACET JNT [61392]     Order Specific Question:   CPT Code:     Answer:   UT DESTROY L/S FACET JNT ADDL [47606]     Order Specific Question:   Is an on-site pathologist required for this procedure?     Answer:   N/A           Requested Prescriptions      No prescriptions requested or ordered in this encounter       Assessment:     1. 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 Rheumatoid arthritis involving multiple sites with positive rheumatoid factor    3. Sacroiliitis    4. Chronic pain of right knee           A's of Opioid Risk Assessment  Activity:Patient can perform ADL.   Analgesia:Patients pain is partially controlled by current medication. Patient has tried OTC medications such as Tylenol and Ibuprofen with out relief.   Adverse Effects: Patient denies constipation or sedation.  Aberrant Behavior:  reviewed with no aberrant drug seeking/taking behavior.  Overdose reversal drug naloxone discussed    Drug screen reviewed    X-ray right knee [facility] September 14, 2022 degenerative changes no fracture noted      Plan:    Narcan December 2022    Follows rheumatology ARM rheumatoid arthritis    Right knee pain chronic follows orthopedics [facility] considering right knee procedure    Complaint low back pain worse with flexion extension rotation lumbar spine ongoing for more than 3 months facet joint in nature right greater than left she is     She is requesting right-sided lumbar procedure     Requesting Toradol injection    Toradol 60 mg IM, tolerated well    She had lumbar RFTC L4 through S1 left and right  She states she had 80% relief after procedure maintaining 65% relief with time  She states the procedure does help improve her level of functioning  Patient experience greater than 3 months pain relief between procedures    Continue home exercise program as  directed    Continue current medication    Indications for this procedure for this specific patient include the following   - Pt has had symptoms for three months with moderate to severe pain with functional impairment rated of 7/10 pain.   - Pain non-responsive to conservative care.    - Pain predominately axial and not associated with radiculopathy or claudication.    - No non-facet pathology as source of pain.    - Clinical assessment implicates facet joint as putative pain source.    - facet loading maneuver positive  - Pain is exacerbated by extension or prolonged sitting/standing and relieved by rest.    - No unexplained neurologic deficit.    - No history of coagulopathy, infection or unstable medical conditions.    - Pain is causing significant functional limitation resulting in diminished quality of life and impaired age appropriate ADL's.   - Clinical assessment implicates facet joint as putative source of pain  - Repeat injections not done prior to 7 days   - no more than 2 levels will be done    The planned medically necessary  surgical procedure is performed in a hospital outpatient department and not in an ambulatory surgical center due to:     -there is no geographically assessable ambulatory surgery center that has the  necessary equipment and fluoroscopy needed for the procedure     -there is no geographically assessable ambulatory surgical center available at which the physician has privileges     -an ASC's  specific  guideline regarding the individuals weight or health conditions that prevent the use of an ASC     -Medial branch block performed in consideration for RFTC, not just for therapeutic treatment    -done under fluoro    Monitor anesthesia request is medically indicated for the scheduled nerve block procedure due to:  1- needle phobia and anxiety, placing  the patient at risk during the provided service.  2-patient has an ASA class greater than 3 and requires constant presence of an  anesthesiologist during the procedure,   3-patient has severe problems hard to lie still  4-patient suffers from chronic pain and is unable to function due to  diminished ADLs    Schedule bilateral lumbar RF TC L4 through S1, lumbosacral spondylosis    Dr. Torre    Bring original prescription medication bottles/container/box with labels to each visit

## 2023-06-28 NOTE — PATIENT INSTRUCTIONS

## 2023-06-28 NOTE — H&P (VIEW-ONLY)
Subjective:         Patient ID: Belem Swann is a 43 y.o. female.    Chief Complaint: Low-back Pain and Knee Pain (Right knee)        Pain  This is a chronic problem. The current episode started more than 1 year ago. The problem occurs daily. The problem has been unchanged. Associated symptoms include arthralgias and neck pain. Pertinent negatives include no anorexia, chest pain, chills, coughing, diaphoresis, fever, sore throat, vertigo or vomiting.   Review of Systems   Constitutional:  Negative for activity change, appetite change, chills, diaphoresis, fever and unexpected weight change.   HENT:  Negative for drooling, ear discharge, ear pain, facial swelling, mouth sores, nosebleeds, sore throat, trouble swallowing, voice change and goiter.    Eyes:  Negative for photophobia, pain, discharge, redness and visual disturbance.   Respiratory:  Negative for apnea, cough, choking, chest tightness, shortness of breath, wheezing and stridor.    Cardiovascular:  Negative for chest pain, palpitations and leg swelling.   Gastrointestinal:  Negative for abdominal distention, anorexia, diarrhea, rectal pain, vomiting and fecal incontinence.   Endocrine: Negative for cold intolerance, heat intolerance, polydipsia, polyphagia and polyuria.   Genitourinary:  Negative for bladder incontinence, dysuria, flank pain, frequency and hot flashes.   Musculoskeletal:  Positive for arthralgias, back pain, leg pain, neck pain and neck stiffness.   Integumentary:  Negative for color change and pallor.   Allergic/Immunologic: Negative for immunocompromised state.   Neurological:  Negative for dizziness, vertigo, seizures, syncope, facial asymmetry, speech difficulty, light-headedness, coordination difficulties, memory loss and coordination difficulties.   Hematological:  Negative for adenopathy. Does not bruise/bleed easily.   Psychiatric/Behavioral:  Negative for agitation, behavioral problems, confusion, decreased concentration,  dysphoric mood, hallucinations, self-injury and suicidal ideas. The patient is not hyperactive.          Past Medical History:   Diagnosis Date    Anxiety     Asthma     Chronic pain     Cushing syndrome     Depression     Essential hypertension     Fibromyalgia     Herpes zoster     Hyperlipidemia     Leukocytosis 2022    Onychomycosis     MARY on CPAP     Rheumatoid arthritis     Type 2 diabetes mellitus 2020    Vitamin D deficiency 2018     Past Surgical History:   Procedure Laterality Date     SECTION      ELBOW SURGERY  1985    EPIDURAL STEROID INJECTION      L4-5 VERITO Dec 2020-[facility]    FEMUR SURGERY Right     due to osteomyelitis    HYSTERECTOMY      Abn Bleeding    INJECTION OF ANESTHETIC AGENT AROUND MEDIAL BRANCH NERVES INNERVATING LUMBAR FACET JOINT Bilateral 2022    Procedure: Bilateral L4-5,5-S1 MBB ( No steroids);  Surgeon: Carmel Torre MD;  Location: [facility];  Service: Pain Management;  Laterality: Bilateral;  PT AWARE TO BE TESTED ON OV    INJECTION OF ANESTHETIC AGENT AROUND MEDIAL BRANCH NERVES INNERVATING LUMBAR FACET JOINT Bilateral 2022    Procedure: Block-nerve-medial branch-lumbar, bilateral L4 through S1;  Surgeon: Carmel Torre MD;  Location: [facility];  Service: Pain Management;  Laterality: Bilateral;    INJECTION OF ANESTHETIC AGENT INTO SACROILIAC JOINT Bilateral 2022    Procedure: BLOCK, SACROILIAC JOINT;  Surgeon: Carmel Torre MD;  Location: [facility];  Service: Pain Management;  Laterality: Bilateral;  covid test put in    LIPOMA RESECTION  2019    RADIOFREQUENCY ABLATION OF LUMBAR MEDIAL BRANCH NERVE AT SINGLE LEVEL Right 2022    Procedure: Radiofrequency Ablation, Nerve, Spinal, Lumbar, Medial Branch, Level L4-S1, right side 1st, will have left-sided after completing;  Surgeon: Carmel Torre MD;  Location: [facility];  Service: Pain Management;  Laterality: Right;  pt aware at visit  "to be tested    RADIOFREQUENCY ABLATION OF LUMBAR MEDIAL BRANCH NERVE AT SINGLE LEVEL Left 05/05/2022    Procedure: LEFT  L4-S1 RFTC  (HAD RIGHT  ON 4-14);  Surgeon: Carmel Torre MD;  Location: [facility];  Service: Pain Management;  Laterality: Left;    SURGICAL REMOVAL OF PILONIDAL CYST      TRANSFORAMINAL EPIDURAL INJECTION OF STEROID      Bilateral L4-5 TFESI Nov 2020-[facility]    TRANSFORAMINAL EPIDURAL INJECTION OF STEROID      Right L4-5 TFESI Feb 2020-[facility]    VENTRAL HERNIA REPAIR  09/2020     Social History     Socioeconomic History    Marital status:    Tobacco Use    Smoking status: Every Day     Types: Cigarettes    Smokeless tobacco: Never   Substance and Sexual Activity    Alcohol use: Yes     Comment: ocassional    Drug use: Never    Sexual activity: Not Currently     Partners: Male     Birth control/protection: See Surgical Hx     Family History   Problem Relation Age of Onset    Thyroid cancer Maternal Grandmother     Cancer Mother     Thyroid cancer Mother     Melanoma Neg Hx     Colon cancer Neg Hx     Breast cancer Neg Hx     Ovarian cancer Neg Hx      Review of patient's allergies indicates:   Allergen Reactions    Doxycycline     Latex      Other reaction(s): Unknown        Objective:  Vitals:    06/28/23 1302   BP: 114/68   Pulse: 71   Resp: 19   Weight: (!) 158.8 kg (350 lb)   Height: 5' 2" (1.575 m)   PainSc:   6           Physical Exam  Vitals and nursing note reviewed. Exam conducted with a chaperone present.   Constitutional:       General: She is awake. She is not in acute distress.     Appearance: Normal appearance. She is obese. She is not ill-appearing, toxic-appearing or diaphoretic.   HENT:      Head: Normocephalic and atraumatic.      Nose: Nose normal.      Mouth/Throat:      Mouth: Mucous membranes are moist.      Pharynx: Oropharynx is clear.   Eyes:      Conjunctiva/sclera: Conjunctivae normal.      Pupils: Pupils are equal, round, and reactive to light. "   Cardiovascular:      Rate and Rhythm: Normal rate.   Pulmonary:      Effort: Pulmonary effort is normal. No respiratory distress.   Abdominal:      Palpations: Abdomen is soft.      Tenderness: There is no guarding.   Musculoskeletal:         General: No swelling.      Cervical back: Normal range of motion and neck supple. Tenderness present. No rigidity.      Thoracic back: Tenderness present.      Lumbar back: Tenderness present. Decreased range of motion.   Skin:     General: Skin is warm and dry.      Coloration: Skin is not jaundiced or pale.   Neurological:      General: No focal deficit present.      Mental Status: She is alert and oriented to person, place, and time. Mental status is at baseline.      Cranial Nerves: No cranial nerve deficit (II-XII).   Psychiatric:         Mood and Affect: Mood normal.         Behavior: Behavior normal. Behavior is cooperative.         Thought Content: Thought content normal.         Mammo Digital Screening Bilat  Narrative: Result:   Mammo Digital Screening Bilat     History:  Patient is 43 y.o. and is seen for a screening mammogram.    Films Compared:  Compared to: 05/24/2022 Mammo Digital Screening Bilat (No Change) and   05/17/2021 Mammo Digital Screening Bilat (No Change)     Findings:  The breasts are almost entirely fatty. There is no evidence of suspicious   masses, calcifications, or other abnormal findings.  Impression: Bilateral  There is no mammographic evidence of malignancy.    BI-RADS Category:   Overall: 1 - Negative       Recommendation:  Routine screening mammogram in 1 year is recommended.         Office Visit on 06/27/2023   Component Date Value Ref Range Status    Sodium 06/27/2023 140  136 - 145 mmol/L Final    Potassium 06/27/2023 4.1  3.5 - 5.1 mmol/L Final    Chloride 06/27/2023 107  98 - 107 mmol/L Final    CO2 06/27/2023 27  21 - 32 mmol/L Final    Anion Gap 06/27/2023 10  7 - 16 mmol/L Final    Glucose 06/27/2023 134 (H)  74 - 106 mg/dL Final     BUN 06/27/2023 16  7 - 18 mg/dL Final    Creatinine 06/27/2023 0.54 (L)  0.55 - 1.02 mg/dL Final    BUN/Creatinine Ratio 06/27/2023 30 (H)  6 - 20 Final    Calcium 06/27/2023 9.2  8.5 - 10.1 mg/dL Final    eGFR 06/27/2023 117  >=60 mL/min/1.73m2 Final    Hemoglobin A1C 06/27/2023 6.0  4.5 - 6.6 % Final    Estimated Average Glucose 06/27/2023 114  mg/dL Final   Office Visit on 05/03/2023   Component Date Value Ref Range Status    POC Amphetamines 05/03/2023 Negative  Negative, Inconclusive Final    POC Barbiturates 05/03/2023 Negative  Negative, Inconclusive Final    POC Benzodiazepines 05/03/2023 Negative  Negative, Inconclusive Final    POC Cocaine 05/03/2023 Negative  Negative, Inconclusive Final    POC THC 05/03/2023 Negative  Negative, Inconclusive Final    POC Methadone 05/03/2023 Negative  Negative, Inconclusive Final    POC Methamphetamine 05/03/2023 Negative  Negative, Inconclusive Final    POC Opiates 05/03/2023 Presumptive Positive (A)  Negative, Inconclusive Final    POC Oxycodone 05/03/2023 Negative  Negative, Inconclusive Final    POC Phencyclidine 05/03/2023 Negative  Negative, Inconclusive Final    POC Methylenedioxymethamphetamine * 05/03/2023 Negative  Negative, Inconclusive Final    POC Tricyclic Antidepressants 05/03/2023 Negative  Negative, Inconclusive Final    POC Buprenorphine 05/03/2023 Negative   Final     Acceptable 05/03/2023 Yes   Final    POC Temperature (Urine) 05/03/2023 94   Final   Office Visit on 03/23/2023   Component Date Value Ref Range Status    Case Report 03/23/2023    Final                    Value:Pap Cytology                                      Case: [id number]                                   Authorizing Provider:  Britney Garcia CNM         Collected:           03/23/2023 02:24 PM          Ordering Location:     [facility] Received:            03/24/2023 08:28 AM                                 - Obstetrics And                                                                                     Gynecology                                                                   First Screen:          CONSUELO Nguyen(ASCP)                                                    Pathologist:           Louie Chopra III, MD                                                                           Specimen:    Liquid-Based Pap Test, Screening, Cervix                                                   Interpretation 03/23/2023 Atypical Squamous Cells of Undetermined Significance (A)              Final    General Categorization 03/23/2023 Epithelial Cell Abnormality   Final    Specimen Adequacy 03/23/2023 Satisfactory for evaluation   Final    Clinical Information 03/23/2023    Final                    Value:This result contains rich text formatting which cannot be displayed here.    Disclaimer 03/23/2023    Final                    Value:This result contains rich text formatting which cannot be displayed here.    HPV 16 03/23/2023 Negative  Negative, Invalid Final    HPV 18 03/23/2023 Negative  Negative, Invalid Final    HPV Other 03/23/2023 Negative  Negative, Invalid Final   Office Visit on 02/23/2023   Component Date Value Ref Range Status    POC Amphetamines 02/23/2023 Negative  Negative, Inconclusive Final    POC Barbiturates 02/23/2023 Negative  Negative, Inconclusive Final    POC Benzodiazepines 02/23/2023 Negative  Negative, Inconclusive Final    POC Cocaine 02/23/2023 Negative  Negative, Inconclusive Final    POC THC 02/23/2023 Negative  Negative, Inconclusive Final    POC Methadone 02/23/2023 Negative  Negative, Inconclusive Final    POC Methamphetamine 02/23/2023 Negative  Negative, Inconclusive Final    POC Opiates 02/23/2023 Presumptive Positive (A)  Negative, Inconclusive Final    POC Oxycodone 02/23/2023 Negative  Negative, Inconclusive Final    POC Phencyclidine  02/23/2023 Negative  Negative, Inconclusive Final    POC Methylenedioxymethamphetamine * 02/23/2023 Negative  Negative, Inconclusive Final    POC Tricyclic Antidepressants 02/23/2023 Negative  Negative, Inconclusive Final    POC Buprenorphine 02/23/2023 Negative   Final     Acceptable 02/23/2023 Yes   Final    POC Temperature (Urine) 02/23/2023 90   Final   Office Visit on 01/12/2023   Component Date Value Ref Range Status    Sodium 01/12/2023 142  136 - 145 mmol/L Final    Potassium 01/12/2023 3.5  3.5 - 5.1 mmol/L Final    Chloride 01/12/2023 104  98 - 107 mmol/L Final    CO2 01/12/2023 34 (H)  21 - 32 mmol/L Final    Anion Gap 01/12/2023 8  7 - 16 mmol/L Final    Glucose 01/12/2023 99  74 - 106 mg/dL Final    BUN 01/12/2023 13  7 - 18 mg/dL Final    Creatinine 01/12/2023 0.64  0.55 - 1.02 mg/dL Final    BUN/Creatinine Ratio 01/12/2023 20  6 - 20 Final    Calcium 01/12/2023 8.8  8.5 - 10.1 mg/dL Final    Total Protein 01/12/2023 7.8  6.4 - 8.2 g/dL Final    Albumin 01/12/2023 3.2 (L)  3.5 - 5.0 g/dL Final    Globulin 01/12/2023 4.6 (H)  2.0 - 4.0 g/dL Final    A/G Ratio 01/12/2023 0.7   Final    Bilirubin, Total 01/12/2023 0.1  >0.0 - 1.2 mg/dL Final    Alk Phos 01/12/2023 125 (H)  37 - 98 U/L Final    ALT 01/12/2023 32  13 - 56 U/L Final    AST 01/12/2023 14 (L)  15 - 37 U/L Final    eGFR 01/12/2023 113  >=60 mL/min/1.73m² Final    TSH 01/12/2023 2.630  0.358 - 3.740 uIU/mL Final    Creatinine, Urine 01/12/2023 59  28 - 219 mg/dL Final    Microalbumin 01/12/2023 <0.5  0.0 - 2.8 mg/dL Final    Microalbumin/Creatinine Ratio 01/12/2023 <8.5  0.0 - 30.0 mg/g Final    Hemoglobin A1C 01/12/2023 6.1  4.5 - 6.6 % Final    Estimated Average Glucose 01/12/2023 117  mg/dL Final    Triglycerides 01/12/2023 153 (H)  35 - 150 mg/dL Final    Cholesterol 01/12/2023 174  0 - 200 mg/dL Final    HDL Cholesterol 01/12/2023 38 (L)  40 - 60 mg/dL Final    Cholesterol/HDL Ratio (Risk Factor) 01/12/2023 4.6   Final     Non-HDL 01/12/2023 136  mg/dL Final    LDL Calculated 01/12/2023 105  mg/dL Final    LDL/HDL 01/12/2023 2.8   Final    VLDL 01/12/2023 31  mg/dL Final         Orders Placed This Encounter   Procedures    Case Request Operating Room: Radiofrequency Ablation, Nerve, Spinal, Lumbar, Medial Branch, Level L4-S1     Order Specific Question:   Medical Necessity:     Answer:   Medically Non-Urgent [100]     Order Specific Question:   CPT Code:     Answer:   NM DESTROY LUMB/SAC FACET JNT [29735]     Order Specific Question:   CPT Code:     Answer:   NM DESTROY L/S FACET JNT ADDL [61397]     Order Specific Question:   Is an on-site pathologist required for this procedure?     Answer:   N/A           Requested Prescriptions     Signed Prescriptions Disp Refills    gabapentin (NEURONTIN) 300 MG capsule 90 capsule 1     Sig: Take 1 capsule (300 mg total) by mouth every 8 (eight) hours.    HYDROcodone-acetaminophen (NORCO)  mg per tablet 90 tablet 0     Sig: Take 1 tablet by mouth every 8 (eight) hours as needed for Pain.       Assessment:     1. 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 Rheumatoid arthritis involving multiple sites with positive rheumatoid factor    3. Sacroiliitis    4. Chronic pain of right knee           A's of Opioid Risk Assessment  Activity:Patient can perform ADL.   Analgesia:Patients pain is partially controlled by current medication. Patient has tried OTC medications such as Tylenol and Ibuprofen with out relief.   Adverse Effects: Patient denies constipation or sedation.  Aberrant Behavior:  reviewed with no aberrant drug seeking/taking behavior.  Overdose reversal drug naloxone discussed    Drug screen reviewed    X-ray right knee [facility] September 14, 2022 degenerative changes no fracture noted      Plan:    Narcan December 2022    Follows rheumatology [facility] rheumatoid arthritis    Right knee pain chronic follows orthopedics [facility] considering right knee  procedure    Complaint low back pain worse with flexion extension rotation lumbar spine ongoing for more than 3 months facet joint in nature right greater than left she is     She is requesting right-sided lumbar procedure     Requesting Toradol injection    Toradol 60 mg IM, tolerated well    She had lumbar RFTC L4 through S1 left and right  She states she had 80% relief after procedure maintaining 65% relief with time  She states the procedure does help improve her level of functioning  Patient experience greater than 3 months pain relief between procedures    Continue home exercise program as directed    Continue current medication    Follow-up 2 months    Dr. Torre, August 2023    Bring original prescription medication bottles/container/box with labels to each visit

## 2023-06-29 DIAGNOSIS — M05.79 RHEUMATOID ARTHRITIS INVOLVING MULTIPLE SITES WITH POSITIVE RHEUMATOID FACTOR: Chronic | ICD-10-CM

## 2023-06-29 DIAGNOSIS — M25.561 CHRONIC PAIN OF RIGHT KNEE: Chronic | ICD-10-CM

## 2023-06-29 DIAGNOSIS — M47.816 SPONDYLOSIS OF LUMBAR REGION WITHOUT MYELOPATHY OR RADICULOPATHY: Chronic | ICD-10-CM

## 2023-06-29 DIAGNOSIS — G89.29 CHRONIC PAIN OF RIGHT KNEE: Chronic | ICD-10-CM

## 2023-06-29 DIAGNOSIS — M46.1 SACROILIITIS: ICD-10-CM

## 2023-06-29 RX ORDER — GABAPENTIN 300 MG/1
300 CAPSULE ORAL EVERY 8 HOURS
Qty: 90 CAPSULE | Refills: 1 | Status: SHIPPED | OUTPATIENT
Start: 2023-06-29 | End: 2023-07-25 | Stop reason: SDUPTHER

## 2023-06-29 RX ORDER — HYDROCODONE BITARTRATE AND ACETAMINOPHEN 10; 325 MG/1; MG/1
1 TABLET ORAL EVERY 8 HOURS PRN
Qty: 90 TABLET | Refills: 0 | Status: SHIPPED | OUTPATIENT
Start: 2023-07-03 | End: 2023-07-25 | Stop reason: SDUPTHER

## 2023-06-29 NOTE — TELEPHONE ENCOUNTER
----- Message from Nelly Ridley sent at 6/29/2023  2:23 PM CDT -----  [phone] Patient called said her meds was sent to the wrong pharmacy and should [facility]   06/29/2023   3:00 PM   [facility] to [facility], call [facility]

## 2023-07-05 ENCOUNTER — HOSPITAL ENCOUNTER (EMERGENCY)
Facility: HOSPITAL | Age: 43
Discharge: HOME | End: 2023-07-05
Attending: EMERGENCY MEDICINE
Payer: MEDICAID

## 2023-07-05 VITALS
DIASTOLIC BLOOD PRESSURE: 80 MMHG | SYSTOLIC BLOOD PRESSURE: 130 MMHG | HEIGHT: 62 IN | BODY MASS INDEX: 53.92 KG/M2 | OXYGEN SATURATION: 97 % | WEIGHT: 293 LBS | RESPIRATION RATE: 18 BRPM | HEART RATE: 80 BPM | TEMPERATURE: 98 F

## 2023-07-05 DIAGNOSIS — K92.2 GASTROINTESTINAL HEMORRHAGE, UNSPECIFIED GASTROINTESTINAL HEMORRHAGE TYPE: Primary | ICD-10-CM

## 2023-07-05 DIAGNOSIS — K62.5 RECTAL BLEEDING: ICD-10-CM

## 2023-07-05 LAB
ALBUMIN SERPL BCP-MCNC: 3.3 G/DL (ref 3.5–5)
ALBUMIN/GLOB SERPL: 0.7 {RATIO}
ALP SERPL-CCNC: 115 U/L (ref 37–98)
ALT SERPL W P-5'-P-CCNC: 21 U/L (ref 13–56)
ANION GAP SERPL CALCULATED.3IONS-SCNC: 10 MMOL/L (ref 7–16)
AST SERPL W P-5'-P-CCNC: 9 U/L (ref 15–37)
BASOPHILS # BLD AUTO: 0.04 K/UL (ref 0–0.2)
BASOPHILS NFR BLD AUTO: 0.3 % (ref 0–1)
BILIRUB SERPL-MCNC: 0.4 MG/DL (ref ?–1.2)
BUN SERPL-MCNC: 17 MG/DL (ref 7–18)
BUN/CREAT SERPL: 21 (ref 6–20)
CALCIUM SERPL-MCNC: 8.6 MG/DL (ref 8.5–10.1)
CHLORIDE SERPL-SCNC: 104 MMOL/L (ref 98–107)
CO2 SERPL-SCNC: 28 MMOL/L (ref 21–32)
CREAT SERPL-MCNC: 0.82 MG/DL (ref 0.55–1.02)
DIFFERENTIAL METHOD BLD: ABNORMAL
EGFR (NO RACE VARIABLE) (RUSH/TITUS): 91 ML/MIN/1.73M2
EOSINOPHIL # BLD AUTO: 0.32 K/UL (ref 0–0.5)
EOSINOPHIL NFR BLD AUTO: 2.5 % (ref 1–4)
ERYTHROCYTE [DISTWIDTH] IN BLOOD BY AUTOMATED COUNT: 12.9 % (ref 11.5–14.5)
GLOBULIN SER-MCNC: 4.7 G/DL (ref 2–4)
GLUCOSE SERPL-MCNC: 166 MG/DL (ref 74–106)
HCT VFR BLD AUTO: 40.9 % (ref 38–47)
HCT VFR BLD AUTO: 42.5 % (ref 38–47)
HGB BLD-MCNC: 13.1 G/DL (ref 12–16)
HGB BLD-MCNC: 13.6 G/DL (ref 12–16)
IMM GRANULOCYTES # BLD AUTO: 0.05 K/UL (ref 0–0.04)
IMM GRANULOCYTES NFR BLD: 0.4 % (ref 0–0.4)
INDIRECT COOMBS: NORMAL
LIPASE SERPL-CCNC: 69 U/L (ref 73–393)
LYMPHOCYTES # BLD AUTO: 1.52 K/UL (ref 1–4.8)
LYMPHOCYTES NFR BLD AUTO: 12.1 % (ref 27–41)
MCH RBC QN AUTO: 29.2 PG (ref 27–31)
MCHC RBC AUTO-ENTMCNC: 32 G/DL (ref 32–36)
MCV RBC AUTO: 91.4 FL (ref 80–96)
MONOCYTES # BLD AUTO: 0.62 K/UL (ref 0–0.8)
MONOCYTES NFR BLD AUTO: 4.9 % (ref 2–6)
MPC BLD CALC-MCNC: 13.9 FL (ref 9.4–12.4)
NEUTROPHILS # BLD AUTO: 10.05 K/UL (ref 1.8–7.7)
NEUTROPHILS NFR BLD AUTO: 79.8 % (ref 53–65)
NRBC # BLD AUTO: 0 X10E3/UL
NRBC, AUTO (.00): 0 %
PLATELET # BLD AUTO: 163 K/UL (ref 150–400)
POTASSIUM SERPL-SCNC: 3.6 MMOL/L (ref 3.5–5.1)
PROT SERPL-MCNC: 8 G/DL (ref 6.4–8.2)
RBC # BLD AUTO: 4.65 M/UL (ref 4.2–5.4)
RH BLD: NORMAL
SODIUM SERPL-SCNC: 138 MMOL/L (ref 136–145)
SPECIMEN OUTDATE: NORMAL
WBC # BLD AUTO: 12.6 K/UL (ref 4.5–11)

## 2023-07-05 PROCEDURE — 99285 EMERGENCY DEPT VISIT HI MDM: CPT | Mod: 25

## 2023-07-05 PROCEDURE — 86900 BLOOD TYPING SEROLOGIC ABO: CPT

## 2023-07-05 PROCEDURE — 80053 COMPREHEN METABOLIC PANEL: CPT

## 2023-07-05 PROCEDURE — 85025 COMPLETE CBC W/AUTO DIFF WBC: CPT

## 2023-07-05 PROCEDURE — 63600175 PHARM REV CODE 636 W HCPCS

## 2023-07-05 PROCEDURE — 99284 PR EMERGENCY DEPT VISIT,LEVEL IV: ICD-10-PCS | Mod: ,,, | Performed by: FAMILY MEDICINE

## 2023-07-05 PROCEDURE — 85014 HEMATOCRIT: CPT | Mod: 59

## 2023-07-05 PROCEDURE — 99284 EMERGENCY DEPT VISIT MOD MDM: CPT | Mod: ,,, | Performed by: FAMILY MEDICINE

## 2023-07-05 PROCEDURE — 96374 THER/PROPH/DIAG INJ IV PUSH: CPT

## 2023-07-05 PROCEDURE — 96361 HYDRATE IV INFUSION ADD-ON: CPT

## 2023-07-05 PROCEDURE — 83690 ASSAY OF LIPASE: CPT

## 2023-07-05 PROCEDURE — 25000003 PHARM REV CODE 250

## 2023-07-05 PROCEDURE — C9113 INJ PANTOPRAZOLE SODIUM, VIA: HCPCS

## 2023-07-05 RX ORDER — PANTOPRAZOLE SODIUM 40 MG/10ML
80 INJECTION, POWDER, LYOPHILIZED, FOR SOLUTION INTRAVENOUS
Status: COMPLETED | OUTPATIENT
Start: 2023-07-05 | End: 2023-07-05

## 2023-07-05 RX ORDER — ACETAMINOPHEN 500 MG
1000 TABLET ORAL
Status: COMPLETED | OUTPATIENT
Start: 2023-07-05 | End: 2023-07-05

## 2023-07-05 RX ADMIN — PANTOPRAZOLE SODIUM 80 MG: 40 INJECTION, POWDER, FOR SOLUTION INTRAVENOUS at 04:07

## 2023-07-05 RX ADMIN — ACETAMINOPHEN 1000 MG: 500 TABLET ORAL at 07:07

## 2023-07-05 RX ADMIN — SODIUM CHLORIDE 1000 ML: 9 INJECTION, SOLUTION INTRAVENOUS at 04:07

## 2023-07-05 NOTE — ED PROVIDER NOTES
Encounter Date: 2023       History     Chief Complaint   Patient presents with    Rectal Bleeding     Pt with pmh of lupus, RA, HTN, chronic pain and DM2 presents with c/o rm blood passed earlier today when trying to have a bowel movement. The patient says she takes Norco daily and was straining today but did not have a bowel movement. She says she is prone to occasional constipation. She is also complaining of lower abdominal pain worse in the left quadrant. She denies a hx of anal fissures, hemorrhoids, or colonic polyps. She denies a hx of upper or lower GI bleeds. The patient says she has not had chest pain, SOB, palpitations or syncope, however said she felt a little diaphoretic when trying to have a bowel movement today.       Review of patient's allergies indicates:   Allergen Reactions    Doxycycline     Latex      Other reaction(s): Unknown     Past Medical History:   Diagnosis Date    Anxiety     Asthma     Chronic pain     Cushing syndrome     Depression     Essential hypertension     Fibromyalgia     Herpes zoster     Hyperlipidemia     Leukocytosis 2022    Onychomycosis     MARY on CPAP     Rheumatoid arthritis     Type 2 diabetes mellitus 2020    Vitamin D deficiency 2018     Past Surgical History:   Procedure Laterality Date     SECTION      ELBOW SURGERY  1985    EPIDURAL STEROID INJECTION      L4-5 VERITO Dec 2020-[facility]    FEMUR SURGERY Right     due to osteomyelitis    HYSTERECTOMY      Abn Bleeding    INJECTION OF ANESTHETIC AGENT AROUND MEDIAL BRANCH NERVES INNERVATING LUMBAR FACET JOINT Bilateral 2022    Procedure: Bilateral L4-5,5-S1 MBB ( No steroids);  Surgeon: Carmel Torre MD;  Location: [facility];  Service: Pain Management;  Laterality: Bilateral;  PT AWARE TO BE TESTED ON OV    INJECTION OF ANESTHETIC AGENT AROUND MEDIAL BRANCH NERVES INNERVATING LUMBAR FACET JOINT Bilateral 2022    Procedure: Block-nerve-medial branch-lumbar,  bilateral L4 through S1;  Surgeon: Carmel Torre MD;  Location: [facility];  Service: Pain Management;  Laterality: Bilateral;    INJECTION OF ANESTHETIC AGENT INTO SACROILIAC JOINT Bilateral 08/23/2022    Procedure: BLOCK, SACROILIAC JOINT;  Surgeon: Carmel Torre MD;  Location: [facility];  Service: Pain Management;  Laterality: Bilateral;  covid test put in    LIPOMA RESECTION  12/2019    RADIOFREQUENCY ABLATION OF LUMBAR MEDIAL BRANCH NERVE AT SINGLE LEVEL Right 04/14/2022    Procedure: Radiofrequency Ablation, Nerve, Spinal, Lumbar, Medial Branch, Level L4-S1, right side 1st, will have left-sided after completing;  Surgeon: Carmel Torre MD;  Location: [facility];  Service: Pain Management;  Laterality: Right;  pt aware at visit to be tested    RADIOFREQUENCY ABLATION OF LUMBAR MEDIAL BRANCH NERVE AT SINGLE LEVEL Left 05/05/2022    Procedure: LEFT  L4-S1 RFTC  (HAD RIGHT  ON 4-14);  Surgeon: Carmel Torre MD;  Location: [facility];  Service: Pain Management;  Laterality: Left;    RADIOFREQUENCY ABLATION OF LUMBAR MEDIAL BRANCH NERVE AT SINGLE LEVEL Right 7/25/2023    Procedure: Radiofrequency Ablation, Nerve, Spinal, Lumbar, Medial Branch, Level L4-S1;  Surgeon: Carmel Torre MD;  Location: [facility];  Service: Pain Management;  Laterality: Right;    SURGICAL REMOVAL OF PILONIDAL CYST      TRANSFORAMINAL EPIDURAL INJECTION OF STEROID      Bilateral L4-5 TFESI Nov 2020-[facility]    TRANSFORAMINAL EPIDURAL INJECTION OF STEROID      Right L4-5 TFESI Feb 2020-[facility]    VENTRAL HERNIA REPAIR  09/2020     Family History   Problem Relation Age of Onset    Thyroid cancer Maternal Grandmother     Cancer Mother     Thyroid cancer Mother     Melanoma Neg Hx     Colon cancer Neg Hx     Breast cancer Neg Hx     Ovarian cancer Neg Hx      Social History     Tobacco Use    Smoking status: Every Day     Current packs/day: 0.00     Types: Cigarettes    Smokeless tobacco:  Never   Substance Use Topics    Alcohol use: Yes     Comment: ocassional    Drug use: Never     Review of Systems   Constitutional:  Positive for diaphoresis. Negative for chills and fever.   Respiratory:  Negative for shortness of breath.    Cardiovascular:  Negative for chest pain, palpitations and leg swelling.   Gastrointestinal:  Positive for abdominal pain and anal bleeding. Negative for nausea and vomiting.   Genitourinary:  Negative for dysuria, frequency and hematuria.   Skin:  Negative for pallor.   Neurological:  Negative for syncope, weakness and light-headedness.   Psychiatric/Behavioral:  Negative for confusion.        Physical Exam     Initial Vitals [07/05/23 1604]   BP Pulse Resp Temp SpO2   (!) 129/94 87 18 98 °F (36.7 °C) 96 %      MAP       --         Physical Exam    Constitutional: She appears well-developed and well-nourished.   HENT:   Head: Atraumatic.   Eyes: Conjunctivae and EOM are normal. Pupils are equal, round, and reactive to light.   Cardiovascular:  Normal rate, regular rhythm, normal heart sounds and intact distal pulses.           Pulmonary/Chest: Breath sounds normal.   Abdominal: Bowel sounds are normal. There is abdominal tenderness. There is guarding.   Genitourinary: Rectum:      Tenderness present.      No rectal mass, anal fissure, external hemorrhoid or internal hemorrhoid.     Musculoskeletal:         General: Normal range of motion.     Neurological: She is alert and oriented to person, place, and time.   Skin: Skin is warm. Capillary refill takes less than 2 seconds.         Medical Screening Exam   See Full Note    ED Course   Procedures  Labs Reviewed   COMPREHENSIVE METABOLIC PANEL - Abnormal; Notable for the following components:       Result Value    Glucose 166 (*)     BUN/Creatinine Ratio 21 (*)     Albumin 3.3 (*)     Globulin 4.7 (*)     Alk Phos 115 (*)     AST 9 (*)     All other components within normal limits   LIPASE - Abnormal; Notable for the following  components:    Lipase 69 (*)     All other components within normal limits   CBC WITH DIFFERENTIAL - Abnormal; Notable for the following components:    WBC 12.60 (*)     MPV 13.9 (*)     Neutrophils % 79.8 (*)     Lymphocytes % 12.1 (*)     Neutrophils, Abs 10.05 (*)     Immature Granulocytes, Absolute 0.05 (*)     All other components within normal limits   HEMOGLOBIN AND HEMATOCRIT, BLOOD - Normal   CBC W/ AUTO DIFFERENTIAL    Narrative:     The following orders were created for panel order CBC auto differential.  Procedure                               Abnormality         Status                     ---------                               -----------         ------                     CBC with Differential[[id number]]        Abnormal            Final result                 Please view results for these tests on the individual orders.   TYPE & SCREEN          Imaging Results              CT Abdomen Pelvis  Without Contrast (Final result)  Result time 07/05/23 16:56:29      Final result by Alex Buchanan MD (07/05/23 16:56:29)                   Impression:      No acute abnormality identified in the abdomen or pelvis.      Electronically signed by: Alex Buchanan  Date:    07/05/2023  Time:    16:56               Narrative:    EXAMINATION:  CT ABDOMEN PELVIS WITHOUT CONTRAST    CLINICAL HISTORY:  Abdominal pain, acute, nonlocalized;    TECHNIQUE:  Low dose axial images, sagittal and coronal reformations were obtained from the lung bases to the pubic symphysis.  Oral contrast was not administered. The CT examination was performed using one or more of the following dose reduction techniques: Automated exposure control, adjustment of the mA and kV according to patient's size, use of acute or iterative reconstruction techniques.    COMPARISON:  09/11/2022    FINDINGS:  Lung bases are clear.    Fatty infiltration of the liver is suggested.  No focal hepatic lesion.  The gallbladder, adrenals, spleen, and pancreas appear  normal.  Kidneys have a normal appearance.  There is no renal stone or hydronephrosis of either kidney.  No hydroureter.  Urinary bladder unremarkable.  No adnexal lesion.    No pneumoperitoneum.  No ascites.  No adenopathy.    Vascular structures are normal caliber.  No bowel obstruction.  No acute bowel abnormality.  There is a small fat containing umbilical hernia.    No acute fracture identified.  Partial sacralization L5 on the left is again seen.  Mild degenerative changes of the lumbar spine.                                       Medications   sodium chloride 0.9% bolus 1,000 mL 1,000 mL (0 mLs Intravenous Stopped 7/5/23 1754)   pantoprazole injection 80 mg (80 mg Intravenous Given 7/5/23 1654)   acetaminophen tablet 1,000 mg (1,000 mg Oral Given 7/5/23 1937)     Medical Decision Making:   Initial Assessment:   BLEEDING PER RECTUM.    Differential Diagnosis:   DDX:  HEMORRHOIDS VS OTHER LOWER GI BLEED VS UPPER.    Clinical Tests:   Lab Tests: Ordered and Reviewed  Radiological Study: Ordered and Reviewed  ED Management:  DX:  GI BLEEDING.  GI FOLLOW UP.  RETURN PRN.              Attending Attestation:   Physician Attestation Statement for Resident:  As the supervising MD   Physician Attestation Statement: I have personally seen and examined this patient.   I agree with the above history.  -:   As the supervising MD I agree with the above PE.     As the supervising MD I agree with the above treatment, course, plan, and disposition.                                Clinical Impression:   Final diagnoses:  [K92.2] Gastrointestinal hemorrhage, unspecified gastrointestinal hemorrhage type (Primary)  [K62.5] Rectal bleeding        ED Disposition Condition    Discharge Stable          ED Prescriptions    None       Follow-up Information    None          Christina Cannon MD  Resident  07/05/23 4743       Michel Le MD  08/06/23 2949

## 2023-07-05 NOTE — Clinical Note
"Belem Montanezlulu Swann was seen and treated in our emergency department on 7/5/2023.  She may return to work on 07/11/2023.       If you have any questions or concerns, please don't hesitate to call.      Michel Le MD"

## 2023-07-06 NOTE — ED PROVIDER NOTES
Encounter Date: 2023       History     Chief Complaint   Patient presents with    Rectal Bleeding     HPI  Review of patient's allergies indicates:   Allergen Reactions    Doxycycline     Latex      Other reaction(s): Unknown     Past Medical History:   Diagnosis Date    Anxiety     Asthma     Chronic pain     Cushing syndrome     Depression     Essential hypertension     Fibromyalgia     Herpes zoster     Hyperlipidemia     Leukocytosis 2022    Onychomycosis     MARY on CPAP     Rheumatoid arthritis     Type 2 diabetes mellitus 2020    Vitamin D deficiency 2018     Past Surgical History:   Procedure Laterality Date     SECTION      ELBOW SURGERY      EPIDURAL STEROID INJECTION      L4-5 VERITO Dec 2020-[facility]    FEMUR SURGERY Right     due to osteomyelitis    HYSTERECTOMY      Abn Bleeding    INJECTION OF ANESTHETIC AGENT AROUND MEDIAL BRANCH NERVES INNERVATING LUMBAR FACET JOINT Bilateral 2022    Procedure: Bilateral L4-5,5-S1 MBB ( No steroids);  Surgeon: Carmel Torre MD;  Location: [facility];  Service: Pain Management;  Laterality: Bilateral;  PT AWARE TO BE TESTED ON OV    INJECTION OF ANESTHETIC AGENT AROUND MEDIAL BRANCH NERVES INNERVATING LUMBAR FACET JOINT Bilateral 2022    Procedure: Block-nerve-medial branch-lumbar, bilateral L4 through S1;  Surgeon: Carmel Torre MD;  Location: [facility];  Service: Pain Management;  Laterality: Bilateral;    INJECTION OF ANESTHETIC AGENT INTO SACROILIAC JOINT Bilateral 2022    Procedure: BLOCK, SACROILIAC JOINT;  Surgeon: Carmel Torre MD;  Location: [facility];  Service: Pain Management;  Laterality: Bilateral;  covid test put in    LIPOMA RESECTION  2019    RADIOFREQUENCY ABLATION OF LUMBAR MEDIAL BRANCH NERVE AT SINGLE LEVEL Right 2022    Procedure: Radiofrequency Ablation, Nerve, Spinal, Lumbar, Medial Branch, Level L4-S1, right side 1st, will have left-sided after  completing;  Surgeon: Carmel Torre MD;  Location: [facility];  Service: Pain Management;  Laterality: Right;  pt aware at visit to be tested    RADIOFREQUENCY ABLATION OF LUMBAR MEDIAL BRANCH NERVE AT SINGLE LEVEL Left 05/05/2022    Procedure: LEFT  L4-S1 RFTC  (HAD RIGHT  ON 4-14);  Surgeon: Carmel Torre MD;  Location: [facility];  Service: Pain Management;  Laterality: Left;    SURGICAL REMOVAL OF PILONIDAL CYST      TRANSFORAMINAL EPIDURAL INJECTION OF STEROID      Bilateral L4-5 TFESI Nov 2020-[facility]    TRANSFORAMINAL EPIDURAL INJECTION OF STEROID      Right L4-5 TFESI Feb 2020-[facility]    VENTRAL HERNIA REPAIR  09/2020     Family History   Problem Relation Age of Onset    Thyroid cancer Maternal Grandmother     Cancer Mother     Thyroid cancer Mother     Melanoma Neg Hx     Colon cancer Neg Hx     Breast cancer Neg Hx     Ovarian cancer Neg Hx      Social History     Tobacco Use    Smoking status: Every Day     Types: Cigarettes    Smokeless tobacco: Never   Substance Use Topics    Alcohol use: Yes     Comment: ocassional    Drug use: Never     Review of Systems    Physical Exam     Initial Vitals [07/05/23 1604]   BP Pulse Resp Temp SpO2   (!) 129/94 87 18 98 °F (36.7 °C) 96 %      MAP       --         Physical Exam    Medical Screening Exam   See Full Note    ED Course   Procedures  Labs Reviewed   COMPREHENSIVE METABOLIC PANEL - Abnormal; Notable for the following components:       Result Value    Glucose 166 (*)     BUN/Creatinine Ratio 21 (*)     Albumin 3.3 (*)     Globulin 4.7 (*)     Alk Phos 115 (*)     AST 9 (*)     All other components within normal limits   LIPASE - Abnormal; Notable for the following components:    Lipase 69 (*)     All other components within normal limits   CBC WITH DIFFERENTIAL - Abnormal; Notable for the following components:    WBC 12.60 (*)     MPV 13.9 (*)     Neutrophils % 79.8 (*)     Lymphocytes % 12.1 (*)     Neutrophils, Abs 10.05 (*)     Immature  Granulocytes, Absolute 0.05 (*)     All other components within normal limits   HEMOGLOBIN AND HEMATOCRIT, BLOOD - Normal   CBC W/ AUTO DIFFERENTIAL    Narrative:     The following orders were created for panel order CBC auto differential.  Procedure                               Abnormality         Status                     ---------                               -----------         ------                     CBC with Differential[[id number]]        Abnormal            Final result                 Please view results for these tests on the individual orders.   TYPE & SCREEN          Imaging Results              CT Abdomen Pelvis  Without Contrast (Final result)  Result time 07/05/23 16:56:29      Final result by Alex Buchanan MD (07/05/23 16:56:29)                   Impression:      No acute abnormality identified in the abdomen or pelvis.      Electronically signed by: Alex Buchanan  Date:    07/05/2023  Time:    16:56               Narrative:    EXAMINATION:  CT ABDOMEN PELVIS WITHOUT CONTRAST    CLINICAL HISTORY:  Abdominal pain, acute, nonlocalized;    TECHNIQUE:  Low dose axial images, sagittal and coronal reformations were obtained from the lung bases to the pubic symphysis.  Oral contrast was not administered. The CT examination was performed using one or more of the following dose reduction techniques: Automated exposure control, adjustment of the mA and kV according to patient's size, use of acute or iterative reconstruction techniques.    COMPARISON:  09/11/2022    FINDINGS:  Lung bases are clear.    Fatty infiltration of the liver is suggested.  No focal hepatic lesion.  The gallbladder, adrenals, spleen, and pancreas appear normal.  Kidneys have a normal appearance.  There is no renal stone or hydronephrosis of either kidney.  No hydroureter.  Urinary bladder unremarkable.  No adnexal lesion.    No pneumoperitoneum.  No ascites.  No adenopathy.    Vascular structures are normal caliber.  No bowel  obstruction.  No acute bowel abnormality.  There is a small fat containing umbilical hernia.    No acute fracture identified.  Partial sacralization L5 on the left is again seen.  Mild degenerative changes of the lumbar spine.                                       Medications   sodium chloride 0.9% bolus 1,000 mL 1,000 mL (0 mLs Intravenous Stopped 7/5/23 1754)   pantoprazole injection 80 mg (80 mg Intravenous Given 7/5/23 1654)   acetaminophen tablet 1,000 mg (1,000 mg Oral Given 7/5/23 1937)     Medical Decision Making:   Initial Assessment:   Patient comes in with rectal bleeding.  She has history hemorrhoids.  Differential Diagnosis:   Lower GI bleeding with stable H&H  ED Management:  Follow-up with primary care provider within 2 days to repeat hematocrit                       Clinical Impression:   Final diagnoses:  [K92.2] Gastrointestinal hemorrhage, unspecified gastrointestinal hemorrhage type (Primary)  [K62.5] Rectal bleeding        ED Disposition Condition    Discharge Stable          ED Prescriptions    None       Follow-up Information    None          Frankie White DO  07/05/23 2050

## 2023-07-25 ENCOUNTER — HOSPITAL ENCOUNTER (OUTPATIENT)
Facility: HOSPITAL | Age: 43
Discharge: HOME | End: 2023-07-25
Attending: PAIN MEDICINE | Admitting: PAIN MEDICINE
Payer: MEDICAID

## 2023-07-25 ENCOUNTER — ANESTHESIA EVENT (OUTPATIENT)
Dept: PAIN MEDICINE | Facility: HOSPITAL | Age: 43
End: 2023-07-25
Payer: MEDICAID

## 2023-07-25 ENCOUNTER — ANESTHESIA (OUTPATIENT)
Dept: PAIN MEDICINE | Facility: HOSPITAL | Age: 43
End: 2023-07-25
Payer: MEDICAID

## 2023-07-25 VITALS
OXYGEN SATURATION: 100 % | HEIGHT: 62 IN | RESPIRATION RATE: 13 BRPM | DIASTOLIC BLOOD PRESSURE: 71 MMHG | WEIGHT: 293 LBS | SYSTOLIC BLOOD PRESSURE: 123 MMHG | TEMPERATURE: 98 F | BODY MASS INDEX: 53.92 KG/M2 | HEART RATE: 64 BPM

## 2023-07-25 DIAGNOSIS — M05.79 RHEUMATOID ARTHRITIS INVOLVING MULTIPLE SITES WITH POSITIVE RHEUMATOID FACTOR: Chronic | ICD-10-CM

## 2023-07-25 DIAGNOSIS — M47.817 SPONDYLOSIS OF LUMBOSACRAL REGION WITHOUT MYELOPATHY OR RADICULOPATHY: ICD-10-CM

## 2023-07-25 DIAGNOSIS — G89.29 CHRONIC PAIN OF RIGHT KNEE: Chronic | ICD-10-CM

## 2023-07-25 DIAGNOSIS — M47.816 SPONDYLOSIS OF LUMBAR REGION WITHOUT MYELOPATHY OR RADICULOPATHY: Chronic | ICD-10-CM

## 2023-07-25 DIAGNOSIS — M46.1 SACROILIITIS: ICD-10-CM

## 2023-07-25 DIAGNOSIS — M25.561 CHRONIC PAIN OF RIGHT KNEE: Chronic | ICD-10-CM

## 2023-07-25 LAB — GLUCOSE SERPL-MCNC: 129 MG/DL (ref 70–105)

## 2023-07-25 PROCEDURE — 01940 ANES NULYT AGT LMBR/SAC: CPT | Performed by: PAIN MEDICINE

## 2023-07-25 PROCEDURE — D9220A PRA ANESTHESIA: ICD-10-PCS | Mod: ,,, | Performed by: NURSE ANESTHETIST, CERTIFIED REGISTERED

## 2023-07-25 PROCEDURE — 82962 GLUCOSE BLOOD TEST: CPT

## 2023-07-25 PROCEDURE — 64635 DESTROY LUMB/SAC FACET JNT: CPT | Mod: RT,,, | Performed by: PAIN MEDICINE

## 2023-07-25 PROCEDURE — 63600175 PHARM REV CODE 636 W HCPCS: Performed by: NURSE ANESTHETIST, CERTIFIED REGISTERED

## 2023-07-25 PROCEDURE — 64636 DESTROY L/S FACET JNT ADDL: CPT | Mod: RT,,, | Performed by: PAIN MEDICINE

## 2023-07-25 PROCEDURE — 64635 DESTROY LUMB/SAC FACET JNT: CPT | Performed by: PAIN MEDICINE

## 2023-07-25 PROCEDURE — 27000284 HC CANNULA NASAL: Performed by: NURSE ANESTHETIST, CERTIFIED REGISTERED

## 2023-07-25 PROCEDURE — 63600175 PHARM REV CODE 636 W HCPCS: Performed by: PAIN MEDICINE

## 2023-07-25 PROCEDURE — D9220A PRA ANESTHESIA: Mod: ,,, | Performed by: NURSE ANESTHETIST, CERTIFIED REGISTERED

## 2023-07-25 PROCEDURE — 64636 DESTROY L/S FACET JNT ADDL: CPT | Performed by: PAIN MEDICINE

## 2023-07-25 PROCEDURE — 64635 PR DESTROY LUMB/SAC FACET JNT: ICD-10-PCS | Mod: RT,,, | Performed by: PAIN MEDICINE

## 2023-07-25 PROCEDURE — 27201423 OPTIME MED/SURG SUP & DEVICES STERILE SUPPLY: Performed by: PAIN MEDICINE

## 2023-07-25 PROCEDURE — 25000003 PHARM REV CODE 250: Performed by: NURSE ANESTHETIST, CERTIFIED REGISTERED

## 2023-07-25 PROCEDURE — 64636 PR DESTROY L/S FACET JNT ADDL: ICD-10-PCS | Mod: RT,,, | Performed by: PAIN MEDICINE

## 2023-07-25 PROCEDURE — 37000008 HC ANESTHESIA 1ST 15 MINUTES: Performed by: PAIN MEDICINE

## 2023-07-25 PROCEDURE — 37000009 HC ANESTHESIA EA ADD 15 MINS: Performed by: PAIN MEDICINE

## 2023-07-25 RX ORDER — ORPHENADRINE CITRATE 30 MG/ML
INJECTION INTRAMUSCULAR; INTRAVENOUS
Status: DISCONTINUED | OUTPATIENT
Start: 2023-07-25 | End: 2023-07-25

## 2023-07-25 RX ORDER — GABAPENTIN 300 MG/1
300 CAPSULE ORAL EVERY 8 HOURS
Qty: 90 CAPSULE | Refills: 1 | Status: SHIPPED | OUTPATIENT
Start: 2023-07-25 | End: 2023-11-30 | Stop reason: SDUPTHER

## 2023-07-25 RX ORDER — BUPIVACAINE HYDROCHLORIDE 2.5 MG/ML
INJECTION, SOLUTION INFILTRATION; PERINEURAL CODE/TRAUMA/SEDATION MEDICATION
Status: DISCONTINUED | OUTPATIENT
Start: 2023-07-25 | End: 2023-07-25 | Stop reason: HOSPADM

## 2023-07-25 RX ORDER — SODIUM CHLORIDE 9 MG/ML
INJECTION, SOLUTION INTRAVENOUS CONTINUOUS
Status: DISCONTINUED | OUTPATIENT
Start: 2023-07-25 | End: 2023-07-25 | Stop reason: HOSPADM

## 2023-07-25 RX ORDER — HYDROCODONE BITARTRATE AND ACETAMINOPHEN 10; 325 MG/1; MG/1
1 TABLET ORAL EVERY 8 HOURS PRN
Qty: 90 TABLET | Refills: 0 | Status: SHIPPED | OUTPATIENT
Start: 2023-08-02 | End: 2023-08-07 | Stop reason: SDUPTHER

## 2023-07-25 RX ORDER — TRIAMCINOLONE ACETONIDE 40 MG/ML
INJECTION, SUSPENSION INTRA-ARTICULAR; INTRAMUSCULAR CODE/TRAUMA/SEDATION MEDICATION
Status: DISCONTINUED | OUTPATIENT
Start: 2023-07-25 | End: 2023-07-25 | Stop reason: HOSPADM

## 2023-07-25 RX ORDER — PROPOFOL 10 MG/ML
VIAL (ML) INTRAVENOUS
Status: DISCONTINUED | OUTPATIENT
Start: 2023-07-25 | End: 2023-07-25

## 2023-07-25 RX ORDER — LIDOCAINE HYDROCHLORIDE 20 MG/ML
INJECTION INTRAVENOUS
Status: DISCONTINUED | OUTPATIENT
Start: 2023-07-25 | End: 2023-07-25

## 2023-07-25 RX ORDER — KETAMINE HYDROCHLORIDE 50 MG/ML
INJECTION, SOLUTION INTRAMUSCULAR; INTRAVENOUS
Status: DISCONTINUED | OUTPATIENT
Start: 2023-07-25 | End: 2023-07-25

## 2023-07-25 RX ADMIN — KETAMINE HYDROCHLORIDE 50 MG: 50 INJECTION INTRAMUSCULAR; INTRAVENOUS at 11:07

## 2023-07-25 RX ADMIN — SODIUM CHLORIDE: 9 INJECTION, SOLUTION INTRAVENOUS at 11:07

## 2023-07-25 RX ADMIN — PROPOFOL 150 MG: 10 INJECTION, EMULSION INTRAVENOUS at 11:07

## 2023-07-25 RX ADMIN — ORPHENADRINE CITRATE 60 MG: 30 INJECTION INTRAMUSCULAR; INTRAVENOUS at 11:07

## 2023-07-25 RX ADMIN — LIDOCAINE HYDROCHLORIDE 75 MG: 20 INJECTION, SOLUTION INTRAVENOUS at 11:07

## 2023-07-25 NOTE — ANESTHESIA PREPROCEDURE EVALUATION
2023  Belem Swann is a 43 y.o., female.    Past Medical History:   Diagnosis Date    Anxiety     Asthma     Chronic pain     Cushing syndrome     Depression     Essential hypertension     Fibromyalgia     Herpes zoster     Hyperlipidemia     Leukocytosis 2022    Onychomycosis     MARY on CPAP     Rheumatoid arthritis     Type 2 diabetes mellitus 2020    Vitamin D deficiency 2018       Past Surgical History:   Procedure Laterality Date     SECTION      ELBOW SURGERY  1985    EPIDURAL STEROID INJECTION      L4-5 VERITO Dec 2020-[facility]    FEMUR SURGERY Right     due to osteomyelitis    HYSTERECTOMY      Abn Bleeding    INJECTION OF ANESTHETIC AGENT AROUND MEDIAL BRANCH NERVES INNERVATING LUMBAR FACET JOINT Bilateral 2022    Procedure: Bilateral L4-5,5-S1 MBB ( No steroids);  Surgeon: Carmel Torre MD;  Location: [facility];  Service: Pain Management;  Laterality: Bilateral;  PT AWARE TO BE TESTED ON OV    INJECTION OF ANESTHETIC AGENT AROUND MEDIAL BRANCH NERVES INNERVATING LUMBAR FACET JOINT Bilateral 2022    Procedure: Block-nerve-medial branch-lumbar, bilateral L4 through S1;  Surgeon: Carmel Torre MD;  Location: [facility];  Service: Pain Management;  Laterality: Bilateral;    INJECTION OF ANESTHETIC AGENT INTO SACROILIAC JOINT Bilateral 2022    Procedure: BLOCK, SACROILIAC JOINT;  Surgeon: Carmel Torre MD;  Location: [facility];  Service: Pain Management;  Laterality: Bilateral;  covid test put in    LIPOMA RESECTION  2019    RADIOFREQUENCY ABLATION OF LUMBAR MEDIAL BRANCH NERVE AT SINGLE LEVEL Right 2022    Procedure: Radiofrequency Ablation, Nerve, Spinal, Lumbar, Medial Branch, Level L4-S1, right side 1st, will have left-sided after completing;  Surgeon: Carmel Torre MD;  Location:  [facility];  Service: Pain Management;  Laterality: Right;  pt aware at visit to be tested    RADIOFREQUENCY ABLATION OF LUMBAR MEDIAL BRANCH NERVE AT SINGLE LEVEL Left 05/05/2022    Procedure: LEFT  L4-S1 RFTC  (HAD RIGHT  ON 4-14);  Surgeon: Carmel Torre MD;  Location: [facility];  Service: Pain Management;  Laterality: Left;    SURGICAL REMOVAL OF PILONIDAL CYST      TRANSFORAMINAL EPIDURAL INJECTION OF STEROID      Bilateral L4-5 TFESI Nov 2020-[facility]    TRANSFORAMINAL EPIDURAL INJECTION OF STEROID      Right L4-5 TFESI Feb 2020-[facility]    VENTRAL HERNIA REPAIR  09/2020       Family History   Problem Relation Age of Onset    Thyroid cancer Maternal Grandmother     Cancer Mother     Thyroid cancer Mother     Melanoma Neg Hx     Colon cancer Neg Hx     Breast cancer Neg Hx     Ovarian cancer Neg Hx        Social History     Socioeconomic History    Marital status:    Tobacco Use    Smoking status: Every Day     Types: Cigarettes    Smokeless tobacco: Never   Substance and Sexual Activity    Alcohol use: Yes     Comment: ocassional    Drug use: Never    Sexual activity: Not Currently     Partners: Male     Birth control/protection: See Surgical Hx       Current Facility-Administered Medications   Medication Dose Route Frequency Provider Last Rate Last Admin    0.9%  NaCl infusion   Intravenous Continuous Carmel Torre MD           Review of patient's allergies indicates:   Allergen Reactions    Doxycycline     Latex      Other reaction(s): Unknown       Pre-op Assessment    I have reviewed the Patient Summary Reports.     I have reviewed the Nursing Notes. I have reviewed the NPO Status.   I have reviewed the Medications.     Review of Systems  Anesthesia Hx:  No problems with previous Anesthesia  Denies Family Hx of Anesthesia complications.   Denies Personal Hx of Anesthesia complications.   Social:  Smoker, Alcohol Use    Cardiovascular:   Exercise tolerance:  poor Hypertension hyperlipidemia ECG has been reviewed.    Pulmonary:   Asthma Sleep Apnea    Hepatic/GI:   Hiatal Hernia,    Musculoskeletal:  Musculoskeletal General/Symptoms: low back pain, joint pain.  Denies Lumbar Spine Disorders   Neurological:   Neuromuscular Disease,  Pain Syndrome  Chronic Pain Syndrome   Endocrine:   Diabetes, type 2  Diabetes, Type 2 Diabetes  Morbid Obesity / BMI > 40  Psych:   Psychiatric History  Anxiety Disorder.  Depression.          Physical Exam  General: Well nourished, Cooperative, Alert and Oriented    Airway:  Mallampati: II   Mouth Opening: Normal  TM Distance: Normal  Tongue: Normal  Neck ROM: Normal ROM    Dental:  Intact    Chest/Lungs:  Clear to auscultation    Heart:  Rate: Normal  Rhythm: Regular Rhythm  Sounds: Normal        Anesthesia Plan  Type of Anesthesia, risks & benefits discussed:    Anesthesia Type: Gen Natural Airway  Intra-op Monitoring Plan: Standard ASA Monitors  Post Op Pain Control Plan: multimodal analgesia  Induction:  IV  Informed Consent: Informed consent signed with the Patient and all parties understand the risks and agree with anesthesia plan.  All questions answered. Patient consented to blood products? Yes  ASA Score: 3  Day of Surgery Review of History & Physical: I have interviewed and examined the patient. I have reviewed the patient's H&P dated: There are no significant changes.     Ready For Surgery From Anesthesia Perspective.     .

## 2023-07-25 NOTE — TRANSFER OF CARE
"Anesthesia Transfer of Care Note    Patient: Belem Swann    Procedure(s) Performed: Procedure(s) (LRB):  Radiofrequency Ablation, Nerve, Spinal, Lumbar, Medial Branch, Level L4-S1 (Right)    Patient location: PACU    Anesthesia Type: general    Transport from OR: Transported from OR on room air with adequate spontaneous ventilation    Post pain: adequate analgesia    Post assessment: no apparent anesthetic complications    Post vital signs: stable    Level of consciousness: responds to stimulation and awake    Nausea/Vomiting: no nausea/vomiting    Complications: none    Transfer of care protocol was followedComments: Good SV continue, NAD noted, VSS, RTRN      Last vitals:   Visit Vitals  /74   Pulse 69   Temp 36.8 °C (98.2 °F)   Resp 11   Ht 5' 2" (1.575 m)   Wt (!) 157.4 kg (347 lb)   SpO2 98%   BMI 63.47 kg/m²     "

## 2023-07-25 NOTE — BRIEF OP NOTE
Discharge Note  Short Stay    Admit Date: 7/25/2023    Discharge Date: 7/25/2023    Attending Physician: Carmel Torre     Discharge Provider: Carmel Torre    Diagnosis:  Lumbosacral spondylosis    Discharged Condition: Good    Final Diagnoses: Spondylosis of lumbar region without myelopathy or radiculopathy [M47.816]    Disposition: Home or Self Care    Hospital Course: No complications, uneventful    Outcome of Hospitalization, Treatment, Procedure, or Surgery:  Patient was admitted for outpatient interventional pain management procedure. The patient tolerated the procedure well with no complications.    Follow up/Patient Instructions:  Follow up as scheduled in Pain Management office in 3-4 weeks.  Patient has received instructions and follow up date and time.    Medications:  Continue previous medications

## 2023-07-25 NOTE — DISCHARGE SUMMARY
[facility] - Pain Management  Discharge Note  Short Stay    Procedure(s) (LRB):  Radiofrequency Ablation, Nerve, Spinal, Lumbar, Medial Branch, Level L4-S1 (Right)      OUTCOME: Patient tolerated treatment/procedure well without complication and is now ready for discharge.    DISPOSITION: Home or Self Care    FINAL DIAGNOSIS:  Lumbosacral spondylosis    FOLLOWUP: In clinic    DISCHARGE INSTRUCTIONS:  See nurse's notes     TIME SPENT ON DISCHARGE: 5 minutes

## 2023-07-25 NOTE — ANESTHESIA POSTPROCEDURE EVALUATION
Anesthesia Post Evaluation    Patient: Belem Swann    Procedure(s) Performed: Procedure(s) (LRB):  Radiofrequency Ablation, Nerve, Spinal, Lumbar, Medial Branch, Level L4-S1 (Right)    Final Anesthesia Type: general      Patient location during evaluation: GI PACU  Patient participation: Yes- Able to Participate  Level of consciousness: awake and alert  Post-procedure vital signs: reviewed and stable  Pain management: adequate  Airway patency: patent    PONV status at discharge: No PONV  Anesthetic complications: no      Cardiovascular status: blood pressure returned to baseline and hemodynamically stable  Respiratory status: spontaneous ventilation  Hydration status: euvolemic  Follow-up not needed.  Comments: Pt voices appreciation for care          Vitals Value Taken Time   /85 07/25/23 1146   Temp 36.8 °C (98.2 °F) 07/25/23 1130   Pulse 64 07/25/23 1147   Resp 14 07/25/23 1147   SpO2 100 % 07/25/23 1147   Vitals shown include unvalidated device data.      No case tracking events are documented in the log.      Pain/Lowell Score: Lowlel Score: 9 (7/25/2023 11:29 AM)

## 2023-07-25 NOTE — OP NOTE
Procedure Note    Procedure Date: 7/25/2023    Procedure Performed:  Radiofrequency ablation of the right  L4-5,5-S1 medial branch nerves utilizing fluoroscopy    Indications: Patient has failed conservative therapy.      Pre-op diagnosis: Lumbosacral Spondylosis    Post-op diagnosis: same    Physician: REBECCA Torre MD    Anesthesia:MAC    Medications injected:  Pre-lesion, 2ml of 1% lidocaine at each level.  From a mixture of  0.25% bupivacaine and  40mg of kenalog 1ml of this solution was injected at each level post-lesion.    Local anesthetic used: 1% Lidocaine, 10 ml     Estimated Blood Loss:Less than 1cc    IVF:Per Anesthesia    Complications: None    Technique:  The patient was interviewed in the holding area and Risks/Benefits were discussed, including, but not limited to  the possibility of new or different pain, bleeding or infection.   All questions were answered.  The patient understood and accepted risks.  The area of treatment was marked. Consent was reviewed and signed.  A time out was taken to identify the patient, procedure and side of the procedure. The patient was placed in a prone position, then prepped and draped in the usual sterile fashion using ChloraPrep and sterile towels.  The levels were determined under fluoroscopic guidance.   AP and oblique fluoroscopy were used to identify and tirso the junctions between the superior articular processes and transverse processes at  right  L4-5,5-S1.  The right sacral ala was also identified and marked.  The skin and subcutaneous tissues in these identified areas were anesthetized with 1% lidocaine. A 20-gauge 150 mm [employer] RF needle was advanced towards each of these points under fluoroscopic guidance such that the tip of the needle was positioned posterior to the Neuro-foramen on lateral fluoroscopic view. Each needle was positioned such that, on stimulation, the patient had an appropriate pain response between 0.3-0.5 V, with no motor response, other  than Lumbar Paraspinal Muscles up to 2.0V.  One mL of 2% lidocaine was then injected at each level prior to lesioning, which was performed for 90 seconds at 80°C.  Once the lesion was complete, 1 mL of a solution from a  mixture of 0.25% bupivacaine 1 cc and 40mg kenalog  was injected through each probe. The probes were removed and a sterile Band-Aid dressing was applied to the puncture site.  The patient tolerated the procedure well and was monitored after the procedure.  Patient was given post procedure and discharge instructions to follow at home.  The patient was discharged in a stable condition and accompanied by an adult.

## 2023-07-25 NOTE — PLAN OF CARE
Plan:  D/c pt via wheelchair at 1205  Informed pt if does not void in 8 hours to go to ER. Notify if redness, drainage, from injection site or fever over next 3-4 days. Rest and drink plenty of fluids for the remainder of the day. No lifting over 5 lbs. For the remainder of the day. Continue regular medications as prescribed. May take pain medications as prescribed.     Pain improved 100%

## 2023-08-07 NOTE — PROGRESS NOTES
Subjective:         Patient ID: Belem Swann is a 43 y.o. female.    Chief Complaint: Knee Pain (bilateral) and Low-back Pain        Pain  This is a chronic problem. The current episode started more than 1 year ago. The problem occurs daily. The problem has been waxing and waning. Associated symptoms include arthralgias and neck pain. Pertinent negatives include no anorexia, chest pain, chills, coughing, diaphoresis, fever, sore throat, vertigo or vomiting.     Review of Systems   Constitutional:  Negative for activity change, appetite change, chills, diaphoresis, fever and unexpected weight change.   HENT:  Negative for drooling, ear discharge, ear pain, facial swelling, mouth sores, nosebleeds, sore throat, trouble swallowing, voice change and goiter.    Eyes:  Negative for photophobia, pain, discharge, redness and visual disturbance.   Respiratory:  Negative for apnea, cough, choking, chest tightness, shortness of breath, wheezing and stridor.    Cardiovascular:  Negative for chest pain, palpitations and leg swelling.   Gastrointestinal:  Negative for abdominal distention, anorexia, diarrhea, rectal pain, vomiting and fecal incontinence.   Endocrine: Negative for cold intolerance, heat intolerance, polydipsia, polyphagia and polyuria.   Genitourinary:  Negative for bladder incontinence, dysuria, flank pain, frequency and hot flashes.   Musculoskeletal:  Positive for arthralgias, back pain, leg pain, neck pain and neck stiffness.   Integumentary:  Negative for color change and pallor.   Allergic/Immunologic: Negative for immunocompromised state.   Neurological:  Negative for dizziness, vertigo, seizures, syncope, facial asymmetry, speech difficulty, light-headedness, coordination difficulties, memory loss and coordination difficulties.   Hematological:  Negative for adenopathy. Does not bruise/bleed easily.   Psychiatric/Behavioral:  Negative for agitation, behavioral problems, confusion, decreased  concentration, dysphoric mood, hallucinations, self-injury and suicidal ideas. The patient is not hyperactive.            Past Medical History:   Diagnosis Date    Anxiety     Asthma     Chronic pain     Cushing syndrome     Depression     Essential hypertension     Fibromyalgia     Herpes zoster     Hyperlipidemia     Leukocytosis 2022    Onychomycosis     MARY on CPAP     Rheumatoid arthritis     Type 2 diabetes mellitus 2020    Vitamin D deficiency 2018     Past Surgical History:   Procedure Laterality Date     SECTION      ELBOW SURGERY  1985    EPIDURAL STEROID INJECTION      L4-5 VERITO Dec 2020-[facility]    FEMUR SURGERY Right     due to osteomyelitis    HYSTERECTOMY      Abn Bleeding    INJECTION OF ANESTHETIC AGENT AROUND MEDIAL BRANCH NERVES INNERVATING LUMBAR FACET JOINT Bilateral 2022    Procedure: Bilateral L4-5,5-S1 MBB ( No steroids);  Surgeon: Carmel Torre MD;  Location: [facility];  Service: Pain Management;  Laterality: Bilateral;  PT AWARE TO BE TESTED ON OV    INJECTION OF ANESTHETIC AGENT AROUND MEDIAL BRANCH NERVES INNERVATING LUMBAR FACET JOINT Bilateral 2022    Procedure: Block-nerve-medial branch-lumbar, bilateral L4 through S1;  Surgeon: Carmel Torre MD;  Location: [facility];  Service: Pain Management;  Laterality: Bilateral;    INJECTION OF ANESTHETIC AGENT INTO SACROILIAC JOINT Bilateral 2022    Procedure: BLOCK, SACROILIAC JOINT;  Surgeon: Carmel Torre MD;  Location: [facility];  Service: Pain Management;  Laterality: Bilateral;  covid test put in    LIPOMA RESECTION  2019    RADIOFREQUENCY ABLATION OF LUMBAR MEDIAL BRANCH NERVE AT SINGLE LEVEL Right 2022    Procedure: Radiofrequency Ablation, Nerve, Spinal, Lumbar, Medial Branch, Level L4-S1, right side 1st, will have left-sided after completing;  Surgeon: Carmel Torre MD;  Location: [facility];  Service: Pain Management;  Laterality: Right;   "pt aware at visit to be tested    RADIOFREQUENCY ABLATION OF LUMBAR MEDIAL BRANCH NERVE AT SINGLE LEVEL Left 05/05/2022    Procedure: LEFT  L4-S1 RFTC  (HAD RIGHT  ON 4-14);  Surgeon: Carmel Torre MD;  Location: [facility];  Service: Pain Management;  Laterality: Left;    RADIOFREQUENCY ABLATION OF LUMBAR MEDIAL BRANCH NERVE AT SINGLE LEVEL Right 7/25/2023    Procedure: Radiofrequency Ablation, Nerve, Spinal, Lumbar, Medial Branch, Level L4-S1;  Surgeon: Carmel Torre MD;  Location: [facility];  Service: Pain Management;  Laterality: Right;    SURGICAL REMOVAL OF PILONIDAL CYST      TRANSFORAMINAL EPIDURAL INJECTION OF STEROID      Bilateral L4-5 TFESI Nov 2020-[facility]    TRANSFORAMINAL EPIDURAL INJECTION OF STEROID      Right L4-5 TFESI Feb 2020-[facility]    VENTRAL HERNIA REPAIR  09/2020     Social History     Socioeconomic History    Marital status:    Tobacco Use    Smoking status: Every Day     Current packs/day: 0.00     Types: Cigarettes    Smokeless tobacco: Never   Substance and Sexual Activity    Alcohol use: Yes     Comment: ocassional    Drug use: Never    Sexual activity: Not Currently     Partners: Male     Birth control/protection: See Surgical Hx     Family History   Problem Relation Age of Onset    Thyroid cancer Maternal Grandmother     Cancer Mother     Thyroid cancer Mother     Melanoma Neg Hx     Colon cancer Neg Hx     Breast cancer Neg Hx     Ovarian cancer Neg Hx      Review of patient's allergies indicates:   Allergen Reactions    Doxycycline     Latex      Other reaction(s): Unknown        Objective:  Vitals:    08/08/23 1322   BP: (!) 120/91   Pulse: 74   Resp: 18   Weight: (!) 155.1 kg (342 lb)   Height: 5' 2" (1.575 m)   PainSc:   8           Physical Exam  Vitals and nursing note reviewed. Exam conducted with a chaperone present.   Constitutional:       General: She is awake. She is not in acute distress.     Appearance: Normal appearance. She is obese. She is " not ill-appearing, toxic-appearing or diaphoretic.   HENT:      Head: Normocephalic and atraumatic.      Nose: Nose normal.      Mouth/Throat:      Mouth: Mucous membranes are moist.      Pharynx: Oropharynx is clear.   Eyes:      Conjunctiva/sclera: Conjunctivae normal.      Pupils: Pupils are equal, round, and reactive to light.   Cardiovascular:      Rate and Rhythm: Normal rate.   Pulmonary:      Effort: Pulmonary effort is normal. No respiratory distress.   Abdominal:      Palpations: Abdomen is soft.      Tenderness: There is no guarding.   Musculoskeletal:         General: No swelling.      Cervical back: Normal range of motion and neck supple. Tenderness present. No rigidity.      Thoracic back: Tenderness present.      Lumbar back: Tenderness present. Decreased range of motion.   Skin:     General: Skin is warm and dry.      Coloration: Skin is not jaundiced or pale.   Neurological:      General: No focal deficit present.      Mental Status: She is alert and oriented to person, place, and time. Mental status is at baseline.      Cranial Nerves: No cranial nerve deficit (II-XII).   Psychiatric:         Mood and Affect: Mood normal.         Behavior: Behavior normal. Behavior is cooperative.         Thought Content: Thought content normal.           FL Fluoro for Pain Management  See OP Notes for results.     IMPRESSION: See OP Notes for results.     This procedure was auto-finalized by: Virtual Radiologist         Admission on 07/25/2023, Discharged on 07/25/2023   Component Date Value Ref Range Status    POC Glucose 07/25/2023 129 (H)  70 - 105 mg/dL Final   Admission on 07/05/2023, Discharged on 07/05/2023   Component Date Value Ref Range Status    Sodium 07/05/2023 138  136 - 145 mmol/L Final    Potassium 07/05/2023 3.6  3.5 - 5.1 mmol/L Final    Chloride 07/05/2023 104  98 - 107 mmol/L Final    CO2 07/05/2023 28  21 - 32 mmol/L Final    Anion Gap 07/05/2023 10  7 - 16 mmol/L Final    Glucose 07/05/2023  166 (H)  74 - 106 mg/dL Final    BUN 07/05/2023 17  7 - 18 mg/dL Final    Creatinine 07/05/2023 0.82  0.55 - 1.02 mg/dL Final    BUN/Creatinine Ratio 07/05/2023 21 (H)  6 - 20 Final    Calcium 07/05/2023 8.6  8.5 - 10.1 mg/dL Final    Total Protein 07/05/2023 8.0  6.4 - 8.2 g/dL Final    Albumin 07/05/2023 3.3 (L)  3.5 - 5.0 g/dL Final    Globulin 07/05/2023 4.7 (H)  2.0 - 4.0 g/dL Final    A/G Ratio 07/05/2023 0.7   Final    Bilirubin, Total 07/05/2023 0.4  >0.0 - 1.2 mg/dL Final    Alk Phos 07/05/2023 115 (H)  37 - 98 U/L Final    ALT 07/05/2023 21  13 - 56 U/L Final    AST 07/05/2023 9 (L)  15 - 37 U/L Final    eGFR 07/05/2023 91  >=60 mL/min/1.73m2 Final    Specimen Outdate 07/05/2023 07/08/2023 23:59   Final    Group & Rh 07/05/2023 O POS   Final    Indirect Sarah 07/05/2023 NEG   Final    Lipase 07/05/2023 69 (L)  73 - 393 U/L Final    WBC 07/05/2023 12.60 (H)  4.50 - 11.00 K/uL Final    RBC 07/05/2023 4.65  4.20 - 5.40 M/uL Final    Hemoglobin 07/05/2023 13.6  12.0 - 16.0 g/dL Final    Hematocrit 07/05/2023 42.5  38.0 - 47.0 % Final    MCV 07/05/2023 91.4  80.0 - 96.0 fL Final    MCH 07/05/2023 29.2  27.0 - 31.0 pg Final    MCHC 07/05/2023 32.0  32.0 - 36.0 g/dL Final    RDW 07/05/2023 12.9  11.5 - 14.5 % Final    Platelet Count 07/05/2023 163  150 - 400 K/uL Final    MPV 07/05/2023 13.9 (H)  9.4 - 12.4 fL Final    Neutrophils % 07/05/2023 79.8 (H)  53.0 - 65.0 % Final    Lymphocytes % 07/05/2023 12.1 (L)  27.0 - 41.0 % Final    Monocytes % 07/05/2023 4.9  2.0 - 6.0 % Final    Eosinophils % 07/05/2023 2.5  1.0 - 4.0 % Final    Basophils % 07/05/2023 0.3  0.0 - 1.0 % Final    Immature Granulocytes % 07/05/2023 0.4  0.0 - 0.4 % Final    nRBC, Auto 07/05/2023 0.0  <=0.0 % Final    Neutrophils, Abs 07/05/2023 10.05 (H)  1.80 - 7.70 K/uL Final    Lymphocytes, Absolute 07/05/2023 1.52  1.00 - 4.80 K/uL Final    Monocytes, Absolute 07/05/2023 0.62  0.00 - 0.80 K/uL Final    Eosinophils, Absolute 07/05/2023 0.32   0.00 - 0.50 K/uL Final    Basophils, Absolute 07/05/2023 0.04  0.00 - 0.20 K/uL Final    Immature Granulocytes, Absolute 07/05/2023 0.05 (H)  0.00 - 0.04 K/uL Final    nRBC, Absolute 07/05/2023 0.00  <=0.00 x10e3/uL Final    Diff Type 07/05/2023 Auto   Final    Hemoglobin 07/05/2023 13.1  12.0 - 16.0 g/dL Final    Hematocrit 07/05/2023 40.9  38.0 - 47.0 % Final   Office Visit on 06/27/2023   Component Date Value Ref Range Status    Sodium 06/27/2023 140  136 - 145 mmol/L Final    Potassium 06/27/2023 4.1  3.5 - 5.1 mmol/L Final    Chloride 06/27/2023 107  98 - 107 mmol/L Final    CO2 06/27/2023 27  21 - 32 mmol/L Final    Anion Gap 06/27/2023 10  7 - 16 mmol/L Final    Glucose 06/27/2023 134 (H)  74 - 106 mg/dL Final    BUN 06/27/2023 16  7 - 18 mg/dL Final    Creatinine 06/27/2023 0.54 (L)  0.55 - 1.02 mg/dL Final    BUN/Creatinine Ratio 06/27/2023 30 (H)  6 - 20 Final    Calcium 06/27/2023 9.2  8.5 - 10.1 mg/dL Final    eGFR 06/27/2023 117  >=60 mL/min/1.73m2 Final    Hemoglobin A1C 06/27/2023 6.0  4.5 - 6.6 % Final    Estimated Average Glucose 06/27/2023 114  mg/dL Final   Office Visit on 05/03/2023   Component Date Value Ref Range Status    POC Amphetamines 05/03/2023 Negative  Negative, Inconclusive Final    POC Barbiturates 05/03/2023 Negative  Negative, Inconclusive Final    POC Benzodiazepines 05/03/2023 Negative  Negative, Inconclusive Final    POC Cocaine 05/03/2023 Negative  Negative, Inconclusive Final    POC THC 05/03/2023 Negative  Negative, Inconclusive Final    POC Methadone 05/03/2023 Negative  Negative, Inconclusive Final    POC Methamphetamine 05/03/2023 Negative  Negative, Inconclusive Final    POC Opiates 05/03/2023 Presumptive Positive (A)  Negative, Inconclusive Final    POC Oxycodone 05/03/2023 Negative  Negative, Inconclusive Final    POC Phencyclidine 05/03/2023 Negative  Negative, Inconclusive Final    POC Methylenedioxymethamphetamine * 05/03/2023 Negative  Negative, Inconclusive Final     POC Tricyclic Antidepressants 05/03/2023 Negative  Negative, Inconclusive Final    POC Buprenorphine 05/03/2023 Negative   Final     Acceptable 05/03/2023 Yes   Final    POC Temperature (Urine) 05/03/2023 94   Final   Office Visit on 03/23/2023   Component Date Value Ref Range Status    Case Report 03/23/2023    Final                    Value:Pap Cytology                                      Case: [id number]                                   Authorizing Provider:  Britney Garcia CNM         Collected:           03/23/2023 02:24 PM          Ordering Location:     [facility] Received:            03/24/2023 08:28 AM                                 - Obstetrics And                                                                                    Gynecology                                                                   First Screen:          CONSUELO Nguyen(ASCP)                                                    Pathologist:           Louie Chopra III, MD                                                                           Specimen:    Liquid-Based Pap Test, Screening, Cervix                                                   Interpretation 03/23/2023 Atypical Squamous Cells of Undetermined Significance (A)              Final    General Categorization 03/23/2023 Epithelial Cell Abnormality   Final    Specimen Adequacy 03/23/2023 Satisfactory for evaluation   Final    Clinical Information 03/23/2023    Final                    Value:This result contains rich text formatting which cannot be displayed here.    Disclaimer 03/23/2023    Final                    Value:This result contains rich text formatting which cannot be displayed here.    HPV 16 03/23/2023 Negative  Negative, Invalid Final    HPV 18 03/23/2023 Negative  Negative, Invalid Final    HPV Other 03/23/2023 Negative  Negative, Invalid  Final   Office Visit on 02/23/2023   Component Date Value Ref Range Status    POC Amphetamines 02/23/2023 Negative  Negative, Inconclusive Final    POC Barbiturates 02/23/2023 Negative  Negative, Inconclusive Final    POC Benzodiazepines 02/23/2023 Negative  Negative, Inconclusive Final    POC Cocaine 02/23/2023 Negative  Negative, Inconclusive Final    POC THC 02/23/2023 Negative  Negative, Inconclusive Final    POC Methadone 02/23/2023 Negative  Negative, Inconclusive Final    POC Methamphetamine 02/23/2023 Negative  Negative, Inconclusive Final    POC Opiates 02/23/2023 Presumptive Positive (A)  Negative, Inconclusive Final    POC Oxycodone 02/23/2023 Negative  Negative, Inconclusive Final    POC Phencyclidine 02/23/2023 Negative  Negative, Inconclusive Final    POC Methylenedioxymethamphetamine * 02/23/2023 Negative  Negative, Inconclusive Final    POC Tricyclic Antidepressants 02/23/2023 Negative  Negative, Inconclusive Final    POC Buprenorphine 02/23/2023 Negative   Final     Acceptable 02/23/2023 Yes   Final    POC Temperature (Urine) 02/23/2023 90   Final         Orders Placed This Encounter   Procedures    POCT Urine Drug Screen Presump     Interpretive Information:     Negative:  No drug detected at the cut off level.   Positive:  This result represents presumptive positive for the   tested drug, other substances may yield a positive response other   than the analyte of interest. This result should be utilized for   diagnostic purpose only. Confirmation testing will be performed upon physician request only.              Requested Prescriptions     Pending Prescriptions Disp Refills    HYDROcodone-acetaminophen (NORCO)  mg per tablet 90 tablet 0     Sig: Take 1 tablet by mouth every 8 (eight) hours as needed for Pain.       Assessment:     1. 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 Rheumatoid arthritis involving multiple sites with positive rheumatoid  factor    3. Sacroiliitis    4. Chronic pain of right knee    5. Encounter for long-term (current) use of other medications           A's of Opioid Risk Assessment  Activity:Patient can perform ADL.   Analgesia:Patients pain is partially controlled by current medication. Patient has tried OTC medications such as Tylenol and Ibuprofen with out relief.   Adverse Effects: Patient denies constipation or sedation.  Aberrant Behavior:  reviewed with no aberrant drug seeking/taking behavior.  Overdose reversal drug naloxone discussed    Drug screen reviewed    X-ray right knee [facility] September 14, 2022 degenerative changes no fracture noted      Plan:    Narcan December 2022    Follows rheumatology ARM rheumatoid arthritis    Right knee pain chronic follows orthopedics [facility] considering right knee procedure    Follow-up after bilateral lumbar RF TC  July 25, 2023  She states she has 75% relief after procedure, the procedure did help improve her level function     Her pharmacy ran out of medication/Norco    She is not had any Norco x5 days    She is requesting to print her prescription today so she can find a pharmacy that has her medication    Due to being out of medication she is complaint increasing joint back pain     Requesting Toradol injection    Toradol 60 mg IM, tolerated well    Continue home exercise program as directed    Continue current medication    Follow-up 2 months    Dr. Torre July 2024    Bring original prescription medication bottles/container/box with labels to each visit

## 2023-08-08 ENCOUNTER — OFFICE VISIT (OUTPATIENT)
Dept: PAIN MEDICINE | Facility: CLINIC | Age: 43
End: 2023-08-08
Payer: MEDICAID

## 2023-08-08 VITALS
RESPIRATION RATE: 18 BRPM | DIASTOLIC BLOOD PRESSURE: 91 MMHG | HEART RATE: 74 BPM | BODY MASS INDEX: 53.92 KG/M2 | HEIGHT: 62 IN | WEIGHT: 293 LBS | SYSTOLIC BLOOD PRESSURE: 120 MMHG

## 2023-08-08 DIAGNOSIS — M05.79 RHEUMATOID ARTHRITIS INVOLVING MULTIPLE SITES WITH POSITIVE RHEUMATOID FACTOR: Chronic | ICD-10-CM

## 2023-08-08 DIAGNOSIS — M46.1 SACROILIITIS: ICD-10-CM

## 2023-08-08 DIAGNOSIS — Z79.899 ENCOUNTER FOR LONG-TERM (CURRENT) USE OF OTHER MEDICATIONS: ICD-10-CM

## 2023-08-08 DIAGNOSIS — M47.816 SPONDYLOSIS OF LUMBAR REGION WITHOUT MYELOPATHY OR RADICULOPATHY: Primary | Chronic | ICD-10-CM

## 2023-08-08 DIAGNOSIS — G89.29 CHRONIC PAIN OF RIGHT KNEE: Chronic | ICD-10-CM

## 2023-08-08 DIAGNOSIS — M25.561 CHRONIC PAIN OF RIGHT KNEE: Chronic | ICD-10-CM

## 2023-08-08 LAB

## 2023-08-08 PROCEDURE — 3074F SYST BP LT 130 MM HG: CPT | Mod: CPTII,,, | Performed by: PHYSICIAN ASSISTANT

## 2023-08-08 PROCEDURE — 1159F MED LIST DOCD IN RCRD: CPT | Mod: CPTII,,, | Performed by: PHYSICIAN ASSISTANT

## 2023-08-08 PROCEDURE — 4010F PR ACE/ARB THEARPY RXD/TAKEN: ICD-10-PCS | Mod: CPTII,,, | Performed by: PHYSICIAN ASSISTANT

## 2023-08-08 PROCEDURE — 3008F BODY MASS INDEX DOCD: CPT | Mod: CPTII,,, | Performed by: PHYSICIAN ASSISTANT

## 2023-08-08 PROCEDURE — 3066F PR DOCUMENTATION OF TREATMENT FOR NEPHROPATHY: ICD-10-PCS | Mod: CPTII,,, | Performed by: PHYSICIAN ASSISTANT

## 2023-08-08 PROCEDURE — 3044F HG A1C LEVEL LT 7.0%: CPT | Mod: CPTII,,, | Performed by: PHYSICIAN ASSISTANT

## 2023-08-08 PROCEDURE — 96372 THER/PROPH/DIAG INJ SC/IM: CPT | Mod: PBBFAC | Performed by: PHYSICIAN ASSISTANT

## 2023-08-08 PROCEDURE — 3080F PR MOST RECENT DIASTOLIC BLOOD PRESSURE >= 90 MM HG: ICD-10-PCS | Mod: CPTII,,, | Performed by: PHYSICIAN ASSISTANT

## 2023-08-08 PROCEDURE — 3061F NEG MICROALBUMINURIA REV: CPT | Mod: CPTII,,, | Performed by: PHYSICIAN ASSISTANT

## 2023-08-08 PROCEDURE — 3080F DIAST BP >= 90 MM HG: CPT | Mod: CPTII,,, | Performed by: PHYSICIAN ASSISTANT

## 2023-08-08 PROCEDURE — 4010F ACE/ARB THERAPY RXD/TAKEN: CPT | Mod: CPTII,,, | Performed by: PHYSICIAN ASSISTANT

## 2023-08-08 PROCEDURE — 1159F PR MEDICATION LIST DOCUMENTED IN MEDICAL RECORD: ICD-10-PCS | Mod: CPTII,,, | Performed by: PHYSICIAN ASSISTANT

## 2023-08-08 PROCEDURE — 3061F PR NEG MICROALBUMINURIA RESULT DOCUMENTED/REVIEW: ICD-10-PCS | Mod: CPTII,,, | Performed by: PHYSICIAN ASSISTANT

## 2023-08-08 PROCEDURE — 80305 DRUG TEST PRSMV DIR OPT OBS: CPT | Mod: PBBFAC | Performed by: PHYSICIAN ASSISTANT

## 2023-08-08 PROCEDURE — 3044F PR MOST RECENT HEMOGLOBIN A1C LEVEL <7.0%: ICD-10-PCS | Mod: CPTII,,, | Performed by: PHYSICIAN ASSISTANT

## 2023-08-08 PROCEDURE — 99214 PR OFFICE/OUTPT VISIT, EST, LEVL IV, 30-39 MIN: ICD-10-PCS | Mod: S$PBB,25,, | Performed by: PHYSICIAN ASSISTANT

## 2023-08-08 PROCEDURE — 3008F PR BODY MASS INDEX (BMI) DOCUMENTED: ICD-10-PCS | Mod: CPTII,,, | Performed by: PHYSICIAN ASSISTANT

## 2023-08-08 PROCEDURE — 99215 OFFICE O/P EST HI 40 MIN: CPT | Mod: PBBFAC,25 | Performed by: PHYSICIAN ASSISTANT

## 2023-08-08 PROCEDURE — 3074F PR MOST RECENT SYSTOLIC BLOOD PRESSURE < 130 MM HG: ICD-10-PCS | Mod: CPTII,,, | Performed by: PHYSICIAN ASSISTANT

## 2023-08-08 PROCEDURE — 3066F NEPHROPATHY DOC TX: CPT | Mod: CPTII,,, | Performed by: PHYSICIAN ASSISTANT

## 2023-08-08 PROCEDURE — 99214 OFFICE O/P EST MOD 30 MIN: CPT | Mod: S$PBB,25,, | Performed by: PHYSICIAN ASSISTANT

## 2023-08-08 RX ORDER — KETOROLAC TROMETHAMINE 30 MG/ML
60 INJECTION, SOLUTION INTRAMUSCULAR; INTRAVENOUS
Status: COMPLETED | OUTPATIENT
Start: 2023-08-08 | End: 2023-08-08

## 2023-08-08 RX ORDER — HYDROCODONE BITARTRATE AND ACETAMINOPHEN 10; 325 MG/1; MG/1
1 TABLET ORAL EVERY 8 HOURS PRN
Qty: 90 TABLET | Refills: 0 | Status: SHIPPED | OUTPATIENT
Start: 2023-09-07 | End: 2023-10-03 | Stop reason: SDUPTHER

## 2023-08-08 RX ORDER — HYDROCODONE BITARTRATE AND ACETAMINOPHEN 10; 325 MG/1; MG/1
1 TABLET ORAL EVERY 8 HOURS PRN
Qty: 90 TABLET | Refills: 0 | Status: SHIPPED | OUTPATIENT
Start: 2023-08-08 | End: 2023-10-03 | Stop reason: SDUPTHER

## 2023-08-08 RX ADMIN — KETOROLAC TROMETHAMINE 60 MG: 60 INJECTION, SOLUTION INTRAMUSCULAR at 02:08

## 2023-08-18 ENCOUNTER — OFFICE VISIT (OUTPATIENT)
Dept: DERMATOLOGY | Facility: CLINIC | Age: 43
End: 2023-08-18
Payer: MEDICAID

## 2023-08-18 DIAGNOSIS — L73.2 HIDRADENITIS SUPPURATIVA: ICD-10-CM

## 2023-08-18 DIAGNOSIS — L40.0 PLAQUE PSORIASIS: ICD-10-CM

## 2023-08-18 DIAGNOSIS — Z79.899 HIGH RISK MEDICATION USE: Primary | ICD-10-CM

## 2023-08-18 PROCEDURE — 3066F PR DOCUMENTATION OF TREATMENT FOR NEPHROPATHY: ICD-10-PCS | Mod: CPTII,,, | Performed by: STUDENT IN AN ORGANIZED HEALTH CARE EDUCATION/TRAINING PROGRAM

## 2023-08-18 PROCEDURE — 3044F PR MOST RECENT HEMOGLOBIN A1C LEVEL <7.0%: ICD-10-PCS | Mod: CPTII,,, | Performed by: STUDENT IN AN ORGANIZED HEALTH CARE EDUCATION/TRAINING PROGRAM

## 2023-08-18 PROCEDURE — 3061F PR NEG MICROALBUMINURIA RESULT DOCUMENTED/REVIEW: ICD-10-PCS | Mod: CPTII,,, | Performed by: STUDENT IN AN ORGANIZED HEALTH CARE EDUCATION/TRAINING PROGRAM

## 2023-08-18 PROCEDURE — 99214 PR OFFICE/OUTPT VISIT, EST, LEVL IV, 30-39 MIN: ICD-10-PCS | Mod: ,,, | Performed by: STUDENT IN AN ORGANIZED HEALTH CARE EDUCATION/TRAINING PROGRAM

## 2023-08-18 PROCEDURE — 3044F HG A1C LEVEL LT 7.0%: CPT | Mod: CPTII,,, | Performed by: STUDENT IN AN ORGANIZED HEALTH CARE EDUCATION/TRAINING PROGRAM

## 2023-08-18 PROCEDURE — 1159F PR MEDICATION LIST DOCUMENTED IN MEDICAL RECORD: ICD-10-PCS | Mod: CPTII,,, | Performed by: STUDENT IN AN ORGANIZED HEALTH CARE EDUCATION/TRAINING PROGRAM

## 2023-08-18 PROCEDURE — 1159F MED LIST DOCD IN RCRD: CPT | Mod: CPTII,,, | Performed by: STUDENT IN AN ORGANIZED HEALTH CARE EDUCATION/TRAINING PROGRAM

## 2023-08-18 PROCEDURE — 3066F NEPHROPATHY DOC TX: CPT | Mod: CPTII,,, | Performed by: STUDENT IN AN ORGANIZED HEALTH CARE EDUCATION/TRAINING PROGRAM

## 2023-08-18 PROCEDURE — 4010F ACE/ARB THERAPY RXD/TAKEN: CPT | Mod: CPTII,,, | Performed by: STUDENT IN AN ORGANIZED HEALTH CARE EDUCATION/TRAINING PROGRAM

## 2023-08-18 PROCEDURE — 3061F NEG MICROALBUMINURIA REV: CPT | Mod: CPTII,,, | Performed by: STUDENT IN AN ORGANIZED HEALTH CARE EDUCATION/TRAINING PROGRAM

## 2023-08-18 PROCEDURE — 4010F PR ACE/ARB THEARPY RXD/TAKEN: ICD-10-PCS | Mod: CPTII,,, | Performed by: STUDENT IN AN ORGANIZED HEALTH CARE EDUCATION/TRAINING PROGRAM

## 2023-08-18 PROCEDURE — 99214 OFFICE O/P EST MOD 30 MIN: CPT | Mod: ,,, | Performed by: STUDENT IN AN ORGANIZED HEALTH CARE EDUCATION/TRAINING PROGRAM

## 2023-08-18 NOTE — PROGRESS NOTES
[facility]  Cj Wakefield MD    [address] [place] [address]  [phone]    Fax: [phone]    Patient Name: Belem Swann  Medical Record Number: [id number]  PCP: Rhoda Rueda FNP  Age: 43 y.o. : 1980  Contact: [phone] (home)     History of Present Illness:     Belem Swann is a 43 y.o.  female here for follow up of HS and plaque psoriasis. Treatment plan includes humira 40 mg weekly which has improved her HS and psoriasis.     The patient has no other concerns today.    Review of Systems:     Unremarkable other than mentioned above.     Physical Exam:     General: Relaxed, oriented, alert    Skin examination of the scalp, face, neck, chest, back, abdomen, upper extremities and lower extremities were normal except for as listed below      Assessment and Plan:     1. Hidradenitis Suppurativa  - Fistula formation and draining sinuses, weeping acneiform pustules and papules, scarring, and acneiform nodules  Alvarado Stage: 3    Plan:   Continue Humira 40 mg     Counseling.  Cleanse acneiform lesions and sinus tracts with anti-bacterial washes. Oral antibiotics can help reduce inflammation.  Discussed importance of not smoking and weight loss      2. Plaque Psoriasis  - psoriasiform plaques with micaceous scale    Plan:   Humira 40 mg      Counseling  I counseled patient regarding systemic effects of inflammation from psoriasis, and the association with cardiac disease, metabolic syndrome, depression, and arthritis    3. High Risk Medication Monitoring : The risks and benefits of the medication were reviewed in full with the patient. Should any side effects occur, the patient will stop the medication and contact me immediately.    Labs:  - Quant Gold  - CBC  - CMP        Return to clinic in 6 months     AVS printed with patient instructions     Cj Wakefield MD   Mohs Surgery/Dermatologic Oncology  Dermatology

## 2023-09-13 ENCOUNTER — OFFICE VISIT (OUTPATIENT)
Dept: FAMILY MEDICINE | Facility: CLINIC | Age: 43
End: 2023-09-13
Payer: MEDICAID

## 2023-09-13 VITALS
HEART RATE: 87 BPM | TEMPERATURE: 98 F | RESPIRATION RATE: 20 BRPM | BODY MASS INDEX: 53.92 KG/M2 | HEIGHT: 62 IN | SYSTOLIC BLOOD PRESSURE: 120 MMHG | DIASTOLIC BLOOD PRESSURE: 79 MMHG | WEIGHT: 293 LBS | OXYGEN SATURATION: 95 %

## 2023-09-13 DIAGNOSIS — E66.01 MORBID OBESITY WITH BMI OF 60.0-69.9, ADULT: Chronic | ICD-10-CM

## 2023-09-13 DIAGNOSIS — Z13.220 SCREENING FOR HYPERLIPIDEMIA: ICD-10-CM

## 2023-09-13 DIAGNOSIS — I10 ESSENTIAL HYPERTENSION: Chronic | ICD-10-CM

## 2023-09-13 DIAGNOSIS — Z00.00 ROUTINE GENERAL MEDICAL EXAMINATION AT A HEALTH CARE FACILITY: Primary | ICD-10-CM

## 2023-09-13 DIAGNOSIS — E11.9 TYPE 2 DIABETES MELLITUS WITHOUT COMPLICATION, WITHOUT LONG-TERM CURRENT USE OF INSULIN: Chronic | ICD-10-CM

## 2023-09-13 DIAGNOSIS — Z13.1 DIABETES MELLITUS SCREENING: ICD-10-CM

## 2023-09-13 LAB
CHOLEST SERPL-MCNC: 173 MG/DL (ref 0–200)
CHOLEST/HDLC SERPL: 3.5 {RATIO}
GLUCOSE SERPL-MCNC: 132 MG/DL (ref 74–106)
HDLC SERPL-MCNC: 49 MG/DL (ref 40–60)
LDLC SERPL CALC-MCNC: 102 MG/DL
LDLC/HDLC SERPL: 2.1 {RATIO}
NONHDLC SERPL-MCNC: 124 MG/DL
TRIGL SERPL-MCNC: 108 MG/DL (ref 35–150)
VLDLC SERPL-MCNC: 22 MG/DL

## 2023-09-13 PROCEDURE — 99396 PR PREVENTIVE VISIT,EST,40-64: ICD-10-PCS | Mod: ,,, | Performed by: NURSE PRACTITIONER

## 2023-09-13 PROCEDURE — 82947 GLUCOSE, FASTING: ICD-10-PCS | Mod: ,,, | Performed by: CLINICAL MEDICAL LABORATORY

## 2023-09-13 PROCEDURE — 3078F PR MOST RECENT DIASTOLIC BLOOD PRESSURE < 80 MM HG: ICD-10-PCS | Mod: CPTII,,, | Performed by: NURSE PRACTITIONER

## 2023-09-13 PROCEDURE — 3061F PR NEG MICROALBUMINURIA RESULT DOCUMENTED/REVIEW: ICD-10-PCS | Mod: CPTII,,, | Performed by: NURSE PRACTITIONER

## 2023-09-13 PROCEDURE — 80061 LIPID PANEL: CPT | Mod: ,,, | Performed by: CLINICAL MEDICAL LABORATORY

## 2023-09-13 PROCEDURE — 99396 PREV VISIT EST AGE 40-64: CPT | Mod: ,,, | Performed by: NURSE PRACTITIONER

## 2023-09-13 PROCEDURE — 3008F BODY MASS INDEX DOCD: CPT | Mod: CPTII,,, | Performed by: NURSE PRACTITIONER

## 2023-09-13 PROCEDURE — 4010F PR ACE/ARB THEARPY RXD/TAKEN: ICD-10-PCS | Mod: CPTII,,, | Performed by: NURSE PRACTITIONER

## 2023-09-13 PROCEDURE — 3066F NEPHROPATHY DOC TX: CPT | Mod: CPTII,,, | Performed by: NURSE PRACTITIONER

## 2023-09-13 PROCEDURE — 4010F ACE/ARB THERAPY RXD/TAKEN: CPT | Mod: CPTII,,, | Performed by: NURSE PRACTITIONER

## 2023-09-13 PROCEDURE — 1160F RVW MEDS BY RX/DR IN RCRD: CPT | Mod: CPTII,,, | Performed by: NURSE PRACTITIONER

## 2023-09-13 PROCEDURE — 3061F NEG MICROALBUMINURIA REV: CPT | Mod: CPTII,,, | Performed by: NURSE PRACTITIONER

## 2023-09-13 PROCEDURE — 80061 LIPID PANEL: ICD-10-PCS | Mod: ,,, | Performed by: CLINICAL MEDICAL LABORATORY

## 2023-09-13 PROCEDURE — 3078F DIAST BP <80 MM HG: CPT | Mod: CPTII,,, | Performed by: NURSE PRACTITIONER

## 2023-09-13 PROCEDURE — 1160F PR REVIEW ALL MEDS BY PRESCRIBER/CLIN PHARMACIST DOCUMENTED: ICD-10-PCS | Mod: CPTII,,, | Performed by: NURSE PRACTITIONER

## 2023-09-13 PROCEDURE — 82947 ASSAY GLUCOSE BLOOD QUANT: CPT | Mod: ,,, | Performed by: CLINICAL MEDICAL LABORATORY

## 2023-09-13 PROCEDURE — 1159F MED LIST DOCD IN RCRD: CPT | Mod: CPTII,,, | Performed by: NURSE PRACTITIONER

## 2023-09-13 PROCEDURE — 3074F PR MOST RECENT SYSTOLIC BLOOD PRESSURE < 130 MM HG: ICD-10-PCS | Mod: CPTII,,, | Performed by: NURSE PRACTITIONER

## 2023-09-13 PROCEDURE — 3066F PR DOCUMENTATION OF TREATMENT FOR NEPHROPATHY: ICD-10-PCS | Mod: CPTII,,, | Performed by: NURSE PRACTITIONER

## 2023-09-13 PROCEDURE — 3074F SYST BP LT 130 MM HG: CPT | Mod: CPTII,,, | Performed by: NURSE PRACTITIONER

## 2023-09-13 PROCEDURE — 3044F HG A1C LEVEL LT 7.0%: CPT | Mod: CPTII,,, | Performed by: NURSE PRACTITIONER

## 2023-09-13 PROCEDURE — 3008F PR BODY MASS INDEX (BMI) DOCUMENTED: ICD-10-PCS | Mod: CPTII,,, | Performed by: NURSE PRACTITIONER

## 2023-09-13 PROCEDURE — 1159F PR MEDICATION LIST DOCUMENTED IN MEDICAL RECORD: ICD-10-PCS | Mod: CPTII,,, | Performed by: NURSE PRACTITIONER

## 2023-09-13 PROCEDURE — 3044F PR MOST RECENT HEMOGLOBIN A1C LEVEL <7.0%: ICD-10-PCS | Mod: CPTII,,, | Performed by: NURSE PRACTITIONER

## 2023-09-13 NOTE — PROGRESS NOTES
[facility] FAMILY MEDICINE       PATIENT NAME: Belem Swann   : 1980    AGE: 43 y.o. DATE OF ENCOUNTER: 23    MRN: [id number]      PCP: Rhoda Rueda FNP    Reason for Visit / Chief Complaint:  wellness (Patient presents to the clinic wellness ([place]))         274}    Subjective:     HPI:    Presents for [place] wellness visit.    Has lost down from 365 lbs Dec 2022.  Max wt 383 lbs 2 yrs ago (21).    Review of Systems:   Review of Systems   Constitutional:  Negative for chills and fever.   HENT:  Positive for congestion (chronic rhinitis). Negative for ear pain, sinus pain and sore throat.    Eyes: Negative.    Respiratory:  Positive for shortness of breath (chronic BALDERAS). Negative for cough and wheezing.    Cardiovascular:  Positive for leg swelling (chronic). Negative for chest pain and palpitations.   Genitourinary: Negative.    Musculoskeletal:  Positive for arthralgias, back pain, gait problem and myalgias.        Chronic, followed by pain management at [facility].   Skin:  Positive for rash (chronic).   Neurological:  Positive for headaches.   Psychiatric/Behavioral:  Positive for sleep disturbance (chronic). Negative for dysphoric mood, self-injury and suicidal ideas. The patient is nervous/anxious (chronic).        Allergies and Meds: 274}     Review of patient's allergies indicates:   Allergen Reactions    Doxycycline     Latex      Other reaction(s): Unknown        Current Outpatient Medications:     albuterol sulfate 90 mcg/actuation aebs, Inhale 180 mcg into the lungs every 4 (four) hours., Disp: , Rfl:     albuterol-ipratropium (DUO-NEB) 2.5 mg-0.5 mg/3 mL nebulizer solution, Take 3 mLs by nebulization every 6 (six) hours as needed. Rescue, Disp: , Rfl:     budesonide-formoterol 160-4.5 mcg (SYMBICORT) 160-4.5 mcg/actuation HFAA, Inhale 2 puffs into the lungs every 12 (twelve) hours. Controller, Disp: 10.2 g, Rfl: 11    carvediloL (COREG) 25  MG tablet, Take 25 mg by mouth 2 (two) times daily with meals., Disp: , Rfl:     cloNIDine (CATAPRES) 0.1 MG tablet, Take 0.1 mg by mouth daily as needed., Disp: , Rfl:     cyclobenzaprine (FLEXERIL) 10 MG tablet, Take 1 tablet (10 mg total) by mouth 3 (three) times daily as needed for Muscle spasms., Disp: 90 tablet, Rfl: 2    exenatide (BYETTA) 5 mcg/dose (250 mcg/mL) 1.2 mL injection, Inject 0.02 mLs (5 mcg total) into the skin 2 (two) times daily with meals., Disp: 3.6 mL, Rfl: 3    fluocinolone (SYNALAR) 0.01 % external solution, Apply topically 2 (two) times daily. (Patient taking differently: Apply 1 Dose topically 2 (two) times daily.), Disp: 90 mL, Rfl: 5    furosemide (LASIX) 40 MG tablet, Take 40 mg by mouth once daily., Disp: , Rfl:     gabapentin (NEURONTIN) 300 MG capsule, Take 1 capsule (300 mg total) by mouth every 8 (eight) hours., Disp: 90 capsule, Rfl: 1    HUMIRA,CF, PEN 40 mg/0.4 mL PnKt, Inject 40 mg into the skin once a week., Disp: , Rfl:     HYDROcodone-acetaminophen (NORCO)  mg per tablet, Take 1 tablet by mouth every 8 (eight) hours as needed for Pain., Disp: 90 tablet, Rfl: 0    hydrocortisone 2.5 % ointment, Apply topically 2 (two) times daily., Disp: 454 g, Rfl: 5    hydrOXYzine pamoate (VISTARIL) 50 MG Cap, Take 2 capsules (100 mg total) by mouth nightly as needed (anxiety & difficulty sleeping)., Disp: 180 capsule, Rfl: 1    ketoconazole (NIZORAL) 2 % cream, Apply topically once daily., Disp: 60 g, Rfl: 2    lancets (ONETOUCH DELICA LANCETS) 33 gauge Misc, 1 lancet by Misc.(Non-Drug; Combo Route) route once daily., Disp: 100 each, Rfl: 3    metFORMIN (GLUCOPHAGE-XR) 500 MG ER 24hr tablet, Take 1 tablet (500 mg total) by mouth once daily., Disp: 90 tablet, Rfl: 3    NIFEdipine (PROCARDIA-XL) 30 MG (OSM) 24 hr tablet, Take 30 mg by mouth every evening., Disp: , Rfl:     nystatin (MYCOSTATIN) powder, Apply topically 2 (two) times daily., Disp: 60 g, Rfl: 5     "olmesartan (BENICAR) 40 MG tablet, Take 40 mg by mouth once daily., Disp: , Rfl:     omeprazole (PRILOSEC) 40 MG capsule, Take 1 capsule (40 mg total) by mouth 2 (two) times daily before meals., Disp: 180 capsule, Rfl: 1    pen needle, diabetic 31 gauge x 1/4" Ndle, , Disp: , Rfl:     promethazine (PHENERGAN) 25 MG tablet, Take 1 tablet (25 mg total) by mouth every 6 (six) hours as needed for Nausea., Disp: 30 tablet, Rfl: 1    rosuvastatin (CRESTOR) 40 MG Tab, Take 1 tablet (40 mg total) by mouth every evening., Disp: 90 tablet, Rfl: 3    triamcinolone acetonide 0.1% (KENALOG) 0.1 % ointment, Apply topically 2 (two) times daily., Disp: 454 g, Rfl: 0    TRUE METRIX GLUCOSE TEST STRIP Strp, 1 strip by Misc.(Non-Drug; Combo Route) route Daily. For T2DM., Disp: 100 strip, Rfl: 3    Labs:274}   I have reviewed labs below:  Lab Results   Component Value Date    WBC 12.60 (H) 08/25/2023    RBC 4.47 08/25/2023    HGB 12.8 08/25/2023    HCT 38.7 08/25/2023     08/25/2023     08/25/2023    K 3.9 08/25/2023     (H) 08/25/2023    CALCIUM 8.8 08/25/2023     (H) 08/25/2023    BUN 14 08/25/2023    CREATININE 0.54 (L) 08/25/2023    ESTGFRAFRICA 103 10/07/2020    EGFRNONAA 95 05/11/2022    ALT 22 08/25/2023    AST 9 (L) 08/25/2023    INR 1.10 10/07/2020    CHOL 174 01/12/2023    TRIG 153 (H) 01/12/2023    HDL 38 (L) 01/12/2023    LDLCALC 105 01/12/2023    TSH 2.630 01/12/2023    HGBA1C 6.0 06/27/2023    MICROALBUR <0.5 01/12/2023     Medical History: 274}     Past Medical History:   Diagnosis Date    Anxiety     Asthma     Chronic pain     Cushing syndrome     Depression     Essential hypertension     Fibromyalgia     Herpes zoster     Hyperlipidemia     Leukocytosis 1/18/2022    Onychomycosis     MARY on CPAP     Rheumatoid arthritis     Type 2 diabetes mellitus 11/2020    Vitamin D deficiency 05/03/2018      Social History     Tobacco Use   Smoking Status Every Day    Types: " "Cigarettes    Start date: 9/8/2021   Smokeless Tobacco Never      Health Maintenance: 274}     Health Maintenance         Date Due Completion Date    Sign Pain Contract Never done ---    Influenza Vaccine (1) 09/01/2023 9/12/2022    HIV Screening 05/06/2027 (Originally 4/4/1994) ---    Hemoglobin A1c 12/27/2023 6/27/2023    Diabetes Urine Screening 01/12/2024 1/12/2023    Lipid Panel 01/12/2024 1/12/2023    Eye Exam 01/19/2024 1/19/2023 (Done)    Override on 1/19/2023: Done (Rhoda Resendiz  [facility])    Override on 12/15/2021: Done ([facility])    Mammogram 05/30/2024 5/30/2023    Foot Exam 06/27/2024 6/27/2023    Override on 5/6/2021: Done    Pap Smear 03/23/2026 3/23/2023    Override on 5/19/2020: Done (negative; Dr. AvilaFelice)    TETANUS VACCINE 05/06/2031 5/6/2021    Pneumococcal Vaccines (Age 0-64) (3 - PPSV23 or PCV20) 04/04/2044 1/4/2021            Objective:  274}   /79 (BP Location: Right arm, Patient Position: Sitting, BP Method: X-Large (Automatic))   Pulse 87   Temp 97.5 °F (36.4 °C) (Oral)   Resp 20   Ht 5' 2" (1.575 m)   Wt (!) 156.5 kg (345 lb)   SpO2 95%   BMI 63.10 kg/m²     Wt Readings from Last 3 Encounters:   09/13/23 (!) 156.5 kg (345 lb)   08/08/23 (!) 155.1 kg (342 lb)   07/25/23 (!) 157.4 kg (347 lb)     BP Readings from Last 3 Encounters:   09/13/23 120/79   08/08/23 (!) 120/91   07/25/23 123/71     Body mass index is 63.1 kg/m².     Physical Exam  Vitals and nursing note reviewed.   Constitutional:       General: She is not in acute distress.     Appearance: Normal appearance. She is obese. She is not ill-appearing.   HENT:      Head: Normocephalic.      Right Ear: Tympanic membrane, ear canal and external ear normal.      Left Ear: Tympanic membrane, ear canal and external ear normal.      Nose: Nose normal.      Mouth/Throat:      Mouth: Mucous membranes are moist.      Pharynx: Oropharynx is clear.   Eyes:      Conjunctiva/sclera: Conjunctivae " normal.   Neck:      Thyroid: No thyromegaly.      Trachea: Trachea normal.   Cardiovascular:      Rate and Rhythm: Normal rate and regular rhythm.      Pulses: Normal pulses.      Heart sounds: Normal heart sounds.   Pulmonary:      Effort: Pulmonary effort is normal.      Breath sounds: Normal breath sounds.   Abdominal:      Palpations: Abdomen is soft.   Musculoskeletal:      Cervical back: Neck supple.      Right lower leg: Edema (trace) present.      Left lower leg: Edema (trace) present.   Lymphadenopathy:      Cervical: No cervical adenopathy.   Skin:     General: Skin is warm and dry.   Neurological:      General: No focal deficit present.      Mental Status: She is alert and oriented to person, place, and time.   Psychiatric:         Mood and Affect: Mood normal.         Behavior: Behavior normal.        Assessment and Plan: 274}     1. Routine general medical examination at a health care facility    2. Screening for hyperlipidemia  -     Lipid Panel; Future; Expected date: 09/13/2023    3. Diabetes mellitus screening  -     Glucose, Fasting; Future; Expected date: 09/13/2023    4. Essential hypertension  Comments:  Controlled, continue current meds and treatment.    5. Type 2 diabetes mellitus without complication, without long-term current use of insulin  Comments:  Controlled, continue metformin and Byetta.    6. Morbid obesity with BMI of 60.0-69.9, adult  Comments:  Has lost 20 lbs since the 1st of the year.   Encouraged continued lifestyle changes and weight loss.       Placed on list to notify her when we get regular dose flu shots in.    Return to clinic 6-mth f/u T2DM, HTN, nonfasting; and sooner as needed.    Future Appointments   Date Time Provider Department Center   10/4/2023 12:45 PM Jeff Pineda PA [facility]   2/20/2024  1:00 PM Ashlyn Wakefield MD [facility] D[facility] [place]   3/20/2024  9:40 AM Rhoda Rueda FNP [facility] [place]   6/4/2024  1:30 PM WHIT GOMEZ  [facility] [place]   9/17/2024  9:00 AM Rhoda Rueda FNP [facility]        Signature:  DANE Saenz

## 2023-09-28 ENCOUNTER — OFFICE VISIT (OUTPATIENT)
Dept: OTOLARYNGOLOGY | Facility: CLINIC | Age: 43
End: 2023-09-28
Payer: MEDICAID

## 2023-09-28 VITALS — HEIGHT: 62 IN | WEIGHT: 293 LBS | BODY MASS INDEX: 53.92 KG/M2

## 2023-09-28 DIAGNOSIS — R09.81 CHRONIC NASAL CONGESTION: ICD-10-CM

## 2023-09-28 DIAGNOSIS — J31.0 CHRONIC RHINITIS: Primary | Chronic | ICD-10-CM

## 2023-09-28 DIAGNOSIS — H69.93 DYSFUNCTION OF BOTH EUSTACHIAN TUBES: ICD-10-CM

## 2023-09-28 PROCEDURE — 99214 OFFICE O/P EST MOD 30 MIN: CPT | Mod: S$PBB,,, | Performed by: OTOLARYNGOLOGY

## 2023-09-28 PROCEDURE — 4010F ACE/ARB THERAPY RXD/TAKEN: CPT | Mod: CPTII,,, | Performed by: OTOLARYNGOLOGY

## 2023-09-28 PROCEDURE — 1159F MED LIST DOCD IN RCRD: CPT | Mod: CPTII,,, | Performed by: OTOLARYNGOLOGY

## 2023-09-28 PROCEDURE — 4010F PR ACE/ARB THEARPY RXD/TAKEN: ICD-10-PCS | Mod: CPTII,,, | Performed by: OTOLARYNGOLOGY

## 2023-09-28 PROCEDURE — 3066F PR DOCUMENTATION OF TREATMENT FOR NEPHROPATHY: ICD-10-PCS | Mod: CPTII,,, | Performed by: OTOLARYNGOLOGY

## 2023-09-28 PROCEDURE — 3066F NEPHROPATHY DOC TX: CPT | Mod: CPTII,,, | Performed by: OTOLARYNGOLOGY

## 2023-09-28 PROCEDURE — 3044F HG A1C LEVEL LT 7.0%: CPT | Mod: CPTII,,, | Performed by: OTOLARYNGOLOGY

## 2023-09-28 PROCEDURE — 99214 PR OFFICE/OUTPT VISIT, EST, LEVL IV, 30-39 MIN: ICD-10-PCS | Mod: S$PBB,,, | Performed by: OTOLARYNGOLOGY

## 2023-09-28 PROCEDURE — 99215 OFFICE O/P EST HI 40 MIN: CPT | Mod: PBBFAC | Performed by: OTOLARYNGOLOGY

## 2023-09-28 PROCEDURE — 3008F BODY MASS INDEX DOCD: CPT | Mod: CPTII,,, | Performed by: OTOLARYNGOLOGY

## 2023-09-28 PROCEDURE — 1159F PR MEDICATION LIST DOCUMENTED IN MEDICAL RECORD: ICD-10-PCS | Mod: CPTII,,, | Performed by: OTOLARYNGOLOGY

## 2023-09-28 PROCEDURE — 3061F PR NEG MICROALBUMINURIA RESULT DOCUMENTED/REVIEW: ICD-10-PCS | Mod: CPTII,,, | Performed by: OTOLARYNGOLOGY

## 2023-09-28 PROCEDURE — 1160F PR REVIEW ALL MEDS BY PRESCRIBER/CLIN PHARMACIST DOCUMENTED: ICD-10-PCS | Mod: CPTII,,, | Performed by: OTOLARYNGOLOGY

## 2023-09-28 PROCEDURE — 3061F NEG MICROALBUMINURIA REV: CPT | Mod: CPTII,,, | Performed by: OTOLARYNGOLOGY

## 2023-09-28 PROCEDURE — 3008F PR BODY MASS INDEX (BMI) DOCUMENTED: ICD-10-PCS | Mod: CPTII,,, | Performed by: OTOLARYNGOLOGY

## 2023-09-28 PROCEDURE — 3044F PR MOST RECENT HEMOGLOBIN A1C LEVEL <7.0%: ICD-10-PCS | Mod: CPTII,,, | Performed by: OTOLARYNGOLOGY

## 2023-09-28 PROCEDURE — 1160F RVW MEDS BY RX/DR IN RCRD: CPT | Mod: CPTII,,, | Performed by: OTOLARYNGOLOGY

## 2023-09-28 NOTE — PROGRESS NOTES
"Subjective:       Patient ID: Belem Swann is a 43 y.o. female.    Chief Complaint: Sinus Problem, Hoarse (Patient is complaining of her voice "going in and out" yesterday. States her allergies has her face hurting. She is having ear pressure and popping.), and Ear Fullness    Sinus Problem  Associated symptoms include congestion, ear pain and sinus pressure.   Ear Fullness   Associated symptoms include a rash and rhinorrhea.     Review of Systems   HENT:  Positive for congestion, ear pain, postnasal drip, rhinorrhea, sinus pressure and tinnitus.    Skin:  Positive for rash.   All other systems reviewed and are negative.      Objective:      Physical Exam  General: NAD  Head: Normocephalic, atraumatic, no facial asymmetry/normal strength,  Ears: Both auricules normal in appearance, w/o deformities tympanic membranes normal external auditory canals normal  Nose: External nose w/o deformities swollen  turbinates no drainage or inflammation  Oral Cavity: Lips, gums, floor of mouth, tongue hard palate, and buccal mucosa without mass/lesion  Oropharynx: Mucosa pink and moist, soft palate, posterior pharynx and oropharyngeal wall without mass/lesion  Neck: Supple, symmetric, trachea midline, no palpable mass/lesion, no palpable cervical lymphadenopathy  Skin: Warm and dry, no concerning lesions  Respiratory: Respirations even, unlabored   Assessment:       1. Chronic rhinitis    2. Chronic nasal congestion    3. Dysfunction of both eustachian tubes        Plan:       [facility]  RAST f/u after RAST    "

## 2023-10-03 NOTE — PROGRESS NOTES
Subjective:         Patient ID: Belem Swann is a 43 y.o. female.    Chief Complaint: Joint Pain and Low-back Pain        Pain  This is a chronic problem. The current episode started more than 1 year ago. The problem occurs daily. The problem has been unchanged. Associated symptoms include arthralgias and neck pain. Pertinent negatives include no anorexia, chest pain, chills, coughing, diaphoresis, fever, sore throat, vertigo or vomiting.     Review of Systems   Constitutional:  Negative for activity change, appetite change, chills, diaphoresis, fever and unexpected weight change.   HENT:  Negative for drooling, ear discharge, ear pain, facial swelling, mouth sores, nosebleeds, sore throat, trouble swallowing, voice change and goiter.    Eyes:  Negative for photophobia, pain, discharge, redness and visual disturbance.   Respiratory:  Negative for apnea, cough, choking, chest tightness, shortness of breath, wheezing and stridor.    Cardiovascular:  Negative for chest pain, palpitations and leg swelling.   Gastrointestinal:  Negative for abdominal distention, anorexia, diarrhea, rectal pain, vomiting and fecal incontinence.   Endocrine: Negative for cold intolerance, heat intolerance, polydipsia, polyphagia and polyuria.   Genitourinary:  Negative for bladder incontinence, dysuria, flank pain, frequency and hot flashes.   Musculoskeletal:  Positive for arthralgias, back pain, leg pain, neck pain and neck stiffness.   Integumentary:  Negative for color change and pallor.   Allergic/Immunologic: Negative for immunocompromised state.   Neurological:  Negative for dizziness, vertigo, seizures, syncope, facial asymmetry, speech difficulty, light-headedness, memory loss and coordination difficulties.   Hematological:  Negative for adenopathy. Does not bruise/bleed easily.   Psychiatric/Behavioral:  Negative for agitation, behavioral problems, confusion, decreased concentration, dysphoric mood, hallucinations,  self-injury and suicidal ideas. The patient is not hyperactive.            Past Medical History:   Diagnosis Date    Anxiety     Asthma     Chronic pain     Cushing syndrome     Depression     Essential hypertension     Fibromyalgia     Herpes zoster     Hyperlipidemia     Leukocytosis 2022    Onychomycosis     MARY on CPAP     Rheumatoid arthritis     Type 2 diabetes mellitus 2020    Vitamin D deficiency 2018     Past Surgical History:   Procedure Laterality Date     SECTION      ELBOW SURGERY  1985    EPIDURAL STEROID INJECTION      L4-5 VERITO Dec 2020-[facility]    FEMUR SURGERY Right     due to osteomyelitis    HYSTERECTOMY      Abn Bleeding    INJECTION OF ANESTHETIC AGENT AROUND MEDIAL BRANCH NERVES INNERVATING LUMBAR FACET JOINT Bilateral 2022    Procedure: Bilateral L4-5,5-S1 MBB ( No steroids);  Surgeon: Carmel Torre MD;  Location: [facility]IN [facility];  Service: Pain Management;  Laterality: Bilateral;  PT AWARE TO BE TESTED ON OV    INJECTION OF ANESTHETIC AGENT AROUND MEDIAL BRANCH NERVES INNERVATING LUMBAR FACET JOINT Bilateral 2022    Procedure: Block-nerve-medial branch-lumbar, bilateral L4 through S1;  Surgeon: Carmel Torre MD;  Location: [facility];  Service: Pain Management;  Laterality: Bilateral;    INJECTION OF ANESTHETIC AGENT INTO SACROILIAC JOINT Bilateral 2022    Procedure: BLOCK, SACROILIAC JOINT;  Surgeon: Carmel Torre MD;  Location: [facility];  Service: Pain Management;  Laterality: Bilateral;  covid test put in    LIPOMA RESECTION  2019    RADIOFREQUENCY ABLATION OF LUMBAR MEDIAL BRANCH NERVE AT SINGLE LEVEL Right 2022    Procedure: Radiofrequency Ablation, Nerve, Spinal, Lumbar, Medial Branch, Level L4-S1, right side 1st, will have left-sided after completing;  Surgeon: Carmel Torre MD;  Location: [facility];  Service: Pain Management;  Laterality: Right;  pt aware at visit to be tested     "RADIOFREQUENCY ABLATION OF LUMBAR MEDIAL BRANCH NERVE AT SINGLE LEVEL Left 05/05/2022    Procedure: LEFT  L4-S1 RFTC  (HAD RIGHT  ON 4-14);  Surgeon: Carmel Torre MD;  Location: [facility];  Service: Pain Management;  Laterality: Left;    RADIOFREQUENCY ABLATION OF LUMBAR MEDIAL BRANCH NERVE AT SINGLE LEVEL Right 7/25/2023    Procedure: Radiofrequency Ablation, Nerve, Spinal, Lumbar, Medial Branch, Level L4-S1;  Surgeon: Carmel Torre MD;  Location: [facility];  Service: Pain Management;  Laterality: Right;    SURGICAL REMOVAL OF PILONIDAL CYST      TRANSFORAMINAL EPIDURAL INJECTION OF STEROID      Bilateral L4-5 TFESI Nov 2020-[facility]    TRANSFORAMINAL EPIDURAL INJECTION OF STEROID      Right L4-5 TFESI Feb 2020-[facility]    VENTRAL HERNIA REPAIR  09/2020     Social History     Socioeconomic History    Marital status:    Tobacco Use    Smoking status: Every Day     Types: Cigarettes     Start date: 9/8/2021    Smokeless tobacco: Never   Substance and Sexual Activity    Alcohol use: Yes     Comment: ocassional    Drug use: Never    Sexual activity: Not Currently     Partners: Male     Birth control/protection: See Surgical Hx     Family History   Problem Relation Age of Onset    Cancer Mother     Thyroid cancer Mother     Thyroid cancer Maternal Grandmother     Melanoma Neg Hx     Colon cancer Neg Hx     Breast cancer Neg Hx     Ovarian cancer Neg Hx      Review of patient's allergies indicates:   Allergen Reactions    Doxycycline     Latex      Other reaction(s): Unknown        Objective:  Vitals:    10/04/23 1312   BP: (!) 154/91   Pulse: 74   Resp: 18   Weight: (!) 152.4 kg (336 lb)   Height: 5' 2" (1.575 m)   PainSc:   7           Physical Exam  Vitals and nursing note reviewed. Exam conducted with a chaperone present.   Constitutional:       General: She is awake. She is not in acute distress.     Appearance: Normal appearance. She is obese. She is not ill-appearing, toxic-appearing or " diaphoretic.   HENT:      Head: Normocephalic and atraumatic.      Nose: Nose normal.      Mouth/Throat:      Mouth: Mucous membranes are moist.      Pharynx: Oropharynx is clear.   Eyes:      Conjunctiva/sclera: Conjunctivae normal.      Pupils: Pupils are equal, round, and reactive to light.   Cardiovascular:      Rate and Rhythm: Normal rate.   Pulmonary:      Effort: Pulmonary effort is normal. No respiratory distress.   Abdominal:      Palpations: Abdomen is soft.      Tenderness: There is no guarding.   Musculoskeletal:         General: No swelling.      Cervical back: Normal range of motion and neck supple. Tenderness present. No rigidity.      Thoracic back: Tenderness present.      Lumbar back: Tenderness present. Decreased range of motion.   Skin:     General: Skin is warm and dry.      Coloration: Skin is not jaundiced or pale.   Neurological:      General: No focal deficit present.      Mental Status: She is alert and oriented to person, place, and time. Mental status is at baseline.      Cranial Nerves: No cranial nerve deficit (II-XII).   Psychiatric:         Mood and Affect: Mood normal.         Behavior: Behavior normal. Behavior is cooperative.         Thought Content: Thought content normal.           FL Fluoro for Pain Management  See OP Notes for results.     IMPRESSION: See OP Notes for results.     This procedure was auto-finalized by: Virtual Radiologist         Lab Visit on 09/28/2023   Component Date Value Ref Range Status    House Dust Mites/D.P., IgE 09/28/2023 <0.10  <0.70 kU/L Final    House Dust Mites/D.F., IgE 09/28/2023 <0.10  <0.70 kU/L Final    Cat Epithelium, IgE 09/28/2023 <0.10  <0.70 kU/L Final    Dog Dander, IgE 09/28/2023 <0.10  <0.70 kU/L Final    [place] Grass, IgE 09/28/2023 <0.10  <0.70 kU/L Final    Tevin Grass, IgE 09/28/2023 <0.10  <0.70 kU/L Final    Cockroach, IgE 09/28/2023 <0.10  <0.70 kU/L Final    Penicillium, IgE 09/28/2023 <0.10  <0.70 kU/L Final     Cladosporium, IgE 09/28/2023 <0.10  <0.70 kU/L Final    Aspergillus Fumigatus, IgE 09/28/2023 <0.10  <0.70 kU/L Final    Alternaria Tenuis, IgE 09/28/2023 <0.10  <0.70 kU/L Final    [place]Maple, IgE 09/28/2023 <0.10  <0.70 kU/L Final    [place], IgE 09/28/2023 <0.10  <0.70 kU/L Final    [place], IgE 09/28/2023 <0.10  <0.70 kU/L Final    [place], IgE 09/28/2023 <0.10  <0.70 kU/L Final    Elm, IgE 09/28/2023 <0.10  <0.70 kU/L Final    [place], IgE 09/28/2023 <0.10  <0.70 kU/L Final    [place], IgE 09/28/2023 <0.10  <0.70 kU/L Final    [place], IgE 09/28/2023 <0.10  <0.70 kU/L Final    Short Ragweed, IgE 09/28/2023 <0.10  <0.70 kU/L Final    Rough Pigweed, IgE 09/28/2023 <0.10  <0.70 kU/L Final    Rough Cleaning Elder, IgE 09/28/2023 <0.10  <0.70 kU/L Final    Immunoglobulin E (IgE) 09/28/2023 11.0  <=214 kU/L Final    [place], IgE 09/28/2023 <0.10  <0.70 kU/L Final   Office Visit on 09/13/2023   Component Date Value Ref Range Status    Glucose, Fasting 09/13/2023 132 (H)  74 - 106 mg/dL Final    Triglycerides 09/13/2023 108  35 - 150 mg/dL Final    Cholesterol 09/13/2023 173  0 - 200 mg/dL Final    HDL Cholesterol 09/13/2023 49  40 - 60 mg/dL Final    Cholesterol/HDL Ratio (Risk Factor) 09/13/2023 3.5   Final    Non-HDL 09/13/2023 124  mg/dL Final    LDL Calculated 09/13/2023 102  mg/dL Final    LDL/HDL 09/13/2023 2.1   Final    VLDL 09/13/2023 22  mg/dL Final   Lab Visit on 08/25/2023   Component Date Value Ref Range Status    Nil Result, TB 08/25/2023 0.03   Final    TB1 Ag minus Nil Result 08/25/2023 0.00   Final    TB2 Ag minus Nil Result 08/25/2023 0.03   Final    Mitogen minus Nil Result, TB 08/25/2023 9.75   Final    QuantiFERON-Tb Gold Plus 08/25/2023 Negative  Negative Final    Sodium 08/25/2023 141  136 - 145 mmol/L Final    Potassium 08/25/2023 3.9  3.5 - 5.1 mmol/L Final    Chloride 08/25/2023 109 (H)  98 - 107 mmol/L Final    CO2 08/25/2023 29  21 - 32 mmol/L Final    Anion Gap  08/25/2023 7  7 - 16 mmol/L Final    Glucose 08/25/2023 110 (H)  74 - 106 mg/dL Final    BUN 08/25/2023 14  7 - 18 mg/dL Final    Creatinine 08/25/2023 0.54 (L)  0.55 - 1.02 mg/dL Final    BUN/Creatinine Ratio 08/25/2023 26 (H)  6 - 20 Final    Calcium 08/25/2023 8.8  8.5 - 10.1 mg/dL Final    Total Protein 08/25/2023 7.5  6.4 - 8.2 g/dL Final    Albumin 08/25/2023 3.2 (L)  3.5 - 5.0 g/dL Final    Globulin 08/25/2023 4.3 (H)  2.0 - 4.0 g/dL Final    A/G Ratio 08/25/2023 0.7   Final    Bilirubin, Total 08/25/2023 0.3  >0.0 - 1.2 mg/dL Final    Alk Phos 08/25/2023 108 (H)  37 - 98 U/L Final    ALT 08/25/2023 22  13 - 56 U/L Final    AST 08/25/2023 9 (L)  15 - 37 U/L Final    eGFR 08/25/2023 117  >=60 mL/min/1.73m2 Final    WBC 08/25/2023 12.60 (H)  4.50 - 11.00 K/uL Final    RBC 08/25/2023 4.47  4.20 - 5.40 M/uL Final    Hemoglobin 08/25/2023 12.8  12.0 - 16.0 g/dL Final    Hematocrit 08/25/2023 38.7  38.0 - 47.0 % Final    MCV 08/25/2023 86.6  80.0 - 96.0 fL Final    MCH 08/25/2023 28.6  27.0 - 31.0 pg Final    MCHC 08/25/2023 33.1  32.0 - 36.0 g/dL Final    RDW 08/25/2023 13.2  11.5 - 14.5 % Final    Platelet Count 08/25/2023 174  150 - 400 K/uL Final    MPV 08/25/2023 13.3 (H)  9.4 - 12.4 fL Final    Neutrophils % 08/25/2023 74.7 (H)  53.0 - 65.0 % Final    Lymphocytes % 08/25/2023 16.1 (L)  27.0 - 41.0 % Final    Monocytes % 08/25/2023 5.6  2.0 - 6.0 % Final    Eosinophils % 08/25/2023 2.5  1.0 - 4.0 % Final    Basophils % 08/25/2023 0.4  0.0 - 1.0 % Final    Immature Granulocytes % 08/25/2023 0.7 (H)  0.0 - 0.4 % Final    nRBC, Auto 08/25/2023 0.0  <=0.0 % Final    Neutrophils, Abs 08/25/2023 9.41 (H)  1.80 - 7.70 K/uL Final    Lymphocytes, Absolute 08/25/2023 2.03  1.00 - 4.80 K/uL Final    Monocytes, Absolute 08/25/2023 0.71  0.00 - 0.80 K/uL Final    Eosinophils, Absolute 08/25/2023 0.31  0.00 - 0.50 K/uL Final    Basophils, Absolute 08/25/2023 0.05  0.00 - 0.20 K/uL Final    Immature Granulocytes, Absolute  08/25/2023 0.09 (H)  0.00 - 0.04 K/uL Final    nRBC, Absolute 08/25/2023 0.00  <=0.00 x10e3/uL Final    Diff Type 08/25/2023 Auto   Final   Office Visit on 08/08/2023   Component Date Value Ref Range Status    POC Amphetamines 08/08/2023 Negative  Negative, Inconclusive Final    POC Barbiturates 08/08/2023 Negative  Negative, Inconclusive Final    POC Benzodiazepines 08/08/2023 Negative  Negative, Inconclusive Final    POC Cocaine 08/08/2023 Negative  Negative, Inconclusive Final    POC THC 08/08/2023 Negative  Negative, Inconclusive Final    POC Methadone 08/08/2023 Negative  Negative, Inconclusive Final    POC Methamphetamine 08/08/2023 Negative  Negative, Inconclusive Final    POC Opiates 08/08/2023 Negative  Negative, Inconclusive Final    POC Oxycodone 08/08/2023 Negative  Negative, Inconclusive Final    POC Phencyclidine 08/08/2023 Negative  Negative, Inconclusive Final    POC Methylenedioxymethamphetamine * 08/08/2023 Negative  Negative, Inconclusive Final    POC Tricyclic Antidepressants 08/08/2023 Negative  Negative, Inconclusive Final    POC Buprenorphine 08/08/2023 Negative   Final     Acceptable 08/08/2023 Yes   Final    POC Temperature (Urine) 08/08/2023 92   Final   Admission on 07/25/2023, Discharged on 07/25/2023   Component Date Value Ref Range Status    POC Glucose 07/25/2023 129 (H)  70 - 105 mg/dL Final   Admission on 07/05/2023, Discharged on 07/05/2023   Component Date Value Ref Range Status    Sodium 07/05/2023 138  136 - 145 mmol/L Final    Potassium 07/05/2023 3.6  3.5 - 5.1 mmol/L Final    Chloride 07/05/2023 104  98 - 107 mmol/L Final    CO2 07/05/2023 28  21 - 32 mmol/L Final    Anion Gap 07/05/2023 10  7 - 16 mmol/L Final    Glucose 07/05/2023 166 (H)  74 - 106 mg/dL Final    BUN 07/05/2023 17  7 - 18 mg/dL Final    Creatinine 07/05/2023 0.82  0.55 - 1.02 mg/dL Final    BUN/Creatinine Ratio 07/05/2023 21 (H)  6 - 20 Final    Calcium 07/05/2023 8.6  8.5 - 10.1 mg/dL Final     Total Protein 07/05/2023 8.0  6.4 - 8.2 g/dL Final    Albumin 07/05/2023 3.3 (L)  3.5 - 5.0 g/dL Final    Globulin 07/05/2023 4.7 (H)  2.0 - 4.0 g/dL Final    A/G Ratio 07/05/2023 0.7   Final    Bilirubin, Total 07/05/2023 0.4  >0.0 - 1.2 mg/dL Final    Alk Phos 07/05/2023 115 (H)  37 - 98 U/L Final    ALT 07/05/2023 21  13 - 56 U/L Final    AST 07/05/2023 9 (L)  15 - 37 U/L Final    eGFR 07/05/2023 91  >=60 mL/min/1.73m2 Final    Specimen Outdate 07/05/2023 07/08/2023 23:59   Final    Group & Rh 07/05/2023 O POS   Final    Indirect Sarah 07/05/2023 NEG   Final    Lipase 07/05/2023 69 (L)  73 - 393 U/L Final    WBC 07/05/2023 12.60 (H)  4.50 - 11.00 K/uL Final    RBC 07/05/2023 4.65  4.20 - 5.40 M/uL Final    Hemoglobin 07/05/2023 13.6  12.0 - 16.0 g/dL Final    Hematocrit 07/05/2023 42.5  38.0 - 47.0 % Final    MCV 07/05/2023 91.4  80.0 - 96.0 fL Final    MCH 07/05/2023 29.2  27.0 - 31.0 pg Final    MCHC 07/05/2023 32.0  32.0 - 36.0 g/dL Final    RDW 07/05/2023 12.9  11.5 - 14.5 % Final    Platelet Count 07/05/2023 163  150 - 400 K/uL Final    MPV 07/05/2023 13.9 (H)  9.4 - 12.4 fL Final    Neutrophils % 07/05/2023 79.8 (H)  53.0 - 65.0 % Final    Lymphocytes % 07/05/2023 12.1 (L)  27.0 - 41.0 % Final    Monocytes % 07/05/2023 4.9  2.0 - 6.0 % Final    Eosinophils % 07/05/2023 2.5  1.0 - 4.0 % Final    Basophils % 07/05/2023 0.3  0.0 - 1.0 % Final    Immature Granulocytes % 07/05/2023 0.4  0.0 - 0.4 % Final    nRBC, Auto 07/05/2023 0.0  <=0.0 % Final    Neutrophils, Abs 07/05/2023 10.05 (H)  1.80 - 7.70 K/uL Final    Lymphocytes, Absolute 07/05/2023 1.52  1.00 - 4.80 K/uL Final    Monocytes, Absolute 07/05/2023 0.62  0.00 - 0.80 K/uL Final    Eosinophils, Absolute 07/05/2023 0.32  0.00 - 0.50 K/uL Final    Basophils, Absolute 07/05/2023 0.04  0.00 - 0.20 K/uL Final    Immature Granulocytes, Absolute 07/05/2023 0.05 (H)  0.00 - 0.04 K/uL Final    nRBC, Absolute 07/05/2023 0.00  <=0.00 x10e3/uL Final    Diff Type  07/05/2023 Auto   Final    Hemoglobin 07/05/2023 13.1  12.0 - 16.0 g/dL Final    Hematocrit 07/05/2023 40.9  38.0 - 47.0 % Final   Office Visit on 06/27/2023   Component Date Value Ref Range Status    Sodium 06/27/2023 140  136 - 145 mmol/L Final    Potassium 06/27/2023 4.1  3.5 - 5.1 mmol/L Final    Chloride 06/27/2023 107  98 - 107 mmol/L Final    CO2 06/27/2023 27  21 - 32 mmol/L Final    Anion Gap 06/27/2023 10  7 - 16 mmol/L Final    Glucose 06/27/2023 134 (H)  74 - 106 mg/dL Final    BUN 06/27/2023 16  7 - 18 mg/dL Final    Creatinine 06/27/2023 0.54 (L)  0.55 - 1.02 mg/dL Final    BUN/Creatinine Ratio 06/27/2023 30 (H)  6 - 20 Final    Calcium 06/27/2023 9.2  8.5 - 10.1 mg/dL Final    eGFR 06/27/2023 117  >=60 mL/min/1.73m2 Final    Hemoglobin A1C 06/27/2023 6.0  4.5 - 6.6 % Final    Estimated Average Glucose 06/27/2023 114  mg/dL Final   Office Visit on 05/03/2023   Component Date Value Ref Range Status    POC Amphetamines 05/03/2023 Negative  Negative, Inconclusive Final    POC Barbiturates 05/03/2023 Negative  Negative, Inconclusive Final    POC Benzodiazepines 05/03/2023 Negative  Negative, Inconclusive Final    POC Cocaine 05/03/2023 Negative  Negative, Inconclusive Final    POC THC 05/03/2023 Negative  Negative, Inconclusive Final    POC Methadone 05/03/2023 Negative  Negative, Inconclusive Final    POC Methamphetamine 05/03/2023 Negative  Negative, Inconclusive Final    POC Opiates 05/03/2023 Presumptive Positive (A)  Negative, Inconclusive Final    POC Oxycodone 05/03/2023 Negative  Negative, Inconclusive Final    POC Phencyclidine 05/03/2023 Negative  Negative, Inconclusive Final    POC Methylenedioxymethamphetamine * 05/03/2023 Negative  Negative, Inconclusive Final    POC Tricyclic Antidepressants 05/03/2023 Negative  Negative, Inconclusive Final    POC Buprenorphine 05/03/2023 Negative   Final     Acceptable 05/03/2023 Yes   Final    POC Temperature (Urine) 05/03/2023 94   Final          No orders of the defined types were placed in this encounter.          Requested Prescriptions     Pending Prescriptions Disp Refills    HYDROcodone-acetaminophen (NORCO)  mg per tablet 90 tablet 0     Sig: Take 1 tablet by mouth every 8 (eight) hours as needed for Pain.    HYDROcodone-acetaminophen (NORCO)  mg per tablet 90 tablet 0     Sig: Take 1 tablet by mouth every 8 (eight) hours as needed for Pain.       Assessment:     1. Rheumatoid arthritis involving multiple sites with positive rheumatoid factor    2. Spondylosis of lumbar region without myelopathy or radiculopathy    3. Sacroiliitis    4. Chronic pain of right knee           A's of Opioid Risk Assessment  Activity:Patient can perform ADL.   Analgesia:Patients pain is partially controlled by current medication. Patient has tried OTC medications such as Tylenol and Ibuprofen with out relief.   Adverse Effects: Patient denies constipation or sedation.  Aberrant Behavior:  reviewed with no aberrant drug seeking/taking behavior.  Overdose reversal drug naloxone discussed    Drug screen reviewed    X-ray right knee [facility] September 14, 2022 degenerative changes no fracture noted      Plan:    Narcan December 2022    Follows rheumatology [facility] rheumatoid arthritis    Follows orthopedics [place] chronic right knee pain    She had bilateral lumbar RF TC  July 25, 2023  She states she has 75% relief after procedure, the procedure did help improve her level function     Complaining flare-up rheumatoid arthritis requesting Toradol injection    Requesting Toradol injection    Toradol 60 mg IM, tolerated well    Continue home exercise program as directed    Continue current medication    Follow-up 2 months    Dr. Torre July 2024    Bring original prescription medication bottles/container/box with labels to each visit

## 2023-10-04 ENCOUNTER — OFFICE VISIT (OUTPATIENT)
Dept: PAIN MEDICINE | Facility: CLINIC | Age: 43
End: 2023-10-04
Payer: MEDICAID

## 2023-10-04 VITALS
DIASTOLIC BLOOD PRESSURE: 91 MMHG | BODY MASS INDEX: 53.92 KG/M2 | RESPIRATION RATE: 18 BRPM | HEART RATE: 74 BPM | SYSTOLIC BLOOD PRESSURE: 154 MMHG | HEIGHT: 62 IN | WEIGHT: 293 LBS

## 2023-10-04 DIAGNOSIS — G89.29 CHRONIC PAIN OF RIGHT KNEE: Chronic | ICD-10-CM

## 2023-10-04 DIAGNOSIS — M05.79 RHEUMATOID ARTHRITIS INVOLVING MULTIPLE SITES WITH POSITIVE RHEUMATOID FACTOR: Primary | Chronic | ICD-10-CM

## 2023-10-04 DIAGNOSIS — M46.1 SACROILIITIS: ICD-10-CM

## 2023-10-04 DIAGNOSIS — M47.816 SPONDYLOSIS OF LUMBAR REGION WITHOUT MYELOPATHY OR RADICULOPATHY: Chronic | ICD-10-CM

## 2023-10-04 DIAGNOSIS — M25.561 CHRONIC PAIN OF RIGHT KNEE: Chronic | ICD-10-CM

## 2023-10-04 PROCEDURE — 3061F PR NEG MICROALBUMINURIA RESULT DOCUMENTED/REVIEW: ICD-10-PCS | Mod: CPTII,,, | Performed by: PHYSICIAN ASSISTANT

## 2023-10-04 PROCEDURE — 99215 OFFICE O/P EST HI 40 MIN: CPT | Mod: PBBFAC | Performed by: PHYSICIAN ASSISTANT

## 2023-10-04 PROCEDURE — 96372 THER/PROPH/DIAG INJ SC/IM: CPT | Mod: PBBFAC | Performed by: PHYSICIAN ASSISTANT

## 2023-10-04 PROCEDURE — 3077F PR MOST RECENT SYSTOLIC BLOOD PRESSURE >= 140 MM HG: ICD-10-PCS | Mod: CPTII,,, | Performed by: PHYSICIAN ASSISTANT

## 2023-10-04 PROCEDURE — 3008F BODY MASS INDEX DOCD: CPT | Mod: CPTII,,, | Performed by: PHYSICIAN ASSISTANT

## 2023-10-04 PROCEDURE — 3066F PR DOCUMENTATION OF TREATMENT FOR NEPHROPATHY: ICD-10-PCS | Mod: CPTII,,, | Performed by: PHYSICIAN ASSISTANT

## 2023-10-04 PROCEDURE — 1159F MED LIST DOCD IN RCRD: CPT | Mod: CPTII,,, | Performed by: PHYSICIAN ASSISTANT

## 2023-10-04 PROCEDURE — 3066F NEPHROPATHY DOC TX: CPT | Mod: CPTII,,, | Performed by: PHYSICIAN ASSISTANT

## 2023-10-04 PROCEDURE — 3080F PR MOST RECENT DIASTOLIC BLOOD PRESSURE >= 90 MM HG: ICD-10-PCS | Mod: CPTII,,, | Performed by: PHYSICIAN ASSISTANT

## 2023-10-04 PROCEDURE — 99214 PR OFFICE/OUTPT VISIT, EST, LEVL IV, 30-39 MIN: ICD-10-PCS | Mod: S$PBB,25,, | Performed by: PHYSICIAN ASSISTANT

## 2023-10-04 PROCEDURE — 3061F NEG MICROALBUMINURIA REV: CPT | Mod: CPTII,,, | Performed by: PHYSICIAN ASSISTANT

## 2023-10-04 PROCEDURE — 4010F PR ACE/ARB THEARPY RXD/TAKEN: ICD-10-PCS | Mod: CPTII,,, | Performed by: PHYSICIAN ASSISTANT

## 2023-10-04 PROCEDURE — 1159F PR MEDICATION LIST DOCUMENTED IN MEDICAL RECORD: ICD-10-PCS | Mod: CPTII,,, | Performed by: PHYSICIAN ASSISTANT

## 2023-10-04 PROCEDURE — 99999PBSHW PR PBB SHADOW TECHNICAL ONLY FILED TO HB: Mod: PBBFAC,,,

## 2023-10-04 PROCEDURE — 99214 OFFICE O/P EST MOD 30 MIN: CPT | Mod: S$PBB,25,, | Performed by: PHYSICIAN ASSISTANT

## 2023-10-04 PROCEDURE — 4010F ACE/ARB THERAPY RXD/TAKEN: CPT | Mod: CPTII,,, | Performed by: PHYSICIAN ASSISTANT

## 2023-10-04 PROCEDURE — 99999PBSHW PR PBB SHADOW TECHNICAL ONLY FILED TO HB: ICD-10-PCS | Mod: PBBFAC,,,

## 2023-10-04 PROCEDURE — 3044F PR MOST RECENT HEMOGLOBIN A1C LEVEL <7.0%: ICD-10-PCS | Mod: CPTII,,, | Performed by: PHYSICIAN ASSISTANT

## 2023-10-04 PROCEDURE — 3077F SYST BP >= 140 MM HG: CPT | Mod: CPTII,,, | Performed by: PHYSICIAN ASSISTANT

## 2023-10-04 PROCEDURE — 3008F PR BODY MASS INDEX (BMI) DOCUMENTED: ICD-10-PCS | Mod: CPTII,,, | Performed by: PHYSICIAN ASSISTANT

## 2023-10-04 PROCEDURE — 3080F DIAST BP >= 90 MM HG: CPT | Mod: CPTII,,, | Performed by: PHYSICIAN ASSISTANT

## 2023-10-04 PROCEDURE — 3044F HG A1C LEVEL LT 7.0%: CPT | Mod: CPTII,,, | Performed by: PHYSICIAN ASSISTANT

## 2023-10-04 RX ORDER — HYDROCODONE BITARTRATE AND ACETAMINOPHEN 10; 325 MG/1; MG/1
1 TABLET ORAL EVERY 8 HOURS PRN
Qty: 90 TABLET | Refills: 0 | Status: SHIPPED | OUTPATIENT
Start: 2023-10-07 | End: 2023-11-06

## 2023-10-04 RX ORDER — KETOROLAC TROMETHAMINE 30 MG/ML
60 INJECTION, SOLUTION INTRAMUSCULAR; INTRAVENOUS
Status: COMPLETED | OUTPATIENT
Start: 2023-10-04 | End: 2023-10-04

## 2023-10-04 RX ORDER — HYDROCODONE BITARTRATE AND ACETAMINOPHEN 10; 325 MG/1; MG/1
1 TABLET ORAL EVERY 8 HOURS PRN
Qty: 90 TABLET | Refills: 0 | Status: SHIPPED | OUTPATIENT
Start: 2023-11-06 | End: 2023-11-30 | Stop reason: SDUPTHER

## 2023-10-04 RX ADMIN — KETOROLAC TROMETHAMINE 60 MG: 60 INJECTION, SOLUTION INTRAMUSCULAR at 01:10

## 2023-11-02 DIAGNOSIS — M62.838 MUSCLE SPASM: Chronic | ICD-10-CM

## 2023-11-02 RX ORDER — CYCLOBENZAPRINE HCL 10 MG
10 TABLET ORAL 3 TIMES DAILY PRN
Qty: 90 TABLET | Refills: 2 | Status: SHIPPED | OUTPATIENT
Start: 2023-11-02 | End: 2024-03-20 | Stop reason: SDUPTHER

## 2023-11-02 NOTE — TELEPHONE ENCOUNTER
----- Message from Armond Peralta sent at 11/2/2023  1:10 PM CDT -----  Flexeril to w/m 39n pt num [phone]

## 2023-11-03 ENCOUNTER — HOSPITAL ENCOUNTER (EMERGENCY)
Facility: HOSPITAL | Age: 43
Discharge: HOME | End: 2023-11-03
Attending: EMERGENCY MEDICINE
Payer: MEDICAID

## 2023-11-03 VITALS
WEIGHT: 293 LBS | HEART RATE: 74 BPM | TEMPERATURE: 99 F | BODY MASS INDEX: 53.92 KG/M2 | OXYGEN SATURATION: 99 % | DIASTOLIC BLOOD PRESSURE: 85 MMHG | HEIGHT: 62 IN | RESPIRATION RATE: 16 BRPM | SYSTOLIC BLOOD PRESSURE: 159 MMHG

## 2023-11-03 DIAGNOSIS — M79.642 LEFT HAND PAIN: ICD-10-CM

## 2023-11-03 DIAGNOSIS — K21.9 GASTROESOPHAGEAL REFLUX DISEASE, UNSPECIFIED WHETHER ESOPHAGITIS PRESENT: Primary | ICD-10-CM

## 2023-11-03 DIAGNOSIS — M25.562 PAIN IN BOTH KNEES, UNSPECIFIED CHRONICITY: Primary | ICD-10-CM

## 2023-11-03 DIAGNOSIS — M25.561 PAIN IN BOTH KNEES, UNSPECIFIED CHRONICITY: Primary | ICD-10-CM

## 2023-11-03 DIAGNOSIS — R10.9 ABDOMINAL PAIN: ICD-10-CM

## 2023-11-03 LAB
ALBUMIN SERPL BCP-MCNC: 3 G/DL (ref 3.5–5)
ALBUMIN/GLOB SERPL: 0.7 {RATIO}
ALP SERPL-CCNC: 119 U/L (ref 37–98)
ALT SERPL W P-5'-P-CCNC: 24 U/L (ref 13–56)
ANION GAP SERPL CALCULATED.3IONS-SCNC: 8 MMOL/L (ref 7–16)
AST SERPL W P-5'-P-CCNC: 13 U/L (ref 15–37)
BASOPHILS # BLD AUTO: 0.05 K/UL (ref 0–0.2)
BASOPHILS NFR BLD AUTO: 0.4 % (ref 0–1)
BILIRUB SERPL-MCNC: 0.2 MG/DL (ref ?–1.2)
BUN SERPL-MCNC: 17 MG/DL (ref 7–18)
BUN/CREAT SERPL: 30 (ref 6–20)
CALCIUM SERPL-MCNC: 8.4 MG/DL (ref 8.5–10.1)
CHLORIDE SERPL-SCNC: 109 MMOL/L (ref 98–107)
CO2 SERPL-SCNC: 29 MMOL/L (ref 21–32)
CREAT SERPL-MCNC: 0.57 MG/DL (ref 0.55–1.02)
DIFFERENTIAL METHOD BLD: ABNORMAL
EGFR (NO RACE VARIABLE) (RUSH/TITUS): 115 ML/MIN/1.73M2
EOSINOPHIL # BLD AUTO: 0.43 K/UL (ref 0–0.5)
EOSINOPHIL NFR BLD AUTO: 3.8 % (ref 1–4)
ERYTHROCYTE [DISTWIDTH] IN BLOOD BY AUTOMATED COUNT: 12.8 % (ref 11.5–14.5)
GLOBULIN SER-MCNC: 4.5 G/DL (ref 2–4)
GLUCOSE SERPL-MCNC: 164 MG/DL (ref 74–106)
HCT VFR BLD AUTO: 40.1 % (ref 38–47)
HGB BLD-MCNC: 13.2 G/DL (ref 12–16)
IMM GRANULOCYTES # BLD AUTO: 0.04 K/UL (ref 0–0.04)
IMM GRANULOCYTES NFR BLD: 0.4 % (ref 0–0.4)
LIPASE SERPL-CCNC: 34 U/L (ref 16–77)
LYMPHOCYTES # BLD AUTO: 2.18 K/UL (ref 1–4.8)
LYMPHOCYTES NFR BLD AUTO: 19.1 % (ref 27–41)
MCH RBC QN AUTO: 28.3 PG (ref 27–31)
MCHC RBC AUTO-ENTMCNC: 32.9 G/DL (ref 32–36)
MCV RBC AUTO: 85.9 FL (ref 80–96)
MONOCYTES # BLD AUTO: 0.77 K/UL (ref 0–0.8)
MONOCYTES NFR BLD AUTO: 6.7 % (ref 2–6)
MPC BLD CALC-MCNC: 13.6 FL (ref 9.4–12.4)
NEUTROPHILS # BLD AUTO: 7.95 K/UL (ref 1.8–7.7)
NEUTROPHILS NFR BLD AUTO: 69.6 % (ref 53–65)
NRBC # BLD AUTO: 0 X10E3/UL
NRBC, AUTO (.00): 0 %
PLATELET # BLD AUTO: 147 K/UL (ref 150–400)
PLATELET MORPHOLOGY: ABNORMAL
POTASSIUM SERPL-SCNC: 3.6 MMOL/L (ref 3.5–5.1)
PROT SERPL-MCNC: 7.5 G/DL (ref 6.4–8.2)
RBC # BLD AUTO: 4.67 M/UL (ref 4.2–5.4)
RBC MORPH BLD: NORMAL
SODIUM SERPL-SCNC: 142 MMOL/L (ref 136–145)
TROPONIN I SERPL DL<=0.01 NG/ML-MCNC: 4.2 PG/ML
TROPONIN I SERPL DL<=0.01 NG/ML-MCNC: 5 PG/ML
WBC # BLD AUTO: 11.42 K/UL (ref 4.5–11)

## 2023-11-03 PROCEDURE — 96374 THER/PROPH/DIAG INJ IV PUSH: CPT

## 2023-11-03 PROCEDURE — 83690 ASSAY OF LIPASE: CPT | Performed by: EMERGENCY MEDICINE

## 2023-11-03 PROCEDURE — 63600175 PHARM REV CODE 636 W HCPCS: Performed by: EMERGENCY MEDICINE

## 2023-11-03 PROCEDURE — C9113 INJ PANTOPRAZOLE SODIUM, VIA: HCPCS | Performed by: EMERGENCY MEDICINE

## 2023-11-03 PROCEDURE — 93005 ELECTROCARDIOGRAM TRACING: CPT

## 2023-11-03 PROCEDURE — 84484 ASSAY OF TROPONIN QUANT: CPT | Performed by: EMERGENCY MEDICINE

## 2023-11-03 PROCEDURE — 99284 EMERGENCY DEPT VISIT MOD MDM: CPT | Mod: 25

## 2023-11-03 PROCEDURE — 99284 PR EMERGENCY DEPT VISIT,LEVEL IV: ICD-10-PCS | Mod: ,,, | Performed by: EMERGENCY MEDICINE

## 2023-11-03 PROCEDURE — 93010 ELECTROCARDIOGRAM REPORT: CPT | Mod: ,,, | Performed by: HOSPITALIST

## 2023-11-03 PROCEDURE — 96375 TX/PRO/DX INJ NEW DRUG ADDON: CPT

## 2023-11-03 PROCEDURE — 25000003 PHARM REV CODE 250: Performed by: EMERGENCY MEDICINE

## 2023-11-03 PROCEDURE — 93010 EKG 12-LEAD: ICD-10-PCS | Mod: ,,, | Performed by: HOSPITALIST

## 2023-11-03 PROCEDURE — 85025 COMPLETE CBC W/AUTO DIFF WBC: CPT | Performed by: EMERGENCY MEDICINE

## 2023-11-03 PROCEDURE — 80053 COMPREHEN METABOLIC PANEL: CPT | Performed by: EMERGENCY MEDICINE

## 2023-11-03 PROCEDURE — 99284 EMERGENCY DEPT VISIT MOD MDM: CPT | Mod: ,,, | Performed by: EMERGENCY MEDICINE

## 2023-11-03 RX ORDER — OMEPRAZOLE 40 MG/1
40 CAPSULE, DELAYED RELEASE ORAL DAILY
Qty: 30 CAPSULE | Refills: 11 | Status: SHIPPED | OUTPATIENT
Start: 2023-11-03 | End: 2023-11-13 | Stop reason: ALTCHOICE

## 2023-11-03 RX ORDER — MAG HYDROX/ALUMINUM HYD/SIMETH 200-200-20
60 SUSPENSION, ORAL (FINAL DOSE FORM) ORAL ONCE
Status: COMPLETED | OUTPATIENT
Start: 2023-11-03 | End: 2023-11-03

## 2023-11-03 RX ORDER — ONDANSETRON 2 MG/ML
4 INJECTION INTRAMUSCULAR; INTRAVENOUS ONCE
Status: COMPLETED | OUTPATIENT
Start: 2023-11-03 | End: 2023-11-03

## 2023-11-03 RX ORDER — MORPHINE SULFATE 4 MG/ML
4 INJECTION, SOLUTION INTRAMUSCULAR; INTRAVENOUS
Status: COMPLETED | OUTPATIENT
Start: 2023-11-03 | End: 2023-11-03

## 2023-11-03 RX ORDER — PANTOPRAZOLE SODIUM 40 MG/10ML
40 INJECTION, POWDER, LYOPHILIZED, FOR SOLUTION INTRAVENOUS
Status: COMPLETED | OUTPATIENT
Start: 2023-11-03 | End: 2023-11-03

## 2023-11-03 RX ORDER — LIDOCAINE HYDROCHLORIDE 20 MG/ML
15 SOLUTION OROPHARYNGEAL ONCE
Status: COMPLETED | OUTPATIENT
Start: 2023-11-03 | End: 2023-11-03

## 2023-11-03 RX ADMIN — ALUMINUM HYDROXIDE, MAGNESIUM HYDROXIDE, AND DIMETHICONE 60 ML: 200; 20; 200 SUSPENSION ORAL at 09:11

## 2023-11-03 RX ADMIN — PANTOPRAZOLE SODIUM 40 MG: 40 INJECTION, POWDER, FOR SOLUTION INTRAVENOUS at 10:11

## 2023-11-03 RX ADMIN — MORPHINE SULFATE 4 MG: 4 INJECTION, SOLUTION INTRAMUSCULAR; INTRAVENOUS at 10:11

## 2023-11-03 RX ADMIN — LIDOCAINE HYDROCHLORIDE 15 ML: 20 SOLUTION ORAL at 09:11

## 2023-11-03 RX ADMIN — ONDANSETRON 4 MG: 2 INJECTION INTRAMUSCULAR; INTRAVENOUS at 10:11

## 2023-11-04 NOTE — ED PROVIDER NOTES
Encounter Date: 11/3/2023    SCRIBE #1 NOTE: I, Oma Antonio, am scribing for, and in the presence of,  Michel Le MD. I have scribed the entire note.       History     Chief Complaint   Patient presents with    Abdominal Pain     This is a 42 y/o female,who presents to the ED with complaints of abdominal pain. She localizes the abdominal pain to the epigastric area. She notes she has a known hx of a hiatal hernia. She notes the pain is worse with laying flat. She denies any nausea or vomiting. There are no other complaints/pain in the ED at this time. She has a known hx of Diabetes, RA, HTN, Cushing syndrome, and hyperlipidemia. There is no smoking hx.     The history is provided by the patient. No  was used.     Review of patient's allergies indicates:   Allergen Reactions    Doxycycline     Latex      Other reaction(s): Unknown     Past Medical History:   Diagnosis Date    Anxiety     Asthma     Chronic pain     Cushing syndrome     Depression     Essential hypertension     Fibromyalgia     Herpes zoster     Hyperlipidemia     Leukocytosis 2022    Onychomycosis     MARY on CPAP     Rheumatoid arthritis     Type 2 diabetes mellitus 2020    Vitamin D deficiency 2018     Past Surgical History:   Procedure Laterality Date     SECTION      ELBOW SURGERY  1985    EPIDURAL STEROID INJECTION      L4-5 VERITO Dec 2020-[facility]    FEMUR SURGERY Right     due to osteomyelitis    HYSTERECTOMY      Abn Bleeding    INJECTION OF ANESTHETIC AGENT AROUND MEDIAL BRANCH NERVES INNERVATING LUMBAR FACET JOINT Bilateral 2022    Procedure: Bilateral L4-5,5-S1 MBB ( No steroids);  Surgeon: Carmel Torre MD;  Location: [facility];  Service: Pain Management;  Laterality: Bilateral;  PT AWARE TO BE TESTED ON OV    INJECTION OF ANESTHETIC AGENT AROUND MEDIAL BRANCH NERVES INNERVATING LUMBAR FACET JOINT Bilateral 2022    Procedure: Block-nerve-medial branch-lumbar,  bilateral L4 through S1;  Surgeon: Carmel Torre MD;  Location: [facility];  Service: Pain Management;  Laterality: Bilateral;    INJECTION OF ANESTHETIC AGENT INTO SACROILIAC JOINT Bilateral 08/23/2022    Procedure: BLOCK, SACROILIAC JOINT;  Surgeon: Carmel Torre MD;  Location: [facility];  Service: Pain Management;  Laterality: Bilateral;  covid test put in    LIPOMA RESECTION  12/2019    RADIOFREQUENCY ABLATION OF LUMBAR MEDIAL BRANCH NERVE AT SINGLE LEVEL Right 04/14/2022    Procedure: Radiofrequency Ablation, Nerve, Spinal, Lumbar, Medial Branch, Level L4-S1, right side 1st, will have left-sided after completing;  Surgeon: Carmel Torre MD;  Location: [facility];  Service: Pain Management;  Laterality: Right;  pt aware at visit to be tested    RADIOFREQUENCY ABLATION OF LUMBAR MEDIAL BRANCH NERVE AT SINGLE LEVEL Left 05/05/2022    Procedure: LEFT  L4-S1 RFTC  (HAD RIGHT  ON 4-14);  Surgeon: Carmel Torre MD;  Location: [facility];  Service: Pain Management;  Laterality: Left;    RADIOFREQUENCY ABLATION OF LUMBAR MEDIAL BRANCH NERVE AT SINGLE LEVEL Right 7/25/2023    Procedure: Radiofrequency Ablation, Nerve, Spinal, Lumbar, Medial Branch, Level L4-S1;  Surgeon: Carmel Torre MD;  Location: [facility];  Service: Pain Management;  Laterality: Right;    SURGICAL REMOVAL OF PILONIDAL CYST      TRANSFORAMINAL EPIDURAL INJECTION OF STEROID      Bilateral L4-5 TFESI Nov 2020-[facility]    TRANSFORAMINAL EPIDURAL INJECTION OF STEROID      Right L4-5 TFESI Feb 2020-[facility]    VENTRAL HERNIA REPAIR  09/2020     Family History   Problem Relation Age of Onset    Cancer Mother     Thyroid cancer Mother     Thyroid cancer Maternal Grandmother     Melanoma Neg Hx     Colon cancer Neg Hx     Breast cancer Neg Hx     Ovarian cancer Neg Hx      Social History     Tobacco Use    Smoking status: Every Day     Types: Cigarettes     Start date: 9/8/2021    Smokeless tobacco: Never    Substance Use Topics    Alcohol use: Yes     Comment: ocassional    Drug use: Never     Review of Systems   All other systems reviewed and are negative.      Physical Exam     Initial Vitals [11/03/23 2033]   BP Pulse Resp Temp SpO2   (!) 154/109 77 18 98.6 °F (37 °C) 97 %      MAP       --         Physical Exam    Nursing note and vitals reviewed.  Constitutional:   Pt is obese.      HENT:   Head: Normocephalic and atraumatic.   Eyes: Conjunctivae and EOM are normal. Pupils are equal, round, and reactive to light.   Neck: Neck supple.   Normal range of motion.  Cardiovascular:  Normal rate, regular rhythm, normal heart sounds and intact distal pulses.           Pulmonary/Chest: Breath sounds normal.   Abdominal: Abdomen is soft. Bowel sounds are normal. There is abdominal tenderness (Tender to the epigastric area.).   Musculoskeletal:         General: Normal range of motion.      Cervical back: Normal range of motion and neck supple.     Neurological: She is alert and oriented to person, place, and time. She has normal strength.   Skin: Skin is warm and dry. Capillary refill takes less than 2 seconds.   Psychiatric: She has a normal mood and affect. Thought content normal.         ED Course   Procedures  Labs Reviewed   COMPREHENSIVE METABOLIC PANEL - Abnormal; Notable for the following components:       Result Value    Chloride 109 (*)     Glucose 164 (*)     BUN/Creatinine Ratio 30 (*)     Calcium 8.4 (*)     Albumin 3.0 (*)     Globulin 4.5 (*)     Alk Phos 119 (*)     AST 13 (*)     All other components within normal limits   CBC WITH DIFFERENTIAL - Abnormal; Notable for the following components:    WBC 11.42 (*)     Platelet Count 147 (*)     MPV 13.6 (*)     Neutrophils % 69.6 (*)     Lymphocytes % 19.1 (*)     Monocytes % 6.7 (*)     Neutrophils, Abs 7.95 (*)     All other components within normal limits   CBC MORPHOLOGY - Abnormal; Notable for the following components:    Platelet Morphology Few Large  Platelets (*)     All other components within normal limits   LIPASE - Normal   TROPONIN I - Normal   TROPONIN I - Normal   CBC W/ AUTO DIFFERENTIAL    Narrative:     The following orders were created for panel order CBC auto differential.  Procedure                               Abnormality         Status                     ---------                               -----------         ------                     CBC with Differential[[id number]]       Abnormal            Final result                 Please view results for these tests on the individual orders.        ECG Results              EKG 12-lead (Final result)  Result time 11/04/23 19:26:25      Final result by Interface, Lab In [facility] (11/04/23 19:26:25)                   Narrative:    Test Reason : R10.9,    Vent. Rate : 071 BPM     Atrial Rate : 000 BPM     P-R Int : 166 ms          QRS Dur : 094 ms      QT Int : 422 ms       P-R-T Axes : 070 031 017 degrees     QTc Int : 441 ms    Sinus rhythm  Poor R wave progression  Low QRS voltages in precordial leads      Confirmed by Joao FERNANDES, Britney BAILON ([id number]) on 11/4/2023 7:26:15 PM    Referred By: [id number]   SELF           Confirmed By:Britney Shepherd MD                                  Imaging Results    None          Medications   aluminum-magnesium hydroxide-simethicone 200-200-20 mg/5 mL suspension 60 mL (60 mLs Oral Given 11/3/23 2111)     And   LIDOcaine HCl 2% oral solution 15 mL (15 mLs Oral Given 11/3/23 2111)   pantoprazole injection 40 mg (40 mg Intravenous Given 11/3/23 2214)   morphine injection 4 mg (4 mg Intravenous Given 11/3/23 2214)   ondansetron injection 4 mg (4 mg Intravenous Given 11/3/23 2214)     Medical Decision Making  EPIGASTRIC PAIN    DDX:  PANCREATITIS VS GERD VS GASTRITIS VS OTHER    OUTPATIENT PPI AND FOLLOW UP .      Amount and/or Complexity of Data Reviewed  Labs: ordered. Decision-making details documented in ED Course.  Radiology: ordered. Decision-making details  documented in ED Course.    Risk  OTC drugs.  Prescription drug management.              Attending Attestation:           Physician Attestation for Scribe:  Physician Attestation Statement for Scribe #1: I, Michel Le MD, reviewed documentation, as scribed by Oma Antonio in my presence, and it is both accurate and complete.             ED Course as of 23 CBC Morphology(!) [LM]      ED Course User Index  [LM] Michel Le MD                    Clinical Impression:   Final diagnoses:  [R10.9] Abdominal pain  [K21.9] Gastroesophageal reflux disease, unspecified whether esophagitis present (Primary)        ED Disposition Condition    Discharge Stable          ED Prescriptions       Medication Sig Dispense Start Date End Date Auth. Provider    omeprazole (PRILOSEC) 40 MG capsule () Take 1 capsule (40 mg total) by mouth once daily. 30 capsule 11/3/2023 2023 Michel Le MD          Follow-up Information       Follow up With Specialties Details Why Contact Info    Rhoda Rueda, FNP Family Medicine  As needed [address] [place] [address] [place] [id number]  [phone]               Michel Le MD  23

## 2023-11-04 NOTE — DISCHARGE INSTRUCTIONS
Take prilosec daily as directed.  Follow up with your primary care provider.  Return to the emergency department as needed.

## 2023-11-13 ENCOUNTER — OFFICE VISIT (OUTPATIENT)
Dept: FAMILY MEDICINE | Facility: CLINIC | Age: 43
End: 2023-11-13
Payer: MEDICAID

## 2023-11-13 VITALS
RESPIRATION RATE: 20 BRPM | OXYGEN SATURATION: 95 % | DIASTOLIC BLOOD PRESSURE: 74 MMHG | TEMPERATURE: 98 F | SYSTOLIC BLOOD PRESSURE: 107 MMHG | WEIGHT: 293 LBS | HEIGHT: 62 IN | HEART RATE: 77 BPM | BODY MASS INDEX: 53.92 KG/M2

## 2023-11-13 DIAGNOSIS — K21.00 GASTROESOPHAGEAL REFLUX DISEASE WITH ESOPHAGITIS WITHOUT HEMORRHAGE: Primary | Chronic | ICD-10-CM

## 2023-11-13 DIAGNOSIS — I10 ESSENTIAL HYPERTENSION: Chronic | ICD-10-CM

## 2023-11-13 DIAGNOSIS — E11.9 TYPE 2 DIABETES MELLITUS WITHOUT COMPLICATION, WITHOUT LONG-TERM CURRENT USE OF INSULIN: Chronic | ICD-10-CM

## 2023-11-13 PROBLEM — K21.9 GASTROESOPHAGEAL REFLUX DISEASE: Status: RESOLVED | Noted: 2023-11-03 | Resolved: 2023-11-13

## 2023-11-13 PROCEDURE — 3061F PR NEG MICROALBUMINURIA RESULT DOCUMENTED/REVIEW: ICD-10-PCS | Mod: CPTII,,, | Performed by: NURSE PRACTITIONER

## 2023-11-13 PROCEDURE — 3074F PR MOST RECENT SYSTOLIC BLOOD PRESSURE < 130 MM HG: ICD-10-PCS | Mod: CPTII,,, | Performed by: NURSE PRACTITIONER

## 2023-11-13 PROCEDURE — 99214 OFFICE O/P EST MOD 30 MIN: CPT | Mod: ,,, | Performed by: NURSE PRACTITIONER

## 2023-11-13 PROCEDURE — 3078F PR MOST RECENT DIASTOLIC BLOOD PRESSURE < 80 MM HG: ICD-10-PCS | Mod: CPTII,,, | Performed by: NURSE PRACTITIONER

## 2023-11-13 PROCEDURE — 3066F PR DOCUMENTATION OF TREATMENT FOR NEPHROPATHY: ICD-10-PCS | Mod: CPTII,,, | Performed by: NURSE PRACTITIONER

## 2023-11-13 PROCEDURE — 1160F PR REVIEW ALL MEDS BY PRESCRIBER/CLIN PHARMACIST DOCUMENTED: ICD-10-PCS | Mod: CPTII,,, | Performed by: NURSE PRACTITIONER

## 2023-11-13 PROCEDURE — 3066F NEPHROPATHY DOC TX: CPT | Mod: CPTII,,, | Performed by: NURSE PRACTITIONER

## 2023-11-13 PROCEDURE — 99214 PR OFFICE/OUTPT VISIT, EST, LEVL IV, 30-39 MIN: ICD-10-PCS | Mod: ,,, | Performed by: NURSE PRACTITIONER

## 2023-11-13 PROCEDURE — 3078F DIAST BP <80 MM HG: CPT | Mod: CPTII,,, | Performed by: NURSE PRACTITIONER

## 2023-11-13 PROCEDURE — 4010F PR ACE/ARB THEARPY RXD/TAKEN: ICD-10-PCS | Mod: CPTII,,, | Performed by: NURSE PRACTITIONER

## 2023-11-13 PROCEDURE — 3044F PR MOST RECENT HEMOGLOBIN A1C LEVEL <7.0%: ICD-10-PCS | Mod: CPTII,,, | Performed by: NURSE PRACTITIONER

## 2023-11-13 PROCEDURE — 1159F PR MEDICATION LIST DOCUMENTED IN MEDICAL RECORD: ICD-10-PCS | Mod: CPTII,,, | Performed by: NURSE PRACTITIONER

## 2023-11-13 PROCEDURE — 3074F SYST BP LT 130 MM HG: CPT | Mod: CPTII,,, | Performed by: NURSE PRACTITIONER

## 2023-11-13 PROCEDURE — 1159F MED LIST DOCD IN RCRD: CPT | Mod: CPTII,,, | Performed by: NURSE PRACTITIONER

## 2023-11-13 PROCEDURE — 4010F ACE/ARB THERAPY RXD/TAKEN: CPT | Mod: CPTII,,, | Performed by: NURSE PRACTITIONER

## 2023-11-13 PROCEDURE — 3061F NEG MICROALBUMINURIA REV: CPT | Mod: CPTII,,, | Performed by: NURSE PRACTITIONER

## 2023-11-13 PROCEDURE — 3044F HG A1C LEVEL LT 7.0%: CPT | Mod: CPTII,,, | Performed by: NURSE PRACTITIONER

## 2023-11-13 PROCEDURE — 1160F RVW MEDS BY RX/DR IN RCRD: CPT | Mod: CPTII,,, | Performed by: NURSE PRACTITIONER

## 2023-11-13 RX ORDER — PANTOPRAZOLE SODIUM 40 MG/1
40 TABLET, DELAYED RELEASE ORAL 2 TIMES DAILY
COMMUNITY
Start: 2023-11-07

## 2023-11-13 RX ORDER — METFORMIN HYDROCHLORIDE 500 MG/1
500 TABLET, EXTENDED RELEASE ORAL DAILY
Qty: 90 TABLET | Refills: 3 | Status: SHIPPED | OUTPATIENT
Start: 2023-11-13

## 2023-11-13 NOTE — PROGRESS NOTES
[facility] - FAMILY MEDICINE       PATIENT NAME: Belem Swann   : 1980    AGE: 43 y.o. DATE OF ENCOUNTER: 23    MRN: [id number]      PCP: Rhoda Rueda FNP    Reason for Visit / Chief Complaint:  Follow-up (Patient presents to the clinic for a hosp f/u )         274}    Subjective:     HPI:    Belem Swann presents for a Transitional Care Management hospital discharge follow-up visit. She was admitted to [facility] on 23 for CP & SOB and was discharged on 23 with ACS ruled out.     Records from [facility] admission not available at time of visit, but have been requested.    Reports she went to [facility] ED  for chest pain and BP elevation and was sent home.  23 evening she began feeling worse with /129 (per pt) and went to [facility] ED where she was admitted.      She was changed from omeprazole to Protonix 40 mg 2 times per day and denies further chest pain.  BP is improved.  Had no BP med changes.    HH came out Friday but doesn't know what company.  No BP med changes. Retaining fluid again, but hasn't taken lasix today.    Hospital records reviewed with D-dimer 0.6, chest x-ray with mild bibasilar atelectasis without infiltrate, CT scan chest without PE.  Troponins were trended and negative x2, no acute changes on cardiac monitor throughout hosp stay.    Family and/or Caretaker present at visit?  Accompanied by adolescent son.  Diagnostic tests reviewed/disposition: No diagnosic tests pending after this hospitalization.  Establishment or re-establishment of referral orders for community resources: No other necessary community resources.   Discussion with other health care providers: No discussion with other health care providers necessary.     Discharge and current medications have been reconciled.    Review of Systems:   Review of Systems   Constitutional:  Negative for chills and fever.   HENT:  Positive for  congestion (chronic rhinitis). Negative for ear pain, sinus pain and sore throat.    Eyes: Negative.    Respiratory:  Positive for shortness of breath (chronic BALDERAS). Negative for cough, chest tightness and wheezing.    Cardiovascular:  Positive for leg swelling (chronic). Negative for chest pain and palpitations.   Gastrointestinal: Negative.  Negative for abdominal pain.   Genitourinary: Negative.    Musculoskeletal:  Positive for arthralgias, back pain, gait problem and myalgias.        Chronic, followed by pain management at [facility].   Skin:  Positive for rash (chronic).   Neurological:  Positive for headaches.   Psychiatric/Behavioral:  Positive for sleep disturbance (chronic). Negative for dysphoric mood, self-injury and suicidal ideas. The patient is nervous/anxious (chronic).        Allergies and Meds: 274}     Review of patient's allergies indicates:   Allergen Reactions    Doxycycline     Latex      Other reaction(s): Unknown        Current Outpatient Medications:     albuterol sulfate 90 mcg/actuation aebs, Inhale 180 mcg into the lungs every 4 (four) hours., Disp: , Rfl:     albuterol-ipratropium (DUO-NEB) 2.5 mg-0.5 mg/3 mL nebulizer solution, Take 3 mLs by nebulization every 6 (six) hours as needed. Rescue, Disp: , Rfl:     budesonide-formoterol 160-4.5 mcg (SYMBICORT) 160-4.5 mcg/actuation HFAA, Inhale 2 puffs into the lungs every 12 (twelve) hours. Controller, Disp: 10.2 g, Rfl: 11    carvediloL (COREG) 25 MG tablet, Take 25 mg by mouth 2 (two) times daily with meals., Disp: , Rfl:     cloNIDine (CATAPRES) 0.1 MG tablet, Take 0.1 mg by mouth daily as needed., Disp: , Rfl:     cyclobenzaprine (FLEXERIL) 10 MG tablet, Take 1 tablet (10 mg total) by mouth 3 (three) times daily as needed for Muscle spasms., Disp: 90 tablet, Rfl: 2    exenatide (BYETTA) 5 mcg/dose (250 mcg/mL) 1.2 mL injection, Inject 0.02 mLs (5 mcg total) into the skin 2 (two) times daily with meals., Disp: 3.6 mL, Rfl: 3     "fluocinolone (SYNALAR) 0.01 % external solution, Apply topically 2 (two) times daily. (Patient taking differently: Apply 1 Dose topically 2 (two) times daily.), Disp: 90 mL, Rfl: 5    furosemide (LASIX) 40 MG tablet, Take 40 mg by mouth once daily., Disp: , Rfl:     gabapentin (NEURONTIN) 300 MG capsule, Take 1 capsule (300 mg total) by mouth every 8 (eight) hours., Disp: 90 capsule, Rfl: 1    HUMIRA,CF, PEN 40 mg/0.4 mL PnKt, Inject 40 mg into the skin once a week., Disp: , Rfl:     HYDROcodone-acetaminophen (NORCO)  mg per tablet, Take 1 tablet by mouth every 8 (eight) hours as needed for Pain., Disp: 90 tablet, Rfl: 0    hydrocortisone 2.5 % ointment, Apply topically 2 (two) times daily., Disp: 454 g, Rfl: 5    hydrOXYzine pamoate (VISTARIL) 50 MG Cap, Take 2 capsules (100 mg total) by mouth nightly as needed (anxiety & difficulty sleeping)., Disp: 180 capsule, Rfl: 1    ketoconazole (NIZORAL) 2 % cream, Apply topically once daily., Disp: 60 g, Rfl: 2    lancets (ONETOUCH DELICA LANCETS) 33 gauge Misc, 1 lancet by Misc.(Non-Drug; Combo Route) route once daily., Disp: 100 each, Rfl: 3    NIFEdipine (PROCARDIA-XL) 30 MG (OSM) 24 hr tablet, Take 30 mg by mouth every evening., Disp: , Rfl:     nystatin (MYCOSTATIN) powder, Apply topically 2 (two) times daily., Disp: 60 g, Rfl: 5    olmesartan (BENICAR) 40 MG tablet, Take 40 mg by mouth once daily., Disp: , Rfl:     pantoprazole (PROTONIX) 40 MG tablet, Take 40 mg by mouth 2 (two) times daily., Disp: , Rfl:     pen needle, diabetic 31 gauge x 1/4" Ndle, , Disp: , Rfl:     promethazine (PHENERGAN) 25 MG tablet, Take 1 tablet (25 mg total) by mouth every 6 (six) hours as needed for Nausea., Disp: 30 tablet, Rfl: 1    rosuvastatin (CRESTOR) 40 MG Tab, Take 1 tablet (40 mg total) by mouth every evening., Disp: 90 tablet, Rfl: 3    triamcinolone acetonide 0.1% (KENALOG) 0.1 % ointment, Apply topically 2 (two) times daily., Disp: 454 g, Rfl: 0    TRUE METRIX GLUCOSE " TEST STRIP Strp, 1 strip by Misc.(Non-Drug; Combo Route) route Daily. For T2DM., Disp: 100 strip, Rfl: 3    metFORMIN (GLUCOPHAGE-XR) 500 MG ER 24hr tablet, Take 1 tablet (500 mg total) by mouth once daily., Disp: 90 tablet, Rfl: 3    Labs:274}    I have reviewed old labs below:  Lab Results   Component Value Date    WBC 11.42 (H) 11/03/2023    RBC 4.67 11/03/2023    HGB 13.2 11/03/2023    HCT 40.1 11/03/2023     (L) 11/03/2023     11/03/2023    K 3.6 11/03/2023     (H) 11/03/2023    CALCIUM 8.4 (L) 11/03/2023     (H) 11/03/2023    BUN 17 11/03/2023    CREATININE 0.57 11/03/2023    ESTGFRAFRICA 103 10/07/2020    EGFRNONAA 95 05/11/2022    ALT 24 11/03/2023    AST 13 (L) 11/03/2023    INR 1.10 10/07/2020    CHOL 173 09/13/2023    TRIG 108 09/13/2023    HDL 49 09/13/2023    LDLCALC 102 09/13/2023    TSH 2.630 01/12/2023    HGBA1C 6.0 06/27/2023    MICROALBUR <0.5 01/12/2023       Medical History: 274}     Past Medical History:   Diagnosis Date    Anxiety     Asthma     Chronic pain     Cushing syndrome     Depression     Essential hypertension     Fibromyalgia     Herpes zoster     Hyperlipidemia     Leukocytosis 1/18/2022    Onychomycosis     MARY on CPAP     Rheumatoid arthritis     Type 2 diabetes mellitus 11/2020    Vitamin D deficiency 05/03/2018      Social History     Tobacco Use   Smoking Status Every Day    Types: Cigarettes    Start date: 9/8/2021   Smokeless Tobacco Never      Health Maintenance: 274}     Health Maintenance         Date Due Completion Date    Sign Pain Contract Never done ---    Influenza Vaccine (1) 09/01/2023 9/12/2022    Eye Exam 01/19/2024 1/19/2023 (Done)    Override on 1/19/2023: Done (Rhoda Resendiz  [facility])    Override on 12/15/2021: Done ([facility])    HIV Screening 05/06/2027 (Originally 4/4/1995) ---    Hemoglobin A1c 12/27/2023 6/27/2023    Diabetes Urine Screening 01/12/2024 1/12/2023    Mammogram 05/30/2024 5/30/2023    Foot  "Exam 06/27/2024 6/27/2023    Override on 5/6/2021: Done    Lipid Panel 09/13/2024 9/13/2023    Pap Smear 03/23/2026 3/23/2023    Override on 5/19/2020: Done (negative; Dr. Solomon)    TETANUS VACCINE 05/06/2031 5/6/2021    Pneumococcal Vaccines (Age 0-64) (3 - PPSV23 or PCV20) 04/04/2044 1/4/2021            Objective:  274}   /74 (BP Location: Right arm, Patient Position: Sitting, BP Method: X-Large (Automatic))   Pulse 77   Temp 98.3 °F (36.8 °C) (Oral)   Resp 20   Ht 5' 2" (1.575 m)   Wt (!) 159.2 kg (351 lb)   SpO2 95%   BMI 64.20 kg/m²     Wt Readings from Last 3 Encounters:   11/13/23 (!) 159.2 kg (351 lb)   11/03/23 (!) 152.4 kg (336 lb)   10/04/23 (!) 152.4 kg (336 lb)     BP Readings from Last 3 Encounters:   11/13/23 107/74   11/03/23 (!) 159/85   10/04/23 (!) 154/91     Body mass index is 64.2 kg/m².     Physical Exam  Vitals and nursing note reviewed.   Constitutional:       General: She is not in acute distress.     Appearance: Normal appearance. She is obese. She is not ill-appearing.   HENT:      Head: Normocephalic.   Eyes:      Conjunctiva/sclera: Conjunctivae normal.   Neck:      Thyroid: No thyromegaly.      Trachea: Trachea normal.   Cardiovascular:      Rate and Rhythm: Normal rate and regular rhythm.      Pulses: Normal pulses.      Heart sounds: Normal heart sounds.   Pulmonary:      Effort: Pulmonary effort is normal.      Breath sounds: Normal breath sounds.   Abdominal:      Palpations: Abdomen is soft.      Tenderness: There is no abdominal tenderness.   Musculoskeletal:      Cervical back: Neck supple.      Right lower leg: Edema (trace) present.      Left lower leg: Edema (trace) present.   Lymphadenopathy:      Cervical: No cervical adenopathy.   Skin:     General: Skin is warm and dry.   Neurological:      General: No focal deficit present.      Mental Status: She is alert and oriented to person, place, and time.   Psychiatric:         Mood and Affect: Mood normal.    "      Behavior: Behavior normal.          Assessment and Plan: 274}     1. Gastroesophageal reflux disease with esophagitis without hemorrhage  Comments:  Controlled with no further chest discomfort, continue pantoprazole 40 mg 2 times per day.    2. Type 2 diabetes mellitus without complication, without long-term current use of insulin  Comments:  Controlled, last A1c 6.0%.    Continue metformin.  Orders:  -     metFORMIN (GLUCOPHAGE-XR) 500 MG ER 24hr tablet; Take 1 tablet (500 mg total) by mouth once daily.  Dispense: 90 tablet; Refill: 3    3. Essential hypertension  Comments:  Well controlled, continue current meds and treatment.       Smoking cessation advised.    Return to clinic move March appointment up to early January 2024 for f/u T2DM and HTN; and sooner as needed.    Future Appointments   Date Time Provider Department Center   12/4/2023  1:00 PM Jeff Pineda PA RAS P[facility]   12/7/2023 11:15 AM Rogelio Wakefield MD OB ORTHO [place]   2/20/2024  1:00 PM Ashlyn Wakefield MD [facility] DER[facility] [place]   3/20/2024  9:40 AM Rhoda Rueda FNP [facility]   6/4/2024  1:30 PM [facility] [place]   9/17/2024  9:00 AM Rhoda Rueda FNP [facility]        Signature:  DANE Saenz

## 2023-11-30 NOTE — PROGRESS NOTES
Subjective:         Patient ID: Belem Swann is a 43 y.o. female.    Chief Complaint: Knee Pain, Low-back Pain, and Leg Pain        Pain  This is a chronic problem. The current episode started more than 1 year ago. The problem occurs daily. The problem has been waxing and waning. Associated symptoms include arthralgias and neck pain. Pertinent negatives include no anorexia, chest pain, chills, coughing, diaphoresis, fever, sore throat, vertigo or vomiting.     Review of Systems   Constitutional:  Negative for activity change, appetite change, chills, diaphoresis, fever and unexpected weight change.   HENT:  Negative for drooling, ear discharge, ear pain, facial swelling, mouth sores, nosebleeds, sore throat, trouble swallowing, voice change and goiter.    Eyes:  Negative for photophobia, pain, discharge, redness and visual disturbance.   Respiratory:  Negative for apnea, cough, choking, chest tightness, shortness of breath, wheezing and stridor.    Cardiovascular:  Negative for chest pain, palpitations and leg swelling.   Gastrointestinal:  Negative for abdominal distention, anorexia, diarrhea, rectal pain, vomiting and fecal incontinence.   Endocrine: Negative for cold intolerance, heat intolerance, polydipsia, polyphagia and polyuria.   Genitourinary:  Negative for bladder incontinence, dysuria, flank pain, frequency and hot flashes.   Musculoskeletal:  Positive for arthralgias, back pain, leg pain, neck pain and neck stiffness.   Integumentary:  Negative for color change and pallor.   Allergic/Immunologic: Negative for immunocompromised state.   Neurological:  Negative for dizziness, vertigo, seizures, syncope, facial asymmetry, speech difficulty, light-headedness, memory loss and coordination difficulties.   Hematological:  Negative for adenopathy. Does not bruise/bleed easily.   Psychiatric/Behavioral:  Negative for agitation, behavioral problems, confusion, decreased concentration, dysphoric mood,  hallucinations, self-injury and suicidal ideas. The patient is not hyperactive.            Past Medical History:   Diagnosis Date    Anxiety     Asthma     Chronic pain     Cushing syndrome     Depression     Essential hypertension     Fibromyalgia     Herpes zoster     Hyperlipidemia     Leukocytosis 2022    Onychomycosis     MARY on CPAP     Rheumatoid arthritis     Type 2 diabetes mellitus 2020    Vitamin D deficiency 2018     Past Surgical History:   Procedure Laterality Date     SECTION      ELBOW SURGERY  1985    EPIDURAL STEROID INJECTION      L4-5 VERITO Dec 2020-[facility]    FEMUR SURGERY Right     due to osteomyelitis    HYSTERECTOMY      Abn Bleeding    INJECTION OF ANESTHETIC AGENT AROUND MEDIAL BRANCH NERVES INNERVATING LUMBAR FACET JOINT Bilateral 2022    Procedure: Bilateral L4-5,5-S1 MBB ( No steroids);  Surgeon: Carmel Torre MD;  Location: [facility];  Service: Pain Management;  Laterality: Bilateral;  PT AWARE TO BE TESTED ON OV    INJECTION OF ANESTHETIC AGENT AROUND MEDIAL BRANCH NERVES INNERVATING LUMBAR FACET JOINT Bilateral 2022    Procedure: Block-nerve-medial branch-lumbar, bilateral L4 through S1;  Surgeon: Carmel Torre MD;  Location: [facility];  Service: Pain Management;  Laterality: Bilateral;    INJECTION OF ANESTHETIC AGENT INTO SACROILIAC JOINT Bilateral 2022    Procedure: BLOCK, SACROILIAC JOINT;  Surgeon: Carmel Torre MD;  Location: [facility];  Service: Pain Management;  Laterality: Bilateral;  covid test put in    LIPOMA RESECTION  2019    RADIOFREQUENCY ABLATION OF LUMBAR MEDIAL BRANCH NERVE AT SINGLE LEVEL Right 2022    Procedure: Radiofrequency Ablation, Nerve, Spinal, Lumbar, Medial Branch, Level L4-S1, right side 1st, will have left-sided after completing;  Surgeon: Carmel Torre MD;  Location: [facility];  Service: Pain Management;  Laterality: Right;  pt aware at visit to be tested  "   RADIOFREQUENCY ABLATION OF LUMBAR MEDIAL BRANCH NERVE AT SINGLE LEVEL Left 05/05/2022    Procedure: LEFT  L4-S1 RFTC  (HAD RIGHT  ON 4-14);  Surgeon: Carmel Torre MD;  Location: [facility];  Service: Pain Management;  Laterality: Left;    RADIOFREQUENCY ABLATION OF LUMBAR MEDIAL BRANCH NERVE AT SINGLE LEVEL Right 7/25/2023    Procedure: Radiofrequency Ablation, Nerve, Spinal, Lumbar, Medial Branch, Level L4-S1;  Surgeon: Carmel Torre MD;  Location: [facility];  Service: Pain Management;  Laterality: Right;    SURGICAL REMOVAL OF PILONIDAL CYST      TRANSFORAMINAL EPIDURAL INJECTION OF STEROID      Bilateral L4-5 TFESI Nov 2020-[facility]    TRANSFORAMINAL EPIDURAL INJECTION OF STEROID      Right L4-5 TFESI Feb 2020-[facility]    VENTRAL HERNIA REPAIR  09/2020     Social History     Socioeconomic History    Marital status:    Tobacco Use    Smoking status: Every Day     Types: Cigarettes     Start date: 9/8/2021    Smokeless tobacco: Never   Substance and Sexual Activity    Alcohol use: Yes     Comment: ocassional    Drug use: Never    Sexual activity: Not Currently     Partners: Male     Birth control/protection: See Surgical Hx     Family History   Problem Relation Age of Onset    Cancer Mother     Thyroid cancer Mother     Thyroid cancer Maternal Grandmother     Melanoma Neg Hx     Colon cancer Neg Hx     Breast cancer Neg Hx     Ovarian cancer Neg Hx      Review of patient's allergies indicates:   Allergen Reactions    Doxycycline     Latex      Other reaction(s): Unknown        Objective:  Vitals:    12/04/23 1312 12/04/23 1314   BP: (!) 163/102    Pulse: 92    Resp: 20    Weight: (!) 157.9 kg (348 lb)    Height: 5' 2" (1.575 m)    PainSc:   6   6           Physical Exam  Vitals and nursing note reviewed. Exam conducted with a chaperone present.   Constitutional:       General: She is awake. She is not in acute distress.     Appearance: Normal appearance. She is obese. She is not " ill-appearing, toxic-appearing or diaphoretic.   HENT:      Head: Normocephalic and atraumatic.      Nose: Nose normal.      Mouth/Throat:      Mouth: Mucous membranes are moist.      Pharynx: Oropharynx is clear.   Eyes:      Conjunctiva/sclera: Conjunctivae normal.      Pupils: Pupils are equal, round, and reactive to light.   Cardiovascular:      Rate and Rhythm: Normal rate.   Pulmonary:      Effort: Pulmonary effort is normal. No respiratory distress.   Abdominal:      Palpations: Abdomen is soft.      Tenderness: There is no guarding.   Musculoskeletal:         General: No swelling.      Cervical back: Normal range of motion and neck supple. Tenderness present. No rigidity.      Thoracic back: Tenderness present.      Lumbar back: Tenderness present. Decreased range of motion.   Skin:     General: Skin is warm and dry.      Coloration: Skin is not jaundiced or pale.   Neurological:      General: No focal deficit present.      Mental Status: She is alert and oriented to person, place, and time. Mental status is at baseline.      Cranial Nerves: No cranial nerve deficit (II-XII).   Psychiatric:         Mood and Affect: Mood normal.         Behavior: Behavior normal. Behavior is cooperative.         Thought Content: Thought content normal.           FL Fluoro for Pain Management  See OP Notes for results.     IMPRESSION: See OP Notes for results.     This procedure was auto-finalized by: Virtual Radiologist         Admission on 11/03/2023, Discharged on 11/03/2023   Component Date Value Ref Range Status    Sodium 11/03/2023 142  136 - 145 mmol/L Final    Potassium 11/03/2023 3.6  3.5 - 5.1 mmol/L Final    Chloride 11/03/2023 109 (H)  98 - 107 mmol/L Final    CO2 11/03/2023 29  21 - 32 mmol/L Final    Anion Gap 11/03/2023 8  7 - 16 mmol/L Final    Glucose 11/03/2023 164 (H)  74 - 106 mg/dL Final    BUN 11/03/2023 17  7 - 18 mg/dL Final    Creatinine 11/03/2023 0.57  0.55 - 1.02 mg/dL Final    BUN/Creatinine Ratio  11/03/2023 30 (H)  6 - 20 Final    Calcium 11/03/2023 8.4 (L)  8.5 - 10.1 mg/dL Final    Total Protein 11/03/2023 7.5  6.4 - 8.2 g/dL Final    Albumin 11/03/2023 3.0 (L)  3.5 - 5.0 g/dL Final    Globulin 11/03/2023 4.5 (H)  2.0 - 4.0 g/dL Final    A/G Ratio 11/03/2023 0.7   Final    Bilirubin, Total 11/03/2023 0.2  >0.0 - 1.2 mg/dL Final    Alk Phos 11/03/2023 119 (H)  37 - 98 U/L Final    ALT 11/03/2023 24  13 - 56 U/L Final    AST 11/03/2023 13 (L)  15 - 37 U/L Final    eGFR 11/03/2023 115  >=60 mL/min/1.73m2 Final    Lipase 11/03/2023 34  16 - 77 U/L Final    Troponin I High Sensitivity 11/03/2023 5.0  <=60.4 pg/mL Final    WBC 11/03/2023 11.42 (H)  4.50 - 11.00 K/uL Final    RBC 11/03/2023 4.67  4.20 - 5.40 M/uL Final    Hemoglobin 11/03/2023 13.2  12.0 - 16.0 g/dL Final    Hematocrit 11/03/2023 40.1  38.0 - 47.0 % Final    MCV 11/03/2023 85.9  80.0 - 96.0 fL Final    MCH 11/03/2023 28.3  27.0 - 31.0 pg Final    MCHC 11/03/2023 32.9  32.0 - 36.0 g/dL Final    RDW 11/03/2023 12.8  11.5 - 14.5 % Final    Platelet Count 11/03/2023 147 (L)  150 - 400 K/uL Final    MPV 11/03/2023 13.6 (H)  9.4 - 12.4 fL Final    Neutrophils % 11/03/2023 69.6 (H)  53.0 - 65.0 % Final    Lymphocytes % 11/03/2023 19.1 (L)  27.0 - 41.0 % Final    Monocytes % 11/03/2023 6.7 (H)  2.0 - 6.0 % Final    Eosinophils % 11/03/2023 3.8  1.0 - 4.0 % Final    Basophils % 11/03/2023 0.4  0.0 - 1.0 % Final    Immature Granulocytes % 11/03/2023 0.4  0.0 - 0.4 % Final    nRBC, Auto 11/03/2023 0.0  <=0.0 % Final    Neutrophils, Abs 11/03/2023 7.95 (H)  1.80 - 7.70 K/uL Final    Lymphocytes, Absolute 11/03/2023 2.18  1.00 - 4.80 K/uL Final    Monocytes, Absolute 11/03/2023 0.77  0.00 - 0.80 K/uL Final    Eosinophils, Absolute 11/03/2023 0.43  0.00 - 0.50 K/uL Final    Basophils, Absolute 11/03/2023 0.05  0.00 - 0.20 K/uL Final    Immature Granulocytes, Absolute 11/03/2023 0.04  0.00 - 0.04 K/uL Final    nRBC, Absolute 11/03/2023 0.00  <=0.00 x10e3/uL  Final    Diff Type 11/03/2023 Auto   Final    Platelet Morphology 11/03/2023 Few Large Platelets (A)  Normal Final    RBC Morphology 11/03/2023 Normal   Final    Troponin I High Sensitivity 11/03/2023 4.2  <=60.4 pg/mL Final   Lab Visit on 09/28/2023   Component Date Value Ref Range Status    House Dust Mites/D.P., IgE 09/28/2023 <0.10  <0.70 kU/L Final    House Dust Mites/D.F., IgE 09/28/2023 <0.10  <0.70 kU/L Final    Cat Epithelium, IgE 09/28/2023 <0.10  <0.70 kU/L Final    Dog Dander, IgE 09/28/2023 <0.10  <0.70 kU/L Final    [place] Grass, IgE 09/28/2023 <0.10  <0.70 kU/L Final    Tevin Grass, IgE 09/28/2023 <0.10  <0.70 kU/L Final    Cockroach, IgE 09/28/2023 <0.10  <0.70 kU/L Final    Penicillium, IgE 09/28/2023 <0.10  <0.70 kU/L Final    Cladosporium, IgE 09/28/2023 <0.10  <0.70 kU/L Final    Aspergillus Fumigatus, IgE 09/28/2023 <0.10  <0.70 kU/L Final    Alternaria Tenuis, IgE 09/28/2023 <0.10  <0.70 kU/L Final    Box [place]/Maple, IgE 09/28/2023 <0.10  <0.70 kU/L Final    Silver Birch, IgE 09/28/2023 <0.10  <0.70 kU/L Final    Mountain Ozone, IgE 09/28/2023 <0.10  <0.70 kU/L Final    [place], IgE 09/28/2023 <0.10  <0.70 kU/L Final    Elm, IgE 09/28/2023 <0.10  <0.70 kU/L Final    [place] Tree, IgE 09/28/2023 <0.10  <0.70 kU/L Final    Pecan [place], IgE 09/28/2023 <0.10  <0.70 kU/L Final    [place], IgE 09/28/2023 <0.10  <0.70 kU/L Final    Short Ragweed, IgE 09/28/2023 <0.10  <0.70 kU/L Final    Rough Pigweed, IgE 09/28/2023 <0.10  <0.70 kU/L Final    Rough Cleaning Elder, IgE 09/28/2023 <0.10  <0.70 kU/L Final    Immunoglobulin E (IgE) 09/28/2023 11.0  <=214 kU/L Final    Golden[place], IgE 09/28/2023 <0.10  <0.70 kU/L Final   Office Visit on 09/13/2023   Component Date Value Ref Range Status    Glucose, Fasting 09/13/2023 132 (H)  74 - 106 mg/dL Final    Triglycerides 09/13/2023 108  35 - 150 mg/dL Final    Cholesterol 09/13/2023 173  0 - 200 mg/dL Final    HDL Cholesterol 09/13/2023 49  40 - 60 mg/dL Final     Cholesterol/HDL Ratio (Risk Factor) 09/13/2023 3.5   Final    Non-HDL 09/13/2023 124  mg/dL Final    LDL Calculated 09/13/2023 102  mg/dL Final    LDL/HDL 09/13/2023 2.1   Final    VLDL 09/13/2023 22  mg/dL Final   Lab Visit on 08/25/2023   Component Date Value Ref Range Status    Nil Result, TB 08/25/2023 0.03   Final    TB1 Ag minus Nil Result 08/25/2023 0.00   Final    TB2 Ag minus Nil Result 08/25/2023 0.03   Final    Mitogen minus Nil Result, TB 08/25/2023 9.75   Final    QuantiFERON-Tb Gold Plus 08/25/2023 Negative  Negative Final    Sodium 08/25/2023 141  136 - 145 mmol/L Final    Potassium 08/25/2023 3.9  3.5 - 5.1 mmol/L Final    Chloride 08/25/2023 109 (H)  98 - 107 mmol/L Final    CO2 08/25/2023 29  21 - 32 mmol/L Final    Anion Gap 08/25/2023 7  7 - 16 mmol/L Final    Glucose 08/25/2023 110 (H)  74 - 106 mg/dL Final    BUN 08/25/2023 14  7 - 18 mg/dL Final    Creatinine 08/25/2023 0.54 (L)  0.55 - 1.02 mg/dL Final    BUN/Creatinine Ratio 08/25/2023 26 (H)  6 - 20 Final    Calcium 08/25/2023 8.8  8.5 - 10.1 mg/dL Final    Total Protein 08/25/2023 7.5  6.4 - 8.2 g/dL Final    Albumin 08/25/2023 3.2 (L)  3.5 - 5.0 g/dL Final    Globulin 08/25/2023 4.3 (H)  2.0 - 4.0 g/dL Final    A/G Ratio 08/25/2023 0.7   Final    Bilirubin, Total 08/25/2023 0.3  >0.0 - 1.2 mg/dL Final    Alk Phos 08/25/2023 108 (H)  37 - 98 U/L Final    ALT 08/25/2023 22  13 - 56 U/L Final    AST 08/25/2023 9 (L)  15 - 37 U/L Final    eGFR 08/25/2023 117  >=60 mL/min/1.73m2 Final    WBC 08/25/2023 12.60 (H)  4.50 - 11.00 K/uL Final    RBC 08/25/2023 4.47  4.20 - 5.40 M/uL Final    Hemoglobin 08/25/2023 12.8  12.0 - 16.0 g/dL Final    Hematocrit 08/25/2023 38.7  38.0 - 47.0 % Final    MCV 08/25/2023 86.6  80.0 - 96.0 fL Final    MCH 08/25/2023 28.6  27.0 - 31.0 pg Final    MCHC 08/25/2023 33.1  32.0 - 36.0 g/dL Final    RDW 08/25/2023 13.2  11.5 - 14.5 % Final    Platelet Count 08/25/2023 174  150 - 400 K/uL Final    MPV 08/25/2023 13.3  (H)  9.4 - 12.4 fL Final    Neutrophils % 08/25/2023 74.7 (H)  53.0 - 65.0 % Final    Lymphocytes % 08/25/2023 16.1 (L)  27.0 - 41.0 % Final    Monocytes % 08/25/2023 5.6  2.0 - 6.0 % Final    Eosinophils % 08/25/2023 2.5  1.0 - 4.0 % Final    Basophils % 08/25/2023 0.4  0.0 - 1.0 % Final    Immature Granulocytes % 08/25/2023 0.7 (H)  0.0 - 0.4 % Final    nRBC, Auto 08/25/2023 0.0  <=0.0 % Final    Neutrophils, Abs 08/25/2023 9.41 (H)  1.80 - 7.70 K/uL Final    Lymphocytes, Absolute 08/25/2023 2.03  1.00 - 4.80 K/uL Final    Monocytes, Absolute 08/25/2023 0.71  0.00 - 0.80 K/uL Final    Eosinophils, Absolute 08/25/2023 0.31  0.00 - 0.50 K/uL Final    Basophils, Absolute 08/25/2023 0.05  0.00 - 0.20 K/uL Final    Immature Granulocytes, Absolute 08/25/2023 0.09 (H)  0.00 - 0.04 K/uL Final    nRBC, Absolute 08/25/2023 0.00  <=0.00 x10e3/uL Final    Diff Type 08/25/2023 Auto   Final   Office Visit on 08/08/2023   Component Date Value Ref Range Status    POC Amphetamines 08/08/2023 Negative  Negative, Inconclusive Final    POC Barbiturates 08/08/2023 Negative  Negative, Inconclusive Final    POC Benzodiazepines 08/08/2023 Negative  Negative, Inconclusive Final    POC Cocaine 08/08/2023 Negative  Negative, Inconclusive Final    POC THC 08/08/2023 Negative  Negative, Inconclusive Final    POC Methadone 08/08/2023 Negative  Negative, Inconclusive Final    POC Methamphetamine 08/08/2023 Negative  Negative, Inconclusive Final    POC Opiates 08/08/2023 Negative  Negative, Inconclusive Final    POC Oxycodone 08/08/2023 Negative  Negative, Inconclusive Final    POC Phencyclidine 08/08/2023 Negative  Negative, Inconclusive Final    POC Methylenedioxymethamphetamine * 08/08/2023 Negative  Negative, Inconclusive Final    POC Tricyclic Antidepressants 08/08/2023 Negative  Negative, Inconclusive Final    POC Buprenorphine 08/08/2023 Negative   Final     Acceptable 08/08/2023 Yes   Final    POC Temperature (Urine)  08/08/2023 92   Final   Admission on 07/25/2023, Discharged on 07/25/2023   Component Date Value Ref Range Status    POC Glucose 07/25/2023 129 (H)  70 - 105 mg/dL Final   Admission on 07/05/2023, Discharged on 07/05/2023   Component Date Value Ref Range Status    Sodium 07/05/2023 138  136 - 145 mmol/L Final    Potassium 07/05/2023 3.6  3.5 - 5.1 mmol/L Final    Chloride 07/05/2023 104  98 - 107 mmol/L Final    CO2 07/05/2023 28  21 - 32 mmol/L Final    Anion Gap 07/05/2023 10  7 - 16 mmol/L Final    Glucose 07/05/2023 166 (H)  74 - 106 mg/dL Final    BUN 07/05/2023 17  7 - 18 mg/dL Final    Creatinine 07/05/2023 0.82  0.55 - 1.02 mg/dL Final    BUN/Creatinine Ratio 07/05/2023 21 (H)  6 - 20 Final    Calcium 07/05/2023 8.6  8.5 - 10.1 mg/dL Final    Total Protein 07/05/2023 8.0  6.4 - 8.2 g/dL Final    Albumin 07/05/2023 3.3 (L)  3.5 - 5.0 g/dL Final    Globulin 07/05/2023 4.7 (H)  2.0 - 4.0 g/dL Final    A/G Ratio 07/05/2023 0.7   Final    Bilirubin, Total 07/05/2023 0.4  >0.0 - 1.2 mg/dL Final    Alk Phos 07/05/2023 115 (H)  37 - 98 U/L Final    ALT 07/05/2023 21  13 - 56 U/L Final    AST 07/05/2023 9 (L)  15 - 37 U/L Final    eGFR 07/05/2023 91  >=60 mL/min/1.73m2 Final    Specimen Outdate 07/05/2023 07/08/2023 23:59   Final    Group & Rh 07/05/2023 O POS   Final    Indirect Sarah 07/05/2023 NEG   Final    Lipase 07/05/2023 69 (L)  73 - 393 U/L Final    WBC 07/05/2023 12.60 (H)  4.50 - 11.00 K/uL Final    RBC 07/05/2023 4.65  4.20 - 5.40 M/uL Final    Hemoglobin 07/05/2023 13.6  12.0 - 16.0 g/dL Final    Hematocrit 07/05/2023 42.5  38.0 - 47.0 % Final    MCV 07/05/2023 91.4  80.0 - 96.0 fL Final    MCH 07/05/2023 29.2  27.0 - 31.0 pg Final    MCHC 07/05/2023 32.0  32.0 - 36.0 g/dL Final    RDW 07/05/2023 12.9  11.5 - 14.5 % Final    Platelet Count 07/05/2023 163  150 - 400 K/uL Final    MPV 07/05/2023 13.9 (H)  9.4 - 12.4 fL Final    Neutrophils % 07/05/2023 79.8 (H)  53.0 - 65.0 % Final    Lymphocytes %  07/05/2023 12.1 (L)  27.0 - 41.0 % Final    Monocytes % 07/05/2023 4.9  2.0 - 6.0 % Final    Eosinophils % 07/05/2023 2.5  1.0 - 4.0 % Final    Basophils % 07/05/2023 0.3  0.0 - 1.0 % Final    Immature Granulocytes % 07/05/2023 0.4  0.0 - 0.4 % Final    nRBC, Auto 07/05/2023 0.0  <=0.0 % Final    Neutrophils, Abs 07/05/2023 10.05 (H)  1.80 - 7.70 K/uL Final    Lymphocytes, Absolute 07/05/2023 1.52  1.00 - 4.80 K/uL Final    Monocytes, Absolute 07/05/2023 0.62  0.00 - 0.80 K/uL Final    Eosinophils, Absolute 07/05/2023 0.32  0.00 - 0.50 K/uL Final    Basophils, Absolute 07/05/2023 0.04  0.00 - 0.20 K/uL Final    Immature Granulocytes, Absolute 07/05/2023 0.05 (H)  0.00 - 0.04 K/uL Final    nRBC, Absolute 07/05/2023 0.00  <=0.00 x10e3/uL Final    Diff Type 07/05/2023 Auto   Final    Hemoglobin 07/05/2023 13.1  12.0 - 16.0 g/dL Final    Hematocrit 07/05/2023 40.9  38.0 - 47.0 % Final   Office Visit on 06/27/2023   Component Date Value Ref Range Status    Sodium 06/27/2023 140  136 - 145 mmol/L Final    Potassium 06/27/2023 4.1  3.5 - 5.1 mmol/L Final    Chloride 06/27/2023 107  98 - 107 mmol/L Final    CO2 06/27/2023 27  21 - 32 mmol/L Final    Anion Gap 06/27/2023 10  7 - 16 mmol/L Final    Glucose 06/27/2023 134 (H)  74 - 106 mg/dL Final    BUN 06/27/2023 16  7 - 18 mg/dL Final    Creatinine 06/27/2023 0.54 (L)  0.55 - 1.02 mg/dL Final    BUN/Creatinine Ratio 06/27/2023 30 (H)  6 - 20 Final    Calcium 06/27/2023 9.2  8.5 - 10.1 mg/dL Final    eGFR 06/27/2023 117  >=60 mL/min/1.73m2 Final    Hemoglobin A1C 06/27/2023 6.0  4.5 - 6.6 % Final    Estimated Average Glucose 06/27/2023 114  mg/dL Final         No orders of the defined types were placed in this encounter.          Requested Prescriptions     Signed Prescriptions Disp Refills    gabapentin (NEURONTIN) 300 MG capsule 90 capsule 1     Sig: Take 1 capsule (300 mg total) by mouth every 8 (eight) hours.    HYDROcodone-acetaminophen (NORCO)  mg per tablet 90  tablet 0     Sig: Take 1 tablet by mouth every 8 (eight) hours as needed for Pain.    HYDROcodone-acetaminophen (NORCO)  mg per tablet 90 tablet 0     Sig: Take 1 tablet by mouth every 8 (eight) hours as needed for Pain.       Assessment:     1. Rheumatoid arthritis involving multiple sites with positive rheumatoid factor    2. Spondylosis of lumbar region without myelopathy or radiculopathy    3. Sacroiliitis    4. Chronic pain of right knee           A's of Opioid Risk Assessment  Activity:Patient can perform ADL.   Analgesia:Patients pain is partially controlled by current medication. Patient has tried OTC medications such as Tylenol and Ibuprofen with out relief.   Adverse Effects: Patient denies constipation or sedation.  Aberrant Behavior:  reviewed with no aberrant drug seeking/taking behavior.  Overdose reversal drug naloxone discussed    Drug screen reviewed    X-ray right knee [facility] September 14, 2022 degenerative changes no fracture noted      Plan:    Narcan December 2022    Follows rheumatology [facility] rheumatoid arthritis    Follows orthopedics [place] chronic right knee pain    She had bilateral lumbar RF TC  July 25, 2023  She states she has 75% relief after procedure, the procedure did help improve her level function       Complaint neck and back pain joint pain requesting Toradol injection    Toradol 60 mg IM, tolerated well    Continue home exercise program as directed    Continue current medication    Follow-up 2 months    Dr. Torre July 2024    Bring original prescription medication bottles/container/box with labels to each visit

## 2023-12-04 ENCOUNTER — OFFICE VISIT (OUTPATIENT)
Dept: PAIN MEDICINE | Facility: CLINIC | Age: 43
End: 2023-12-04
Payer: MEDICAID

## 2023-12-04 VITALS
BODY MASS INDEX: 53.92 KG/M2 | HEART RATE: 92 BPM | WEIGHT: 293 LBS | HEIGHT: 62 IN | SYSTOLIC BLOOD PRESSURE: 163 MMHG | DIASTOLIC BLOOD PRESSURE: 102 MMHG | RESPIRATION RATE: 20 BRPM

## 2023-12-04 DIAGNOSIS — G89.29 CHRONIC PAIN OF RIGHT KNEE: Chronic | ICD-10-CM

## 2023-12-04 DIAGNOSIS — M46.1 SACROILIITIS: ICD-10-CM

## 2023-12-04 DIAGNOSIS — M25.562 PAIN IN BOTH KNEES, UNSPECIFIED CHRONICITY: Primary | ICD-10-CM

## 2023-12-04 DIAGNOSIS — M47.816 SPONDYLOSIS OF LUMBAR REGION WITHOUT MYELOPATHY OR RADICULOPATHY: Chronic | ICD-10-CM

## 2023-12-04 DIAGNOSIS — M05.79 RHEUMATOID ARTHRITIS INVOLVING MULTIPLE SITES WITH POSITIVE RHEUMATOID FACTOR: Primary | Chronic | ICD-10-CM

## 2023-12-04 DIAGNOSIS — M25.561 CHRONIC PAIN OF RIGHT KNEE: Chronic | ICD-10-CM

## 2023-12-04 DIAGNOSIS — M25.561 PAIN IN BOTH KNEES, UNSPECIFIED CHRONICITY: Primary | ICD-10-CM

## 2023-12-04 PROCEDURE — 3008F PR BODY MASS INDEX (BMI) DOCUMENTED: ICD-10-PCS | Mod: CPTII,,, | Performed by: PHYSICIAN ASSISTANT

## 2023-12-04 PROCEDURE — 3077F SYST BP >= 140 MM HG: CPT | Mod: CPTII,,, | Performed by: PHYSICIAN ASSISTANT

## 2023-12-04 PROCEDURE — 3044F HG A1C LEVEL LT 7.0%: CPT | Mod: CPTII,,, | Performed by: PHYSICIAN ASSISTANT

## 2023-12-04 PROCEDURE — 99215 OFFICE O/P EST HI 40 MIN: CPT | Mod: PBBFAC | Performed by: PHYSICIAN ASSISTANT

## 2023-12-04 PROCEDURE — 99999PBSHW PR PBB SHADOW TECHNICAL ONLY FILED TO HB: Mod: PBBFAC,,,

## 2023-12-04 PROCEDURE — 99999PBSHW PR PBB SHADOW TECHNICAL ONLY FILED TO HB: ICD-10-PCS | Mod: PBBFAC,,,

## 2023-12-04 PROCEDURE — 96372 THER/PROPH/DIAG INJ SC/IM: CPT | Mod: PBBFAC | Performed by: PHYSICIAN ASSISTANT

## 2023-12-04 PROCEDURE — 3061F NEG MICROALBUMINURIA REV: CPT | Mod: CPTII,,, | Performed by: PHYSICIAN ASSISTANT

## 2023-12-04 PROCEDURE — 3080F DIAST BP >= 90 MM HG: CPT | Mod: CPTII,,, | Performed by: PHYSICIAN ASSISTANT

## 2023-12-04 PROCEDURE — 1159F MED LIST DOCD IN RCRD: CPT | Mod: CPTII,,, | Performed by: PHYSICIAN ASSISTANT

## 2023-12-04 PROCEDURE — 99214 PR OFFICE/OUTPT VISIT, EST, LEVL IV, 30-39 MIN: ICD-10-PCS | Mod: S$PBB,25,, | Performed by: PHYSICIAN ASSISTANT

## 2023-12-04 PROCEDURE — 3066F NEPHROPATHY DOC TX: CPT | Mod: CPTII,,, | Performed by: PHYSICIAN ASSISTANT

## 2023-12-04 PROCEDURE — 4010F ACE/ARB THERAPY RXD/TAKEN: CPT | Mod: CPTII,,, | Performed by: PHYSICIAN ASSISTANT

## 2023-12-04 PROCEDURE — 99214 OFFICE O/P EST MOD 30 MIN: CPT | Mod: S$PBB,25,, | Performed by: PHYSICIAN ASSISTANT

## 2023-12-04 PROCEDURE — 3061F PR NEG MICROALBUMINURIA RESULT DOCUMENTED/REVIEW: ICD-10-PCS | Mod: CPTII,,, | Performed by: PHYSICIAN ASSISTANT

## 2023-12-04 PROCEDURE — 3008F BODY MASS INDEX DOCD: CPT | Mod: CPTII,,, | Performed by: PHYSICIAN ASSISTANT

## 2023-12-04 PROCEDURE — 3080F PR MOST RECENT DIASTOLIC BLOOD PRESSURE >= 90 MM HG: ICD-10-PCS | Mod: CPTII,,, | Performed by: PHYSICIAN ASSISTANT

## 2023-12-04 PROCEDURE — 1159F PR MEDICATION LIST DOCUMENTED IN MEDICAL RECORD: ICD-10-PCS | Mod: CPTII,,, | Performed by: PHYSICIAN ASSISTANT

## 2023-12-04 PROCEDURE — 3077F PR MOST RECENT SYSTOLIC BLOOD PRESSURE >= 140 MM HG: ICD-10-PCS | Mod: CPTII,,, | Performed by: PHYSICIAN ASSISTANT

## 2023-12-04 PROCEDURE — 3066F PR DOCUMENTATION OF TREATMENT FOR NEPHROPATHY: ICD-10-PCS | Mod: CPTII,,, | Performed by: PHYSICIAN ASSISTANT

## 2023-12-04 PROCEDURE — 4010F PR ACE/ARB THEARPY RXD/TAKEN: ICD-10-PCS | Mod: CPTII,,, | Performed by: PHYSICIAN ASSISTANT

## 2023-12-04 PROCEDURE — 3044F PR MOST RECENT HEMOGLOBIN A1C LEVEL <7.0%: ICD-10-PCS | Mod: CPTII,,, | Performed by: PHYSICIAN ASSISTANT

## 2023-12-04 RX ORDER — HYDROCODONE BITARTRATE AND ACETAMINOPHEN 10; 325 MG/1; MG/1
1 TABLET ORAL EVERY 8 HOURS PRN
Qty: 90 TABLET | Refills: 0 | Status: SHIPPED | OUTPATIENT
Start: 2023-12-07 | End: 2024-01-30 | Stop reason: SDUPTHER

## 2023-12-04 RX ORDER — HYDROCODONE BITARTRATE AND ACETAMINOPHEN 10; 325 MG/1; MG/1
1 TABLET ORAL EVERY 8 HOURS PRN
Qty: 90 TABLET | Refills: 0 | Status: SHIPPED | OUTPATIENT
Start: 2024-01-06 | End: 2024-01-30 | Stop reason: SDUPTHER

## 2023-12-04 RX ORDER — GABAPENTIN 300 MG/1
300 CAPSULE ORAL EVERY 8 HOURS
Qty: 90 CAPSULE | Refills: 1 | Status: SHIPPED | OUTPATIENT
Start: 2023-12-04 | End: 2024-01-30 | Stop reason: SDUPTHER

## 2023-12-04 RX ORDER — KETOROLAC TROMETHAMINE 30 MG/ML
60 INJECTION, SOLUTION INTRAMUSCULAR; INTRAVENOUS
Status: COMPLETED | OUTPATIENT
Start: 2023-12-04 | End: 2023-12-04

## 2023-12-04 RX ADMIN — KETOROLAC TROMETHAMINE 60 MG: 30 INJECTION, SOLUTION INTRAMUSCULAR at 01:12

## 2023-12-07 ENCOUNTER — OFFICE VISIT (OUTPATIENT)
Dept: ORTHOPEDICS | Facility: CLINIC | Age: 43
End: 2023-12-07
Payer: MEDICAID

## 2023-12-07 ENCOUNTER — HOSPITAL ENCOUNTER (OUTPATIENT)
Dept: RADIOLOGY | Facility: HOSPITAL | Age: 43
Discharge: HOME | End: 2023-12-07
Attending: ORTHOPAEDIC SURGERY
Payer: MEDICAID

## 2023-12-07 DIAGNOSIS — M17.0 PRIMARY OSTEOARTHRITIS OF BOTH KNEES: Primary | ICD-10-CM

## 2023-12-07 DIAGNOSIS — M25.561 PAIN IN BOTH KNEES, UNSPECIFIED CHRONICITY: ICD-10-CM

## 2023-12-07 DIAGNOSIS — M25.562 PAIN IN BOTH KNEES, UNSPECIFIED CHRONICITY: ICD-10-CM

## 2023-12-07 PROCEDURE — 20610 LARGE JOINT ASPIRATION/INJECTION: BILATERAL KNEE: ICD-10-PCS | Mod: 50,S$PBB,, | Performed by: ORTHOPAEDIC SURGERY

## 2023-12-07 PROCEDURE — 73562 X-RAY EXAM OF KNEE 3: CPT | Mod: 26,50,, | Performed by: ORTHOPAEDIC SURGERY

## 2023-12-07 PROCEDURE — 3066F NEPHROPATHY DOC TX: CPT | Mod: CPTII,,, | Performed by: ORTHOPAEDIC SURGERY

## 2023-12-07 PROCEDURE — 3044F PR MOST RECENT HEMOGLOBIN A1C LEVEL <7.0%: ICD-10-PCS | Mod: CPTII,,, | Performed by: ORTHOPAEDIC SURGERY

## 2023-12-07 PROCEDURE — 99999PBSHW PR PBB SHADOW TECHNICAL ONLY FILED TO HB: Mod: PBBFAC,,,

## 2023-12-07 PROCEDURE — 99999PBSHW PR PBB SHADOW TECHNICAL ONLY FILED TO HB: ICD-10-PCS | Mod: PBBFAC,,,

## 2023-12-07 PROCEDURE — 99213 OFFICE O/P EST LOW 20 MIN: CPT | Mod: S$PBB,25,, | Performed by: ORTHOPAEDIC SURGERY

## 2023-12-07 PROCEDURE — 99212 OFFICE O/P EST SF 10 MIN: CPT | Mod: PBBFAC,25 | Performed by: ORTHOPAEDIC SURGERY

## 2023-12-07 PROCEDURE — 3044F HG A1C LEVEL LT 7.0%: CPT | Mod: CPTII,,, | Performed by: ORTHOPAEDIC SURGERY

## 2023-12-07 PROCEDURE — 73562 X-RAY EXAM OF KNEE 3: CPT | Mod: TC,50

## 2023-12-07 PROCEDURE — 3061F NEG MICROALBUMINURIA REV: CPT | Mod: CPTII,,, | Performed by: ORTHOPAEDIC SURGERY

## 2023-12-07 PROCEDURE — 4010F PR ACE/ARB THEARPY RXD/TAKEN: ICD-10-PCS | Mod: CPTII,,, | Performed by: ORTHOPAEDIC SURGERY

## 2023-12-07 PROCEDURE — 73562 XR KNEE 3 VIEW BILATERAL: ICD-10-PCS | Mod: 26,50,, | Performed by: ORTHOPAEDIC SURGERY

## 2023-12-07 PROCEDURE — 99213 PR OFFICE/OUTPT VISIT, EST, LEVL III, 20-29 MIN: ICD-10-PCS | Mod: S$PBB,25,, | Performed by: ORTHOPAEDIC SURGERY

## 2023-12-07 PROCEDURE — 20610 DRAIN/INJ JOINT/BURSA W/O US: CPT | Mod: 50,PBBFAC | Performed by: ORTHOPAEDIC SURGERY

## 2023-12-07 PROCEDURE — 3061F PR NEG MICROALBUMINURIA RESULT DOCUMENTED/REVIEW: ICD-10-PCS | Mod: CPTII,,, | Performed by: ORTHOPAEDIC SURGERY

## 2023-12-07 PROCEDURE — 3066F PR DOCUMENTATION OF TREATMENT FOR NEPHROPATHY: ICD-10-PCS | Mod: CPTII,,, | Performed by: ORTHOPAEDIC SURGERY

## 2023-12-07 PROCEDURE — 4010F ACE/ARB THERAPY RXD/TAKEN: CPT | Mod: CPTII,,, | Performed by: ORTHOPAEDIC SURGERY

## 2023-12-07 RX ORDER — TRIAMCINOLONE ACETONIDE 40 MG/ML
40 INJECTION, SUSPENSION INTRA-ARTICULAR; INTRAMUSCULAR
Status: DISCONTINUED | OUTPATIENT
Start: 2023-12-07 | End: 2023-12-07 | Stop reason: HOSPADM

## 2023-12-07 RX ORDER — BUPIVACAINE HYDROCHLORIDE 2.5 MG/ML
2 INJECTION, SOLUTION INFILTRATION; PERINEURAL
Status: DISCONTINUED | OUTPATIENT
Start: 2023-12-07 | End: 2023-12-07 | Stop reason: HOSPADM

## 2023-12-07 RX ADMIN — BUPIVACAINE HYDROCHLORIDE 2 ML: 2.5 INJECTION, SOLUTION INFILTRATION; PERINEURAL at 11:12

## 2023-12-07 RX ADMIN — TRIAMCINOLONE ACETONIDE 40 MG: 400 INJECTION, SUSPENSION INTRA-ARTICULAR; INTRAMUSCULAR at 11:12

## 2023-12-07 NOTE — PROCEDURES
Large Joint Aspiration/Injection: bilateral knee    Date/Time: 12/7/2023 11:15 AM    Performed by: Rogelio Wakefield MD  Authorized by: Rogelio Wakefield MD    Consent Done?:  Yes (Verbal)  Indications:  Pain  Local anesthesia used?: No      Details:  Needle Size:  22 G  Approach:  Superior  Location:  Knee  Laterality:  Bilateral  Site:  Bilateral knee  Medications (Right):  2 mL BUPivacaine 0.25% (2.5 mg/ml) 0.25 % (2.5 mg/mL); 40 mg triamcinolone acetonide 40 mg/mL  Medications (Left):  2 mL BUPivacaine 0.25% (2.5 mg/ml) 0.25 % (2.5 mg/mL); 40 mg triamcinolone acetonide 40 mg/mL  Patient tolerance:  Patient tolerated the procedure well with no immediate complications

## 2023-12-07 NOTE — PROGRESS NOTES
CC:  Knee pain    43 y.o. Female returns to clinic for a follow up visit regarding knee pain.       Patient states she had good results from last injection, would like to consider another on today.       Past Medical History:   Diagnosis Date    Anxiety     Asthma     Chronic pain     Cushing syndrome     Depression     Essential hypertension     Fibromyalgia     Herpes zoster     Hyperlipidemia     Leukocytosis 2022    Onychomycosis     MARY on CPAP     Rheumatoid arthritis     Type 2 diabetes mellitus 2020    Vitamin D deficiency 2018     Past Surgical History:   Procedure Laterality Date     SECTION      ELBOW SURGERY      EPIDURAL STEROID INJECTION      L4-5 VERITO Dec 2020-[facility]    FEMUR SURGERY Right     due to osteomyelitis    HYSTERECTOMY      Abn Bleeding    INJECTION OF ANESTHETIC AGENT AROUND MEDIAL BRANCH NERVES INNERVATING LUMBAR FACET JOINT Bilateral 2022    Procedure: Bilateral L4-5,5-S1 MBB ( No steroids);  Surgeon: Carmel Torre MD;  Location: [facility];  Service: Pain Management;  Laterality: Bilateral;  PT AWARE TO BE TESTED ON OV    INJECTION OF ANESTHETIC AGENT AROUND MEDIAL BRANCH NERVES INNERVATING LUMBAR FACET JOINT Bilateral 2022    Procedure: Block-nerve-medial branch-lumbar, bilateral L4 through S1;  Surgeon: Carmel Torre MD;  Location: [facility];  Service: Pain Management;  Laterality: Bilateral;    INJECTION OF ANESTHETIC AGENT INTO SACROILIAC JOINT Bilateral 2022    Procedure: BLOCK, SACROILIAC JOINT;  Surgeon: Carmel Torre MD;  Location: [facility];  Service: Pain Management;  Laterality: Bilateral;  covid test put in    LIPOMA RESECTION  2019    RADIOFREQUENCY ABLATION OF LUMBAR MEDIAL BRANCH NERVE AT SINGLE LEVEL Right 2022    Procedure: Radiofrequency Ablation, Nerve, Spinal, Lumbar, Medial Branch, Level L4-S1, right side 1st, will have left-sided after completing;  Surgeon: Carmel Torre,  MD;  Location: [facility];  Service: Pain Management;  Laterality: Right;  pt aware at visit to be tested    RADIOFREQUENCY ABLATION OF LUMBAR MEDIAL BRANCH NERVE AT SINGLE LEVEL Left 05/05/2022    Procedure: LEFT  L4-S1 RFTC  (HAD RIGHT  ON 4-14);  Surgeon: Carmel Torre MD;  Location: [facility];  Service: Pain Management;  Laterality: Left;    RADIOFREQUENCY ABLATION OF LUMBAR MEDIAL BRANCH NERVE AT SINGLE LEVEL Right 7/25/2023    Procedure: Radiofrequency Ablation, Nerve, Spinal, Lumbar, Medial Branch, Level L4-S1;  Surgeon: Carmel Torre MD;  Location: [facility];  Service: Pain Management;  Laterality: Right;    SURGICAL REMOVAL OF PILONIDAL CYST      TRANSFORAMINAL EPIDURAL INJECTION OF STEROID      Bilateral L4-5 TFESI Nov 2020-[facility]    TRANSFORAMINAL EPIDURAL INJECTION OF STEROID      Right L4-5 TFESI Feb 2020-[facility]    VENTRAL HERNIA REPAIR  09/2020         PHYSICAL EXAMINATION:  There were no vitals taken for this visit.  General    Nursing note and vitals reviewed.  Constitutional: She is oriented to person, place, and time. She appears well-developed and well-nourished.   HENT:   Head: Normocephalic and atraumatic.   Nose: Nose normal.   Eyes: Pupils are equal, round, and reactive to light.   Neck: Neck supple.   Cardiovascular:  Normal rate, regular rhythm and intact distal pulses.            Pulmonary/Chest: Effort normal. No respiratory distress. She exhibits no tenderness.   Abdominal: Soft. She exhibits no distension. There is no abdominal tenderness.   Neurological: She is alert and oriented to person, place, and time. She has normal reflexes. She displays normal reflexes. No cranial nerve deficit. She exhibits normal muscle tone.   Psychiatric: She has a normal mood and affect. Her behavior is normal. Judgment and thought content normal.     General Musculoskeletal Exam   Gait: antalgic       Right Knee Exam   Right knee exam is normal.    Inspection   Swelling:  present  Effusion: present  Deformity: present    Tenderness   The patient is tender to palpation of the medial joint line.    Crepitus   The patient has crepitus of the patella and medial joint line.    Range of Motion   Extension:  abnormal   Flexion:  abnormal     Tests   Meniscus   Grady:  Medial - positive   Ligament Examination   Lachman: normal (-1 to 2mm)   PCL-Posterior Drawer: normal (0 to 2mm)     MCL - Valgus: normal (0 to 2mm)  LCL - Varus: normal  Pivot Shift: normal (Equal)  Reverse Pivot Shift: normal (Equal)  Dial Test at 30 degrees: normal (< 5 degrees)  Dial Test at 90 degrees: normal (< 5 degrees)  Posterior Sag Test: negative  Posterolateral Corner: unstable (>15 degrees difference)  Patella   Patellar Tracking: normal  Q-Angle at 90 degrees: normal  Patellar Grind: positive    Other   Sensation: normal    Left Knee Exam     Inspection   Deformity: absent    Tenderness   The patient tender to palpation of the medial joint line and lateral joint line.    Crepitus   The patient has crepitus of the patella.    Range of Motion   Extension:  normal   Flexion:  abnormal     Tests   Meniscus   Grady:  Medial - positive Lateral - positive  Stability   Lachman: normal (-1 to 2mm)   PCL-Posterior Drawer: normal (0 to 2mm)  MCL - Valgus: normal (0 to 2mm)  LCL - Varus: normal (0 to 2mm)  Posterior Sag Test: negative  Patella   Passive Patellar Tilt: lateral tilt  Patellar Tracking: normal  Patellar Grind: positive  J-Sign: J sign absent    Other   Muscle Tightness: hamstring tightness  Sensation: normal    Muscle Strength   Right Lower Extremity   Quadriceps:  5/5   Hamstrin/5   Left Lower Extremity   Hip Abduction: 5/5   Quadriceps:  5/5   Hamstrin/5     Reflexes     Left Side  Quadriceps:  2+    Right Side   Quadriceps:  2+    Vascular Exam     Right Pulses  Dorsalis Pedis:      2+  Posterior Tibial:      2+            IMAGING:  X-Ray Knee 3 View Bilateral    Result Date: 2023  See  Procedure Notes for results. IMPRESSION: Please see Ortho procedure notes for report.  This procedure was auto-finalized by: Virtual Radiologist   Three views right knee three views left knee were obtained today demonstrating varus alignment with severe tricompartmental degenerative changes seen bilaterally    ASSESSMENT:      ICD-10-CM ICD-9-CM   1. Primary osteoarthritis of both knees  M17.0 715.16       PLAN:     -Findings and treatment options were discussed with the patient  -All questions answered  Natural history and expected course discussed. Questions answered.  Educational materials distributed.  Rest, ice, compression, and elevation (RICE) therapy.  Arthrocentesis. See procedure note.      There are no Patient Instructions on file for this visit.      Orders Placed This Encounter   Procedures    Large Joint Aspiration/Injection: bilateral knee         Procedures

## 2024-01-30 NOTE — PROGRESS NOTES
Subjective:         Patient ID: Belem Swann is a 43 y.o. female.    Chief Complaint: Joint Pain and Back Pain        Pain  This is a chronic problem. The current episode started more than 1 year ago. The problem occurs daily. The problem has been unchanged. Associated symptoms include arthralgias and neck pain. Pertinent negatives include no anorexia, chest pain, chills, coughing, diaphoresis, fever, sore throat, vertigo or vomiting.     Review of Systems   Constitutional:  Negative for activity change, appetite change, chills, diaphoresis, fever and unexpected weight change.   HENT:  Negative for drooling, ear discharge, ear pain, facial swelling, mouth sores, nosebleeds, sore throat, trouble swallowing, voice change and goiter.    Eyes:  Negative for photophobia, pain, discharge, redness and visual disturbance.   Respiratory:  Negative for apnea, cough, choking, chest tightness, shortness of breath, wheezing and stridor.    Cardiovascular:  Negative for chest pain, palpitations and leg swelling.   Gastrointestinal:  Negative for abdominal distention, anorexia, diarrhea, rectal pain, vomiting and fecal incontinence.   Endocrine: Negative for cold intolerance, heat intolerance, polydipsia, polyphagia and polyuria.   Genitourinary:  Negative for bladder incontinence, dysuria, flank pain, frequency and hot flashes.   Musculoskeletal:  Positive for arthralgias, back pain, leg pain, neck pain and neck stiffness.   Integumentary:  Negative for color change and pallor.   Allergic/Immunologic: Negative for immunocompromised state.   Neurological:  Negative for dizziness, vertigo, seizures, syncope, facial asymmetry, speech difficulty, light-headedness, memory loss and coordination difficulties.   Hematological:  Negative for adenopathy. Does not bruise/bleed easily.   Psychiatric/Behavioral:  Negative for agitation, behavioral problems, confusion, decreased concentration, dysphoric mood, hallucinations, self-injury  and suicidal ideas. The patient is not hyperactive.            Past Medical History:   Diagnosis Date    Anxiety     Asthma     Chronic pain     Cushing syndrome     Depression     Essential hypertension     Fibromyalgia     Herpes zoster     Hyperlipidemia     Leukocytosis 2022    Onychomycosis     MARY on CPAP     Rheumatoid arthritis     Type 2 diabetes mellitus 2020    Vitamin D deficiency 2018     Past Surgical History:   Procedure Laterality Date     SECTION      ELBOW SURGERY  1985    EPIDURAL STEROID INJECTION      L4-5 VERITO Dec 2020-[facility]    FEMUR SURGERY Right     due to osteomyelitis    HYSTERECTOMY      Abn Bleeding    INJECTION OF ANESTHETIC AGENT AROUND MEDIAL BRANCH NERVES INNERVATING LUMBAR FACET JOINT Bilateral 2022    Procedure: Bilateral L4-5,5-S1 MBB ( No steroids);  Surgeon: Carmel Torre MD;  [facility];  Service: Pain Management;  Laterality: Bilateral;  PT AWARE TO BE TESTED ON OV    INJECTION OF ANESTHETIC AGENT AROUND MEDIAL BRANCH NERVES INNERVATING LUMBAR FACET JOINT Bilateral 2022    Procedure: Block-nerve-medial branch-lumbar, bilateral L4 through S1;  Surgeon: Carmel Torre MD;  [facility];  Service: Pain Management;  Laterality: Bilateral;    INJECTION OF ANESTHETIC AGENT INTO SACROILIAC JOINT Bilateral 2022    Procedure: BLOCK, SACROILIAC JOINT;  Surgeon: Carmel Torre MD;  [facility];  Service: Pain Management;  Laterality: Bilateral;  covid test put in    LIPOMA RESECTION  2019    RADIOFREQUENCY ABLATION OF LUMBAR MEDIAL BRANCH NERVE AT SINGLE LEVEL Right 2022    Procedure: Radiofrequency Ablation, Nerve, Spinal, Lumbar, Medial Branch, Level L4-S1, right side 1st, will have left-sided after completing;  Surgeon: Carmel Torre MD;  [facility];  Service: Pain Management;  Laterality: Right;  pt aware at visit to be tested    RADIOFREQUENCY ABLATION  "OF LUMBAR MEDIAL BRANCH NERVE AT SINGLE LEVEL Left 05/05/2022    Procedure: LEFT  L4-S1 RFTC  (HAD RIGHT  ON 4-14);  Surgeon: Carmel Torre MD;  Location: [facility];  Service: Pain Management;  Laterality: Left;    RADIOFREQUENCY ABLATION OF LUMBAR MEDIAL BRANCH NERVE AT SINGLE LEVEL Right 7/25/2023    Procedure: Radiofrequency Ablation, Nerve, Spinal, Lumbar, Medial Branch, Level L4-S1;  Surgeon: Carmel Torre MD;  Location: [facility];  Service: Pain Management;  Laterality: Right;    SURGICAL REMOVAL OF PILONIDAL CYST      TRANSFORAMINAL EPIDURAL INJECTION OF STEROID      Bilateral L4-5 TFESI Nov 2020-[facility]    TRANSFORAMINAL EPIDURAL INJECTION OF STEROID      Right L4-5 TFESI Feb 2020-[facility]    VENTRAL HERNIA REPAIR  09/2020     Social History     Socioeconomic History    Marital status:    Tobacco Use    Smoking status: Every Day     Types: Cigarettes     Start date: 9/8/2021    Smokeless tobacco: Never   Substance and Sexual Activity    Alcohol use: Yes     Comment: ocassional    Drug use: Never    Sexual activity: Not Currently     Partners: Male     Birth control/protection: See Surgical Hx     Family History   Problem Relation Age of Onset    Cancer Mother     Thyroid cancer Mother     Thyroid cancer Maternal Grandmother     Melanoma Neg Hx     Colon cancer Neg Hx     Breast cancer Neg Hx     Ovarian cancer Neg Hx      Review of patient's allergies indicates:   Allergen Reactions    Doxycycline     Latex      Other reaction(s): Unknown        Objective:  Vitals:    02/01/24 1330   BP: (!) 142/84   Pulse: 92   Resp: 18   Weight: (!) 156 kg (344 lb)   Height: 5' 2" (1.575 m)   PainSc:   9           Physical Exam  Vitals and nursing note reviewed. Exam conducted with a chaperone present.   Constitutional:       General: She is awake. She is not in acute distress.     Appearance: Normal appearance. She is obese. She is not ill-appearing, toxic-appearing or diaphoretic.   HENT:      " Head: Normocephalic and atraumatic.      Nose: Nose normal.      Mouth/Throat:      Mouth: Mucous membranes are moist.      Pharynx: Oropharynx is clear.   Eyes:      Conjunctiva/sclera: Conjunctivae normal.      Pupils: Pupils are equal, round, and reactive to light.   Cardiovascular:      Rate and Rhythm: Normal rate.   Pulmonary:      Effort: Pulmonary effort is normal. No respiratory distress.   Abdominal:      Palpations: Abdomen is soft.      Tenderness: There is no guarding.   Musculoskeletal:         General: No swelling.      Cervical back: Normal range of motion and neck supple. Tenderness present. No rigidity.      Thoracic back: Tenderness present.      Lumbar back: Tenderness present. Decreased range of motion.   Skin:     General: Skin is warm and dry.      Coloration: Skin is not jaundiced or pale.   Neurological:      General: No focal deficit present.      Mental Status: She is alert and oriented to person, place, and time. Mental status is at baseline.      Cranial Nerves: No cranial nerve deficit (II-XII).   Psychiatric:         Mood and Affect: Mood normal.         Behavior: Behavior normal. Behavior is cooperative.         Thought Content: Thought content normal.           X-Ray Knee 3 View Bilateral  See Procedure Notes for results.     IMPRESSION: Please see Ortho procedure notes for report.      This procedure was auto-finalized by: Virtual Radiologist         Admission on 11/03/2023, Discharged on 11/03/2023   Component Date Value Ref Range Status    Sodium 11/03/2023 142  136 - 145 mmol/L Final    Potassium 11/03/2023 3.6  3.5 - 5.1 mmol/L Final    Chloride 11/03/2023 109 (H)  98 - 107 mmol/L Final    CO2 11/03/2023 29  21 - 32 mmol/L Final    Anion Gap 11/03/2023 8  7 - 16 mmol/L Final    Glucose 11/03/2023 164 (H)  74 - 106 mg/dL Final    BUN 11/03/2023 17  7 - 18 mg/dL Final    Creatinine 11/03/2023 0.57  0.55 - 1.02 mg/dL Final    BUN/Creatinine Ratio 11/03/2023 30 (H)  6 - 20 Final     Calcium 11/03/2023 8.4 (L)  8.5 - 10.1 mg/dL Final    Total Protein 11/03/2023 7.5  6.4 - 8.2 g/dL Final    Albumin 11/03/2023 3.0 (L)  3.5 - 5.0 g/dL Final    Globulin 11/03/2023 4.5 (H)  2.0 - 4.0 g/dL Final    A/G Ratio 11/03/2023 0.7   Final    Bilirubin, Total 11/03/2023 0.2  >0.0 - 1.2 mg/dL Final    Alk Phos 11/03/2023 119 (H)  37 - 98 U/L Final    ALT 11/03/2023 24  13 - 56 U/L Final    AST 11/03/2023 13 (L)  15 - 37 U/L Final    eGFR 11/03/2023 115  >=60 mL/min/1.73m2 Final    Lipase 11/03/2023 34  16 - 77 U/L Final    Troponin I High Sensitivity 11/03/2023 5.0  <=60.4 pg/mL Final    WBC 11/03/2023 11.42 (H)  4.50 - 11.00 K/uL Final    RBC 11/03/2023 4.67  4.20 - 5.40 M/uL Final    Hemoglobin 11/03/2023 13.2  12.0 - 16.0 g/dL Final    Hematocrit 11/03/2023 40.1  38.0 - 47.0 % Final    MCV 11/03/2023 85.9  80.0 - 96.0 fL Final    MCH 11/03/2023 28.3  27.0 - 31.0 pg Final    MCHC 11/03/2023 32.9  32.0 - 36.0 g/dL Final    RDW 11/03/2023 12.8  11.5 - 14.5 % Final    Platelet Count 11/03/2023 147 (L)  150 - 400 K/uL Final    MPV 11/03/2023 13.6 (H)  9.4 - 12.4 fL Final    Neutrophils % 11/03/2023 69.6 (H)  53.0 - 65.0 % Final    Lymphocytes % 11/03/2023 19.1 (L)  27.0 - 41.0 % Final    Monocytes % 11/03/2023 6.7 (H)  2.0 - 6.0 % Final    Eosinophils % 11/03/2023 3.8  1.0 - 4.0 % Final    Basophils % 11/03/2023 0.4  0.0 - 1.0 % Final    Immature Granulocytes % 11/03/2023 0.4  0.0 - 0.4 % Final    nRBC, Auto 11/03/2023 0.0  <=0.0 % Final    Neutrophils, Abs 11/03/2023 7.95 (H)  1.80 - 7.70 K/uL Final    Lymphocytes, Absolute 11/03/2023 2.18  1.00 - 4.80 K/uL Final    Monocytes, Absolute 11/03/2023 0.77  0.00 - 0.80 K/uL Final    Eosinophils, Absolute 11/03/2023 0.43  0.00 - 0.50 K/uL Final    Basophils, Absolute 11/03/2023 0.05  0.00 - 0.20 K/uL Final    Immature Granulocytes, Absolute 11/03/2023 0.04  0.00 - 0.04 K/uL Final    nRBC, Absolute 11/03/2023 0.00  <=0.00 x10e3/uL Final    Diff Type 11/03/2023 Auto    Final    Platelet Morphology 11/03/2023 Few Large Platelets (A)  Normal Final    RBC Morphology 11/03/2023 Normal   Final    Troponin I High Sensitivity 11/03/2023 4.2  <=60.4 pg/mL Final   Lab Visit on 09/28/2023   Component Date Value Ref Range Status    House Dust Mites/D.P., IgE 09/28/2023 <0.10  <0.70 kU/L Final    House Dust Mites/D.F., IgE 09/28/2023 <0.10  <0.70 kU/L Final    Cat Epithelium, IgE 09/28/2023 <0.10  <0.70 kU/L Final    Dog Dander IgE 09/28/2023 <0.10  <0.70 kU/L Final    [place] Grass, IgE 09/28/2023 <0.10  <0.70 kU/L Final    Tevin Grass, IgE 09/28/2023 <0.10  <0.70 kU/L Final    Cockroach, IgE 09/28/2023 <0.10  <0.70 kU/L Final    Penicillium IgE 09/28/2023 <0.10  <0.70 kU/L Final    Cladosporium IgE 09/28/2023 <0.10  <0.70 kU/L Final    Aspergillus Fumigatus, IgE 09/28/2023 <0.10  <0.70 kU/L Final    Alternaria Tenuis, IgE 09/28/2023 <0.10  <0.70 kU/L Final    [place]Maple, IgE 09/28/2023 <0.10  <0.70 kU/L Final    [place], IgE 09/28/2023 <0.10  <0.70 kU/L Final    [place], IgE 09/28/2023 <0.10  <0.70 kU/L Final    [place], IgE 09/28/2023 <0.10  <0.70 kU/L Final    Elm, IgE 09/28/2023 <0.10  <0.70 kU/L Final    [place] Tree IgE 09/28/2023 <0.10  <0.70 kU/L Final    [place], IgE 09/28/2023 <0.10  <0.70 kU/L Final    [place], IgE 09/28/2023 <0.10  <0.70 kU/L Final    Short Ragweed, IgE 09/28/2023 <0.10  <0.70 kU/L Final    Rough Pigweed, IgE 09/28/2023 <0.10  <0.70 kU/L Final    Rough Cleaning Elder, IgE 09/28/2023 <0.10  <0.70 kU/L Final    Immunoglobulin E (IgE) 09/28/2023 11.0  <=214 kU/L Final    [place], IgE 09/28/2023 <0.10  <0.70 kU/L Final   Office Visit on 09/13/2023   Component Date Value Ref Range Status    Glucose, Fasting 09/13/2023 132 (H)  74 - 106 mg/dL Final    Triglycerides 09/13/2023 108  35 - 150 mg/dL Final    Cholesterol 09/13/2023 173  0 - 200 mg/dL Final    HDL Cholesterol 09/13/2023 49  40 - 60 mg/dL Final    Cholesterol/HDL Ratio (Risk Factor)  09/13/2023 3.5   Final    Non-HDL 09/13/2023 124  mg/dL Final    LDL Calculated 09/13/2023 102  mg/dL Final    LDL/HDL 09/13/2023 2.1   Final    VLDL 09/13/2023 22  mg/dL Final   Lab Visit on 08/25/2023   Component Date Value Ref Range Status    Nil Result, TB 08/25/2023 0.03   Final    TB1 Ag minus Nil Result 08/25/2023 0.00   Final    TB2 Ag minus Nil Result 08/25/2023 0.03   Final    Mitogen minus Nil Result, TB 08/25/2023 9.75   Final    QuantiFERON-Tb Gold Plus 08/25/2023 Negative  Negative Final    Sodium 08/25/2023 141  136 - 145 mmol/L Final    Potassium 08/25/2023 3.9  3.5 - 5.1 mmol/L Final    Chloride 08/25/2023 109 (H)  98 - 107 mmol/L Final    CO2 08/25/2023 29  21 - 32 mmol/L Final    Anion Gap 08/25/2023 7  7 - 16 mmol/L Final    Glucose 08/25/2023 110 (H)  74 - 106 mg/dL Final    BUN 08/25/2023 14  7 - 18 mg/dL Final    Creatinine 08/25/2023 0.54 (L)  0.55 - 1.02 mg/dL Final    BUN/Creatinine Ratio 08/25/2023 26 (H)  6 - 20 Final    Calcium 08/25/2023 8.8  8.5 - 10.1 mg/dL Final    Total Protein 08/25/2023 7.5  6.4 - 8.2 g/dL Final    Albumin 08/25/2023 3.2 (L)  3.5 - 5.0 g/dL Final    Globulin 08/25/2023 4.3 (H)  2.0 - 4.0 g/dL Final    A/G Ratio 08/25/2023 0.7   Final    Bilirubin, Total 08/25/2023 0.3  >0.0 - 1.2 mg/dL Final    Alk Phos 08/25/2023 108 (H)  37 - 98 U/L Final    ALT 08/25/2023 22  13 - 56 U/L Final    AST 08/25/2023 9 (L)  15 - 37 U/L Final    eGFR 08/25/2023 117  >=60 mL/min/1.73m2 Final    WBC 08/25/2023 12.60 (H)  4.50 - 11.00 K/uL Final    RBC 08/25/2023 4.47  4.20 - 5.40 M/uL Final    Hemoglobin 08/25/2023 12.8  12.0 - 16.0 g/dL Final    Hematocrit 08/25/2023 38.7  38.0 - 47.0 % Final    MCV 08/25/2023 86.6  80.0 - 96.0 fL Final    MCH 08/25/2023 28.6  27.0 - 31.0 pg Final    MCHC 08/25/2023 33.1  32.0 - 36.0 g/dL Final    RDW 08/25/2023 13.2  11.5 - 14.5 % Final    Platelet Count 08/25/2023 174  150 - 400 K/uL Final    MPV 08/25/2023 13.3 (H)  9.4 - 12.4 fL Final     Neutrophils % 08/25/2023 74.7 (H)  53.0 - 65.0 % Final    Lymphocytes % 08/25/2023 16.1 (L)  27.0 - 41.0 % Final    Monocytes % 08/25/2023 5.6  2.0 - 6.0 % Final    Eosinophils % 08/25/2023 2.5  1.0 - 4.0 % Final    Basophils % 08/25/2023 0.4  0.0 - 1.0 % Final    Immature Granulocytes % 08/25/2023 0.7 (H)  0.0 - 0.4 % Final    nRBC, Auto 08/25/2023 0.0  <=0.0 % Final    Neutrophils, Abs 08/25/2023 9.41 (H)  1.80 - 7.70 K/uL Final    Lymphocytes, Absolute 08/25/2023 2.03  1.00 - 4.80 K/uL Final    Monocytes, Absolute 08/25/2023 0.71  0.00 - 0.80 K/uL Final    Eosinophils, Absolute 08/25/2023 0.31  0.00 - 0.50 K/uL Final    Basophils, Absolute 08/25/2023 0.05  0.00 - 0.20 K/uL Final    Immature Granulocytes, Absolute 08/25/2023 0.09 (H)  0.00 - 0.04 K/uL Final    nRBC, Absolute 08/25/2023 0.00  <=0.00 x10e3/uL Final    Diff Type 08/25/2023 Auto   Final   Office Visit on 08/08/2023   Component Date Value Ref Range Status    POC Amphetamines 08/08/2023 Negative  Negative, Inconclusive Final    POC Barbiturates 08/08/2023 Negative  Negative, Inconclusive Final    POC Benzodiazepines 08/08/2023 Negative  Negative, Inconclusive Final    POC Cocaine 08/08/2023 Negative  Negative, Inconclusive Final    POC THC 08/08/2023 Negative  Negative, Inconclusive Final    POC Methadone 08/08/2023 Negative  Negative, Inconclusive Final    POC Methamphetamine 08/08/2023 Negative  Negative, Inconclusive Final    POC Opiates 08/08/2023 Negative  Negative, Inconclusive Final    POC Oxycodone 08/08/2023 Negative  Negative, Inconclusive Final    POC Phencyclidine 08/08/2023 Negative  Negative, Inconclusive Final    POC Methylenedioxymethamphetamine * 08/08/2023 Negative  Negative, Inconclusive Final    POC Tricyclic Antidepressants 08/08/2023 Negative  Negative, Inconclusive Final    POC Buprenorphine 08/08/2023 Negative   Final     Acceptable 08/08/2023 Yes   Final    POC Temperature (Urine) 08/08/2023 92   Final          Orders Placed This Encounter   Procedures    Drug Screen Definitive 14, Urine     Order Specific Question:   Specimen Source     Answer:   Urine    POCT Urine Drug Screen Presump     Interpretive Information:     Negative:  No drug detected at the cut off level.   Positive:  This result represents presumptive positive for the   tested drug, other substances may yield a positive response other   than the analyte of interest. This result should be utilized for   diagnostic purpose only. Confirmation testing will be performed upon physician request only.              Requested Prescriptions     Signed Prescriptions Disp Refills    gabapentin (NEURONTIN) 300 MG capsule 90 capsule 1     Sig: Take 1 capsule (300 mg total) by mouth every 8 (eight) hours.    HYDROcodone-acetaminophen (NORCO)  mg per tablet 90 tablet 0     Sig: Take 1 tablet by mouth every 8 (eight) hours as needed for Pain.    HYDROcodone-acetaminophen (NORCO)  mg per tablet 90 tablet 0     Sig: Take 1 tablet by mouth every 8 (eight) hours as needed for Pain.       Assessment:     1. Rheumatoid arthritis involving multiple sites with positive rheumatoid factor    2. Spondylosis of lumbar region without myelopathy or radiculopathy    3. Sacroiliitis    4. Chronic pain of right knee    5. Encounter for long-term (current) use of other medications           A's of Opioid Risk Assessment  Activity:Patient can perform ADL.   Analgesia:Patients pain is partially controlled by current medication. Patient has tried OTC medications such as Tylenol and Ibuprofen with out relief.   Adverse Effects: Patient denies constipation or sedation.  Aberrant Behavior:  reviewed with no aberrant drug seeking/taking behavior.  Overdose reversal drug naloxone discussed    Drug screen reviewed    X-ray right knee [facility] September 14, 2022 degenerative changes no fracture noted      Plan:    February 1, 2024 presumptive drug screen positive for  opiates/oxycodone    Patient takes hydrocodone    Order definitive drug screen for clarification            Narcan December 2022    Follows rheumatology ARM rheumatoid arthritis    Follows orthopedics [facility] chronic right knee pain    She had bilateral lumbar RF TC  July 25, 2023  She states she has 75% relief after procedure, the procedure did help improve her level function       Complaint flare-up rheumatoid arthritis increasing joint back pain    Requesting Toradol injection    Toradol 60 mg IM, tolerated well    Otherwise she would like to continue conservative management this time    Continue home exercise program as directed    Continue current medication    Follow-up 2 months    Dr. Torre July 2024    Bring original prescription medication bottles/container/box with labels to each visit

## 2024-02-01 ENCOUNTER — OFFICE VISIT (OUTPATIENT)
Dept: PAIN MEDICINE | Facility: CLINIC | Age: 44
End: 2024-02-01
Payer: MEDICAID

## 2024-02-01 VITALS
SYSTOLIC BLOOD PRESSURE: 142 MMHG | HEART RATE: 92 BPM | DIASTOLIC BLOOD PRESSURE: 84 MMHG | HEIGHT: 62 IN | BODY MASS INDEX: 53.92 KG/M2 | WEIGHT: 293 LBS | RESPIRATION RATE: 18 BRPM

## 2024-02-01 DIAGNOSIS — M47.816 SPONDYLOSIS OF LUMBAR REGION WITHOUT MYELOPATHY OR RADICULOPATHY: Chronic | ICD-10-CM

## 2024-02-01 DIAGNOSIS — Z79.899 ENCOUNTER FOR LONG-TERM (CURRENT) USE OF OTHER MEDICATIONS: ICD-10-CM

## 2024-02-01 DIAGNOSIS — M05.79 RHEUMATOID ARTHRITIS INVOLVING MULTIPLE SITES WITH POSITIVE RHEUMATOID FACTOR: Primary | Chronic | ICD-10-CM

## 2024-02-01 DIAGNOSIS — G89.29 CHRONIC PAIN OF RIGHT KNEE: Chronic | ICD-10-CM

## 2024-02-01 DIAGNOSIS — M46.1 SACROILIITIS: ICD-10-CM

## 2024-02-01 DIAGNOSIS — M25.561 CHRONIC PAIN OF RIGHT KNEE: Chronic | ICD-10-CM

## 2024-02-01 LAB
CTP QC/QA: YES
POC (AMP) AMPHETAMINE: NEGATIVE
POC (BAR) BARBITURATES: NEGATIVE
POC (BUP) BUPRENORPHINE: NEGATIVE
POC (BZO) BENZODIAZEPINES: NEGATIVE
POC (COC) COCAINE: NEGATIVE
POC (MDMA) METHYLENEDIOXYMETHAMPHETAMINE 3,4: NEGATIVE
POC (MET) METHAMPHETAMINE: NEGATIVE
POC (MOP) OPIATES: ABNORMAL
POC (MTD) METHADONE: NEGATIVE
POC (OXY) OXYCODONE: ABNORMAL
POC (PCP) PHENCYCLIDINE: NEGATIVE
POC (TCA) TRICYCLIC ANTIDEPRESSANTS: NEGATIVE
POC TEMPERATURE (URINE): 92
POC THC: NEGATIVE

## 2024-02-01 PROCEDURE — 80305 DRUG TEST PRSMV DIR OPT OBS: CPT | Mod: PBBFAC | Performed by: PHYSICIAN ASSISTANT

## 2024-02-01 PROCEDURE — 4010F ACE/ARB THERAPY RXD/TAKEN: CPT | Mod: CPTII,,, | Performed by: PHYSICIAN ASSISTANT

## 2024-02-01 PROCEDURE — 99999PBSHW PR PBB SHADOW TECHNICAL ONLY FILED TO HB: Mod: PBBFAC,,,

## 2024-02-01 PROCEDURE — 96372 THER/PROPH/DIAG INJ SC/IM: CPT | Mod: PBBFAC | Performed by: PHYSICIAN ASSISTANT

## 2024-02-01 PROCEDURE — G0481 DRUG TEST DEF 8-14 CLASSES: HCPCS | Mod: ,,, | Performed by: CLINICAL MEDICAL LABORATORY

## 2024-02-01 PROCEDURE — 3077F SYST BP >= 140 MM HG: CPT | Mod: CPTII,,, | Performed by: PHYSICIAN ASSISTANT

## 2024-02-01 PROCEDURE — 99215 OFFICE O/P EST HI 40 MIN: CPT | Mod: PBBFAC | Performed by: PHYSICIAN ASSISTANT

## 2024-02-01 PROCEDURE — 1159F MED LIST DOCD IN RCRD: CPT | Mod: CPTII,,, | Performed by: PHYSICIAN ASSISTANT

## 2024-02-01 PROCEDURE — 3008F BODY MASS INDEX DOCD: CPT | Mod: CPTII,,, | Performed by: PHYSICIAN ASSISTANT

## 2024-02-01 PROCEDURE — 99214 OFFICE O/P EST MOD 30 MIN: CPT | Mod: S$PBB,25,, | Performed by: PHYSICIAN ASSISTANT

## 2024-02-01 PROCEDURE — 99999PBSHW POCT URINE DRUG SCREEN PRESUMP: Mod: PBBFAC,,,

## 2024-02-01 PROCEDURE — 3079F DIAST BP 80-89 MM HG: CPT | Mod: CPTII,,, | Performed by: PHYSICIAN ASSISTANT

## 2024-02-01 RX ORDER — KETOROLAC TROMETHAMINE 30 MG/ML
60 INJECTION, SOLUTION INTRAMUSCULAR; INTRAVENOUS
Status: COMPLETED | OUTPATIENT
Start: 2024-02-01 | End: 2024-02-01

## 2024-02-01 RX ORDER — HYDROCODONE BITARTRATE AND ACETAMINOPHEN 10; 325 MG/1; MG/1
1 TABLET ORAL EVERY 8 HOURS PRN
Qty: 90 TABLET | Refills: 0 | Status: SHIPPED | OUTPATIENT
Start: 2024-03-09 | End: 2024-03-27 | Stop reason: SDUPTHER

## 2024-02-01 RX ORDER — GABAPENTIN 300 MG/1
300 CAPSULE ORAL EVERY 8 HOURS
Qty: 90 CAPSULE | Refills: 1 | Status: SHIPPED | OUTPATIENT
Start: 2024-02-01 | End: 2024-03-27 | Stop reason: SDUPTHER

## 2024-02-01 RX ORDER — HYDROCODONE BITARTRATE AND ACETAMINOPHEN 10; 325 MG/1; MG/1
1 TABLET ORAL EVERY 8 HOURS PRN
Qty: 90 TABLET | Refills: 0 | Status: SHIPPED | OUTPATIENT
Start: 2024-02-08 | End: 2024-03-20

## 2024-02-01 RX ADMIN — KETOROLAC TROMETHAMINE 60 MG: 30 INJECTION, SOLUTION INTRAMUSCULAR at 01:02

## 2024-02-05 LAB
6-ACETYLMORPHINE, URINE (RUSH): NEGATIVE 10 NG/ML
7-AMINOCLONAZEPAM, URINE (RUSH): NEGATIVE 25 NG/ML
A-HYDROXYALPRAZOLAM, URINE (RUSH): NEGATIVE 25 NG/ML
ACETYL FENTANYL, URINE (RUSH): NEGATIVE 2.5 NG/ML
ACETYL NORFENTANYL OXALATE, URINE (RUSH): NEGATIVE 5 NG/ML
AMPHET UR QL SCN: NEGATIVE
BENZOYLECGONINE, URINE (RUSH): NEGATIVE 100 NG/ML
BUPRENORPHINE UR QL SCN: NEGATIVE 25 NG/ML
CODEINE, URINE (RUSH): NEGATIVE 25 NG/ML
CREAT UR-MCNC: 282 MG/DL (ref 28–219)
EDDP, URINE (RUSH): NEGATIVE 25 NG/ML
FENTANYL, URINE (RUSH): NEGATIVE 2.5 NG/ML
HYDROCODONE, URINE (RUSH): >250 25 NG/ML
HYDROMORPHONE, URINE (RUSH): NEGATIVE 25 NG/ML
LORAZEPAM, URINE (RUSH): NEGATIVE 25 NG/ML
METHADONE UR QL SCN: NEGATIVE 25 NG/ML
METHAMPHET UR QL SCN: NEGATIVE
MORPHINE, URINE (RUSH): NEGATIVE 25 NG/ML
NORBUPRENORPHINE, URINE (RUSH): NEGATIVE 25 NG/ML
NORDIAZEPAM, URINE (RUSH): NEGATIVE 25 NG/ML
NORFENTANYL OXALATE, URINE (RUSH): NEGATIVE 5 NG/ML
NORHYDROCODONE, URINE (RUSH): >500 50 NG/ML
NOROXYCODONE HCL, URINE (RUSH): NEGATIVE 50 NG/ML
OXAZEPAM, URINE (RUSH): NEGATIVE 25 NG/ML
OXYCODONE UR QL SCN: NEGATIVE 25 NG/ML
OXYMORPHONE, URINE (RUSH): NEGATIVE 25 NG/ML
PH UR STRIP: 5.5 PH UNITS
SP GR UR STRIP: 1.02
TAPENTADOL, URINE (RUSH): NEGATIVE 25 NG/ML
TEMAZEPAM, URINE (RUSH): NEGATIVE 25 NG/ML
THC-COOH, URINE (RUSH): NEGATIVE 25 NG/ML
TRAMADOL, URINE (RUSH): NEGATIVE 100 NG/ML

## 2024-02-20 ENCOUNTER — OFFICE VISIT (OUTPATIENT)
Dept: DERMATOLOGY | Facility: CLINIC | Age: 44
End: 2024-02-20
Payer: MEDICAID

## 2024-02-20 DIAGNOSIS — L40.0 PLAQUE PSORIASIS: ICD-10-CM

## 2024-02-20 DIAGNOSIS — Z79.899 HIGH RISK MEDICATION USE: ICD-10-CM

## 2024-02-20 DIAGNOSIS — L25.3 ALLERGIC CONTACT DERMATITIS DUE TO LATEX: ICD-10-CM

## 2024-02-20 DIAGNOSIS — L73.2 HIDRADENITIS SUPPURATIVA: Primary | ICD-10-CM

## 2024-02-20 DIAGNOSIS — D17.20 LIPOMA OF UPPER EXTREMITY, UNSPECIFIED LATERALITY: ICD-10-CM

## 2024-02-20 PROCEDURE — 4010F ACE/ARB THERAPY RXD/TAKEN: CPT | Mod: CPTII,,, | Performed by: STUDENT IN AN ORGANIZED HEALTH CARE EDUCATION/TRAINING PROGRAM

## 2024-02-20 PROCEDURE — 1159F MED LIST DOCD IN RCRD: CPT | Mod: CPTII,,, | Performed by: STUDENT IN AN ORGANIZED HEALTH CARE EDUCATION/TRAINING PROGRAM

## 2024-02-20 PROCEDURE — 99214 OFFICE O/P EST MOD 30 MIN: CPT | Mod: ,,, | Performed by: STUDENT IN AN ORGANIZED HEALTH CARE EDUCATION/TRAINING PROGRAM

## 2024-02-20 RX ORDER — MINOCYCLINE HYDROCHLORIDE 100 MG/1
100 CAPSULE ORAL EVERY 12 HOURS
Qty: 14 CAPSULE | Refills: 2 | Status: SHIPPED | OUTPATIENT
Start: 2024-02-20 | End: 2024-03-20 | Stop reason: ALTCHOICE

## 2024-02-20 NOTE — PROGRESS NOTES
[facility]  Cj Wakefield MD    [address] [address]  [phone]    Fax: [phone]    Patient Name: Belem Swann  Medical Record Number: [id number]  PCP: Rhoda Rueda FNP  Age: 43 y.o. : 1980  Contact: [phone] (home)     History of Present Illness:     Belem Swann is a 43 y.o.  female here for follow up of HS and plaque psoriasis. Treatment plan includes humira injections 40 mg weekly which manages her symptoms despite having a flare of her HS in her right axilla. Her psoriasis is well controlled.       The patient has no other concerns today.    Review of Systems:     Unremarkable other than mentioned above.     Physical Exam:     General: Relaxed, oriented, alert    Skin examination of the scalp, face, neck, chest, back, abdomen, upper extremities and lower extremities were normal except for as listed below      Assessment and Plan:     1. Hidradenitis Suppurativa  - Fistula formation and draining sinuses, weeping acneiform pustules and papules, scarring, and acneiform nodules  Alvarado Stage: 3    Plan:   - will defer from Doxycycline related to nausea   Minocycline 100 mg BID x 7 days with 2 refills for flares     - continue Humira 40 mg weekly     Counseling.  Cleanse acneiform lesions and sinus tracts with anti-bacterial washes. Oral antibiotics can help reduce inflammation.  Discussed importance of not smoking and weight loss       2. Plaque Psoriasis  - psoriasiform plaques with micaceous scale    Plan:   Humira 40 mg weekly      Counseling  I counseled patient regarding systemic effects of inflammation from psoriasis, and the association with cardiac disease, metabolic syndrome, depression, and arthritis    3.  Lipoma   - soft, mobile nodule on the left arm     Plan:  - favor benign clinically   - will schedule excision if desired      4. Allergic Contact Dermatitis   - Well demarcated, geometric eczematous patches on  cheeks from CPAP mask     Plan:   Hydrocortisone 2.5 % ointment BID     Counseling.  Patient should use hypoallergenic products such as unscented soaps. Eliminate exposure to all new  cosmetics, fragrances, hair products, nail products shampoos, scented soaps, plants, metals and sunscreens.  Sometimes, patcht esting is necessary if reactions persist or if the patient is in contact with several potential allergens.    5. High Risk Medication Monitoring : The risks and benefits of the medication were reviewed in full with the patient. Should any side effects occur, the patient will stop the medication and contact me immediately.      Return to clinic in 6 months     AVS printed with patient instructions     Cj Wakefield MD   Mohs Surgery/Dermatologic Oncology  Dermatology

## 2024-03-20 ENCOUNTER — OFFICE VISIT (OUTPATIENT)
Dept: FAMILY MEDICINE | Facility: CLINIC | Age: 44
End: 2024-03-20
Payer: MEDICAID

## 2024-03-20 VITALS
RESPIRATION RATE: 19 BRPM | TEMPERATURE: 98 F | HEIGHT: 62 IN | DIASTOLIC BLOOD PRESSURE: 82 MMHG | OXYGEN SATURATION: 96 % | WEIGHT: 293 LBS | SYSTOLIC BLOOD PRESSURE: 138 MMHG | HEART RATE: 82 BPM | BODY MASS INDEX: 53.92 KG/M2

## 2024-03-20 DIAGNOSIS — E11.9 TYPE 2 DIABETES MELLITUS WITHOUT COMPLICATION, WITHOUT LONG-TERM CURRENT USE OF INSULIN: Primary | Chronic | ICD-10-CM

## 2024-03-20 DIAGNOSIS — E66.01 MORBID OBESITY WITH BMI OF 60.0-69.9, ADULT: Chronic | ICD-10-CM

## 2024-03-20 DIAGNOSIS — M05.79 RHEUMATOID ARTHRITIS INVOLVING MULTIPLE SITES WITH POSITIVE RHEUMATOID FACTOR: Chronic | ICD-10-CM

## 2024-03-20 DIAGNOSIS — Z11.4 SCREENING FOR HIV (HUMAN IMMUNODEFICIENCY VIRUS): ICD-10-CM

## 2024-03-20 DIAGNOSIS — E78.00 HYPERCHOLESTEROLEMIA: Chronic | ICD-10-CM

## 2024-03-20 DIAGNOSIS — M79.644 FINGER PAIN, RIGHT: ICD-10-CM

## 2024-03-20 DIAGNOSIS — M62.838 MUSCLE SPASM: Chronic | ICD-10-CM

## 2024-03-20 DIAGNOSIS — Z79.899 ENCOUNTER FOR LONG-TERM (CURRENT) USE OF OTHER MEDICATIONS: ICD-10-CM

## 2024-03-20 LAB
ALBUMIN SERPL BCP-MCNC: 3.5 G/DL (ref 3.5–5)
ALBUMIN/GLOB SERPL: 0.7 {RATIO}
ALP SERPL-CCNC: 139 U/L (ref 37–98)
ALT SERPL W P-5'-P-CCNC: 23 U/L (ref 13–56)
ANION GAP SERPL CALCULATED.3IONS-SCNC: 10 MMOL/L (ref 7–16)
AST SERPL W P-5'-P-CCNC: 14 U/L (ref 15–37)
BILIRUB SERPL-MCNC: 0.5 MG/DL (ref ?–1.2)
BUN SERPL-MCNC: 17 MG/DL (ref 7–18)
BUN/CREAT SERPL: 31 (ref 6–20)
CALCIUM SERPL-MCNC: 9.3 MG/DL (ref 8.5–10.1)
CHLORIDE SERPL-SCNC: 104 MMOL/L (ref 98–107)
CHOLEST SERPL-MCNC: 176 MG/DL (ref 0–200)
CHOLEST/HDLC SERPL: 4.6 {RATIO}
CO2 SERPL-SCNC: 30 MMOL/L (ref 21–32)
CREAT SERPL-MCNC: 0.55 MG/DL (ref 0.55–1.02)
CREAT UR-MCNC: 281 MG/DL (ref 28–219)
EGFR (NO RACE VARIABLE) (RUSH/TITUS): 116 ML/MIN/1.73M2
EST. AVERAGE GLUCOSE BLD GHB EST-MCNC: 128 MG/DL
GLOBULIN SER-MCNC: 4.7 G/DL (ref 2–4)
GLUCOSE SERPL-MCNC: 150 MG/DL (ref 74–106)
HBA1C MFR BLD HPLC: 6.1 % (ref 4.5–6.6)
HDLC SERPL-MCNC: 38 MG/DL (ref 40–60)
HIV 1+O+2 AB SERPL QL: NORMAL
LDLC SERPL CALC-MCNC: 113 MG/DL
LDLC/HDLC SERPL: 3 {RATIO}
MICROALBUMIN UR-MCNC: 2.8 MG/DL (ref 0–2.8)
MICROALBUMIN/CREAT RATIO PNL UR: 10 MG/G (ref 0–30)
NONHDLC SERPL-MCNC: 138 MG/DL
POTASSIUM SERPL-SCNC: 3.4 MMOL/L (ref 3.5–5.1)
PROT SERPL-MCNC: 8.2 G/DL (ref 6.4–8.2)
SODIUM SERPL-SCNC: 141 MMOL/L (ref 136–145)
TRIGL SERPL-MCNC: 126 MG/DL (ref 35–150)
TSH SERPL DL<=0.005 MIU/L-ACNC: 2.14 UIU/ML (ref 0.36–3.74)
VLDLC SERPL-MCNC: 25 MG/DL

## 2024-03-20 PROCEDURE — 96372 THER/PROPH/DIAG INJ SC/IM: CPT | Mod: ,,, | Performed by: NURSE PRACTITIONER

## 2024-03-20 PROCEDURE — 1159F MED LIST DOCD IN RCRD: CPT | Mod: CPTII,,, | Performed by: NURSE PRACTITIONER

## 2024-03-20 PROCEDURE — 84443 ASSAY THYROID STIM HORMONE: CPT | Mod: ,,, | Performed by: CLINICAL MEDICAL LABORATORY

## 2024-03-20 PROCEDURE — 80053 COMPREHEN METABOLIC PANEL: CPT | Mod: ,,, | Performed by: CLINICAL MEDICAL LABORATORY

## 2024-03-20 PROCEDURE — 82043 UR ALBUMIN QUANTITATIVE: CPT | Mod: ,,, | Performed by: CLINICAL MEDICAL LABORATORY

## 2024-03-20 PROCEDURE — 3075F SYST BP GE 130 - 139MM HG: CPT | Mod: CPTII,,, | Performed by: NURSE PRACTITIONER

## 2024-03-20 PROCEDURE — 3061F NEG MICROALBUMINURIA REV: CPT | Mod: CPTII,,, | Performed by: NURSE PRACTITIONER

## 2024-03-20 PROCEDURE — 87389 HIV-1 AG W/HIV-1&-2 AB AG IA: CPT | Mod: ,,, | Performed by: CLINICAL MEDICAL LABORATORY

## 2024-03-20 PROCEDURE — 1160F RVW MEDS BY RX/DR IN RCRD: CPT | Mod: CPTII,,, | Performed by: NURSE PRACTITIONER

## 2024-03-20 PROCEDURE — 3066F NEPHROPATHY DOC TX: CPT | Mod: CPTII,,, | Performed by: NURSE PRACTITIONER

## 2024-03-20 PROCEDURE — 82570 ASSAY OF URINE CREATININE: CPT | Mod: ,,, | Performed by: CLINICAL MEDICAL LABORATORY

## 2024-03-20 PROCEDURE — 3044F HG A1C LEVEL LT 7.0%: CPT | Mod: CPTII,,, | Performed by: NURSE PRACTITIONER

## 2024-03-20 PROCEDURE — 4010F ACE/ARB THERAPY RXD/TAKEN: CPT | Mod: CPTII,,, | Performed by: NURSE PRACTITIONER

## 2024-03-20 PROCEDURE — 3079F DIAST BP 80-89 MM HG: CPT | Mod: CPTII,,, | Performed by: NURSE PRACTITIONER

## 2024-03-20 PROCEDURE — 80061 LIPID PANEL: CPT | Mod: ,,, | Performed by: CLINICAL MEDICAL LABORATORY

## 2024-03-20 PROCEDURE — 3008F BODY MASS INDEX DOCD: CPT | Mod: CPTII,,, | Performed by: NURSE PRACTITIONER

## 2024-03-20 PROCEDURE — 83036 HEMOGLOBIN GLYCOSYLATED A1C: CPT | Mod: ,,, | Performed by: CLINICAL MEDICAL LABORATORY

## 2024-03-20 PROCEDURE — 99214 OFFICE O/P EST MOD 30 MIN: CPT | Mod: 25,,, | Performed by: NURSE PRACTITIONER

## 2024-03-20 RX ORDER — CYCLOBENZAPRINE HCL 10 MG
10 TABLET ORAL 3 TIMES DAILY PRN
Qty: 90 TABLET | Refills: 2 | Status: SHIPPED | OUTPATIENT
Start: 2024-03-20

## 2024-03-20 RX ORDER — MONTELUKAST SODIUM 10 MG/1
10 TABLET ORAL DAILY
COMMUNITY
Start: 2024-02-14

## 2024-03-20 RX ORDER — SEMAGLUTIDE 0.68 MG/ML
0.5 INJECTION, SOLUTION SUBCUTANEOUS
Qty: 9 ML | Refills: 0 | Status: SHIPPED | OUTPATIENT
Start: 2024-03-20

## 2024-03-20 RX ORDER — KETOROLAC TROMETHAMINE 30 MG/ML
60 INJECTION, SOLUTION INTRAMUSCULAR; INTRAVENOUS
Status: COMPLETED | OUTPATIENT
Start: 2024-03-20 | End: 2024-03-20

## 2024-03-20 RX ADMIN — KETOROLAC TROMETHAMINE 60 MG: 30 INJECTION, SOLUTION INTRAMUSCULAR; INTRAVENOUS at 10:03

## 2024-03-20 NOTE — PROGRESS NOTES
[facility] - FAMILY MEDICINE       PATIENT NAME: Belem Swann   : 1980    AGE: 43 y.o. DATE OF ENCOUNTER: 3/20/24    MRN: [id number]      PCP: Rhoda Rueda FNP    Reason for Visit / Chief Complaint:  Hypertension, Diabetes, and Follow-up (Patient presents to clinic for 6 month follow up of HTN and DM.  Patient complains of right middle finger pain, tender to touch, and swollen times a week.  )         274}    Subjective:     HPI:    Presents for 6 mth f/u T2DM, HTN.    Hasn't been seeing Rheumatology since Dr. Aguilar left [facility] but has an appt w/ [facility] next week she thinks, 3/26/23.  Denies injury, but reports flare-up of arthritis with pain, swelling, and decreased ROM R 3rd finger x 1 wk.  Toradol IM helps in past    T2DM - monitors glucose occasionally and it runs 120-130s  Cardiologist, Dr. Hawthorne, asked her at visit 2 days ago if she could get Ozempic instead of Byetta; Ozempic was denied in past but I had already planned to try again to change because I think is in her best interest.  Is continuing to gradually lose weight as advised.    Reports Dr. Hawthorne is referring her to Neuro, Dr. Olsen, for chronic headaches.  MARY on CPAP & 2 L oxygen w/ good compliance    Seeing URBY Walker NP, and reports singulair has helped with breathing.    Review of Systems:   Review of Systems   Constitutional:  Negative for chills and fever.   HENT:  Positive for congestion (chronic rhinitis). Negative for ear pain, sinus pain and sore throat.    Eyes: Negative.    Respiratory:  Positive for shortness of breath (chronic BALDERAS). Negative for cough, chest tightness and wheezing.    Cardiovascular:  Positive for leg swelling (chronic). Negative for chest pain and palpitations.   Gastrointestinal: Negative.  Negative for abdominal pain.   Genitourinary: Negative.    Musculoskeletal:  Positive for arthralgias, back pain, gait problem and myalgias.        Chronic,  followed by pain management at [facility].   Skin:  Positive for rash (chronic).   Neurological:  Positive for headaches.   Psychiatric/Behavioral:  Positive for sleep disturbance (chronic). Negative for dysphoric mood, self-injury and suicidal ideas. The patient is nervous/anxious (chronic).        Allergies and Meds: 274}     Review of patient's allergies indicates:   Allergen Reactions    Latex      Other reaction(s): Unknown    Doxycycline Nausea Only        Current Outpatient Medications:     albuterol sulfate 90 mcg/actuation aebs, Inhale 180 mcg into the lungs every 4 (four) hours., Disp: , Rfl:     albuterol-ipratropium (DUO-NEB) 2.5 mg-0.5 mg/3 mL nebulizer solution, Take 3 mLs by nebulization every 6 (six) hours as needed. Rescue, Disp: , Rfl:     budesonide-formoterol 160-4.5 mcg (SYMBICORT) 160-4.5 mcg/actuation HFAA, Inhale 2 puffs into the lungs every 12 (twelve) hours. Controller, Disp: 10.2 g, Rfl: 11    carvediloL (COREG) 25 MG tablet, Take 25 mg by mouth 2 (two) times daily with meals., Disp: , Rfl:     cloNIDine (CATAPRES) 0.1 MG tablet, Take 0.1 mg by mouth daily as needed., Disp: , Rfl:     fluocinolone (SYNALAR) 0.01 % external solution, Apply topically 2 (two) times daily. (Patient taking differently: Apply 1 Dose topically 2 (two) times daily.), Disp: 90 mL, Rfl: 5    furosemide (LASIX) 40 MG tablet, Take 40 mg by mouth once daily., Disp: , Rfl:     gabapentin (NEURONTIN) 300 MG capsule, Take 1 capsule (300 mg total) by mouth every 8 (eight) hours., Disp: 90 capsule, Rfl: 1    HUMIRA,CF, PEN 40 mg/0.4 mL PnKt, Inject 40 mg into the skin once a week., Disp: , Rfl:     HYDROcodone-acetaminophen (NORCO)  mg per tablet, Take 1 tablet by mouth every 8 (eight) hours as needed for Pain., Disp: 90 tablet, Rfl: 0    hydrocortisone 2.5 % ointment, Apply topically 2 (two) times daily., Disp: 454 g, Rfl: 5    hydrOXYzine pamoate (VISTARIL) 50 MG Cap, Take 2 capsules (100 mg total) by mouth  "nightly as needed (anxiety & difficulty sleeping)., Disp: 180 capsule, Rfl: 1    ketoconazole (NIZORAL) 2 % cream, Apply topically once daily., Disp: 60 g, Rfl: 2    lancets (ONETOUCH DELICA LANCETS) 33 gauge Misc, 1 lancet by Misc.(Non-Drug; Combo Route) route once daily., Disp: 100 each, Rfl: 3    metFORMIN (GLUCOPHAGE-XR) 500 MG ER 24hr tablet, Take 1 tablet (500 mg total) by mouth once daily., Disp: 90 tablet, Rfl: 3    montelukast (SINGULAIR) 10 mg tablet, Take 10 mg by mouth once daily., Disp: , Rfl:     NIFEdipine (PROCARDIA-XL) 30 MG (OSM) 24 hr tablet, Take 30 mg by mouth every evening., Disp: , Rfl:     nystatin (MYCOSTATIN) powder, Apply topically 2 (two) times daily., Disp: 60 g, Rfl: 5    olmesartan (BENICAR) 40 MG tablet, Take 40 mg by mouth once daily., Disp: , Rfl:     pantoprazole (PROTONIX) 40 MG tablet, Take 40 mg by mouth 2 (two) times daily., Disp: , Rfl:     pen needle, diabetic 31 gauge x 1/4" Ndle, , Disp: , Rfl:     promethazine (PHENERGAN) 25 MG tablet, Take 1 tablet (25 mg total) by mouth every 6 (six) hours as needed for Nausea., Disp: 30 tablet, Rfl: 1    rosuvastatin (CRESTOR) 40 MG Tab, Take 1 tablet (40 mg total) by mouth every evening., Disp: 90 tablet, Rfl: 3    triamcinolone acetonide 0.1% (KENALOG) 0.1 % ointment, Apply topically 2 (two) times daily., Disp: 454 g, Rfl: 0    TRUE METRIX GLUCOSE TEST STRIP Strp, 1 strip by Misc.(Non-Drug; Combo Route) route Daily. For T2DM., Disp: 100 strip, Rfl: 3    cyclobenzaprine (FLEXERIL) 10 MG tablet, Take 1 tablet (10 mg total) by mouth 3 (three) times daily as needed for Muscle spasms., Disp: 90 tablet, Rfl: 2    semaglutide (OZEMPIC) 0.25 mg or 0.5 mg (2 mg/3 mL) pen injector, Inject 0.5 mg into the skin every 7 days., Disp: 9 mL, Rfl: 0  No current facility-administered medications for this visit.    Labs:274}   I have reviewed labs below:  Lab Results   Component Value Date    WBC 11.42 (H) 11/03/2023    RBC 4.67 11/03/2023    HGB 13.2 " 11/03/2023    HCT 40.1 11/03/2023     (L) 11/03/2023     11/03/2023    K 3.6 11/03/2023     (H) 11/03/2023    CALCIUM 8.4 (L) 11/03/2023     (H) 11/03/2023    BUN 17 11/03/2023    CREATININE 0.57 11/03/2023    ESTGFRAFRICA 103 10/07/2020    EGFRNONAA 95 05/11/2022    ALT 24 11/03/2023    AST 13 (L) 11/03/2023    INR 1.10 10/07/2020    CHOL 173 09/13/2023    TRIG 108 09/13/2023    HDL 49 09/13/2023    LDLCALC 102 09/13/2023    TSH 2.630 01/12/2023    HGBA1C 6.0 06/27/2023    MICROALBUR <0.5 01/12/2023       Medical History: 274}     Past Medical History:   Diagnosis Date    Anxiety     Asthma     Chronic pain     Cushing syndrome     Depression     Essential hypertension     Fibromyalgia     Herpes zoster     Hyperlipidemia     Leukocytosis 1/18/2022    Onychomycosis     MARY on CPAP     Rheumatoid arthritis     Type 2 diabetes mellitus 11/2020    Vitamin D deficiency 05/03/2018      Social History     Tobacco Use   Smoking Status Every Day    Types: Cigarettes    Start date: 9/8/2021   Smokeless Tobacco Never      Health Maintenance: 274}     Health Maintenance         Date Due Completion Date    Sign Pain Contract Never done ---    Eye Exam 01/19/2024 1/19/2023 (Done)    Override on 1/19/2023: Done (Rhoda Resendiz  [facility])    Override on 12/15/2021: Done ([facility])    Mammogram 05/30/2024 5/30/2023    Foot Exam 06/27/2024 6/27/2023    Override on 5/6/2021: Done    Hemoglobin A1c 09/20/2024 3/20/2024    Diabetes Urine Screening 03/20/2025 3/20/2024    Lipid Panel 03/20/2025 3/20/2024    Pap Smear 03/23/2026 3/23/2023    Override on 5/19/2020: Done (negative; Dr. Solomon)    TETANUS VACCINE 05/06/2031 5/6/2021    Pneumococcal Vaccines (Age 0-64) (3 of 3 - PPSV23 or PCV20) 04/04/2044 1/4/2021          Immunization History   Administered Date(s) Administered    Influenza - Quadrivalent 09/03/2020    Influenza - Quadrivalent - PF *Preferred* (6 months and older)  "09/08/2021, 09/12/2022    Pneumococcal Conjugate - 13 Valent 12/11/2019    Pneumococcal Polysaccharide - 23 Valent 01/04/2021    Tdap 05/06/2021     Objective:  274}   /82 (BP Location: Left arm, Patient Position: Sitting, BP Method: Large (Automatic))   Pulse 82   Temp 98.2 °F (36.8 °C) (Oral)   Resp 19   Ht 5' 2" (1.575 m)   Wt (!) 153.8 kg (339 lb)   SpO2 96%   BMI 62.00 kg/m²     Wt Readings from Last 3 Encounters:   03/20/24 (!) 153.8 kg (339 lb)   02/01/24 (!) 156 kg (344 lb)   12/04/23 (!) 157.9 kg (348 lb)     BP Readings from Last 3 Encounters:   03/20/24 138/82   02/01/24 (!) 142/84   12/04/23 (!) 163/102     Body mass index is 62 kg/m².     Physical Exam  Vitals and nursing note reviewed.   Constitutional:       General: She is not in acute distress.     Appearance: Normal appearance. She is obese. She is not ill-appearing.   HENT:      Head: Normocephalic.      Right Ear: Tympanic membrane, ear canal and external ear normal.      Left Ear: Tympanic membrane, ear canal and external ear normal.      Nose: Nose normal.      Mouth/Throat:      Mouth: Mucous membranes are moist.      Pharynx: Oropharynx is clear. Uvula midline. No posterior oropharyngeal erythema or uvula swelling.   Eyes:      Conjunctiva/sclera: Conjunctivae normal.      Pupils: Pupils are equal, round, and reactive to light.   Neck:      Thyroid: No thyroid mass or thyromegaly.      Vascular: Normal carotid pulses. No carotid bruit.      Trachea: Trachea normal.   Cardiovascular:      Rate and Rhythm: Normal rate and regular rhythm.      Pulses: Normal pulses.      Heart sounds: Normal heart sounds.   Pulmonary:      Effort: Pulmonary effort is normal.      Breath sounds: Normal breath sounds.   Abdominal:      Palpations: Abdomen is soft.      Tenderness: There is no abdominal tenderness.   Musculoskeletal:      Cervical back: Neck supple.      Right lower leg: Edema (trace) present.      Left lower leg: Edema (trace) " present.   Lymphadenopathy:      Cervical: No cervical adenopathy.   Skin:     General: Skin is warm and dry.   Neurological:      General: No focal deficit present.      Mental Status: She is alert and oriented to person, place, and time.   Psychiatric:         Mood and Affect: Mood normal.         Behavior: Behavior normal.          Assessment and Plan: 274}     1. Type 2 diabetes mellitus without complication, without long-term current use of insulin  Comments:  Suboptimal control w/ -130s despite good A1c.    Continue metformin and change Byetta to Ozempic to improve glucose regulation & for CV prevention.  Orders:  -     Comprehensive Metabolic Panel; Future; Expected date: 03/20/2024  -     Hemoglobin A1C; Future; Expected date: 03/20/2024  -     Microalbumin/Creatinine Ratio, Urine; Future; Expected date: 03/20/2024  -     semaglutide (OZEMPIC) 0.25 mg or 0.5 mg (2 mg/3 mL) pen injector; Inject 0.5 mg into the skin every 7 days.  Dispense: 9 mL; Refill: 0    2. Hypercholesterolemia  Comments:  Suboptimal control with low HDL.  Continue rosuvastatin 40 mg daily.  Orders:  -     Comprehensive Metabolic Panel; Future; Expected date: 03/20/2024  -     Lipid Panel; Future; Expected date: 03/20/2024    3. Rheumatoid arthritis involving multiple sites with positive rheumatoid factor  Comments:  Keep appointment with rheumatology as scheduled.  Orders:  -     ketorolac injection 60 mg    4. Encounter for long-term (current) use of other medications  -     TSH; Future; Expected date: 03/20/2024    5. Finger pain, right  -     ketorolac injection 60 mg    6. Muscle spasm  Comments:  Request refills of cyclobenzaprine which help control muscle spasms.  Orders:  -     cyclobenzaprine (FLEXERIL) 10 MG tablet; Take 1 tablet (10 mg total) by mouth 3 (three) times daily as needed for Muscle spasms.  Dispense: 90 tablet; Refill: 2    7. Screening for HIV (human immunodeficiency virus)  -     HIV 1/2 Ag/Ab (4th Gen);  Future; Expected date: 03/20/2024    8. Morbid obesity with BMI of 60.0-69.9, adult  Assessment & Plan:  Body mass index is 62 kg/m². Morbid obesity complicates all aspects of disease management from diagnostic modalities to treatment. Weight loss encouraged and health benefits explained to patient.           Advised smoking cessation.  Advised to schedule updated diabetic eye exam.    Return to clinic 6-mth for wellness/routine f/u T2DM, fasting; and sooner as needed.    Future Appointments   Date Time Provider Department Center   4/1/2024  1:15 PM Jeff Pineda PA [facility]   6/4/2024  1:40 PM [facility] [place]   8/20/2024  1:00 PM Ashlyn Wakefield MD [facility]   9/17/2024  9:00 AM Rhoda Rueda FNP [facility]   9/24/2024 10:40 AM Rhoda Rueda FNP [facility]        Signature:  DANE Saenz

## 2024-03-20 NOTE — ASSESSMENT & PLAN NOTE
Body mass index is 62 kg/m². Morbid obesity complicates all aspects of disease management from diagnostic modalities to treatment. Weight loss encouraged and health benefits explained to patient.

## 2024-03-24 DIAGNOSIS — E87.6 HYPOKALEMIA: Primary | ICD-10-CM

## 2024-03-24 RX ORDER — POTASSIUM CHLORIDE 750 MG/1
10 TABLET, EXTENDED RELEASE ORAL DAILY
Qty: 90 TABLET | Refills: 1 | Status: SHIPPED | OUTPATIENT
Start: 2024-03-24 | End: 2024-09-20

## 2024-03-27 NOTE — PROGRESS NOTES
Subjective:         Patient ID: Belem Swann is a 43 y.o. female.    Chief Complaint: Wrist Pain, Low-back Pain, and Knee Pain        Pain  This is a chronic problem. The current episode started more than 1 year ago. The problem occurs daily. The problem has been waxing and waning. Associated symptoms include arthralgias and neck pain. Pertinent negatives include no anorexia, chest pain, chills, coughing, diaphoresis, fever, sore throat, vertigo or vomiting.     Review of Systems   Constitutional:  Negative for activity change, appetite change, chills, diaphoresis, fever and unexpected weight change.   HENT:  Negative for drooling, ear discharge, ear pain, facial swelling, mouth sores, nosebleeds, sore throat, trouble swallowing, voice change and goiter.    Eyes:  Negative for photophobia, pain, discharge, redness and visual disturbance.   Respiratory:  Negative for apnea, cough, choking, chest tightness, shortness of breath, wheezing and stridor.    Cardiovascular:  Negative for chest pain, palpitations and leg swelling.   Gastrointestinal:  Negative for abdominal distention, anorexia, diarrhea, rectal pain, vomiting and fecal incontinence.   Endocrine: Negative for cold intolerance, heat intolerance, polydipsia, polyphagia and polyuria.   Genitourinary:  Negative for bladder incontinence, dysuria, flank pain, frequency and hot flashes.   Musculoskeletal:  Positive for arthralgias, back pain, leg pain, neck pain and neck stiffness.   Integumentary:  Negative for color change and pallor.   Allergic/Immunologic: Negative for immunocompromised state.   Neurological:  Negative for dizziness, vertigo, seizures, syncope, facial asymmetry, speech difficulty, light-headedness, memory loss and coordination difficulties.   Hematological:  Negative for adenopathy. Does not bruise/bleed easily.   Psychiatric/Behavioral:  Negative for agitation, behavioral problems, confusion, decreased concentration, dysphoric mood,  hallucinations, self-injury and suicidal ideas. The patient is not hyperactive.            Past Medical History:   Diagnosis Date    Anxiety     Asthma     Chronic pain     Cushing syndrome     Depression     Essential hypertension     Fibromyalgia     Herpes zoster     Hyperlipidemia     Leukocytosis 2022    Onychomycosis     MARY on CPAP     Rheumatoid arthritis     Type 2 diabetes mellitus 2020    Vitamin D deficiency 2018     Past Surgical History:   Procedure Laterality Date     SECTION      ELBOW SURGERY  1985    EPIDURAL STEROID INJECTION      L4-5 VERITO Dec 2020-[facility]    FEMUR SURGERY Right     due to osteomyelitis    HYSTERECTOMY      Abn Bleeding    INJECTION OF ANESTHETIC AGENT AROUND MEDIAL BRANCH NERVES INNERVATING LUMBAR FACET JOINT Bilateral 2022    Procedure: Bilateral L4-5,5-S1 MBB ( No steroids);  Surgeon: Carmel Torre MD;  Location: [facility];  Service: Pain Management;  Laterality: Bilateral;  PT AWARE TO BE TESTED ON OV    INJECTION OF ANESTHETIC AGENT AROUND MEDIAL BRANCH NERVES INNERVATING LUMBAR FACET JOINT Bilateral 2022    Procedure: Block-nerve-medial branch-lumbar, bilateral L4 through S1;  Surgeon: Carmel Torre MD;  Location: [facility];  Service: Pain Management;  Laterality: Bilateral;    INJECTION OF ANESTHETIC AGENT INTO SACROILIAC JOINT Bilateral 2022    Procedure: BLOCK, SACROILIAC JOINT;  Surgeon: Carmel Torre MD;  Location: [facility];  Service: Pain Management;  Laterality: Bilateral;  covid test put in    LIPOMA RESECTION  2019    RADIOFREQUENCY ABLATION OF LUMBAR MEDIAL BRANCH NERVE AT SINGLE LEVEL Right 2022    Procedure: Radiofrequency Ablation, Nerve, Spinal, Lumbar, Medial Branch, Level L4-S1, right side 1st, will have left-sided after completing;  Surgeon: Carmel Torre MD;  Location: [facility];  Service: Pain Management;  Laterality: Right;  pt aware at visit to be tested  "   RADIOFREQUENCY ABLATION OF LUMBAR MEDIAL BRANCH NERVE AT SINGLE LEVEL Left 05/05/2022    Procedure: LEFT  L4-S1 RFTC  (HAD RIGHT  ON 4-14);  Surgeon: Carmel Torre MD;  Location: [facility];  Service: Pain Management;  Laterality: Left;    RADIOFREQUENCY ABLATION OF LUMBAR MEDIAL BRANCH NERVE AT SINGLE LEVEL Right 7/25/2023    Procedure: Radiofrequency Ablation, Nerve, Spinal, Lumbar, Medial Branch, Level L4-S1;  Surgeon: Carmel Torre MD;  Location: [facility];  Service: Pain Management;  Laterality: Right;    SURGICAL REMOVAL OF PILONIDAL CYST      TRANSFORAMINAL EPIDURAL INJECTION OF STEROID      Bilateral L4-5 TFESI Nov 2020-[facility]    TRANSFORAMINAL EPIDURAL INJECTION OF STEROID      Right L4-5 TFESI Feb 2020-[facility]    VENTRAL HERNIA REPAIR  09/2020     Social History     Socioeconomic History    Marital status:    Tobacco Use    Smoking status: Every Day     Types: Cigarettes     Start date: 9/8/2021    Smokeless tobacco: Never   Substance and Sexual Activity    Alcohol use: Yes     Comment: ocassional    Drug use: Never    Sexual activity: Not Currently     Partners: Male     Birth control/protection: See Surgical Hx     Family History   Problem Relation Age of Onset    Cancer Mother     Thyroid cancer Mother     Thyroid cancer Maternal Grandmother     Melanoma Neg Hx     Colon cancer Neg Hx     Breast cancer Neg Hx     Ovarian cancer Neg Hx      Review of patient's allergies indicates:   Allergen Reactions    Latex      Other reaction(s): Unknown    Doxycycline Nausea Only        Objective:  Vitals:    04/01/24 1305   BP: (!) 141/101   Pulse: 104   Resp: 20   Weight: (!) 156 kg (344 lb)   Height: 5' 2" (1.575 m)   PainSc:   7           Physical Exam  Vitals and nursing note reviewed. Exam conducted with a chaperone present.   Constitutional:       General: She is awake. She is not in acute distress.     Appearance: Normal appearance. She is obese. She is not ill-appearing, " toxic-appearing or diaphoretic.   HENT:      Head: Normocephalic and atraumatic.      Nose: Nose normal.      Mouth/Throat:      Mouth: Mucous membranes are moist.      Pharynx: Oropharynx is clear.   Eyes:      Conjunctiva/sclera: Conjunctivae normal.      Pupils: Pupils are equal, round, and reactive to light.   Cardiovascular:      Rate and Rhythm: Normal rate.   Pulmonary:      Effort: Pulmonary effort is normal. No respiratory distress.   Abdominal:      Palpations: Abdomen is soft.      Tenderness: There is no guarding.   Musculoskeletal:         General: No swelling.      Cervical back: Normal range of motion and neck supple. Tenderness present. No rigidity.      Thoracic back: Tenderness present.      Lumbar back: Tenderness present. Decreased range of motion.   Skin:     General: Skin is warm and dry.      Coloration: Skin is not jaundiced or pale.   Neurological:      General: No focal deficit present.      Mental Status: She is alert and oriented to person, place, and time. Mental status is at baseline.      Cranial Nerves: No cranial nerve deficit (II-XII).   Psychiatric:         Mood and Affect: Mood normal.         Behavior: Behavior normal. Behavior is cooperative.         Thought Content: Thought content normal.           X-Ray Knee 3 View Bilateral  See Procedure Notes for results.     IMPRESSION: Please see Ortho procedure notes for report.      This procedure was auto-finalized by: Virtual Radiologist         Office Visit on 03/20/2024   Component Date Value Ref Range Status    Sodium 03/20/2024 141  136 - 145 mmol/L Final    Potassium 03/20/2024 3.4 (L)  3.5 - 5.1 mmol/L Final    Chloride 03/20/2024 104  98 - 107 mmol/L Final    CO2 03/20/2024 30  21 - 32 mmol/L Final    Anion Gap 03/20/2024 10  7 - 16 mmol/L Final    Glucose 03/20/2024 150 (H)  74 - 106 mg/dL Final    BUN 03/20/2024 17  7 - 18 mg/dL Final    Creatinine 03/20/2024 0.55  0.55 - 1.02 mg/dL Final    BUN/Creatinine Ratio 03/20/2024  31 (H)  6 - 20 Final    Calcium 03/20/2024 9.3  8.5 - 10.1 mg/dL Final    Total Protein 03/20/2024 8.2  6.4 - 8.2 g/dL Final    Albumin 03/20/2024 3.5  3.5 - 5.0 g/dL Final    Globulin 03/20/2024 4.7 (H)  2.0 - 4.0 g/dL Final    A/G Ratio 03/20/2024 0.7   Final    Bilirubin, Total 03/20/2024 0.5  >0.0 - 1.2 mg/dL Final    Alk Phos 03/20/2024 139 (H)  37 - 98 U/L Final    ALT 03/20/2024 23  13 - 56 U/L Final    AST 03/20/2024 14 (L)  15 - 37 U/L Final    eGFR 03/20/2024 116  >=60 mL/min/1.73m2 Final    Triglycerides 03/20/2024 126  35 - 150 mg/dL Final    Cholesterol 03/20/2024 176  0 - 200 mg/dL Final    HDL Cholesterol 03/20/2024 38 (L)  40 - 60 mg/dL Final    Cholesterol/HDL Ratio (Risk Factor) 03/20/2024 4.6   Final    Non-HDL 03/20/2024 138  mg/dL Final    LDL Calculated 03/20/2024 113  mg/dL Final    LDL/HDL 03/20/2024 3.0   Final    VLDL 03/20/2024 25  mg/dL Final    Hemoglobin A1C 03/20/2024 6.1  4.5 - 6.6 % Final    Estimated Average Glucose 03/20/2024 128  mg/dL Final    Creatinine, Urine 03/20/2024 281 (H)  28 - 219 mg/dL Final    Microalbumin 03/20/2024 2.8  0.0 - 2.8 mg/dL Final    Microalbumin/Creatinine Ratio 03/20/2024 10.0  0.0 - 30.0 mg/g Final    TSH 03/20/2024 2.140  0.358 - 3.740 uIU/mL Final    HIV 1/2 03/20/2024 Non-Reactive  Non-Reactive Final   Office Visit on 02/01/2024   Component Date Value Ref Range Status    POC Amphetamines 02/01/2024 Negative  Negative, Inconclusive Final    POC Barbiturates 02/01/2024 Negative  Negative, Inconclusive Final    POC Benzodiazepines 02/01/2024 Negative  Negative, Inconclusive Final    POC Cocaine 02/01/2024 Negative  Negative, Inconclusive Final    POC THC 02/01/2024 Negative  Negative, Inconclusive Final    POC Methadone 02/01/2024 Negative  Negative, Inconclusive Final    POC Methamphetamine 02/01/2024 Negative  Negative, Inconclusive Final    POC Opiates 02/01/2024 Presumptive Positive (A)  Negative, Inconclusive Final    POC Oxycodone 02/01/2024  Presumptive Positive (A)  Negative, Inconclusive Final    POC Phencyclidine 02/01/2024 Negative  Negative, Inconclusive Final    POC Methylenedioxymethamphetamine * 02/01/2024 Negative  Negative, Inconclusive Final    POC Tricyclic Antidepressants 02/01/2024 Negative  Negative, Inconclusive Final    POC Buprenorphine 02/01/2024 Negative   Final     Acceptable 02/01/2024 Yes   Final    POC Temperature (Urine) 02/01/2024 92   Final    pH, UA 02/01/2024 5.5  5.0 to 8.0 pH Units Final    Creatinine, Urine 02/01/2024 282 (H)  28 - 219 mg/dL Final    6-Acetylmorphine 02/01/2024 Negative  10 ng/mL Final    7-Aminoclonazepam 02/01/2024 Negative  Negative 25 ng/mL Final    a-Hydroxyalprazolam 02/01/2024 Negative  Negative 25 ng/mL Final    Acetyl Fentanyl 02/01/2024 Negative  Negative 2.5 ng/mL Final    Acetyl Norfentanyl Oxalate 02/01/2024 Negative  5 ng/mL Final    Benzoylecgonine 02/01/2024 Negative  100 ng/mL Final    Buprenorphine 02/01/2024 Negative  25 ng/mL Final    Codeine 02/01/2024 Negative  25 ng/mL Final    EDDP 02/01/2024 Negative  25 ng/mL Final    Fentanyl 02/01/2024 Negative  2.5 ng/mL Final    Hydrocodone 02/01/2024 >250.0 (H)  <25.0 25 ng/mL Final    Hydromorphone 02/01/2024 Negative  25 ng/mL Final    Lorazepam 02/01/2024 Negative  25 ng/mL Final    Morphine 02/01/2024 Negative  25 ng/mL Final    Norbuprenorphine 02/01/2024 Negative  25 ng/mL Final    Nordiazepam 02/01/2024 Negative  25 ng/mL Final    Norfentanyl Oxalate 02/01/2024 Negative  5 ng/mL Final    Norhydrocodone 02/01/2024 >500.0 (H)  <50.0 50 ng/mL Final    Noroxycodone HCL 02/01/2024 Negative  50 ng/mL Final    Oxazepam 02/01/2024 Negative  25 ng/mL Final    Oxymorphone 02/01/2024 Negative  25 ng/mL Final    Tapentadol 02/01/2024 Negative  25 ng/mL Final    Temazepam 02/01/2024 Negative  25 ng/mL Final    THC-COOH 02/01/2024 Negative  25 ng/mL Final    Tramadol 02/01/2024 Negative  100 ng/mL Final    Amphetamine, Urine  02/01/2024 Negative  Negative Final    Methamphetamines, Urine 02/01/2024 Negative  Negative Final    Methadone, Urine 02/01/2024 Negative  Negative 25 ng/mL Final    Oxycodone, Urine 02/01/2024 Negative  Negative 25 ng/mL Final    Specific Gravity, UA 02/01/2024 1.025  <=1.030 Final   Admission on 11/03/2023, Discharged on 11/03/2023   Component Date Value Ref Range Status    Sodium 11/03/2023 142  136 - 145 mmol/L Final    Potassium 11/03/2023 3.6  3.5 - 5.1 mmol/L Final    Chloride 11/03/2023 109 (H)  98 - 107 mmol/L Final    CO2 11/03/2023 29  21 - 32 mmol/L Final    Anion Gap 11/03/2023 8  7 - 16 mmol/L Final    Glucose 11/03/2023 164 (H)  74 - 106 mg/dL Final    BUN 11/03/2023 17  7 - 18 mg/dL Final    Creatinine 11/03/2023 0.57  0.55 - 1.02 mg/dL Final    BUN/Creatinine Ratio 11/03/2023 30 (H)  6 - 20 Final    Calcium 11/03/2023 8.4 (L)  8.5 - 10.1 mg/dL Final    Total Protein 11/03/2023 7.5  6.4 - 8.2 g/dL Final    Albumin 11/03/2023 3.0 (L)  3.5 - 5.0 g/dL Final    Globulin 11/03/2023 4.5 (H)  2.0 - 4.0 g/dL Final    A/G Ratio 11/03/2023 0.7   Final    Bilirubin, Total 11/03/2023 0.2  >0.0 - 1.2 mg/dL Final    Alk Phos 11/03/2023 119 (H)  37 - 98 U/L Final    ALT 11/03/2023 24  13 - 56 U/L Final    AST 11/03/2023 13 (L)  15 - 37 U/L Final    eGFR 11/03/2023 115  >=60 mL/min/1.73m2 Final    Lipase 11/03/2023 34  16 - 77 U/L Final    Troponin I High Sensitivity 11/03/2023 5.0  <=60.4 pg/mL Final    WBC 11/03/2023 11.42 (H)  4.50 - 11.00 K/uL Final    RBC 11/03/2023 4.67  4.20 - 5.40 M/uL Final    Hemoglobin 11/03/2023 13.2  12.0 - 16.0 g/dL Final    Hematocrit 11/03/2023 40.1  38.0 - 47.0 % Final    MCV 11/03/2023 85.9  80.0 - 96.0 fL Final    MCH 11/03/2023 28.3  27.0 - 31.0 pg Final    MCHC 11/03/2023 32.9  32.0 - 36.0 g/dL Final    RDW 11/03/2023 12.8  11.5 - 14.5 % Final    Platelet Count 11/03/2023 147 (L)  150 - 400 K/uL Final    MPV 11/03/2023 13.6 (H)  9.4 - 12.4 fL Final    Neutrophils % 11/03/2023  69.6 (H)  53.0 - 65.0 % Final    Lymphocytes % 11/03/2023 19.1 (L)  27.0 - 41.0 % Final    Monocytes % 11/03/2023 6.7 (H)  2.0 - 6.0 % Final    Eosinophils % 11/03/2023 3.8  1.0 - 4.0 % Final    Basophils % 11/03/2023 0.4  0.0 - 1.0 % Final    Immature Granulocytes % 11/03/2023 0.4  0.0 - 0.4 % Final    nRBC, Auto 11/03/2023 0.0  <=0.0 % Final    Neutrophils, Abs 11/03/2023 7.95 (H)  1.80 - 7.70 K/uL Final    Lymphocytes, Absolute 11/03/2023 2.18  1.00 - 4.80 K/uL Final    Monocytes, Absolute 11/03/2023 0.77  0.00 - 0.80 K/uL Final    Eosinophils, Absolute 11/03/2023 0.43  0.00 - 0.50 K/uL Final    Basophils, Absolute 11/03/2023 0.05  0.00 - 0.20 K/uL Final    Immature Granulocytes, Absolute 11/03/2023 0.04  0.00 - 0.04 K/uL Final    nRBC, Absolute 11/03/2023 0.00  <=0.00 x10e3/uL Final    Diff Type 11/03/2023 Auto   Final    Platelet Morphology 11/03/2023 Few Large Platelets (A)  Normal Final    RBC Morphology 11/03/2023 Normal   Final    Troponin I High Sensitivity 11/03/2023 4.2  <=60.4 pg/mL Final         Orders Placed This Encounter   Procedures    Miscellaneous Test, Sendout blood drug screen     Standing Status:   Future     Number of Occurrences:   1     Standing Expiration Date:   5/31/2025     Order Specific Question:   What is the name of the test you wish to perform? (Note: Please provide the reference lab test code if possible. If you have any questions, call the Lab.)     Answer:   blood drug screen           Requested Prescriptions     Signed Prescriptions Disp Refills    gabapentin (NEURONTIN) 300 MG capsule 90 capsule 1     Sig: Take 1 capsule (300 mg total) by mouth every 8 (eight) hours.    HYDROcodone-acetaminophen (NORCO)  mg per tablet 90 tablet 0     Sig: Take 1 tablet by mouth every 8 (eight) hours as needed for Pain.    HYDROcodone-acetaminophen (NORCO)  mg per tablet 90 tablet 0     Sig: Take 1 tablet by mouth every 8 (eight) hours as needed for Pain.       Assessment:     1.  Rheumatoid arthritis involving multiple sites with positive rheumatoid factor    2. Spondylosis of lumbar region without myelopathy or radiculopathy    3. Sacroiliitis    4. Chronic pain of right knee    5. Encounter for long-term (current) use of other medications           A's of Opioid Risk Assessment  Activity:Patient can perform ADL.   Analgesia:Patients pain is partially controlled by current medication. Patient has tried OTC medications such as Tylenol and Ibuprofen with out relief.   Adverse Effects: Patient denies constipation or sedation.  Aberrant Behavior:  reviewed with no aberrant drug seeking/taking behavior.  Overdose reversal drug naloxone discussed    Drug screen reviewed    X-ray right knee [facility] September 14, 2022 degenerative changes no fracture noted      Plan:    February 1, 2024 definitive drug screen positive for hydrocodone, nor hydrocodone  No hydromorphone    April 1, 2024 will order presumptive/definitive drug screen today for compliance  Patient could not provide urine drug screen    Will order serum drug screen  Serum drug screen ordered April 1, 2024 returned April 25, 2024  Hydrocodone 16        Will order urine drug screen with definitive at next visit        Narcan December 2022    Follows rheumatology [facility] rheumatoid arthritis    Follows orthopedics [facility] chronic right knee pain    She had bilateral lumbar RF TC  July 25, 2023  She states she has 75% relief after procedure, the procedure did help improve her level function     Left lateral arm just above the left wrist 2 cm circular nonmobile tender area present for several days now patient has underlying rheumatoid     She will observe this area if it resolves with time    Requesting Toradol injection    She had Toradol injections 2 weeks ago advised patient's limit NSAIDs due to complications hypertension, renal issues    Otherwise she would like to continue conservative management this time    Will/shall Continue home  exercise program as directed    Continue current medication    Follow-up 2 months    Dr. Torre July 2024    Bring original prescription medication bottles/container/box with labels to each visit

## 2024-03-28 DIAGNOSIS — G89.29 CHRONIC HEADACHES: Primary | ICD-10-CM

## 2024-03-28 DIAGNOSIS — R51.9 CHRONIC HEADACHES: Primary | ICD-10-CM

## 2024-04-01 ENCOUNTER — OFFICE VISIT (OUTPATIENT)
Dept: PAIN MEDICINE | Facility: CLINIC | Age: 44
End: 2024-04-01
Payer: MEDICAID

## 2024-04-01 VITALS
DIASTOLIC BLOOD PRESSURE: 101 MMHG | RESPIRATION RATE: 20 BRPM | HEIGHT: 62 IN | SYSTOLIC BLOOD PRESSURE: 141 MMHG | WEIGHT: 293 LBS | HEART RATE: 104 BPM | BODY MASS INDEX: 53.92 KG/M2

## 2024-04-01 DIAGNOSIS — M25.561 CHRONIC PAIN OF RIGHT KNEE: Chronic | ICD-10-CM

## 2024-04-01 DIAGNOSIS — M47.816 SPONDYLOSIS OF LUMBAR REGION WITHOUT MYELOPATHY OR RADICULOPATHY: Chronic | ICD-10-CM

## 2024-04-01 DIAGNOSIS — M46.1 SACROILIITIS: Chronic | ICD-10-CM

## 2024-04-01 DIAGNOSIS — M05.79 RHEUMATOID ARTHRITIS INVOLVING MULTIPLE SITES WITH POSITIVE RHEUMATOID FACTOR: Primary | Chronic | ICD-10-CM

## 2024-04-01 DIAGNOSIS — G89.29 CHRONIC PAIN OF RIGHT KNEE: Chronic | ICD-10-CM

## 2024-04-01 DIAGNOSIS — Z79.899 ENCOUNTER FOR LONG-TERM (CURRENT) USE OF OTHER MEDICATIONS: ICD-10-CM

## 2024-04-01 PROCEDURE — 3066F NEPHROPATHY DOC TX: CPT | Mod: CPTII,,, | Performed by: PHYSICIAN ASSISTANT

## 2024-04-01 PROCEDURE — 99214 OFFICE O/P EST MOD 30 MIN: CPT | Mod: S$PBB,,, | Performed by: PHYSICIAN ASSISTANT

## 2024-04-01 PROCEDURE — 3008F BODY MASS INDEX DOCD: CPT | Mod: CPTII,,, | Performed by: PHYSICIAN ASSISTANT

## 2024-04-01 PROCEDURE — 3077F SYST BP >= 140 MM HG: CPT | Mod: CPTII,,, | Performed by: PHYSICIAN ASSISTANT

## 2024-04-01 PROCEDURE — 4010F ACE/ARB THERAPY RXD/TAKEN: CPT | Mod: CPTII,,, | Performed by: PHYSICIAN ASSISTANT

## 2024-04-01 PROCEDURE — 3061F NEG MICROALBUMINURIA REV: CPT | Mod: CPTII,,, | Performed by: PHYSICIAN ASSISTANT

## 2024-04-01 PROCEDURE — 99215 OFFICE O/P EST HI 40 MIN: CPT | Mod: PBBFAC | Performed by: PHYSICIAN ASSISTANT

## 2024-04-01 PROCEDURE — 3044F HG A1C LEVEL LT 7.0%: CPT | Mod: CPTII,,, | Performed by: PHYSICIAN ASSISTANT

## 2024-04-01 PROCEDURE — 1159F MED LIST DOCD IN RCRD: CPT | Mod: CPTII,,, | Performed by: PHYSICIAN ASSISTANT

## 2024-04-01 PROCEDURE — 3080F DIAST BP >= 90 MM HG: CPT | Mod: CPTII,,, | Performed by: PHYSICIAN ASSISTANT

## 2024-04-01 RX ORDER — KETOROLAC TROMETHAMINE 30 MG/ML
60 INJECTION, SOLUTION INTRAMUSCULAR; INTRAVENOUS
Status: SHIPPED | OUTPATIENT
Start: 2024-04-01

## 2024-04-01 RX ORDER — GABAPENTIN 300 MG/1
300 CAPSULE ORAL EVERY 8 HOURS
Qty: 90 CAPSULE | Refills: 1 | Status: SHIPPED | OUTPATIENT
Start: 2024-04-01

## 2024-04-01 RX ORDER — HYDROCODONE BITARTRATE AND ACETAMINOPHEN 10; 325 MG/1; MG/1
1 TABLET ORAL EVERY 8 HOURS PRN
Qty: 90 TABLET | Refills: 0 | Status: SHIPPED | OUTPATIENT
Start: 2024-04-15 | End: 2024-05-08 | Stop reason: SDUPTHER

## 2024-04-01 RX ORDER — HYDROCODONE BITARTRATE AND ACETAMINOPHEN 10; 325 MG/1; MG/1
1 TABLET ORAL EVERY 8 HOURS PRN
Qty: 90 TABLET | Refills: 0 | Status: SHIPPED | OUTPATIENT
Start: 2024-05-15 | End: 2024-05-01

## 2024-04-30 ENCOUNTER — TELEPHONE (OUTPATIENT)
Dept: PAIN MEDICINE | Facility: CLINIC | Age: 44
End: 2024-04-30
Payer: MEDICAID

## 2024-04-30 NOTE — PROGRESS NOTES
Subjective:         Patient ID: Belem Swann is a 44 y.o. female.    Chief Complaint: Follow-up (Called in for pill count )        Pain  This is a chronic problem. The current episode started more than 1 year ago. The problem occurs daily. The problem has been waxing and waning. Associated symptoms include arthralgias and neck pain. Pertinent negatives include no anorexia, chest pain, chills, coughing, diaphoresis, fever, sore throat, vertigo or vomiting.     Review of Systems   Constitutional:  Negative for activity change, appetite change, chills, diaphoresis, fever and unexpected weight change.   HENT:  Negative for drooling, ear discharge, ear pain, facial swelling, mouth sores, nosebleeds, sore throat, trouble swallowing, voice change and goiter.    Eyes:  Negative for photophobia, pain, discharge, redness and visual disturbance.   Respiratory:  Negative for apnea, cough, choking, chest tightness, shortness of breath, wheezing and stridor.    Cardiovascular:  Negative for chest pain, palpitations and leg swelling.   Gastrointestinal:  Negative for abdominal distention, anorexia, diarrhea, rectal pain, vomiting and fecal incontinence.   Endocrine: Negative for cold intolerance, heat intolerance, polydipsia, polyphagia and polyuria.   Genitourinary:  Negative for bladder incontinence, dysuria, flank pain, frequency and hot flashes.   Musculoskeletal:  Positive for arthralgias, back pain, leg pain, neck pain and neck stiffness.   Integumentary:  Negative for color change and pallor.   Allergic/Immunologic: Negative for immunocompromised state.   Neurological:  Negative for dizziness, vertigo, seizures, syncope, facial asymmetry, speech difficulty, light-headedness, memory loss and coordination difficulties.   Hematological:  Negative for adenopathy. Does not bruise/bleed easily.   Psychiatric/Behavioral:  Negative for agitation, behavioral problems, confusion, decreased concentration, dysphoric mood,  hallucinations, self-injury and suicidal ideas. The patient is not hyperactive.            Past Medical History:   Diagnosis Date    Anxiety     Asthma     Chronic pain     Cushing syndrome     Depression     Essential hypertension     Fibromyalgia     Herpes zoster     Hyperlipidemia     Leukocytosis 2022    Onychomycosis     MARY on CPAP     Rheumatoid arthritis     Type 2 diabetes mellitus 2020    Vitamin D deficiency 2018     Past Surgical History:   Procedure Laterality Date     SECTION      ELBOW SURGERY  1985    EPIDURAL STEROID INJECTION      L4-5 VERITO Dec 2020-[facility]    FEMUR SURGERY Right     due to osteomyelitis    HYSTERECTOMY      Abn Bleeding    INJECTION OF ANESTHETIC AGENT AROUND MEDIAL BRANCH NERVES INNERVATING LUMBAR FACET JOINT Bilateral 2022    Procedure: Bilateral L4-5,5-S1 MBB ( No steroids);  Surgeon: Carmel Torre MD;  Location: [facility];  Service: Pain Management;  Laterality: Bilateral;  PT AWARE TO BE TESTED ON OV    INJECTION OF ANESTHETIC AGENT AROUND MEDIAL BRANCH NERVES INNERVATING LUMBAR FACET JOINT Bilateral 2022    Procedure: Block-nerve-medial branch-lumbar, bilateral L4 through S1;  Surgeon: Carmel Torre MD;  Location: [facility];  Service: Pain Management;  Laterality: Bilateral;    INJECTION OF ANESTHETIC AGENT INTO SACROILIAC JOINT Bilateral 2022    Procedure: BLOCK, SACROILIAC JOINT;  Surgeon: Carmel Torre MD;  Location: [facility];  Service: Pain Management;  Laterality: Bilateral;  covid test put in    LIPOMA RESECTION  2019    RADIOFREQUENCY ABLATION OF LUMBAR MEDIAL BRANCH NERVE AT SINGLE LEVEL Right 2022    Procedure: Radiofrequency Ablation, Nerve, Spinal, Lumbar, Medial Branch, Level L4-S1, right side 1st, will have left-sided after completing;  Surgeon: Carmel Torre MD;  Location: [facility];  Service: Pain Management;  Laterality: Right;  pt aware at visit to be tested  "   RADIOFREQUENCY ABLATION OF LUMBAR MEDIAL BRANCH NERVE AT SINGLE LEVEL Left 05/05/2022    Procedure: LEFT  L4-S1 RFTC  (HAD RIGHT  ON 4-14);  Surgeon: Carmel Torre MD;  Location: [facility];  Service: Pain Management;  Laterality: Left;    RADIOFREQUENCY ABLATION OF LUMBAR MEDIAL BRANCH NERVE AT SINGLE LEVEL Right 7/25/2023    Procedure: Radiofrequency Ablation, Nerve, Spinal, Lumbar, Medial Branch, Level L4-S1;  Surgeon: Carmel Torre MD;  Location: [facility];  Service: Pain Management;  Laterality: Right;    SURGICAL REMOVAL OF PILONIDAL CYST      TRANSFORAMINAL EPIDURAL INJECTION OF STEROID      Bilateral L4-5 TFESI Nov 2020-[facility]    TRANSFORAMINAL EPIDURAL INJECTION OF STEROID      Right L4-5 TFESI Feb 2020-[facility]    VENTRAL HERNIA REPAIR  09/2020     Social History     Socioeconomic History    Marital status:    Tobacco Use    Smoking status: Every Day     Types: Cigarettes     Start date: 9/8/2021    Smokeless tobacco: Never   Substance and Sexual Activity    Alcohol use: Yes     Comment: ocassional    Drug use: Never    Sexual activity: Not Currently     Partners: Male     Birth control/protection: See Surgical Hx     Family History   Problem Relation Name Age of Onset    Cancer Mother      Thyroid cancer Mother      Thyroid cancer Maternal Grandmother      Melanoma Neg Hx      Colon cancer Neg Hx      Breast cancer Neg Hx      Ovarian cancer Neg Hx       Review of patient's allergies indicates:   Allergen Reactions    Latex      Other reaction(s): Unknown    Doxycycline Nausea Only        Objective:  Vitals:    05/01/24 1346   BP: (!) 156/87   Pulse: 79   Resp: 16   Weight: (!) 154.7 kg (341 lb)   Height: 5' 2" (1.575 m)   PainSc:   6             Physical Exam  Vitals and nursing note reviewed. Exam conducted with a chaperone present.   Constitutional:       General: She is awake. She is not in acute distress.     Appearance: Normal appearance. She is obese. She is not " ill-appearing, toxic-appearing or diaphoretic.   HENT:      Head: Normocephalic and atraumatic.      Nose: Nose normal.      Mouth/Throat:      Mouth: Mucous membranes are moist.      Pharynx: Oropharynx is clear.   Eyes:      Conjunctiva/sclera: Conjunctivae normal.      Pupils: Pupils are equal, round, and reactive to light.   Cardiovascular:      Rate and Rhythm: Normal rate.   Pulmonary:      Effort: Pulmonary effort is normal. No respiratory distress.   Abdominal:      Palpations: Abdomen is soft.      Tenderness: There is no guarding.   Musculoskeletal:         General: No swelling.      Cervical back: Normal range of motion and neck supple. Tenderness present. No rigidity.      Thoracic back: Tenderness present.      Lumbar back: Tenderness present. Decreased range of motion.   Skin:     General: Skin is warm and dry.      Coloration: Skin is not jaundiced or pale.   Neurological:      General: No focal deficit present.      Mental Status: She is alert and oriented to person, place, and time. Mental status is at baseline.      Cranial Nerves: No cranial nerve deficit (II-XII).   Psychiatric:         Mood and Affect: Mood normal.         Behavior: Behavior normal. Behavior is cooperative.         Thought Content: Thought content normal.           X-Ray Knee 3 View Bilateral  See Procedure Notes for results.     IMPRESSION: Please see Ortho procedure notes for report.      This procedure was auto-finalized by: Virtual Radiologist         Office Visit on 03/20/2024   Component Date Value Ref Range Status    Sodium 03/20/2024 141  136 - 145 mmol/L Final    Potassium 03/20/2024 3.4 (L)  3.5 - 5.1 mmol/L Final    Chloride 03/20/2024 104  98 - 107 mmol/L Final    CO2 03/20/2024 30  21 - 32 mmol/L Final    Anion Gap 03/20/2024 10  7 - 16 mmol/L Final    Glucose 03/20/2024 150 (H)  74 - 106 mg/dL Final    BUN 03/20/2024 17  7 - 18 mg/dL Final    Creatinine 03/20/2024 0.55  0.55 - 1.02 mg/dL Final    BUN/Creatinine  Ratio 03/20/2024 31 (H)  6 - 20 Final    Calcium 03/20/2024 9.3  8.5 - 10.1 mg/dL Final    Total Protein 03/20/2024 8.2  6.4 - 8.2 g/dL Final    Albumin 03/20/2024 3.5  3.5 - 5.0 g/dL Final    Globulin 03/20/2024 4.7 (H)  2.0 - 4.0 g/dL Final    A/G Ratio 03/20/2024 0.7   Final    Bilirubin, Total 03/20/2024 0.5  >0.0 - 1.2 mg/dL Final    Alk Phos 03/20/2024 139 (H)  37 - 98 U/L Final    ALT 03/20/2024 23  13 - 56 U/L Final    AST 03/20/2024 14 (L)  15 - 37 U/L Final    eGFR 03/20/2024 116  >=60 mL/min/1.73m2 Final    Triglycerides 03/20/2024 126  35 - 150 mg/dL Final    Cholesterol 03/20/2024 176  0 - 200 mg/dL Final    HDL Cholesterol 03/20/2024 38 (L)  40 - 60 mg/dL Final    Cholesterol/HDL Ratio (Risk Factor) 03/20/2024 4.6   Final    Non-HDL 03/20/2024 138  mg/dL Final    LDL Calculated 03/20/2024 113  mg/dL Final    LDL/HDL 03/20/2024 3.0   Final    VLDL 03/20/2024 25  mg/dL Final    Hemoglobin A1C 03/20/2024 6.1  4.5 - 6.6 % Final    Estimated Average Glucose 03/20/2024 128  mg/dL Final    Creatinine, Urine 03/20/2024 281 (H)  28 - 219 mg/dL Final    Microalbumin 03/20/2024 2.8  0.0 - 2.8 mg/dL Final    Microalbumin/Creatinine Ratio 03/20/2024 10.0  0.0 - 30.0 mg/g Final    TSH 03/20/2024 2.140  0.358 - 3.740 uIU/mL Final    HIV 1/2 03/20/2024 Non-Reactive  Non-Reactive Final   Office Visit on 02/01/2024   Component Date Value Ref Range Status    POC Amphetamines 02/01/2024 Negative  Negative, Inconclusive Final    POC Barbiturates 02/01/2024 Negative  Negative, Inconclusive Final    POC Benzodiazepines 02/01/2024 Negative  Negative, Inconclusive Final    POC Cocaine 02/01/2024 Negative  Negative, Inconclusive Final    POC THC 02/01/2024 Negative  Negative, Inconclusive Final    POC Methadone 02/01/2024 Negative  Negative, Inconclusive Final    POC Methamphetamine 02/01/2024 Negative  Negative, Inconclusive Final    POC Opiates 02/01/2024 Presumptive Positive (A)  Negative, Inconclusive Final    POC Oxycodone  02/01/2024 Presumptive Positive (A)  Negative, Inconclusive Final    POC Phencyclidine 02/01/2024 Negative  Negative, Inconclusive Final    POC Methylenedioxymethamphetamine * 02/01/2024 Negative  Negative, Inconclusive Final    POC Tricyclic Antidepressants 02/01/2024 Negative  Negative, Inconclusive Final    POC Buprenorphine 02/01/2024 Negative   Final     Acceptable 02/01/2024 Yes   Final    POC Temperature (Urine) 02/01/2024 92   Final    pH, UA 02/01/2024 5.5  5.0 to 8.0 pH Units Final    Creatinine, Urine 02/01/2024 282 (H)  28 - 219 mg/dL Final    6-Acetylmorphine 02/01/2024 Negative  10 ng/mL Final    7-Aminoclonazepam 02/01/2024 Negative  Negative 25 ng/mL Final    a-Hydroxyalprazolam 02/01/2024 Negative  Negative 25 ng/mL Final    Acetyl Fentanyl 02/01/2024 Negative  Negative 2.5 ng/mL Final    Acetyl Norfentanyl Oxalate 02/01/2024 Negative  5 ng/mL Final    Benzoylecgonine 02/01/2024 Negative  100 ng/mL Final    Buprenorphine 02/01/2024 Negative  25 ng/mL Final    Codeine 02/01/2024 Negative  25 ng/mL Final    EDDP 02/01/2024 Negative  25 ng/mL Final    Fentanyl 02/01/2024 Negative  2.5 ng/mL Final    Hydrocodone 02/01/2024 >250.0 (H)  <25.0 25 ng/mL Final    Hydromorphone 02/01/2024 Negative  25 ng/mL Final    Lorazepam 02/01/2024 Negative  25 ng/mL Final    Morphine 02/01/2024 Negative  25 ng/mL Final    Norbuprenorphine 02/01/2024 Negative  25 ng/mL Final    Nordiazepam 02/01/2024 Negative  25 ng/mL Final    Norfentanyl Oxalate 02/01/2024 Negative  5 ng/mL Final    Norhydrocodone 02/01/2024 >500.0 (H)  <50.0 50 ng/mL Final    Noroxycodone HCL 02/01/2024 Negative  50 ng/mL Final    Oxazepam 02/01/2024 Negative  25 ng/mL Final    Oxymorphone 02/01/2024 Negative  25 ng/mL Final    Tapentadol 02/01/2024 Negative  25 ng/mL Final    Temazepam 02/01/2024 Negative  25 ng/mL Final    THC-COOH 02/01/2024 Negative  25 ng/mL Final    Tramadol 02/01/2024 Negative  100 ng/mL Final    Amphetamine,  Urine 02/01/2024 Negative  Negative Final    Methamphetamines, Urine 02/01/2024 Negative  Negative Final    Methadone, Urine 02/01/2024 Negative  Negative 25 ng/mL Final    Oxycodone, Urine 02/01/2024 Negative  Negative 25 ng/mL Final    Specific Gravity, UA 02/01/2024 1.025  <=1.030 Final   Admission on 11/03/2023, Discharged on 11/03/2023   Component Date Value Ref Range Status    Sodium 11/03/2023 142  136 - 145 mmol/L Final    Potassium 11/03/2023 3.6  3.5 - 5.1 mmol/L Final    Chloride 11/03/2023 109 (H)  98 - 107 mmol/L Final    CO2 11/03/2023 29  21 - 32 mmol/L Final    Anion Gap 11/03/2023 8  7 - 16 mmol/L Final    Glucose 11/03/2023 164 (H)  74 - 106 mg/dL Final    BUN 11/03/2023 17  7 - 18 mg/dL Final    Creatinine 11/03/2023 0.57  0.55 - 1.02 mg/dL Final    BUN/Creatinine Ratio 11/03/2023 30 (H)  6 - 20 Final    Calcium 11/03/2023 8.4 (L)  8.5 - 10.1 mg/dL Final    Total Protein 11/03/2023 7.5  6.4 - 8.2 g/dL Final    Albumin 11/03/2023 3.0 (L)  3.5 - 5.0 g/dL Final    Globulin 11/03/2023 4.5 (H)  2.0 - 4.0 g/dL Final    A/G Ratio 11/03/2023 0.7   Final    Bilirubin, Total 11/03/2023 0.2  >0.0 - 1.2 mg/dL Final    Alk Phos 11/03/2023 119 (H)  37 - 98 U/L Final    ALT 11/03/2023 24  13 - 56 U/L Final    AST 11/03/2023 13 (L)  15 - 37 U/L Final    eGFR 11/03/2023 115  >=60 mL/min/1.73m2 Final    Lipase 11/03/2023 34  16 - 77 U/L Final    Troponin I High Sensitivity 11/03/2023 5.0  <=60.4 pg/mL Final    WBC 11/03/2023 11.42 (H)  4.50 - 11.00 K/uL Final    RBC 11/03/2023 4.67  4.20 - 5.40 M/uL Final    Hemoglobin 11/03/2023 13.2  12.0 - 16.0 g/dL Final    Hematocrit 11/03/2023 40.1  38.0 - 47.0 % Final    MCV 11/03/2023 85.9  80.0 - 96.0 fL Final    MCH 11/03/2023 28.3  27.0 - 31.0 pg Final    MCHC 11/03/2023 32.9  32.0 - 36.0 g/dL Final    RDW 11/03/2023 12.8  11.5 - 14.5 % Final    Platelet Count 11/03/2023 147 (L)  150 - 400 K/uL Final    MPV 11/03/2023 13.6 (H)  9.4 - 12.4 fL Final    Neutrophils %  11/03/2023 69.6 (H)  53.0 - 65.0 % Final    Lymphocytes % 11/03/2023 19.1 (L)  27.0 - 41.0 % Final    Monocytes % 11/03/2023 6.7 (H)  2.0 - 6.0 % Final    Eosinophils % 11/03/2023 3.8  1.0 - 4.0 % Final    Basophils % 11/03/2023 0.4  0.0 - 1.0 % Final    Immature Granulocytes % 11/03/2023 0.4  0.0 - 0.4 % Final    nRBC, Auto 11/03/2023 0.0  <=0.0 % Final    Neutrophils, Abs 11/03/2023 7.95 (H)  1.80 - 7.70 K/uL Final    Lymphocytes, Absolute 11/03/2023 2.18  1.00 - 4.80 K/uL Final    Monocytes, Absolute 11/03/2023 0.77  0.00 - 0.80 K/uL Final    Eosinophils, Absolute 11/03/2023 0.43  0.00 - 0.50 K/uL Final    Basophils, Absolute 11/03/2023 0.05  0.00 - 0.20 K/uL Final    Immature Granulocytes, Absolute 11/03/2023 0.04  0.00 - 0.04 K/uL Final    nRBC, Absolute 11/03/2023 0.00  <=0.00 x10e3/uL Final    Diff Type 11/03/2023 Auto   Final    Platelet Morphology 11/03/2023 Few Large Platelets (A)  Normal Final    RBC Morphology 11/03/2023 Normal   Final    Troponin I High Sensitivity 11/03/2023 4.2  <=60.4 pg/mL Final         Orders Placed This Encounter   Procedures    Drug Screen Definitive 14, Urine     Standing Status:   Future     Number of Occurrences:   1     Standing Expiration Date:   6/30/2025     Order Specific Question:   Specimen Source     Answer:   Urine    POCT Urine Drug Screen Presump     Interpretive Information:     Negative:  No drug detected at the cut off level.   Positive:  This result represents presumptive positive for the   tested drug, other substances may yield a positive response other   than the analyte of interest. This result should be utilized for   diagnostic purpose only. Confirmation testing will be performed upon physician request only.              Requested Prescriptions      No prescriptions requested or ordered in this encounter       Assessment:     1. Rheumatoid arthritis involving multiple sites with positive rheumatoid factor    2. Encounter for long-term (current) use of  other medications    3. Spondylosis of lumbar region without myelopathy or radiculopathy    4. Sacroiliitis    5. Chronic pain of right knee             A's of Opioid Risk Assessment  Activity:Patient can perform ADL.   Analgesia:Patients pain is partially controlled by current medication. Patient has tried OTC medications such as Tylenol and Ibuprofen with out relief.   Adverse Effects: Patient denies constipation or sedation.  Aberrant Behavior:  reviewed with no aberrant drug seeking/taking behavior.  Overdose reversal drug naloxone discussed    Drug screen reviewed    X-ray right knee [facility] September 14, 2022 degenerative changes no fracture noted      Plan:    February 1, 2024 definitive drug screen positive for hydrocodone, nor hydrocodone  No hydromorphone    April 1, 2024 will order presumptive/definitive drug screen today for compliance  Patient could not provide urine drug screen    Will order serum drug screen  Serum drug screen ordered April 1, 2024 returned April 25, 2024  Hydrocodone 16        Will order urine drug screen with definitive May 1, 2024    If drug screen inconsistent discontinue narcotics    Patient states it last visit she was overtaking her medication due to increasing pain      May 1, 2024 she verbalized understanding narcotics agreement compliance with medications     Any further inconsistent drug screens will discontinue narcotics permanent        Narcan December 2022    Follows rheumatology [facility] rheumatoid arthritis    Follows orthopedics [place] chronic right knee pain    She had bilateral lumbar RF TC  July 25, 2023  She states she has 75% relief after procedure, the procedure did help improve her level function         She had Toradol injections 2 weeks ago advised patient's limit NSAIDs due to complications hypertension, renal issues    Otherwise she would like to continue conservative management this time    Will/shall Continue home exercise program as  directed    Continue current medication    Follow-up 2 weeks pill count/drug screen    Dr. Torre July 2024    Bring original prescription medication bottles/container/box with labels to each visit

## 2024-04-30 NOTE — TELEPHONE ENCOUNTER
SARAH SIDHU   04/30/2024   1:09 PM   Called in patient for pill count, stated car in broken down, explained to patient she has 24 hours to come in , failed last drug screen or medications will be canceled.  Voiced understanding.

## 2024-05-01 ENCOUNTER — OFFICE VISIT (OUTPATIENT)
Dept: PAIN MEDICINE | Facility: CLINIC | Age: 44
End: 2024-05-01
Payer: MEDICAID

## 2024-05-01 VITALS
SYSTOLIC BLOOD PRESSURE: 156 MMHG | BODY MASS INDEX: 53.92 KG/M2 | WEIGHT: 293 LBS | RESPIRATION RATE: 16 BRPM | DIASTOLIC BLOOD PRESSURE: 87 MMHG | HEART RATE: 79 BPM | HEIGHT: 62 IN

## 2024-05-01 DIAGNOSIS — M05.79 RHEUMATOID ARTHRITIS INVOLVING MULTIPLE SITES WITH POSITIVE RHEUMATOID FACTOR: Primary | Chronic | ICD-10-CM

## 2024-05-01 DIAGNOSIS — G89.29 CHRONIC PAIN OF RIGHT KNEE: Chronic | ICD-10-CM

## 2024-05-01 DIAGNOSIS — M47.816 SPONDYLOSIS OF LUMBAR REGION WITHOUT MYELOPATHY OR RADICULOPATHY: Chronic | ICD-10-CM

## 2024-05-01 DIAGNOSIS — Z79.899 ENCOUNTER FOR LONG-TERM (CURRENT) USE OF OTHER MEDICATIONS: ICD-10-CM

## 2024-05-01 DIAGNOSIS — M25.561 CHRONIC PAIN OF RIGHT KNEE: Chronic | ICD-10-CM

## 2024-05-01 DIAGNOSIS — M46.1 SACROILIITIS: Chronic | ICD-10-CM

## 2024-05-01 PROCEDURE — 4010F ACE/ARB THERAPY RXD/TAKEN: CPT | Mod: CPTII,,, | Performed by: PHYSICIAN ASSISTANT

## 2024-05-01 PROCEDURE — 99999PBSHW POCT URINE DRUG SCREEN PRESUMP: Mod: PBBFAC,,,

## 2024-05-01 PROCEDURE — 3066F NEPHROPATHY DOC TX: CPT | Mod: CPTII,,, | Performed by: PHYSICIAN ASSISTANT

## 2024-05-01 PROCEDURE — G0481 DRUG TEST DEF 8-14 CLASSES: HCPCS | Mod: ,,, | Performed by: CLINICAL MEDICAL LABORATORY

## 2024-05-01 PROCEDURE — 1159F MED LIST DOCD IN RCRD: CPT | Mod: CPTII,,, | Performed by: PHYSICIAN ASSISTANT

## 2024-05-01 PROCEDURE — 99214 OFFICE O/P EST MOD 30 MIN: CPT | Mod: S$PBB,,, | Performed by: PHYSICIAN ASSISTANT

## 2024-05-01 PROCEDURE — 80305 DRUG TEST PRSMV DIR OPT OBS: CPT | Mod: PBBFAC | Performed by: PHYSICIAN ASSISTANT

## 2024-05-01 PROCEDURE — 3077F SYST BP >= 140 MM HG: CPT | Mod: CPTII,,, | Performed by: PHYSICIAN ASSISTANT

## 2024-05-01 PROCEDURE — 3008F BODY MASS INDEX DOCD: CPT | Mod: CPTII,,, | Performed by: PHYSICIAN ASSISTANT

## 2024-05-01 PROCEDURE — 3044F HG A1C LEVEL LT 7.0%: CPT | Mod: CPTII,,, | Performed by: PHYSICIAN ASSISTANT

## 2024-05-01 PROCEDURE — 99213 OFFICE O/P EST LOW 20 MIN: CPT | Mod: PBBFAC | Performed by: PHYSICIAN ASSISTANT

## 2024-05-01 PROCEDURE — 3061F NEG MICROALBUMINURIA REV: CPT | Mod: CPTII,,, | Performed by: PHYSICIAN ASSISTANT

## 2024-05-01 PROCEDURE — 3079F DIAST BP 80-89 MM HG: CPT | Mod: CPTII,,, | Performed by: PHYSICIAN ASSISTANT

## 2024-05-06 LAB
6-ACETYLMORPHINE, URINE (RUSH): NEGATIVE 10 NG/ML
7-AMINOCLONAZEPAM, URINE (RUSH): NEGATIVE 25 NG/ML
A-HYDROXYALPRAZOLAM, URINE (RUSH): NEGATIVE 25 NG/ML
AMPHET UR QL SCN: NEGATIVE
BENZOYLECGONINE, URINE (RUSH): NEGATIVE 100 NG/ML
BUPRENORPHINE UR QL SCN: NEGATIVE 25 NG/ML
CODEINE, URINE (RUSH): NEGATIVE 25 NG/ML
CREAT UR-MCNC: 166 MG/DL (ref 28–219)
EDDP, URINE (RUSH): NEGATIVE 25 NG/ML
FENTANYL, URINE (RUSH): NEGATIVE 2.5 NG/ML
HYDROCODONE, URINE (RUSH): >250 25 NG/ML
HYDROMORPHONE, URINE (RUSH): NEGATIVE 25 NG/ML
METHADONE UR QL SCN: NEGATIVE 25 NG/ML
METHAMPHET UR QL SCN: NEGATIVE
MORPHINE, URINE (RUSH): NEGATIVE 25 NG/ML
NORBUPRENORPHINE, URINE (RUSH): NEGATIVE 25 NG/ML
NORDIAZEPAM, URINE (RUSH): NEGATIVE 25 NG/ML
NORFENTANYL OXALATE, URINE (RUSH): NEGATIVE 5 NG/ML
NORHYDROCODONE, URINE (RUSH): >500 50 NG/ML
NOROXYCODONE HCL, URINE (RUSH): NEGATIVE 50 NG/ML
OXYCODONE UR QL SCN: NEGATIVE 25 NG/ML
OXYMORPHONE, URINE (RUSH): NEGATIVE 25 NG/ML
PH UR STRIP: 6 PH UNITS
SP GR UR STRIP: 1.02
TAPENTADOL, URINE (RUSH): NEGATIVE 25 NG/ML
TEMAZEPAM, URINE (RUSH): NEGATIVE 25 NG/ML
THC-COOH, URINE (RUSH): NEGATIVE 25 NG/ML
TRAMADOL, URINE (RUSH): NEGATIVE 100 NG/ML

## 2024-05-08 NOTE — PROGRESS NOTES
Subjective:         Patient ID: Belem Swann is a 44 y.o. female.    Chief Complaint: Low-back Pain, Headache, and Knee Pain        Pain  This is a chronic problem. The current episode started more than 1 year ago. The problem occurs daily. The problem has been unchanged. Associated symptoms include arthralgias and neck pain. Pertinent negatives include no anorexia, chest pain, chills, coughing, diaphoresis, fever, sore throat, vertigo or vomiting.     Review of Systems   Constitutional:  Negative for activity change, appetite change, chills, diaphoresis, fever and unexpected weight change.   HENT:  Negative for drooling, ear discharge, ear pain, facial swelling, mouth sores, nosebleeds, sore throat, trouble swallowing, voice change and goiter.    Eyes:  Negative for photophobia, pain, discharge, redness and visual disturbance.   Respiratory:  Negative for apnea, cough, choking, chest tightness, shortness of breath, wheezing and stridor.    Cardiovascular:  Negative for chest pain, palpitations and leg swelling.   Gastrointestinal:  Negative for abdominal distention, anorexia, diarrhea, rectal pain, vomiting and fecal incontinence.   Endocrine: Negative for cold intolerance, heat intolerance, polydipsia, polyphagia and polyuria.   Genitourinary:  Negative for bladder incontinence, dysuria, flank pain, frequency and hot flashes.   Musculoskeletal:  Positive for arthralgias, back pain, leg pain, neck pain and neck stiffness.   Integumentary:  Negative for color change and pallor.   Allergic/Immunologic: Negative for immunocompromised state.   Neurological:  Negative for dizziness, vertigo, seizures, syncope, facial asymmetry, speech difficulty, light-headedness, memory loss and coordination difficulties.   Hematological:  Negative for adenopathy. Does not bruise/bleed easily.   Psychiatric/Behavioral:  Negative for agitation, behavioral problems, confusion, decreased concentration, dysphoric mood,  hallucinations, self-injury and suicidal ideas. The patient is not hyperactive.            Past Medical History:   Diagnosis Date    Anxiety     Asthma     Chronic pain     Cushing syndrome     Depression     Essential hypertension     Fibromyalgia     Herpes zoster     Hyperlipidemia     Leukocytosis 2022    Onychomycosis     MARY on CPAP     Rheumatoid arthritis     Type 2 diabetes mellitus 2020    Vitamin D deficiency 2018     Past Surgical History:   Procedure Laterality Date     SECTION      ELBOW SURGERY  1985    EPIDURAL STEROID INJECTION      L4-5 VERITO Dec 2020-[facility]    FEMUR SURGERY Right     due to osteomyelitis    HYSTERECTOMY      Abn Bleeding    INJECTION OF ANESTHETIC AGENT AROUND MEDIAL BRANCH NERVES INNERVATING LUMBAR FACET JOINT Bilateral 2022    Procedure: Bilateral L4-5,5-S1 MBB ( No steroids);  Surgeon: Carmel Torre MD;  Location: [facility];  Service: Pain Management;  Laterality: Bilateral;  PT AWARE TO BE TESTED ON OV    INJECTION OF ANESTHETIC AGENT AROUND MEDIAL BRANCH NERVES INNERVATING LUMBAR FACET JOINT Bilateral 2022    Procedure: Block-nerve-medial branch-lumbar, bilateral L4 through S1;  Surgeon: Carmel Torre MD;  Location: [facility];  Service: Pain Management;  Laterality: Bilateral;    INJECTION OF ANESTHETIC AGENT INTO SACROILIAC JOINT Bilateral 2022    Procedure: BLOCK, SACROILIAC JOINT;  Surgeon: Carmel Torre MD;  Location: [facility];  Service: Pain Management;  Laterality: Bilateral;  covid test put in    LIPOMA RESECTION  2019    RADIOFREQUENCY ABLATION OF LUMBAR MEDIAL BRANCH NERVE AT SINGLE LEVEL Right 2022    Procedure: Radiofrequency Ablation, Nerve, Spinal, Lumbar, Medial Branch, Level L4-S1, right side 1st, will have left-sided after completing;  Surgeon: Carmel Torre MD;  Location: [facility];  Service: Pain Management;  Laterality: Right;  pt aware at visit to be tested  "   RADIOFREQUENCY ABLATION OF LUMBAR MEDIAL BRANCH NERVE AT SINGLE LEVEL Left 05/05/2022    Procedure: LEFT  L4-S1 RFTC  (HAD RIGHT  ON 4-14);  Surgeon: Carmel Torre MD;  Location: [facility];  Service: Pain Management;  Laterality: Left;    RADIOFREQUENCY ABLATION OF LUMBAR MEDIAL BRANCH NERVE AT SINGLE LEVEL Right 7/25/2023    Procedure: Radiofrequency Ablation, Nerve, Spinal, Lumbar, Medial Branch, Level L4-S1;  Surgeon: Carmel Torre MD;  Location: [facility];  Service: Pain Management;  Laterality: Right;    SURGICAL REMOVAL OF PILONIDAL CYST      TRANSFORAMINAL EPIDURAL INJECTION OF STEROID      Bilateral L4-5 TFESI Nov 2020-[facility]    TRANSFORAMINAL EPIDURAL INJECTION OF STEROID      Right L4-5 TFESI Feb 2020-[facility]    VENTRAL HERNIA REPAIR  09/2020     Social History     Socioeconomic History    Marital status:    Tobacco Use    Smoking status: Every Day     Types: Cigarettes     Start date: 9/8/2021    Smokeless tobacco: Never   Substance and Sexual Activity    Alcohol use: Yes     Comment: ocassional    Drug use: Never    Sexual activity: Not Currently     Partners: Male     Birth control/protection: See Surgical Hx     Family History   Problem Relation Name Age of Onset    Cancer Mother      Thyroid cancer Mother      Thyroid cancer Maternal Grandmother      Melanoma Neg Hx      Colon cancer Neg Hx      Breast cancer Neg Hx      Ovarian cancer Neg Hx       Review of patient's allergies indicates:   Allergen Reactions    Latex      Other reaction(s): Unknown    Doxycycline Nausea Only        Objective:  Vitals:    05/15/24 1318   Resp: 18   Weight: (!) 156.5 kg (345 lb)   Height: 5' 3" (1.6 m)   PainSc:   6               Physical Exam  Vitals and nursing note reviewed. Exam conducted with a chaperone present.   Constitutional:       General: She is awake. She is not in acute distress.     Appearance: Normal appearance. She is obese. She is not ill-appearing, toxic-appearing or " diaphoretic.   HENT:      Head: Normocephalic and atraumatic.      Nose: Nose normal.      Mouth/Throat:      Mouth: Mucous membranes are moist.      Pharynx: Oropharynx is clear.   Eyes:      Conjunctiva/sclera: Conjunctivae normal.      Pupils: Pupils are equal, round, and reactive to light.   Cardiovascular:      Rate and Rhythm: Normal rate.   Pulmonary:      Effort: Pulmonary effort is normal. No respiratory distress.   Abdominal:      Palpations: Abdomen is soft.      Tenderness: There is no guarding.   Musculoskeletal:         General: No swelling.      Cervical back: Normal range of motion and neck supple. Tenderness present. No rigidity.      Thoracic back: Tenderness present.      Lumbar back: Tenderness present. Decreased range of motion.   Skin:     General: Skin is warm and dry.      Coloration: Skin is not jaundiced or pale.   Neurological:      General: No focal deficit present.      Mental Status: She is alert and oriented to person, place, and time. Mental status is at baseline.      Cranial Nerves: No cranial nerve deficit (II-XII).   Psychiatric:         Mood and Affect: Mood normal.         Behavior: Behavior normal. Behavior is cooperative.         Thought Content: Thought content normal.           X-Ray Knee 3 View Bilateral  See Procedure Notes for results.     IMPRESSION: Please see Ortho procedure notes for report.      This procedure was auto-finalized by: Virtual Radiologist         Office Visit on 05/01/2024   Component Date Value Ref Range Status    POC Amphetamines 05/01/2024 Negative  Negative, Inconclusive Final    POC Barbiturates 05/01/2024 Negative  Negative, Inconclusive Final    POC Benzodiazepines 05/01/2024 Negative  Negative, Inconclusive Final    POC Cocaine 05/01/2024 Negative  Negative, Inconclusive Final    POC THC 05/01/2024 Negative  Negative, Inconclusive Final    POC Methadone 05/01/2024 Negative  Negative, Inconclusive Final    POC Methamphetamine 05/01/2024 Negative   Negative, Inconclusive Final    POC Opiates 05/01/2024 Presumptive Positive (A)  Negative, Inconclusive Final    POC Oxycodone 05/01/2024 Negative  Negative, Inconclusive Final    POC Phencyclidine 05/01/2024 Negative  Negative, Inconclusive Final    POC Methylenedioxymethamphetamine * 05/01/2024 Negative  Negative, Inconclusive Final    POC Tricyclic Antidepressants 05/01/2024 Negative  Negative, Inconclusive Final    POC Buprenorphine 05/01/2024 Negative   Final     Acceptable 05/01/2024 Yes   Final    POC Temperature (Urine) 05/01/2024 90   Final    pH, UA 05/01/2024 6.0  5.0 to 8.0 pH Units Final    Creatinine, Urine 05/01/2024 166  28 - 219 mg/dL Final    6-Acetylmorphine 05/01/2024 Negative  10 ng/mL Final    7-Aminoclonazepam 05/01/2024 Negative  Negative 25 ng/mL Final    a-Hydroxyalprazolam 05/01/2024 Negative  Negative 25 ng/mL Final    Benzoylecgonine 05/01/2024 Negative  100 ng/mL Final    Buprenorphine 05/01/2024 Negative  25 ng/mL Final    Codeine 05/01/2024 Negative  25 ng/mL Final    EDDP 05/01/2024 Negative  25 ng/mL Final    Fentanyl 05/01/2024 Negative  2.5 ng/mL Final    Hydrocodone 05/01/2024 >250.0 (H)  <25.0 25 ng/mL Final    Hydromorphone 05/01/2024 Negative  25 ng/mL Final    Morphine 05/01/2024 Negative  25 ng/mL Final    Norbuprenorphine 05/01/2024 Negative  25 ng/mL Final    Nordiazepam 05/01/2024 Negative  25 ng/mL Final    Norfentanyl Oxalate 05/01/2024 Negative  5 ng/mL Final    Norhydrocodone 05/01/2024 >500.0 (H)  <50.0 50 ng/mL Final    Noroxycodone HCL 05/01/2024 Negative  50 ng/mL Final    Oxymorphone 05/01/2024 Negative  25 ng/mL Final    Tapentadol 05/01/2024 Negative  25 ng/mL Final    Temazepam 05/01/2024 Negative  25 ng/mL Final    THC-COOH 05/01/2024 Negative  25 ng/mL Final    Tramadol 05/01/2024 Negative  100 ng/mL Final    Amphetamine, Urine 05/01/2024 Negative  Negative Final    Methamphetamines, Urine 05/01/2024 Negative  Negative Final    Methadone,  Urine 05/01/2024 Negative  Negative 25 ng/mL Final    Oxycodone, Urine 05/01/2024 Negative  Negative 25 ng/mL Final    Specific Gravity, UA 05/01/2024 1.018  <=1.030 Final   Office Visit on 03/20/2024   Component Date Value Ref Range Status    Sodium 03/20/2024 141  136 - 145 mmol/L Final    Potassium 03/20/2024 3.4 (L)  3.5 - 5.1 mmol/L Final    Chloride 03/20/2024 104  98 - 107 mmol/L Final    CO2 03/20/2024 30  21 - 32 mmol/L Final    Anion Gap 03/20/2024 10  7 - 16 mmol/L Final    Glucose 03/20/2024 150 (H)  74 - 106 mg/dL Final    BUN 03/20/2024 17  7 - 18 mg/dL Final    Creatinine 03/20/2024 0.55  0.55 - 1.02 mg/dL Final    BUN/Creatinine Ratio 03/20/2024 31 (H)  6 - 20 Final    Calcium 03/20/2024 9.3  8.5 - 10.1 mg/dL Final    Total Protein 03/20/2024 8.2  6.4 - 8.2 g/dL Final    Albumin 03/20/2024 3.5  3.5 - 5.0 g/dL Final    Globulin 03/20/2024 4.7 (H)  2.0 - 4.0 g/dL Final    A/G Ratio 03/20/2024 0.7   Final    Bilirubin, Total 03/20/2024 0.5  >0.0 - 1.2 mg/dL Final    Alk Phos 03/20/2024 139 (H)  37 - 98 U/L Final    ALT 03/20/2024 23  13 - 56 U/L Final    AST 03/20/2024 14 (L)  15 - 37 U/L Final    eGFR 03/20/2024 116  >=60 mL/min/1.73m2 Final    Triglycerides 03/20/2024 126  35 - 150 mg/dL Final    Cholesterol 03/20/2024 176  0 - 200 mg/dL Final    HDL Cholesterol 03/20/2024 38 (L)  40 - 60 mg/dL Final    Cholesterol/HDL Ratio (Risk Factor) 03/20/2024 4.6   Final    Non-HDL 03/20/2024 138  mg/dL Final    LDL Calculated 03/20/2024 113  mg/dL Final    LDL/HDL 03/20/2024 3.0   Final    VLDL 03/20/2024 25  mg/dL Final    Hemoglobin A1C 03/20/2024 6.1  4.5 - 6.6 % Final    Estimated Average Glucose 03/20/2024 128  mg/dL Final    Creatinine, Urine 03/20/2024 281 (H)  28 - 219 mg/dL Final    Microalbumin 03/20/2024 2.8  0.0 - 2.8 mg/dL Final    Microalbumin/Creatinine Ratio 03/20/2024 10.0  0.0 - 30.0 mg/g Final    TSH 03/20/2024 2.140  0.358 - 3.740 uIU/mL Final    HIV 1/2 03/20/2024 Non-Reactive   Non-Reactive Final   Office Visit on 02/01/2024   Component Date Value Ref Range Status    POC Amphetamines 02/01/2024 Negative  Negative, Inconclusive Final    POC Barbiturates 02/01/2024 Negative  Negative, Inconclusive Final    POC Benzodiazepines 02/01/2024 Negative  Negative, Inconclusive Final    POC Cocaine 02/01/2024 Negative  Negative, Inconclusive Final    POC THC 02/01/2024 Negative  Negative, Inconclusive Final    POC Methadone 02/01/2024 Negative  Negative, Inconclusive Final    POC Methamphetamine 02/01/2024 Negative  Negative, Inconclusive Final    POC Opiates 02/01/2024 Presumptive Positive (A)  Negative, Inconclusive Final    POC Oxycodone 02/01/2024 Presumptive Positive (A)  Negative, Inconclusive Final    POC Phencyclidine 02/01/2024 Negative  Negative, Inconclusive Final    POC Methylenedioxymethamphetamine * 02/01/2024 Negative  Negative, Inconclusive Final    POC Tricyclic Antidepressants 02/01/2024 Negative  Negative, Inconclusive Final    POC Buprenorphine 02/01/2024 Negative   Final     Acceptable 02/01/2024 Yes   Final    POC Temperature (Urine) 02/01/2024 92   Final    pH, UA 02/01/2024 5.5  5.0 to 8.0 pH Units Final    Creatinine, Urine 02/01/2024 282 (H)  28 - 219 mg/dL Final    6-Acetylmorphine 02/01/2024 Negative  10 ng/mL Final    7-Aminoclonazepam 02/01/2024 Negative  Negative 25 ng/mL Final    a-Hydroxyalprazolam 02/01/2024 Negative  Negative 25 ng/mL Final    Acetyl Fentanyl 02/01/2024 Negative  Negative 2.5 ng/mL Final    Acetyl Norfentanyl Oxalate 02/01/2024 Negative  5 ng/mL Final    Benzoylecgonine 02/01/2024 Negative  100 ng/mL Final    Buprenorphine 02/01/2024 Negative  25 ng/mL Final    Codeine 02/01/2024 Negative  25 ng/mL Final    EDDP 02/01/2024 Negative  25 ng/mL Final    Fentanyl 02/01/2024 Negative  2.5 ng/mL Final    Hydrocodone 02/01/2024 >250.0 (H)  <25.0 25 ng/mL Final    Hydromorphone 02/01/2024 Negative  25 ng/mL Final    Lorazepam 02/01/2024  Negative  25 ng/mL Final    Morphine 02/01/2024 Negative  25 ng/mL Final    Norbuprenorphine 02/01/2024 Negative  25 ng/mL Final    Nordiazepam 02/01/2024 Negative  25 ng/mL Final    Norfentanyl Oxalate 02/01/2024 Negative  5 ng/mL Final    Norhydrocodone 02/01/2024 >500.0 (H)  <50.0 50 ng/mL Final    Noroxycodone HCL 02/01/2024 Negative  50 ng/mL Final    Oxazepam 02/01/2024 Negative  25 ng/mL Final    Oxymorphone 02/01/2024 Negative  25 ng/mL Final    Tapentadol 02/01/2024 Negative  25 ng/mL Final    Temazepam 02/01/2024 Negative  25 ng/mL Final    THC-COOH 02/01/2024 Negative  25 ng/mL Final    Tramadol 02/01/2024 Negative  100 ng/mL Final    Amphetamine, Urine 02/01/2024 Negative  Negative Final    Methamphetamines, Urine 02/01/2024 Negative  Negative Final    Methadone, Urine 02/01/2024 Negative  Negative 25 ng/mL Final    Oxycodone, Urine 02/01/2024 Negative  Negative 25 ng/mL Final    Specific Gravity, UA 02/01/2024 1.025  <=1.030 Final         Orders Placed This Encounter   Procedures    POCT Urine Drug Screen Presump     Interpretive Information:     Negative:  No drug detected at the cut off level.   Positive:  This result represents presumptive positive for the   tested drug, other substances may yield a positive response other   than the analyte of interest. This result should be utilized for   diagnostic purpose only. Confirmation testing will be performed upon physician request only.              Requested Prescriptions     Signed Prescriptions Disp Refills    HYDROcodone-acetaminophen (NORCO)  mg per tablet 90 tablet 0     Sig: Take 1 tablet by mouth every 8 (eight) hours as needed for Pain.       Assessment:     1. Rheumatoid arthritis involving multiple sites with positive rheumatoid factor    2. Spondylosis of lumbar region without myelopathy or radiculopathy    3. Sacroiliitis    4. Chronic pain of right knee    5. Encounter for long-term (current) use of other medications               A's  of Opioid Risk Assessment  Activity:Patient can perform ADL.   Analgesia:Patients pain is partially controlled by current medication. Patient has tried OTC medications such as Tylenol and Ibuprofen with out relief.   Adverse Effects: Patient denies constipation or sedation.  Aberrant Behavior:  reviewed with no aberrant drug seeking/taking behavior.  Overdose reversal drug naloxone discussed    Drug screen reviewed    X-ray right knee [facility] September 14, 2022 degenerative changes no fracture noted      Plan:    May 15, 2024 presumptive drug screen consistent with opiates      Serum drug screen ordered April 1, 2024 returned April 25, 2024  Hydrocodone 16    February 1, 2024 definitive drug screen positive for hydrocodone, nor hydrocodone  No hydromorphone      May 1, 2024 she verbalized understanding narcotics agreement compliance with medications     Any further inconsistent drug screens will discontinue narcotics permanent        Narcan December 2022    Follows rheumatology [facility] rheumatoid arthritis    Follows orthopedics [facility] chronic right knee pain    She had bilateral lumbar RF TC  July 25, 2023  She states she has 75% relief after procedure, the procedure did help improve her level function     May 15, 2024 she states she is taking her medication as directed    She states it is helping her discomfort     Requesting Toradol injection     Toradol 30 mg IM, tolerated well    Will continue home exercise program as directed    Continue current medication    Follow-up 3 weeks pill count/drug screen    Dr. Torre July 2024    Bring original prescription medication bottles/container/box with labels to each visit

## 2024-05-15 ENCOUNTER — OFFICE VISIT (OUTPATIENT)
Dept: PAIN MEDICINE | Facility: CLINIC | Age: 44
End: 2024-05-15
Payer: MEDICAID

## 2024-05-15 VITALS — BODY MASS INDEX: 51.91 KG/M2 | WEIGHT: 293 LBS | RESPIRATION RATE: 18 BRPM | HEIGHT: 63 IN

## 2024-05-15 DIAGNOSIS — M05.79 RHEUMATOID ARTHRITIS INVOLVING MULTIPLE SITES WITH POSITIVE RHEUMATOID FACTOR: Primary | Chronic | ICD-10-CM

## 2024-05-15 DIAGNOSIS — M46.1 SACROILIITIS: Chronic | ICD-10-CM

## 2024-05-15 DIAGNOSIS — M47.816 SPONDYLOSIS OF LUMBAR REGION WITHOUT MYELOPATHY OR RADICULOPATHY: Chronic | ICD-10-CM

## 2024-05-15 DIAGNOSIS — G89.29 CHRONIC PAIN OF RIGHT KNEE: Chronic | ICD-10-CM

## 2024-05-15 DIAGNOSIS — M25.561 CHRONIC PAIN OF RIGHT KNEE: Chronic | ICD-10-CM

## 2024-05-15 DIAGNOSIS — Z79.899 ENCOUNTER FOR LONG-TERM (CURRENT) USE OF OTHER MEDICATIONS: ICD-10-CM

## 2024-05-15 PROCEDURE — 99213 OFFICE O/P EST LOW 20 MIN: CPT | Mod: PBBFAC,25 | Performed by: PHYSICIAN ASSISTANT

## 2024-05-15 PROCEDURE — 80305 DRUG TEST PRSMV DIR OPT OBS: CPT | Mod: PBBFAC | Performed by: PHYSICIAN ASSISTANT

## 2024-05-15 PROCEDURE — 96372 THER/PROPH/DIAG INJ SC/IM: CPT | Mod: PBBFAC | Performed by: PHYSICIAN ASSISTANT

## 2024-05-15 PROCEDURE — 99999PBSHW PR PBB SHADOW TECHNICAL ONLY FILED TO HB: Mod: PBBFAC,,,

## 2024-05-15 PROCEDURE — 99999PBSHW POCT URINE DRUG SCREEN PRESUMP: Mod: PBBFAC,,,

## 2024-05-15 PROCEDURE — 4010F ACE/ARB THERAPY RXD/TAKEN: CPT | Mod: CPTII,,, | Performed by: PHYSICIAN ASSISTANT

## 2024-05-15 PROCEDURE — 3008F BODY MASS INDEX DOCD: CPT | Mod: CPTII,,, | Performed by: PHYSICIAN ASSISTANT

## 2024-05-15 PROCEDURE — 3044F HG A1C LEVEL LT 7.0%: CPT | Mod: CPTII,,, | Performed by: PHYSICIAN ASSISTANT

## 2024-05-15 PROCEDURE — 99214 OFFICE O/P EST MOD 30 MIN: CPT | Mod: S$PBB,25,, | Performed by: PHYSICIAN ASSISTANT

## 2024-05-15 PROCEDURE — 1159F MED LIST DOCD IN RCRD: CPT | Mod: CPTII,,, | Performed by: PHYSICIAN ASSISTANT

## 2024-05-15 PROCEDURE — 3061F NEG MICROALBUMINURIA REV: CPT | Mod: CPTII,,, | Performed by: PHYSICIAN ASSISTANT

## 2024-05-15 PROCEDURE — 3066F NEPHROPATHY DOC TX: CPT | Mod: CPTII,,, | Performed by: PHYSICIAN ASSISTANT

## 2024-05-15 RX ORDER — HYDROCODONE BITARTRATE AND ACETAMINOPHEN 10; 325 MG/1; MG/1
1 TABLET ORAL EVERY 8 HOURS PRN
Qty: 90 TABLET | Refills: 0 | Status: SHIPPED | OUTPATIENT
Start: 2024-05-15 | End: 2024-06-10 | Stop reason: SDUPTHER

## 2024-05-15 RX ORDER — KETOROLAC TROMETHAMINE 30 MG/ML
30 INJECTION, SOLUTION INTRAMUSCULAR; INTRAVENOUS
Status: COMPLETED | OUTPATIENT
Start: 2024-05-15 | End: 2024-05-15

## 2024-05-15 RX ADMIN — KETOROLAC TROMETHAMINE 30 MG: 30 INJECTION, SOLUTION INTRAMUSCULAR at 01:05

## 2024-05-24 ENCOUNTER — OFFICE VISIT (OUTPATIENT)
Dept: NEUROLOGY | Facility: CLINIC | Age: 44
End: 2024-05-24
Payer: MEDICAID

## 2024-05-24 VITALS
OXYGEN SATURATION: 96 % | HEART RATE: 93 BPM | BODY MASS INDEX: 51.91 KG/M2 | SYSTOLIC BLOOD PRESSURE: 165 MMHG | RESPIRATION RATE: 18 BRPM | DIASTOLIC BLOOD PRESSURE: 108 MMHG | WEIGHT: 293 LBS | HEIGHT: 63 IN

## 2024-05-24 DIAGNOSIS — G89.29 CHRONIC HEADACHES: Primary | ICD-10-CM

## 2024-05-24 DIAGNOSIS — R51.9 CHRONIC HEADACHES: Primary | ICD-10-CM

## 2024-05-24 PROCEDURE — 1159F MED LIST DOCD IN RCRD: CPT | Mod: CPTII,,, | Performed by: PSYCHIATRY & NEUROLOGY

## 2024-05-24 PROCEDURE — 99214 OFFICE O/P EST MOD 30 MIN: CPT | Mod: PBBFAC | Performed by: PSYCHIATRY & NEUROLOGY

## 2024-05-24 PROCEDURE — 3008F BODY MASS INDEX DOCD: CPT | Mod: CPTII,,, | Performed by: PSYCHIATRY & NEUROLOGY

## 2024-05-24 PROCEDURE — 99204 OFFICE O/P NEW MOD 45 MIN: CPT | Mod: S$PBB,,, | Performed by: PSYCHIATRY & NEUROLOGY

## 2024-05-24 PROCEDURE — 3044F HG A1C LEVEL LT 7.0%: CPT | Mod: CPTII,,, | Performed by: PSYCHIATRY & NEUROLOGY

## 2024-05-24 PROCEDURE — 3066F NEPHROPATHY DOC TX: CPT | Mod: CPTII,,, | Performed by: PSYCHIATRY & NEUROLOGY

## 2024-05-24 PROCEDURE — 1160F RVW MEDS BY RX/DR IN RCRD: CPT | Mod: CPTII,,, | Performed by: PSYCHIATRY & NEUROLOGY

## 2024-05-24 PROCEDURE — 3061F NEG MICROALBUMINURIA REV: CPT | Mod: CPTII,,, | Performed by: PSYCHIATRY & NEUROLOGY

## 2024-05-24 PROCEDURE — 3080F DIAST BP >= 90 MM HG: CPT | Mod: CPTII,,, | Performed by: PSYCHIATRY & NEUROLOGY

## 2024-05-24 PROCEDURE — 4010F ACE/ARB THERAPY RXD/TAKEN: CPT | Mod: CPTII,,, | Performed by: PSYCHIATRY & NEUROLOGY

## 2024-05-24 PROCEDURE — 3077F SYST BP >= 140 MM HG: CPT | Mod: CPTII,,, | Performed by: PSYCHIATRY & NEUROLOGY

## 2024-05-24 RX ORDER — AMITRIPTYLINE HYDROCHLORIDE 25 MG/1
25 TABLET, FILM COATED ORAL NIGHTLY
Qty: 90 TABLET | Refills: 3 | Status: SHIPPED | OUTPATIENT
Start: 2024-05-24 | End: 2025-05-24

## 2024-05-24 NOTE — PROGRESS NOTES
Subjective:       Patient ID: Belem Swann is a 44 y.o. female     Chief Complaint:  No chief complaint on file.       Allergies:  Latex and Doxycycline    Current Medications:    Outpatient Encounter Medications as of 5/24/2024   Medication Sig Dispense Refill    albuterol sulfate 90 mcg/actuation aebs Inhale 180 mcg into the lungs every 4 (four) hours.      albuterol-ipratropium (DUO-NEB) 2.5 mg-0.5 mg/3 mL nebulizer solution Take 3 mLs by nebulization every 6 (six) hours as needed. Rescue      budesonide-formoterol 160-4.5 mcg (SYMBICORT) 160-4.5 mcg/actuation HFAA Inhale 2 puffs into the lungs every 12 (twelve) hours. Controller 10.2 g 11    carvediloL (COREG) 25 MG tablet Take 25 mg by mouth 2 (two) times daily with meals.      cloNIDine (CATAPRES) 0.1 MG tablet Take 0.1 mg by mouth daily as needed.      cyclobenzaprine (FLEXERIL) 10 MG tablet Take 1 tablet (10 mg total) by mouth 3 (three) times daily as needed for Muscle spasms. 90 tablet 2    furosemide (LASIX) 40 MG tablet Take 40 mg by mouth once daily.      gabapentin (NEURONTIN) 300 MG capsule Take 1 capsule (300 mg total) by mouth every 8 (eight) hours. 90 capsule 1    HUMIRA,CF, PEN 40 mg/0.4 mL PnKt Inject 40 mg into the skin once a week.      HYDROcodone-acetaminophen (NORCO)  mg per tablet Take 1 tablet by mouth every 8 (eight) hours as needed for Pain. 90 tablet 0    hydrocortisone 2.5 % ointment Apply topically 2 (two) times daily. 454 g 5    hydrOXYzine pamoate (VISTARIL) 50 MG Cap Take 2 capsules (100 mg total) by mouth nightly as needed (anxiety & difficulty sleeping). 180 capsule 1    ketoconazole (NIZORAL) 2 % cream Apply topically once daily. 60 g 2    lancets (ONETOUCH DELICA LANCETS) 33 gauge Misc 1 lancet by Misc.(Non-Drug; Combo Route) route once daily. 100 each 3    metFORMIN (GLUCOPHAGE-XR) 500 MG ER 24hr tablet Take 1 tablet (500 mg total) by mouth once daily. 90 tablet 3    montelukast (SINGULAIR) 10 mg tablet Take 10  "mg by mouth once daily.      NIFEdipine (PROCARDIA-XL) 30 MG (OSM) 24 hr tablet Take 30 mg by mouth every evening.      nystatin (MYCOSTATIN) powder Apply topically 2 (two) times daily. 60 g 5    olmesartan (BENICAR) 40 MG tablet Take 40 mg by mouth once daily.      pantoprazole (PROTONIX) 40 MG tablet Take 40 mg by mouth 2 (two) times daily.      pen needle, diabetic 31 gauge x 1/4" Ndle       potassium chloride (KLOR-CON) 10 MEQ TbSR Take 1 tablet (10 mEq total) by mouth once daily. for 180 doses 90 tablet 1    promethazine (PHENERGAN) 25 MG tablet Take 1 tablet (25 mg total) by mouth every 6 (six) hours as needed for Nausea. 30 tablet 1    rosuvastatin (CRESTOR) 40 MG Tab Take 1 tablet (40 mg total) by mouth every evening. 90 tablet 3    semaglutide (OZEMPIC) 0.25 mg or 0.5 mg (2 mg/3 mL) pen injector Inject 0.5 mg into the skin every 7 days. 9 mL 0    triamcinolone acetonide 0.1% (KENALOG) 0.1 % ointment Apply topically 2 (two) times daily. 454 g 0    TRUE METRIX GLUCOSE TEST STRIP Strp 1 strip by Misc.(Non-Drug; Combo Route) route Daily. For T2DM. 100 strip 3    [DISCONTINUED] fluocinolone (SYNALAR) 0.01 % external solution Apply topically 2 (two) times daily. (Patient taking differently: Apply 1 Dose topically 2 (two) times daily.) 90 mL 5    [DISCONTINUED] HYDROcodone-acetaminophen (NORCO)  mg per tablet Take 1 tablet by mouth every 8 (eight) hours as needed for Pain. 90 tablet 0    [DISCONTINUED] HYDROcodone-acetaminophen (NORCO)  mg per tablet Take 1 tablet by mouth every 8 (eight) hours as needed for Pain. 90 tablet 0     Facility-Administered Encounter Medications as of 5/24/2024   Medication Dose Route Frequency Provider Last Rate Last Admin    ketorolac injection 60 mg  60 mg Intramuscular 1 time in Clinic/[facility], GUCCI Whaley           History of Present Illness  43 yo WF w/ 15 yr hx of chronic generalized tension stress HEADACHE's  Pt still smoking tobacco, not sleeping at night and " daytime napping, taking opioid narcotics, morbid obesity, polypharmacy, DM,HTN,XOL,RA,fibromyalgia, HS etc,etc  Tried OTC meds to no avail            Past Medical History:   Diagnosis Date    Anxiety     Asthma     Chronic pain     Cushing syndrome     Depression     Essential hypertension     Fibromyalgia     Herpes zoster     Hyperlipidemia     Leukocytosis 2022    Onychomycosis     MARY on CPAP     Rheumatoid arthritis     Type 2 diabetes mellitus 2020    Vitamin D deficiency 2018       Past Surgical History:   Procedure Laterality Date     SECTION      ELBOW SURGERY      EPIDURAL STEROID INJECTION      L4-5 VERITO Dec 2020-[facility]    FEMUR SURGERY Right     due to osteomyelitis    HYSTERECTOMY      Abn Bleeding    INJECTION OF ANESTHETIC AGENT AROUND MEDIAL BRANCH NERVES INNERVATING LUMBAR FACET JOINT Bilateral 2022    Procedure: Bilateral L4-5,5-S1 MBB ( No steroids);  Surgeon: Carmel Torre MD;  Location: [facility];  Service: Pain Management;  Laterality: Bilateral;  PT AWARE TO BE TESTED ON OV    INJECTION OF ANESTHETIC AGENT AROUND MEDIAL BRANCH NERVES INNERVATING LUMBAR FACET JOINT Bilateral 2022    Procedure: Block-nerve-medial branch-lumbar, bilateral L4 through S1;  Surgeon: Carmel Torre MD;  Location: [facility];  Service: Pain Management;  Laterality: Bilateral;    INJECTION OF ANESTHETIC AGENT INTO SACROILIAC JOINT Bilateral 2022    Procedure: BLOCK, SACROILIAC JOINT;  Surgeon: Carmel Torre MD;  Location: [facility];  Service: Pain Management;  Laterality: Bilateral;  covid test put in    LIPOMA RESECTION  2019    RADIOFREQUENCY ABLATION OF LUMBAR MEDIAL BRANCH NERVE AT SINGLE LEVEL Right 2022    Procedure: Radiofrequency Ablation, Nerve, Spinal, Lumbar, Medial Branch, Level L4-S1, right side 1st, will have left-sided after completing;  Surgeon: Carmel Torre MD;  Location: [facility];  Service: Pain  "Management;  Laterality: Right;  pt aware at visit to be tested    RADIOFREQUENCY ABLATION OF LUMBAR MEDIAL BRANCH NERVE AT SINGLE LEVEL Left 05/05/2022    Procedure: LEFT  L4-S1 RFTC  (HAD RIGHT  ON 4-14);  Surgeon: Carmel Torre MD;  Location: [facility];  Service: Pain Management;  Laterality: Left;    RADIOFREQUENCY ABLATION OF LUMBAR MEDIAL BRANCH NERVE AT SINGLE LEVEL Right 7/25/2023    Procedure: Radiofrequency Ablation, Nerve, Spinal, Lumbar, Medial Branch, Level L4-S1;  Surgeon: Carmel Torre MD;  Location: [facility];  Service: Pain Management;  Laterality: Right;    SURGICAL REMOVAL OF PILONIDAL CYST      TRANSFORAMINAL EPIDURAL INJECTION OF STEROID      Bilateral L4-5 TFESI Nov 2020-[facility]    TRANSFORAMINAL EPIDURAL INJECTION OF STEROID      Right L4-5 TFESI Feb 2020-[facility]    VENTRAL HERNIA REPAIR  09/2020       Social History  Ms. Swann  reports that she has been smoking cigarettes. She started smoking about 2 years ago. She has never used smokeless tobacco. She reports current alcohol use. She reports that she does not use drugs.    Family History  Ms.'s Swann family history includes Cancer in her mother; Thyroid cancer in her maternal grandmother and mother.    Review of Systems  Review of Systems   Musculoskeletal:  Positive for back pain, joint pain and neck pain.   Neurological:  Positive for tingling, sensory change and headaches.   Psychiatric/Behavioral:  Positive for depression. The patient is nervous/anxious and has insomnia.    All other systems reviewed and are negative.     Objective:   BP (!) 165/108 (BP Location: Left arm, Patient Position: Sitting, BP Method: Large (Automatic))   Pulse 93   Resp 18   Ht 5' 3" (1.6 m)   Wt (!) 155.6 kg (343 lb)   SpO2 96%   BMI 60.76 kg/m²    NEUROLOGICAL EXAMINATION:     MENTAL STATUS   Oriented to person, place, and time.   Level of consciousness: drowsy  Knowledge: good.     CRANIAL NERVES   Cranial nerves II through " XII intact.     MOTOR EXAM     Strength   Strength 5/5 throughout.     GAIT AND COORDINATION        Waddling         Physical Exam  Vitals reviewed.   Constitutional:       Appearance: She is obese.   Neurological:      Mental Status: She is alert and oriented to person, place, and time. Mental status is at baseline.      Cranial Nerves: Cranial nerves 2-12 are intact.      Motor: Motor strength is normal.         Assessment:     Chronic headaches  Comments:  tension stress HA  Orders:  -     Ambulatory referral/consult to Neurology         Primary Diagnosis and ICD10  Chronic headaches [R51.9, G89.29]    Plan:     Patient Instructions   Trial Elavil 25 mg one hour before bedtime   Avoid daytime napping -improve sleep hygiene   Incr phys activity daily   STOP smoking tobacco   Cont Ozempic and portion control   F/u 3 months    Medications Discontinued During This Encounter   Medication Reason    fluocinolone (SYNALAR) 0.01 % external solution        Requested Prescriptions     Pending Prescriptions Disp Refills    amitriptyline (ELAVIL) 25 MG tablet 90 tablet 3     Sig: Take 1 tablet (25 mg total) by mouth every evening.

## 2024-05-24 NOTE — PATIENT INSTRUCTIONS
Trial Elavil 25 mg one hour before bedtime   Avoid daytime napping -improve sleep hygiene   Incr phys activity daily   STOP smoking tobacco   Cont Ozempic and portion control   F/u 3 months

## 2024-06-03 RX ORDER — GABAPENTIN 300 MG/1
300 CAPSULE ORAL EVERY 8 HOURS
Qty: 90 CAPSULE | Refills: 1 | Status: CANCELLED | OUTPATIENT
Start: 2024-06-03

## 2024-06-03 RX ORDER — HYDROCODONE BITARTRATE AND ACETAMINOPHEN 10; 325 MG/1; MG/1
1 TABLET ORAL EVERY 8 HOURS PRN
Qty: 90 TABLET | Refills: 0 | Status: CANCELLED | OUTPATIENT
Start: 2024-06-03 | End: 2024-07-03

## 2024-06-03 NOTE — PROGRESS NOTES
Subjective:         Patient ID: Belem Swann is a 44 y.o. female.    Chief Complaint: Low-back Pain, Knee Pain, and Wrist Pain        Pain  This is a chronic problem. The current episode started more than 1 year ago. The problem occurs daily. The problem has been waxing and waning. Associated symptoms include arthralgias and neck pain. Pertinent negatives include no anorexia, chest pain, chills, coughing, diaphoresis, fever, sore throat, vertigo or vomiting.     Review of Systems   Constitutional:  Negative for activity change, appetite change, chills, diaphoresis, fever and unexpected weight change.   HENT:  Negative for drooling, ear discharge, ear pain, facial swelling, mouth sores, nosebleeds, sore throat, trouble swallowing, voice change and goiter.    Eyes:  Negative for photophobia, pain, discharge, redness and visual disturbance.   Respiratory:  Negative for apnea, cough, choking, chest tightness, shortness of breath, wheezing and stridor.    Cardiovascular:  Negative for chest pain, palpitations and leg swelling.   Gastrointestinal:  Negative for abdominal distention, anorexia, diarrhea, rectal pain, vomiting and fecal incontinence.   Endocrine: Negative for cold intolerance, heat intolerance, polydipsia, polyphagia and polyuria.   Genitourinary:  Negative for bladder incontinence, dysuria, flank pain, frequency and hot flashes.   Musculoskeletal:  Positive for arthralgias, back pain, leg pain, neck pain and neck stiffness.   Integumentary:  Negative for color change and pallor.   Allergic/Immunologic: Negative for immunocompromised state.   Neurological:  Negative for dizziness, vertigo, seizures, syncope, facial asymmetry, speech difficulty, light-headedness, memory loss and coordination difficulties.   Hematological:  Negative for adenopathy. Does not bruise/bleed easily.   Psychiatric/Behavioral:  Negative for agitation, behavioral problems, confusion, decreased concentration, dysphoric mood,  hallucinations, self-injury and suicidal ideas. The patient is not hyperactive.            Past Medical History:   Diagnosis Date    Anxiety     Asthma     Chronic pain     Cushing syndrome     Depression     Essential hypertension     Fibromyalgia     Herpes zoster     Hyperlipidemia     Leukocytosis 2022    Onychomycosis     MARY on CPAP     Rheumatoid arthritis     Type 2 diabetes mellitus 2020    Vitamin D deficiency 2018     Past Surgical History:   Procedure Laterality Date     SECTION      ELBOW SURGERY  1985    EPIDURAL STEROID INJECTION      L4-5 VERITO Dec 2020-[facility]    FEMUR SURGERY Right     due to osteomyelitis    HYSTERECTOMY      Abn Bleeding    INJECTION OF ANESTHETIC AGENT AROUND MEDIAL BRANCH NERVES INNERVATING LUMBAR FACET JOINT Bilateral 2022    Procedure: Bilateral L4-5,5-S1 MBB ( No steroids);  Surgeon: Carmel Torre MD;  Location: [facility];  Service: Pain Management;  Laterality: Bilateral;  PT AWARE TO BE TESTED ON OV    INJECTION OF ANESTHETIC AGENT AROUND MEDIAL BRANCH NERVES INNERVATING LUMBAR FACET JOINT Bilateral 2022    Procedure: Block-nerve-medial branch-lumbar, bilateral L4 through S1;  Surgeon: Carmel Torre MD;  Location: [facility];  Service: Pain Management;  Laterality: Bilateral;    INJECTION OF ANESTHETIC AGENT INTO SACROILIAC JOINT Bilateral 2022    Procedure: BLOCK, SACROILIAC JOINT;  Surgeon: Carmel Torre MD;  Location: [facility];  Service: Pain Management;  Laterality: Bilateral;  covid test put in    LIPOMA RESECTION  2019    RADIOFREQUENCY ABLATION OF LUMBAR MEDIAL BRANCH NERVE AT SINGLE LEVEL Right 2022    Procedure: Radiofrequency Ablation, Nerve, Spinal, Lumbar, Medial Branch, Level L4-S1, right side 1st, will have left-sided after completing;  Surgeon: Carmel Torre MD;  Location: [facility];  Service: Pain Management;  Laterality: Right;  pt  "aware at visit to be tested    RADIOFREQUENCY ABLATION OF LUMBAR MEDIAL BRANCH NERVE AT SINGLE LEVEL Left 05/05/2022    Procedure: LEFT  L4-S1 RFTC  (HAD RIGHT  ON 4-14);  Surgeon: Carmel Torre MD;  Location: [facility];  Service: Pain Management;  Laterality: Left;    RADIOFREQUENCY ABLATION OF LUMBAR MEDIAL BRANCH NERVE AT SINGLE LEVEL Right 7/25/2023    Procedure: Radiofrequency Ablation, Nerve, Spinal, Lumbar, Medial Branch, Level L4-S1;  Surgeon: Carmel Torre MD;  Location: [facility];  Service: Pain Management;  Laterality: Right;    SURGICAL REMOVAL OF PILONIDAL CYST      TRANSFORAMINAL EPIDURAL INJECTION OF STEROID      Bilateral L4-5 TFESI Nov 2020-[facility]    TRANSFORAMINAL EPIDURAL INJECTION OF STEROID      Right L4-5 TFESI Feb 2020-[facility]    VENTRAL HERNIA REPAIR  09/2020     Social History     Socioeconomic History    Marital status:    Tobacco Use    Smoking status: Every Day     Types: Cigarettes     Start date: 9/8/2021    Smokeless tobacco: Never   Substance and Sexual Activity    Alcohol use: Yes     Comment: ocassional    Drug use: Never    Sexual activity: Not Currently     Partners: Male     Birth control/protection: See Surgical Hx     Family History   Problem Relation Name Age of Onset    Cancer Mother      Thyroid cancer Mother      Thyroid cancer Maternal Grandmother      Melanoma Neg Hx      Colon cancer Neg Hx      Breast cancer Neg Hx      Ovarian cancer Neg Hx       Review of patient's allergies indicates:   Allergen Reactions    Latex      Other reaction(s): Unknown    Doxycycline Nausea Only        Objective:  Vitals:    06/10/24 1304   BP: (!) 140/88   Pulse: 87   Resp: 18   Weight: (!) 158.3 kg (349 lb)   Height: 5' 3" (1.6 m)   PainSc:   6                 Physical Exam  Vitals and nursing note reviewed. Exam conducted with a chaperone present.   Constitutional:       General: She is awake. She is not in acute distress.     " Appearance: Normal appearance. She is obese. She is not ill-appearing, toxic-appearing or diaphoretic.   HENT:      Head: Normocephalic and atraumatic.      Nose: Nose normal.      Mouth/Throat:      Mouth: Mucous membranes are moist.      Pharynx: Oropharynx is clear.   Eyes:      Conjunctiva/sclera: Conjunctivae normal.      Pupils: Pupils are equal, round, and reactive to light.   Cardiovascular:      Rate and Rhythm: Normal rate.   Pulmonary:      Effort: Pulmonary effort is normal. No respiratory distress.   Abdominal:      Palpations: Abdomen is soft.      Tenderness: There is no guarding.   Musculoskeletal:         General: No swelling.      Cervical back: Normal range of motion and neck supple. Tenderness present. No rigidity.      Thoracic back: Tenderness present.      Lumbar back: Tenderness present. Decreased range of motion.   Skin:     General: Skin is warm and dry.      Coloration: Skin is not jaundiced or pale.   Neurological:      General: No focal deficit present.      Mental Status: She is alert and oriented to person, place, and time. Mental status is at baseline.      Cranial Nerves: No cranial nerve deficit (II-XII).   Psychiatric:         Mood and Affect: Mood normal.         Behavior: Behavior normal. Behavior is cooperative.         Thought Content: Thought content normal.         X-Ray Knee 3 View Bilateral  See Procedure Notes for results.     IMPRESSION: Please see Ortho procedure notes for report.      This procedure was auto-finalized by: Virtual Radiologist         Office Visit on 05/15/2024   Component Date Value Ref Range Status    POC Amphetamines 05/15/2024 Negative  Negative, Inconclusive Final    POC Barbiturates 05/15/2024 Negative  Negative, Inconclusive Final    POC Benzodiazepines 05/15/2024 Negative  Negative, Inconclusive Final    POC Cocaine 05/15/2024 Negative  Negative, Inconclusive Final    POC THC 05/15/2024 Negative  Negative, Inconclusive Final    POC Methadone  05/15/2024 Negative  Negative, Inconclusive Final    POC Methamphetamine 05/15/2024 Negative  Negative, Inconclusive Final    POC Opiates 05/15/2024 Presumptive Positive (A)  Negative, Inconclusive Final    POC Oxycodone 05/15/2024 Negative  Negative, Inconclusive Final    POC Phencyclidine 05/15/2024 Negative  Negative, Inconclusive Final    POC Methylenedioxymethamphetamine * 05/15/2024 Negative  Negative, Inconclusive Final    POC Tricyclic Antidepressants 05/15/2024 Negative  Negative, Inconclusive Final    POC Buprenorphine 05/15/2024 Negative   Final     Acceptable 05/15/2024 Yes   Final    POC Temperature (Urine) 05/15/2024 92   Final   Office Visit on 05/01/2024   Component Date Value Ref Range Status    POC Amphetamines 05/01/2024 Negative  Negative, Inconclusive Final    POC Barbiturates 05/01/2024 Negative  Negative, Inconclusive Final    POC Benzodiazepines 05/01/2024 Negative  Negative, Inconclusive Final    POC Cocaine 05/01/2024 Negative  Negative, Inconclusive Final    POC THC 05/01/2024 Negative  Negative, Inconclusive Final    POC Methadone 05/01/2024 Negative  Negative, Inconclusive Final    POC Methamphetamine 05/01/2024 Negative  Negative, Inconclusive Final    POC Opiates 05/01/2024 Presumptive Positive (A)  Negative, Inconclusive Final    POC Oxycodone 05/01/2024 Negative  Negative, Inconclusive Final    POC Phencyclidine 05/01/2024 Negative  Negative, Inconclusive Final    POC Methylenedioxymethamphetamine * 05/01/2024 Negative  Negative, Inconclusive Final    POC Tricyclic Antidepressants 05/01/2024 Negative  Negative, Inconclusive Final    POC Buprenorphine 05/01/2024 Negative   Final     Acceptable 05/01/2024 Yes   Final    POC Temperature (Urine) 05/01/2024 90   Final    pH, UA 05/01/2024 6.0  5.0 to 8.0 pH Units Final    Creatinine, Urine 05/01/2024 166  28 - 219 mg/dL Final    6-Acetylmorphine 05/01/2024 Negative  10 ng/mL Final     7-Aminoclonazepam 05/01/2024 Negative  Negative 25 ng/mL Final    a-Hydroxyalprazolam 05/01/2024 Negative  Negative 25 ng/mL Final    Benzoylecgonine 05/01/2024 Negative  100 ng/mL Final    Buprenorphine 05/01/2024 Negative  25 ng/mL Final    Codeine 05/01/2024 Negative  25 ng/mL Final    EDDP 05/01/2024 Negative  25 ng/mL Final    Fentanyl 05/01/2024 Negative  2.5 ng/mL Final    Hydrocodone 05/01/2024 >250.0 (H)  <25.0 25 ng/mL Final    Hydromorphone 05/01/2024 Negative  25 ng/mL Final    Morphine 05/01/2024 Negative  25 ng/mL Final    Norbuprenorphine 05/01/2024 Negative  25 ng/mL Final    Nordiazepam 05/01/2024 Negative  25 ng/mL Final    Norfentanyl Oxalate 05/01/2024 Negative  5 ng/mL Final    Norhydrocodone 05/01/2024 >500.0 (H)  <50.0 50 ng/mL Final    Noroxycodone HCL 05/01/2024 Negative  50 ng/mL Final    Oxymorphone 05/01/2024 Negative  25 ng/mL Final    Tapentadol 05/01/2024 Negative  25 ng/mL Final    Temazepam 05/01/2024 Negative  25 ng/mL Final    THC-COOH 05/01/2024 Negative  25 ng/mL Final    Tramadol 05/01/2024 Negative  100 ng/mL Final    Amphetamine, Urine 05/01/2024 Negative  Negative Final    Methamphetamines, Urine 05/01/2024 Negative  Negative Final    Methadone, Urine 05/01/2024 Negative  Negative 25 ng/mL Final    Oxycodone, Urine 05/01/2024 Negative  Negative 25 ng/mL Final    Specific [place], UA 05/01/2024 1.018  <=1.030 Final   Office Visit on 03/20/2024   Component Date Value Ref Range Status    Sodium 03/20/2024 141  136 - 145 mmol/L Final    Potassium 03/20/2024 3.4 (L)  3.5 - 5.1 mmol/L Final    Chloride 03/20/2024 104  98 - 107 mmol/L Final    CO2 03/20/2024 30  21 - 32 mmol/L Final    Anion Gap 03/20/2024 10  7 - 16 mmol/L Final    Glucose 03/20/2024 150 (H)  74 - 106 mg/dL Final    BUN 03/20/2024 17  7 - 18 mg/dL Final    Creatinine 03/20/2024 0.55  0.55 - 1.02 mg/dL Final    BUN/Creatinine Ratio 03/20/2024 31 (H)  6 - 20 Final    Calcium  03/20/2024 9.3  8.5 - 10.1 mg/dL Final    Total Protein 03/20/2024 8.2  6.4 - 8.2 g/dL Final    Albumin 03/20/2024 3.5  3.5 - 5.0 g/dL Final    Globulin 03/20/2024 4.7 (H)  2.0 - 4.0 g/dL Final    A/G Ratio 03/20/2024 0.7   Final    Bilirubin, Total 03/20/2024 0.5  >0.0 - 1.2 mg/dL Final    Alk Phos 03/20/2024 139 (H)  37 - 98 U/L Final    ALT 03/20/2024 23  13 - 56 U/L Final    AST 03/20/2024 14 (L)  15 - 37 U/L Final    eGFR 03/20/2024 116  >=60 mL/min/1.73m2 Final    Triglycerides 03/20/2024 126  35 - 150 mg/dL Final    Cholesterol 03/20/2024 176  0 - 200 mg/dL Final    HDL Cholesterol 03/20/2024 38 (L)  40 - 60 mg/dL Final    Cholesterol/HDL Ratio (Risk Factor) 03/20/2024 4.6   Final    Non-HDL 03/20/2024 138  mg/dL Final    LDL Calculated 03/20/2024 113  mg/dL Final    LDL/HDL 03/20/2024 3.0   Final    VLDL 03/20/2024 25  mg/dL Final    Hemoglobin A1C 03/20/2024 6.1  4.5 - 6.6 % Final    Estimated Average Glucose 03/20/2024 128  mg/dL Final    Creatinine, Urine 03/20/2024 281 (H)  28 - 219 mg/dL Final    Microalbumin 03/20/2024 2.8  0.0 - 2.8 mg/dL Final    Microalbumin/Creatinine Ratio 03/20/2024 10.0  0.0 - 30.0 mg/g Final    TSH 03/20/2024 2.140  0.358 - 3.740 uIU/mL Final    HIV 1/2 03/20/2024 Non-Reactive  Non-Reactive Final   Office Visit on 02/01/2024   Component Date Value Ref Range Status    POC Amphetamines 02/01/2024 Negative  Negative, Inconclusive Final    POC Barbiturates 02/01/2024 Negative  Negative, Inconclusive Final    POC Benzodiazepines 02/01/2024 Negative  Negative, Inconclusive Final    POC Cocaine 02/01/2024 Negative  Negative, Inconclusive Final    POC THC 02/01/2024 Negative  Negative, Inconclusive Final    POC Methadone 02/01/2024 Negative  Negative, Inconclusive Final    POC Methamphetamine 02/01/2024 Negative  Negative, Inconclusive Final    POC Opiates 02/01/2024 Presumptive Positive (A)  Negative, Inconclusive Final    POC Oxycodone 02/01/2024  Presumptive Positive (A)  Negative, Inconclusive Final    POC Phencyclidine 02/01/2024 Negative  Negative, Inconclusive Final    POC Methylenedioxymethamphetamine * 02/01/2024 Negative  Negative, Inconclusive Final    POC Tricyclic Antidepressants 02/01/2024 Negative  Negative, Inconclusive Final    POC Buprenorphine 02/01/2024 Negative   Final     Acceptable 02/01/2024 Yes   Final    POC Temperature (Urine) 02/01/2024 92   Final    pH, UA 02/01/2024 5.5  5.0 to 8.0 pH Units Final    Creatinine, Urine 02/01/2024 282 (H)  28 - 219 mg/dL Final    6-Acetylmorphine 02/01/2024 Negative  10 ng/mL Final    7-Aminoclonazepam 02/01/2024 Negative  Negative 25 ng/mL Final    a-Hydroxyalprazolam 02/01/2024 Negative  Negative 25 ng/mL Final    Acetyl Fentanyl 02/01/2024 Negative  Negative 2.5 ng/mL Final    Acetyl Norfentanyl Oxalate 02/01/2024 Negative  5 ng/mL Final    Benzoylecgonine 02/01/2024 Negative  100 ng/mL Final    Buprenorphine 02/01/2024 Negative  25 ng/mL Final    Codeine 02/01/2024 Negative  25 ng/mL Final    EDDP 02/01/2024 Negative  25 ng/mL Final    Fentanyl 02/01/2024 Negative  2.5 ng/mL Final    Hydrocodone 02/01/2024 >250.0 (H)  <25.0 25 ng/mL Final    Hydromorphone 02/01/2024 Negative  25 ng/mL Final    Lorazepam 02/01/2024 Negative  25 ng/mL Final    Morphine 02/01/2024 Negative  25 ng/mL Final    Norbuprenorphine 02/01/2024 Negative  25 ng/mL Final    Nordiazepam 02/01/2024 Negative  25 ng/mL Final    Norfentanyl Oxalate 02/01/2024 Negative  5 ng/mL Final    Norhydrocodone 02/01/2024 >500.0 (H)  <50.0 50 ng/mL Final    Noroxycodone HCL 02/01/2024 Negative  50 ng/mL Final    Oxazepam 02/01/2024 Negative  25 ng/mL Final    Oxymorphone 02/01/2024 Negative  25 ng/mL Final    Tapentadol 02/01/2024 Negative  25 ng/mL Final    Temazepam 02/01/2024 Negative  25 ng/mL Final    THC-COOH 02/01/2024 Negative  25 ng/mL Final    Tramadol 02/01/2024 Negative  100 ng/mL  Final    Amphetamine, Urine 02/01/2024 Negative  Negative Final    Methamphetamines, Urine 02/01/2024 Negative  Negative Final    Methadone, Urine 02/01/2024 Negative  Negative 25 ng/mL Final    Oxycodone, Urine 02/01/2024 Negative  Negative 25 ng/mL Final    Specific [place], UA 02/01/2024 1.025  <=1.030 Final         No orders of the defined types were placed in this encounter.          Requested Prescriptions     Signed Prescriptions Disp Refills    HYDROcodone-acetaminophen (NORCO)  mg per tablet 90 tablet 0     Sig: Take 1 tablet by mouth every 8 (eight) hours as needed for Pain.    HYDROcodone-acetaminophen (NORCO)  mg per tablet 90 tablet 0     Sig: Take 1 tablet by mouth every 8 (eight) hours as needed for Pain.       Assessment:     1. Rheumatoid arthritis involving multiple sites with positive rheumatoid factor    2. Spondylosis of lumbar region without myelopathy or radiculopathy    3. Sacroiliitis    4. Chronic pain of right knee                 A's of Opioid Risk Assessment  Activity:Patient can perform ADL.   Analgesia:Patients pain is partially controlled by current medication. Patient has tried OTC medications such as Tylenol and Ibuprofen with out relief.   Adverse Effects: Patient denies constipation or sedation.  Aberrant Behavior:  reviewed with no aberrant drug seeking/taking behavior.  Overdose reversal drug naloxone discussed    Drug screen reviewed    X-ray right knee [facility] September 14, 2022 degenerative changes no fracture noted      Plan:    May 15, 2024 presumptive drug screen consistent with opiates      Serum drug screen ordered April 1, 2024 returned April 25, 2024  Hydrocodone 16    February 1, 2024 definitive drug screen positive for hydrocodone, nor hydrocodone  No hydromorphone      May 1, 2024 she verbalized understanding narcotics agreement compliance with medications     Any further inconsistent drug screens will discontinue narcotics  permanent    [place] 10, 2024 pill count correct, 7 tablets          Narcan December 2022    Follows rheumatology [facility] rheumatoid arthritis    Follows orthopedics [employer] chronic right knee pain    She had bilateral lumbar RF TC  July 25, 2023  She states she has 75% relief after procedure, the procedure did help improve her level function     May 15, 2024 she states she is taking her medication as directed    Complaint of chronic back and joint pain related to underlying rheumatology 6 condition    Requesting Toradol injection     Toradol 30 mg IM, tolerated well    She has no new complaints today    Will continue home exercise program as directed    Continue current medication    Follow-up 2 months    Dr. Torre July 2024    Bring original prescription medication bottles/container/box with labels to each visit

## 2024-06-10 ENCOUNTER — OFFICE VISIT (OUTPATIENT)
Dept: PAIN MEDICINE | Facility: CLINIC | Age: 44
End: 2024-06-10
Payer: MEDICAID

## 2024-06-10 VITALS
DIASTOLIC BLOOD PRESSURE: 88 MMHG | HEIGHT: 63 IN | SYSTOLIC BLOOD PRESSURE: 140 MMHG | HEART RATE: 87 BPM | WEIGHT: 293 LBS | RESPIRATION RATE: 18 BRPM | BODY MASS INDEX: 51.91 KG/M2

## 2024-06-10 DIAGNOSIS — M46.1 SACROILIITIS: Chronic | ICD-10-CM

## 2024-06-10 DIAGNOSIS — M25.561 CHRONIC PAIN OF RIGHT KNEE: Chronic | ICD-10-CM

## 2024-06-10 DIAGNOSIS — Z79.899 ENCOUNTER FOR LONG-TERM (CURRENT) USE OF OTHER MEDICATIONS: ICD-10-CM

## 2024-06-10 DIAGNOSIS — M05.79 RHEUMATOID ARTHRITIS INVOLVING MULTIPLE SITES WITH POSITIVE RHEUMATOID FACTOR: Primary | Chronic | ICD-10-CM

## 2024-06-10 DIAGNOSIS — M47.816 SPONDYLOSIS OF LUMBAR REGION WITHOUT MYELOPATHY OR RADICULOPATHY: Chronic | ICD-10-CM

## 2024-06-10 DIAGNOSIS — G89.29 CHRONIC PAIN OF RIGHT KNEE: Chronic | ICD-10-CM

## 2024-06-10 PROCEDURE — 3079F DIAST BP 80-89 MM HG: CPT | Mod: CPTII,,, | Performed by: PHYSICIAN ASSISTANT

## 2024-06-10 PROCEDURE — 99214 OFFICE O/P EST MOD 30 MIN: CPT | Mod: S$PBB,25,, | Performed by: PHYSICIAN ASSISTANT

## 2024-06-10 PROCEDURE — 1159F MED LIST DOCD IN RCRD: CPT | Mod: CPTII,,, | Performed by: PHYSICIAN ASSISTANT

## 2024-06-10 PROCEDURE — 3061F NEG MICROALBUMINURIA REV: CPT | Mod: CPTII,,, | Performed by: PHYSICIAN ASSISTANT

## 2024-06-10 PROCEDURE — 99999PBSHW PR PBB SHADOW TECHNICAL ONLY FILED TO HB: Mod: PBBFAC,,,

## 2024-06-10 PROCEDURE — 3077F SYST BP >= 140 MM HG: CPT | Mod: CPTII,,, | Performed by: PHYSICIAN ASSISTANT

## 2024-06-10 PROCEDURE — 80305 DRUG TEST PRSMV DIR OPT OBS: CPT | Mod: PBBFAC | Performed by: PHYSICIAN ASSISTANT

## 2024-06-10 PROCEDURE — 96372 THER/PROPH/DIAG INJ SC/IM: CPT | Mod: PBBFAC | Performed by: PHYSICIAN ASSISTANT

## 2024-06-10 PROCEDURE — 3044F HG A1C LEVEL LT 7.0%: CPT | Mod: CPTII,,, | Performed by: PHYSICIAN ASSISTANT

## 2024-06-10 PROCEDURE — 4010F ACE/ARB THERAPY RXD/TAKEN: CPT | Mod: CPTII,,, | Performed by: PHYSICIAN ASSISTANT

## 2024-06-10 PROCEDURE — 99999PBSHW POCT URINE DRUG SCREEN PRESUMP: Mod: PBBFAC,,,

## 2024-06-10 PROCEDURE — 3008F BODY MASS INDEX DOCD: CPT | Mod: CPTII,,, | Performed by: PHYSICIAN ASSISTANT

## 2024-06-10 PROCEDURE — 99213 OFFICE O/P EST LOW 20 MIN: CPT | Mod: PBBFAC | Performed by: PHYSICIAN ASSISTANT

## 2024-06-10 PROCEDURE — 3066F NEPHROPATHY DOC TX: CPT | Mod: CPTII,,, | Performed by: PHYSICIAN ASSISTANT

## 2024-06-10 RX ORDER — HYDROCODONE BITARTRATE AND ACETAMINOPHEN 10; 325 MG/1; MG/1
1 TABLET ORAL EVERY 8 HOURS PRN
Qty: 90 TABLET | Refills: 0 | Status: SHIPPED | OUTPATIENT
Start: 2024-07-14 | End: 2024-08-13

## 2024-06-10 RX ORDER — HYDROCODONE BITARTRATE AND ACETAMINOPHEN 10; 325 MG/1; MG/1
1 TABLET ORAL EVERY 8 HOURS PRN
Qty: 90 TABLET | Refills: 0 | Status: SHIPPED | OUTPATIENT
Start: 2024-06-14 | End: 2024-07-14

## 2024-06-10 RX ORDER — KETOROLAC TROMETHAMINE 30 MG/ML
60 INJECTION, SOLUTION INTRAMUSCULAR; INTRAVENOUS
Status: COMPLETED | OUTPATIENT
Start: 2024-06-10 | End: 2024-06-10

## 2024-06-10 RX ADMIN — KETOROLAC TROMETHAMINE 60 MG: 30 INJECTION, SOLUTION INTRAMUSCULAR at 01:06

## 2024-06-19 DIAGNOSIS — E11.9 TYPE 2 DIABETES MELLITUS WITHOUT COMPLICATION, WITHOUT LONG-TERM CURRENT USE OF INSULIN: Chronic | ICD-10-CM

## 2024-06-19 NOTE — TELEPHONE ENCOUNTER
----- Message from Janet Wang sent at 6/19/2024  3:28 PM CDT -----  Pt called and stated that she needs a refill on semaglutide  sent to [facility] [address] call back is [phone]

## 2024-06-20 RX ORDER — SEMAGLUTIDE 0.68 MG/ML
0.5 INJECTION, SOLUTION SUBCUTANEOUS
Qty: 9 ML | Refills: 0 | Status: SHIPPED | OUTPATIENT
Start: 2024-06-20

## 2024-06-24 ENCOUNTER — OFFICE VISIT (OUTPATIENT)
Dept: GASTROENTEROLOGY | Facility: CLINIC | Age: 44
End: 2024-06-24
Payer: MEDICAID

## 2024-06-24 VITALS
SYSTOLIC BLOOD PRESSURE: 184 MMHG | BODY MASS INDEX: 51.91 KG/M2 | DIASTOLIC BLOOD PRESSURE: 136 MMHG | HEART RATE: 97 BPM | WEIGHT: 293 LBS | HEIGHT: 63 IN

## 2024-06-24 DIAGNOSIS — K21.00 GASTROESOPHAGEAL REFLUX DISEASE WITH ESOPHAGITIS WITHOUT HEMORRHAGE: Chronic | ICD-10-CM

## 2024-06-24 DIAGNOSIS — Z12.11 SCREENING FOR MALIGNANT NEOPLASM OF COLON: ICD-10-CM

## 2024-06-24 DIAGNOSIS — K44.9 HH (HIATUS HERNIA): Primary | Chronic | ICD-10-CM

## 2024-06-24 PROCEDURE — 3008F BODY MASS INDEX DOCD: CPT | Mod: CPTII,,,

## 2024-06-24 PROCEDURE — 99214 OFFICE O/P EST MOD 30 MIN: CPT | Mod: S$PBB,,,

## 2024-06-24 PROCEDURE — 3044F HG A1C LEVEL LT 7.0%: CPT | Mod: CPTII,,,

## 2024-06-24 PROCEDURE — 3077F SYST BP >= 140 MM HG: CPT | Mod: CPTII,,,

## 2024-06-24 PROCEDURE — 3080F DIAST BP >= 90 MM HG: CPT | Mod: CPTII,,,

## 2024-06-24 PROCEDURE — 1159F MED LIST DOCD IN RCRD: CPT | Mod: CPTII,,,

## 2024-06-24 PROCEDURE — 99999 PR PBB SHADOW E&M-EST. PATIENT-LVL III: CPT | Mod: PBBFAC,,,

## 2024-06-24 PROCEDURE — 99213 OFFICE O/P EST LOW 20 MIN: CPT | Mod: PBBFAC

## 2024-06-24 PROCEDURE — 3061F NEG MICROALBUMINURIA REV: CPT | Mod: CPTII,,,

## 2024-06-24 PROCEDURE — 3066F NEPHROPATHY DOC TX: CPT | Mod: CPTII,,,

## 2024-06-24 PROCEDURE — 1160F RVW MEDS BY RX/DR IN RCRD: CPT | Mod: CPTII,,,

## 2024-06-24 PROCEDURE — 4010F ACE/ARB THERAPY RXD/TAKEN: CPT | Mod: CPTII,,,

## 2024-06-24 RX ORDER — PANTOPRAZOLE SODIUM 40 MG/1
40 TABLET, DELAYED RELEASE ORAL 2 TIMES DAILY
Qty: 60 TABLET | Refills: 6 | Status: SHIPPED | OUTPATIENT
Start: 2024-06-24

## 2024-06-24 RX ORDER — POLYETHYLENE GLYCOL 3350, SODIUM SULFATE ANHYDROUS, SODIUM BICARBONATE, SODIUM CHLORIDE, POTASSIUM CHLORIDE 236; 22.74; 6.74; 5.86; 2.97 G/4L; G/4L; G/4L; G/4L; G/4L
4 POWDER, FOR SOLUTION ORAL ONCE
Qty: 4000 ML | Refills: 0 | Status: SHIPPED | OUTPATIENT
Start: 2024-06-24 | End: 2024-06-24

## 2024-06-24 NOTE — PROGRESS NOTES
"  CC: Epigastric pain, and GERD symptoms increasing    HPI 44 y.o. white female presents today with increase in GERD symptoms and epigastric pain.  Patient on her previous 10/7/2022 EGD showed a small hiatal hernia, inflammation and erosion at the EG junction and gastritis no ulcers.  Pathology report shows mild chronic gastritis and esophagus shows mild basal cell hyperplasia, spongiosis and increased intraepithelial eosinophils up to 6 per high-power feel consistent with reflux esophagitis.  Patient has been however Protonix we will plan to restart that today b.i.d. labs reviewed no elevated liver enzymes and no anemia pain patient did have a very high elevated blood pressure 184/136 patient state she did not take her blood pressure medicine this morning instructed patient she needed to do that soon as she got home or get to the emergency room.  Before discharging the recheck was 173/113.  The nurse again reiterated to the patient that she needed to make sure she that her blood pressure down a bit did not come down to her normal range she needed to go to the emergency room patient voiced understanding.  Medical records reviewed. Additional history supplemented by nursing.     Past Medical History:   Diagnosis Date    Anxiety     Asthma     Chronic pain     Cushing syndrome     Depression     Essential hypertension     Fibromyalgia     Herpes zoster     Hyperlipidemia     Leukocytosis 1/18/2022    Onychomycosis     MARY on CPAP     Rheumatoid arthritis     Type 2 diabetes mellitus 11/2020    Vitamin D deficiency 05/03/2018         Review of Systems  General ROS: negative for chills, fever or weight loss  Cardiovascular ROS: no chest pain or dyspnea on exertion  Gastrointestinal ROS: no abdominal pain, change in bowel habits, or black/ bloody stools    Physical Examination  BP (!) 184/136   Pulse 97   Ht 5' 3" (1.6 m)   Wt (!) 157.3 kg (346 lb 11.2 oz)   BMI 61.42 kg/m²   General appearance: alert, cooperative, " no distress  HENT: Normocephalic, atraumatic, neck symmetrical, no nasal discharge   Lungs: no labored breathing, symmetric chest wall expansion bilaterally  Heart: regular rate and rhythm without rub; no displacement of the PMI   Abdomen: soft, non-tender; bowel sounds normoactive; no organomegaly    Labs:  Lab Results   Component Value Date    WBC 11.42 (H) 11/03/2023    HGB 13.2 11/03/2023    HCT 40.1 11/03/2023    MCV 85.9 11/03/2023     (L) 11/03/2023       CMP  Sodium   Date Value Ref Range Status   03/20/2024 141 136 - 145 mmol/L Final     Potassium   Date Value Ref Range Status   03/20/2024 3.4 (L) 3.5 - 5.1 mmol/L Final     Chloride   Date Value Ref Range Status   03/20/2024 104 98 - 107 mmol/L Final     CO2   Date Value Ref Range Status   03/20/2024 30 21 - 32 mmol/L Final     Glucose   Date Value Ref Range Status   03/20/2024 150 (H) 74 - 106 mg/dL Final     BUN   Date Value Ref Range Status   03/20/2024 17 7 - 18 mg/dL Final     Creatinine   Date Value Ref Range Status   03/20/2024 0.55 0.55 - 1.02 mg/dL Final     Calcium   Date Value Ref Range Status   03/20/2024 9.3 8.5 - 10.1 mg/dL Final     Total Protein   Date Value Ref Range Status   03/20/2024 8.2 6.4 - 8.2 g/dL Final     Albumin   Date Value Ref Range Status   03/20/2024 3.5 3.5 - 5.0 g/dL Final     Bilirubin, Total   Date Value Ref Range Status   03/20/2024 0.5 >0.0 - 1.2 mg/dL Final     Alk Phos   Date Value Ref Range Status   03/20/2024 139 (H) 37 - 98 U/L Final     AST   Date Value Ref Range Status   03/20/2024 14 (L) 15 - 37 U/L Final     ALT   Date Value Ref Range Status   03/20/2024 23 13 - 56 U/L Final     Anion Gap   Date Value Ref Range Status   03/20/2024 10 7 - 16 mmol/L Final     eGFR    Date Value Ref Range Status   10/07/2020 103       eGFR   Date Value Ref Range Status   05/11/2022 95 >=60 mL/min/1.73m² Final       Imaging:  No pertinent imaging    Independently reviewed    Assessment: Belem Swann 44  yo WF here with:  Epigastric pain  2.  Nausea   3.  history of hiatal hernia  4. Screening colonoscopy    Plan:  EGD to see if hiatal hernia has grown, patient would like a referral to general surgery if this is the case.  Screening colonoscopy scheduled today  Start taking Protonix 40 mg b.i.d.  Patient to take her blood pressure medicine as soon as she gets home if blood pressure does not come down she is to go to the emergency room  Follow-up as needed    30 minutes of total time spent on the encounter, which includes face to face time and non-face to face time preparing to see the patient (eg, review of tests), obtaining and/or reviewing separately obtained history, documenting clinical information in the electronic or other health record, Independently interpreting results (not separately reported) and communicating results to the patient/family/caregiver, or care coordination (not separately reported).         Lyndsay Diggs, FNP [facility]

## 2024-07-11 ENCOUNTER — ANESTHESIA (OUTPATIENT)
Dept: GASTROENTEROLOGY | Facility: HOSPITAL | Age: 44
End: 2024-07-11
Payer: MEDICAID

## 2024-07-11 ENCOUNTER — HOSPITAL ENCOUNTER (OUTPATIENT)
Dept: GASTROENTEROLOGY | Facility: HOSPITAL | Age: 44
Discharge: HOME | End: 2024-07-11
Admitting: INTERNAL MEDICINE
Payer: MEDICAID

## 2024-07-11 ENCOUNTER — ANESTHESIA EVENT (OUTPATIENT)
Dept: GASTROENTEROLOGY | Facility: HOSPITAL | Age: 44
End: 2024-07-11
Payer: MEDICAID

## 2024-07-11 VITALS
WEIGHT: 293 LBS | SYSTOLIC BLOOD PRESSURE: 136 MMHG | BODY MASS INDEX: 53.92 KG/M2 | HEIGHT: 62 IN | HEART RATE: 78 BPM | DIASTOLIC BLOOD PRESSURE: 91 MMHG | OXYGEN SATURATION: 98 % | RESPIRATION RATE: 18 BRPM | TEMPERATURE: 98 F

## 2024-07-11 DIAGNOSIS — K21.00 GASTROESOPHAGEAL REFLUX DISEASE WITH ESOPHAGITIS WITHOUT HEMORRHAGE: ICD-10-CM

## 2024-07-11 DIAGNOSIS — K44.9 HH (HIATUS HERNIA): ICD-10-CM

## 2024-07-11 LAB — GLUCOSE SERPL-MCNC: 143 MG/DL (ref 70–105)

## 2024-07-11 PROCEDURE — 27201423 OPTIME MED/SURG SUP & DEVICES STERILE SUPPLY

## 2024-07-11 PROCEDURE — 88342 IMHCHEM/IMCYTCHM 1ST ANTB: CPT | Mod: TC,SUR | Performed by: INTERNAL MEDICINE

## 2024-07-11 PROCEDURE — 25000003 PHARM REV CODE 250: Performed by: NURSE ANESTHETIST, CERTIFIED REGISTERED

## 2024-07-11 PROCEDURE — 88305 TISSUE EXAM BY PATHOLOGIST: CPT | Mod: TC,SUR | Performed by: INTERNAL MEDICINE

## 2024-07-11 PROCEDURE — 37000008 HC ANESTHESIA 1ST 15 MINUTES

## 2024-07-11 PROCEDURE — 88305 TISSUE EXAM BY PATHOLOGIST: CPT | Mod: 26,,, | Performed by: PATHOLOGY

## 2024-07-11 PROCEDURE — 27000284 HC CANNULA NASAL: Performed by: NURSE ANESTHETIST, CERTIFIED REGISTERED

## 2024-07-11 RX ORDER — SODIUM CHLORIDE 0.9 % (FLUSH) 0.9 %
10 SYRINGE (ML) INJECTION EVERY 6 HOURS PRN
Status: DISCONTINUED | OUTPATIENT
Start: 2024-07-11 | End: 2024-07-12 | Stop reason: HOSPADM

## 2024-07-11 RX ORDER — SODIUM CHLORIDE, SODIUM LACTATE, POTASSIUM CHLORIDE, CALCIUM CHLORIDE 600; 310; 30; 20 MG/100ML; MG/100ML; MG/100ML; MG/100ML
INJECTION, SOLUTION INTRAVENOUS CONTINUOUS
Status: DISCONTINUED | OUTPATIENT
Start: 2024-07-11 | End: 2024-07-12 | Stop reason: HOSPADM

## 2024-07-11 RX ORDER — PROPOFOL 10 MG/ML
VIAL (ML) INTRAVENOUS
Status: DISCONTINUED | OUTPATIENT
Start: 2024-07-11 | End: 2024-07-11

## 2024-07-11 RX ORDER — LIDOCAINE HYDROCHLORIDE 20 MG/ML
INJECTION INTRAVENOUS
Status: DISCONTINUED | OUTPATIENT
Start: 2024-07-11 | End: 2024-07-11

## 2024-07-11 RX ADMIN — Medication 50 MG: at 02:07

## 2024-07-11 RX ADMIN — SODIUM CHLORIDE: 9 INJECTION, SOLUTION INTRAVENOUS at 02:07

## 2024-07-11 RX ADMIN — LIDOCAINE HYDROCHLORIDE 50 MG: 20 INJECTION, SOLUTION INTRAVENOUS at 02:07

## 2024-07-11 RX ADMIN — Medication 150 MG: at 02:07

## 2024-07-11 NOTE — ANESTHESIA POSTPROCEDURE EVALUATION
Anesthesia Post Evaluation    Patient: Belem Swann    Procedure(s) Performed: * No procedures listed *    Final Anesthesia Type: general      Patient location during evaluation: GI PACU  Patient participation: Yes- Able to Participate  Level of consciousness: awake and alert  Post-procedure vital signs: reviewed and stable  Pain management: adequate  Airway patency: patent    PONV status at discharge: No PONV  Anesthetic complications: no      Cardiovascular status: blood pressure returned to baseline and hemodynamically stable  Respiratory status: spontaneous ventilation  Hydration status: euvolemic  Follow-up not needed.  Comments: Refer to nursing notes for pain/janae score upon discharge from recovery.              Vitals Value Taken Time   /80 07/11/24 1442   Temp 36.8 °C (98.2 °F) 07/11/24 1431   Pulse 82 07/11/24 1444   Resp 32 07/11/24 1444   SpO2 99 % 07/11/24 1444   Vitals shown include unfiled device data.      No case tracking events are documented in the log.      Pain/Janae Score: Janae Score: 9 (7/11/2024  2:36 PM)

## 2024-07-11 NOTE — H&P
History & Physical - Short Stay  Gastroenterology      SUBJECTIVE:     Procedure: EGD    Chief Complaint/Indication for Procedure: Epigastric Pain, GERD    (Not in a hospital admission)      Review of patient's allergies indicates:   Allergen Reactions    Latex      Other reaction(s): Unknown    Doxycycline Nausea Only        Past Medical History:   Diagnosis Date    Anxiety     Asthma     Chronic pain     Cushing syndrome     Depression     Essential hypertension     Fibromyalgia     Herpes zoster     Hyperlipidemia     Leukocytosis 2022    Onychomycosis     MARY on CPAP     Rheumatoid arthritis     Type 2 diabetes mellitus 2020    Vitamin D deficiency 2018     Past Surgical History:   Procedure Laterality Date     SECTION      ELBOW SURGERY      EPIDURAL STEROID INJECTION      L4-5 VERITO Dec 2020-[facility]    FEMUR SURGERY Right     due to osteomyelitis    HYSTERECTOMY      Abn Bleeding    INJECTION OF ANESTHETIC AGENT AROUND MEDIAL BRANCH NERVES INNERVATING LUMBAR FACET JOINT Bilateral 2022    Procedure: Bilateral L4-5,5-S1 MBB ( No steroids);  Surgeon: Carmel Torre MD;  Location: [facility];  Service: Pain Management;  Laterality: Bilateral;  PT AWARE TO BE TESTED ON OV    INJECTION OF ANESTHETIC AGENT AROUND MEDIAL BRANCH NERVES INNERVATING LUMBAR FACET JOINT Bilateral 2022    Procedure: Block-nerve-medial branch-lumbar, bilateral L4 through S1;  Surgeon: Carmel Torre MD;  Location: [facility];  Service: Pain Management;  Laterality: Bilateral;    INJECTION OF ANESTHETIC AGENT INTO SACROILIAC JOINT Bilateral 2022    Procedure: BLOCK, SACROILIAC JOINT;  Surgeon: Carmel Torre MD;  Location: [facility];  Service: Pain Management;  Laterality: Bilateral;  covid test put in    LIPOMA RESECTION  2019    RADIOFREQUENCY ABLATION OF LUMBAR MEDIAL BRANCH NERVE AT SINGLE LEVEL Right 2022    Procedure: Radiofrequency Ablation, Nerve,  Spinal, Lumbar, Medial Branch, Level L4-S1, right side 1st, will have left-sided after completing;  Surgeon: Carmel Torre MD;  Location: [facility];  Service: Pain Management;  Laterality: Right;  pt aware at visit to be tested    RADIOFREQUENCY ABLATION OF LUMBAR MEDIAL BRANCH NERVE AT SINGLE LEVEL Left 05/05/2022    Procedure: LEFT  L4-S1 RFTC  (HAD RIGHT  ON 4-14);  Surgeon: Carmel Torre MD;  Location: [facility];  Service: Pain Management;  Laterality: Left;    RADIOFREQUENCY ABLATION OF LUMBAR MEDIAL BRANCH NERVE AT SINGLE LEVEL Right 7/25/2023    Procedure: Radiofrequency Ablation, Nerve, Spinal, Lumbar, Medial Branch, Level L4-S1;  Surgeon: Carmel Torre MD;  Location: [facility];  Service: Pain Management;  Laterality: Right;    SURGICAL REMOVAL OF PILONIDAL CYST      TRANSFORAMINAL EPIDURAL INJECTION OF STEROID      Bilateral L4-5 TFESI Nov 2020-[facility]    TRANSFORAMINAL EPIDURAL INJECTION OF STEROID      Right L4-5 TFESI Feb 2020-[facility]    VENTRAL HERNIA REPAIR  09/2020     Family History   Problem Relation Name Age of Onset    Cancer Mother      Thyroid cancer Mother      Thyroid cancer Maternal Grandmother      Melanoma Neg Hx      Colon cancer Neg Hx      Breast cancer Neg Hx      Ovarian cancer Neg Hx       Social History     Tobacco Use    Smoking status: Every Day     Types: Cigarettes     Start date: 9/8/2021    Smokeless tobacco: Never   Substance Use Topics    Alcohol use: Yes     Comment: ocassional    Drug use: Never         OBJECTIVE:     Vital Signs (Most Recent)  Temp: 97.8 °F (36.6 °C) (07/11/24 1350)  Pulse: 80 (07/11/24 1350)  Resp: (!) 21 (07/11/24 1350)  BP: 114/75 (07/11/24 1350)  SpO2: 96 % (07/11/24 1350)    Physical Exam:                                                       GENERAL:  Comfortable, in no acute distress.                                 HEENT EXAM:  Nonicteric.  No adenopathy.  Oropharynx is clear.               NECK:  Supple.                                                                LUNGS:  Clear.                                                               CARDIAC:  Regular rate and rhythm.  S1, S2.  No murmur.                      ABDOMEN:  Soft, positive bowel sounds, nontender.  No hepatosplenomegaly or masses.  No rebound or guarding.                                             EXTREMITIES:  No edema.     MENTAL STATUS:  Normal, alert and oriented.      ASSESSMENT/PLAN:     Assessment: Epigastric Pain, GERD    Plan: EGD

## 2024-07-11 NOTE — DISCHARGE INSTRUCTIONS
Impression  Overall Impression:   The esophagus appeared normal.  Mild erythematous mucosa in the antrum; performed cold forceps biopsy  The duodenal bulb and 2nd part of the duodenum appeared normal.        Recommendation  - No significant hiatal hernia seen on today's exam; likely a small sliding hiatal hernia that was seen last procedure  - Discharge patient to home  - Advance diet as tolerated  - Continue present medications  - Await pathology results  - Patient has a contact number available for emergencies. The signs and symptoms of potential delayed complications were discussed with the patient. Return to normal activities tomorrow. Written discharge instructions were provided to the patient.     No driving today, no operating heavy machinery, no signing any legal documents until tomorrow.    Drink lots of fluids, resume regular diet.  Take your normal medications.     Please call our office for any nausea, vomiting or abdominal pain.

## 2024-07-11 NOTE — TRANSFER OF CARE
"Anesthesia Transfer of Care Note    Patient: Belem Swann    Procedure(s) Performed: * No procedures listed *    Patient location: GI    Anesthesia Type: general    Transport from OR: Transported from OR on room air with adequate spontaneous ventilation    Post pain: adequate analgesia    Post assessment: no apparent anesthetic complications    Post vital signs: stable    Level of consciousness: awake    Nausea/Vomiting: no nausea/vomiting    Complications: none    Transfer of care protocol was followed      Last vitals: Visit Vitals  BP (!) 147/81   Pulse 85   Temp 36.8 °C (98.2 °F)   Resp 20   Ht 5' 2" (1.575 m)   Wt (!) 154.2 kg (340 lb)   SpO2 98%   Breastfeeding No   BMI 62.19 kg/m²     "

## 2024-07-11 NOTE — ANESTHESIA PREPROCEDURE EVALUATION
2024  Belem Swann is a 44 y.o., female.    Past Medical History:   Diagnosis Date    Anxiety     Asthma     Chronic pain     Cushing syndrome     Depression     Essential hypertension     Fibromyalgia     Herpes zoster     Hyperlipidemia     Leukocytosis 2022    Onychomycosis     MARY on CPAP     Rheumatoid arthritis     Type 2 diabetes mellitus 2020    Vitamin D deficiency 2018       Past Surgical History:   Procedure Laterality Date     SECTION      ELBOW SURGERY  1985    EPIDURAL STEROID INJECTION      L4-5 VERITO Dec 2020-[facility]    FEMUR SURGERY Right     due to osteomyelitis    HYSTERECTOMY      Abn Bleeding    INJECTION OF ANESTHETIC AGENT AROUND MEDIAL BRANCH NERVES INNERVATING LUMBAR FACET JOINT Bilateral 2022    Procedure: Bilateral L4-5,5-S1 MBB ( No steroids);  Surgeon: Carmel Torre MD;  Location: [facility];  Service: Pain Management;  Laterality: Bilateral;  PT AWARE TO BE TESTED ON OV    INJECTION OF ANESTHETIC AGENT AROUND MEDIAL BRANCH NERVES INNERVATING LUMBAR FACET JOINT Bilateral 2022    Procedure: Block-nerve-medial branch-lumbar, bilateral L4 through S1;  Surgeon: Carmel Torre MD;  Location: [facility];  Service: Pain Management;  Laterality: Bilateral;    INJECTION OF ANESTHETIC AGENT INTO SACROILIAC JOINT Bilateral 2022    Procedure: BLOCK, SACROILIAC JOINT;  Surgeon: Carmel Torre MD;  Location: [facility];  Service: Pain Management;  Laterality: Bilateral;  covid test put in    LIPOMA RESECTION  2019    RADIOFREQUENCY ABLATION OF LUMBAR MEDIAL BRANCH NERVE AT SINGLE LEVEL Right 2022    Procedure: Radiofrequency Ablation, Nerve, Spinal, Lumbar, Medial Branch, Level L4-S1, right side 1st, will have left-sided after completing;  Surgeon: Carmel Torre MD;  Location: [facility];   Service: Pain Management;  Laterality: Right;  pt aware at visit to be tested    RADIOFREQUENCY ABLATION OF LUMBAR MEDIAL BRANCH NERVE AT SINGLE LEVEL Left 05/05/2022    Procedure: LEFT  L4-S1 RFTC  (HAD RIGHT  ON 4-14);  Surgeon: Carmel Torre MD;  Location: [facility];  Service: Pain Management;  Laterality: Left;    RADIOFREQUENCY ABLATION OF LUMBAR MEDIAL BRANCH NERVE AT SINGLE LEVEL Right 7/25/2023    Procedure: Radiofrequency Ablation, Nerve, Spinal, Lumbar, Medial Branch, Level L4-S1;  Surgeon: Carmel Torre MD;  Location: [facility];  Service: Pain Management;  Laterality: Right;    SURGICAL REMOVAL OF PILONIDAL CYST      TRANSFORAMINAL EPIDURAL INJECTION OF STEROID      Bilateral L4-5 TFESI Nov 2020-[facility]    TRANSFORAMINAL EPIDURAL INJECTION OF STEROID      Right L4-5 TFESI Feb 2020-[facility]    VENTRAL HERNIA REPAIR  09/2020       Family History   Problem Relation Name Age of Onset    Cancer Mother      Thyroid cancer Mother      Thyroid cancer Maternal Grandmother      Melanoma Neg Hx      Colon cancer Neg Hx      Breast cancer Neg Hx      Ovarian cancer Neg Hx         Social History     Socioeconomic History    Marital status:    Tobacco Use    Smoking status: Every Day     Types: Cigarettes     Start date: 9/8/2021    Smokeless tobacco: Never   Substance and Sexual Activity    Alcohol use: Yes     Comment: ocassional    Drug use: Never    Sexual activity: Not Currently     Partners: Male     Birth control/protection: See Surgical Hx       Current Outpatient Medications   Medication Sig Dispense Refill    albuterol sulfate 90 mcg/actuation aebs Inhale 180 mcg into the lungs every 4 (four) hours.      albuterol-ipratropium (DUO-NEB) 2.5 mg-0.5 mg/3 mL nebulizer solution Take 3 mLs by nebulization every 6 (six) hours as needed. Rescue      amitriptyline (ELAVIL) 25 MG tablet Take 1 tablet (25 mg total) by mouth every evening. 90 tablet 3    budesonide-formoterol 160-4.5 mcg  (SYMBICORT) 160-4.5 mcg/actuation HFAA Inhale 2 puffs into the lungs every 12 (twelve) hours. Controller 10.2 g 11    carvediloL (COREG) 25 MG tablet Take 25 mg by mouth 2 (two) times daily with meals.      cloNIDine (CATAPRES) 0.1 MG tablet Take 0.1 mg by mouth daily as needed.      cyclobenzaprine (FLEXERIL) 10 MG tablet Take 1 tablet (10 mg total) by mouth 3 (three) times daily as needed for Muscle spasms. 90 tablet 2    furosemide (LASIX) 40 MG tablet Take 40 mg by mouth once daily.      gabapentin (NEURONTIN) 300 MG capsule Take 1 capsule (300 mg total) by mouth every 8 (eight) hours. 90 capsule 1    HUMIRA,CF, PEN 40 mg/0.4 mL PnKt Inject 40 mg into the skin once a week.      HYDROcodone-acetaminophen (NORCO)  mg per tablet Take 1 tablet by mouth every 8 (eight) hours as needed for Pain. 90 tablet 0    [START ON 7/14/2024] HYDROcodone-acetaminophen (NORCO)  mg per tablet Take 1 tablet by mouth every 8 (eight) hours as needed for Pain. 90 tablet 0    hydrocortisone 2.5 % ointment Apply topically 2 (two) times daily. 454 g 5    hydrOXYzine pamoate (VISTARIL) 50 MG Cap Take 2 capsules (100 mg total) by mouth nightly as needed (anxiety & difficulty sleeping). 180 capsule 1    ketoconazole (NIZORAL) 2 % cream Apply topically once daily. 60 g 2    lancets (ONETOUCH DELICA LANCETS) 33 gauge Misc 1 lancet by Misc.(Non-Drug; Combo Route) route once daily. 100 each 3    metFORMIN (GLUCOPHAGE-XR) 500 MG ER 24hr tablet Take 1 tablet (500 mg total) by mouth once daily. 90 tablet 3    montelukast (SINGULAIR) 10 mg tablet Take 10 mg by mouth once daily.      NIFEdipine (PROCARDIA-XL) 30 MG (OSM) 24 hr tablet Take 30 mg by mouth every evening.      nystatin (MYCOSTATIN) powder Apply topically 2 (two) times daily. 60 g 5    olmesartan (BENICAR) 40 MG tablet Take 40 mg by mouth once daily.      pantoprazole (PROTONIX) 40 MG tablet Take 1 tablet (40 mg total) by mouth 2 (two) times daily. 60 tablet 6    pen needle,  "diabetic 31 gauge x 1/4" Ndle       potassium chloride (KLOR-CON) 10 MEQ TbSR Take 1 tablet (10 mEq total) by mouth once daily. for 180 doses 90 tablet 1    promethazine (PHENERGAN) 25 MG tablet Take 1 tablet (25 mg total) by mouth every 6 (six) hours as needed for Nausea. 30 tablet 1    rosuvastatin (CRESTOR) 40 MG Tab Take 1 tablet (40 mg total) by mouth every evening. 90 tablet 3    semaglutide (OZEMPIC) 0.25 mg or 0.5 mg (2 mg/3 mL) pen injector Inject 0.5 mg into the skin every 7 days. 9 mL 0    triamcinolone acetonide 0.1% (KENALOG) 0.1 % ointment Apply topically 2 (two) times daily. 454 g 0    TRUE METRIX GLUCOSE TEST STRIP Strp 1 strip by Misc.(Non-Drug; Combo Route) route Daily. For T2DM. 100 strip 3     Current Facility-Administered Medications   Medication Dose Route Frequency Provider Last Rate Last Admin    ketorolac injection 60 mg  60 mg Intramuscular 1 time in Clinic/[facility]OD Shows, GUCCI Whaley        lactated ringers infusion   Intravenous Continuous StephanieerisetTree varma MD        sodium chloride 0.9% flush 10 mL  10 mL Intravenous Q6H PRN Tree Roche MD           Review of patient's allergies indicates:   Allergen Reactions    Latex      Other reaction(s): Unknown    Doxycycline Nausea Only        Pre-op Assessment    I have reviewed the Patient Summary Reports.     I have reviewed the Nursing Notes. I have reviewed the NPO Status.   I have reviewed the Medications.     Review of Systems  Anesthesia Hx:  No previous Anesthesia             Denies Family Hx of Anesthesia complications.    Denies Personal Hx of Anesthesia complications.                    Social:  Smoker, Alcohol Use       Hematology/Oncology:  Hematology Normal   Oncology Normal                                   EENT/Dental:  EENT/Dental Normal           Cardiovascular:     Hypertension                                        Pulmonary:    Asthma    Sleep Apnea                Hepatic/GI:  Bowel Prep.  Hiatal Hernia,            "   Neurological:    Neuromuscular Disease,                                   Endocrine:  Diabetes           Psych:  Denies Psychiatric History. anxiety                 Physical Exam  General: Well nourished, Cooperative, Alert and Oriented    Airway:  Mallampati: II   Mouth Opening: Normal  TM Distance: Normal  Tongue: Normal  Neck ROM: Normal ROM    Dental:  Intact        Anesthesia Plan  Type of Anesthesia, risks & benefits discussed:    Anesthesia Type: Gen Natural Airway  Intra-op Monitoring Plan: Standard ASA Monitors  Post Op Pain Control Plan: multimodal analgesia  Induction:  IV  Informed Consent: Informed consent signed with the Patient and all parties understand the risks and agree with anesthesia plan.  All questions answered. Patient consented to blood products? Yes  ASA Score: 3  Day of Surgery Review of History & Physical: I have interviewed and examined the patient. I have reviewed the patient's H&P dated: There are no significant changes.     Ready For Surgery From Anesthesia Perspective.     .

## 2024-08-08 ENCOUNTER — OFFICE VISIT (OUTPATIENT)
Dept: PAIN MEDICINE | Facility: CLINIC | Age: 44
End: 2024-08-08
Payer: MEDICAID

## 2024-08-08 VITALS
HEART RATE: 77 BPM | HEIGHT: 62 IN | WEIGHT: 293 LBS | DIASTOLIC BLOOD PRESSURE: 101 MMHG | BODY MASS INDEX: 53.92 KG/M2 | SYSTOLIC BLOOD PRESSURE: 174 MMHG

## 2024-08-08 DIAGNOSIS — G89.29 CHRONIC PAIN OF RIGHT KNEE: Chronic | ICD-10-CM

## 2024-08-08 DIAGNOSIS — M05.79 RHEUMATOID ARTHRITIS INVOLVING MULTIPLE SITES WITH POSITIVE RHEUMATOID FACTOR: Primary | Chronic | ICD-10-CM

## 2024-08-08 DIAGNOSIS — M47.816 SPONDYLOSIS OF LUMBAR REGION WITHOUT MYELOPATHY OR RADICULOPATHY: Chronic | ICD-10-CM

## 2024-08-08 DIAGNOSIS — M25.561 CHRONIC PAIN OF RIGHT KNEE: Chronic | ICD-10-CM

## 2024-08-08 DIAGNOSIS — M46.1 SACROILIITIS: Chronic | ICD-10-CM

## 2024-08-08 PROCEDURE — 99999 PR PBB SHADOW E&M-EST. PATIENT-LVL V: CPT | Mod: PBBFAC,,, | Performed by: PHYSICIAN ASSISTANT

## 2024-08-08 PROCEDURE — 99214 OFFICE O/P EST MOD 30 MIN: CPT | Mod: S$PBB,25,, | Performed by: PHYSICIAN ASSISTANT

## 2024-08-08 PROCEDURE — 99215 OFFICE O/P EST HI 40 MIN: CPT | Mod: PBBFAC | Performed by: PHYSICIAN ASSISTANT

## 2024-08-08 PROCEDURE — 3044F HG A1C LEVEL LT 7.0%: CPT | Mod: CPTII,,, | Performed by: PHYSICIAN ASSISTANT

## 2024-08-08 PROCEDURE — 3061F NEG MICROALBUMINURIA REV: CPT | Mod: CPTII,,, | Performed by: PHYSICIAN ASSISTANT

## 2024-08-08 PROCEDURE — 99999PBSHW PR PBB SHADOW TECHNICAL ONLY FILED TO HB: Mod: PBBFAC,,,

## 2024-08-08 PROCEDURE — 3077F SYST BP >= 140 MM HG: CPT | Mod: CPTII,,, | Performed by: PHYSICIAN ASSISTANT

## 2024-08-08 PROCEDURE — 3066F NEPHROPATHY DOC TX: CPT | Mod: CPTII,,, | Performed by: PHYSICIAN ASSISTANT

## 2024-08-08 PROCEDURE — 3080F DIAST BP >= 90 MM HG: CPT | Mod: CPTII,,, | Performed by: PHYSICIAN ASSISTANT

## 2024-08-08 PROCEDURE — 3008F BODY MASS INDEX DOCD: CPT | Mod: CPTII,,, | Performed by: PHYSICIAN ASSISTANT

## 2024-08-08 PROCEDURE — 96372 THER/PROPH/DIAG INJ SC/IM: CPT | Mod: PBBFAC | Performed by: PHYSICIAN ASSISTANT

## 2024-08-08 PROCEDURE — 1159F MED LIST DOCD IN RCRD: CPT | Mod: CPTII,,, | Performed by: PHYSICIAN ASSISTANT

## 2024-08-08 PROCEDURE — 4010F ACE/ARB THERAPY RXD/TAKEN: CPT | Mod: CPTII,,, | Performed by: PHYSICIAN ASSISTANT

## 2024-08-08 RX ORDER — HYDROCODONE BITARTRATE AND ACETAMINOPHEN 10; 325 MG/1; MG/1
1 TABLET ORAL EVERY 8 HOURS PRN
Qty: 90 TABLET | Refills: 0 | Status: SHIPPED | OUTPATIENT
Start: 2024-08-13 | End: 2024-09-12

## 2024-08-08 RX ORDER — GABAPENTIN 300 MG/1
300 CAPSULE ORAL EVERY 8 HOURS
Qty: 90 CAPSULE | Refills: 1 | Status: SHIPPED | OUTPATIENT
Start: 2024-08-08

## 2024-08-08 RX ORDER — HYDROCODONE BITARTRATE AND ACETAMINOPHEN 10; 325 MG/1; MG/1
1 TABLET ORAL EVERY 8 HOURS PRN
Qty: 90 TABLET | Refills: 0 | Status: SHIPPED | OUTPATIENT
Start: 2024-09-12 | End: 2024-10-12

## 2024-08-08 RX ORDER — KETOROLAC TROMETHAMINE 30 MG/ML
60 INJECTION, SOLUTION INTRAMUSCULAR; INTRAVENOUS
Status: COMPLETED | OUTPATIENT
Start: 2024-08-08 | End: 2024-08-08

## 2024-08-08 RX ADMIN — KETOROLAC TROMETHAMINE 60 MG: 30 INJECTION, SOLUTION INTRAMUSCULAR at 02:08

## 2024-08-20 ENCOUNTER — OFFICE VISIT (OUTPATIENT)
Dept: DERMATOLOGY | Facility: CLINIC | Age: 44
End: 2024-08-20
Payer: MEDICAID

## 2024-08-20 DIAGNOSIS — L73.2 HIDRADENITIS SUPPURATIVA: ICD-10-CM

## 2024-08-20 DIAGNOSIS — D17.9 LIPOMA, UNSPECIFIED SITE: ICD-10-CM

## 2024-08-20 DIAGNOSIS — Z79.899 HIGH RISK MEDICATION USE: Primary | ICD-10-CM

## 2024-08-20 DIAGNOSIS — L40.0 PLAQUE PSORIASIS: ICD-10-CM

## 2024-08-20 PROCEDURE — 99214 OFFICE O/P EST MOD 30 MIN: CPT | Mod: ,,, | Performed by: STUDENT IN AN ORGANIZED HEALTH CARE EDUCATION/TRAINING PROGRAM

## 2024-08-20 PROCEDURE — 4010F ACE/ARB THERAPY RXD/TAKEN: CPT | Mod: CPTII,,, | Performed by: STUDENT IN AN ORGANIZED HEALTH CARE EDUCATION/TRAINING PROGRAM

## 2024-08-20 PROCEDURE — 1159F MED LIST DOCD IN RCRD: CPT | Mod: CPTII,,, | Performed by: STUDENT IN AN ORGANIZED HEALTH CARE EDUCATION/TRAINING PROGRAM

## 2024-08-20 PROCEDURE — 3044F HG A1C LEVEL LT 7.0%: CPT | Mod: CPTII,,, | Performed by: STUDENT IN AN ORGANIZED HEALTH CARE EDUCATION/TRAINING PROGRAM

## 2024-08-20 PROCEDURE — 3061F NEG MICROALBUMINURIA REV: CPT | Mod: CPTII,,, | Performed by: STUDENT IN AN ORGANIZED HEALTH CARE EDUCATION/TRAINING PROGRAM

## 2024-08-20 PROCEDURE — 3066F NEPHROPATHY DOC TX: CPT | Mod: CPTII,,, | Performed by: STUDENT IN AN ORGANIZED HEALTH CARE EDUCATION/TRAINING PROGRAM

## 2024-08-20 NOTE — PROGRESS NOTES
[facility]  Cj Wakefield MD    [address] [address]  [phone]    Fax: [phone]    Patient Name: Belem Swann  Medical Record Number: [id number]  PCP: Rhoda Rueda FNP  Age: 44 y.o. : 1980  Contact: [phone] (home)     History of Present Illness:     Belem Swann is a 44 y.o.  female here for follow up of  HS and plaque psoriasis. Treatment plan includes humira injections 40 mg weekly and minocycline for flares that has improved. Her psoriasis is well controlled.     The patient has no other concerns today.    Review of Systems:     Unremarkable other than mentioned above.     Physical Exam:     General: Relaxed, oriented, alert    Skin examination of the scalp, face, neck, chest, back, abdomen, upper extremities and lower extremities were normal except for as listed below      Assessment and Plan:     1. Hidradenitis Suppurativa  - Fistula formation and draining sinuses, weeping acneiform pustules and papules, scarring, and acneiform nodules  Alvarado Stage: 3     Plan:    - continue Humira 40 mg weekly      Counseling.  Cleanse acneiform lesions and sinus tracts with anti-bacterial washes. Oral antibiotics can help reduce inflammation.  Discussed importance of not smoking and weight loss         2. Plaque Psoriasis  - psoriasiform plaques with micaceous scale     Plan:   Humira 40 mg weekly       Counseling  I counseled patient regarding systemic effects of inflammation from psoriasis, and the association with cardiac disease, metabolic syndrome, depression, and arthritis     3. High Risk Medication Monitoring : The risks and benefits of the medication were reviewed in full with the patient. Should any side effects occur, the patient will stop the medication and contact me immediately.    - CBC, CMP and quant gold labs     4.  Lipoma   - soft, mobile nodule on the left arm      Plan:  - favor benign clinically   - will schedule  excision if desired        Cj Wakefield MD   Mohs Surgery/Dermatologic Oncology  Dermatology

## 2024-09-17 ENCOUNTER — OFFICE VISIT (OUTPATIENT)
Dept: FAMILY MEDICINE | Facility: CLINIC | Age: 44
End: 2024-09-17
Payer: MEDICAID

## 2024-09-17 VITALS
HEART RATE: 82 BPM | RESPIRATION RATE: 18 BRPM | TEMPERATURE: 98 F | BODY MASS INDEX: 53.92 KG/M2 | OXYGEN SATURATION: 95 % | WEIGHT: 293 LBS | DIASTOLIC BLOOD PRESSURE: 82 MMHG | HEIGHT: 62 IN | SYSTOLIC BLOOD PRESSURE: 148 MMHG

## 2024-09-17 DIAGNOSIS — E78.00 HYPERCHOLESTEROLEMIA: Chronic | ICD-10-CM

## 2024-09-17 DIAGNOSIS — M25.562 BILATERAL CHRONIC KNEE PAIN: Chronic | ICD-10-CM

## 2024-09-17 DIAGNOSIS — Z00.00 ROUTINE GENERAL MEDICAL EXAMINATION AT A HEALTH CARE FACILITY: Primary | ICD-10-CM

## 2024-09-17 DIAGNOSIS — G89.29 BILATERAL CHRONIC KNEE PAIN: Chronic | ICD-10-CM

## 2024-09-17 DIAGNOSIS — I10 ESSENTIAL HYPERTENSION: ICD-10-CM

## 2024-09-17 DIAGNOSIS — K44.9 HH (HIATUS HERNIA): Chronic | ICD-10-CM

## 2024-09-17 DIAGNOSIS — M25.561 BILATERAL CHRONIC KNEE PAIN: Chronic | ICD-10-CM

## 2024-09-17 DIAGNOSIS — E11.9 TYPE 2 DIABETES MELLITUS WITHOUT COMPLICATION, WITHOUT LONG-TERM CURRENT USE OF INSULIN: Chronic | ICD-10-CM

## 2024-09-17 DIAGNOSIS — M05.79 RHEUMATOID ARTHRITIS INVOLVING MULTIPLE SITES WITH POSITIVE RHEUMATOID FACTOR: Chronic | ICD-10-CM

## 2024-09-17 DIAGNOSIS — Z13.220 SCREENING FOR HYPERLIPIDEMIA: ICD-10-CM

## 2024-09-17 DIAGNOSIS — J45.40 MODERATE PERSISTENT ASTHMA WITHOUT COMPLICATION: Chronic | ICD-10-CM

## 2024-09-17 DIAGNOSIS — M62.838 MUSCLE SPASM: Chronic | ICD-10-CM

## 2024-09-17 DIAGNOSIS — K21.00 GASTROESOPHAGEAL REFLUX DISEASE WITH ESOPHAGITIS WITHOUT HEMORRHAGE: Chronic | ICD-10-CM

## 2024-09-17 LAB
ALBUMIN SERPL BCP-MCNC: 3.1 G/DL (ref 3.5–5)
ALBUMIN/GLOB SERPL: 0.6 {RATIO}
ALP SERPL-CCNC: 130 U/L (ref 39–100)
ALT SERPL W P-5'-P-CCNC: 21 U/L (ref 13–56)
ANION GAP SERPL CALCULATED.3IONS-SCNC: 11 MMOL/L (ref 7–16)
AST SERPL W P-5'-P-CCNC: 11 U/L (ref 15–37)
BASOPHILS # BLD AUTO: 0.06 K/UL (ref 0–0.2)
BASOPHILS NFR BLD AUTO: 0.5 % (ref 0–1)
BILIRUB SERPL-MCNC: 0.2 MG/DL (ref ?–1.2)
BUN SERPL-MCNC: 14 MG/DL (ref 7–18)
BUN/CREAT SERPL: 25 (ref 6–20)
CALCIUM SERPL-MCNC: 8.8 MG/DL (ref 8.5–10.1)
CHLORIDE SERPL-SCNC: 105 MMOL/L (ref 98–107)
CHOLEST SERPL-MCNC: 171 MG/DL (ref 0–200)
CHOLEST/HDLC SERPL: 4 {RATIO}
CO2 SERPL-SCNC: 27 MMOL/L (ref 21–32)
CREAT SERPL-MCNC: 0.56 MG/DL (ref 0.55–1.02)
DIFFERENTIAL METHOD BLD: ABNORMAL
EGFR (NO RACE VARIABLE) (RUSH/TITUS): 115 ML/MIN/1.73M2
EOSINOPHIL # BLD AUTO: 0.39 K/UL (ref 0–0.5)
EOSINOPHIL NFR BLD AUTO: 3.4 % (ref 1–4)
ERYTHROCYTE [DISTWIDTH] IN BLOOD BY AUTOMATED COUNT: 13 % (ref 11.5–14.5)
EST. AVERAGE GLUCOSE BLD GHB EST-MCNC: 134 MG/DL
GLOBULIN SER-MCNC: 4.8 G/DL (ref 2–4)
GLUCOSE SERPL-MCNC: 148 MG/DL (ref 74–106)
HBA1C MFR BLD HPLC: 6.3 % (ref 4.5–6.6)
HCT VFR BLD AUTO: 42.3 % (ref 38–47)
HDLC SERPL-MCNC: 43 MG/DL (ref 40–60)
HGB BLD-MCNC: 13.6 G/DL (ref 12–16)
IMM GRANULOCYTES # BLD AUTO: 0.08 K/UL (ref 0–0.04)
IMM GRANULOCYTES NFR BLD: 0.7 % (ref 0–0.4)
LDLC SERPL CALC-MCNC: 101 MG/DL
LDLC/HDLC SERPL: 2.3 {RATIO}
LYMPHOCYTES # BLD AUTO: 2.14 K/UL (ref 1–4.8)
LYMPHOCYTES NFR BLD AUTO: 18.5 % (ref 27–41)
MCH RBC QN AUTO: 27.8 PG (ref 27–31)
MCHC RBC AUTO-ENTMCNC: 32.2 G/DL (ref 32–36)
MCV RBC AUTO: 86.5 FL (ref 80–96)
MONOCYTES # BLD AUTO: 0.65 K/UL (ref 0–0.8)
MONOCYTES NFR BLD AUTO: 5.6 % (ref 2–6)
MPC BLD CALC-MCNC: 13.9 FL (ref 9.4–12.4)
NEUTROPHILS # BLD AUTO: 8.27 K/UL (ref 1.8–7.7)
NEUTROPHILS NFR BLD AUTO: 71.3 % (ref 53–65)
NONHDLC SERPL-MCNC: 128 MG/DL
NRBC # BLD AUTO: 0 X10E3/UL
NRBC, AUTO (.00): 0 %
PLATELET # BLD AUTO: 155 K/UL (ref 150–400)
PLATELET MORPHOLOGY: ABNORMAL
POTASSIUM SERPL-SCNC: 3.8 MMOL/L (ref 3.5–5.1)
PROT SERPL-MCNC: 7.9 G/DL (ref 6.4–8.2)
RBC # BLD AUTO: 4.89 M/UL (ref 4.2–5.4)
RBC MORPH BLD: NORMAL
SODIUM SERPL-SCNC: 139 MMOL/L (ref 136–145)
TRIGL SERPL-MCNC: 135 MG/DL (ref 35–150)
VLDLC SERPL-MCNC: 27 MG/DL
WBC # BLD AUTO: 11.59 K/UL (ref 4.5–11)

## 2024-09-17 PROCEDURE — 3066F NEPHROPATHY DOC TX: CPT | Mod: CPTII,,, | Performed by: NURSE PRACTITIONER

## 2024-09-17 PROCEDURE — 80061 LIPID PANEL: CPT | Mod: ,,, | Performed by: CLINICAL MEDICAL LABORATORY

## 2024-09-17 PROCEDURE — 85025 COMPLETE CBC W/AUTO DIFF WBC: CPT | Mod: ,,, | Performed by: CLINICAL MEDICAL LABORATORY

## 2024-09-17 PROCEDURE — 3077F SYST BP >= 140 MM HG: CPT | Mod: CPTII,,, | Performed by: NURSE PRACTITIONER

## 2024-09-17 PROCEDURE — 96372 THER/PROPH/DIAG INJ SC/IM: CPT | Mod: ,,, | Performed by: NURSE PRACTITIONER

## 2024-09-17 PROCEDURE — 80053 COMPREHEN METABOLIC PANEL: CPT | Mod: ,,, | Performed by: CLINICAL MEDICAL LABORATORY

## 2024-09-17 PROCEDURE — 3079F DIAST BP 80-89 MM HG: CPT | Mod: CPTII,,, | Performed by: NURSE PRACTITIONER

## 2024-09-17 PROCEDURE — 4010F ACE/ARB THERAPY RXD/TAKEN: CPT | Mod: CPTII,,, | Performed by: NURSE PRACTITIONER

## 2024-09-17 PROCEDURE — 3008F BODY MASS INDEX DOCD: CPT | Mod: CPTII,,, | Performed by: NURSE PRACTITIONER

## 2024-09-17 PROCEDURE — 83036 HEMOGLOBIN GLYCOSYLATED A1C: CPT | Mod: ,,, | Performed by: CLINICAL MEDICAL LABORATORY

## 2024-09-17 PROCEDURE — 99396 PREV VISIT EST AGE 40-64: CPT | Mod: 25,,, | Performed by: NURSE PRACTITIONER

## 2024-09-17 PROCEDURE — 1159F MED LIST DOCD IN RCRD: CPT | Mod: CPTII,,, | Performed by: NURSE PRACTITIONER

## 2024-09-17 PROCEDURE — 1160F RVW MEDS BY RX/DR IN RCRD: CPT | Mod: CPTII,,, | Performed by: NURSE PRACTITIONER

## 2024-09-17 PROCEDURE — 3061F NEG MICROALBUMINURIA REV: CPT | Mod: CPTII,,, | Performed by: NURSE PRACTITIONER

## 2024-09-17 PROCEDURE — 3044F HG A1C LEVEL LT 7.0%: CPT | Mod: CPTII,,, | Performed by: NURSE PRACTITIONER

## 2024-09-17 RX ORDER — PROMETHAZINE HYDROCHLORIDE 25 MG/1
25 TABLET ORAL EVERY 6 HOURS PRN
Qty: 30 TABLET | Refills: 1 | Status: SHIPPED | OUTPATIENT
Start: 2024-09-17

## 2024-09-17 RX ORDER — ROSUVASTATIN CALCIUM 40 MG/1
40 TABLET, COATED ORAL NIGHTLY
Qty: 90 TABLET | Refills: 3 | Status: SHIPPED | OUTPATIENT
Start: 2024-09-17 | End: 2025-09-17

## 2024-09-17 RX ORDER — SEMAGLUTIDE 1.34 MG/ML
1 INJECTION, SOLUTION SUBCUTANEOUS
Qty: 9 ML | Refills: 3 | Status: SHIPPED | OUTPATIENT
Start: 2024-09-17

## 2024-09-17 RX ORDER — LEFLUNOMIDE 10 MG/1
10 TABLET ORAL DAILY
COMMUNITY
Start: 2024-06-20

## 2024-09-17 RX ORDER — BUDESONIDE AND FORMOTEROL FUMARATE DIHYDRATE 160; 4.5 UG/1; UG/1
2 AEROSOL RESPIRATORY (INHALATION) EVERY 12 HOURS
Qty: 32.1 G | Refills: 3 | Status: SHIPPED | OUTPATIENT
Start: 2024-09-17

## 2024-09-17 RX ORDER — CYCLOBENZAPRINE HCL 10 MG
10 TABLET ORAL 3 TIMES DAILY PRN
Qty: 90 TABLET | Refills: 2 | Status: SHIPPED | OUTPATIENT
Start: 2024-09-17

## 2024-09-17 RX ORDER — KETOROLAC TROMETHAMINE 30 MG/ML
60 INJECTION, SOLUTION INTRAMUSCULAR; INTRAVENOUS
Status: COMPLETED | OUTPATIENT
Start: 2024-09-17 | End: 2024-09-17

## 2024-09-17 RX ADMIN — KETOROLAC TROMETHAMINE 60 MG: 30 INJECTION, SOLUTION INTRAMUSCULAR; INTRAVENOUS at 10:09

## 2024-09-17 NOTE — ASSESSMENT & PLAN NOTE
Not well controlled, followed by Rheumatology.  C/o increased knee pain.  Toradol 60 mg IM today.  f/u with ortho as scheduled.

## 2024-09-17 NOTE — ASSESSMENT & PLAN NOTE
Not controlled but has not taken medications today.    Advised to take medications upon arrival home and stressed daily adherence.  We will have nurse call her this afternoon to get another BP reading to see if medication adjustment is needed.    Continue olmesartan, nifedipine, furosemide, clonidine, and carvedilol.

## 2024-09-17 NOTE — ASSESSMENT & PLAN NOTE
Stable   Continue montelukast 10 mg daily, Symbicort daily, and rescue with DuoNeb or albuterol HFA p.r.n.

## 2024-09-17 NOTE — PROGRESS NOTES
[facility]  [address] [place] [address]  Phone: [phone]  Fax: [phone]       PATIENT NAME: Belem Swann   : 1980    AGE: 44 y.o. DATE OF ENCOUNTER: 24    MRN: [id number]      PCP: Rhoda Rueda FNP    Subjective:     Reason for Visit / Chief Complaint:     274}  Chief Complaint   Patient presents with    [employer]     Patient reports to the clinic for [facility] [employer] [facility] visit, she is fasting this morning.    Health Maintenance     Care gaps addressed, patient due for foot exam, has not had her eye exam or mammogram. She declines all vaccines today.    Silvia Gomez, LYSSA     [employer] [facility] wellness and routine f/u T2DM, HTN, HLD    Would like to increase Ozempic.    Had skin infection right side but it is clearing up.  Chronic WBCs elevation.    Increased knee pain and swelling.  Doesn't see Ortho until 10/01/24.  Requesting Toradol IM today.    Review of Systems:     Review of Systems   Constitutional:  Negative for chills and fever.   HENT:  Negative for congestion, ear pain, sinus pain and sore throat.    Eyes: Negative.    Respiratory:  Positive for shortness of breath (chronic BALDERAS). Negative for cough, chest tightness and wheezing.    Cardiovascular:  Positive for leg swelling (chronic). Negative for chest pain and palpitations.   Gastrointestinal: Negative.  Negative for abdominal pain.   Genitourinary: Negative.    Musculoskeletal:  Positive for arthralgias, back pain, gait problem and myalgias.        Chronic, followed by pain management at [facility].   Skin:  Positive for rash (chronic).   Neurological:  Positive for headaches.   Psychiatric/Behavioral:  Positive for sleep disturbance (chronic). Negative for dysphoric mood, self-injury and suicidal ideas. The patient is nervous/anxious (chronic).        Allergies and Meds: 274}     Review of patient's allergies indicates:   Allergen Reactions    Latex      Other reaction(s):  Unknown    Doxycycline Nausea Only        Current Outpatient Medications   Medication Sig Dispense Refill    albuterol sulfate 90 mcg/actuation aebs Inhale 180 mcg into the lungs every 4 (four) hours.      albuterol-ipratropium (DUO-NEB) 2.5 mg-0.5 mg/3 mL nebulizer solution Take 3 mLs by nebulization every 6 (six) hours as needed. Rescue      amitriptyline (ELAVIL) 25 MG tablet Take 1 tablet (25 mg total) by mouth every evening. 90 tablet 3    carvediloL (COREG) 25 MG tablet Take 25 mg by mouth 2 (two) times daily with meals.      cloNIDine (CATAPRES) 0.1 MG tablet Take 0.1 mg by mouth daily as needed.      furosemide (LASIX) 40 MG tablet Take 40 mg by mouth once daily.      gabapentin (NEURONTIN) 300 MG capsule Take 1 capsule (300 mg total) by mouth every 8 (eight) hours. 90 capsule 1    HUMIRA,CF, PEN 40 mg/0.4 mL PnKt Inject 40 mg into the skin once a week.      HYDROcodone-acetaminophen (NORCO)  mg per tablet Take 1 tablet by mouth every 8 (eight) hours as needed for Pain. 90 tablet 0    hydrocortisone 2.5 % ointment Apply topically 2 (two) times daily. 454 g 5    hydrOXYzine pamoate (VISTARIL) 50 MG Cap Take 2 capsules (100 mg total) by mouth nightly as needed (anxiety & difficulty sleeping). 180 capsule 1    ketoconazole (NIZORAL) 2 % cream Apply topically once daily. 60 g 2    lancets (ONETOUCH DELICA LANCETS) 33 gauge Misc 1 lancet by Misc.(Non-Drug; Combo Route) route once daily. 100 each 3    leflunomide (ARAVA) 10 MG Tab Take 10 mg by mouth once daily.      metFORMIN (GLUCOPHAGE-XR) 500 MG ER 24hr tablet Take 1 tablet (500 mg total) by mouth once daily. 90 tablet 3    montelukast (SINGULAIR) 10 mg tablet Take 10 mg by mouth once daily.      NIFEdipine (PROCARDIA-XL) 30 MG (OSM) 24 hr tablet Take 30 mg by mouth every evening.      nystatin (MYCOSTATIN) powder Apply topically 2 (two) times daily. 60 g 5    olmesartan (BENICAR) 40 MG tablet Take 40 mg by mouth once daily.      pantoprazole (PROTONIX)  "40 MG tablet Take 1 tablet (40 mg total) by mouth 2 (two) times daily. 60 tablet 6    pen needle, diabetic 31 gauge x 1/4" Ndle       potassium chloride (KLOR-CON) 10 MEQ TbSR Take 1 tablet (10 mEq total) by mouth once daily. for 180 doses 90 tablet 1    triamcinolone acetonide 0.1% (KENALOG) 0.1 % ointment Apply topically 2 (two) times daily. 454 g 0    TRUE METRIX GLUCOSE TEST STRIP Strp 1 strip by Misc.(Non-Drug; Combo Route) route Daily. For T2DM. 100 strip 3    budesonide-formoterol 160-4.5 mcg (SYMBICORT) 160-4.5 mcg/actuation HFAA Inhale 2 puffs into the lungs every 12 (twelve) hours. Controller 32.1 g 3    cyclobenzaprine (FLEXERIL) 10 MG tablet Take 1 tablet (10 mg total) by mouth 3 (three) times daily as needed for Muscle spasms. 90 tablet 2    promethazine (PHENERGAN) 25 MG tablet Take 1 tablet (25 mg total) by mouth every 6 (six) hours as needed for Nausea. 30 tablet 1    rosuvastatin (CRESTOR) 40 MG Tab Take 1 tablet (40 mg total) by mouth every evening. 90 tablet 3    semaglutide (OZEMPIC) 1 mg/dose (4 mg/3 mL) Inject 1 mg into the skin every 7 days. 9 mL 3     Current Facility-Administered Medications   Medication Dose Route Frequency Provider Last Rate Last Admin    ketorolac injection 60 mg  60 mg Intramuscular 1 time in Clinic/HOD Shows, GUCCI Whaley           Labs:274}   I have reviewed labs below:    Lab Results   Component Value Date    WBC 11.42 (H) 11/03/2023    RBC 4.67 11/03/2023    HGB 13.2 11/03/2023    HCT 40.1 11/03/2023     (L) 11/03/2023     03/20/2024    K 3.4 (L) 03/20/2024     03/20/2024    CALCIUM 9.3 03/20/2024     (H) 03/20/2024    BUN 17 03/20/2024    CREATININE 0.55 03/20/2024    ESTGFRAFRICA 103 10/07/2020    EGFRNONAA 95 05/11/2022    ALT 23 03/20/2024    AST 14 (L) 03/20/2024    INR 1.10 10/07/2020    CHOL 176 03/20/2024    TRIG 126 03/20/2024    HDL 38 (L) 03/20/2024    LDLCALC 113 03/20/2024    TSH 2.140 03/20/2024    HGBA1C 6.1 03/20/2024    " "MICROALBUR 2.8 03/20/2024       Medical History: 274}     Past Medical History:   Diagnosis Date    Anxiety     Asthma     Chronic pain     Cushing syndrome     Depression     Essential hypertension     Fibromyalgia     Herpes zoster     Hyperlipidemia     Leukocytosis 1/18/2022    Onychomycosis     MARY on CPAP     Rheumatoid arthritis     Type 2 diabetes mellitus 11/2020    Vitamin D deficiency 05/03/2018      Social History     Tobacco Use   Smoking Status Every Day    Types: Cigarettes    Start date: 9/8/2021   Smokeless Tobacco Never      Health Maintenance:      Health Maintenance Topics with due status: Not Due       Topic Last Completion Date    Pneumococcal Vaccines (Age 0-64) 01/04/2021    TETANUS VACCINE 05/06/2021    Pap Smear 03/23/2023    Diabetes Urine Screening 03/20/2024    Lipid Panel 03/20/2024    Foot Exam 09/17/2024       Objective:  274}   Vital Signs  Temp: 98 °F (36.7 °C)  Temp Source: Oral  Pulse: 82  Resp: 18  SpO2: 95 %  BP: (!) 148/82  BP Location: Left arm  Patient Position: Sitting  Pain Score:   9  Pain Loc: Knee  Height and Weight  Height: 5' 2" (157.5 cm)  Weight: (!) 155.2 kg (342 lb 3.2 oz)  BSA (Calculated - sq m): 2.61 sq meters  BMI (Calculated): 62.6  Weight in (lb) to have BMI = 25: 136.4    Over the last two weeks how often have you been bothered by little interest or pleasure in doing things: 0  Over the last two weeks how often have you been bothered by feeling down, depressed or hopeless: 0  PHQ-2 Total Score: 0  PHQ-9 Score: 0  PHQ-9 Interpretation: Minimal or None    Wt Readings from Last 3 Encounters:   09/17/24 (!) 155.2 kg (342 lb 3.2 oz)   08/08/24 (!) 157.4 kg (347 lb)   07/11/24 (!) 154.2 kg (340 lb)     Physical Exam  Vitals and nursing note reviewed.   Constitutional:       General: She is not in acute distress.     Appearance: Normal appearance. She is morbidly obese.   HENT:      Head: Normocephalic.      Right Ear: Hearing, tympanic membrane, ear canal and " external ear normal.      Left Ear: Hearing, tympanic membrane, ear canal and external ear normal.      Nose: No congestion or rhinorrhea.      Right Turbinates: Swollen.      Left Turbinates: Swollen.      Right Sinus: No maxillary sinus tenderness or frontal sinus tenderness.      Left Sinus: No maxillary sinus tenderness or frontal sinus tenderness.      Mouth/Throat:      Lips: Pink.      Mouth: Mucous membranes are moist.      Tongue: No lesions.      Pharynx: Uvula midline. No pharyngeal swelling, oropharyngeal exudate, posterior oropharyngeal erythema or uvula swelling.   Eyes:      Conjunctiva/sclera: Conjunctivae normal.      Pupils: Pupils are equal, round, and reactive to light.   Neck:      Thyroid: No thyromegaly.      Vascular: No carotid bruit.      Trachea: Trachea normal.   Cardiovascular:      Rate and Rhythm: Normal rate and regular rhythm.      Pulses:           Dorsalis pedis pulses are 3+ on the right side and 3+ on the left side.        Posterior tibial pulses are 3+ on the right side and 3+ on the left side.      Heart sounds: Normal heart sounds.   Pulmonary:      Effort: Pulmonary effort is normal. No respiratory distress.      Breath sounds: Normal breath sounds.   Abdominal:      Palpations: Abdomen is soft.      Tenderness: There is no abdominal tenderness.   Musculoskeletal:      Cervical back: Neck supple. No rigidity.      Right knee: Swelling and erythema present. Decreased range of motion.      Left knee: Swelling and erythema present. Decreased range of motion.      Right lower leg: No edema.      Left lower leg: No edema.      Right foot: Normal range of motion. No deformity or bunion.      Left foot: Normal range of motion. No deformity or bunion.   Feet:      Right foot:      Protective Sensation: 10 sites tested.  10 sites sensed.      Skin integrity: Dry skin present. No ulcer, blister, skin breakdown, erythema, warmth, callus or fissure.      Toenail Condition: Right toenails  are normal.      Left foot:      Protective Sensation: 10 sites tested.  10 sites sensed.      Skin integrity: Dry skin present. No ulcer, blister, skin breakdown, erythema, warmth, callus or fissure.      Toenail Condition: Left toenails are normal.   Lymphadenopathy:      Cervical: No cervical adenopathy.      Upper Body:      Right upper body: No supraclavicular adenopathy.      Left upper body: No supraclavicular adenopathy.   Skin:     General: Skin is warm and dry.      Findings: No rash.   Neurological:      General: No focal deficit present.      Mental Status: She is alert and oriented to person, place, and time.   Psychiatric:         Mood and Affect: Mood normal.         Behavior: Behavior normal.          Assessment and Plan: 274}     1. Routine general medical examination at a health care facility    2. Muscle spasm  -     cyclobenzaprine (FLEXERIL) 10 MG tablet; Take 1 tablet (10 mg total) by mouth 3 (three) times daily as needed for Muscle spasms.  Dispense: 90 tablet; Refill: 2    3. Hypercholesterolemia  Assessment & Plan:  Suboptimal control.  Recheck lipids today.  Continue rosuvastatin 40 mg daily.    Orders:  -     rosuvastatin (CRESTOR) 40 MG Tab; Take 1 tablet (40 mg total) by mouth every evening.  Dispense: 90 tablet; Refill: 3  -     Comprehensive Metabolic Panel; Future; Expected date: 09/17/2024    4. Screening for hyperlipidemia  -     Lipid Panel; Future; Expected date: 09/17/2024    5. Type 2 diabetes mellitus without complication, without long-term current use of insulin  Assessment & Plan:  Lab Results   Component Value Date    HGBA1C 6.1 03/20/2024     Last visit changed Byetta to Ozempic for cardiovascular protection and improvement of T2DM control and is ready to escalate dosage.  Increase Ozempic to 1 mg weekly.    Continue metformin  mg daily.    Update A1c today.    Continue daily home glucose monitoring.    Orders:  -     Comprehensive Metabolic Panel; Future; Expected  date: 09/17/2024  -     Hemoglobin A1C; Future; Expected date: 09/17/2024  -     semaglutide (OZEMPIC) 1 mg/dose (4 mg/3 mL); Inject 1 mg into the skin every 7 days.  Dispense: 9 mL; Refill: 3    6. Essential hypertension  Assessment & Plan:  Not controlled but has not taken medications today.    Advised to take medications upon arrival home and stressed daily adherence.  We will have nurse call her this afternoon to get another BP reading to see if medication adjustment is needed.    Continue olmesartan, nifedipine, furosemide, clonidine, and carvedilol.    Orders:  -     CBC Auto Differential; Future; Expected date: 09/17/2024  -     Comprehensive Metabolic Panel; Future; Expected date: 09/17/2024    7. Bilateral chronic knee pain  -     ketorolac injection 60 mg    8. Rheumatoid arthritis involving multiple sites with positive rheumatoid factor  Assessment & Plan:  Not well controlled, followed by Rheumatology.  C/o increased knee pain.  Toradol 60 mg IM today.  f/u with ortho as scheduled.      9. Gastroesophageal reflux disease with esophagitis without hemorrhage  Assessment & Plan:  Controlled, continue pantoprazole.      10. HH (hiatus hernia)  Assessment & Plan:  Stable  Continue pantoprazole 40 mg 2 times per day.      11. Moderate persistent asthma without complication  Assessment & Plan:  Stable   Continue montelukast 10 mg daily, Symbicort daily, and rescue with DuoNeb or albuterol HFA p.r.n.    Orders:  -     budesonide-formoterol 160-4.5 mcg (SYMBICORT) 160-4.5 mcg/actuation HFAA; Inhale 2 puffs into the lungs every 12 (twelve) hours. Controller  Dispense: 32.1 g; Refill: 3    Other orders  -     promethazine (PHENERGAN) 25 MG tablet; Take 1 tablet (25 mg total) by mouth every 6 (six) hours as needed for Nausea.  Dispense: 30 tablet; Refill: 1    Re Pet wellness agenda completed and to be faxed back.  Diagnosis, risks, benefits, and side effects of any meds and treatment plan were discussed  with the patient.  Patient to call or follow-up with any new or worsening symptoms or problems prior to next appointment.  Go to ER for any urgent complications.  All questions were answered to the satisfaction of the patient, and pt verbalized understanding and agreement to treatment plan.      Follow up in about 6 months (around 3/17/2025) for T2DM, with nonfasting lab and, wellness 1 year with fasting labs.    Signature:  DANE Saenz-BC    Future Appointments   Date Time Provider Department Center   10/1/2024  2:30 PM Rogelio Wakefield MD [facility] ORTHO [facility]   10/7/2024  1:15 PM Carmel Torre MD [facility] P[facility]RE [facility]   10/21/2024 11:30 AM [facility] GI ROOM 03 [facility] ENDO [place] [facility]   10/28/2024  1:45 PM Jayme Olsen MD [facility] NEURO [facility]   2/20/2025  1:45 PM Ashlyn Wakefield MD [facility] DERM [place]   3/20/2025  9:40 AM Rhoda Rueda FNP [facility] [place]   10/1/2025  9:40 AM Rhoda Rueda FNP [facility] [place]

## 2024-09-17 NOTE — ASSESSMENT & PLAN NOTE
Lab Results   Component Value Date    HGBA1C 6.1 03/20/2024     Last visit changed Byetta to Ozempic for cardiovascular protection and improvement of T2DM control and is ready to escalate dosage.  Increase Ozempic to 1 mg weekly.    Continue metformin  mg daily.    Update A1c today.    Continue daily home glucose monitoring.

## 2024-09-20 ENCOUNTER — TELEPHONE (OUTPATIENT)
Dept: FAMILY MEDICINE | Facility: CLINIC | Age: 44
End: 2024-09-20
Payer: MEDICAID

## 2024-09-20 NOTE — TELEPHONE ENCOUNTER
----- Message from DANE Saenz sent at 9/17/2024 12:58 PM CDT -----  Regarding: Remote BP reading  Please call patient for remote BP reading today after she has taken meds.

## 2024-09-25 ENCOUNTER — PATIENT MESSAGE (OUTPATIENT)
Dept: FAMILY MEDICINE | Facility: CLINIC | Age: 44
End: 2024-09-25
Payer: MEDICAID

## 2024-09-30 ENCOUNTER — TELEPHONE (OUTPATIENT)
Dept: FAMILY MEDICINE | Facility: CLINIC | Age: 44
End: 2024-09-30
Payer: MEDICAID

## 2024-09-30 DIAGNOSIS — M25.561 PAIN IN BOTH KNEES, UNSPECIFIED CHRONICITY: Primary | ICD-10-CM

## 2024-09-30 DIAGNOSIS — M25.562 PAIN IN BOTH KNEES, UNSPECIFIED CHRONICITY: Primary | ICD-10-CM

## 2024-09-30 NOTE — TELEPHONE ENCOUNTER
----- Message from DANE Layne sent at 9/17/2024 12:58 PM CDT -----  Regarding: Remote BP reading  Please call patient for remote BP reading today after she has taken meds.

## 2024-09-30 NOTE — TELEPHONE ENCOUNTER
I have called patient and sent a portal message to try and get a remote blood pressure reading.  Patient seen portal message but has not responded.

## 2024-10-01 ENCOUNTER — OFFICE VISIT (OUTPATIENT)
Dept: ORTHOPEDICS | Facility: CLINIC | Age: 44
End: 2024-10-01
Payer: MEDICAID

## 2024-10-01 ENCOUNTER — HOSPITAL ENCOUNTER (OUTPATIENT)
Dept: RADIOLOGY | Facility: HOSPITAL | Age: 44
Discharge: HOME | End: 2024-10-01
Attending: ORTHOPAEDIC SURGERY
Payer: MEDICAID

## 2024-10-01 DIAGNOSIS — M25.562 PAIN IN BOTH KNEES, UNSPECIFIED CHRONICITY: ICD-10-CM

## 2024-10-01 DIAGNOSIS — M25.561 PAIN IN BOTH KNEES, UNSPECIFIED CHRONICITY: ICD-10-CM

## 2024-10-01 DIAGNOSIS — M17.0 PRIMARY OSTEOARTHRITIS OF BOTH KNEES: Primary | ICD-10-CM

## 2024-10-01 PROCEDURE — 20610 DRAIN/INJ JOINT/BURSA W/O US: CPT | Mod: 50,PBBFAC | Performed by: ORTHOPAEDIC SURGERY

## 2024-10-01 PROCEDURE — 99999PBSHW PR PBB SHADOW TECHNICAL ONLY FILED TO HB: Mod: PBBFAC,,,

## 2024-10-01 PROCEDURE — 99212 OFFICE O/P EST SF 10 MIN: CPT | Mod: PBBFAC,25 | Performed by: ORTHOPAEDIC SURGERY

## 2024-10-01 PROCEDURE — 99999 PR PBB SHADOW E&M-EST. PATIENT-LVL II: CPT | Mod: PBBFAC,,, | Performed by: ORTHOPAEDIC SURGERY

## 2024-10-01 PROCEDURE — 73564 X-RAY EXAM KNEE 4 OR MORE: CPT | Mod: 26,50,, | Performed by: ORTHOPAEDIC SURGERY

## 2024-10-01 PROCEDURE — 73564 X-RAY EXAM KNEE 4 OR MORE: CPT | Mod: TC,50

## 2024-10-01 RX ORDER — BUPIVACAINE HYDROCHLORIDE 2.5 MG/ML
2 INJECTION, SOLUTION INFILTRATION; PERINEURAL
Status: DISCONTINUED | OUTPATIENT
Start: 2024-10-01 | End: 2024-10-01 | Stop reason: HOSPADM

## 2024-10-01 RX ORDER — TRIAMCINOLONE ACETONIDE 40 MG/ML
40 INJECTION, SUSPENSION INTRA-ARTICULAR; INTRAMUSCULAR
Status: DISCONTINUED | OUTPATIENT
Start: 2024-10-01 | End: 2024-10-01 | Stop reason: HOSPADM

## 2024-10-01 RX ORDER — NAPROXEN 500 MG/1
500 TABLET ORAL 2 TIMES DAILY WITH MEALS
Qty: 60 TABLET | Refills: 3 | Status: SHIPPED | OUTPATIENT
Start: 2024-10-01

## 2024-10-01 RX ADMIN — BUPIVACAINE HYDROCHLORIDE 2 ML: 2.5 INJECTION, SOLUTION INFILTRATION; PERINEURAL at 02:10

## 2024-10-01 RX ADMIN — TRIAMCINOLONE ACETONIDE 40 MG: 400 INJECTION, SUSPENSION INTRA-ARTICULAR; INTRAMUSCULAR at 02:10

## 2024-10-01 NOTE — PROGRESS NOTES
CC:  Knee pain    44 y.o. Female returns to clinic for a follow up visit regarding knee pain.       Patient states her last injection gave her good relief.    States it is very painful going up and down steps. States her balance is very unbalance.  She is also complaining of bilateral carpal tunnel syndrome, left worse.  Also complaining of bilateral hand OA       Past Medical History:   Diagnosis Date    Anxiety     Asthma     Chronic pain     Cushing syndrome     Depression     Essential hypertension     Fibromyalgia     Herpes zoster     Hyperlipidemia     Leukocytosis 2022    Onychomycosis     MARY on CPAP     Rheumatoid arthritis     Type 2 diabetes mellitus 2020    Vitamin D deficiency 2018     Past Surgical History:   Procedure Laterality Date     SECTION      ELBOW SURGERY  1985    EPIDURAL STEROID INJECTION      L4-5 VERITO Dec 2020-[facility]    FEMUR SURGERY Right     due to osteomyelitis    HYSTERECTOMY      Abn Bleeding    INJECTION OF ANESTHETIC AGENT AROUND MEDIAL BRANCH NERVES INNERVATING LUMBAR FACET JOINT Bilateral 2022    Procedure: Bilateral L4-5,5-S1 MBB ( No steroids);  Surgeon: Carmel Torre MD;  Location: [facility];  Service: Pain Management;  Laterality: Bilateral;  PT AWARE TO BE TESTED ON OV    INJECTION OF ANESTHETIC AGENT AROUND MEDIAL BRANCH NERVES INNERVATING LUMBAR FACET JOINT Bilateral 2022    Procedure: Block-nerve-medial branch-lumbar, bilateral L4 through S1;  Surgeon: Carmel Torre MD;  Location: [facility];  Service: Pain Management;  Laterality: Bilateral;    INJECTION OF ANESTHETIC AGENT INTO SACROILIAC JOINT Bilateral 2022    Procedure: BLOCK, SACROILIAC JOINT;  Surgeon: Carmel Torre MD;  Location: [facility];  Service: Pain Management;  Laterality: Bilateral;  covid test put in    LIPOMA RESECTION  2019    RADIOFREQUENCY ABLATION OF LUMBAR MEDIAL BRANCH NERVE AT SINGLE LEVEL Right 2022     Procedure: Radiofrequency Ablation, Nerve, Spinal, Lumbar, Medial Branch, Level L4-S1, right side 1st, will have left-sided after completing;  Surgeon: Carmel Torre MD;  Location: [facility];  Service: Pain Management;  Laterality: Right;  pt aware at visit to be tested    RADIOFREQUENCY ABLATION OF LUMBAR MEDIAL BRANCH NERVE AT SINGLE LEVEL Left 05/05/2022    Procedure: LEFT  L4-S1 RFTC  (HAD RIGHT  ON 4-14);  Surgeon: Carmel Torre MD;  Location: [facility];  Service: Pain Management;  Laterality: Left;    RADIOFREQUENCY ABLATION OF LUMBAR MEDIAL BRANCH NERVE AT SINGLE LEVEL Right 7/25/2023    Procedure: Radiofrequency Ablation, Nerve, Spinal, Lumbar, Medial Branch, Level L4-S1;  Surgeon: Carmel Torre MD;  Location: [facility];  Service: Pain Management;  Laterality: Right;    SURGICAL REMOVAL OF PILONIDAL CYST      TRANSFORAMINAL EPIDURAL INJECTION OF STEROID      Bilateral L4-5 TFESI Nov 2020-[facility]    TRANSFORAMINAL EPIDURAL INJECTION OF STEROID      Right L4-5 TFESI Feb 2020-[facility]    VENTRAL HERNIA REPAIR  09/2020         PHYSICAL EXAMINATION:  There were no vitals taken for this visit.  General    Nursing note and vitals reviewed.  Constitutional: She is oriented to person, place, and time. She appears well-developed and well-nourished.   HENT:   Head: Normocephalic and atraumatic.   Nose: Nose normal.   Eyes: Pupils are equal, round, and reactive to light.   Neck: Neck supple.   Cardiovascular:  Normal rate, regular rhythm and intact distal pulses.            Pulmonary/Chest: Effort normal. No respiratory distress. She exhibits no tenderness.   Abdominal: Soft. She exhibits no distension. There is no abdominal tenderness.   Neurological: She is alert and oriented to person, place, and time. She has normal reflexes. She displays normal reflexes. No cranial nerve deficit. She exhibits normal muscle tone.   Psychiatric: She has a normal mood and affect. Her behavior is  normal. Judgment and thought content normal.     General Musculoskeletal Exam   Gait: antalgic       Right Knee Exam   Right knee exam is normal.    Inspection   Swelling: present    Tenderness   The patient is tender to palpation of the medial joint line and lateral joint line.    Crepitus   The patient has crepitus of the patella, medial joint line and lateral joint line.    Range of Motion   Extension:  abnormal   Flexion:  abnormal     Tests   Meniscus   Grady:  Medial - positive   Ligament Examination   Lachman: normal (-1 to 2mm)   PCL-Posterior Drawer: normal (0 to 2mm)     MCL - Valgus: normal (0 to 2mm)  LCL - Varus: normal  Pivot Shift: normal (Equal)  Reverse Pivot Shift: normal (Equal)  Dial Test at 30 degrees: normal (< 5 degrees)  Dial Test at 90 degrees: normal (< 5 degrees)  Posterolateral Corner: stable  Patella   Patellar Tracking: normal  Q-Angle at 90 degrees: normal  Patellar Grind: positive    Other   Sensation: normal    Left Knee Exam     Inspection   Deformity: absent    Tenderness   The patient tender to palpation of the medial joint line, lateral joint line and patella.    Crepitus   The patient has crepitus of the patella and medial joint line.    Range of Motion   Extension:  normal   Flexion:  abnormal     Tests   Meniscus   Grady:  Medial - positive Lateral - positive  Stability   Lachman: normal (-1 to 2mm)   PCL-Posterior Drawer: normal (0 to 2mm)  MCL - Valgus: normal (0 to 2mm)  LCL - Varus: normal (0 to 2mm)  Pivot Shift: normal (Equal)  Patella   Passive Patellar Tilt: lateral tilt  Patellar Tracking: normal  Patellar Grind: positive  J-Sign: J sign absent    Other   Muscle Tightness: hamstring tightness  Sensation: normal    Muscle Strength   Right Lower Extremity   Quadriceps:  5/5   Hamstrin/5   Left Lower Extremity   Hip Abduction: 5/5   Quadriceps:  5/5   Hamstrin/5     Reflexes     Left Side  Quadriceps:  2+    Right Side   Quadriceps:  2+    Vascular Exam      Right Pulses  Dorsalis Pedis:      2+  Posterior Tibial:      2+            IMAGING:  X-Ray Knee Complete 4 Or More Views Bilat    Result Date: 10/1/2024  See Procedure Notes for results. IMPRESSION: Please see Ortho procedure notes for report.  This procedure was auto-finalized by: Virtual Radiologist     Four views right knee four views left knee were obtained today demonstrating severe tricompartmental degenerative changes of the bilateral knees  ASSESSMENT:      ICD-10-CM ICD-9-CM   1. Primary osteoarthritis of both knees  M17.0 715.16       PLAN:     -Findings and treatment options were discussed with the patient  -All questions answered  Natural history and expected course discussed. Questions answered.  Educational materials distributed.  Rest, ice, compression, and elevation (RICE) therapy.  Reduction in offending activity.  NSAIDs per medication orders.  Arthrocentesis. See procedure note.  Weight loss encouraged.  See back in 1 year.    There are no Patient Instructions on file for this visit.      Orders Placed This Encounter   Procedures    Large Joint Aspiration/Injection: bilateral knee         Large Joint Aspiration/Injection: bilateral knee    Date/Time: 10/1/2024 2:30 PM    Performed by: Rogelio Wakefield MD  Authorized by: Rogelio Wakefield MD    Consent Done?:  Yes (Verbal)  Indications:  Pain  Local anesthesia used?: No      Details:  Needle Size:  22 G  Approach:  Superior  Location:  Knee  Laterality:  Bilateral  Site:  Bilateral knee  Medications (Right):  2 mL BUPivacaine 0.25% (2.5 mg/ml) 0.25 % (2.5 mg/mL); 40 mg triamcinolone acetonide 40 mg/mL  Medications (Left):  2 mL BUPivacaine 0.25% (2.5 mg/ml) 0.25 % (2.5 mg/mL); 40 mg triamcinolone acetonide 40 mg/mL  Patient tolerance:  Patient tolerated the procedure well with no immediate complications

## 2024-10-07 ENCOUNTER — OFFICE VISIT (OUTPATIENT)
Dept: PAIN MEDICINE | Facility: CLINIC | Age: 44
End: 2024-10-07
Payer: MEDICAID

## 2024-10-07 VITALS
HEART RATE: 81 BPM | SYSTOLIC BLOOD PRESSURE: 186 MMHG | BODY MASS INDEX: 53.92 KG/M2 | WEIGHT: 293 LBS | DIASTOLIC BLOOD PRESSURE: 103 MMHG | RESPIRATION RATE: 18 BRPM | HEIGHT: 62 IN

## 2024-10-07 DIAGNOSIS — M05.79 RHEUMATOID ARTHRITIS INVOLVING MULTIPLE SITES WITH POSITIVE RHEUMATOID FACTOR: Chronic | ICD-10-CM

## 2024-10-07 DIAGNOSIS — G89.29 CHRONIC PAIN OF RIGHT KNEE: Chronic | ICD-10-CM

## 2024-10-07 DIAGNOSIS — M25.561 CHRONIC PAIN OF RIGHT KNEE: Chronic | ICD-10-CM

## 2024-10-07 DIAGNOSIS — M46.1 SACROILIITIS: Chronic | ICD-10-CM

## 2024-10-07 DIAGNOSIS — M47.816 SPONDYLOSIS OF LUMBAR REGION WITHOUT MYELOPATHY OR RADICULOPATHY: Chronic | ICD-10-CM

## 2024-10-07 DIAGNOSIS — Z79.899 ENCOUNTER FOR LONG-TERM (CURRENT) USE OF MEDICATIONS: Primary | ICD-10-CM

## 2024-10-07 PROCEDURE — 99999PBSHW POCT URINE DRUG SCREEN PRESUMP: Mod: PBBFAC,,,

## 2024-10-07 PROCEDURE — 80305 DRUG TEST PRSMV DIR OPT OBS: CPT | Mod: PBBFAC | Performed by: PAIN MEDICINE

## 2024-10-07 PROCEDURE — 3077F SYST BP >= 140 MM HG: CPT | Mod: CPTII,,, | Performed by: PAIN MEDICINE

## 2024-10-07 PROCEDURE — 3008F BODY MASS INDEX DOCD: CPT | Mod: CPTII,,, | Performed by: PAIN MEDICINE

## 2024-10-07 PROCEDURE — 4010F ACE/ARB THERAPY RXD/TAKEN: CPT | Mod: CPTII,,, | Performed by: PAIN MEDICINE

## 2024-10-07 PROCEDURE — 99214 OFFICE O/P EST MOD 30 MIN: CPT | Mod: S$PBB,25,, | Performed by: PAIN MEDICINE

## 2024-10-07 PROCEDURE — 99999PBSHW PR PBB SHADOW TECHNICAL ONLY FILED TO HB: Mod: PBBFAC,,,

## 2024-10-07 PROCEDURE — 99214 OFFICE O/P EST MOD 30 MIN: CPT | Mod: PBBFAC | Performed by: PAIN MEDICINE

## 2024-10-07 PROCEDURE — 3080F DIAST BP >= 90 MM HG: CPT | Mod: CPTII,,, | Performed by: PAIN MEDICINE

## 2024-10-07 PROCEDURE — 1159F MED LIST DOCD IN RCRD: CPT | Mod: CPTII,,, | Performed by: PAIN MEDICINE

## 2024-10-07 PROCEDURE — 3066F NEPHROPATHY DOC TX: CPT | Mod: CPTII,,, | Performed by: PAIN MEDICINE

## 2024-10-07 PROCEDURE — 96372 THER/PROPH/DIAG INJ SC/IM: CPT | Mod: PBBFAC | Performed by: PAIN MEDICINE

## 2024-10-07 PROCEDURE — 3044F HG A1C LEVEL LT 7.0%: CPT | Mod: CPTII,,, | Performed by: PAIN MEDICINE

## 2024-10-07 PROCEDURE — 3061F NEG MICROALBUMINURIA REV: CPT | Mod: CPTII,,, | Performed by: PAIN MEDICINE

## 2024-10-07 PROCEDURE — 99999 PR PBB SHADOW E&M-EST. PATIENT-LVL IV: CPT | Mod: PBBFAC,,, | Performed by: PAIN MEDICINE

## 2024-10-07 RX ORDER — HYDROCODONE BITARTRATE AND ACETAMINOPHEN 10; 325 MG/1; MG/1
1 TABLET ORAL EVERY 8 HOURS PRN
Qty: 90 TABLET | Refills: 0 | Status: SHIPPED | OUTPATIENT
Start: 2024-10-12 | End: 2024-11-11

## 2024-10-07 RX ORDER — HYDROCODONE BITARTRATE AND ACETAMINOPHEN 10; 325 MG/1; MG/1
1 TABLET ORAL EVERY 8 HOURS PRN
Qty: 90 TABLET | Refills: 0 | Status: SHIPPED | OUTPATIENT
Start: 2024-11-11 | End: 2024-12-11

## 2024-10-07 RX ORDER — GABAPENTIN 300 MG/1
300 CAPSULE ORAL EVERY 8 HOURS
Qty: 90 CAPSULE | Refills: 1 | Status: SHIPPED | OUTPATIENT
Start: 2024-10-07

## 2024-10-07 RX ORDER — KETOROLAC TROMETHAMINE 30 MG/ML
60 INJECTION, SOLUTION INTRAMUSCULAR; INTRAVENOUS
Status: COMPLETED | OUTPATIENT
Start: 2024-10-07 | End: 2024-10-08

## 2024-10-07 RX ADMIN — KETOROLAC TROMETHAMINE 60 MG: 30 INJECTION, SOLUTION INTRAMUSCULAR at 02:10

## 2024-10-07 NOTE — PROGRESS NOTES
She Disclaimer: This note has been generated using voice-recognition software. There may be typographical errors that have been missed during proof-reading        Patient ID: Belem Swann is a 44 y.o. female.      Chief Complaint: Low-back Pain, Knee Pain (bilateral), Shoulder Pain (right), and Hand Pain (bilateral)      44-year-old female returns for re-evaluation of intractable lower back, bilateral knee , right shoulder and hand pain.  She has rheumatoid arthritis and has been treated conservatively with medication management.  A medial branch radiofrequency procedure July 20, 2023 provided pain relief but of short duration.  She was evaluated by Dr. Gale but is not a surgical candidate due to BMI.  Her current medications are providing some relief and she returns today requesting a refill.            Pain Assessment  Pain Assessment: 0-10  Pain Score:   7  Pain Location: Back  Pain Orientation: Left, Right  Pain Radiating Towards: hand/ knees/ right shoulder  Pain Descriptors: Stabbing, Throbbing, Aching, Burning, Sharp, Dull  Pain Frequency: Constant/continuous  Pain Onset: Awakened from sleep  Clinical Progression: Not changed  Aggravating Factors: Bending, Standing, Walking, Other (Comment) (lying down)  Pain Intervention(s): Medication (See eMAR), Home medication, Heat applied, Cold applied      A's of Opioid Risk Assessment  Activity:Patient is unable perform ADL.   Analgesia:Patients pain is partially controlled by current medication.   Adverse Effects: Patient denies constipation or sedation.  Aberrant Behavior:  reviewed with no aberrant drug seeking/taking behavior.      Patient denies any suicidal or homicidal ideations    Physical Therapy/Home Exercise:  Yes, without benefit    X-Ray Knee Complete 4 Or More Views Bilat  See Procedure Notes for results.     IMPRESSION: Please see Ortho procedure notes for report.      This procedure was auto-finalized by: Virtual Radiologist      Review of  Systems   Constitutional: Negative.    HENT: Negative.     Eyes: Negative.    Respiratory: Negative.     Cardiovascular: Negative.    Gastrointestinal: Negative.    Endocrine: Negative.    Genitourinary: Negative.    Musculoskeletal:  Positive for arthralgias, back pain and gait problem.   Integumentary:  Negative.   Hematological: Negative.    Psychiatric/Behavioral:  Positive for sleep disturbance.              Past Medical History:   Diagnosis Date    Anxiety     Asthma     Chronic pain     Cushing syndrome     Depression     Essential hypertension     Fibromyalgia     Herpes zoster     Hyperlipidemia     Leukocytosis 2022    Onychomycosis     MARY on CPAP     Rheumatoid arthritis     Type 2 diabetes mellitus 2020    Vitamin D deficiency 2018     Past Surgical History:   Procedure Laterality Date     SECTION      ELBOW SURGERY  1985    EPIDURAL STEROID INJECTION      L4-5 VERITO Dec 2020-[facility]    FEMUR SURGERY Right     due to osteomyelitis    HYSTERECTOMY      Abn Bleeding    INJECTION OF ANESTHETIC AGENT AROUND MEDIAL BRANCH NERVES INNERVATING LUMBAR FACET JOINT Bilateral 2022    Procedure: Bilateral L4-5,5-S1 MBB ( No steroids);  Surgeon: Carmel Torre MD;  Location: [facility]IN [facility];  Service: Pain Management;  Laterality: Bilateral;  PT AWARE TO BE TESTED ON OV    INJECTION OF ANESTHETIC AGENT AROUND MEDIAL BRANCH NERVES INNERVATING LUMBAR FACET JOINT Bilateral 2022    Procedure: Block-nerve-medial branch-lumbar, bilateral L4 through S1;  Surgeon: Carmel Torre MD;  Location: [facility];  Service: Pain Management;  Laterality: Bilateral;    INJECTION OF ANESTHETIC AGENT INTO SACROILIAC JOINT Bilateral 2022    Procedure: BLOCK, SACROILIAC JOINT;  Surgeon: Carmel Torre MD;  Location: [facility]IN [facility];  Service: Pain Management;  Laterality: Bilateral;  covid test put in    LIPOMA RESECTION  2019    RADIOFREQUENCY ABLATION OF LUMBAR MEDIAL BRANCH  NERVE AT SINGLE LEVEL Right 04/14/2022    Procedure: Radiofrequency Ablation, Nerve, Spinal, Lumbar, Medial Branch, Level L4-S1, right side 1st, will have left-sided after completing;  Surgeon: Carmel Torre MD;  Location: [facility]IN [facility];  Service: Pain Management;  Laterality: Right;  pt aware at visit to be tested    RADIOFREQUENCY ABLATION OF LUMBAR MEDIAL BRANCH NERVE AT SINGLE LEVEL Left 05/05/2022    Procedure: LEFT  L4-S1 RFTC  (HAD RIGHT  ON 4-14);  Surgeon: Carmel Torre MD;  Location: [facility]IN [facility];  Service: Pain Management;  Laterality: Left;    RADIOFREQUENCY ABLATION OF LUMBAR MEDIAL BRANCH NERVE AT SINGLE LEVEL Right 7/25/2023    Procedure: Radiofrequency Ablation, Nerve, Spinal, Lumbar, Medial Branch, Level L4-S1;  Surgeon: Carmel Torre MD;  Location: [facility] PAIN [facility];  Service: Pain Management;  Laterality: Right;    SURGICAL REMOVAL OF PILONIDAL CYST      TRANSFORAMINAL EPIDURAL INJECTION OF STEROID      Bilateral L4-5 TFESI Nov 2020-[facility]    TRANSFORAMINAL EPIDURAL INJECTION OF STEROID      Right L4-5 TFESI Feb 2020-[facility]    VENTRAL HERNIA REPAIR  09/2020     Social History     Socioeconomic History    Marital status:    Tobacco Use    Smoking status: Every Day     Types: Cigarettes     Start date: 9/8/2021    Smokeless tobacco: Never   Substance and Sexual Activity    Alcohol use: Yes     Comment: ocassional    Drug use: Never    Sexual activity: Not Currently     Partners: Male     Birth control/protection: See Surgical Hx     Family History   Problem Relation Name Age of Onset    Cancer Mother      Thyroid cancer Mother      Thyroid cancer Maternal Grandmother      Melanoma Neg Hx      Colon cancer Neg Hx      Breast cancer Neg Hx      Ovarian cancer Neg Hx       Review of patient's allergies indicates:   Allergen Reactions    Latex      Other reaction(s): Unknown    Doxycycline Nausea Only     has a current medication list which includes the following  prescription(s): albuterol sulfate, albuterol-ipratropium, amitriptyline, budesonide-formoterol 160-4.5 mcg, carvedilol, clonidine, cyclobenzaprine, furosemide, humira(cf) pen, hydrocodone-acetaminophen, hydrocortisone, hydroxyzine pamoate, ketoconazole, lancets, leflunomide, metformin, montelukast, naproxen, nifedipine, nystatin, olmesartan, pantoprazole, pen needle, diabetic, rosuvastatin, ozempic, triamcinolone acetonide 0.1%, true metrix glucose test strip, gabapentin, [START ON 10/12/2024] hydrocodone-acetaminophen, [START ON 11/11/2024] hydrocodone-acetaminophen, and promethazine, and the following Facility-Administered Medications: ketorolac.      Objective:  Vitals:    10/07/24 1322   BP: (!) 186/103   Pulse: 81   Resp: 18        Physical Exam  Vitals and nursing note reviewed.   Constitutional:       General: She is not in acute distress.     Appearance: Normal appearance. She is not ill-appearing, toxic-appearing or diaphoretic.   HENT:      Head: Normocephalic and atraumatic.      Nose: Nose normal.      Mouth/Throat:      Mouth: Mucous membranes are moist.   Eyes:      Extraocular Movements: Extraocular movements intact.      Pupils: Pupils are equal, round, and reactive to light.   Cardiovascular:      Rate and Rhythm: Normal rate and regular rhythm.      Heart sounds: Normal heart sounds.   Pulmonary:      Effort: Pulmonary effort is normal. No respiratory distress.      Breath sounds: Normal breath sounds. No stridor. No wheezing or rhonchi.   Abdominal:      General: Bowel sounds are normal.      Palpations: Abdomen is soft.   Musculoskeletal:         General: No swelling or deformity.      Cervical back: Normal and normal range of motion. No spasms or tenderness. No pain with movement. Normal range of motion.      Thoracic back: Normal.      Lumbar back: Tenderness and bony tenderness present. No spasms. Decreased range of motion. Negative right straight leg raise test and negative left straight leg  raise test. No scoliosis.      Right lower leg: No edema.      Left lower leg: No edema.      Comments: Lumbar pain with flexion, extension lateral rotation.  Lumbar spinous and paraspinous process tenderness to palpation   Skin:     General: Skin is warm.   Neurological:      General: No focal deficit present.      Mental Status: She is alert and oriented to person, place, and time. Mental status is at baseline.      Cranial Nerves: No cranial nerve deficit.      Sensory: Sensation is intact. No sensory deficit.      Motor: No weakness.      Coordination: Coordination normal.      Gait: Gait normal.      Deep Tendon Reflexes: Reflexes are normal and symmetric.   Psychiatric:         Mood and Affect: Mood normal.         Behavior: Behavior normal.           Assessment:      1. Encounter for long-term (current) use of medications    2. Rheumatoid arthritis involving multiple sites with positive rheumatoid factor    3. Spondylosis of lumbar region without myelopathy or radiculopathy    4. Sacroiliitis    5. Chronic pain of right knee          Plan:  1. reviewed  2.Addiction, Dependency, Tolerance, Opioid abuse-misuse, Death, Diversion Discussed. Overdose reversal drug Naloxone discussed.Patient is prescribed opiates for chronic nonmalignant pain pathology.  Patient is receiving opiates which require greater than a 72 hour supply of therapy.  Patient was educated on potential dependency associated with long-term opioid use as well as decreasing efficacy with prolonged use.  Patient was advised of risks, benefits and side effects and how to utilize each medication.  Patient was also informed that any deviation from therapy protocol will  lead to discontinuation of opiates.  It is reasonable to prescribe opioid analgesics for patient based on positive response to opioid medications, lack of side effects and  limited aberrant behavior.    3.Refill/Continue medications for pain control and function       Requested  Prescriptions     Signed Prescriptions Disp Refills    HYDROcodone-acetaminophen (NORCO)  mg per tablet 90 tablet 0     Sig: Take 1 tablet by mouth every 8 (eight) hours as needed for Pain.    HYDROcodone-acetaminophen (NORCO)  mg per tablet 90 tablet 0     Sig: Take 1 tablet by mouth every 8 (eight) hours as needed for Pain.    gabapentin (NEURONTIN) 300 MG capsule 90 capsule 1     Sig: Take 1 capsule (300 mg total) by mouth every 8 (eight) hours.     4.Urine drug screen point of care obtained and consistent with prescribed medications and medication refill date.  Drug screen is to monitor compliance with prescribed opiates and to ensure that there was no misuse/abuse of other non-prescribed opioids or illicit drugs.  From this information I will determine if patient is suitable for opioid therapy    Orders Placed This Encounter   Procedures    POCT Urine Drug Screen ([facility])     Interpretive Information:     Negative:  No drug detected at the cut off level.   Positive:  This result represents presumptive positive for the   tested drug, other substances may yield a positive response other   than the analyte of interest. This result should be utilized for   diagnostic purpose only. Confirmation testing will be performed upon physician request only.         5.Patient defers nerve block injections, physical therapy or surgical consultation  6.Follow with GUCCI Wagner in 2 months for re-evaluation and medication refill       report:  Reviewed and consistent with medication use as prescribed.      The total time spent for evaluation and management on 10/07/2024 including reviewing separately obtained history, performing a medically appropriate exam and evaluation, documenting clinical information in the health record, independently interpreting results and communicating them to the patient/family/caregiver, and ordering medications/tests/procedures was between 15-29 minutes.    The above plan and  management options were discussed at length with patient. Patient is in agreement with the above and verbalized understanding. It will be communicated with the referring physician via electronic record, fax, or mail.

## 2024-10-08 PROCEDURE — 99999PBSHW PR PBB SHADOW TECHNICAL ONLY FILED TO HB: Mod: PBBFAC,,,

## 2024-10-08 RX ADMIN — KETOROLAC TROMETHAMINE 60 MG: 30 INJECTION, SOLUTION INTRAMUSCULAR; INTRAVENOUS at 02:10

## 2024-10-21 ENCOUNTER — ANESTHESIA EVENT (OUTPATIENT)
Dept: GASTROENTEROLOGY | Facility: HOSPITAL | Age: 44
End: 2024-10-21
Payer: MEDICAID

## 2024-10-21 ENCOUNTER — HOSPITAL ENCOUNTER (OUTPATIENT)
Dept: GASTROENTEROLOGY | Facility: HOSPITAL | Age: 44
Discharge: HOME | End: 2024-10-21
Admitting: INTERNAL MEDICINE
Payer: MEDICAID

## 2024-10-21 ENCOUNTER — ANESTHESIA (OUTPATIENT)
Dept: GASTROENTEROLOGY | Facility: HOSPITAL | Age: 44
End: 2024-10-21
Payer: MEDICAID

## 2024-10-21 VITALS
BODY MASS INDEX: 53.92 KG/M2 | OXYGEN SATURATION: 97 % | HEART RATE: 87 BPM | DIASTOLIC BLOOD PRESSURE: 83 MMHG | RESPIRATION RATE: 11 BRPM | TEMPERATURE: 98 F | SYSTOLIC BLOOD PRESSURE: 152 MMHG | WEIGHT: 293 LBS | HEIGHT: 62 IN

## 2024-10-21 DIAGNOSIS — Z12.11 SCREENING FOR MALIGNANT NEOPLASM OF COLON: ICD-10-CM

## 2024-10-21 PROCEDURE — 45380 COLONOSCOPY AND BIOPSY: CPT | Mod: 33 | Performed by: INTERNAL MEDICINE

## 2024-10-21 PROCEDURE — 88305 TISSUE EXAM BY PATHOLOGIST: CPT | Mod: TC,SUR | Performed by: INTERNAL MEDICINE

## 2024-10-21 PROCEDURE — 37000008 HC ANESTHESIA 1ST 15 MINUTES

## 2024-10-21 PROCEDURE — 88305 TISSUE EXAM BY PATHOLOGIST: CPT | Mod: 26,,, | Performed by: PATHOLOGY

## 2024-10-21 PROCEDURE — 27201423 OPTIME MED/SURG SUP & DEVICES STERILE SUPPLY

## 2024-10-21 PROCEDURE — 25000003 PHARM REV CODE 250: Performed by: NURSE ANESTHETIST, CERTIFIED REGISTERED

## 2024-10-21 PROCEDURE — 63600175 PHARM REV CODE 636 W HCPCS: Performed by: NURSE ANESTHETIST, CERTIFIED REGISTERED

## 2024-10-21 PROCEDURE — D9220A PRA ANESTHESIA: Mod: ,,, | Performed by: NURSE ANESTHETIST, CERTIFIED REGISTERED

## 2024-10-21 PROCEDURE — 27000284 HC CANNULA NASAL: Performed by: NURSE ANESTHETIST, CERTIFIED REGISTERED

## 2024-10-21 PROCEDURE — 45380 COLONOSCOPY AND BIOPSY: CPT | Mod: 33,,, | Performed by: INTERNAL MEDICINE

## 2024-10-21 PROCEDURE — 37000009 HC ANESTHESIA EA ADD 15 MINS

## 2024-10-21 RX ORDER — PROPOFOL 10 MG/ML
VIAL (ML) INTRAVENOUS
Status: DISCONTINUED | OUTPATIENT
Start: 2024-10-21 | End: 2024-10-21

## 2024-10-21 RX ORDER — SODIUM CHLORIDE 9 MG/ML
INJECTION, SOLUTION INTRAVENOUS CONTINUOUS PRN
Status: DISCONTINUED | OUTPATIENT
Start: 2024-10-21 | End: 2024-10-21

## 2024-10-21 RX ORDER — LIDOCAINE HYDROCHLORIDE 20 MG/ML
INJECTION, SOLUTION EPIDURAL; INFILTRATION; INTRACAUDAL; PERINEURAL
Status: DISCONTINUED | OUTPATIENT
Start: 2024-10-21 | End: 2024-10-21

## 2024-10-21 RX ORDER — SODIUM CHLORIDE, SODIUM LACTATE, POTASSIUM CHLORIDE, CALCIUM CHLORIDE 600; 310; 30; 20 MG/100ML; MG/100ML; MG/100ML; MG/100ML
INJECTION, SOLUTION INTRAVENOUS CONTINUOUS
Status: DISCONTINUED | OUTPATIENT
Start: 2024-10-21 | End: 2024-10-22 | Stop reason: HOSPADM

## 2024-10-21 RX ORDER — SODIUM CHLORIDE 0.9 % (FLUSH) 0.9 %
10 SYRINGE (ML) INJECTION EVERY 6 HOURS PRN
Status: DISCONTINUED | OUTPATIENT
Start: 2024-10-21 | End: 2024-10-22 | Stop reason: HOSPADM

## 2024-10-21 RX ADMIN — PROPOFOL 50 MG: 10 INJECTION, EMULSION INTRAVENOUS at 12:10

## 2024-10-21 RX ADMIN — SODIUM CHLORIDE: 9 INJECTION, SOLUTION INTRAVENOUS at 12:10

## 2024-10-21 RX ADMIN — LIDOCAINE HYDROCHLORIDE 100 MG: 20 INJECTION, SOLUTION INTRAVENOUS at 12:10

## 2024-10-21 RX ADMIN — PROPOFOL 50 MG: 10 INJECTION, EMULSION INTRAVENOUS at 01:10

## 2024-10-21 NOTE — H&P
History & Physical - Short Stay  Gastroenterology      SUBJECTIVE:     Procedure: Colonoscopy    Chief Complaint/Indication for Procedure: Screening    (Not in a hospital admission)      Review of patient's allergies indicates:   Allergen Reactions    Latex      Other reaction(s): Unknown    Doxycycline Nausea Only        Past Medical History:   Diagnosis Date    Anxiety     Asthma     Chronic pain     Cushing syndrome     Depression     Essential hypertension     Fibromyalgia     Herpes zoster     Hyperlipidemia     Leukocytosis 2022    Onychomycosis     MARY on CPAP     Rheumatoid arthritis     Type 2 diabetes mellitus 2020    Vitamin D deficiency 2018     Past Surgical History:   Procedure Laterality Date     SECTION      ELBOW SURGERY  1985    EPIDURAL STEROID INJECTION      L4-5 VERITO Dec 2020-[facility]    FEMUR SURGERY Right     due to osteomyelitis    HYSTERECTOMY      Abn Bleeding    INJECTION OF ANESTHETIC AGENT AROUND MEDIAL BRANCH NERVES INNERVATING LUMBAR FACET JOINT Bilateral 2022    Procedure: Bilateral L4-5,5-S1 MBB ( No steroids);  Surgeon: Carmel Torre MD;  Location: [facility] PAIN [facility];  Service: Pain Management;  Laterality: Bilateral;  PT AWARE TO BE TESTED ON OV    INJECTION OF ANESTHETIC AGENT AROUND MEDIAL BRANCH NERVES INNERVATING LUMBAR FACET JOINT Bilateral 2022    Procedure: Block-nerve-medial branch-lumbar, bilateral L4 through S1;  Surgeon: Carmel Torre MD;  Location: [facility] PAIN [facility];  Service: Pain Management;  Laterality: Bilateral;    INJECTION OF ANESTHETIC AGENT INTO SACROILIAC JOINT Bilateral 2022    Procedure: BLOCK, SACROILIAC JOINT;  Surgeon: Carmel Torre MD;  Location: [facility];  Service: Pain Management;  Laterality: Bilateral;  covid test put in    LIPOMA RESECTION  2019    RADIOFREQUENCY ABLATION OF LUMBAR MEDIAL BRANCH NERVE AT SINGLE LEVEL Right 2022    Procedure: Radiofrequency Ablation, Nerve, Spinal,  Lumbar, Medial Branch, Level L4-S1, right side 1st, will have left-sided after completing;  Surgeon: Carmel Torre MD;  Location: [facility];  Service: Pain Management;  Laterality: Right;  pt aware at visit to be tested    RADIOFREQUENCY ABLATION OF LUMBAR MEDIAL BRANCH NERVE AT SINGLE LEVEL Left 05/05/2022    Procedure: LEFT  L4-S1 RFTC  (HAD RIGHT  ON 4-14);  Surgeon: Carmel Torre MD;  Location: [facility];  Service: Pain Management;  Laterality: Left;    RADIOFREQUENCY ABLATION OF LUMBAR MEDIAL BRANCH NERVE AT SINGLE LEVEL Right 7/25/2023    Procedure: Radiofrequency Ablation, Nerve, Spinal, Lumbar, Medial Branch, Level L4-S1;  Surgeon: Carmel Torre MD;  Location: [facility];  Service: Pain Management;  Laterality: Right;    SURGICAL REMOVAL OF PILONIDAL CYST      TRANSFORAMINAL EPIDURAL INJECTION OF STEROID      Bilateral L4-5 TFESI Nov 2020-[facility]    TRANSFORAMINAL EPIDURAL INJECTION OF STEROID      Right L4-5 TFESI Feb 2020-[facility]    VENTRAL HERNIA REPAIR  09/2020     Family History   Problem Relation Name Age of Onset    Cancer Mother      Thyroid cancer Mother      Thyroid cancer Maternal Grandmother      Melanoma Neg Hx      Colon cancer Neg Hx      Breast cancer Neg Hx      Ovarian cancer Neg Hx       Social History     Tobacco Use    Smoking status: Every Day     Types: Cigarettes     Start date: 9/8/2021    Smokeless tobacco: Never   Substance Use Topics    Alcohol use: Yes     Comment: ocassional    Drug use: Never         OBJECTIVE:     Vital Signs (Most Recent)  Temp: 97.7 °F (36.5 °C) (10/21/24 1220)  Pulse: 103 (10/21/24 1220)  Resp: 17 (10/21/24 1220)  BP: (!) 184/108 (10/21/24 1220)  SpO2: 96 % (10/21/24 1220)    Physical Exam:                                                       GENERAL:  Comfortable, in no acute distress.                                 HEENT EXAM:  Nonicteric.  No adenopathy.  Oropharynx is clear.               NECK:  Supple.                                                                LUNGS:  Clear.                                                               CARDIAC:  Regular rate and rhythm.  S1, S2.  No murmur.                      ABDOMEN:  Soft, positive bowel sounds, nontender.  No hepatosplenomegaly or masses.  No rebound or guarding.                                             EXTREMITIES:  No edema.     MENTAL STATUS:  Normal, alert and oriented.      ASSESSMENT/PLAN:     Assessment: Screening    Plan: Colonoscopy

## 2024-10-21 NOTE — DISCHARGE INSTRUCTIONS
Procedure Date  10/21/24     Impression  Overall Impression:   The terminal ileum appeared normal.  Angioectasia in the ascending colon  Subcentimeter polyp in the transverse colon was removed with cold forceps biopsy     Recommendation  - Repeat colonoscopy in 5 years if polyp returns adenomatous otherwise repeat in 10 years  - Discharge patient to home  - Advance diet as tolerated  - Continue present medications  - Await pathology results  - Patient has a contact number available for emergencies. The signs and symptoms of potential delayed complications were discussed with the patient. Return to normal activities tomorrow. Written discharge instructions were provided to the patient.    Continue home medications as instructed. No driving for 24 hrs. No signing legal documents for 24 hrs. Drink plenty of fluids

## 2024-10-21 NOTE — ANESTHESIA PREPROCEDURE EVALUATION
10/21/2024  Belem Swann is a 44 y.o., female.    Past Medical History:   Diagnosis Date    Anxiety     Asthma     Chronic pain     Cushing syndrome     Depression     Essential hypertension     Fibromyalgia     Herpes zoster     Hyperlipidemia     Leukocytosis 2022    Onychomycosis     MARY on CPAP     Rheumatoid arthritis     Type 2 diabetes mellitus 2020    Vitamin D deficiency 2018       Past Surgical History:   Procedure Laterality Date     SECTION      ELBOW SURGERY  1985    EPIDURAL STEROID INJECTION      L4-5 VERITO Dec 2020-[facility]    FEMUR SURGERY Right     due to osteomyelitis    HYSTERECTOMY      Abn Bleeding    INJECTION OF ANESTHETIC AGENT AROUND MEDIAL BRANCH NERVES INNERVATING LUMBAR FACET JOINT Bilateral 2022    Procedure: Bilateral L4-5,5-S1 MBB ( No steroids);  Surgeon: Carmel Torre MD;  Location: [facility];  Service: Pain Management;  Laterality: Bilateral;  PT AWARE TO BE TESTED ON OV    INJECTION OF ANESTHETIC AGENT AROUND MEDIAL BRANCH NERVES INNERVATING LUMBAR FACET JOINT Bilateral 2022    Procedure: Block-nerve-medial branch-lumbar, bilateral L4 through S1;  Surgeon: Carmel Torre MD;  Location: [facility];  Service: Pain Management;  Laterality: Bilateral;    INJECTION OF ANESTHETIC AGENT INTO SACROILIAC JOINT Bilateral 2022    Procedure: BLOCK, SACROILIAC JOINT;  Surgeon: Carmel Torre MD;  Location: [facility];  Service: Pain Management;  Laterality: Bilateral;  covid test put in    LIPOMA RESECTION  2019    RADIOFREQUENCY ABLATION OF LUMBAR MEDIAL BRANCH NERVE AT SINGLE LEVEL Right 2022    Procedure: Radiofrequency Ablation, Nerve, Spinal, Lumbar, Medial Branch, Level L4-S1, right side 1st, will have left-sided after completing;  Surgeon: Carmel Torre MD;  Location: [facility];   Service: Pain Management;  Laterality: Right;  pt aware at visit to be tested    RADIOFREQUENCY ABLATION OF LUMBAR MEDIAL BRANCH NERVE AT SINGLE LEVEL Left 05/05/2022    Procedure: LEFT  L4-S1 RFTC  (HAD RIGHT  ON 4-14);  Surgeon: Carmel Torre MD;  Location: [facility];  Service: Pain Management;  Laterality: Left;    RADIOFREQUENCY ABLATION OF LUMBAR MEDIAL BRANCH NERVE AT SINGLE LEVEL Right 7/25/2023    Procedure: Radiofrequency Ablation, Nerve, Spinal, Lumbar, Medial Branch, Level L4-S1;  Surgeon: Carmel Torre MD;  Location: [facility];  Service: Pain Management;  Laterality: Right;    SURGICAL REMOVAL OF PILONIDAL CYST      TRANSFORAMINAL EPIDURAL INJECTION OF STEROID      Bilateral L4-5 TFESI Nov 2020-[facility]    TRANSFORAMINAL EPIDURAL INJECTION OF STEROID      Right L4-5 TFESI Feb 2020-N[facility]o    VENTRAL HERNIA REPAIR  09/2020       Family History   Problem Relation Name Age of Onset    Cancer Mother      Thyroid cancer Mother      Thyroid cancer Maternal Grandmother      Melanoma Neg Hx      Colon cancer Neg Hx      Breast cancer Neg Hx      Ovarian cancer Neg Hx         Social History     Socioeconomic History    Marital status:    Tobacco Use    Smoking status: Every Day     Types: Cigarettes     Start date: 9/8/2021    Smokeless tobacco: Never   Substance and Sexual Activity    Alcohol use: Yes     Comment: ocassional    Drug use: Never    Sexual activity: Not Currently     Partners: Male     Birth control/protection: See Surgical Hx       Current Outpatient Medications   Medication Sig Dispense Refill    albuterol sulfate 90 mcg/actuation aebs Inhale 180 mcg into the lungs every 4 (four) hours.      albuterol-ipratropium (DUO-NEB) 2.5 mg-0.5 mg/3 mL nebulizer solution Take 3 mLs by nebulization every 6 (six) hours as needed. Rescue      amitriptyline (ELAVIL) 25 MG tablet Take 1 tablet (25 mg total) by mouth every evening. 90 tablet 3    budesonide-formoterol 160-4.5 mcg  (SYMBICORT) 160-4.5 mcg/actuation HFAA Inhale 2 puffs into the lungs every 12 (twelve) hours. Controller 32.1 g 3    carvediloL (COREG) 25 MG tablet Take 25 mg by mouth 2 (two) times daily with meals.      cloNIDine (CATAPRES) 0.1 MG tablet Take 0.1 mg by mouth daily as needed.      cyclobenzaprine (FLEXERIL) 10 MG tablet Take 1 tablet (10 mg total) by mouth 3 (three) times daily as needed for Muscle spasms. 90 tablet 2    furosemide (LASIX) 40 MG tablet Take 40 mg by mouth once daily.      gabapentin (NEURONTIN) 300 MG capsule Take 1 capsule (300 mg total) by mouth every 8 (eight) hours. 90 capsule 1    HUMIRA,CF, PEN 40 mg/0.4 mL PnKt Inject 40 mg into the skin once a week.      HYDROcodone-acetaminophen (NORCO)  mg per tablet Take 1 tablet by mouth every 8 (eight) hours as needed for Pain. 90 tablet 0    [START ON 11/11/2024] HYDROcodone-acetaminophen (NORCO)  mg per tablet Take 1 tablet by mouth every 8 (eight) hours as needed for Pain. 90 tablet 0    hydrocortisone 2.5 % ointment Apply topically 2 (two) times daily. 454 g 5    hydrOXYzine pamoate (VISTARIL) 50 MG Cap Take 2 capsules (100 mg total) by mouth nightly as needed (anxiety & difficulty sleeping). 180 capsule 1    ketoconazole (NIZORAL) 2 % cream Apply topically once daily. 60 g 2    lancets (ONETOUCH DELICA LANCETS) 33 gauge Misc 1 lancet by Misc.(Non-Drug; Combo Route) route once daily. 100 each 3    leflunomide (ARAVA) 10 MG Tab Take 10 mg by mouth once daily.      metFORMIN (GLUCOPHAGE-XR) 500 MG ER 24hr tablet Take 1 tablet (500 mg total) by mouth once daily. 90 tablet 3    montelukast (SINGULAIR) 10 mg tablet Take 10 mg by mouth once daily.      NIFEdipine (PROCARDIA-XL) 30 MG (OSM) 24 hr tablet Take 30 mg by mouth every evening.      nystatin (MYCOSTATIN) powder Apply topically 2 (two) times daily. 60 g 5    olmesartan (BENICAR) 40 MG tablet Take 40 mg by mouth once daily.      pantoprazole (PROTONIX) 40 MG tablet Take 1 tablet (40  "mg total) by mouth 2 (two) times daily. 60 tablet 6    pen needle, diabetic 31 gauge x 1/4" Ndle       promethazine (PHENERGAN) 25 MG tablet Take 1 tablet (25 mg total) by mouth every 6 (six) hours as needed for Nausea. 30 tablet 1    rosuvastatin (CRESTOR) 40 MG Tab Take 1 tablet (40 mg total) by mouth every evening. 90 tablet 3    semaglutide (OZEMPIC) 1 mg/dose (4 mg/3 mL) Inject 1 mg into the skin every 7 days. 9 mL 3    triamcinolone acetonide 0.1% (KENALOG) 0.1 % ointment Apply topically 2 (two) times daily. 454 g 0    TRUE METRIX GLUCOSE TEST STRIP Strp 1 strip by Misc.(Non-Drug; Combo Route) route Daily. For T2DM. 100 strip 3     No current facility-administered medications for this encounter.       Review of patient's allergies indicates:   Allergen Reactions    Latex      Other reaction(s): Unknown    Doxycycline Nausea Only        Pre-op Assessment    I have reviewed the Patient Summary Reports.     I have reviewed the Nursing Notes. I have reviewed the NPO Status.   I have reviewed the Medications.     Review of Systems  Anesthesia Hx:  No problems with previous Anesthesia                Cardiovascular:     Hypertension           hyperlipidemia                               Pulmonary:    Asthma    Sleep Apnea, CPAP           Education provided regarding risk of obstructive sleep apnea            Hepatic/GI:    Hiatal Hernia,                 Musculoskeletal:  Arthritis   RA            Neurological:    Neuromuscular Disease,                                   Endocrine:  Diabetes           Psych:  Psychiatric History                  Physical Exam    Airway:  Mallampati: III   Mouth Opening: Normal  TM Distance: Normal  Tongue: Normal  Neck ROM: Normal ROM        Anesthesia Plan  Type of Anesthesia, risks & benefits discussed:    Anesthesia Type: Gen Natural Airway, MAC  Intra-op Monitoring Plan: Standard ASA Monitors  Post Op Pain Control Plan: multimodal analgesia  Induction:  IV  Informed Consent: " Informed consent signed with the Patient and all parties understand the risks and agree with anesthesia plan.  All questions answered. Patient consented to blood products? Yes  ASA Score: 3  Day of Surgery Review of History & Physical: H&P Update referred to the surgeon/provider.I have interviewed and examined the patient. I have reviewed the patient's H&P dated:     Ready For Surgery From Anesthesia Perspective.     .

## 2024-10-23 NOTE — ANESTHESIA POSTPROCEDURE EVALUATION
Anesthesia Post Evaluation    Patient: Belem Swann    Procedure(s) Performed: * No procedures listed *    Final Anesthesia Type: general      Patient location during evaluation: PACU  Patient participation: Yes- Able to Participate  Level of consciousness: responds to stimulation  Post-procedure vital signs: reviewed and stable  Pain management: adequate  Airway patency: patent    PONV status at discharge: No PONV  Anesthetic complications: no      Cardiovascular status: hemodynamically stable  Respiratory status: spontaneous ventilation and unassisted  Hydration status: euvolemic  Follow-up not needed.              Vitals Value Taken Time   /106 10/21/24 1344   Temp 97f 10/23/24 0950   Pulse 80 10/21/24 1345   Resp 14 10/21/24 1345   SpO2 97 % 10/21/24 1341   Vitals shown include unfiled device data.      Event Time   Out of Recovery 14:00:00         Pain/[place] Score: No data recorded

## 2024-11-01 ENCOUNTER — TELEPHONE (OUTPATIENT)
Dept: GASTROENTEROLOGY | Facility: CLINIC | Age: 44
End: 2024-11-01
Payer: MEDICAID

## 2024-11-21 NOTE — PROGRESS NOTES
Subjective:         Patient ID: Belem Swann is a 44 y.o. female.    Chief Complaint: Back Pain and Knee Pain        Pain  This is a chronic problem. The current episode started more than 1 year ago. The problem occurs daily. The problem has been waxing and waning. Associated symptoms include arthralgias and neck pain. Pertinent negatives include no anorexia, chest pain, chills, coughing, diaphoresis, fever, sore throat, vertigo or vomiting.     Review of Systems   Constitutional:  Negative for activity change, appetite change, chills, diaphoresis, fever and unexpected weight change.   HENT:  Negative for drooling, ear discharge, ear pain, facial swelling, mouth sores, nosebleeds, sore throat, trouble swallowing, voice change and goiter.    Eyes:  Negative for photophobia, pain, discharge, redness and visual disturbance.   Respiratory:  Negative for apnea, cough, choking, chest tightness, shortness of breath, wheezing and stridor.    Cardiovascular:  Negative for chest pain, palpitations and leg swelling.   Gastrointestinal:  Negative for abdominal distention, anorexia, diarrhea, rectal pain, vomiting and fecal incontinence.   Endocrine: Negative for cold intolerance, heat intolerance, polydipsia, polyphagia and polyuria.   Genitourinary:  Negative for bladder incontinence, dysuria, flank pain, frequency and hot flashes.   Musculoskeletal:  Positive for arthralgias, back pain, leg pain, neck pain and neck stiffness.   Integumentary:  Negative for color change and pallor.   Allergic/Immunologic: Negative for immunocompromised state.   Neurological:  Negative for dizziness, vertigo, seizures, syncope, facial asymmetry, speech difficulty, light-headedness, memory loss and coordination difficulties.   Hematological:  Negative for adenopathy. Does not bruise/bleed easily.   Psychiatric/Behavioral:  Negative for agitation, behavioral problems, confusion, decreased concentration, dysphoric mood, hallucinations,  self-injury and suicidal ideas. The patient is not hyperactive.            Past Medical History:   Diagnosis Date    Anxiety     Asthma     Chronic pain     Cushing syndrome     Depression     Essential hypertension     Fibromyalgia     Herpes zoster     Hyperlipidemia     Leukocytosis 2022    Onychomycosis     MARY on CPAP     Rheumatoid arthritis     Type 2 diabetes mellitus 2020    Vitamin D deficiency 2018     Past Surgical History:   Procedure Laterality Date     SECTION      ELBOW SURGERY  1985    EPIDURAL STEROID INJECTION      L4-5 VERITO Dec 2020-[facility]    FEMUR SURGERY Right     due to osteomyelitis    HYSTERECTOMY      Abn Bleeding    INJECTION OF ANESTHETIC AGENT AROUND MEDIAL BRANCH NERVES INNERVATING LUMBAR FACET JOINT Bilateral 2022    Procedure: Bilateral L4-5,5-S1 MBB ( No steroids);  Surgeon: Carmel Torre MD;  Location: [facility];  Service: Pain Management;  Laterality: Bilateral;  PT AWARE TO BE TESTED ON OV    INJECTION OF ANESTHETIC AGENT AROUND MEDIAL BRANCH NERVES INNERVATING LUMBAR FACET JOINT Bilateral 2022    Procedure: Block-nerve-medial branch-lumbar, bilateral L4 through S1;  Surgeon: Carmel Torre MD;  Location: [facility];  Service: Pain Management;  Laterality: Bilateral;    INJECTION OF ANESTHETIC AGENT INTO SACROILIAC JOINT Bilateral 2022    Procedure: BLOCK, SACROILIAC JOINT;  Surgeon: Carmel Torre MD;  Location: [facility];  Service: Pain Management;  Laterality: Bilateral;  covid test put in    LIPOMA RESECTION  2019    RADIOFREQUENCY ABLATION OF LUMBAR MEDIAL BRANCH NERVE AT SINGLE LEVEL Right 2022    Procedure: Radiofrequency Ablation, Nerve, Spinal, Lumbar, Medial Branch, Level L4-S1, right side 1st, will have left-sided after completing;  Surgeon: Carmel Torre MD;  Location: [facility];  Service: Pain Management;  Laterality: Right;  pt aware at visit to  "be tested    RADIOFREQUENCY ABLATION OF LUMBAR MEDIAL BRANCH NERVE AT SINGLE LEVEL Left 05/05/2022    Procedure: LEFT  L4-S1 RFTC  (HAD RIGHT  ON 4-14);  Surgeon: Carmel Torre MD;  Location: [facility];  Service: Pain Management;  Laterality: Left;    RADIOFREQUENCY ABLATION OF LUMBAR MEDIAL BRANCH NERVE AT SINGLE LEVEL Right 7/25/2023    Procedure: Radiofrequency Ablation, Nerve, Spinal, Lumbar, Medial Branch, Level L4-S1;  Surgeon: Carmel Torre MD;  Location: [facility];  Service: Pain Management;  Laterality: Right;    SURGICAL REMOVAL OF PILONIDAL CYST      TRANSFORAMINAL EPIDURAL INJECTION OF STEROID      Bilateral L4-5 TFESI Nov 2020-[facility]    TRANSFORAMINAL EPIDURAL INJECTION OF STEROID      Right L4-5 TFESI Feb 2020-[facility]    VENTRAL HERNIA REPAIR  09/2020     Social History     Socioeconomic History    Marital status:    Tobacco Use    Smoking status: Every Day     Types: Cigarettes     Start date: 9/8/2021    Smokeless tobacco: Never   Substance and Sexual Activity    Alcohol use: Yes     Comment: ocassional    Drug use: Never    Sexual activity: Not Currently     Partners: Male     Birth control/protection: See Surgical Hx     Family History   Problem Relation Name Age of Onset    Cancer Mother      Thyroid cancer Mother      Thyroid cancer Maternal Grandmother      Melanoma Neg Hx      Colon cancer Neg Hx      Breast cancer Neg Hx      Ovarian cancer Neg Hx       Review of patient's allergies indicates:   Allergen Reactions    Latex      Other reaction(s): Unknown    Doxycycline Nausea Only        Objective:  Vitals:    12/05/24 1339 12/05/24 1340   BP: (!) 183/110    Pulse: 96    Resp: 17    SpO2: 98%    Weight: (!) 155.6 kg (343 lb)    Height: 5' 2" (1.575 m)    PainSc:   7   7                     Physical Exam  Vitals and nursing note reviewed. Exam conducted with a chaperone present.   Constitutional:       General: She is awake. She is not in acute " distress.     Appearance: Normal appearance. She is obese. She is not ill-appearing, toxic-appearing or diaphoretic.   HENT:      Head: Normocephalic and atraumatic.      Nose: Nose normal.      Mouth/Throat:      Mouth: Mucous membranes are moist.      Pharynx: Oropharynx is clear.   Eyes:      Conjunctiva/sclera: Conjunctivae normal.      Pupils: Pupils are equal, round, and reactive to light.   Cardiovascular:      Rate and Rhythm: Normal rate.   Pulmonary:      Effort: Pulmonary effort is normal. No respiratory distress.   Abdominal:      Palpations: Abdomen is soft.      Tenderness: There is no guarding.   Musculoskeletal:         General: No swelling.      Cervical back: Normal range of motion and neck supple. Tenderness present. No rigidity.      Thoracic back: Tenderness present.      Lumbar back: Tenderness present. Decreased range of motion.   Skin:     General: Skin is warm and dry.      Coloration: Skin is not jaundiced or pale.   Neurological:      General: No focal deficit present.      Mental Status: She is alert and oriented to person, place, and time. Mental status is at baseline.      Cranial Nerves: No cranial nerve deficit (II-XII).   Psychiatric:         Mood and Affect: Mood normal.         Behavior: Behavior normal. Behavior is cooperative.         Thought Content: Thought content normal.         Colonoscopy  Narrative: Table formatting from the original result was not included.  Procedure Date  10/21/24    Impression  Overall   Impression:    The terminal ileum appeared normal.  Angioectasia in the ascending colon  Subcentimeter polyp in the transverse colon was removed with cold forceps   biopsy    Recommendation  - Repeat colonoscopy in 5 years if polyp returns adenomatous otherwise   repeat in 10 years  - Discharge patient to home  - Advance diet as tolerated  - Continue present medications  - Await pathology results  - Patient has a contact number available for emergencies. The signs and    symptoms of potential delayed complications were discussed with the   patient. Return to normal activities tomorrow. Written discharge   instructions were provided to the patient.    Indication  Screening for malignant neoplasm of colon    Providers  Camila Mosley Technician   Rowan Green CRNA CRNA Roberts, Sonya Yvette, RN Registered Nurse   Thad Martinez CRNA CRNA Veerisetty, Sai S., MD Proceduralist     Medications  General anesthesia - See anesthesia record.    Preprocedure  A history and physical has been performed, and patient medication   allergies have been reviewed. The patient's tolerance of previous   anesthesia has been reviewed. The risks and benefits of the procedure and   the sedation options and risks were discussed with the patient. All   questions were answered and informed consent obtained.    Details of the Procedure  The patient underwent general anesthesia, which was administered by an   anesthesia professional. The patient's heart rate, blood pressure, level   of consciousness, respirations, oxygen, ECG and ETCO2 were monitored   throughout the procedure. A digital rectal exam was performed. A perianal   exam was performed. The scope was introduced through the anus and advanced   to the terminal ileum. Retroflexion was performed in the rectum. The   quality of bowel preparation was evaluated using the Vernon Bowel   Preparation Scale with scores of: right colon = 3, transverse colon = 3,   left colon = 3. The total BBPS score was 9. Bowel prep was adequate. The   patient's estimated blood loss was minimal (<5 mL). The procedure was not   difficult. The patient tolerated the procedure well. There were no   apparent adverse events.     Scope: Pediatric Colonoscope  Scope Serial: [id number]    Events    Procedure Events   Event Event Time     Procedure Events   Event Event Time   ENDO SCOPE IN TIME 10/21/2024 12:49 PM   ENDO CECUM REACHED 10/21/2024 12:52 PM   ENDO SCOPE OUT TIME  10/21/2024  1:09 PM     CECAL WITHDRAWAL TIME: 16m 54s    Findings  The terminal ileum appeared normal.  Angioectasia in the ascending colon; no bleeding was identified  One 4 mm polyp in the transverse colon; performed cold forceps biopsy         Hospital Outpatient Visit on 10/21/2024   Component Date Value Ref Range Status    Case Report 10/21/2024    Final                    Value:Surgical Pathology                                Case: [id number]                                   Authorizing Provider:  Tree Roche MD     Collected:           10/21/2024 01:02 PM          Ordering Location:     [facility] -         Received:            10/21/2024 03:04 PM                                 Endoscopy                                                                    Pathologist:           Louie Chopra III, MD                                                                           Specimen:    Intestine Large, Transverse Colon, Jar #1 - transverse colon polyp biopsy                  Final Diagnosis 10/21/2024    Final                    Value:A. Transverse colon, biopsy:  Tubular adenoma      Gross Description 10/21/2024    Final                    Value:A. Intestine Large, Transverse Colon: Jar #1 - transverse colon polyp biopsy  The specimen is received in formalin designated transverse colon polyp biopsy and consists of a single pink-tan tissue fragment that measures 0.3 cm in greatest dimension and is entirely submitted in cassette A1    Grossing was completed by Clayton Haddad.      Microscopic Description 10/21/2024    Final                    Value:A microscopic examination was performed and the diagnosis reflects the findings.          Laboratory Notes 10/21/2024    Final                    Value:If this report includes immunohistochemical (IHC) test results, please note the following: IHC studies were  interpreted in conjunction with appropriate positive and negative controls which demonstrate the expected positive and negative reactivity. This laboratory is regulated under CLIA as qualified to perform high-complexity testing. IHC tests are used for clinical purposes. They should not be regarded as investigational or research.       Office Visit on 10/07/2024   Component Date Value Ref Range Status    POC Amphetamines 10/07/2024 Negative  Negative, Inconclusive Final    POC Barbiturates 10/07/2024 Negative  Negative, Inconclusive Final    POC Benzodiazepines 10/07/2024 Negative  Negative, Inconclusive Final    POC Cocaine 10/07/2024 Negative  Negative, Inconclusive Final    POC THC 10/07/2024 Negative  Negative, Inconclusive Final    POC Methadone 10/07/2024 Negative  Negative, Inconclusive Final    POC Methamphetamine 10/07/2024 Negative  Negative, Inconclusive Final    POC Opiates 10/07/2024 Presumptive Positive (A)  Negative, Inconclusive Final    POC Oxycodone 10/07/2024 Negative  Negative, Inconclusive Final    POC Phencyclidine 10/07/2024 Negative  Negative, Inconclusive Final    POC Methylenedioxymethamphetamine * 10/07/2024 Negative  Negative, Inconclusive Final    POC Tricyclic Antidepressants 10/07/2024 Negative  Negative, Inconclusive Final    POC Buprenorphine 10/07/2024 Negative   Final     Acceptable 10/07/2024 Yes   Final    POC Temperature (Urine) 10/07/2024 92   Final   Office Visit on 09/17/2024   Component Date Value Ref Range Status    Sodium 09/17/2024 139  136 - 145 mmol/L Final    Potassium 09/17/2024 3.8  3.5 - 5.1 mmol/L Final    Chloride 09/17/2024 105  98 - 107 mmol/L Final    CO2 09/17/2024 27  21 - 32 mmol/L Final    Anion Gap 09/17/2024 11  7 - 16 mmol/L Final    Glucose 09/17/2024 148 (H)  74 - 106 mg/dL Final    BUN 09/17/2024 14  7 - 18 mg/dL Final    Creatinine 09/17/2024 0.56  0.55 - 1.02 mg/dL Final    BUN/Creatinine Ratio 09/17/2024 25 (H)   6 - 20 Final    Calcium 09/17/2024 8.8  8.5 - 10.1 mg/dL Final    Total Protein 09/17/2024 7.9  6.4 - 8.2 g/dL Final    Albumin 09/17/2024 3.1 (L)  3.5 - 5.0 g/dL Final    Globulin 09/17/2024 4.8 (H)  2.0 - 4.0 g/dL Final    A/G Ratio 09/17/2024 0.6   Final    Bilirubin, Total 09/17/2024 0.2  >0.0 - 1.2 mg/dL Final    Alk Phos 09/17/2024 130 (H)  39 - 100 U/L Final    ALT 09/17/2024 21  13 - 56 U/L Final    AST 09/17/2024 11 (L)  15 - 37 U/L Final    eGFR 09/17/2024 115  >=60 mL/min/1.73m2 Final    Triglycerides 09/17/2024 135  35 - 150 mg/dL Final    Cholesterol 09/17/2024 171  0 - 200 mg/dL Final    HDL Cholesterol 09/17/2024 43  40 - 60 mg/dL Final    Cholesterol/HDL Ratio (Risk Factor) 09/17/2024 4.0   Final    Non-HDL 09/17/2024 128  mg/dL Final    LDL Calculated 09/17/2024 101  mg/dL Final    LDL/HDL 09/17/2024 2.3   Final    VLDL 09/17/2024 27  mg/dL Final    Hemoglobin A1C 09/17/2024 6.3  4.5 - 6.6 % Final    Estimated Average Glucose 09/17/2024 134  mg/dL Final    WBC 09/17/2024 11.59 (H)  4.50 - 11.00 K/uL Final    RBC 09/17/2024 4.89  4.20 - 5.40 M/uL Final    Hemoglobin 09/17/2024 13.6  12.0 - 16.0 g/dL Final    Hematocrit 09/17/2024 42.3  38.0 - 47.0 % Final    MCV 09/17/2024 86.5  80.0 - 96.0 fL Final    MCH 09/17/2024 27.8  27.0 - 31.0 pg Final    MCHC 09/17/2024 32.2  32.0 - 36.0 g/dL Final    RDW 09/17/2024 13.0  11.5 - 14.5 % Final    Platelet Count 09/17/2024 155  150 - 400 K/uL Final    MPV 09/17/2024 13.9 (H)  9.4 - 12.4 fL Final    Neutrophils % 09/17/2024 71.3 (H)  53.0 - 65.0 % Final    Lymphocytes % 09/17/2024 18.5 (L)  27.0 - 41.0 % Final    Monocytes % 09/17/2024 5.6  2.0 - 6.0 % Final    Eosinophils % 09/17/2024 3.4  1.0 - 4.0 % Final    Basophils % 09/17/2024 0.5  0.0 - 1.0 % Final    Immature Granulocytes % 09/17/2024 0.7 (H)  0.0 - 0.4 % Final    nRBC, Auto 09/17/2024 0.0  <=0.0 % Final    Neutrophils, Abs 09/17/2024 8.27 (H)  1.80 - 7.70 K/uL  Final    Lymphocytes, Absolute 09/17/2024 2.14  1.00 - 4.80 K/uL Final    Monocytes, Absolute 09/17/2024 0.65  0.00 - 0.80 K/uL Final    Eosinophils, Absolute 09/17/2024 0.39  0.00 - 0.50 K/uL Final    Basophils, Absolute 09/17/2024 0.06  0.00 - 0.20 K/uL Final    Immature Granulocytes, Absolute 09/17/2024 0.08 (H)  0.00 - 0.04 K/uL Final    nRBC, Absolute 09/17/2024 0.00  <=0.00 x10e3/uL Final    Diff Type 09/17/2024 Auto   Final    Platelet Morphology 09/17/2024 Few Large Platelets (A)  Normal Final    RBC Morphology 09/17/2024 Normal   Final   Hospital Outpatient Visit on 07/11/2024   Component Date Value Ref Range Status    POC Glucose 07/11/2024 143 (H)  70 - 105 mg/dL Final    Case Report 07/11/2024    Final                    Value:Surgical Pathology                                Case: [id number]                                   Authorizing Provider:  Tree Roche MD     Collected:           07/11/2024 02:25 PM          Ordering Location:     [facility] -         Received:            07/11/2024 03:02 PM                                 Endoscopy                                                                    Pathologist:           Farhat Mendoza MD                                                      Specimen:    Stomach, a. gastric bx, r/o H.Pylori                                                       Final Diagnosis 07/11/2024    Final                    Value:This result contains rich text formatting which cannot be displayed here.    Gross Description 07/11/2024    Final                    Value:This result contains rich text formatting which cannot be displayed here.    Microscopic Description 07/11/2024    Final                    Value:This result contains rich text formatting which cannot be displayed here.    Laboratory Notes 07/11/2024    Final                    Value:This result contains rich text formatting which cannot be displayed here.   Office Visit on  06/10/2024   Component Date Value Ref Range Status    POC Amphetamines 06/10/2024 Negative  Negative, Inconclusive Final    POC Barbiturates 06/10/2024 Negative  Negative, Inconclusive Final    POC Benzodiazepines 06/10/2024 Negative  Negative, Inconclusive Final    POC Cocaine 06/10/2024 Negative  Negative, Inconclusive Final    POC THC 06/10/2024 Negative  Negative, Inconclusive Final    POC Methadone 06/10/2024 Negative  Negative, Inconclusive Final    POC Methamphetamine 06/10/2024 Negative  Negative, Inconclusive Final    POC Opiates 06/10/2024 Presumptive Positive (A)  Negative, Inconclusive Final    POC Oxycodone 06/10/2024 Negative  Negative, Inconclusive Final    POC Phencyclidine 06/10/2024 Negative  Negative, Inconclusive Final    POC Methylenedioxymethamphetamine * 06/10/2024 Negative  Negative, Inconclusive Final    POC Tricyclic Antidepressants 06/10/2024 Negative  Negative, Inconclusive Final    POC Buprenorphine 06/10/2024 Negative   Final     Acceptable 06/10/2024 Yes   Final    POC Temperature (Urine) 06/10/2024 90   Final         No orders of the defined types were placed in this encounter.          Requested Prescriptions     Signed Prescriptions Disp Refills    gabapentin (NEURONTIN) 300 MG capsule 90 capsule 1     Sig: Take 1 capsule (300 mg total) by mouth every 8 (eight) hours.    HYDROcodone-acetaminophen (NORCO)  mg per tablet 90 tablet 0     Sig: Take 1 tablet by mouth every 8 (eight) hours as needed for Pain.    HYDROcodone-acetaminophen (NORCO)  mg per tablet 90 tablet 0     Sig: Take 1 tablet by mouth every 8 (eight) hours as needed for Pain.       Assessment:     1. Rheumatoid arthritis involving multiple sites with positive rheumatoid factor    2. Spondylosis of lumbar region without myelopathy or radiculopathy    3. Sacroiliitis    4. Chronic pain of right knee                     A's of Opioid Risk Assessment  Activity:Patient can perform  ADL.   Analgesia:Patients pain is partially controlled by current medication. Patient has tried OTC medications such as Tylenol and Ibuprofen with out relief.   Adverse Effects: Patient denies constipation or sedation.  Aberrant Behavior:  reviewed with no aberrant drug seeking/taking behavior.  Overdose reversal drug naloxone discussed    Drug screen reviewed    X-ray right knee [facility] September 14, 2022 degenerative changes no fracture noted        Plan:      May 1, 2024 she verbalized understanding narcotics agreement compliance with medications       Narcan December 2022    Follows rheumatology [facility] rheumatoid arthritis    Follows orthopedics [place] chronic right knee pain    She had bilateral lumbar RF TC  July 25, 2023  She states she has 75% relief after procedure, the procedure did help improve her level function       Complaint increasing knee pain left greater than right    Considering genicular nerve block    Requesting Toradol injection for joint pain     Toradol 60 mg IM, tolerated well    Will continue home exercise program as directed    Continue current medication    Follow-up 2 months    Dr. Torre October 2025    Bring original prescription medication bottles/container/box with labels to each visit

## 2024-12-05 ENCOUNTER — OFFICE VISIT (OUTPATIENT)
Dept: PAIN MEDICINE | Facility: CLINIC | Age: 44
End: 2024-12-05
Payer: MEDICAID

## 2024-12-05 VITALS
HEART RATE: 96 BPM | WEIGHT: 293 LBS | OXYGEN SATURATION: 98 % | BODY MASS INDEX: 53.92 KG/M2 | HEIGHT: 62 IN | SYSTOLIC BLOOD PRESSURE: 183 MMHG | RESPIRATION RATE: 17 BRPM | DIASTOLIC BLOOD PRESSURE: 110 MMHG

## 2024-12-05 DIAGNOSIS — M25.561 CHRONIC PAIN OF RIGHT KNEE: Chronic | ICD-10-CM

## 2024-12-05 DIAGNOSIS — M47.816 SPONDYLOSIS OF LUMBAR REGION WITHOUT MYELOPATHY OR RADICULOPATHY: Chronic | ICD-10-CM

## 2024-12-05 DIAGNOSIS — G89.29 CHRONIC PAIN OF RIGHT KNEE: Chronic | ICD-10-CM

## 2024-12-05 DIAGNOSIS — M46.1 SACROILIITIS: Chronic | ICD-10-CM

## 2024-12-05 DIAGNOSIS — M05.79 RHEUMATOID ARTHRITIS INVOLVING MULTIPLE SITES WITH POSITIVE RHEUMATOID FACTOR: Primary | Chronic | ICD-10-CM

## 2024-12-05 PROCEDURE — 3080F DIAST BP >= 90 MM HG: CPT | Mod: CPTII,,, | Performed by: PHYSICIAN ASSISTANT

## 2024-12-05 PROCEDURE — 99999 PR PBB SHADOW E&M-EST. PATIENT-LVL V: CPT | Mod: PBBFAC,,, | Performed by: PHYSICIAN ASSISTANT

## 2024-12-05 PROCEDURE — 3061F NEG MICROALBUMINURIA REV: CPT | Mod: CPTII,,, | Performed by: PHYSICIAN ASSISTANT

## 2024-12-05 PROCEDURE — 99215 OFFICE O/P EST HI 40 MIN: CPT | Mod: PBBFAC | Performed by: PHYSICIAN ASSISTANT

## 2024-12-05 PROCEDURE — 3008F BODY MASS INDEX DOCD: CPT | Mod: CPTII,,, | Performed by: PHYSICIAN ASSISTANT

## 2024-12-05 PROCEDURE — 1159F MED LIST DOCD IN RCRD: CPT | Mod: CPTII,,, | Performed by: PHYSICIAN ASSISTANT

## 2024-12-05 PROCEDURE — 3077F SYST BP >= 140 MM HG: CPT | Mod: CPTII,,, | Performed by: PHYSICIAN ASSISTANT

## 2024-12-05 PROCEDURE — 3044F HG A1C LEVEL LT 7.0%: CPT | Mod: CPTII,,, | Performed by: PHYSICIAN ASSISTANT

## 2024-12-05 PROCEDURE — 3066F NEPHROPATHY DOC TX: CPT | Mod: CPTII,,, | Performed by: PHYSICIAN ASSISTANT

## 2024-12-05 PROCEDURE — 96372 THER/PROPH/DIAG INJ SC/IM: CPT | Mod: PBBFAC | Performed by: PHYSICIAN ASSISTANT

## 2024-12-05 PROCEDURE — 4010F ACE/ARB THERAPY RXD/TAKEN: CPT | Mod: CPTII,,, | Performed by: PHYSICIAN ASSISTANT

## 2024-12-05 PROCEDURE — 99999PBSHW PR PBB SHADOW TECHNICAL ONLY FILED TO HB: Mod: PBBFAC,,,

## 2024-12-05 PROCEDURE — 99214 OFFICE O/P EST MOD 30 MIN: CPT | Mod: S$PBB,25,, | Performed by: PHYSICIAN ASSISTANT

## 2024-12-05 RX ORDER — HYDROCODONE BITARTRATE AND ACETAMINOPHEN 10; 325 MG/1; MG/1
1 TABLET ORAL EVERY 8 HOURS PRN
Qty: 90 TABLET | Refills: 0 | Status: SHIPPED | OUTPATIENT
Start: 2025-01-10 | End: 2025-02-09

## 2024-12-05 RX ORDER — KETOROLAC TROMETHAMINE 30 MG/ML
60 INJECTION, SOLUTION INTRAMUSCULAR; INTRAVENOUS
Status: COMPLETED | OUTPATIENT
Start: 2024-12-05 | End: 2024-12-05

## 2024-12-05 RX ORDER — GABAPENTIN 300 MG/1
300 CAPSULE ORAL EVERY 8 HOURS
Qty: 90 CAPSULE | Refills: 1 | Status: SHIPPED | OUTPATIENT
Start: 2024-12-05

## 2024-12-05 RX ORDER — HYDROCODONE BITARTRATE AND ACETAMINOPHEN 10; 325 MG/1; MG/1
1 TABLET ORAL EVERY 8 HOURS PRN
Qty: 90 TABLET | Refills: 0 | Status: SHIPPED | OUTPATIENT
Start: 2024-12-11 | End: 2025-01-10

## 2024-12-05 RX ADMIN — KETOROLAC TROMETHAMINE 60 MG: 30 INJECTION, SOLUTION INTRAMUSCULAR at 01:12

## 2024-12-20 ENCOUNTER — TELEPHONE (OUTPATIENT)
Dept: GASTROENTEROLOGY | Facility: CLINIC | Age: 44
End: 2024-12-20
Payer: MEDICAID

## 2024-12-20 ENCOUNTER — OFFICE VISIT (OUTPATIENT)
Dept: FAMILY MEDICINE | Facility: CLINIC | Age: 44
End: 2024-12-20
Payer: MEDICAID

## 2024-12-20 VITALS
BODY MASS INDEX: 53.92 KG/M2 | DIASTOLIC BLOOD PRESSURE: 83 MMHG | OXYGEN SATURATION: 95 % | RESPIRATION RATE: 20 BRPM | TEMPERATURE: 98 F | WEIGHT: 293 LBS | HEIGHT: 62 IN | SYSTOLIC BLOOD PRESSURE: 138 MMHG | HEART RATE: 70 BPM

## 2024-12-20 DIAGNOSIS — H92.09 OTALGIA, UNSPECIFIED LATERALITY: ICD-10-CM

## 2024-12-20 DIAGNOSIS — K92.1 BLOOD IN STOOL: Primary | ICD-10-CM

## 2024-12-20 RX ORDER — NAPROXEN 500 MG/1
500 TABLET ORAL 2 TIMES DAILY WITH MEALS
COMMUNITY
Start: 2024-11-21

## 2024-12-20 RX ORDER — CHLORTHALIDONE 25 MG/1
TABLET ORAL
COMMUNITY
Start: 2024-03-21

## 2024-12-20 NOTE — TELEPHONE ENCOUNTER
Spoke w/ pt and updated on appt scheduled for 12/23/24 at 3p - pt asked to arrive at 1pm for check in - good vu noted

## 2024-12-20 NOTE — PROGRESS NOTES
DANE Santos        PATIENT NAME: Belem Swann  : 1980  DATE: 24  MRN: [id number]      Patient PCP Information       Provider PCP Type    DANE Saenz [facility]            Reason for Visit / Chief Complaint: Otalgia (Pt presents to the clinic for ear pain ) and Rectal Bleeding       Update PCP  Update Chief Complaint         History of Present Illness / Problem Focused Workflow     Belem Swann presents to the clinic with Otalgia (Pt presents to the clinic for ear pain ) and Rectal Bleeding     HPI  Patient presents to clinic with several concerns. Patient reports blood stools since Wed. Patient states she had colonoscopy in 10/21/2024. Patient experienced some bleeding post procedure. Patient states she notified clinic however bleeding resolved. Patient now with recurrent bleeding since Wednesday. Per cscope report no diverticular disease noted. Patient did have polyp removed which pathology noted as tubular adenoma. Patient reports some generalized mid abdominal cramping at times. Last bowel mvmt yesterday morning, more blood than stool.   Patient also reports right ear pain, pulsatile tinnitus noted. Also reports some pain to left side of anterior neck, states feel lymph nodes are tender. No dental abscess noted.     Medical / Social / Family History     Past Medical History:   Diagnosis Date    Anxiety     Asthma     Chronic pain     Cushing syndrome     Depression     Essential hypertension     Fibromyalgia     Herpes zoster     Hyperlipidemia     Leukocytosis 2022    Onychomycosis     MARY on CPAP     Rheumatoid arthritis     Type 2 diabetes mellitus 2020    Vitamin D deficiency 2018       Past Surgical History:   Procedure Laterality Date     SECTION      ELBOW SURGERY  1985    EPIDURAL STEROID INJECTION      L4-5 VERITO Dec 2020-[facility]    FEMUR SURGERY Right 1985    due to osteomyelitis    HYSTERECTOMY      Abn Bleeding    INJECTION OF ANESTHETIC  AGENT AROUND MEDIAL BRANCH NERVES INNERVATING LUMBAR FACET JOINT Bilateral 02/08/2022    Procedure: Bilateral L4-5,5-S1 MBB ( No steroids);  Surgeon: Carmel Torre MD;  Location: [facility];  Service: Pain Management;  Laterality: Bilateral;  PT AWARE TO BE TESTED ON OV    INJECTION OF ANESTHETIC AGENT AROUND MEDIAL BRANCH NERVES INNERVATING LUMBAR FACET JOINT Bilateral 03/08/2022    Procedure: Block-nerve-medial branch-lumbar, bilateral L4 through S1;  Surgeon: Carmel Torre MD;  Location: [facility];  Service: Pain Management;  Laterality: Bilateral;    INJECTION OF ANESTHETIC AGENT INTO SACROILIAC JOINT Bilateral 08/23/2022    Procedure: BLOCK, SACROILIAC JOINT;  Surgeon: Carmel Torre MD;  Location: [facility];  Service: Pain Management;  Laterality: Bilateral;  covid test put in    LIPOMA RESECTION  12/2019    RADIOFREQUENCY ABLATION OF LUMBAR MEDIAL BRANCH NERVE AT SINGLE LEVEL Right 04/14/2022    Procedure: Radiofrequency Ablation, Nerve, Spinal, Lumbar, Medial Branch, Level L4-S1, right side 1st, will have left-sided after completing;  Surgeon: Carmel Torre MD;  Location: [facility];  Service: Pain Management;  Laterality: Right;  pt aware at visit to be tested    RADIOFREQUENCY ABLATION OF LUMBAR MEDIAL BRANCH NERVE AT SINGLE LEVEL Left 05/05/2022    Procedure: LEFT  L4-S1 RFTC  (HAD RIGHT  ON 4-14);  Surgeon: Carmel Torre MD;  Location: [facility];  Service: Pain Management;  Laterality: Left;    RADIOFREQUENCY ABLATION OF LUMBAR MEDIAL BRANCH NERVE AT SINGLE LEVEL Right 7/25/2023    Procedure: Radiofrequency Ablation, Nerve, Spinal, Lumbar, Medial Branch, Level L4-S1;  Surgeon: Carmel Torre MD;  Location: [facility];  Service: Pain Management;  Laterality: Right;    SURGICAL REMOVAL OF PILONIDAL CYST      TRANSFORAMINAL EPIDURAL INJECTION OF STEROID      Bilateral L4-5 TFESI Nov 20202020-[facility]    TRANSFORAMINAL EPIDURAL INJECTION OF STEROID       Right L4-5 TFESI Feb 2020-[facility]    VENTRAL HERNIA REPAIR  09/2020       Social History  Ms.  reports that she has been smoking cigarettes. She started smoking about 3 years ago. She has never used smokeless tobacco. She reports current alcohol use. She reports that she does not use drugs.    Family History  Ms.'s family history includes Cancer in her mother; Thyroid cancer in her maternal grandmother and mother.    Medications and Allergies     Medications  Outpatient Medications Marked as Taking for the 12/20/24 encounter (Office Visit) with Britney Hearn FNP   Medication Sig Dispense Refill    albuterol sulfate 90 mcg/actuation aebs Inhale 180 mcg into the lungs every 4 (four) hours.      albuterol-ipratropium (DUO-NEB) 2.5 mg-0.5 mg/3 mL nebulizer solution Take 3 mLs by nebulization every 6 (six) hours as needed. Rescue      amitriptyline (ELAVIL) 25 MG tablet Take 1 tablet (25 mg total) by mouth every evening. 90 tablet 3    budesonide-formoterol 160-4.5 mcg (SYMBICORT) 160-4.5 mcg/actuation HFAA Inhale 2 puffs into the lungs every 12 (twelve) hours. Controller 32.1 g 3    carvediloL (COREG) 25 MG tablet Take 25 mg by mouth 2 (two) times daily with meals.      chlorthalidone (HYGROTEN) 25 MG Tab       cloNIDine (CATAPRES) 0.1 MG tablet Take 0.1 mg by mouth daily as needed.      cyclobenzaprine (FLEXERIL) 10 MG tablet Take 1 tablet (10 mg total) by mouth 3 (three) times daily as needed for Muscle spasms. 90 tablet 2    furosemide (LASIX) 40 MG tablet Take 40 mg by mouth once daily.      gabapentin (NEURONTIN) 300 MG capsule Take 1 capsule (300 mg total) by mouth every 8 (eight) hours. 90 capsule 1    HUMIRA,CF, PEN 40 mg/0.4 mL PnKt Inject 40 mg into the skin once a week.      [START ON 1/10/2025] HYDROcodone-acetaminophen (NORCO)  mg per tablet Take 1 tablet by mouth every 8 (eight) hours as needed for Pain. 90 tablet 0    HYDROcodone-acetaminophen (NORCO)  mg per tablet Take 1 tablet by  mouth every 8 (eight) hours as needed for Pain. 90 tablet 0    hydrocortisone 2.5 % ointment Apply topically 2 (two) times daily. 454 g 5    hydrOXYzine pamoate (VISTARIL) 50 MG Cap Take 2 capsules (100 mg total) by mouth nightly as needed (anxiety & difficulty sleeping). 180 capsule 1    ketoconazole (NIZORAL) 2 % cream Apply topically once daily. 60 g 2    lancets (ONETOUCH DELICA LANCETS) 33 gauge Misc 1 lancet by Misc.(Non-Drug; Combo Route) route once daily. 100 each 3    leflunomide (ARAVA) 10 MG Tab Take 10 mg by mouth once daily.      metFORMIN (GLUCOPHAGE-XR) 500 MG ER 24hr tablet Take 1 tablet (500 mg total) by mouth once daily. 90 tablet 3    montelukast (SINGULAIR) 10 mg tablet Take 10 mg by mouth once daily.      naproxen (NAPROSYN) 500 MG tablet Take 500 mg by mouth 2 (two) times daily with meals.      NIFEdipine (PROCARDIA-XL) 30 MG (OSM) 24 hr tablet Take 30 mg by mouth every evening.      nystatin (MYCOSTATIN) powder Apply topically 2 (two) times daily. 60 g 5    olmesartan (BENICAR) 40 MG tablet Take 40 mg by mouth once daily.      pantoprazole (PROTONIX) 40 MG tablet Take 1 tablet (40 mg total) by mouth 2 (two) times daily. 60 tablet 6    promethazine (PHENERGAN) 25 MG tablet Take 1 tablet (25 mg total) by mouth every 6 (six) hours as needed for Nausea. 30 tablet 1    rosuvastatin (CRESTOR) 40 MG Tab Take 1 tablet (40 mg total) by mouth every evening. 90 tablet 3    semaglutide (OZEMPIC) 1 mg/dose (4 mg/3 mL) Inject 1 mg into the skin every 7 days. 9 mL 3    TRUE METRIX GLUCOSE TEST STRIP Strp 1 strip by Misc.(Non-Drug; Combo Route) route Daily. For T2DM. 100 strip 3       Allergies  Review of patient's allergies indicates:   Allergen Reactions    Latex      Other reaction(s): Unknown    Doxycycline Nausea Only       Physical Examination     Vitals:    12/20/24 0725   BP: 138/83   BP Location: Right arm   Patient Position: Sitting   Pulse: 70   Resp: 20   Temp: 97.6 °F (36.4 °C)   TempSrc: Oral  "  SpO2: 95%   Weight: (!) 154.7 kg (341 lb)   Height: 5' 2" (1.575 m)       Physical Exam  Vitals reviewed.   Constitutional:       Appearance: Normal appearance. She is obese.   HENT:      Head: Normocephalic.      Right Ear: External ear normal.      Left Ear: External ear normal.      Nose: Nose normal.      Mouth/Throat:      Mouth: Mucous membranes are moist.   Eyes:      Extraocular Movements: Extraocular movements intact.   Cardiovascular:      Rate and Rhythm: Normal rate and regular rhythm.      Pulses: Normal pulses.      Heart sounds: Normal heart sounds.   Pulmonary:      Effort: Pulmonary effort is normal. No respiratory distress.      Breath sounds: Normal breath sounds. No stridor. No wheezing, rhonchi or rales.   Chest:      Chest wall: No tenderness.   Musculoskeletal:         General: Normal range of motion.      Cervical back: Normal range of motion.   Skin:     General: Skin is warm and dry.      Capillary Refill: Capillary refill takes less than 2 seconds.   Neurological:      General: No focal deficit present.      Mental Status: She is alert and oriented to person, place, and time.   Psychiatric:         Mood and Affect: Mood normal.         Behavior: Behavior normal.         Thought Content: Thought content normal.         Judgment: Judgment normal.           No visits with results within 14 Day(s) from this visit.   Latest known visit with results is:   Hospital Outpatient Visit on 10/21/2024   Component Date Value Ref Range Status    Case Report 10/21/2024    Final                    Value:Surgical Pathology                                Case: [id number]                                   Authorizing Provider:  Tree Roche MD     Collected:           10/21/2024 01:02 PM          Ordering Location:     [facility] -         Received:            10/21/2024 03:04 PM                                 Endoscopy                                                                  "   Pathologist:           Louie Chopra III, MD                                                                           Specimen:    Intestine Large, Transverse Colon, Jar #1 - transverse colon polyp biopsy                  Final Diagnosis 10/21/2024    Final                    Value:A. Transverse colon, biopsy:  Tubular adenoma      Gross Description 10/21/2024    Final                    Value:A. Intestine Large, Transverse Colon: Jar #1 - transverse colon polyp biopsy  The specimen is received in formalin designated transverse colon polyp biopsy and consists of a single pink-tan tissue fragment that measures 0.3 cm in greatest dimension and is entirely submitted in cassette A1    Grossing was completed by Clayton Haddad.      Microscopic Description 10/21/2024    Final                    Value:A microscopic examination was performed and the diagnosis reflects the findings.          Laboratory Notes 10/21/2024    Final                    Value:If this report includes immunohistochemical (IHC) test results, please note the following: IHC studies were interpreted in conjunction with appropriate positive and negative controls which demonstrate the expected positive and negative reactivity. This laboratory is regulated under CLIA as qualified to perform high-complexity testing. IHC tests are used for clinical purposes. They should not be regarded as investigational or research.                 Assessment and Plan (including Health Maintenance)      Problem List  Smart Sets  Document Outside HM   :    Plan:     1. Blood in stool    2. Otalgia, unspecified laterality    Ear exam normal.   Message GI, Unable to see in clinic today.   Instructions given for patient to follow up in clinic on Monday or ED over weekend if bleeding worsens.       There are no Patient Instructions on file for this visit.       Health Maintenance Due   Topic  Date Due    Eye Exam  01/19/2024    Mammogram  05/30/2024    Influenza Vaccine (1) 09/01/2024       Most Recent Immunizations   Administered Date(s) Administered    Influenza - Quadrivalent 09/03/2020    Influenza - Quadrivalent - PF *Preferred* (6 months and older) 09/12/2022    Pneumococcal Conjugate - 13 Valent 12/11/2019    Pneumococcal Polysaccharide - 23 Valent 01/04/2021    Tdap 05/06/2021            Health Maintenance Topics with due status: Not Due       Topic Last Completion Date    Pneumococcal Vaccines (Age 0-64) 01/04/2021    TETANUS VACCINE 05/06/2021    Pap Smear 03/23/2023    Diabetes Urine Screening 03/20/2024    Foot Exam 09/17/2024    Lipid Panel 09/17/2024    Hemoglobin A1c 09/17/2024    Colorectal Cancer Screening 10/21/2024    RSV Vaccine (Age 60+ and Pregnant patients) Not Due       Future Appointments   Date Time Provider Department Center   12/23/2024  3:00 PM Lyndsay Diggs NP [facility] GA[facility] [place] [facility]   2/5/2025  1:15 PM Jeff Pineda PA [facility]   2/20/2025  1:45 PM Ashlyn Wakefield MD [facility] DERM [place]   3/20/2025  9:40 AM Rhoda Rueda FNP [facility]   10/1/2025  9:40 AM Rhoda Rueda FNP [facility]   10/2/2025  1:15 PM Rogelio Wakefield MD [facility]            Signature:  Britney Hearn JOSE A  [facility]     Date of encounter: 12/20/24

## 2024-12-23 ENCOUNTER — OFFICE VISIT (OUTPATIENT)
Dept: GASTROENTEROLOGY | Facility: CLINIC | Age: 44
End: 2024-12-23
Payer: MEDICAID

## 2024-12-23 VITALS
WEIGHT: 293 LBS | DIASTOLIC BLOOD PRESSURE: 97 MMHG | HEART RATE: 92 BPM | BODY MASS INDEX: 53.92 KG/M2 | HEIGHT: 62 IN | SYSTOLIC BLOOD PRESSURE: 163 MMHG

## 2024-12-23 DIAGNOSIS — K31.819 ANGIECTASIA OF GASTROINTESTINAL TRACT: ICD-10-CM

## 2024-12-23 DIAGNOSIS — K92.1 BLOOD IN STOOL: Primary | ICD-10-CM

## 2024-12-23 PROCEDURE — 1159F MED LIST DOCD IN RCRD: CPT | Mod: CPTII,,,

## 2024-12-23 PROCEDURE — 4010F ACE/ARB THERAPY RXD/TAKEN: CPT | Mod: CPTII,,,

## 2024-12-23 PROCEDURE — 3066F NEPHROPATHY DOC TX: CPT | Mod: CPTII,,,

## 2024-12-23 PROCEDURE — 3008F BODY MASS INDEX DOCD: CPT | Mod: CPTII,,,

## 2024-12-23 PROCEDURE — 99214 OFFICE O/P EST MOD 30 MIN: CPT | Mod: S$PBB,,,

## 2024-12-23 PROCEDURE — 3077F SYST BP >= 140 MM HG: CPT | Mod: CPTII,,,

## 2024-12-23 PROCEDURE — 3044F HG A1C LEVEL LT 7.0%: CPT | Mod: CPTII,,,

## 2024-12-23 PROCEDURE — 99999 PR PBB SHADOW E&M-EST. PATIENT-LVL V: CPT | Mod: PBBFAC,,,

## 2024-12-23 PROCEDURE — 3061F NEG MICROALBUMINURIA REV: CPT | Mod: CPTII,,,

## 2024-12-23 PROCEDURE — 99215 OFFICE O/P EST HI 40 MIN: CPT | Mod: PBBFAC

## 2024-12-23 PROCEDURE — 3080F DIAST BP >= 90 MM HG: CPT | Mod: CPTII,,,

## 2024-12-23 RX ORDER — POLYETHYLENE GLYCOL 3350, SODIUM SULFATE ANHYDROUS, SODIUM BICARBONATE, SODIUM CHLORIDE, POTASSIUM CHLORIDE 236; 22.74; 6.74; 5.86; 2.97 G/4L; G/4L; G/4L; G/4L; G/4L
4 POWDER, FOR SOLUTION ORAL ONCE
Qty: 4000 ML | Refills: 0 | Status: SHIPPED | OUTPATIENT
Start: 2024-12-23 | End: 2024-12-23

## 2024-12-23 NOTE — PROGRESS NOTES
CC: rectal bleeding    HPI 44 y.o. white female presents today with a complaint of rectal bleeding.  This patient was in her PCP clinic I did discuss this patient with Ms. Britney Duran's FNP in instructed her to draw level patient to make sure CBC was stable if CBC was stable patient could calm see me on Monday otherwise she would need to go to the emergency room.  Patient states blood was just dripping from her rectal area onto the floor however this has since stopped and she has not had anymore bleeding.  Patient does admit to not having another bowel movement since her episode Friday.  Patient is worried that if she has a another bowel movement that she we will start back bleeding.  Instructed patient was scan if she starts back bleeding she will need to go to the emergency room.  Colonoscopy 10/21/2024 terminal ileum normal, angiectasia in the ascending colon in polyp in transverse colon which was tubular adenoma recommended repeat 5 years  Patient did have an angiectasia in her ascending colon. I did speak with Dr. Roche about this we will plan to do lab work today and put her on for colonoscopy coming up.  Instructed patient that if she started back having the rectal bleeding she was to immediately go to the emergency room.  Patient verbalized understanding patient denies any abdominal pain, nausea vomiting or dysphagia  Interval  HPI 44 y.o. white female presents today with increase in GERD symptoms and epigastric pain.  Patient on her previous 10/7/2022 EGD showed a small hiatal hernia, inflammation and erosion at the EG junction and gastritis no ulcers.  Pathology report shows mild chronic gastritis and esophagus shows mild basal cell hyperplasia, spongiosis and increased intraepithelial eosinophils up to 6 per high-power feel consistent with reflux esophagitis.  Patient has been however Protonix we will plan to restart that today b.i.d. labs reviewed no elevated liver enzymes and no anemia pain  "patient did have a very high elevated blood pressure 184/136 patient state she did not take her blood pressure medicine this morning instructed patient she needed to do that soon as she got home or get to the emergency room.  Before discharging the recheck was 173/113.  The nurse again reiterated to the patient that she needed to make sure she that her blood pressure down a bit did not come down to her normal range she needed to go to the emergency room patient voiced understanding.  Medical records reviewed. Additional history supplemented by nursing.  Medical records reviewed. Additional history supplemented by nursing.     Past Medical History:   Diagnosis Date    Anxiety     Asthma     Chronic pain     Cushing syndrome     Depression     Essential hypertension     Fibromyalgia     Herpes zoster     Hyperlipidemia     Leukocytosis 1/18/2022    Onychomycosis     MARY on CPAP     Rheumatoid arthritis     Type 2 diabetes mellitus 11/2020    Vitamin D deficiency 05/03/2018         Review of Systems  General ROS: negative for chills, fever or weight loss  Cardiovascular ROS: no chest pain or dyspnea on exertion  Gastrointestinal ROS: no abdominal pain, change in bowel habits, or black/ bloody stools    Physical Examination  BP (!) 163/97   Pulse 92   Ht 5' 2" (1.575 m)   Wt (!) 159.8 kg (352 lb 6.4 oz)   BMI 64.45 kg/m²   General appearance: alert, cooperative, no distress  HENT: Normocephalic, atraumatic, neck symmetrical, no nasal discharge   Lungs: no labored breathing, symmetric chest wall expansion bilaterally  Heart: regular rate and rhythm without rub; no displacement of the PMI   Abdomen: soft, non-tender; bowel sounds normoactive; no organomegaly    Labs:  Lab Results   Component Value Date    WBC 11.59 (H) 09/17/2024    HGB 13.6 09/17/2024    HCT 42.3 09/17/2024    MCV 86.5 09/17/2024     09/17/2024       CMP  Sodium   Date Value Ref Range Status   09/17/2024 139 136 - 145 mmol/L Final "     Potassium   Date Value Ref Range Status   09/17/2024 3.8 3.5 - 5.1 mmol/L Final     Chloride   Date Value Ref Range Status   09/17/2024 105 98 - 107 mmol/L Final     CO2   Date Value Ref Range Status   09/17/2024 27 21 - 32 mmol/L Final     Glucose   Date Value Ref Range Status   09/17/2024 148 (H) 74 - 106 mg/dL Final     BUN   Date Value Ref Range Status   09/17/2024 14 7 - 18 mg/dL Final     Creatinine   Date Value Ref Range Status   09/17/2024 0.56 0.55 - 1.02 mg/dL Final     Calcium   Date Value Ref Range Status   09/17/2024 8.8 8.5 - 10.1 mg/dL Final     Total Protein   Date Value Ref Range Status   09/17/2024 7.9 6.4 - 8.2 g/dL Final     Albumin   Date Value Ref Range Status   09/17/2024 3.1 (L) 3.5 - 5.0 g/dL Final     Bilirubin, Total   Date Value Ref Range Status   09/17/2024 0.2 >0.0 - 1.2 mg/dL Final     Alk Phos   Date Value Ref Range Status   09/17/2024 130 (H) 39 - 100 U/L Final     AST   Date Value Ref Range Status   09/17/2024 11 (L) 15 - 37 U/L Final     ALT   Date Value Ref Range Status   09/17/2024 21 13 - 56 U/L Final     Anion Gap   Date Value Ref Range Status   09/17/2024 11 7 - 16 mmol/L Final     eGFR    Date Value Ref Range Status   10/07/2020 103       eGFR   Date Value Ref Range Status   05/11/2022 95 >=60 mL/min/1.73m² Final       Imaging:  No pertinent imaging    Independently reviewed    Assessment:  Janet Swann 45 yo WF here with:   Rectal bleeding  History 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    30 minutes of total time spent on the encounter, which includes face to face time and non-face to face time preparing to see the patient (eg, review of tests), obtaining and/or reviewing  separately obtained history, documenting clinical information in the electronic or other health record, Independently interpreting results (not separately reported) and communicating results to the patient/family/caregiver, or care coordination (not separately reported).       Carla Adams, FNP [facility]

## 2025-01-08 ENCOUNTER — OFFICE VISIT (OUTPATIENT)
Dept: OTOLARYNGOLOGY | Facility: CLINIC | Age: 45
End: 2025-01-08
Payer: MEDICAID

## 2025-01-08 VITALS — RESPIRATION RATE: 18 BRPM | HEIGHT: 62 IN | WEIGHT: 293 LBS | BODY MASS INDEX: 53.92 KG/M2

## 2025-01-08 DIAGNOSIS — H69.93 ETD (EUSTACHIAN TUBE DYSFUNCTION), BILATERAL: ICD-10-CM

## 2025-01-08 DIAGNOSIS — H93.19 TINNITUS, UNSPECIFIED LATERALITY: Primary | ICD-10-CM

## 2025-01-08 DIAGNOSIS — H90.3 SENSORINEURAL HEARING LOSS (SNHL) OF BOTH EARS: ICD-10-CM

## 2025-01-08 PROCEDURE — 1159F MED LIST DOCD IN RCRD: CPT | Mod: CPTII,,, | Performed by: OTOLARYNGOLOGY

## 2025-01-08 PROCEDURE — 99214 OFFICE O/P EST MOD 30 MIN: CPT | Mod: S$PBB,,, | Performed by: OTOLARYNGOLOGY

## 2025-01-08 PROCEDURE — 1160F RVW MEDS BY RX/DR IN RCRD: CPT | Mod: CPTII,,, | Performed by: OTOLARYNGOLOGY

## 2025-01-08 PROCEDURE — 3008F BODY MASS INDEX DOCD: CPT | Mod: CPTII,,, | Performed by: OTOLARYNGOLOGY

## 2025-01-08 PROCEDURE — 99999 PR PBB SHADOW E&M-EST. PATIENT-LVL V: CPT | Mod: PBBFAC,,, | Performed by: OTOLARYNGOLOGY

## 2025-01-08 PROCEDURE — 99215 OFFICE O/P EST HI 40 MIN: CPT | Mod: PBBFAC | Performed by: OTOLARYNGOLOGY

## 2025-01-08 NOTE — PROGRESS NOTES
Subjective:       Patient ID: Belem Swann is a 44 y.o. female.    Chief Complaint: Follow-up (Ear problem)  Pulsatile tinnitus right ear   Follow-up      Review of Systems   HENT:  Positive for hearing loss and tinnitus.    All other systems reviewed and are negative.      Objective:      Physical Exam  General: NAD  Head: Normocephalic, atraumatic, no facial asymmetry/normal strength,  Ears: Both auricules normal in appearance, w/o deformities tympanic membranes normal external auditory canals normal  Nose: External nose w/o deformities normal turbinates no drainage or inflammation  Oral Cavity: Lips, gums, floor of mouth, tongue hard palate, and buccal mucosa without mass/lesion  Oropharynx: Mucosa pink and moist, soft palate, posterior pharynx and oropharyngeal wall without mass/lesion  Neck: Supple, symmetric, trachea midline, no palpable mass/lesion, no palpable cervical lymphadenopathy  Skin: Warm and dry, no concerning lesions  Respiratory: Respirations even, unlabored  Assessment:       1. ETD (Eustachian tube dysfunction), bilateral    2. Sensorineural hearing loss (SNHL) of both ears    3. Tinnitus, unspecified laterality        Plan:       CT IAC   Nasacort Zyrtec

## 2025-01-22 ENCOUNTER — TELEPHONE (OUTPATIENT)
Dept: FAMILY MEDICINE | Facility: CLINIC | Age: 45
End: 2025-01-22
Payer: MEDICAID

## 2025-01-22 DIAGNOSIS — M62.838 MUSCLE SPASM: Chronic | ICD-10-CM

## 2025-01-22 RX ORDER — CYCLOBENZAPRINE HCL 10 MG
10 TABLET ORAL 3 TIMES DAILY PRN
Qty: 90 TABLET | Refills: 0 | Status: SHIPPED | OUTPATIENT
Start: 2025-01-22

## 2025-01-28 ENCOUNTER — HOSPITAL ENCOUNTER (OUTPATIENT)
Dept: RADIOLOGY | Facility: HOSPITAL | Age: 45
Discharge: HOME | End: 2025-01-28
Attending: OTOLARYNGOLOGY
Payer: MEDICAID

## 2025-01-28 DIAGNOSIS — H93.19 TINNITUS, UNSPECIFIED LATERALITY: ICD-10-CM

## 2025-01-28 PROCEDURE — 70480 CT ORBIT/EAR/FOSSA W/O DYE: CPT | Mod: TC

## 2025-01-29 NOTE — PROGRESS NOTES
Subjective:         Patient ID: Belem Swann is a 44 y.o. female.    Chief Complaint: Low-back Pain and Knee Pain (bilateral )        Pain  This is a chronic problem. The current episode started more than 1 year ago. The problem occurs daily. The problem has been unchanged. Associated symptoms include arthralgias and neck pain. Pertinent negatives include no anorexia, chest pain, chills, coughing, diaphoresis, fever, sore throat, vertigo or vomiting.     Review of Systems   Constitutional:  Negative for activity change, appetite change, chills, diaphoresis, fever and unexpected weight change.   HENT:  Negative for drooling, ear discharge, ear pain, facial swelling, mouth sores, nosebleeds, sore throat, trouble swallowing, voice change and goiter.    Eyes:  Negative for photophobia, pain, discharge, redness and visual disturbance.   Respiratory:  Negative for apnea, cough, choking, chest tightness, shortness of breath, wheezing and stridor.    Cardiovascular:  Negative for chest pain, palpitations and leg swelling.   Gastrointestinal:  Negative for abdominal distention, anorexia, diarrhea, rectal pain, vomiting and fecal incontinence.   Endocrine: Negative for cold intolerance, heat intolerance, polydipsia, polyphagia and polyuria.   Genitourinary:  Negative for bladder incontinence, dysuria, flank pain, frequency and hot flashes.   Musculoskeletal:  Positive for arthralgias, back pain, leg pain, neck pain and neck stiffness.   Integumentary:  Negative for color change and pallor.   Allergic/Immunologic: Negative for immunocompromised state.   Neurological:  Negative for dizziness, vertigo, seizures, syncope, facial asymmetry, speech difficulty, light-headedness, memory loss and coordination difficulties.   Hematological:  Negative for adenopathy. Does not bruise/bleed easily.   Psychiatric/Behavioral:  Negative for agitation, behavioral problems, confusion, decreased concentration, dysphoric mood,  hallucinations, self-injury and suicidal ideas. The patient is not hyperactive.            Past Medical History:   Diagnosis Date    Anxiety     Asthma     Chronic pain     Cushing syndrome     Depression     Essential hypertension     Fibromyalgia     Herpes zoster     Hyperlipidemia     Leukocytosis 2022    Onychomycosis     MARY on CPAP     Rheumatoid arthritis     Type 2 diabetes mellitus 2020    Vitamin D deficiency 2018     Past Surgical History:   Procedure Laterality Date     SECTION      ELBOW SURGERY  1985    EPIDURAL STEROID INJECTION      L4-5 VERITO Dec 2020-[facility]    FEMUR SURGERY Right     due to osteomyelitis    HYSTERECTOMY      Abn Bleeding    INJECTION OF ANESTHETIC AGENT AROUND MEDIAL BRANCH NERVES INNERVATING LUMBAR FACET JOINT Bilateral 2022    Procedure: Bilateral L4-5,5-S1 MBB ( No steroids);  Surgeon: Carmel Torre MD;  Location: [facility];  Service: Pain Management;  Laterality: Bilateral;  PT AWARE TO BE TESTED ON OV    INJECTION OF ANESTHETIC AGENT AROUND MEDIAL BRANCH NERVES INNERVATING LUMBAR FACET JOINT Bilateral 2022    Procedure: Block-nerve-medial branch-lumbar, bilateral L4 through S1;  Surgeon: Carmel Torre MD;  Location: [facility];  Service: Pain Management;  Laterality: Bilateral;    INJECTION OF ANESTHETIC AGENT INTO SACROILIAC JOINT Bilateral 2022    Procedure: BLOCK, SACROILIAC JOINT;  Surgeon: Carmel Torre MD;  Location: [facility];  Service: Pain Management;  Laterality: Bilateral;  covid test put in    LIPOMA RESECTION  2019    RADIOFREQUENCY ABLATION OF LUMBAR MEDIAL BRANCH NERVE AT SINGLE LEVEL Right 2022    Procedure: Radiofrequency Ablation, Nerve, Spinal, Lumbar, Medial Branch, Level L4-S1, right side 1st, will have left-sided after completing;  Surgeon: Carmel Torre MD;  Location: [facility];  Service: Pain Management;  Laterality: Right;  pt aware at visit to be tested  "   RADIOFREQUENCY ABLATION OF LUMBAR MEDIAL BRANCH NERVE AT SINGLE LEVEL Left 05/05/2022    Procedure: LEFT  L4-S1 RFTC  (HAD RIGHT  ON 4-14);  Surgeon: Carmel Torre MD;  Location: [facility];  Service: Pain Management;  Laterality: Left;    RADIOFREQUENCY ABLATION OF LUMBAR MEDIAL BRANCH NERVE AT SINGLE LEVEL Right 7/25/2023    Procedure: Radiofrequency Ablation, Nerve, Spinal, Lumbar, Medial Branch, Level L4-S1;  Surgeon: Carmel Torre MD;  Location: [facility];  Service: Pain Management;  Laterality: Right;    SURGICAL REMOVAL OF PILONIDAL CYST      TRANSFORAMINAL EPIDURAL INJECTION OF STEROID      Bilateral L4-5 TFESI Nov 2020-[facility]    TRANSFORAMINAL EPIDURAL INJECTION OF STEROID      Right L4-5 TFESI Feb 2020-[facility]    VENTRAL HERNIA REPAIR  09/2020     Social History     Socioeconomic History    Marital status:    Tobacco Use    Smoking status: Every Day     Types: Cigarettes     Start date: 9/8/2021    Smokeless tobacco: Never   Substance and Sexual Activity    Alcohol use: Yes     Comment: ocassional    Drug use: Never    Sexual activity: Not Currently     Partners: Male     Birth control/protection: See Surgical Hx     Family History   Problem Relation Name Age of Onset    Cancer Mother      Thyroid cancer Mother      Thyroid cancer Maternal Grandmother      Melanoma Neg Hx      Colon cancer Neg Hx      Breast cancer Neg Hx      Ovarian cancer Neg Hx       Review of patient's allergies indicates:   Allergen Reactions    Latex      Other reaction(s): Unknown    Doxycycline Nausea Only        Objective:  Vitals:    02/05/25 1314 02/05/25 1316   BP: (!) 180/93    Pulse: 87    Resp: 20    Weight: (!) 152 kg (335 lb)    Height: 5' 2" (1.575 m)    PainSc:   7   7                       Physical Exam  Vitals and nursing note reviewed. Exam conducted with a chaperone present.   Constitutional:       General: She is awake. She is not in acute distress.     Appearance: Normal appearance. " She is obese. She is not ill-appearing, toxic-appearing or diaphoretic.   HENT:      Head: Normocephalic and atraumatic.      Nose: Nose normal.      Mouth/Throat:      Mouth: Mucous membranes are moist.      Pharynx: Oropharynx is clear.   Eyes:      Conjunctiva/sclera: Conjunctivae normal.      Pupils: Pupils are equal, round, and reactive to light.   Cardiovascular:      Rate and Rhythm: Normal rate.   Pulmonary:      Effort: Pulmonary effort is normal. No respiratory distress.   Abdominal:      Palpations: Abdomen is soft.      Tenderness: There is no guarding.   Musculoskeletal:         General: No swelling.      Cervical back: Normal range of motion and neck supple. Tenderness present. No rigidity.      Thoracic back: Tenderness present.      Lumbar back: Tenderness present. Decreased range of motion.   Skin:     General: Skin is warm and dry.      Coloration: Skin is not jaundiced or pale.   Neurological:      General: No focal deficit present.      Mental Status: She is alert and oriented to person, place, and time. Mental status is at baseline.      Cranial Nerves: No cranial nerve deficit (II-XII).   Psychiatric:         Mood and Affect: Mood normal.         Behavior: Behavior normal. Behavior is cooperative.         Thought Content: Thought content normal.           CT Orbits Sella Post Fossa Without Cont  Narrative: EXAMINATION:  CT ORBITS SELLA POST FOSSA WITHOUT CONT    CLINICAL HISTORY:  Pulsatile Tinnitus;Tinnitus, unspecified ear    TECHNIQUE:  Low dose axial images were acquired through the temporal bones and skull base without contrast administration.  Coronal and sagittal reconstructions were performed.    COMPARISON:  CT head 05/14/2018    FINDINGS:  Right side:  The   external auditory canal, middle ear cavity, and mastoid air cells are clear. The bony coverings of the vestibule, semicircular canals and cochlea are intact.  No evidence for enlargement of the vestibular aqueduct.    The middle  ear ossicular chain is intact without evidence for focal bony erosion.    Left side:The external auditory canal, middle ear cavity, and mastoid air cells are clear. The bony coverings of the vestibule, semicircular canals and cochlea are intact.  No evidence for enlargement of the vestibular aqueduct.    .    The middle ear ossicular chain is intact without evidence for focal bony erosion.    The IACs are relatively symmetric.    Remote fracture deformity of the right inferior orbit with extension of extraconal fat into the fracture cleft.  There is mild probable mucosal thickening along the floor of the right maxillary antra.    Question bilateral globe proptosis clinical correlation advised.  Impression: Unremarkable noncontrast CT of the temporal bones as detailed above    Incidental remote fracture deformity right inferior orbital wall with question bilateral globe proptosis.  Clinical correlation advised.    Electronically signed by: Umair Escudero DO  Date:    01/28/2025  Time:    15:53         Lab Visit on 12/23/2024   Component Date Value Ref Range Status    Sodium 12/23/2024 141  136 - 145 mmol/L Final    Potassium 12/23/2024 4.6  3.5 - 5.1 mmol/L Final    Chloride 12/23/2024 105  98 - 107 mmol/L Final    CO2 12/23/2024 28  22 - 29 mmol/L Final    Anion Gap 12/23/2024 13  7 - 16 mmol/L Final    Glucose 12/23/2024 123 (H)  74 - 100 mg/dL Final    BUN 12/23/2024 16  7 - 19 mg/dL Final    Creatinine 12/23/2024 0.59  0.55 - 1.02 mg/dL Final    BUN/Creatinine Ratio 12/23/2024 27 (H)  6 - 20 Final    Calcium 12/23/2024 8.7  8.4 - 10.2 mg/dL Final    Total Protein 12/23/2024 7.9  6.4 - 8.3 g/dL Final    Albumin 12/23/2024 3.4 (L)  3.5 - 5.0 g/dL Final    Globulin 12/23/2024 4.5 (H)  2.0 - 4.0 g/dL Final    A/G Ratio 12/23/2024 0.8   Final    Bilirubin, Total 12/23/2024 0.3  <=1.5 mg/dL Final    Alk Phos 12/23/2024 118  40 - 150 U/L Final    ALT 12/23/2024 19  <=55 U/L Final    AST 12/23/2024 18  5 - 34 U/L Final     eGFR 12/23/2024 113  >=60 mL/min/1.73m2 Final    Iron Level 12/23/2024 73  50 - 170 ug/dL Final    Iron Binding Capacity Total 12/23/2024 232 (L)  250 - 450 ug/dL Final    Iron Binding Capacity Unsaturated 12/23/2024 159  70 - 310 ug/dL Final    Iron Saturation 12/23/2024 31  20 - 50 % Final    Transferrin 12/23/2024 196  180 - 382 mg/dL Final    Ferritin 12/23/2024 245 (H)  5 - 204 ng/mL Final    WBC 12/23/2024 12.80 (H)  4.50 - 11.00 K/uL Final    RBC 12/23/2024 4.83  4.20 - 5.40 M/uL Final    Hemoglobin 12/23/2024 13.6  12.0 - 16.0 g/dL Final    Hematocrit 12/23/2024 43.9  38.0 - 47.0 % Final    MCV 12/23/2024 90.9  80.0 - 96.0 fL Final    MCH 12/23/2024 28.2  27.0 - 31.0 pg Final    MCHC 12/23/2024 31.0 (L)  32.0 - 36.0 g/dL Final    RDW 12/23/2024 13.2  11.5 - 14.5 % Final    Platelet Count 12/23/2024 166  150 - 400 K/uL Final    MPV 12/23/2024 13.2 (H)  9.4 - 12.4 fL Final    Neutrophils % 12/23/2024 71.8 (H)  53.0 - 65.0 % Final    Lymphocytes % 12/23/2024 17.3 (L)  27.0 - 41.0 % Final    Monocytes % 12/23/2024 6.4 (H)  2.0 - 6.0 % Final    Eosinophils % 12/23/2024 3.1  1.0 - 4.0 % Final    Basophils % 12/23/2024 0.5  0.0 - 1.0 % Final    Immature Granulocytes % 12/23/2024 0.9 (H)  0.0 - 0.4 % Final    nRBC, Auto 12/23/2024 0.0  <=0.0 % Final    Neutrophils, Abs 12/23/2024 9.20 (H)  1.80 - 7.70 K/uL Final    Lymphocytes, Absolute 12/23/2024 2.21  1.00 - 4.80 K/uL Final    Monocytes, Absolute 12/23/2024 0.82 (H)  0.00 - 0.80 K/uL Final    Eosinophils, Absolute 12/23/2024 0.40  0.00 - 0.50 K/uL Final    Basophils, Absolute 12/23/2024 0.06  0.00 - 0.20 K/uL Final    Immature Granulocytes, Absolute 12/23/2024 0.11 (H)  0.00 - 0.04 K/uL Final    nRBC, Absolute 12/23/2024 0.00  <=0.00 x10e3/uL Final    Diff Type 12/23/2024 Auto   Final    Platelet Morphology 12/23/2024 Few Large Platelets (A)  Normal Final    RBC Morphology 12/23/2024 Normal   Final   Hospital Outpatient Visit on 10/21/2024   Component Date Value  Ref Range Status    Case Report 10/21/2024    Final                    Value:Surgical Pathology                                Case: [id number]                                   Authorizing Provider:  Tree Roche MD     Collected:           10/21/2024 01:02 PM          Ordering Location:     [facility] -         Received:            10/21/2024 03:04 PM                                 Endoscopy                                                                    Pathologist:           Louie Chopra III, MD                                                                           Specimen:    Intestine Large, Transverse Colon, Jar #1 - transverse colon polyp biopsy                  Final Diagnosis 10/21/2024    Final                    Value:A. Transverse colon, biopsy:  Tubular adenoma      Gross Description 10/21/2024    Final                    Value:A. Intestine Large, Transverse Colon: Jar #1 - transverse colon polyp biopsy  The specimen is received in formalin designated transverse colon polyp biopsy and consists of a single pink-tan tissue fragment that measures 0.3 cm in greatest dimension and is entirely submitted in cassette A1    Grossing was completed by Clayton Haddad.      Microscopic Description 10/21/2024    Final                    Value:A microscopic examination was performed and the diagnosis reflects the findings.          Laboratory Notes 10/21/2024    Final                    Value:If this report includes immunohistochemical (IHC) test results, please note the following: IHC studies were interpreted in conjunction with appropriate positive and negative controls which demonstrate the expected positive and negative reactivity. This laboratory is regulated under CLIA as qualified to perform high-complexity testing. IHC tests are used for clinical purposes. They should not be regarded as investigational or  research.       Office Visit on 10/07/2024   Component Date Value Ref Range Status    POC Amphetamines 10/07/2024 Negative  Negative, Inconclusive Final    POC Barbiturates 10/07/2024 Negative  Negative, Inconclusive Final    POC Benzodiazepines 10/07/2024 Negative  Negative, Inconclusive Final    POC Cocaine 10/07/2024 Negative  Negative, Inconclusive Final    POC THC 10/07/2024 Negative  Negative, Inconclusive Final    POC Methadone 10/07/2024 Negative  Negative, Inconclusive Final    POC Methamphetamine 10/07/2024 Negative  Negative, Inconclusive Final    POC Opiates 10/07/2024 Presumptive Positive (A)  Negative, Inconclusive Final    POC Oxycodone 10/07/2024 Negative  Negative, Inconclusive Final    POC Phencyclidine 10/07/2024 Negative  Negative, Inconclusive Final    POC Methylenedioxymethamphetamine * 10/07/2024 Negative  Negative, Inconclusive Final    POC Tricyclic Antidepressants 10/07/2024 Negative  Negative, Inconclusive Final    POC Buprenorphine 10/07/2024 Negative   Final     Acceptable 10/07/2024 Yes   Final    POC Temperature (Urine) 10/07/2024 92   Final   Office Visit on 09/17/2024   Component Date Value Ref Range Status    Sodium 09/17/2024 139  136 - 145 mmol/L Final    Potassium 09/17/2024 3.8  3.5 - 5.1 mmol/L Final    Chloride 09/17/2024 105  98 - 107 mmol/L Final    CO2 09/17/2024 27  21 - 32 mmol/L Final    Anion Gap 09/17/2024 11  7 - 16 mmol/L Final    Glucose 09/17/2024 148 (H)  74 - 106 mg/dL Final    BUN 09/17/2024 14  7 - 18 mg/dL Final    Creatinine 09/17/2024 0.56  0.55 - 1.02 mg/dL Final    BUN/Creatinine Ratio 09/17/2024 25 (H)  6 - 20 Final    Calcium 09/17/2024 8.8  8.5 - 10.1 mg/dL Final    Total Protein 09/17/2024 7.9  6.4 - 8.2 g/dL Final    Albumin 09/17/2024 3.1 (L)  3.5 - 5.0 g/dL Final    Globulin 09/17/2024 4.8 (H)  2.0 - 4.0 g/dL Final    A/G Ratio 09/17/2024 0.6   Final    Bilirubin, Total 09/17/2024 0.2  >0.0 - 1.2 mg/dL Final    Alk Phos 09/17/2024 130  (H)  39 - 100 U/L Final    ALT 09/17/2024 21  13 - 56 U/L Final    AST 09/17/2024 11 (L)  15 - 37 U/L Final    eGFR 09/17/2024 115  >=60 mL/min/1.73m2 Final    Triglycerides 09/17/2024 135  35 - 150 mg/dL Final    Cholesterol 09/17/2024 171  0 - 200 mg/dL Final    HDL Cholesterol 09/17/2024 43  40 - 60 mg/dL Final    Cholesterol/HDL Ratio (Risk Factor) 09/17/2024 4.0   Final    Non-HDL 09/17/2024 128  mg/dL Final    LDL Calculated 09/17/2024 101  mg/dL Final    LDL/HDL 09/17/2024 2.3   Final    VLDL 09/17/2024 27  mg/dL Final    Hemoglobin A1C 09/17/2024 6.3  4.5 - 6.6 % Final    Estimated Average Glucose 09/17/2024 134  mg/dL Final    WBC 09/17/2024 11.59 (H)  4.50 - 11.00 K/uL Final    RBC 09/17/2024 4.89  4.20 - 5.40 M/uL Final    Hemoglobin 09/17/2024 13.6  12.0 - 16.0 g/dL Final    Hematocrit 09/17/2024 42.3  38.0 - 47.0 % Final    MCV 09/17/2024 86.5  80.0 - 96.0 fL Final    MCH 09/17/2024 27.8  27.0 - 31.0 pg Final    MCHC 09/17/2024 32.2  32.0 - 36.0 g/dL Final    RDW 09/17/2024 13.0  11.5 - 14.5 % Final    Platelet Count 09/17/2024 155  150 - 400 K/uL Final    MPV 09/17/2024 13.9 (H)  9.4 - 12.4 fL Final    Neutrophils % 09/17/2024 71.3 (H)  53.0 - 65.0 % Final    Lymphocytes % 09/17/2024 18.5 (L)  27.0 - 41.0 % Final    Monocytes % 09/17/2024 5.6  2.0 - 6.0 % Final    Eosinophils % 09/17/2024 3.4  1.0 - 4.0 % Final    Basophils % 09/17/2024 0.5  0.0 - 1.0 % Final    Immature Granulocytes % 09/17/2024 0.7 (H)  0.0 - 0.4 % Final    nRBC, Auto 09/17/2024 0.0  <=0.0 % Final    Neutrophils, Abs 09/17/2024 8.27 (H)  1.80 - 7.70 K/uL Final    Lymphocytes, Absolute 09/17/2024 2.14  1.00 - 4.80 K/uL Final    Monocytes, Absolute 09/17/2024 0.65  0.00 - 0.80 K/uL Final    Eosinophils, Absolute 09/17/2024 0.39  0.00 - 0.50 K/uL Final    Basophils, Absolute 09/17/2024 0.06  0.00 - 0.20 K/uL Final    Immature Granulocytes, Absolute 09/17/2024 0.08 (H)  0.00 - 0.04 K/uL Final    nRBC, Absolute 09/17/2024 0.00  <=0.00  "x10e3/uL Final    Diff Type 09/17/2024 Auto   Final    Platelet Morphology 09/17/2024 Few Large Platelets (A)  Normal Final    RBC Morphology 09/17/2024 Normal   Final         Orders Placed This Encounter   Procedures    CANE FOR HOME USE     Order Specific Question:   Type of Cane:     Answer:   Straight     Order Specific Question:   Height:     Answer:   5' 2" (1.575 m)     Order Specific Question:   Weight:     Answer:   152 kg (335 lb)     Order Specific Question:   Length of need (1-99 months):     Answer:   99     Order Specific Question:   Please check all that apply:     Answer:   Patient's condition impairs ambulation.    POCT Urine Drug Screen Presump     Interpretive Information:     Negative:  No drug detected at the cut off level.   Positive:  This result represents presumptive positive for the   tested drug, other substances may yield a positive response other   than the analyte of interest. This result should be utilized for   diagnostic purpose only. Confirmation testing will be performed upon physician request only.              Requested Prescriptions     Signed Prescriptions Disp Refills    gabapentin (NEURONTIN) 300 MG capsule 90 capsule 1     Sig: Take 1 capsule (300 mg total) by mouth every 8 (eight) hours.    HYDROcodone-acetaminophen (NORCO)  mg per tablet 90 tablet 0     Sig: Take 1 tablet by mouth every 8 (eight) hours as needed for Pain.    HYDROcodone-acetaminophen (NORCO)  mg per tablet 90 tablet 0     Sig: Take 1 tablet by mouth every 8 (eight) hours as needed for Pain.       Assessment:     1. Rheumatoid arthritis involving multiple sites with positive rheumatoid factor    2. Spondylosis of lumbar region without myelopathy or radiculopathy    3. Sacroiliitis    4. Chronic pain of right knee    5. Encounter for long-term (current) use of medications                       A's of Opioid Risk Assessment  Activity:Patient can perform ADL.   Analgesia:Patients pain is partially " controlled by current medication. Patient has tried OTC medications such as Tylenol and Ibuprofen with out relief.   Adverse Effects: Patient denies constipation or sedation.  Aberrant Behavior:  reviewed with no aberrant drug seeking/taking behavior.  Overdose reversal drug naloxone discussed    Drug screen reviewed    X-ray right knee [facility] September 14, 2022 degenerative changes no fracture noted        Plan:      May 1, 2024 she verbalized understanding narcotics agreement compliance with medications       Narcan December 2022    Follows rheumatology [facility] rheumatoid arthritis    Follows orthopedics [facility] chronic right knee pain    She had bilateral lumbar RF TC  July 25, 2023  She states she has 75% relief after procedure, the procedure did help improve her level function       Complaint of back and joint pain     Requesting Toradol injection    Toradol 60 mg IM, tolerated well    Requesting walking cane prescription due to her chronic joint pain rheumatoid arthritis    Will continue home exercise program as directed    Continue current medication    Follow-up 2 months    Dr. Torre October 2025    Bring original prescription medication bottles/container/box with labels to each visit

## 2025-02-05 ENCOUNTER — OFFICE VISIT (OUTPATIENT)
Dept: PAIN MEDICINE | Facility: CLINIC | Age: 45
End: 2025-02-05
Payer: MEDICAID

## 2025-02-05 VITALS
HEART RATE: 87 BPM | DIASTOLIC BLOOD PRESSURE: 93 MMHG | RESPIRATION RATE: 20 BRPM | HEIGHT: 62 IN | SYSTOLIC BLOOD PRESSURE: 180 MMHG | BODY MASS INDEX: 53.92 KG/M2 | WEIGHT: 293 LBS

## 2025-02-05 DIAGNOSIS — M05.79 RHEUMATOID ARTHRITIS INVOLVING MULTIPLE SITES WITH POSITIVE RHEUMATOID FACTOR: Primary | Chronic | ICD-10-CM

## 2025-02-05 DIAGNOSIS — M25.561 CHRONIC PAIN OF RIGHT KNEE: Chronic | ICD-10-CM

## 2025-02-05 DIAGNOSIS — Z79.899 ENCOUNTER FOR LONG-TERM (CURRENT) USE OF MEDICATIONS: ICD-10-CM

## 2025-02-05 DIAGNOSIS — G89.29 CHRONIC PAIN OF RIGHT KNEE: Chronic | ICD-10-CM

## 2025-02-05 DIAGNOSIS — M46.1 SACROILIITIS: Chronic | ICD-10-CM

## 2025-02-05 DIAGNOSIS — M47.816 SPONDYLOSIS OF LUMBAR REGION WITHOUT MYELOPATHY OR RADICULOPATHY: Chronic | ICD-10-CM

## 2025-02-05 PROCEDURE — 3008F BODY MASS INDEX DOCD: CPT | Mod: CPTII,,, | Performed by: PHYSICIAN ASSISTANT

## 2025-02-05 PROCEDURE — 99215 OFFICE O/P EST HI 40 MIN: CPT | Mod: PBBFAC | Performed by: PHYSICIAN ASSISTANT

## 2025-02-05 PROCEDURE — 99999 PR PBB SHADOW E&M-EST. PATIENT-LVL V: CPT | Mod: PBBFAC,,, | Performed by: PHYSICIAN ASSISTANT

## 2025-02-05 PROCEDURE — 80305 DRUG TEST PRSMV DIR OPT OBS: CPT | Mod: PBBFAC | Performed by: PHYSICIAN ASSISTANT

## 2025-02-05 PROCEDURE — 3077F SYST BP >= 140 MM HG: CPT | Mod: CPTII,,, | Performed by: PHYSICIAN ASSISTANT

## 2025-02-05 PROCEDURE — 96372 THER/PROPH/DIAG INJ SC/IM: CPT | Mod: PBBFAC,JZ | Performed by: PHYSICIAN ASSISTANT

## 2025-02-05 PROCEDURE — 99999PBSHW POCT URINE DRUG SCREEN PRESUMP: Mod: PBBFAC,,,

## 2025-02-05 PROCEDURE — 1159F MED LIST DOCD IN RCRD: CPT | Mod: CPTII,,, | Performed by: PHYSICIAN ASSISTANT

## 2025-02-05 PROCEDURE — 99214 OFFICE O/P EST MOD 30 MIN: CPT | Mod: S$PBB,25,, | Performed by: PHYSICIAN ASSISTANT

## 2025-02-05 PROCEDURE — 99999PBSHW PR PBB SHADOW TECHNICAL ONLY FILED TO HB: Mod: PBBFAC,JZ,,

## 2025-02-05 PROCEDURE — 3080F DIAST BP >= 90 MM HG: CPT | Mod: CPTII,,, | Performed by: PHYSICIAN ASSISTANT

## 2025-02-05 RX ORDER — KETOROLAC TROMETHAMINE 30 MG/ML
60 INJECTION, SOLUTION INTRAMUSCULAR; INTRAVENOUS
Status: COMPLETED | OUTPATIENT
Start: 2025-02-05 | End: 2025-02-05

## 2025-02-05 RX ORDER — GABAPENTIN 300 MG/1
300 CAPSULE ORAL EVERY 8 HOURS
Qty: 90 CAPSULE | Refills: 1 | Status: SHIPPED | OUTPATIENT
Start: 2025-02-05

## 2025-02-05 RX ORDER — HYDROCODONE BITARTRATE AND ACETAMINOPHEN 10; 325 MG/1; MG/1
1 TABLET ORAL EVERY 8 HOURS PRN
Qty: 90 TABLET | Refills: 0 | Status: SHIPPED | OUTPATIENT
Start: 2025-02-09 | End: 2025-03-11

## 2025-02-05 RX ORDER — HYDROCODONE BITARTRATE AND ACETAMINOPHEN 10; 325 MG/1; MG/1
1 TABLET ORAL EVERY 8 HOURS PRN
Qty: 90 TABLET | Refills: 0 | Status: SHIPPED | OUTPATIENT
Start: 2025-03-11 | End: 2025-04-10

## 2025-02-05 RX ADMIN — KETOROLAC TROMETHAMINE 60 MG: 30 INJECTION, SOLUTION INTRAMUSCULAR at 01:02

## 2025-02-11 ENCOUNTER — OFFICE VISIT (OUTPATIENT)
Dept: OTOLARYNGOLOGY | Facility: CLINIC | Age: 45
End: 2025-02-11
Payer: MEDICAID

## 2025-02-11 VITALS — WEIGHT: 293 LBS | HEIGHT: 62 IN | BODY MASS INDEX: 53.92 KG/M2

## 2025-02-11 DIAGNOSIS — H93.19 TINNITUS, UNSPECIFIED LATERALITY: Primary | ICD-10-CM

## 2025-02-11 DIAGNOSIS — H60.313 DIFFUSE OTITIS EXTERNA OF BOTH EARS, UNSPECIFIED CHRONICITY: ICD-10-CM

## 2025-02-11 PROCEDURE — 1159F MED LIST DOCD IN RCRD: CPT | Mod: CPTII,,, | Performed by: OTOLARYNGOLOGY

## 2025-02-11 PROCEDURE — 99215 OFFICE O/P EST HI 40 MIN: CPT | Mod: PBBFAC | Performed by: OTOLARYNGOLOGY

## 2025-02-11 PROCEDURE — 1160F RVW MEDS BY RX/DR IN RCRD: CPT | Mod: CPTII,,, | Performed by: OTOLARYNGOLOGY

## 2025-02-11 PROCEDURE — 99999 PR PBB SHADOW E&M-EST. PATIENT-LVL V: CPT | Mod: PBBFAC,,, | Performed by: OTOLARYNGOLOGY

## 2025-02-11 PROCEDURE — 99214 OFFICE O/P EST MOD 30 MIN: CPT | Mod: S$PBB,,, | Performed by: OTOLARYNGOLOGY

## 2025-02-11 PROCEDURE — 3008F BODY MASS INDEX DOCD: CPT | Mod: CPTII,,, | Performed by: OTOLARYNGOLOGY

## 2025-02-11 RX ORDER — NEOMYCIN SULFATE, POLYMYXIN B SULFATE AND HYDROCORTISONE 10; 3.5; 1 MG/ML; MG/ML; [USP'U]/ML
3 SUSPENSION/ DROPS AURICULAR (OTIC) 2 TIMES DAILY
Qty: 10 ML | Refills: 0 | Status: SHIPPED | OUTPATIENT
Start: 2025-02-11

## 2025-02-11 NOTE — PROGRESS NOTES
Subjective:       Patient ID: Belem Swann is a 44 y.o. female.    Chief Complaint: Tinnitus (CT IAC results.)    Ringing in Ears:    Associated symptoms: Ear pain and tinnitus.      Review of Systems   HENT:  Positive for ear discharge, ear pain and tinnitus.    All other systems reviewed and are negative.      Objective:      Physical Exam  General: NAD  Head: Normocephalic, atraumatic, no facial asymmetry/normal strength,  Ears: Both auricules normal in appearance, w/o deformities tympanic membranes normal external auditory canals red   Nose: External nose w/o deformities normal turbinates no drainage or inflammation  Oral Cavity: Lips, gums, floor of mouth, tongue hard palate, and buccal mucosa without mass/lesion  Oropharynx: Mucosa pink and moist, soft palate, posterior pharynx and oropharyngeal wall without mass/lesion  Neck: Supple, symmetric, trachea midline, no palpable mass/lesion, no palpable cervical lymphadenopathy  Skin: Warm and dry, no concerning lesions  Respiratory: Respirations even, unlabored  Assessment:       1. Tinnitus, unspecified laterality    2. Diffuse otitis externa of both ears, unspecified chronicity        Plan:     CT IAC OK   CSP drops   F/u 2 weeks or prn

## 2025-02-19 ENCOUNTER — TELEPHONE (OUTPATIENT)
Dept: DERMATOLOGY | Facility: CLINIC | Age: 45
End: 2025-02-19
Payer: MEDICAID

## 2025-02-20 ENCOUNTER — OFFICE VISIT (OUTPATIENT)
Dept: DERMATOLOGY | Facility: CLINIC | Age: 45
End: 2025-02-20
Payer: MEDICAID

## 2025-02-20 DIAGNOSIS — L71.9 ROSACEA: ICD-10-CM

## 2025-02-20 DIAGNOSIS — L73.2 HIDRADENITIS SUPPURATIVA: ICD-10-CM

## 2025-02-20 DIAGNOSIS — Z79.899 HIGH RISK MEDICATION USE: ICD-10-CM

## 2025-02-20 DIAGNOSIS — L40.0 PLAQUE PSORIASIS: Primary | ICD-10-CM

## 2025-02-20 NOTE — PROGRESS NOTES
[facility]  Cj Wakefield MD    [address] [address]  [phone]    Fax: [phone]    Patient Name: Belem Swann  Medical Record Number: [id number]  PCP: Rhoda Rueda FNP  Age: 44 y.o. : 1980  Contact: [phone] (home)     History of Present Illness:     Belem Swann is a 44 y.o.  female here for follow up of HS and plaque psoriasis. Treatment plan includes humira injections 40 mg weekly that has improved, but has a psoriasis flare on the L leg and HS flare in the R axilla. Patient is also concerned with erythema on the nose.    The patient has no other concerns today.    Review of Systems:     Unremarkable other than mentioned above.     Physical Exam:     General: Relaxed, oriented, alert    Skin examination of the scalp, face, neck, chest, back, abdomen, upper extremities and lower extremities were normal except for as listed below      Assessment and Plan:     1. Hidradenitis Suppurativa  - Fistula formation and draining sinuses, weeping acneiform pustules and papules, scarring, and acneiform nodules  Alvarado Stage: 3     Plan:    - continue Humira 40 mg weekly      Counseling.  Cleanse acneiform lesions and sinus tracts with anti-bacterial washes. Oral antibiotics can help reduce inflammation.  Discussed importance of not smoking and weight loss         2. Plaque Psoriasis  - psoriasiform plaques with micaceous scale     Plan:   Humira 40 mg weekly       Counseling  I counseled patient regarding systemic effects of inflammation from psoriasis, and the association with cardiac disease, metabolic syndrome, depression, and arthritis     3. High Risk Medication Monitoring : The risks and benefits of the medication were reviewed in full with the patient. Should any side effects occur, the patient will stop the medication and contact me immediately.     - CBC, CMP, and quant gold    3. Rosacea  - Erythematous papules and pustules on  cheeks and nose with background erythema     Plan:   - will monitor    Counseling.  Rosacea can be a/w cardiac disease, and can involve the eyes as well.       Cj Wakefield MD   Mohs Surgery/Dermatologic Oncology  Dermatology

## 2025-03-10 ENCOUNTER — OFFICE VISIT (OUTPATIENT)
Dept: ORTHOPEDICS | Facility: CLINIC | Age: 45
End: 2025-03-10
Payer: MEDICAID

## 2025-03-10 VITALS
OXYGEN SATURATION: 95 % | TEMPERATURE: 98 F | BODY MASS INDEX: 53.92 KG/M2 | SYSTOLIC BLOOD PRESSURE: 215 MMHG | DIASTOLIC BLOOD PRESSURE: 117 MMHG | HEART RATE: 107 BPM | HEIGHT: 62 IN | WEIGHT: 293 LBS

## 2025-03-10 DIAGNOSIS — M17.0 PRIMARY OSTEOARTHRITIS OF BOTH KNEES: Primary | ICD-10-CM

## 2025-03-10 PROCEDURE — 99213 OFFICE O/P EST LOW 20 MIN: CPT | Mod: S$PBB,25,, | Performed by: NURSE PRACTITIONER

## 2025-03-10 PROCEDURE — 20610 DRAIN/INJ JOINT/BURSA W/O US: CPT | Mod: PBBFAC,LT | Performed by: NURSE PRACTITIONER

## 2025-03-10 PROCEDURE — 99999PBSHW PR PBB SHADOW TECHNICAL ONLY FILED TO HB: Mod: PBBFAC,,,

## 2025-03-10 PROCEDURE — 99213 OFFICE O/P EST LOW 20 MIN: CPT | Mod: PBBFAC,25 | Performed by: NURSE PRACTITIONER

## 2025-03-10 PROCEDURE — 3008F BODY MASS INDEX DOCD: CPT | Mod: CPTII,,, | Performed by: NURSE PRACTITIONER

## 2025-03-10 PROCEDURE — 3080F DIAST BP >= 90 MM HG: CPT | Mod: CPTII,,, | Performed by: NURSE PRACTITIONER

## 2025-03-10 PROCEDURE — 3077F SYST BP >= 140 MM HG: CPT | Mod: CPTII,,, | Performed by: NURSE PRACTITIONER

## 2025-03-10 PROCEDURE — 20610 DRAIN/INJ JOINT/BURSA W/O US: CPT | Mod: PBBFAC,RT | Performed by: NURSE PRACTITIONER

## 2025-03-10 PROCEDURE — 99999 PR PBB SHADOW E&M-EST. PATIENT-LVL III: CPT | Mod: PBBFAC,,, | Performed by: NURSE PRACTITIONER

## 2025-03-10 RX ORDER — TRIAMCINOLONE ACETONIDE 40 MG/ML
40 INJECTION, SUSPENSION INTRA-ARTICULAR; INTRAMUSCULAR
Status: DISCONTINUED | OUTPATIENT
Start: 2025-03-10 | End: 2025-03-10 | Stop reason: HOSPADM

## 2025-03-10 RX ORDER — MELOXICAM 7.5 MG/1
7.5 TABLET ORAL DAILY
Qty: 30 TABLET | Refills: 2 | Status: SHIPPED | OUTPATIENT
Start: 2025-03-10

## 2025-03-10 RX ADMIN — TRIAMCINOLONE ACETONIDE 40 MG: 40 INJECTION, SUSPENSION INTRA-ARTICULAR; INTRAMUSCULAR at 01:03

## 2025-03-10 NOTE — PROGRESS NOTES
CC:  Knee pain    44 y.o. Female returns to clinic for a follow up visit regarding knee pain.       Patient has had injections in the past with the last being 10/1/2024 by Dr. Rogelio Wakefield.   She states that she just recently started having pain again.  She is here today for repeat injections.     She state she did have 2 falls last week, but her knees were hurting prior to the falls.          Past Medical History:   Diagnosis Date    Anxiety     Asthma     Chronic pain     Cushing syndrome     Depression     Essential hypertension     Fibromyalgia     Herpes zoster     Hyperlipidemia     Leukocytosis 2022    Onychomycosis     MARY on CPAP     Rheumatoid arthritis     Type 2 diabetes mellitus 2020    Vitamin D deficiency 2018     Past Surgical History:   Procedure Laterality Date     SECTION      ELBOW SURGERY      EPIDURAL STEROID INJECTION      L4-5 VERITO Dec 2020-[place]    FEMUR SURGERY Right     due to osteomyelitis    HYSTERECTOMY      Abn Bleeding    INJECTION OF ANESTHETIC AGENT AROUND MEDIAL BRANCH NERVES INNERVATING LUMBAR FACET JOINT Bilateral 2022    Procedure: Bilateral L4-5,5-S1 MBB ( No steroids);  Surgeon: Carmel Torre MD;  Location: [facility];  Service: Pain Management;  Laterality: Bilateral;  PT AWARE TO BE TESTED ON OV    INJECTION OF ANESTHETIC AGENT AROUND MEDIAL BRANCH NERVES INNERVATING LUMBAR FACET JOINT Bilateral 2022    Procedure: Block-nerve-medial branch-lumbar, bilateral L4 through S1;  Surgeon: Carmel Torre MD;  Location: [facility];  Service: Pain Management;  Laterality: Bilateral;    INJECTION OF ANESTHETIC AGENT INTO SACROILIAC JOINT Bilateral 2022    Procedure: BLOCK, SACROILIAC JOINT;  Surgeon: Carmel Torre MD;  Location: [facility];  Service: Pain Management;  Laterality: Bilateral;  covid test put in    LIPOMA RESECTION  2019    RADIOFREQUENCY ABLATION OF LUMBAR MEDIAL BRANCH NERVE AT SINGLE  "LEVEL Right 04/14/2022    Procedure: Radiofrequency Ablation, Nerve, Spinal, Lumbar, Medial Branch, Level L4-S1, right side 1st, will have left-sided after completing;  Surgeon: Carmel Torre MD;  Location: [facility];  Service: Pain Management;  Laterality: Right;  pt aware at visit to be tested    RADIOFREQUENCY ABLATION OF LUMBAR MEDIAL BRANCH NERVE AT SINGLE LEVEL Left 05/05/2022    Procedure: LEFT  L4-S1 RFTC  (HAD RIGHT  ON 4-14);  Surgeon: Carmel Torre MD;  Location: [facility];  Service: Pain Management;  Laterality: Left;    RADIOFREQUENCY ABLATION OF LUMBAR MEDIAL BRANCH NERVE AT SINGLE LEVEL Right 7/25/2023    Procedure: Radiofrequency Ablation, Nerve, Spinal, Lumbar, Medial Branch, Level L4-S1;  Surgeon: Carmel Torre MD;  Location: [facility];  Service: Pain Management;  Laterality: Right;    SURGICAL REMOVAL OF PILONIDAL CYST      TRANSFORAMINAL EPIDURAL INJECTION OF STEROID      Bilateral L4-5 TFESI Nov 2020-[place]    TRANSFORAMINAL EPIDURAL INJECTION OF STEROID      Right L4-5 TFESI Feb 2020-[place]    VENTRAL HERNIA REPAIR  09/2020         PHYSICAL EXAMINATION:  BP (!) 215/117   Pulse 107   Temp 97.6 °F (36.4 °C)   Ht 5' 2" (1.575 m)   Wt (!) 161.7 kg (356 lb 6.4 oz)   SpO2 95%   BMI 65.19 kg/m²   General    Constitutional: She is oriented to person, place, and time. She appears well-nourished.   HENT:   Head: Normocephalic and atraumatic.   Eyes: Pupils are equal, round, and reactive to light.   Neck: Neck supple.   Cardiovascular:  Normal rate and regular rhythm.            Pulmonary/Chest: Effort normal. No respiratory distress.   Abdominal: There is no abdominal tenderness. There is no guarding.   Neurological: She is alert and oriented to person, place, and time. She has normal reflexes.   Psychiatric: She has a normal mood and affect. Her behavior is normal. Judgment and thought content normal.           Right Knee Exam     Inspection   Swelling: " present  Effusion: present    Tenderness   The patient is tender to palpation of the medial joint line and lateral joint line.    Crepitus   The patient has crepitus of the patella.    Range of Motion   Extension:  normal   Flexion:  abnormal     Tests   Meniscus   Grady:  Medial - positive   Ligament Examination   Lachman: normal (-1 to 2mm)   PCL-Posterior Drawer: normal (0 to 2mm)     Patella   Patellar Tracking: normal  Patellar Grind: positive    Other   Sensation: normal    Left Knee Exam     Inspection   Swelling: present  Effusion: present    Tenderness   The patient tender to palpation of the medial joint line and lateral joint line.    Crepitus   The patient has crepitus of the patella.    Range of Motion   Extension:  normal   Flexion:  abnormal     Tests   Meniscus   Grady:  Medial - positive   Stability   Lachman: normal (-1 to 2mm)   PCL-Posterior Drawer: normal (0 to 2mm)  Patella   Patellar Tracking: normal  Patellar Grind: positive    Other   Sensation: normal    Muscle Strength   Right Lower Extremity   Quadriceps:  5/5   Hamstrin/5   Left Lower Extremity   Quadriceps:  5/5   Hamstrin/5     Reflexes     Right Side   Achilles:  2+  Quadriceps:  2+    Vascular Exam     Right Pulses  Dorsalis Pedis:      2+  Posterior Tibial:      2+          IMAGING:  No results found.     ASSESSMENT:      ICD-10-CM ICD-9-CM   1. Primary osteoarthritis of both knees  M17.0 715.16       PLAN:     -Findings and treatment options were discussed with the patient  -All questions answered  Natural history and expected course discussed. Questions answered.  Educational materials distributed.  Reduction in offending activity.  NSAIDs per medication orders.  Arthrocentesis. See procedure note.   We will start her on Mobic 7.5 mg p.o. daily.  Bilateral knee injections today.  Return to clinic 6 months for recheck.  Stop naproxen    There are no Patient Instructions on file for this visit.      Orders Placed  This Encounter   Procedures    Large Joint Aspiration/Injection: R supra patellar bursa    Large Joint Aspiration/Injection: L supra patellar bursa         Large Joint Aspiration/Injection: R supra patellar bursa    Date/Time: 3/10/2025 1:40 PM    Performed by: Toshia Conroy FNP  Authorized by: Toshia Conroy FNP    Consent Done?:  Yes (Verbal)  Indications:  Pain  Site marked: the procedure site was marked    Local anesthetic:  Bupivacaine 0.25% without epinephrine    Details:  Needle Size:  22 G  Location:  Knee  Site:  R supra patellar bursa  Medications:  40 mg triamcinolone acetonide 40 mg/mL  Patient tolerance:  Patient tolerated the procedure well with no immediate complications  Large Joint Aspiration/Injection: L supra patellar bursa    Date/Time: 3/10/2025 1:40 PM    Performed by: Toshia Conroy FNP  Authorized by: Toshia Conroy FNP    Consent Done?:  Yes (Verbal)  Indications:  Pain  Site marked: the procedure site was marked    Local anesthetic:  Bupivacaine 0.25% without epinephrine    Details:  Needle Size:  22 G  Location:  Knee  Site:  L supra patellar bursa  Medications:  40 mg triamcinolone acetonide 40 mg/mL  Patient tolerance:  Patient tolerated the procedure well with no immediate complications

## 2025-03-13 ENCOUNTER — TELEPHONE (OUTPATIENT)
Dept: GASTROENTEROLOGY | Facility: CLINIC | Age: 45
End: 2025-03-13
Payer: MEDICAID

## 2025-03-14 ENCOUNTER — RESULTS FOLLOW-UP (OUTPATIENT)
Dept: DERMATOLOGY | Facility: CLINIC | Age: 45
End: 2025-03-14

## 2025-03-20 ENCOUNTER — OFFICE VISIT (OUTPATIENT)
Dept: FAMILY MEDICINE | Facility: CLINIC | Age: 45
End: 2025-03-20
Payer: MEDICAID

## 2025-03-20 VITALS
OXYGEN SATURATION: 98 % | WEIGHT: 293 LBS | SYSTOLIC BLOOD PRESSURE: 115 MMHG | RESPIRATION RATE: 18 BRPM | HEIGHT: 62 IN | TEMPERATURE: 98 F | DIASTOLIC BLOOD PRESSURE: 75 MMHG | BODY MASS INDEX: 53.92 KG/M2 | HEART RATE: 90 BPM

## 2025-03-20 DIAGNOSIS — M62.838 MUSCLE SPASM: Chronic | ICD-10-CM

## 2025-03-20 DIAGNOSIS — E11.9 TYPE 2 DIABETES MELLITUS WITHOUT COMPLICATION, WITHOUT LONG-TERM CURRENT USE OF INSULIN: Primary | Chronic | ICD-10-CM

## 2025-03-20 DIAGNOSIS — R51.9 ACUTE NONINTRACTABLE HEADACHE, UNSPECIFIED HEADACHE TYPE: ICD-10-CM

## 2025-03-20 DIAGNOSIS — Z12.31 BREAST CANCER SCREENING BY MAMMOGRAM: ICD-10-CM

## 2025-03-20 DIAGNOSIS — M05.79 RHEUMATOID ARTHRITIS INVOLVING MULTIPLE SITES WITH POSITIVE RHEUMATOID FACTOR: Chronic | ICD-10-CM

## 2025-03-20 DIAGNOSIS — G47.9 DIFFICULTY SLEEPING: Chronic | ICD-10-CM

## 2025-03-20 DIAGNOSIS — L30.9 DERMATITIS: ICD-10-CM

## 2025-03-20 DIAGNOSIS — F41.9 ANXIETY: Chronic | ICD-10-CM

## 2025-03-20 DIAGNOSIS — L30.4 INTERTRIGO: ICD-10-CM

## 2025-03-20 LAB
ANION GAP SERPL CALCULATED.3IONS-SCNC: 14 MMOL/L (ref 7–16)
BUN SERPL-MCNC: 17 MG/DL (ref 7–19)
BUN/CREAT SERPL: 26 (ref 6–20)
CALCIUM SERPL-MCNC: 8.6 MG/DL (ref 8.4–10.2)
CHLORIDE SERPL-SCNC: 103 MMOL/L (ref 98–107)
CO2 SERPL-SCNC: 25 MMOL/L (ref 22–29)
CREAT SERPL-MCNC: 0.66 MG/DL (ref 0.55–1.02)
CREAT UR-MCNC: 156 MG/DL (ref 15–325)
EGFR (NO RACE VARIABLE) (RUSH/TITUS): 110 ML/MIN/1.73M2
EST. AVERAGE GLUCOSE BLD GHB EST-MCNC: 137 MG/DL
GLUCOSE SERPL-MCNC: 143 MG/DL (ref 74–100)
HBA1C MFR BLD HPLC: 6.4 %
MICROALBUMIN UR-MCNC: 1.4 MG/DL
MICROALBUMIN/CREAT RATIO PNL UR: 9 MG/G (ref 0–30)
POTASSIUM SERPL-SCNC: 4.2 MMOL/L (ref 3.5–5.1)
SODIUM SERPL-SCNC: 138 MMOL/L (ref 136–145)

## 2025-03-20 PROCEDURE — 83036 HEMOGLOBIN GLYCOSYLATED A1C: CPT | Mod: ,,, | Performed by: CLINICAL MEDICAL LABORATORY

## 2025-03-20 PROCEDURE — 82570 ASSAY OF URINE CREATININE: CPT | Mod: ,,, | Performed by: CLINICAL MEDICAL LABORATORY

## 2025-03-20 PROCEDURE — 80048 BASIC METABOLIC PNL TOTAL CA: CPT | Mod: ,,, | Performed by: CLINICAL MEDICAL LABORATORY

## 2025-03-20 PROCEDURE — 82043 UR ALBUMIN QUANTITATIVE: CPT | Mod: ,,, | Performed by: CLINICAL MEDICAL LABORATORY

## 2025-03-20 RX ORDER — HYDROXYZINE PAMOATE 50 MG/1
100 CAPSULE ORAL NIGHTLY PRN
Qty: 180 CAPSULE | Refills: 2 | Status: SHIPPED | OUTPATIENT
Start: 2025-03-20

## 2025-03-20 RX ORDER — KETOROLAC TROMETHAMINE 30 MG/ML
60 INJECTION, SOLUTION INTRAMUSCULAR; INTRAVENOUS
Status: COMPLETED | OUTPATIENT
Start: 2025-03-20 | End: 2025-03-20

## 2025-03-20 RX ORDER — CYCLOBENZAPRINE HCL 10 MG
10 TABLET ORAL 3 TIMES DAILY PRN
Qty: 90 TABLET | Refills: 6 | Status: SHIPPED | OUTPATIENT
Start: 2025-03-20

## 2025-03-20 RX ORDER — HYDROXYZINE PAMOATE 50 MG/1
100 CAPSULE ORAL NIGHTLY PRN
Qty: 180 CAPSULE | Refills: 1 | Status: SHIPPED | OUTPATIENT
Start: 2025-03-20 | End: 2025-03-20

## 2025-03-20 RX ORDER — CYCLOBENZAPRINE HCL 10 MG
10 TABLET ORAL 3 TIMES DAILY PRN
Qty: 90 TABLET | Refills: 0 | Status: SHIPPED | OUTPATIENT
Start: 2025-03-20 | End: 2025-03-20

## 2025-03-20 RX ORDER — KETOCONAZOLE 20 MG/G
CREAM TOPICAL DAILY
Qty: 60 G | Refills: 2 | Status: SHIPPED | OUTPATIENT
Start: 2025-03-20

## 2025-03-20 RX ORDER — TRIAMCINOLONE ACETONIDE 1 MG/G
OINTMENT TOPICAL 2 TIMES DAILY
Qty: 454 G | Refills: 0 | Status: SHIPPED | OUTPATIENT
Start: 2025-03-20

## 2025-03-20 RX ORDER — NYSTATIN 100000 [USP'U]/G
POWDER TOPICAL 2 TIMES DAILY
Qty: 60 G | Refills: 5 | Status: SHIPPED | OUTPATIENT
Start: 2025-03-20

## 2025-03-20 RX ADMIN — KETOROLAC TROMETHAMINE 60 MG: 30 INJECTION, SOLUTION INTRAMUSCULAR; INTRAVENOUS at 10:03

## 2025-03-20 NOTE — PROGRESS NOTES
[facility]  [address] [place] [address]  Phone: [phone]  Fax: [phone]       PATIENT NAME: Belem Swann   : 1980    AGE: 44 y.o. DATE OF ENCOUNTER: 3/20/25    MRN: [id number]      PCP: Rhoda Rueda FNP    Subjective:   CHANGE CHIEF COMPLAINT      :[id number]}274}  Chief Complaint   Patient presents with    Diabetes     Patient reports to the clinic today for her 6 month follow up and labs. She reports a rash on her lower back for about a week.    Health Maintenance     Care gaps addressed, patient needs to reschedule her mammogram, has not had her eye exam yet.    Silvia Gomez CMA     History of Present Illness    HPI:  Patient reports severe headache with pain localized to the area around her ears, along with ear pain and a swooshing sensation. She was evaluated by Dr. Sheikh for this issue, who considered the possibility of surgery in [place] but decided against immediate surgical intervention.  She was diagnosed with tinnitus right ear.  Patient expresses discomfort with the persistent swooshing sensation in her ear.    Patient had a breathing-related episode last month, leading her to see pulmonologist, Dr. Barahona. She is currently under the care of Dr. Barahona for pulmonary issues, as her previous pulmonology provider, Jered Willingham NP, is unavailable due to illness.    Patient describes an itching and burning sensation with rash across her lower back, which she attributes to a reaction from applying white Tiger Balm instead of the red variety. The affected area is across the middle of her low back.    She reports significant sinus pressure in her ears. She has been taking Zyrtec as recommended by Dr. Barahona, but is unsure of its effectiveness due to recent initiation of the medication.    Patient mentions issues with her CPAP mask.    A CT of her ear was performed on 2025, which showed no abnormalities.    Patient describes a persistent  raw, moist area in the crease under her abdomen, which has worsened with weight loss, causing increased skin drooping. This condition prevents her from wearing underwear comfortably for extended periods.    She mentions having rheumatoid arthritis, for which she is seeing a specialist, and psoriasis, under the care of Dr. Cj Wakefield. She also notes that she is due for a colonoscopy on the 27th of the current month so she is currently holding her Ozempic.    Patient reports being behind on her mammogram due to car troubles and difficulty managing multiple medical appointments.    She denies any current allergies based on a recent allergy test.      ROS:  Head: +head pain, +headaches  ENT: +ear pain, +nasal congestion, +sinus pressure, +ear pressure  Musculoskeletal: +muscle spasms  Integumentary: +skin cracking, +itching, +burning sensation         Allergies and Meds: 274}     Review of patient's allergies indicates:   Allergen Reactions    Latex      Other reaction(s): Unknown    Doxycycline Nausea Only        Current Outpatient Medications   Medication Sig Dispense Refill    albuterol sulfate 90 mcg/actuation aebs Inhale 180 mcg into the lungs every 4 (four) hours.      albuterol-ipratropium (DUO-NEB) 2.5 mg-0.5 mg/3 mL nebulizer solution Take 3 mLs by nebulization every 6 (six) hours as needed. Rescue      amitriptyline (ELAVIL) 25 MG tablet Take 1 tablet (25 mg total) by mouth every evening. 90 tablet 3    budesonide-formoterol 160-4.5 mcg (SYMBICORT) 160-4.5 mcg/actuation HFAA Inhale 2 puffs into the lungs every 12 (twelve) hours. Controller 32.1 g 3    carvediloL (COREG) 25 MG tablet Take 25 mg by mouth 2 (two) times daily with meals.      chlorthalidone (HYGROTEN) 25 MG Tab       cloNIDine (CATAPRES) 0.1 MG tablet Take 0.1 mg by mouth daily as needed.      cyclobenzaprine (FLEXERIL) 10 MG tablet Take 1 tablet (10 mg total) by mouth 3 (three) times daily as needed for Muscle spasms. 90 tablet 0    furosemide  "(LASIX) 40 MG tablet Take 40 mg by mouth once daily.      gabapentin (NEURONTIN) 300 MG capsule Take 1 capsule (300 mg total) by mouth every 8 (eight) hours. 90 capsule 1    HUMIRA,CF, PEN 40 mg/0.4 mL PnKt Inject 40 mg into the skin once a week.      HYDROcodone-acetaminophen (NORCO)  mg per tablet Take 1 tablet by mouth every 8 (eight) hours as needed for Pain. 90 tablet 0    hydrocortisone 2.5 % ointment Apply topically 2 (two) times daily. 454 g 5    hydrOXYzine pamoate (VISTARIL) 50 MG Cap Take 2 capsules (100 mg total) by mouth nightly as needed (anxiety & difficulty sleeping). 180 capsule 1    lancets (ONETOUCH DELICA LANCETS) 33 gauge Misc 1 lancet by Misc.(Non-Drug; Combo Route) route once daily. 100 each 3    leflunomide (ARAVA) 10 MG Tab Take 10 mg by mouth once daily.      meloxicam (MOBIC) 7.5 MG tablet Take 1 tablet (7.5 mg total) by mouth once daily. 30 tablet 2    metFORMIN (GLUCOPHAGE-XR) 500 MG ER 24hr tablet Take 1 tablet (500 mg total) by mouth once daily. 90 tablet 3    montelukast (SINGULAIR) 10 mg tablet Take 10 mg by mouth once daily.      naproxen (NAPROSYN) 500 MG tablet Take 500 mg by mouth 2 (two) times daily with meals.      neomycin-polymyxin-hydrocortisone (CORTISPORIN) 3.5-10,000-1 mg/mL-unit/mL-% otic suspension Place 3 drops into the right ear 2 (two) times a day. 10 mL 0    NIFEdipine (PROCARDIA-XL) 30 MG (OSM) 24 hr tablet Take 30 mg by mouth every evening.      nystatin (MYCOSTATIN) powder Apply topically 2 (two) times daily. 60 g 5    olmesartan (BENICAR) 40 MG tablet Take 40 mg by mouth once daily.      pantoprazole (PROTONIX) 40 MG tablet Take 1 tablet (40 mg total) by mouth 2 (two) times daily. 60 tablet 6    pen needle, diabetic 31 gauge x 1/4" Ndle       promethazine (PHENERGAN) 25 MG tablet Take 1 tablet (25 mg total) by mouth every 6 (six) hours as needed for Nausea. 30 tablet 1    rosuvastatin (CRESTOR) 40 MG Tab Take 1 tablet (40 mg total) by mouth every evening. " "90 tablet 3    semaglutide (OZEMPIC) 1 mg/dose (4 mg/3 mL) Inject 1 mg into the skin every 7 days. 9 mL 3    triamcinolone acetonide 0.1% (KENALOG) 0.1 % ointment Apply topically 2 (two) times daily. 454 g 0    TRUE METRIX GLUCOSE TEST STRIP Strp 1 strip by Misc.(Non-Drug; Combo Route) route Daily. For T2DM. 100 strip 3    ketoconazole (NIZORAL) 2 % cream Apply topically once daily. (Patient not taking: Reported on 3/20/2025) 60 g 2     No current facility-administered medications for this visit.       Labs:274}   I have reviewed labs below:    Lab Results   Component Value Date    WBC 11.92 (H) 03/10/2025    RBC 5.00 03/10/2025    HGB 13.8 03/10/2025    HCT 43.5 03/10/2025     03/10/2025     03/10/2025    K 3.8 03/10/2025     03/10/2025    CALCIUM 9.1 03/10/2025     (H) 03/10/2025    BUN 15 03/10/2025    CREATININE 0.64 03/10/2025    ESTGFRAFRICA 103 10/07/2020    EGFRNONAA 95 05/11/2022    ALT 20 03/10/2025    AST 13 03/10/2025    INR 1.10 10/07/2020    CHOL 171 09/17/2024    TRIG 135 09/17/2024    HDL 43 09/17/2024    LDLCALC 101 09/17/2024    TSH 2.140 03/20/2024    HGBA1C 6.3 09/17/2024    MICROALBUR 2.8 03/20/2024       Medical History: 274}     Past Medical History:   Diagnosis Date    Anxiety     Asthma     Chronic pain     Cushing syndrome     Depression     Essential hypertension     Fibromyalgia     Herpes zoster     Hyperlipidemia     Leukocytosis 1/18/2022    Onychomycosis     MARY on CPAP     Rheumatoid arthritis     Type 2 diabetes mellitus 11/2020    Vitamin D deficiency 05/03/2018      Social History  Tobacco Use   Smoking Status Every Day    Types: Cigarettes    Start date: 9/8/2021   Smokeless Tobacco Never        Objective:  274}   Vital Signs  Temp: 97.7 °F (36.5 °C)  Temp Source: Oral  Pulse: 90  Resp: 18  SpO2: 98 %  BP: 115/75  BP Location: Left arm  Patient Position: Sitting  Pain Score:   8  Pain Loc: Back  Height and Weight  Height: 5' 2" (157.5 cm)  Weight: (!) " 156.6 kg (345 lb 3.2 oz)  BSA (Calculated - sq m): 2.62 sq meters  BMI (Calculated): 63.1  Weight in (lb) to have BMI = 25: 136.4    Over the last two weeks how often have you been bothered by little interest or pleasure in doing things: 0  Over the last two weeks how often have you been bothered by feeling down, depressed or hopeless: 0  PHQ-2 Total Score: 0    Wt Readings from Last 3 Encounters:   03/20/25 (!) 156.6 kg (345 lb 3.2 oz)   03/10/25 (!) 161.7 kg (356 lb 6.4 oz)   02/11/25 (!) 152 kg (335 lb)     Physical Exam  Vitals and nursing note reviewed.   Constitutional:       General: She is not in acute distress.     Appearance: Normal appearance. She is obese. She is not ill-appearing.   HENT:      Head: Normocephalic.      Right Ear: Ear canal and external ear normal. A middle ear effusion is present. Tympanic membrane is erythematous. Tympanic membrane is not perforated.      Left Ear: Ear canal and external ear normal. A middle ear effusion is present. Tympanic membrane is not perforated or erythematous.      Nose: Nose normal.      Mouth/Throat:      Mouth: Mucous membranes are moist.      Pharynx: Oropharynx is clear. Uvula midline. No posterior oropharyngeal erythema or uvula swelling.   Eyes:      Conjunctiva/sclera: Conjunctivae normal.      Pupils: Pupils are equal, round, and reactive to light.   Neck:      Thyroid: No thyroid mass or thyromegaly.      Vascular: Normal carotid pulses. No carotid bruit.      Trachea: Trachea normal.   Cardiovascular:      Rate and Rhythm: Normal rate and regular rhythm.      Pulses: Normal pulses.      Heart sounds: Normal heart sounds.   Pulmonary:      Effort: Pulmonary effort is normal.      Breath sounds: Normal breath sounds.   Abdominal:      Palpations: Abdomen is soft.      Tenderness: There is no abdominal tenderness.   Musculoskeletal:      Cervical back: Neck supple.      Right lower leg: Edema (trace) present.      Left lower leg: Edema (trace) present.    Lymphadenopathy:      Cervical: No cervical adenopathy.   Skin:     General: Skin is warm and dry.      Findings: Rash (redness, moisture, plaques beneath abd pannus; lower back with patchy erythematous papular rash with mild crusting and inflammation) present.   Neurological:      General: No focal deficit present.      Mental Status: She is alert and oriented to person, place, and time.   Psychiatric:         Mood and Affect: Mood normal.         Behavior: Behavior normal.          Assessment and Plan: 274}       1. Type 2 diabetes mellitus without complication, without long-term current use of insulin  Assessment & Plan:  Lab Results   Component Value Date    HGBA1C 6.3 09/17/2024    HGBA1C 6.1 03/20/2024    HGBA1C 6.0 06/27/2023     Continue metformin ER and Ozempic.   March 2024 Dr. Dung Gale [facility]    Orders:  -     Microalbumin/Creatinine Ratio, Urine; Future; Expected date: 03/20/2025  -     Basic Metabolic Panel; Future; Expected date: 03/20/2025  -     Hemoglobin A1C; Future; Expected date: 03/20/2025    2. Muscle spasm  -     Discontinue: cyclobenzaprine (FLEXERIL) 10 MG tablet; Take 1 tablet (10 mg total) by mouth 3 (three) times daily as needed for Muscle spasms.  Dispense: 90 tablet; Refill: 0  -     cyclobenzaprine (FLEXERIL) 10 MG tablet; Take 1 tablet (10 mg total) by mouth 3 (three) times daily as needed for Muscle spasms.  Dispense: 90 tablet; Refill: 6    3. Anxiety  -     Discontinue: hydrOXYzine pamoate (VISTARIL) 50 MG Cap; Take 2 capsules (100 mg total) by mouth nightly as needed (anxiety & difficulty sleeping).  Dispense: 180 capsule; Refill: 1  -     hydrOXYzine pamoate (VISTARIL) 50 MG Cap; Take 2 capsules (100 mg total) by mouth nightly as needed (anxiety & difficulty sleeping).  Dispense: 180 capsule; Refill: 2    4. Difficulty sleeping  -     Discontinue: hydrOXYzine pamoate (VISTARIL) 50 MG Cap; Take 2 capsules (100 mg total) by mouth nightly as needed (anxiety & difficulty  sleeping).  Dispense: 180 capsule; Refill: 1  -     hydrOXYzine pamoate (VISTARIL) 50 MG Cap; Take 2 capsules (100 mg total) by mouth nightly as needed (anxiety & difficulty sleeping).  Dispense: 180 capsule; Refill: 2    5. Dermatitis  -     triamcinolone acetonide 0.1% (KENALOG) 0.1 % ointment; Apply topically 2 (two) times daily. To rash on back  Dispense: 454 g; Refill: 0    6. Breast cancer screening by mammogram  -     Mammo Digital Screening Bilat w/ Jonny (XPD); Future; Expected date: 03/20/2025    7. Intertrigo  -     ketoconazole (NIZORAL) 2 % cream; Apply topically once daily.  Dispense: 60 g; Refill: 2    8. Rheumatoid arthritis involving multiple sites with positive rheumatoid factor  -     ketorolac injection 60 mg    9. Acute nonintractable headache, unspecified headache type  -     ketorolac injection 60 mg    Other orders  -     nystatin (MYCOSTATIN) powder; Apply topically 2 (two) times daily.  Dispense: 60 g; Refill: 5        Assessment & Plan    IMPRESSION:  - Assessed ear symptoms and reviewed previous CT results showing no abnormalities.  - Considered sinus-related pressure as potential cause for ear discomfort and headaches.  - Evaluated diabetes management, noting temporary cessation of Ozempic due to upcoming colonoscopy. Patient to resume Ozempic after colonoscopy.  - Assessed skin irritation on back, likely due to reaction from Tiger Balm application.  - Evaluated abdominal skin fold (pannus) condition, considering potential future paniculectomy pending further weight loss.    TYPE 2 DIABETES MELLITUS:  - Scheduled follow-up visit for diabetes management.  - Noted that the patient is taking Ozempic for diabetes management.  - Instructed the patient to hold Ozempic for 2 weeks due to upcoming colonoscopy.    RHEUMATOID ARTHRITIS:  - Confirmed that the patient is seeing Lesly Dewey NP at [facility] for rheumatoid arthritis management.    ASTHMA:  - Noted that the patient had a recent  asthma-related incident and consulted a pulmonologist.  - Confirmed that the patient is under the care of Dr. Barahona for pulmonary management.    PSORIASIS:  - Verified that the patient is under the care of Dr. Cj Wakefield for psoriasis management.    SKIN CONDITIONS:  - Refilled Nystatin powder for topical application beneath the abdominal pannus.  - Prescribed ketoconazole cream for application to abdominal skin fold.  - Triamcinolone ointment for rash to lower.  - Documented the condition for potential treatment as she would definitely benefit from a panniculectomy in the future.  - Explained relationship between weight loss and increased skin laxity, particularly in abdominal area.  - Discussed potential benefits of paniculectomy for managing persistent skin fold issues.    SINUS AND EAR DISORDERS:  - Noted patient's report of sinus pressure and ear discomfort.  - Confirmed that the patient started taking Zyrtec as recommended by Dr. Barahona.  - No indication for antibiotic at this time.  - Smoking cessation advised.  - Reviewed CT results which showed no abnormalities.    MUSCLE SPASMS:  - Noted patient's report of recent muscle spasm affecting a nerve.  - Refilled cyclobenzaprine (Flexeril) for muscle spasms, to be taken 3 times daily as needed.    FOLLOW-UP:  - Follow up on October 1st as scheduled for wellness, T2DM, HTN.  - Contact office if there are issues with medication refills at pharmacy.     Signature:  DANE Saenz-BC    Future Appointments   Date Time Provider Department Center   3/27/2025  1:00 PM [facility] GI ROOM 03 [facility] ENDO [facility]   4/2/2025  1:15 PM Jeff Pineda PA [facility] PNTRE [facility]   4/7/2025  1:00 PM [facility] MAMMO1 [facility] MAMMO [place] [facility]   6/23/2025  1:00 PM Lyndsay Diggs, NP [facility] GASTR [place] [facility]   8/21/2025  3:15 PM Ashlyn Wakefield MD [facility] DERM [place]   10/1/2025  9:40 AM Rhoda Rueda FNP [facility] [place]   10/1/2025  1:20 PM Clayton Hamilton,  MD [facility] ORTHO [place] [facility]     This note was generated with the assistance of ambient listening technology. Verbal consent was obtained by the patient and accompanying visitor(s) for the recording of patient appointment to facilitate this note. I attest to having reviewed and edited the generated note for accuracy, though some syntax or spelling errors may persist. Please contact the author of this note for any clarification.

## 2025-03-20 NOTE — PATIENT INSTRUCTIONS
"Patient Education     Quitting smoking for adults   The Basics   Written by the doctors and editors at [facility]   What are the benefits of quitting smoking? --   Quitting smoking can dramatically improve your health and help you live longer. It lowers your risk of heart disease, lung disease, kidney failure, cancer, infection, stomach problems, and diabetes.  Quitting smoking can also lower your chances of getting osteoporosis, a condition that makes your bones weak.  Quitting is not easy for most people. It might take several tries to completely quit. But help and support are available. Quitting smoking will improve your health no matter how old you are, even if you have smoked for a long time.  What should I do if I want to quit smoking? --   It's a good idea to start by talking with your doctor or nurse. It is possible to quit on your own without help. But getting help greatly increases your chances of quitting successfully.  When you are ready to quit, you will make a plan to:   Set a quit date.   Tell your family and friends that you plan to quit.   Plan ahead for the challenges you will face, such as cigarette cravings.   Remove cigarettes from your home, car, and work.  How can my doctor or nurse help? --   They can give you advice on the best way to quit. They can also give you medicines to reduce your:   Craving for cigarettes   "Withdrawal" symptoms (symptoms that happen when you stop smoking)  Your doctor or nurse can also help you find a counselor to talk to. For most people who are trying to quit smoking, it works best to use both medicines and counseling.  You can also get help from a free phone line (5-258-QUIT-NOW, or 1-316.270.8876) or go online to www.smokefree.gov.  What are the symptoms of withdrawal? --   When you stop smoking, you might have symptoms such as:   Trouble sleeping   Feeling irritable, anxious, or restless   Getting frustrated or angry   Having trouble thinking clearly  These " symptoms can be hard to deal with. This is why it can be so hard to quit. But medicines can help.  Some people who stop smoking become temporarily depressed. Some need treatment for depression, such as counseling, medicines, or both. People with depression might:   No longer enjoy or care about doing the things they used to like to do   Feel sad, down, hopeless, nervous, or cranky most of the day, almost every day   Lose or gain weight   Sleep too much or too little   Feel tired or like they have no energy   Feel guilty or like they are worth nothing   Forget things or feel confused   Move and speak more slowly than usual   Act restless or have trouble staying still   Think about death or suicide  If you think that you might be depressed, tell your doctor or nurse right away. They can talk to you about your symptoms and recommend treatment if needed.  Get help right away if you are thinking of hurting or killing yourself! --   Sometimes, people with depression think of hurting or killing themselves. If you ever feel like you might hurt yourself, help is available:   In the , contact the [facility] Lifeline:   To speak to someone, call or text [facility].   To talk to someone online, go to www.[employer].org/chat.   Call your doctor or nurse, and tell them that it is an emergency.   Call for an ambulance (in the [place] and [place], call 9-1-1).   Go to the emergency department at your local hospital.  If you think that your partner might have depression, or if you are worried that they might hurt themselves, get them help right away.  How does counseling work? --   A counselor can help you figure out:   What triggers you to want to smoke, and how to handle these situations   How to resist cravings   What you can do differently if you tried to quit before  You can meet with a counselor in 1-on-1 sessions or as part of a group. There are other ways to get counseling, too, such as over the phone, text, or online.  How  "can medicines help me quit smoking? --   Different medicines work in different ways:   Nicotine replacement therapy - Nicotine is the main drug in cigarettes and why they are addictive. These medicines reduce your body's craving for nicotine. They also help with withdrawal symptoms.  There are different forms of nicotine replacement, including skin patches, lozenges, gum, nasal sprays, and inhalers. Most can be bought without a prescription. Also, health insurance might cover some or all of the cost.  It often helps to use 2 forms of nicotine replacement. For example, you might wear a patch all of the time, and also use gum or lozenges when you get a craving to smoke.   Varenicline (brand names: Chantix, Champix) - This is a prescription medicine that reduces withdrawal symptoms and cigarette cravings. It can increase the effects of alcohol in some people. Avoid or limit drinking while you're taking it, at least until you know how it affects you.  Even if you are not yet ready to commit to a quit date, varenicline can help reduce cravings. This can make it easier to quit when you are ready.   Bupropion (sample brand names: Wellbutrin, Zyban) - This is a prescription medicine that reduces your desire to smoke. It is also available in a generic version, which is cheaper. Doctors do not usually prescribe bupropion for people with seizures or those who had seizures in the past.  It might also be helpful to combine nicotine replacement with bupropion or varenicline. In some cases, a person might even take both bupropion and varenicline. Your doctor or nurse can help you figure out the best combination for you.  Can vaping help me quit? --   Vaping is using electronic cigarettes, or "e-cigarettes." Sometimes, people wonder if vaping can help them quit smoking.  Doctors recommend using medicines and counseling to quit smoking. These methods have been studied the most. But for people who have tried these and were not able " to quit, switching to vaping might be an option. There are some things to remember:   Vaping might be less harmful than smoking regular cigarettes. But e-cigarettes still contain nicotine as well as other substances that might be harmful.   If you try vaping to help you quit, it's important to switch completely from regular cigarettes to e-cigarettes. Using both will probably not be helpful, and might increase the risks of harm.   It's not clear how vaping can affect a person's health in the long term.  For these reasons, doctors recommend that if you do try vaping to help you quit smoking, you still make a plan to quit vaping eventually.  If you are interested in trying vaping to help you quit smoking, talk to your doctor or nurse first.  What if I am pregnant and I smoke? --   If you are pregnant, it's really important for the health of your baby that you quit. Ask your doctor about your options, and what is safest for your baby.  What if I have already smoked for a long time? --   The longer you have smoked, the higher your chances are of having health problems. But it is never too late to quit smoking. It helps your health even if you are older or have smoked for many years. The best thing you can do to lower your chance of having a health problem caused by smoking is to quit.  Will I gain weight if I quit? --   You might gain a little weight. This can be frustrating for some people. But remember that you are improving your health by quitting smoking. You can help prevent gaining a lot of weight by staying active and eating a healthy diet.  What if I'm having trouble quitting? --   If you don't quit on your first try, or if you quit but then start smoking again, don't give up hope. Lots of people have to try more than once before they can completely quit.  It might help to try to understand why quitting did not work. There might be something you can do differently when you try again. It can help to figure out  which situations make you want to smoke, so you can avoid them.  What else can I do to improve my chances of quitting? --   You can:   Get regular exercise. Any type of physical activity, even gentle forms of movement, is good for your health. Physical activity can also help reduce stress.   Stay away from people who smoke and places that make you want to smoke. If people close to you smoke, ask them to quit with you or avoid smoking around you.   Carry gum, hard candy, or something to put in your mouth. If you get a craving for a cigarette, try 1 of these instead.   Don't give up, even if you start smoking again. It takes most people a few tries before they succeed.  All topics are updated as new evidence becomes available and our peer review process is complete.  This topic retrieved from [employer] on: Aug 18, 2024.  Topic 47608 Version 35.0  Release: 32.6.2 - C32.229  © 2024 [employer] and/or its affiliates. All rights reserved.  Consumer Information Use and Disclaimer   Disclaimer: This generalized information is a limited summary of diagnosis, treatment, and/or medication information. It is not meant to be comprehensive and should be used as a tool to help the user understand and/or assess potential diagnostic and treatment options. It does NOT include all information about conditions, treatments, medications, side effects, or risks that may apply to a specific patient. It is not intended to be medical advice or a substitute for the medical advice, diagnosis, or treatment of a health care provider based on the health care provider's examination and assessment of a patient's specific and unique circumstances. Patients must speak with a health care provider for complete information about their health, medical questions, and treatment options, including any risks or benefits regarding use of medications. This information does not endorse any treatments or medications as safe, effective, or approved for treating  a specific patient. [employer] Inc[employer] and its affiliates disclaim any warranty or liability relating to this information or the use thereof.The use of this information is governed by the Terms of Use, available at https://www.[employer]uwer.com/en/know/clinical-effectiveness-terms. 2024© [employer]Date, Inc. and its affiliates and/or licensors. All rights reserved.  Copyright   © 2024 [employer] and/or its affiliates. All rights reserved.

## 2025-03-20 NOTE — ASSESSMENT & PLAN NOTE
Lab Results   Component Value Date    HGBA1C 6.3 09/17/2024    HGBA1C 6.1 03/20/2024    HGBA1C 6.0 06/27/2023     Continue metformin ER and Ozempic.   March 2024 Dr. Dung Gale [facility]

## 2025-03-23 ENCOUNTER — RESULTS FOLLOW-UP (OUTPATIENT)
Dept: FAMILY MEDICINE | Facility: CLINIC | Age: 45
End: 2025-03-23

## 2025-03-26 NOTE — PROGRESS NOTES
Subjective:         Patient ID: Belem Swann is a 44 y.o. female.    Chief Complaint: Low-back Pain (Patient is having low back pain and bilateral knee pain.)        Pain  This is a chronic problem. The current episode started more than 1 year ago. The problem occurs daily. The problem has been waxing and waning. Associated symptoms include arthralgias and neck pain. Pertinent negatives include no anorexia, chest pain, chills, diaphoresis, fever, sore throat, vertigo or vomiting.     Review of Systems   Constitutional:  Negative for activity change, appetite change, chills, diaphoresis, fever and unexpected weight change.   HENT:  Negative for drooling, ear discharge, ear pain, facial swelling, mouth sores, nosebleeds, sore throat, trouble swallowing, voice change and goiter.    Eyes:  Negative for photophobia, pain, discharge, redness and visual disturbance.   Respiratory:  Negative for apnea, choking, chest tightness, shortness of breath, wheezing and stridor.    Cardiovascular:  Negative for chest pain, palpitations and leg swelling.   Gastrointestinal:  Negative for abdominal distention, anorexia, diarrhea, rectal pain, vomiting and fecal incontinence.   Endocrine: Negative for cold intolerance, heat intolerance, polydipsia, polyphagia and polyuria.   Genitourinary:  Negative for bladder incontinence, dysuria, flank pain, frequency and hot flashes.   Musculoskeletal:  Positive for arthralgias, back pain, leg pain, neck pain and neck stiffness.   Integumentary:  Negative for color change and pallor.   Allergic/Immunologic: Negative for immunocompromised state.   Neurological:  Negative for dizziness, vertigo, seizures, syncope, facial asymmetry, speech difficulty, light-headedness and coordination difficulties.   Hematological:  Negative for adenopathy. Does not bruise/bleed easily.   Psychiatric/Behavioral:  Negative for agitation, behavioral problems, confusion, decreased concentration, dysphoric mood,  hallucinations, self-injury and suicidal ideas. The patient is not hyperactive.            Past Medical History:   Diagnosis Date    Anxiety     Asthma     Chronic pain     Cushing syndrome     Depression     Essential hypertension     Fibromyalgia     Herpes zoster     Hyperlipidemia     Leukocytosis 2022    Onychomycosis     MARY on CPAP     Rheumatoid arthritis     Type 2 diabetes mellitus 2020    Vitamin D deficiency 2018     Past Surgical History:   Procedure Laterality Date     SECTION      ELBOW SURGERY  1985    EPIDURAL STEROID INJECTION      L4-5 VERITO Dec 2020-[facility]    FEMUR SURGERY Right     due to osteomyelitis    HYSTERECTOMY      Abn Bleeding    INJECTION OF ANESTHETIC AGENT AROUND MEDIAL BRANCH NERVES INNERVATING LUMBAR FACET JOINT Bilateral 2022    Procedure: Bilateral L4-5,5-S1 MBB ( No steroids);  Surgeon: Carmel Torre MD;  Location: [facility];  Service: Pain Management;  Laterality: Bilateral;  PT AWARE TO BE TESTED ON OV    INJECTION OF ANESTHETIC AGENT AROUND MEDIAL BRANCH NERVES INNERVATING LUMBAR FACET JOINT Bilateral 2022    Procedure: Block-nerve-medial branch-lumbar, bilateral L4 through S1;  Surgeon: Carmel Torre MD;  Location: [facility];  Service: Pain Management;  Laterality: Bilateral;    INJECTION OF ANESTHETIC AGENT INTO SACROILIAC JOINT Bilateral 2022    Procedure: BLOCK, SACROILIAC JOINT;  Surgeon: Carmel Torre MD;  Location: [facility];  Service: Pain Management;  Laterality: Bilateral;  covid test put in    LIPOMA RESECTION  2019    RADIOFREQUENCY ABLATION OF LUMBAR MEDIAL BRANCH NERVE AT SINGLE LEVEL Right 2022    Procedure: Radiofrequency Ablation, Nerve, Spinal, Lumbar, Medial Branch, Level L4-S1, right side 1st, will have left-sided after completing;  Surgeon: Carmel Torre MD;  Location: [facility];  Service: Pain Management;  Laterality: Right;  pt aware at visit to be tested  "   RADIOFREQUENCY ABLATION OF LUMBAR MEDIAL BRANCH NERVE AT SINGLE LEVEL Left 05/05/2022    Procedure: LEFT  L4-S1 RFTC  (HAD RIGHT  ON 4-14);  Surgeon: Carmel Torre MD;  Location: [facility];  Service: Pain Management;  Laterality: Left;    RADIOFREQUENCY ABLATION OF LUMBAR MEDIAL BRANCH NERVE AT SINGLE LEVEL Right 7/25/2023    Procedure: Radiofrequency Ablation, Nerve, Spinal, Lumbar, Medial Branch, Level L4-S1;  Surgeon: Carmel Torre MD;  Location: [facility];  Service: Pain Management;  Laterality: Right;    SURGICAL REMOVAL OF PILONIDAL CYST      TRANSFORAMINAL EPIDURAL INJECTION OF STEROID      Bilateral L4-5 TFESI Nov 2020-[facility]    TRANSFORAMINAL EPIDURAL INJECTION OF STEROID      Right L4-5 TFESI Feb 2020-[facility]    VENTRAL HERNIA REPAIR  09/2020     Social History     Socioeconomic History    Marital status:    Tobacco Use    Smoking status: Every Day     Types: Cigarettes     Start date: 9/8/2021    Smokeless tobacco: Never   Substance and Sexual Activity    Alcohol use: Yes     Comment: ocassional    Drug use: Never    Sexual activity: Not Currently     Partners: Male     Birth control/protection: See Surgical Hx     Family History   Problem Relation Name Age of Onset    Cancer Mother      Thyroid cancer Mother      Thyroid cancer Maternal Grandmother      Melanoma Neg Hx      Colon cancer Neg Hx      Breast cancer Neg Hx      Ovarian cancer Neg Hx       Review of patient's allergies indicates:   Allergen Reactions    Latex      Other reaction(s): Unknown    Doxycycline Nausea Only        Objective:  Vitals:    04/02/25 1310 04/02/25 1311   BP: (!) 170/105    Pulse: 76    Resp: 18    Weight: (!) 163.3 kg (360 lb)    Height: 5' 2" (1.575 m)    PainSc:   7   7   PainLoc: Back                          Physical Exam  Vitals and nursing note reviewed. Exam conducted with a chaperone present.   Constitutional:       General: She is awake. She is not in acute distress.     " Appearance: Normal appearance. She is obese. She is not ill-appearing, toxic-appearing or diaphoretic.   HENT:      Head: Normocephalic and atraumatic.      Nose: Nose normal.      Mouth/Throat:      Mouth: Mucous membranes are moist.      Pharynx: Oropharynx is clear.   Eyes:      Conjunctiva/sclera: Conjunctivae normal.      Pupils: Pupils are equal, round, and reactive to light.   Cardiovascular:      Rate and Rhythm: Normal rate.   Pulmonary:      Effort: Pulmonary effort is normal. No respiratory distress.   Abdominal:      Palpations: Abdomen is soft.      Tenderness: There is no guarding.   Musculoskeletal:         General: No swelling.      Cervical back: Normal range of motion and neck supple. Tenderness present. No rigidity.      Thoracic back: Tenderness present.      Lumbar back: Tenderness present. Decreased range of motion.   Skin:     General: Skin is warm and dry.      Coloration: Skin is not jaundiced or pale.   Neurological:      General: No focal deficit present.      Mental Status: She is alert and oriented to person, place, and time. Mental status is at baseline.      Cranial Nerves: No cranial nerve deficit (II-XII).   Psychiatric:         Mood and Affect: Mood normal.         Behavior: Behavior normal. Behavior is cooperative.         Thought Content: Thought content normal.           Colonoscopy  Narrative: Table formatting from the original result was not included.  Procedure Date  3/27/25    Impression  Overall   Impression:    The terminal ileum appeared normal.  Single large angioectasia in the ascending colon; tissue was ablated with   argon plasma coagulation  Subcentimeter polyp in the descending colon; performed cold snare  Erythematous mucosa in the sigmoid colon; performed cold forceps biopsy  Large hemorrhoids    Recommendation  - Repeat colonoscopy in 5 years  - Suspect blood per rectum is most likely hemorrhoidal in nature  - Recommend fiber and miralax as needed    - Discharge  patient to home  - Advance diet as tolerated  - Continue present medications  - Await pathology results  - Patient has a contact number available for emergencies. The signs and   symptoms of potential delayed complications were discussed with the   patient. Return to normal activities tomorrow. Written discharge   instructions were provided to the patient.  Indication  Blood in stool    Post Procedure Diagnosis  Internal hemorrhoid    Staff present during procedure  Floresita Cherry ST Technician   Ej Toro CRNA CRNA Veerisetty, Sai S., MD Proceduralist   Cathie Brunner RN Registered Nurse     Medications  General anesthesia - See anesthesia record.    Preprocedure  A history and physical has been performed, and patient medication   allergies have been reviewed. The patient's tolerance of previous   anesthesia has been reviewed. The risks and benefits of the procedure and   the sedation options and risks were discussed with the patient. All   questions were answered and informed consent obtained.    Details of the Procedure  The patient underwent general anesthesia, which was administered by an   anesthesia professional. The patient's heart rate, blood pressure, level   of consciousness, respirations, oxygen, ECG and ETCO2 were monitored   throughout the procedure. A digital rectal exam was performed. A perianal   exam was performed. The scope was introduced through the anus and advanced   to the terminal ileum. Retroflexion was performed in the rectum. The   quality of bowel preparation was evaluated using the [place] Bowel   Preparation Scale with scores of: right colon = 3, transverse colon = 3,   left colon = 3. The total BBPS score was 9. Bowel prep was adequate. The   patient's estimated blood loss was minimal (<5 mL). The procedure was not   difficult. The patient tolerated the procedure well. There were no   apparent adverse events.     Scope: Colonoscope  Scope Serial:  [id number]    Events    Procedure Events   Event Event Time     Procedure Events   Event Event Time   ENDO SCOPE IN TIME 3/27/2025 12:57 PM   ENDO CECUM REACHED 3/27/2025  1:00 PM   ENDO SCOPE OUT TIME 3/27/2025  1:16 PM     CECAL WITHDRAWAL TIME: 15m 17s    Findings  The terminal ileum appeared normal.  Single large angioectasia in the ascending colon; the lesion was ablated   with argon plasma coagulation  One 5 mm polyp in the descending colon; performed cold snare removal  Erythematous mucosa in the sigmoid colon; performed cold forceps biopsy  Internal large hemorrhoids observed during retroflexion         Hospital Outpatient Visit on 03/27/2025   Component Date Value Ref Range Status    POC Glucose 03/27/2025 145 (H)  70 - 105 mg/dL Final    Case Report 03/27/2025    Final                    Value:Surgical Pathology                                Case: [id number]                                   Authorizing Provider:  Tree Roche MD     Collected:           03/27/2025 01:10 PM          Ordering Location:     [facility] -         Received:            03/27/2025 02:55 PM                                 Endoscopy                                                                    Pathologist:           Farhat Mendoza MD                                                      Specimens:   A) - Large intestine, Descending Colon, a. desending polyp                                          B) - Large intestine, Sigmoid, b. erythemous bx sigmoid colon                              Final Diagnosis 03/27/2025    Final                    Value:A. Descending colon polyp, biopsy:  - Colonic mucosa without diagnostic abnormality     B. Sigmoid colon, erythematous mucosa, biopsy:  - Colonic mucosa with a prominent mucosal lymphoid aggregate      Comments 03/27/2025    Final                    Value:(A): Multiple deeper levels were cut and examined.      Gross Description 03/27/2025    Final                     Value:A. Large intestine, Descending Colon: a. desending polyp  The specimen is received in formalin designated a. desending polyp and consists of a single pink-tan tissue fragment that measures 0.5 cm in greatest dimension and is entirely submitted in cassette A1.    Grossing was completed by Clayton Haddad.  B. Large intestine, Sigmoid: b. erythemous bx sigmoid colon  The specimen is received in formalin designated b. erythemous bx sigmoid colon and consists of 2 pink-tan tissue fragments that measure from 0.4-0.6 cm in greatest dimension and is entirely submitted in cassette B1.    Grossing was completed by Clayton Haddad.      Microscopic Description 03/27/2025    Final                    Value:A microscopic examination was performed and the diagnosis reflects the findings.          Laboratory Notes 03/27/2025    Final                    Value:If this report includes immunohistochemical (IHC) test results, please note the following: IHC studies were interpreted in conjunction with appropriate positive and negative controls which demonstrate the expected positive and negative reactivity. This laboratory is regulated under CLIA as qualified to perform high-complexity testing. IHC tests are used for clinical purposes. They should not be regarded as investigational or research.       Office Visit on 03/20/2025   Component Date Value Ref Range Status    Creatinine, Urine 03/20/2025 156  15 - 325 mg/dL Final    Microalbumin 03/20/2025 1.4  <=3.0 mg/dL Final    Microalbumin/Creatinine Ratio 03/20/2025 9.0  0.0 - 30.0 mg/g Final    Sodium 03/20/2025 138  136 - 145 mmol/L Final    Potassium 03/20/2025 4.2  3.5 - 5.1 mmol/L Final    Chloride 03/20/2025 103  98 - 107 mmol/L Final    CO2 03/20/2025 25  22 - 29 mmol/L Final    Anion Gap 03/20/2025 14  7 - 16 mmol/L Final    Glucose 03/20/2025 143 (H)  74 - 100 mg/dL Final    BUN 03/20/2025 17  7 - 19 mg/dL Final    Creatinine 03/20/2025 0.66  0.55 - 1.02 mg/dL Final     BUN/Creatinine Ratio 03/20/2025 26 (H)  6 - 20 Final    Calcium 03/20/2025 8.6  8.4 - 10.2 mg/dL Final    eGFR 03/20/2025 110  >=60 mL/min/1.73m2 Final    Hemoglobin A1C 03/20/2025 6.4  <=7.0 % Final    Estimated Average Glucose 03/20/2025 137  mg/dL Final   Lab Visit on 03/10/2025   Component Date Value Ref Range Status    Sodium 03/10/2025 140  136 - 145 mmol/L Final    Potassium 03/10/2025 3.8  3.5 - 5.1 mmol/L Final    Chloride 03/10/2025 105  98 - 107 mmol/L Final    CO2 03/10/2025 26  22 - 29 mmol/L Final    Anion Gap 03/10/2025 13  7 - 16 mmol/L Final    Glucose 03/10/2025 154 (H)  74 - 100 mg/dL Final    BUN 03/10/2025 15  7 - 19 mg/dL Final    Creatinine 03/10/2025 0.64  0.55 - 1.02 mg/dL Final    BUN/Creatinine Ratio 03/10/2025 23 (H)  6 - 20 Final    Calcium 03/10/2025 9.1  8.4 - 10.2 mg/dL Final    Total Protein 03/10/2025 7.9  6.4 - 8.3 g/dL Final    Albumin 03/10/2025 3.3 (L)  3.5 - 5.0 g/dL Final    Globulin 03/10/2025 4.6 (H)  2.0 - 4.0 g/dL Final    A/G Ratio 03/10/2025 0.7   Final    Bilirubin, Total 03/10/2025 0.3  <=1.5 mg/dL Final    Alk Phos 03/10/2025 134  40 - 150 U/L Final    ALT 03/10/2025 20  <=55 U/L Final    AST 03/10/2025 13  11 - 45 U/L Final    eGFR 03/10/2025 111  >=60 mL/min/1.73m2 Final    Nil Result, TB 03/10/2025 0.04   Final    TB1 Ag minus Nil Result 03/10/2025 0.02   Final    TB2 Ag minus Nil Result 03/10/2025 0.06   Final    Mitogen minus Nil Result, TB 03/10/2025 8.27   Final    QuantiFERON-Tb Gold Plus 03/10/2025 Negative  Negative Final    WBC 03/10/2025 11.92 (H)  4.50 - 11.00 K/uL Final    RBC 03/10/2025 5.00  4.20 - 5.40 M/uL Final    Hemoglobin 03/10/2025 13.8  12.0 - 16.0 g/dL Final    Hematocrit 03/10/2025 43.5  38.0 - 47.0 % Final    MCV 03/10/2025 87.0  80.0 - 96.0 fL Final    MCH 03/10/2025 27.6  27.0 - 31.0 pg Final    MCHC 03/10/2025 31.7 (L)  32.0 - 36.0 g/dL Final    RDW 03/10/2025 12.8  11.5 - 14.5 % Final    Platelet Count 03/10/2025 166  150 - 400 K/uL  Final    MPV 03/10/2025 13.6 (H)  9.4 - 12.4 fL Final    Neutrophils % 03/10/2025 70.3 (H)  53.0 - 65.0 % Final    Lymphocytes % 03/10/2025 17.9 (L)  27.0 - 41.0 % Final    Monocytes % 03/10/2025 6.5 (H)  2.0 - 6.0 % Final    Eosinophils % 03/10/2025 3.8  1.0 - 4.0 % Final    Basophils % 03/10/2025 0.6  0.0 - 1.0 % Final    Immature Granulocytes % 03/10/2025 0.9 (H)  0.0 - 0.4 % Final    nRBC, Auto 03/10/2025 0.0  <=0.0 % Final    Neutrophils, Abs 03/10/2025 8.39 (H)  1.80 - 7.70 K/uL Final    Lymphocytes, Absolute 03/10/2025 2.13  1.00 - 4.80 K/uL Final    Monocytes, Absolute 03/10/2025 0.77  0.00 - 0.80 K/uL Final    Eosinophils, Absolute 03/10/2025 0.45  0.00 - 0.50 K/uL Final    Basophils, Absolute 03/10/2025 0.07  0.00 - 0.20 K/uL Final    Immature Granulocytes, Absolute 03/10/2025 0.11 (H)  0.00 - 0.04 K/uL Final    nRBC, Absolute 03/10/2025 0.00  <=0.00 x10e3/uL Final    Diff Type 03/10/2025 Auto   Final    Platelet Morphology 03/10/2025 Large Platelets (A)  Normal Final    RBC Morphology 03/10/2025 Normal   Final   Office Visit on 02/05/2025   Component Date Value Ref Range Status    POC Amphetamines 02/05/2025 Negative  Negative, Inconclusive Final    POC Barbiturates 02/05/2025 Negative  Negative, Inconclusive Final    POC Benzodiazepines 02/05/2025 Negative  Negative, Inconclusive Final    POC Cocaine 02/05/2025 Negative  Negative, Inconclusive Final    POC THC 02/05/2025 Negative  Negative, Inconclusive Final    POC Methadone 02/05/2025 Negative  Negative, Inconclusive Final    POC Methamphetamine 02/05/2025 Negative  Negative, Inconclusive Final    POC Opiates 02/05/2025 Presumptive Positive (A)  Negative, Inconclusive Final    POC Oxycodone 02/05/2025 Negative  Negative, Inconclusive Final    POC Phencyclidine 02/05/2025 Negative  Negative, Inconclusive Final    POC Methylenedioxymethamphetamine * 02/05/2025 Negative  Negative, Inconclusive Final    POC Tricyclic Antidepressants 02/05/2025 Negative   Negative, Inconclusive Final    POC Buprenorphine 02/05/2025 Negative   Final     Acceptable 02/05/2025 Yes   Final    POC Temperature (Urine) 02/05/2025 94   Final   Lab Visit on 12/23/2024   Component Date Value Ref Range Status    Sodium 12/23/2024 141  136 - 145 mmol/L Final    Potassium 12/23/2024 4.6  3.5 - 5.1 mmol/L Final    Chloride 12/23/2024 105  98 - 107 mmol/L Final    CO2 12/23/2024 28  22 - 29 mmol/L Final    Anion Gap 12/23/2024 13  7 - 16 mmol/L Final    Glucose 12/23/2024 123 (H)  74 - 100 mg/dL Final    BUN 12/23/2024 16  7 - 19 mg/dL Final    Creatinine 12/23/2024 0.59  0.55 - 1.02 mg/dL Final    BUN/Creatinine Ratio 12/23/2024 27 (H)  6 - 20 Final    Calcium 12/23/2024 8.7  8.4 - 10.2 mg/dL Final    Total Protein 12/23/2024 7.9  6.4 - 8.3 g/dL Final    Albumin 12/23/2024 3.4 (L)  3.5 - 5.0 g/dL Final    Globulin 12/23/2024 4.5 (H)  2.0 - 4.0 g/dL Final    A/G Ratio 12/23/2024 0.8   Final    Bilirubin, Total 12/23/2024 0.3  <=1.5 mg/dL Final    Alk Phos 12/23/2024 118  40 - 150 U/L Final    ALT 12/23/2024 19  <=55 U/L Final    AST 12/23/2024 18  5 - 34 U/L Final    eGFR 12/23/2024 113  >=60 mL/min/1.73m2 Final    Iron Level 12/23/2024 73  50 - 170 ug/dL Final    Iron Binding Capacity Total 12/23/2024 232 (L)  250 - 450 ug/dL Final    Iron Binding Capacity Unsaturated 12/23/2024 159  70 - 310 ug/dL Final    Iron Saturation 12/23/2024 31  20 - 50 % Final    Transferrin 12/23/2024 196  180 - 382 mg/dL Final    Ferritin 12/23/2024 245 (H)  5 - 204 ng/mL Final    WBC 12/23/2024 12.80 (H)  4.50 - 11.00 K/uL Final    RBC 12/23/2024 4.83  4.20 - 5.40 M/uL Final    Hemoglobin 12/23/2024 13.6  12.0 - 16.0 g/dL Final    Hematocrit 12/23/2024 43.9  38.0 - 47.0 % Final    MCV 12/23/2024 90.9  80.0 - 96.0 fL Final    MCH 12/23/2024 28.2  27.0 - 31.0 pg Final    MCHC 12/23/2024 31.0 (L)  32.0 - 36.0 g/dL Final    RDW 12/23/2024 13.2  11.5 - 14.5 % Final    Platelet Count 12/23/2024 166  150 - 400  K/uL Final    MPV 12/23/2024 13.2 (H)  9.4 - 12.4 fL Final    Neutrophils % 12/23/2024 71.8 (H)  53.0 - 65.0 % Final    Lymphocytes % 12/23/2024 17.3 (L)  27.0 - 41.0 % Final    Monocytes % 12/23/2024 6.4 (H)  2.0 - 6.0 % Final    Eosinophils % 12/23/2024 3.1  1.0 - 4.0 % Final    Basophils % 12/23/2024 0.5  0.0 - 1.0 % Final    Immature Granulocytes % 12/23/2024 0.9 (H)  0.0 - 0.4 % Final    nRBC, Auto 12/23/2024 0.0  <=0.0 % Final    Neutrophils, Abs 12/23/2024 9.20 (H)  1.80 - 7.70 K/uL Final    Lymphocytes, Absolute 12/23/2024 2.21  1.00 - 4.80 K/uL Final    Monocytes, Absolute 12/23/2024 0.82 (H)  0.00 - 0.80 K/uL Final    Eosinophils, Absolute 12/23/2024 0.40  0.00 - 0.50 K/uL Final    Basophils, Absolute 12/23/2024 0.06  0.00 - 0.20 K/uL Final    Immature Granulocytes, Absolute 12/23/2024 0.11 (H)  0.00 - 0.04 K/uL Final    nRBC, Absolute 12/23/2024 0.00  <=0.00 x10e3/uL Final    Diff Type 12/23/2024 Auto   Final    Platelet Morphology 12/23/2024 Few Large Platelets (A)  Normal Final    RBC Morphology 12/23/2024 Normal   Final   Hospital Outpatient Visit on 10/21/2024   Component Date Value Ref Range Status    Case Report 10/21/2024    Final                    Value:Surgical Pathology                                Case: [id number]                                   Authorizing Provider:  Tree Roche MD     Collected:           10/21/2024 01:02 PM          Ordering Location:     [facility] -         Received:            10/21/2024 03:04 PM                                 Endoscopy                                                                    Pathologist:           Louie Chopra III, MD                                                                           Specimen:    Intestine Large, Transverse Colon, Jar #1 - transverse colon polyp biopsy                  Final Diagnosis 10/21/2024    Final                     Value:A. Transverse colon, biopsy:  Tubular adenoma      Gross Description 10/21/2024    Final                    Value:A. Intestine Large, Transverse Colon: Jar #1 - transverse colon polyp biopsy  The specimen is received in formalin designated transverse colon polyp biopsy and consists of a single pink-tan tissue fragment that measures 0.3 cm in greatest dimension and is entirely submitted in cassette A1    Grossing was completed by Clayton Haddad.      Microscopic Description 10/21/2024    Final                    Value:A microscopic examination was performed and the diagnosis reflects the findings.          Laboratory Notes 10/21/2024    Final                    Value:If this report includes immunohistochemical (IHC) test results, please note the following: IHC studies were interpreted in conjunction with appropriate positive and negative controls which demonstrate the expected positive and negative reactivity. This laboratory is regulated under CLIA as qualified to perform high-complexity testing. IHC tests are used for clinical purposes. They should not be regarded as investigational or research.       Office Visit on 10/07/2024   Component Date Value Ref Range Status    POC Amphetamines 10/07/2024 Negative  Negative, Inconclusive Final    POC Barbiturates 10/07/2024 Negative  Negative, Inconclusive Final    POC Benzodiazepines 10/07/2024 Negative  Negative, Inconclusive Final    POC Cocaine 10/07/2024 Negative  Negative, Inconclusive Final    POC THC 10/07/2024 Negative  Negative, Inconclusive Final    POC Methadone 10/07/2024 Negative  Negative, Inconclusive Final    POC Methamphetamine 10/07/2024 Negative  Negative, Inconclusive Final    POC Opiates 10/07/2024 Presumptive Positive (A)  Negative, Inconclusive Final    POC Oxycodone 10/07/2024 Negative  Negative, Inconclusive Final    POC Phencyclidine 10/07/2024 Negative  Negative, Inconclusive Final    POC Methylenedioxymethamphetamine * 10/07/2024  Negative  Negative, Inconclusive Final    POC Tricyclic Antidepressants 10/07/2024 Negative  Negative, Inconclusive Final    POC Buprenorphine 10/07/2024 Negative   Final     Acceptable 10/07/2024 Yes   Final    POC Temperature (Urine) 10/07/2024 92   Final         No orders of the defined types were placed in this encounter.          Requested Prescriptions     Signed Prescriptions Disp Refills    gabapentin (NEURONTIN) 300 MG capsule 90 capsule 1     Sig: Take 1 capsule (300 mg total) by mouth every 8 (eight) hours.    HYDROcodone-acetaminophen (NORCO)  mg per tablet 90 tablet 0     Sig: Take 1 tablet by mouth every 8 (eight) hours as needed for Pain.    HYDROcodone-acetaminophen (NORCO)  mg per tablet 90 tablet 0     Sig: Take 1 tablet by mouth every 8 (eight) hours as needed for Pain.    traMADoL (ULTRAM-ER) 100 MG Tb24 30 tablet 1     Sig: Take 1 tablet (100 mg total) by mouth once daily.       Assessment:     1. Rheumatoid arthritis involving multiple sites with positive rheumatoid factor    2. Spondylosis of lumbar region without myelopathy or radiculopathy    3. Sacroiliitis    4. Chronic pain of right knee                         A's of Opioid Risk Assessment  Activity:Patient can perform ADL.   Analgesia:Patients pain is partially controlled by current medication. Patient has tried OTC medications such as Tylenol and Ibuprofen with out relief.   Adverse Effects: Patient denies constipation or sedation.  Aberrant Behavior:  reviewed with no aberrant drug seeking/taking behavior.  Overdose reversal drug naloxone discussed    Drug screen reviewed    X-ray right knee [facility] September 14, 2022 degenerative changes no fracture noted        Plan:    Narcan December 2022    Follows rheumatology [facility] rheumatoid arthritis    Follows orthopedics [place] chronic right knee pain    She had bilateral lumbar RF TC  July 25, 2023  She states she has 75% relief after procedure, the  procedure did help improve her level function       Complaint increasing pain related to underlying rheumatologic condition     Requesting adjustment medication     Trial tramadol 100 mg extended release 1 p.o. q.day     Continue hydrocodone 3 times a day as needed    Will continue home exercise program as directed    Follow-up 2 months    Dr. Torre October 2025    Bring original prescription medication bottles/container/box with labels to each visit

## 2025-03-27 ENCOUNTER — ANESTHESIA (OUTPATIENT)
Dept: GASTROENTEROLOGY | Facility: HOSPITAL | Age: 45
End: 2025-03-27
Payer: MEDICAID

## 2025-03-27 ENCOUNTER — ANESTHESIA EVENT (OUTPATIENT)
Dept: GASTROENTEROLOGY | Facility: HOSPITAL | Age: 45
End: 2025-03-27
Payer: MEDICAID

## 2025-03-27 ENCOUNTER — HOSPITAL ENCOUNTER (OUTPATIENT)
Dept: GASTROENTEROLOGY | Facility: HOSPITAL | Age: 45
Discharge: HOME | End: 2025-03-27
Admitting: INTERNAL MEDICINE
Payer: MEDICAID

## 2025-03-27 VITALS
HEIGHT: 62 IN | TEMPERATURE: 98 F | DIASTOLIC BLOOD PRESSURE: 56 MMHG | SYSTOLIC BLOOD PRESSURE: 113 MMHG | RESPIRATION RATE: 18 BRPM | OXYGEN SATURATION: 99 % | HEART RATE: 84 BPM | WEIGHT: 293 LBS | BODY MASS INDEX: 53.92 KG/M2

## 2025-03-27 DIAGNOSIS — K92.1 BLOOD IN STOOL: ICD-10-CM

## 2025-03-27 LAB — GLUCOSE SERPL-MCNC: 145 MG/DL (ref 70–105)

## 2025-03-27 PROCEDURE — 88305 TISSUE EXAM BY PATHOLOGIST: CPT | Mod: TC,SUR | Performed by: INTERNAL MEDICINE

## 2025-03-27 PROCEDURE — 27000716 HC OXISENSOR PROBE, ANY SIZE: Performed by: NURSE ANESTHETIST, CERTIFIED REGISTERED

## 2025-03-27 PROCEDURE — 37000009 HC ANESTHESIA EA ADD 15 MINS

## 2025-03-27 PROCEDURE — 27201423 OPTIME MED/SURG SUP & DEVICES STERILE SUPPLY

## 2025-03-27 PROCEDURE — 63600175 PHARM REV CODE 636 W HCPCS: Performed by: NURSE ANESTHETIST, CERTIFIED REGISTERED

## 2025-03-27 PROCEDURE — 37000008 HC ANESTHESIA 1ST 15 MINUTES

## 2025-03-27 PROCEDURE — 27000284 HC CANNULA NASAL: Performed by: NURSE ANESTHETIST, CERTIFIED REGISTERED

## 2025-03-27 PROCEDURE — 88305 TISSUE EXAM BY PATHOLOGIST: CPT | Mod: 26,,, | Performed by: PATHOLOGY

## 2025-03-27 RX ORDER — SODIUM CHLORIDE, SODIUM LACTATE, POTASSIUM CHLORIDE, CALCIUM CHLORIDE 600; 310; 30; 20 MG/100ML; MG/100ML; MG/100ML; MG/100ML
INJECTION, SOLUTION INTRAVENOUS CONTINUOUS
Status: DISCONTINUED | OUTPATIENT
Start: 2025-03-27 | End: 2025-03-28 | Stop reason: HOSPADM

## 2025-03-27 RX ORDER — SODIUM CHLORIDE 0.9 % (FLUSH) 0.9 %
10 SYRINGE (ML) INJECTION EVERY 6 HOURS PRN
Status: DISCONTINUED | OUTPATIENT
Start: 2025-03-27 | End: 2025-03-28 | Stop reason: HOSPADM

## 2025-03-27 RX ORDER — PROPOFOL 10 MG/ML
VIAL (ML) INTRAVENOUS
Status: DISCONTINUED | OUTPATIENT
Start: 2025-03-27 | End: 2025-03-27

## 2025-03-27 RX ORDER — LIDOCAINE HYDROCHLORIDE 20 MG/ML
INJECTION, SOLUTION EPIDURAL; INFILTRATION; INTRACAUDAL; PERINEURAL
Status: DISCONTINUED | OUTPATIENT
Start: 2025-03-27 | End: 2025-03-27

## 2025-03-27 RX ADMIN — LIDOCAINE HYDROCHLORIDE 100 MG: 20 INJECTION, SOLUTION EPIDURAL; INFILTRATION; INTRACAUDAL; PERINEURAL at 12:03

## 2025-03-27 RX ADMIN — PROPOFOL 200 MG: 10 INJECTION, EMULSION INTRAVENOUS at 01:03

## 2025-03-27 RX ADMIN — PROPOFOL 200 MG: 10 INJECTION, EMULSION INTRAVENOUS at 12:03

## 2025-03-27 NOTE — H&P
History & Physical - Short Stay  Gastroenterology      SUBJECTIVE:     Procedure: Colonoscopy    Chief Complaint/Indication for Procedure:  Rectal Bleeding, blood in stool    (Not in a hospital admission)      Review of patient's allergies indicates:   Allergen Reactions    Latex      Other reaction(s): Unknown    Doxycycline Nausea Only        Past Medical History:   Diagnosis Date    Anxiety     Asthma     Chronic pain     Cushing syndrome     Depression     Essential hypertension     Fibromyalgia     Herpes zoster     Hyperlipidemia     Leukocytosis 2022    Onychomycosis     MARY on CPAP     Rheumatoid arthritis     Type 2 diabetes mellitus 2020    Vitamin D deficiency 2018     Past Surgical History:   Procedure Laterality Date     SECTION      ELBOW SURGERY      EPIDURAL STEROID INJECTION      L4-5 VERITO Dec 2020-[facility]    FEMUR SURGERY Right     due to osteomyelitis    HYSTERECTOMY      Abn Bleeding    INJECTION OF ANESTHETIC AGENT AROUND MEDIAL BRANCH NERVES INNERVATING LUMBAR FACET JOINT Bilateral 2022    Procedure: Bilateral L4-5,5-S1 MBB ( No steroids);  Surgeon: Carmel Torre MD;  Location: [facility] PAIN [facility];  Service: Pain Management;  Laterality: Bilateral;  PT AWARE TO BE TESTED ON OV    INJECTION OF ANESTHETIC AGENT AROUND MEDIAL BRANCH NERVES INNERVATING LUMBAR FACET JOINT Bilateral 2022    Procedure: Block-nerve-medial branch-lumbar, bilateral L4 through S1;  Surgeon: Carmel Torre MD;  Location: [facility] PAIN [facility];  Service: Pain Management;  Laterality: Bilateral;    INJECTION OF ANESTHETIC AGENT INTO SACROILIAC JOINT Bilateral 2022    Procedure: BLOCK, SACROILIAC JOINT;  Surgeon: Carmel Torre MD;  Location: [facility] PAIN [facility];  Service: Pain Management;  Laterality: Bilateral;  covid test put in    LIPOMA RESECTION  2019    RADIOFREQUENCY ABLATION OF LUMBAR MEDIAL BRANCH NERVE AT SINGLE LEVEL Right 2022    Procedure: Radiofrequency  Ablation, Nerve, Spinal, Lumbar, Medial Branch, Level L4-S1, right side 1st, will have left-sided after completing;  Surgeon: Carmel Torre MD;  Location: [facility] PAIN [facility];  Service: Pain Management;  Laterality: Right;  pt aware at visit to be tested    RADIOFREQUENCY ABLATION OF LUMBAR MEDIAL BRANCH NERVE AT SINGLE LEVEL Left 05/05/2022    Procedure: LEFT  L4-S1 RFTC  (HAD RIGHT  ON 4-14);  Surgeon: Carmel Torre MD;  Location: [facility] PAIN [facility];  Service: Pain Management;  Laterality: Left;    RADIOFREQUENCY ABLATION OF LUMBAR MEDIAL BRANCH NERVE AT SINGLE LEVEL Right 7/25/2023    Procedure: Radiofrequency Ablation, Nerve, Spinal, Lumbar, Medial Branch, Level L4-S1;  Surgeon: Carmel Torre MD;  Location: [facility] PAIN [facility];  Service: Pain Management;  Laterality: Right;    SURGICAL REMOVAL OF PILONIDAL CYST      TRANSFORAMINAL EPIDURAL INJECTION OF STEROID      Bilateral L4-5 TFESI Nov 2020-[facility]    TRANSFORAMINAL EPIDURAL INJECTION OF STEROID      Right L4-5 TFESI Feb 2020-[facility]    VENTRAL HERNIA REPAIR  09/2020     Family History   Problem Relation Name Age of Onset    Cancer Mother      Thyroid cancer Mother      Thyroid cancer Maternal Grandmother      Melanoma Neg Hx      Colon cancer Neg Hx      Breast cancer Neg Hx      Ovarian cancer Neg Hx       Social History[1]      OBJECTIVE:     Vital Signs (Most Recent)  Temp: 98 °F (36.7 °C) (03/27/25 1248)  Pulse: 76 (03/27/25 1248)  Resp: (!) 22 (03/27/25 1248)  BP: (!) 143/86 (03/27/25 1248)  SpO2: 96 % (03/27/25 1248)    Physical Exam:                                                       General appearance: alert, cooperative, no distress, comfortable appearing  HENT: Normocephalic, atraumatic, neck symmetrical  Eyes: conjunctivae clear  Lungs: No labored breathing  Abd: Soft, non-tender    ASSESSMENT/PLAN:     Assessment: Rectal Bleeding, blood in stool    Plan: Colonoscopy         [1]   Social History  Tobacco Use    Smoking status: Every Day      Types: Cigarettes     Start date: 9/8/2021    Smokeless tobacco: Never   Substance Use Topics    Alcohol use: Yes     Comment: ocassional    Drug use: Never

## 2025-03-27 NOTE — TRANSFER OF CARE
"Anesthesia Transfer of Care Note    Patient: Belem Swann    Procedure(s) Performed: * No procedures listed *    Patient location: GI    Anesthesia Type: MAC    Transport from OR: Transported from OR on room air with adequate spontaneous ventilation. Continuous SpO2 monitoring in transport. Continuous ECG monitoring in transport    Post pain: adequate analgesia    Post assessment: no apparent anesthetic complications    Post vital signs: stable    Level of consciousness: responds to stimulation    Nausea/Vomiting: no nausea/vomiting    Complications: none    Transfer of care protocol was followed      Last vitals: Visit Vitals  /76 (BP Location: Left arm, Patient Position: Lying)   Pulse 90   Temp 36.7 °C (98 °F) (Oral)   Resp 14   Ht 5' 2" (1.575 m)   Wt (!) 156.5 kg (345 lb)   SpO2 100%   Breastfeeding No   BMI 63.10 kg/m²     "

## 2025-03-27 NOTE — ANESTHESIA POSTPROCEDURE EVALUATION
Anesthesia Post Evaluation    Patient: Belem Swann    Procedure(s) Performed: * No procedures listed *    Final Anesthesia Type: MAC      Patient location during evaluation: PACU  Patient participation: Yes- Able to Participate  Level of consciousness: awake and alert  Post-procedure vital signs: reviewed and stable  Airway patency: patent    PONV status at discharge: No PONV  Anesthetic complications: no      Cardiovascular status: blood pressure returned to baseline  Respiratory status: spontaneous ventilation  Hydration status: euvolemic  Follow-up not needed.              Vitals Value Taken Time   /76 03/27/25 13:20   Temp 36.7 °C (98 °F) 03/27/25 13:20   Pulse 90 03/27/25 13:20   Resp 14 03/27/25 13:20   SpO2 100 % 03/27/25 13:20         No case tracking events are documented in the log.      Pain/Lowell Score: No data recorded

## 2025-03-27 NOTE — ANESTHESIA PREPROCEDURE EVALUATION
03/27/2025  Belem Swann is a 44 y.o., female.    Medications Ordered Prior to Encounter[1]   Active Ambulatory Problems     Diagnosis Date Noted    Chronic pain syndrome 03/29/2021    Lumbosacral radiculopathy 03/29/2021    Rheumatoid arthritis     Type 2 diabetes mellitus without complication, without long-term current use of insulin 05/07/2021    Essential hypertension 05/07/2021    Hypercholesterolemia 05/07/2021    Moderate persistent asthma without complication 05/07/2021    Chronic rhinitis 05/07/2021    Morbid obesity with BMI of 60.0-69.9, adult     Fibromyalgia     MARY on CPAP     Elevated serum globulin level 01/18/2022    Large platelets 01/18/2022    Anxiety 10/21/2021    Hearing loss 09/09/2022    Hirsutism 09/09/2022    Idiopathic sleep related nonobstructive alveolar hypoventilation 09/09/2022    Low back pain 09/09/2022    Osteomalacia 09/09/2022    Other problems related to lifestyle 09/09/2022    Pituitary Cushing syndrome 09/09/2022    Polyarthralgia 09/09/2022    Temporomandibular joint disorder 09/09/2022    Tinnitus 09/09/2022    Unspecified eustachian tube disorder, bilateral 09/09/2022    Gastroesophageal reflux disease with esophagitis without hemorrhage 10/07/2022    HH (hiatus hernia) 10/07/2022    Acute superficial gastritis without hemorrhage 10/07/2022    Chronic pain of right knee 10/31/2022    Difficulty sleeping 06/27/2023    Abdominal pain 11/03/2023    Blood in stool 12/23/2024    Angiectasia of gastrointestinal tract 12/23/2024    Intertrigo 03/20/2025     Resolved Ambulatory Problems     Diagnosis Date Noted    Leukocytosis 01/18/2022    Tongue lesion 05/17/2022    Otalgia 09/09/2022    Gastroesophageal reflux disease 11/03/2023     Past Medical History:   Diagnosis Date    Asthma     Chronic pain     Cushing syndrome     Depression     Herpes zoster      Hyperlipidemia     Onychomycosis     Type 2 diabetes mellitus 2020    Vitamin D deficiency 2018      Review of patient's allergies indicates:   Allergen Reactions    Latex      Other reaction(s): Unknown    Doxycycline Nausea Only      Past Surgical History:   Procedure Laterality Date     SECTION      ELBOW SURGERY  1985    EPIDURAL STEROID INJECTION      L4-5 VERITO Dec 2020-[facility]    FEMUR SURGERY Right 1985    due to osteomyelitis    HYSTERECTOMY      Abn Bleeding    INJECTION OF ANESTHETIC AGENT AROUND MEDIAL BRANCH NERVES INNERVATING LUMBAR FACET JOINT Bilateral 2022    Procedure: Bilateral L4-5,5-S1 MBB ( No steroids);  Surgeon: Carmel Torre MD;  Location: [facility]IN [facility];  Service: Pain Management;  Laterality: Bilateral;  PT AWARE TO BE TESTED ON OV    INJECTION OF ANESTHETIC AGENT AROUND MEDIAL BRANCH NERVES INNERVATING LUMBAR FACET JOINT Bilateral 2022    Procedure: Block-nerve-medial branch-lumbar, bilateral L4 through S1;  Surgeon: Carmel Torre MD;  Location: [facility]IN [facility];  Service: Pain Management;  Laterality: Bilateral;    INJECTION OF ANESTHETIC AGENT INTO SACROILIAC JOINT Bilateral 2022    Procedure: BLOCK, SACROILIAC JOINT;  Surgeon: Carmel Torre MD;  Location: [facility]IN [facility];  Service: Pain Management;  Laterality: Bilateral;  covid test put in    LIPOMA RESECTION  2019    RADIOFREQUENCY ABLATION OF LUMBAR MEDIAL BRANCH NERVE AT SINGLE LEVEL Right 2022    Procedure: Radiofrequency Ablation, Nerve, Spinal, Lumbar, Medial Branch, Level L4-S1, right side 1st, will have left-sided after completing;  Surgeon: Carmel Torre MD;  Location: [facility]IN [facility];  Service: Pain Management;  Laterality: Right;  pt aware at visit to be tested    RADIOFREQUENCY ABLATION OF LUMBAR MEDIAL BRANCH NERVE AT SINGLE LEVEL Left 2022    Procedure: LEFT  L4-S1 RFTC  (HAD RIGHT  ON );  Surgeon: Carmel Torre MD;  Location: [facility]  MG[facility];  Service: Pain Management;  Laterality: Left;    RADIOFREQUENCY ABLATION OF LUMBAR MEDIAL BRANCH NERVE AT SINGLE LEVEL Right 7/25/2023    Procedure: Radiofrequency Ablation, Nerve, Spinal, Lumbar, Medial Branch, Level L4-S1;  Surgeon: Carmel Torre MD;  Location: [facility];  Service: Pain Management;  Laterality: Right;    SURGICAL REMOVAL OF PILONIDAL CYST      TRANSFORAMINAL EPIDURAL INJECTION OF STEROID      Bilateral L4-5 TFESI Nov 2020-[facility]    TRANSFORAMINAL EPIDURAL INJECTION OF STEROID      Right L4-5 TFESI Feb 2020-Tor[facility]    VENTRAL HERNIA REPAIR  09/2020      Social Drivers of Health     Tobacco Use: High Risk (3/20/2025)    Patient History     Smoking Tobacco Use: Every Day     Smokeless Tobacco Use: Never     Passive Exposure: Not on file   Alcohol Use: Not on file   Financial Resource Strain: Not on file   Food Insecurity: Not on file   Transportation Needs: Not on file   Physical Activity: Not on file   Stress: Not on file   Housing Stability: Not on file   Depression: Low Risk  (3/20/2025)    Depression     Last PHQ-4: Flowsheet Data: 0   Utilities: Not on file   Health Literacy: Not on file   Social Isolation: Not on file      Pre-op Assessment    I have reviewed the Patient Summary Reports.     I have reviewed the Nursing Notes. I have reviewed the NPO Status.   I have reviewed the Medications.     Review of Systems  Anesthesia Hx:             Denies Family Hx of Anesthesia complications.    Denies Personal Hx of Anesthesia complications.                    Cardiovascular:     Hypertension                                    Hypertension         Pulmonary:    Asthma    Sleep Apnea   Asthma:    Obstructive Sleep Apnea (MARY).           Hepatic/GI:    Hiatal Hernia,           Hernia, Hiatal Hernia      Neurological:    Neuromuscular Disease,                                 Neuromuscular Disease   Endocrine:  Diabetes    Diabetes                      Psych:  Psychiatric History                   Physical Exam  General: Well nourished, Cooperative, Alert and Oriented    Airway:  Mallampati: II   Mouth Opening: Normal  TM Distance: Normal  Tongue: Normal  Neck ROM: Normal ROM        Anesthesia Plan  Type of Anesthesia, risks & benefits discussed:    Anesthesia Type: MAC  Intra-op Monitoring Plan: Standard ASA Monitors  Induction:  IV  Informed Consent: Informed consent signed with the Patient and all parties understand the risks and agree with anesthesia plan.  All questions answered. Patient consented to blood products? Yes  ASA Score: 3  Day of Surgery Review of History & Physical: H&P Update referred to the surgeon/provider.H&P completed by Anesthesiologist.    Ready For Surgery From Anesthesia Perspective.     .           [1]   Current Outpatient Medications on File Prior to Visit   Medication Sig Dispense Refill    albuterol sulfate 90 mcg/actuation aebs Inhale 180 mcg into the lungs every 4 (four) hours.      albuterol-ipratropium (DUO-NEB) 2.5 mg-0.5 mg/3 mL nebulizer solution Take 3 mLs by nebulization every 6 (six) hours as needed. Rescue      amitriptyline (ELAVIL) 25 MG tablet Take 1 tablet (25 mg total) by mouth every evening. 90 tablet 3    budesonide-formoterol 160-4.5 mcg (SYMBICORT) 160-4.5 mcg/actuation HFAA Inhale 2 puffs into the lungs every 12 (twelve) hours. Controller 32.1 g 3    carvediloL (COREG) 25 MG tablet Take 25 mg by mouth 2 (two) times daily with meals.      chlorthalidone (HYGROTEN) 25 MG Tab       cloNIDine (CATAPRES) 0.1 MG tablet Take 0.1 mg by mouth daily as needed.      cyclobenzaprine (FLEXERIL) 10 MG tablet Take 1 tablet (10 mg total) by mouth 3 (three) times daily as needed for Muscle spasms. 90 tablet 6    furosemide (LASIX) 40 MG tablet Take 40 mg by mouth once daily.      gabapentin (NEURONTIN) 300 MG capsule Take 1 capsule (300 mg total) by mouth every 8 (eight) hours. 90 capsule 1    HUMIRA,CF, PEN 40 mg/0.4 mL PnKt Inject 40 mg into the skin once a week.   "    HYDROcodone-acetaminophen (NORCO)  mg per tablet Take 1 tablet by mouth every 8 (eight) hours as needed for Pain. 90 tablet 0    hydrocortisone 2.5 % ointment Apply topically 2 (two) times daily. 454 g 5    hydrOXYzine pamoate (VISTARIL) 50 MG Cap Take 2 capsules (100 mg total) by mouth nightly as needed (anxiety & difficulty sleeping). 180 capsule 2    ketoconazole (NIZORAL) 2 % cream Apply topically once daily. 60 g 2    lancets (ONETOUCH DELICA LANCETS) 33 gauge Misc 1 lancet by Misc.(Non-Drug; Combo Route) route once daily. 100 each 3    leflunomide (ARAVA) 10 MG Tab Take 10 mg by mouth once daily.      meloxicam (MOBIC) 7.5 MG tablet Take 1 tablet (7.5 mg total) by mouth once daily. 30 tablet 2    metFORMIN (GLUCOPHAGE-XR) 500 MG ER 24hr tablet Take 1 tablet (500 mg total) by mouth once daily. 90 tablet 3    montelukast (SINGULAIR) 10 mg tablet Take 10 mg by mouth once daily.      neomycin-polymyxin-hydrocortisone (CORTISPORIN) 3.5-10,000-1 mg/mL-unit/mL-% otic suspension Place 3 drops into the right ear 2 (two) times a day. 10 mL 0    NIFEdipine (PROCARDIA-XL) 30 MG (OSM) 24 hr tablet Take 30 mg by mouth every evening.      nystatin (MYCOSTATIN) powder Apply topically 2 (two) times daily. 60 g 5    olmesartan (BENICAR) 40 MG tablet Take 40 mg by mouth once daily.      pantoprazole (PROTONIX) 40 MG tablet Take 1 tablet (40 mg total) by mouth 2 (two) times daily. 60 tablet 6    pen needle, diabetic 31 gauge x 1/4" Ndle       promethazine (PHENERGAN) 25 MG tablet Take 1 tablet (25 mg total) by mouth every 6 (six) hours as needed for Nausea. 30 tablet 1    rosuvastatin (CRESTOR) 40 MG Tab Take 1 tablet (40 mg total) by mouth every evening. 90 tablet 3    semaglutide (OZEMPIC) 1 mg/dose (4 mg/3 mL) Inject 1 mg into the skin every 7 days. 9 mL 3    triamcinolone acetonide 0.1% (KENALOG) 0.1 % ointment Apply topically 2 (two) times daily. To rash on back 454 g 0    TRUE METRIX GLUCOSE TEST STRIP Strp 1 " strip by Misc.(Non-Drug; Combo Route) route Daily. For T2DM. 100 strip 3     No current facility-administered medications on file prior to visit.

## 2025-03-27 NOTE — DISCHARGE INSTRUCTIONS
Procedure Date  3/27/25     Impression  Overall Impression:   The terminal ileum appeared normal.  Single large angioectasia in the ascending colon; tissue was ablated with argon plasma coagulation  Subcentimeter polyp in the descending colon; performed cold snare  Erythematous mucosa in the sigmoid colon; performed cold forceps biopsy  Large hemorrhoids        Recommendation  - Repeat colonoscopy in 5 years  - Suspect blood per rectum is most likely hemorrhoidal in nature  - Recommend fiber and miralax as needed     - Discharge patient to home  - Advance diet as tolerated  - Continue present medications  - Await pathology results  - Patient has a contact number available for emergencies. The signs and symptoms of potential delayed complications were discussed with the patient. Return to normal activities tomorrow. Written discharge instructions were provided to the patient.  Indication  Blood in stool

## 2025-03-28 LAB
DHEA SERPL-MCNC: NORMAL
ESTROGEN SERPL-MCNC: NORMAL PG/ML
INSULIN SERPL-ACNC: NORMAL U[IU]/ML
LAB AP GROSS DESCRIPTION: NORMAL
LAB AP LABORATORY NOTES: NORMAL
T3RU NFR SERPL: NORMAL %

## 2025-04-02 ENCOUNTER — OFFICE VISIT (OUTPATIENT)
Dept: PAIN MEDICINE | Facility: CLINIC | Age: 45
End: 2025-04-02
Payer: MEDICAID

## 2025-04-02 VITALS
BODY MASS INDEX: 53.92 KG/M2 | HEIGHT: 62 IN | RESPIRATION RATE: 18 BRPM | DIASTOLIC BLOOD PRESSURE: 105 MMHG | WEIGHT: 293 LBS | SYSTOLIC BLOOD PRESSURE: 170 MMHG | HEART RATE: 76 BPM

## 2025-04-02 DIAGNOSIS — M25.561 CHRONIC PAIN OF RIGHT KNEE: Chronic | ICD-10-CM

## 2025-04-02 DIAGNOSIS — M47.816 SPONDYLOSIS OF LUMBAR REGION WITHOUT MYELOPATHY OR RADICULOPATHY: Chronic | ICD-10-CM

## 2025-04-02 DIAGNOSIS — M05.79 RHEUMATOID ARTHRITIS INVOLVING MULTIPLE SITES WITH POSITIVE RHEUMATOID FACTOR: Primary | Chronic | ICD-10-CM

## 2025-04-02 DIAGNOSIS — M46.1 SACROILIITIS: Chronic | ICD-10-CM

## 2025-04-02 DIAGNOSIS — G89.29 CHRONIC PAIN OF RIGHT KNEE: Chronic | ICD-10-CM

## 2025-04-02 PROCEDURE — 3008F BODY MASS INDEX DOCD: CPT | Mod: CPTII,,, | Performed by: PHYSICIAN ASSISTANT

## 2025-04-02 PROCEDURE — 1159F MED LIST DOCD IN RCRD: CPT | Mod: CPTII,,, | Performed by: PHYSICIAN ASSISTANT

## 2025-04-02 PROCEDURE — 99213 OFFICE O/P EST LOW 20 MIN: CPT | Mod: PBBFAC | Performed by: PHYSICIAN ASSISTANT

## 2025-04-02 PROCEDURE — 3080F DIAST BP >= 90 MM HG: CPT | Mod: CPTII,,, | Performed by: PHYSICIAN ASSISTANT

## 2025-04-02 PROCEDURE — 3061F NEG MICROALBUMINURIA REV: CPT | Mod: CPTII,,, | Performed by: PHYSICIAN ASSISTANT

## 2025-04-02 PROCEDURE — 3066F NEPHROPATHY DOC TX: CPT | Mod: CPTII,,, | Performed by: PHYSICIAN ASSISTANT

## 2025-04-02 PROCEDURE — 99999 PR PBB SHADOW E&M-EST. PATIENT-LVL III: CPT | Mod: PBBFAC,,, | Performed by: PHYSICIAN ASSISTANT

## 2025-04-02 PROCEDURE — 3044F HG A1C LEVEL LT 7.0%: CPT | Mod: CPTII,,, | Performed by: PHYSICIAN ASSISTANT

## 2025-04-02 PROCEDURE — 3077F SYST BP >= 140 MM HG: CPT | Mod: CPTII,,, | Performed by: PHYSICIAN ASSISTANT

## 2025-04-02 PROCEDURE — 99214 OFFICE O/P EST MOD 30 MIN: CPT | Mod: S$PBB,,, | Performed by: PHYSICIAN ASSISTANT

## 2025-04-02 RX ORDER — HYDROCODONE BITARTRATE AND ACETAMINOPHEN 10; 325 MG/1; MG/1
1 TABLET ORAL EVERY 8 HOURS PRN
Qty: 90 TABLET | Refills: 0 | Status: SHIPPED | OUTPATIENT
Start: 2025-05-10 | End: 2025-06-09

## 2025-04-02 RX ORDER — TRAMADOL HYDROCHLORIDE 100 MG/1
100 TABLET, EXTENDED RELEASE ORAL DAILY
Qty: 30 TABLET | Refills: 1 | Status: SHIPPED | OUTPATIENT
Start: 2025-04-10

## 2025-04-02 RX ORDER — GABAPENTIN 300 MG/1
300 CAPSULE ORAL EVERY 8 HOURS
Qty: 90 CAPSULE | Refills: 1 | Status: SHIPPED | OUTPATIENT
Start: 2025-04-02

## 2025-04-02 RX ORDER — HYDROCODONE BITARTRATE AND ACETAMINOPHEN 10; 325 MG/1; MG/1
1 TABLET ORAL EVERY 8 HOURS PRN
Qty: 90 TABLET | Refills: 0 | Status: SHIPPED | OUTPATIENT
Start: 2025-04-10 | End: 2025-05-10

## 2025-04-03 ENCOUNTER — RESULTS FOLLOW-UP (OUTPATIENT)
Dept: GASTROENTEROLOGY | Facility: HOSPITAL | Age: 45
End: 2025-04-03

## 2025-04-07 ENCOUNTER — HOSPITAL ENCOUNTER (OUTPATIENT)
Dept: RADIOLOGY | Facility: HOSPITAL | Age: 45
Discharge: HOME | End: 2025-04-07
Attending: NURSE PRACTITIONER
Payer: MEDICAID

## 2025-04-07 ENCOUNTER — TELEPHONE (OUTPATIENT)
Dept: GASTROENTEROLOGY | Facility: CLINIC | Age: 45
End: 2025-04-07
Payer: MEDICAID

## 2025-04-07 VITALS — HEIGHT: 62 IN | WEIGHT: 293 LBS | BODY MASS INDEX: 53.92 KG/M2

## 2025-04-07 DIAGNOSIS — Z12.31 BREAST CANCER SCREENING BY MAMMOGRAM: ICD-10-CM

## 2025-04-07 PROCEDURE — 77063 BREAST TOMOSYNTHESIS BI: CPT | Mod: 26,,, | Performed by: RADIOLOGY

## 2025-04-07 PROCEDURE — 77067 SCR MAMMO BI INCL CAD: CPT | Mod: TC

## 2025-04-07 PROCEDURE — 77067 SCR MAMMO BI INCL CAD: CPT | Mod: 26,,, | Performed by: RADIOLOGY

## 2025-04-07 NOTE — TELEPHONE ENCOUNTER
----- Message from Med Assistant Guy sent at 4/7/2025  1:12 PM CDT -----  Regarding: Pathology results  Placed 5 year reminder in EPIC  ----- Message -----  From: Tree Roche MD  Sent: 4/3/2025   3:26 PM CDT  To: Kaley HERNANDEZ Staff    Please advise patient that polyp pathology was reviewed and is benign. Repeat colonoscopy recommended in 5 years.  Place reminder in system for repeat colonoscopy.      ----- Message -----  From: Interface, Lab In [facility]  Sent: 3/27/2025   1:06 PM CDT  To: Tree Roche MD

## 2025-06-02 ENCOUNTER — OFFICE VISIT (OUTPATIENT)
Dept: PAIN MEDICINE | Facility: CLINIC | Age: 45
End: 2025-06-02
Payer: MEDICAID

## 2025-06-02 VITALS
WEIGHT: 293 LBS | HEIGHT: 62 IN | HEART RATE: 72 BPM | BODY MASS INDEX: 53.92 KG/M2 | RESPIRATION RATE: 20 BRPM | SYSTOLIC BLOOD PRESSURE: 132 MMHG | DIASTOLIC BLOOD PRESSURE: 83 MMHG

## 2025-06-02 DIAGNOSIS — M25.561 CHRONIC PAIN OF RIGHT KNEE: Chronic | ICD-10-CM

## 2025-06-02 DIAGNOSIS — M46.1 SACROILIITIS: Chronic | ICD-10-CM

## 2025-06-02 DIAGNOSIS — G89.29 CHRONIC PAIN OF RIGHT KNEE: Chronic | ICD-10-CM

## 2025-06-02 DIAGNOSIS — Z79.899 ENCOUNTER FOR LONG-TERM (CURRENT) USE OF MEDICATIONS: ICD-10-CM

## 2025-06-02 DIAGNOSIS — M47.816 SPONDYLOSIS OF LUMBAR REGION WITHOUT MYELOPATHY OR RADICULOPATHY: Primary | Chronic | ICD-10-CM

## 2025-06-02 DIAGNOSIS — M05.79 RHEUMATOID ARTHRITIS INVOLVING MULTIPLE SITES WITH POSITIVE RHEUMATOID FACTOR: Chronic | ICD-10-CM

## 2025-06-02 PROCEDURE — 99999 PR PBB SHADOW E&M-EST. PATIENT-LVL III: CPT | Mod: PBBFAC,,, | Performed by: PHYSICIAN ASSISTANT

## 2025-06-02 PROCEDURE — 3079F DIAST BP 80-89 MM HG: CPT | Mod: CPTII,,, | Performed by: PHYSICIAN ASSISTANT

## 2025-06-02 PROCEDURE — 99999PBSHW PR PBB SHADOW TECHNICAL ONLY FILED TO HB: Mod: PBBFAC,JZ,,

## 2025-06-02 PROCEDURE — 1159F MED LIST DOCD IN RCRD: CPT | Mod: CPTII,,, | Performed by: PHYSICIAN ASSISTANT

## 2025-06-02 PROCEDURE — 96372 THER/PROPH/DIAG INJ SC/IM: CPT | Mod: PBBFAC | Performed by: PHYSICIAN ASSISTANT

## 2025-06-02 PROCEDURE — 3008F BODY MASS INDEX DOCD: CPT | Mod: CPTII,,, | Performed by: PHYSICIAN ASSISTANT

## 2025-06-02 PROCEDURE — 3075F SYST BP GE 130 - 139MM HG: CPT | Mod: CPTII,,, | Performed by: PHYSICIAN ASSISTANT

## 2025-06-02 PROCEDURE — 99214 OFFICE O/P EST MOD 30 MIN: CPT | Mod: S$PBB,25,, | Performed by: PHYSICIAN ASSISTANT

## 2025-06-02 PROCEDURE — 80305 DRUG TEST PRSMV DIR OPT OBS: CPT | Mod: PBBFAC | Performed by: PHYSICIAN ASSISTANT

## 2025-06-02 PROCEDURE — 3066F NEPHROPATHY DOC TX: CPT | Mod: CPTII,,, | Performed by: PHYSICIAN ASSISTANT

## 2025-06-02 PROCEDURE — 99213 OFFICE O/P EST LOW 20 MIN: CPT | Mod: PBBFAC | Performed by: PHYSICIAN ASSISTANT

## 2025-06-02 PROCEDURE — 3061F NEG MICROALBUMINURIA REV: CPT | Mod: CPTII,,, | Performed by: PHYSICIAN ASSISTANT

## 2025-06-02 PROCEDURE — 3044F HG A1C LEVEL LT 7.0%: CPT | Mod: CPTII,,, | Performed by: PHYSICIAN ASSISTANT

## 2025-06-02 PROCEDURE — 99999PBSHW POCT URINE DRUG SCREEN PRESUMP: Mod: PBBFAC,,,

## 2025-06-02 RX ORDER — GABAPENTIN 300 MG/1
300 CAPSULE ORAL EVERY 8 HOURS
Qty: 90 CAPSULE | Refills: 1 | Status: SHIPPED | OUTPATIENT
Start: 2025-06-02

## 2025-06-02 RX ORDER — KETOROLAC TROMETHAMINE 30 MG/ML
60 INJECTION, SOLUTION INTRAMUSCULAR; INTRAVENOUS
Status: COMPLETED | OUTPATIENT
Start: 2025-06-02 | End: 2025-06-02

## 2025-06-02 RX ORDER — HYDROCODONE BITARTRATE AND ACETAMINOPHEN 10; 325 MG/1; MG/1
1 TABLET ORAL EVERY 8 HOURS PRN
Qty: 90 TABLET | Refills: 0 | Status: SHIPPED | OUTPATIENT
Start: 2025-07-09 | End: 2025-08-08

## 2025-06-02 RX ORDER — HYDROCODONE BITARTRATE AND ACETAMINOPHEN 10; 325 MG/1; MG/1
1 TABLET ORAL EVERY 8 HOURS PRN
Qty: 90 TABLET | Refills: 0 | Status: SHIPPED | OUTPATIENT
Start: 2025-06-09 | End: 2025-07-09

## 2025-06-02 RX ADMIN — KETOROLAC TROMETHAMINE 60 MG: 30 INJECTION, SOLUTION INTRAMUSCULAR at 02:06

## 2025-07-25 DIAGNOSIS — G89.29 CHRONIC PAIN OF RIGHT KNEE: Chronic | ICD-10-CM

## 2025-07-25 DIAGNOSIS — M25.561 CHRONIC PAIN OF RIGHT KNEE: Chronic | ICD-10-CM

## 2025-07-25 DIAGNOSIS — M46.1 SACROILIITIS: Chronic | ICD-10-CM

## 2025-07-25 DIAGNOSIS — M05.79 RHEUMATOID ARTHRITIS INVOLVING MULTIPLE SITES WITH POSITIVE RHEUMATOID FACTOR: Chronic | ICD-10-CM

## 2025-07-25 DIAGNOSIS — M47.816 SPONDYLOSIS OF LUMBAR REGION WITHOUT MYELOPATHY OR RADICULOPATHY: Chronic | ICD-10-CM

## 2025-07-28 RX ORDER — HYDROCODONE BITARTRATE AND ACETAMINOPHEN 10; 325 MG/1; MG/1
1 TABLET ORAL EVERY 8 HOURS PRN
Qty: 90 TABLET | Refills: 0 | Status: SHIPPED | OUTPATIENT
Start: 2025-08-08 | End: 2025-09-07

## 2025-07-28 RX ORDER — HYDROCODONE BITARTRATE AND ACETAMINOPHEN 10; 325 MG/1; MG/1
1 TABLET ORAL EVERY 8 HOURS PRN
Qty: 90 TABLET | Refills: 0 | Status: SHIPPED | OUTPATIENT
Start: 2025-08-08 | End: 2025-07-28 | Stop reason: SDUPTHER

## 2025-08-21 ENCOUNTER — OFFICE VISIT (OUTPATIENT)
Dept: PAIN MEDICINE | Facility: CLINIC | Age: 45
End: 2025-08-21
Payer: MEDICAID

## 2025-08-21 VITALS
BODY MASS INDEX: 53.92 KG/M2 | SYSTOLIC BLOOD PRESSURE: 152 MMHG | HEART RATE: 74 BPM | DIASTOLIC BLOOD PRESSURE: 91 MMHG | WEIGHT: 293 LBS | HEIGHT: 62 IN | RESPIRATION RATE: 18 BRPM

## 2025-08-21 DIAGNOSIS — M05.79 RHEUMATOID ARTHRITIS INVOLVING MULTIPLE SITES WITH POSITIVE RHEUMATOID FACTOR: Chronic | ICD-10-CM

## 2025-08-21 DIAGNOSIS — M25.561 CHRONIC PAIN OF BOTH KNEES: Primary | Chronic | ICD-10-CM

## 2025-08-21 DIAGNOSIS — M47.816 SPONDYLOSIS OF LUMBAR REGION WITHOUT MYELOPATHY OR RADICULOPATHY: Chronic | ICD-10-CM

## 2025-08-21 DIAGNOSIS — G89.29 CHRONIC PAIN OF BOTH KNEES: Primary | Chronic | ICD-10-CM

## 2025-08-21 DIAGNOSIS — M25.562 CHRONIC PAIN OF BOTH KNEES: Primary | Chronic | ICD-10-CM

## 2025-08-21 DIAGNOSIS — M46.1 SACROILIITIS: Chronic | ICD-10-CM

## 2025-08-21 PROCEDURE — 3061F NEG MICROALBUMINURIA REV: CPT | Mod: CPTII,,, | Performed by: PHYSICIAN ASSISTANT

## 2025-08-21 PROCEDURE — 96372 THER/PROPH/DIAG INJ SC/IM: CPT | Mod: PBBFAC | Performed by: PHYSICIAN ASSISTANT

## 2025-08-21 PROCEDURE — 3077F SYST BP >= 140 MM HG: CPT | Mod: CPTII,,, | Performed by: PHYSICIAN ASSISTANT

## 2025-08-21 PROCEDURE — 99999PBSHW PR PBB SHADOW TECHNICAL ONLY FILED TO HB: Mod: PBBFAC,JZ,,

## 2025-08-21 PROCEDURE — 1159F MED LIST DOCD IN RCRD: CPT | Mod: CPTII,,, | Performed by: PHYSICIAN ASSISTANT

## 2025-08-21 PROCEDURE — 99214 OFFICE O/P EST MOD 30 MIN: CPT | Mod: PBBFAC | Performed by: PHYSICIAN ASSISTANT

## 2025-08-21 PROCEDURE — 3044F HG A1C LEVEL LT 7.0%: CPT | Mod: CPTII,,, | Performed by: PHYSICIAN ASSISTANT

## 2025-08-21 PROCEDURE — 99999 PR PBB SHADOW E&M-EST. PATIENT-LVL IV: CPT | Mod: PBBFAC,,, | Performed by: PHYSICIAN ASSISTANT

## 2025-08-21 PROCEDURE — 3066F NEPHROPATHY DOC TX: CPT | Mod: CPTII,,, | Performed by: PHYSICIAN ASSISTANT

## 2025-08-21 PROCEDURE — 3080F DIAST BP >= 90 MM HG: CPT | Mod: CPTII,,, | Performed by: PHYSICIAN ASSISTANT

## 2025-08-21 PROCEDURE — 99214 OFFICE O/P EST MOD 30 MIN: CPT | Mod: S$PBB,25,, | Performed by: PHYSICIAN ASSISTANT

## 2025-08-21 PROCEDURE — 3008F BODY MASS INDEX DOCD: CPT | Mod: CPTII,,, | Performed by: PHYSICIAN ASSISTANT

## 2025-08-21 RX ORDER — KETOROLAC TROMETHAMINE 30 MG/ML
60 INJECTION, SOLUTION INTRAMUSCULAR; INTRAVENOUS
Status: COMPLETED | OUTPATIENT
Start: 2025-08-21 | End: 2025-08-21

## 2025-08-21 RX ORDER — GABAPENTIN 300 MG/1
300 CAPSULE ORAL EVERY 8 HOURS
Qty: 270 CAPSULE | Refills: 0 | Status: SHIPPED | OUTPATIENT
Start: 2025-08-21

## 2025-08-21 RX ORDER — HYDROCODONE BITARTRATE AND ACETAMINOPHEN 10; 325 MG/1; MG/1
1 TABLET ORAL EVERY 8 HOURS PRN
Qty: 90 TABLET | Refills: 0 | Status: SHIPPED | OUTPATIENT
Start: 2025-09-07 | End: 2025-10-07

## 2025-08-21 RX ADMIN — KETOROLAC TROMETHAMINE 60 MG: 30 INJECTION, SOLUTION INTRAMUSCULAR at 02:08

## 2025-08-29 ENCOUNTER — PATIENT MESSAGE (OUTPATIENT)
Facility: HOSPITAL | Age: 45
End: 2025-08-29
Payer: MEDICAID

## 2025-09-03 RX ORDER — MELOXICAM 7.5 MG/1
7.5 TABLET ORAL DAILY
Qty: 30 TABLET | Refills: 3 | Status: SHIPPED | OUTPATIENT
Start: 2025-09-03

## (undated) DEVICE — APPLICATOR CHLORAPREP HI-LITE TINTED ORANGE 26ML

## (undated) DEVICE — KIT IV START 849

## (undated) DEVICE — TRAY NERVE BLOCK (KC) PMA

## (undated) DEVICE — DRAPE THREE-QUARTER 53X77IN

## (undated) DEVICE — GLOVE SURGICAL PROTEXIS PI SIZE 6.5

## (undated) DEVICE — Device

## (undated) DEVICE — DRAPE SURGICAL 3/4 SHEET 52IN X 76IN STERILE

## (undated) DEVICE — CATH IV 22G X 1 AUTOGUARD

## (undated) DEVICE — SOL WATER STRL IRRIGATION 250ML

## (undated) DEVICE — NEEDLE SPINAL SPINOCAN 22GX4 3/4IN CS/50

## (undated) DEVICE — NEEDLE SPINAL 22GX5IN BX/10

## (undated) DEVICE — SET IV SOL CONTIN-FLOW 10 DROP/ML (PRIMARY)

## (undated) DEVICE — TRAY NERVE BLOCK UNIV 10/CA

## (undated) DEVICE — KIT COOLED RF PROBE 17G/150MM COOLIEF

## (undated) DEVICE — APPLICATOR CHLORAPREP ORN 26ML

## (undated) DEVICE — SOL CONTINU-FLO SET 2 LAV

## (undated) DEVICE — KIT IV START OCHSNER CUSTOM

## (undated) DEVICE — CATH IV JELCO 22GX1 IN

## (undated) DEVICE — GLOVE PROTEXIS PI SYN SURG 6.5

## (undated) DEVICE — PAD GROUNDING W/CORD(BAYLIS)

## (undated) DEVICE — PAD ELECTROSURGICAL SPL W/CORD

## (undated) DEVICE — NDL SPINAL CLR HUB 22GX4 3/4

## (undated) DEVICE — CATH IV JELCO 20GX1 1/4IN

## (undated) DEVICE — KIT IV START RUSH

## (undated) DEVICE — CATH IV JELCO 24GX3/4